# Patient Record
Sex: MALE | Race: WHITE | NOT HISPANIC OR LATINO | Employment: OTHER | URBAN - METROPOLITAN AREA
[De-identification: names, ages, dates, MRNs, and addresses within clinical notes are randomized per-mention and may not be internally consistent; named-entity substitution may affect disease eponyms.]

---

## 2017-08-09 ENCOUNTER — ANESTHESIA EVENT (EMERGENCY)
Dept: GASTROENTEROLOGY | Facility: HOSPITAL | Age: 67
DRG: 375 | End: 2017-08-09
Payer: COMMERCIAL

## 2017-08-09 ENCOUNTER — HOSPITAL ENCOUNTER (INPATIENT)
Facility: HOSPITAL | Age: 67
LOS: 3 days | Discharge: HOME | DRG: 375 | End: 2017-08-12
Attending: EMERGENCY MEDICINE | Admitting: FAMILY MEDICINE
Payer: COMMERCIAL

## 2017-08-09 ENCOUNTER — ANESTHESIA (EMERGENCY)
Dept: GASTROENTEROLOGY | Facility: HOSPITAL | Age: 67
DRG: 375 | End: 2017-08-09
Payer: COMMERCIAL

## 2017-08-09 ENCOUNTER — APPOINTMENT (INPATIENT)
Dept: RADIOLOGY | Facility: HOSPITAL | Age: 67
DRG: 375 | End: 2017-08-09
Payer: COMMERCIAL

## 2017-08-09 DIAGNOSIS — R13.10 DYSPHAGIA: Chronic | ICD-10-CM

## 2017-08-09 DIAGNOSIS — K22.9 ESOPHAGEAL ABNORMALITY: ICD-10-CM

## 2017-08-09 DIAGNOSIS — R13.10 ODYNOPHAGIA: Primary | ICD-10-CM

## 2017-08-09 PROBLEM — K21.9 GERD (GASTROESOPHAGEAL REFLUX DISEASE): Chronic | Status: ACTIVE | Noted: 2017-08-09

## 2017-08-09 PROBLEM — E78.5 HYPERLIPIDEMIA: Chronic | Status: ACTIVE | Noted: 2017-08-09

## 2017-08-09 PROBLEM — I10 ESSENTIAL HYPERTENSION: Chronic | Status: ACTIVE | Noted: 2017-08-09

## 2017-08-09 LAB
ANION GAP BLD CALC-SCNC: 17 MMOL/L (ref 4–13)
ANION GAP SERPL CALCULATED.3IONS-SCNC: 8 MMOL/L (ref 4–13)
BASOPHILS # BLD AUTO: 0.03 THOUSANDS/ΜL (ref 0–0.1)
BASOPHILS NFR BLD AUTO: 0 % (ref 0–1)
BUN BLD-MCNC: 12 MG/DL (ref 5–25)
BUN SERPL-MCNC: 11 MG/DL (ref 5–25)
CA-I BLD-SCNC: 1.11 MMOL/L (ref 1.12–1.32)
CALCIUM SERPL-MCNC: 8.6 MG/DL (ref 8.3–10.1)
CHLORIDE BLD-SCNC: 104 MMOL/L (ref 100–108)
CHLORIDE SERPL-SCNC: 107 MMOL/L (ref 100–108)
CO2 SERPL-SCNC: 24 MMOL/L (ref 21–32)
CREAT BLD-MCNC: 1.1 MG/DL (ref 0.6–1.3)
CREAT SERPL-MCNC: 1.08 MG/DL (ref 0.6–1.3)
EOSINOPHIL # BLD AUTO: 0.1 THOUSAND/ΜL (ref 0–0.61)
EOSINOPHIL NFR BLD AUTO: 1 % (ref 0–6)
ERYTHROCYTE [DISTWIDTH] IN BLOOD BY AUTOMATED COUNT: 13.2 % (ref 11.6–15.1)
GFR SERPL CREATININE-BSD FRML MDRD: 69 ML/MIN/1.73SQ M
GFR SERPL CREATININE-BSD FRML MDRD: 70 ML/MIN/1.73SQ M
GLUCOSE SERPL-MCNC: 111 MG/DL (ref 65–140)
GLUCOSE SERPL-MCNC: 117 MG/DL (ref 65–140)
HCT VFR BLD AUTO: 43.3 % (ref 36.5–49.3)
HCT VFR BLD CALC: 42 % (ref 36.5–49.3)
HGB BLD-MCNC: 15.1 G/DL (ref 12–17)
HGB BLDA-MCNC: 14.3 G/DL (ref 12–17)
LYMPHOCYTES # BLD AUTO: 1.45 THOUSANDS/ΜL (ref 0.6–4.47)
LYMPHOCYTES NFR BLD AUTO: 20 % (ref 14–44)
MCH RBC QN AUTO: 31.9 PG (ref 26.8–34.3)
MCHC RBC AUTO-ENTMCNC: 34.9 G/DL (ref 31.4–37.4)
MCV RBC AUTO: 92 FL (ref 82–98)
MONOCYTES # BLD AUTO: 0.6 THOUSAND/ΜL (ref 0.17–1.22)
MONOCYTES NFR BLD AUTO: 8 % (ref 4–12)
NEUTROPHILS # BLD AUTO: 5.22 THOUSANDS/ΜL (ref 1.85–7.62)
NEUTS SEG NFR BLD AUTO: 71 % (ref 43–75)
NRBC BLD AUTO-RTO: 0 /100 WBCS
PCO2 BLD: 25 MMOL/L (ref 21–32)
PLATELET # BLD AUTO: 211 THOUSANDS/UL (ref 149–390)
PMV BLD AUTO: 9 FL (ref 8.9–12.7)
POTASSIUM BLD-SCNC: 3.9 MMOL/L (ref 3.5–5.3)
POTASSIUM SERPL-SCNC: 3.8 MMOL/L (ref 3.5–5.3)
RBC # BLD AUTO: 4.73 MILLION/UL (ref 3.88–5.62)
SODIUM BLD-SCNC: 142 MMOL/L (ref 136–145)
SODIUM SERPL-SCNC: 139 MMOL/L (ref 136–145)
SPECIMEN SOURCE: ABNORMAL
WBC # BLD AUTO: 7.42 THOUSAND/UL (ref 4.31–10.16)

## 2017-08-09 PROCEDURE — 71260 CT THORAX DX C+: CPT

## 2017-08-09 PROCEDURE — 0DB38ZX EXCISION OF LOWER ESOPHAGUS, VIA NATURAL OR ARTIFICIAL OPENING ENDOSCOPIC, DIAGNOSTIC: ICD-10-PCS | Performed by: INTERNAL MEDICINE

## 2017-08-09 PROCEDURE — 80048 BASIC METABOLIC PNL TOTAL CA: CPT | Performed by: EMERGENCY MEDICINE

## 2017-08-09 PROCEDURE — 36415 COLL VENOUS BLD VENIPUNCTURE: CPT | Performed by: EMERGENCY MEDICINE

## 2017-08-09 PROCEDURE — 88305 TISSUE EXAM BY PATHOLOGIST: CPT | Performed by: INTERNAL MEDICINE

## 2017-08-09 PROCEDURE — 99285 EMERGENCY DEPT VISIT HI MDM: CPT

## 2017-08-09 PROCEDURE — 85025 COMPLETE CBC W/AUTO DIFF WBC: CPT | Performed by: EMERGENCY MEDICINE

## 2017-08-09 PROCEDURE — 80047 BASIC METABLC PNL IONIZED CA: CPT

## 2017-08-09 PROCEDURE — 85014 HEMATOCRIT: CPT

## 2017-08-09 PROCEDURE — 74177 CT ABD & PELVIS W/CONTRAST: CPT

## 2017-08-09 RX ORDER — SODIUM CHLORIDE 9 MG/ML
125 INJECTION, SOLUTION INTRAVENOUS CONTINUOUS
Status: DISCONTINUED | OUTPATIENT
Start: 2017-08-09 | End: 2017-08-12 | Stop reason: HOSPADM

## 2017-08-09 RX ORDER — PRAVASTATIN SODIUM 80 MG/1
80 TABLET ORAL
Status: DISCONTINUED | OUTPATIENT
Start: 2017-08-09 | End: 2017-08-12 | Stop reason: HOSPADM

## 2017-08-09 RX ORDER — ONDANSETRON 2 MG/ML
4 INJECTION INTRAMUSCULAR; INTRAVENOUS EVERY 6 HOURS PRN
Status: DISCONTINUED | OUTPATIENT
Start: 2017-08-09 | End: 2017-08-12 | Stop reason: HOSPADM

## 2017-08-09 RX ORDER — AMLODIPINE BESYLATE 5 MG/1
5 TABLET ORAL DAILY
Status: DISCONTINUED | OUTPATIENT
Start: 2017-08-10 | End: 2017-08-12 | Stop reason: HOSPADM

## 2017-08-09 RX ORDER — PROPOFOL 10 MG/ML
INJECTION, EMULSION INTRAVENOUS AS NEEDED
Status: DISCONTINUED | OUTPATIENT
Start: 2017-08-09 | End: 2017-08-09 | Stop reason: SURG

## 2017-08-09 RX ORDER — LIDOCAINE HYDROCHLORIDE 10 MG/ML
INJECTION, SOLUTION EPIDURAL; INFILTRATION; INTRACAUDAL; PERINEURAL AS NEEDED
Status: DISCONTINUED | OUTPATIENT
Start: 2017-08-09 | End: 2017-08-09 | Stop reason: SURG

## 2017-08-09 RX ORDER — SODIUM CHLORIDE 9 MG/ML
INJECTION, SOLUTION INTRAVENOUS CONTINUOUS PRN
Status: DISCONTINUED | OUTPATIENT
Start: 2017-08-09 | End: 2017-08-09 | Stop reason: SURG

## 2017-08-09 RX ORDER — METOPROLOL TARTRATE 50 MG/1
50 TABLET, FILM COATED ORAL EVERY 12 HOURS SCHEDULED
Status: DISCONTINUED | OUTPATIENT
Start: 2017-08-09 | End: 2017-08-12 | Stop reason: HOSPADM

## 2017-08-09 RX ORDER — ACETAMINOPHEN 325 MG/1
650 TABLET ORAL EVERY 6 HOURS PRN
Status: DISCONTINUED | OUTPATIENT
Start: 2017-08-09 | End: 2017-08-12 | Stop reason: HOSPADM

## 2017-08-09 RX ORDER — ASPIRIN 81 MG/1
81 TABLET, CHEWABLE ORAL DAILY
COMMUNITY

## 2017-08-09 RX ORDER — PANTOPRAZOLE SODIUM 40 MG/1
40 TABLET, DELAYED RELEASE ORAL
Status: DISCONTINUED | OUTPATIENT
Start: 2017-08-10 | End: 2017-08-09 | Stop reason: SDUPTHER

## 2017-08-09 RX ORDER — ASPIRIN 81 MG/1
81 TABLET, CHEWABLE ORAL DAILY
Status: DISCONTINUED | OUTPATIENT
Start: 2017-08-10 | End: 2017-08-12 | Stop reason: HOSPADM

## 2017-08-09 RX ORDER — PROPOFOL 10 MG/ML
INJECTION, EMULSION INTRAVENOUS CONTINUOUS PRN
Status: DISCONTINUED | OUTPATIENT
Start: 2017-08-09 | End: 2017-08-09 | Stop reason: SURG

## 2017-08-09 RX ORDER — METOPROLOL TARTRATE 50 MG/1
50 TABLET, FILM COATED ORAL 2 TIMES DAILY
COMMUNITY
End: 2021-01-01 | Stop reason: HOSPADM

## 2017-08-09 RX ORDER — AMLODIPINE BESYLATE 5 MG/1
5 TABLET ORAL DAILY
COMMUNITY
End: 2021-01-01 | Stop reason: HOSPADM

## 2017-08-09 RX ORDER — PANTOPRAZOLE SODIUM 40 MG/1
40 TABLET, DELAYED RELEASE ORAL
Status: DISCONTINUED | OUTPATIENT
Start: 2017-08-10 | End: 2017-08-12 | Stop reason: HOSPADM

## 2017-08-09 RX ORDER — SIMVASTATIN 40 MG
40 TABLET ORAL
COMMUNITY

## 2017-08-09 RX ADMIN — PROPOFOL 120 MCG/KG/MIN: 10 INJECTION, EMULSION INTRAVENOUS at 12:29

## 2017-08-09 RX ADMIN — METOPROLOL TARTRATE 50 MG: 50 TABLET ORAL at 22:03

## 2017-08-09 RX ADMIN — IOHEXOL 100 ML: 350 INJECTION, SOLUTION INTRAVENOUS at 16:23

## 2017-08-09 RX ADMIN — PRAVASTATIN SODIUM 80 MG: 80 TABLET ORAL at 17:51

## 2017-08-09 RX ADMIN — PROPOFOL 120 MG: 10 INJECTION, EMULSION INTRAVENOUS at 12:29

## 2017-08-09 RX ADMIN — SODIUM CHLORIDE 125 ML/HR: 0.9 INJECTION, SOLUTION INTRAVENOUS at 19:20

## 2017-08-09 RX ADMIN — LIDOCAINE HYDROCHLORIDE 100 MG: 10 INJECTION, SOLUTION EPIDURAL; INFILTRATION; INTRACAUDAL; PERINEURAL at 12:29

## 2017-08-09 RX ADMIN — SODIUM CHLORIDE: 0.9 INJECTION, SOLUTION INTRAVENOUS at 12:21

## 2017-08-10 LAB
ALBUMIN SERPL BCP-MCNC: 3.5 G/DL (ref 3.5–5)
ALP SERPL-CCNC: 70 U/L (ref 46–116)
ALT SERPL W P-5'-P-CCNC: 19 U/L (ref 12–78)
ANION GAP SERPL CALCULATED.3IONS-SCNC: 10 MMOL/L (ref 4–13)
AST SERPL W P-5'-P-CCNC: 21 U/L (ref 5–45)
BASOPHILS # BLD AUTO: 0.02 THOUSANDS/ΜL (ref 0–0.1)
BASOPHILS NFR BLD AUTO: 0 % (ref 0–1)
BILIRUB SERPL-MCNC: 0.63 MG/DL (ref 0.2–1)
BUN SERPL-MCNC: 10 MG/DL (ref 5–25)
CALCIUM SERPL-MCNC: 8.3 MG/DL (ref 8.3–10.1)
CHLORIDE SERPL-SCNC: 108 MMOL/L (ref 100–108)
CO2 SERPL-SCNC: 23 MMOL/L (ref 21–32)
CREAT SERPL-MCNC: 1.09 MG/DL (ref 0.6–1.3)
EOSINOPHIL # BLD AUTO: 0.19 THOUSAND/ΜL (ref 0–0.61)
EOSINOPHIL NFR BLD AUTO: 3 % (ref 0–6)
ERYTHROCYTE [DISTWIDTH] IN BLOOD BY AUTOMATED COUNT: 13.2 % (ref 11.6–15.1)
GFR SERPL CREATININE-BSD FRML MDRD: 69 ML/MIN/1.73SQ M
GLUCOSE SERPL-MCNC: 102 MG/DL (ref 65–140)
HCT VFR BLD AUTO: 42.4 % (ref 36.5–49.3)
HGB BLD-MCNC: 14.6 G/DL (ref 12–17)
INR PPP: 1.03 (ref 0.86–1.16)
LYMPHOCYTES # BLD AUTO: 1.64 THOUSANDS/ΜL (ref 0.6–4.47)
LYMPHOCYTES NFR BLD AUTO: 23 % (ref 14–44)
MCH RBC QN AUTO: 31.7 PG (ref 26.8–34.3)
MCHC RBC AUTO-ENTMCNC: 34.4 G/DL (ref 31.4–37.4)
MCV RBC AUTO: 92 FL (ref 82–98)
MONOCYTES # BLD AUTO: 0.63 THOUSAND/ΜL (ref 0.17–1.22)
MONOCYTES NFR BLD AUTO: 9 % (ref 4–12)
NEUTROPHILS # BLD AUTO: 4.68 THOUSANDS/ΜL (ref 1.85–7.62)
NEUTS SEG NFR BLD AUTO: 65 % (ref 43–75)
NRBC BLD AUTO-RTO: 0 /100 WBCS
PLATELET # BLD AUTO: 213 THOUSANDS/UL (ref 149–390)
PLATELET # BLD AUTO: 224 THOUSANDS/UL (ref 149–390)
PMV BLD AUTO: 9 FL (ref 8.9–12.7)
PMV BLD AUTO: 9.1 FL (ref 8.9–12.7)
POTASSIUM SERPL-SCNC: 3.8 MMOL/L (ref 3.5–5.3)
PROT SERPL-MCNC: 6.8 G/DL (ref 6.4–8.2)
PROTHROMBIN TIME: 13.5 SECONDS (ref 12.1–14.4)
RBC # BLD AUTO: 4.6 MILLION/UL (ref 3.88–5.62)
SODIUM SERPL-SCNC: 141 MMOL/L (ref 136–145)
WBC # BLD AUTO: 7.18 THOUSAND/UL (ref 4.31–10.16)

## 2017-08-10 PROCEDURE — 80053 COMPREHEN METABOLIC PANEL: CPT | Performed by: INTERNAL MEDICINE

## 2017-08-10 PROCEDURE — 85610 PROTHROMBIN TIME: CPT | Performed by: PHYSICIAN ASSISTANT

## 2017-08-10 PROCEDURE — 85025 COMPLETE CBC W/AUTO DIFF WBC: CPT | Performed by: INTERNAL MEDICINE

## 2017-08-10 PROCEDURE — 85049 AUTOMATED PLATELET COUNT: CPT | Performed by: INTERNAL MEDICINE

## 2017-08-10 RX ORDER — HEPARIN SODIUM 5000 [USP'U]/ML
5000 INJECTION, SOLUTION INTRAVENOUS; SUBCUTANEOUS EVERY 8 HOURS SCHEDULED
Status: DISCONTINUED | OUTPATIENT
Start: 2017-08-10 | End: 2017-08-12 | Stop reason: HOSPADM

## 2017-08-10 RX ADMIN — SODIUM CHLORIDE 125 ML/HR: 0.9 INJECTION, SOLUTION INTRAVENOUS at 19:01

## 2017-08-10 RX ADMIN — HEPARIN SODIUM 5000 UNITS: 5000 INJECTION, SOLUTION INTRAVENOUS; SUBCUTANEOUS at 14:01

## 2017-08-10 RX ADMIN — METOPROLOL TARTRATE 50 MG: 50 TABLET ORAL at 20:01

## 2017-08-10 RX ADMIN — PRAVASTATIN SODIUM 80 MG: 80 TABLET ORAL at 20:01

## 2017-08-10 RX ADMIN — SODIUM CHLORIDE 125 ML/HR: 0.9 INJECTION, SOLUTION INTRAVENOUS at 03:26

## 2017-08-10 RX ADMIN — PANTOPRAZOLE SODIUM 40 MG: 40 TABLET, DELAYED RELEASE ORAL at 05:57

## 2017-08-10 RX ADMIN — AMLODIPINE BESYLATE 5 MG: 5 TABLET ORAL at 10:25

## 2017-08-10 RX ADMIN — METOPROLOL TARTRATE 50 MG: 50 TABLET ORAL at 10:25

## 2017-08-10 RX ADMIN — SODIUM CHLORIDE 125 ML/HR: 0.9 INJECTION, SOLUTION INTRAVENOUS at 10:31

## 2017-08-10 RX ADMIN — ASPIRIN 81 MG 81 MG: 81 TABLET ORAL at 10:25

## 2017-08-11 ENCOUNTER — APPOINTMENT (INPATIENT)
Dept: RADIOLOGY | Facility: HOSPITAL | Age: 67
DRG: 375 | End: 2017-08-11
Payer: COMMERCIAL

## 2017-08-11 ENCOUNTER — ANESTHESIA (INPATIENT)
Dept: GASTROENTEROLOGY | Facility: HOSPITAL | Age: 67
DRG: 375 | End: 2017-08-11
Payer: COMMERCIAL

## 2017-08-11 ENCOUNTER — APPOINTMENT (INPATIENT)
Dept: RADIOLOGY | Facility: HOSPITAL | Age: 67
DRG: 375 | End: 2017-08-11
Attending: INTERNAL MEDICINE
Payer: COMMERCIAL

## 2017-08-11 ENCOUNTER — ANESTHESIA EVENT (INPATIENT)
Dept: GASTROENTEROLOGY | Facility: HOSPITAL | Age: 67
DRG: 375 | End: 2017-08-11
Payer: COMMERCIAL

## 2017-08-11 PROCEDURE — 99152 MOD SED SAME PHYS/QHP 5/>YRS: CPT

## 2017-08-11 PROCEDURE — 88305 TISSUE EXAM BY PATHOLOGIST: CPT | Performed by: FAMILY MEDICINE

## 2017-08-11 PROCEDURE — 0BBD3ZX EXCISION OF RIGHT MIDDLE LUNG LOBE, PERCUTANEOUS APPROACH, DIAGNOSTIC: ICD-10-PCS | Performed by: RADIOLOGY

## 2017-08-11 PROCEDURE — 88333 PATH CONSLTJ SURG CYTO XM 1: CPT | Performed by: FAMILY MEDICINE

## 2017-08-11 PROCEDURE — 0D738DZ DILATION OF LOWER ESOPHAGUS WITH INTRALUMINAL DEVICE, VIA NATURAL OR ARTIFICIAL OPENING ENDOSCOPIC: ICD-10-PCS | Performed by: INTERNAL MEDICINE

## 2017-08-11 PROCEDURE — 76000 FLUOROSCOPY <1 HR PHYS/QHP: CPT

## 2017-08-11 PROCEDURE — C1874 STENT, COATED/COV W/DEL SYS: HCPCS | Performed by: INTERNAL MEDICINE

## 2017-08-11 PROCEDURE — 32405 HB PERCUT BX LUNG/MEDIASTINUM: CPT

## 2017-08-11 PROCEDURE — 99153 MOD SED SAME PHYS/QHP EA: CPT

## 2017-08-11 PROCEDURE — 88305 TISSUE EXAM BY PATHOLOGIST: CPT | Performed by: INTERNAL MEDICINE

## 2017-08-11 PROCEDURE — 88341 IMHCHEM/IMCYTCHM EA ADD ANTB: CPT | Performed by: FAMILY MEDICINE

## 2017-08-11 PROCEDURE — 77012 CT SCAN FOR NEEDLE BIOPSY: CPT

## 2017-08-11 PROCEDURE — 88342 IMHCHEM/IMCYTCHM 1ST ANTB: CPT | Performed by: FAMILY MEDICINE

## 2017-08-11 PROCEDURE — 88313 SPECIAL STAINS GROUP 2: CPT | Performed by: INTERNAL MEDICINE

## 2017-08-11 PROCEDURE — 0DB68ZX EXCISION OF STOMACH, VIA NATURAL OR ARTIFICIAL OPENING ENDOSCOPIC, DIAGNOSTIC: ICD-10-PCS | Performed by: INTERNAL MEDICINE

## 2017-08-11 PROCEDURE — C1769 GUIDE WIRE: HCPCS | Performed by: INTERNAL MEDICINE

## 2017-08-11 DEVICE — STENT SYSTEM
Type: IMPLANTABLE DEVICE | Site: ESOPHAGUS | Status: NON-FUNCTIONAL
Brand: WALLFLEX™ ESOPHAGEAL
Removed: 2020-02-11

## 2017-08-11 DEVICE — STENT SYSTEM
Type: IMPLANTABLE DEVICE | Site: ESOPHAGUS | Status: FUNCTIONAL
Brand: WALLFLEX™ ESOPHAGEAL

## 2017-08-11 RX ORDER — MIDAZOLAM HYDROCHLORIDE 1 MG/ML
INJECTION INTRAMUSCULAR; INTRAVENOUS CODE/TRAUMA/SEDATION MEDICATION
Status: COMPLETED | OUTPATIENT
Start: 2017-08-11 | End: 2017-08-11

## 2017-08-11 RX ORDER — PROPOFOL 10 MG/ML
INJECTION, EMULSION INTRAVENOUS AS NEEDED
Status: DISCONTINUED | OUTPATIENT
Start: 2017-08-11 | End: 2017-08-11 | Stop reason: SURG

## 2017-08-11 RX ORDER — FENTANYL CITRATE 50 UG/ML
INJECTION, SOLUTION INTRAMUSCULAR; INTRAVENOUS CODE/TRAUMA/SEDATION MEDICATION
Status: COMPLETED | OUTPATIENT
Start: 2017-08-11 | End: 2017-08-11

## 2017-08-11 RX ORDER — PROPOFOL 10 MG/ML
INJECTION, EMULSION INTRAVENOUS CONTINUOUS PRN
Status: DISCONTINUED | OUTPATIENT
Start: 2017-08-11 | End: 2017-08-11 | Stop reason: SURG

## 2017-08-11 RX ADMIN — PROPOFOL 50 MG: 10 INJECTION, EMULSION INTRAVENOUS at 12:18

## 2017-08-11 RX ADMIN — PRAVASTATIN SODIUM 80 MG: 80 TABLET ORAL at 17:57

## 2017-08-11 RX ADMIN — ASPIRIN 81 MG 81 MG: 81 TABLET ORAL at 08:03

## 2017-08-11 RX ADMIN — SODIUM CHLORIDE 125 ML/HR: 0.9 INJECTION, SOLUTION INTRAVENOUS at 16:32

## 2017-08-11 RX ADMIN — PROPOFOL 50 MG: 10 INJECTION, EMULSION INTRAVENOUS at 12:21

## 2017-08-11 RX ADMIN — HEPARIN SODIUM 5000 UNITS: 5000 INJECTION, SOLUTION INTRAVENOUS; SUBCUTANEOUS at 22:23

## 2017-08-11 RX ADMIN — MIDAZOLAM 1 MG: 1 INJECTION INTRAMUSCULAR; INTRAVENOUS at 09:30

## 2017-08-11 RX ADMIN — PANTOPRAZOLE SODIUM 40 MG: 40 TABLET, DELAYED RELEASE ORAL at 05:57

## 2017-08-11 RX ADMIN — HEPARIN SODIUM 5000 UNITS: 5000 INJECTION, SOLUTION INTRAVENOUS; SUBCUTANEOUS at 01:39

## 2017-08-11 RX ADMIN — METOPROLOL TARTRATE 50 MG: 50 TABLET ORAL at 22:23

## 2017-08-11 RX ADMIN — AMLODIPINE BESYLATE 5 MG: 5 TABLET ORAL at 08:03

## 2017-08-11 RX ADMIN — METOPROLOL TARTRATE 50 MG: 50 TABLET ORAL at 08:03

## 2017-08-11 RX ADMIN — SODIUM CHLORIDE: 0.9 INJECTION, SOLUTION INTRAVENOUS at 12:12

## 2017-08-11 RX ADMIN — FENTANYL CITRATE 50 MCG: 50 INJECTION INTRAMUSCULAR; INTRAVENOUS at 09:30

## 2017-08-11 RX ADMIN — PROPOFOL 150 MCG/KG/MIN: 10 INJECTION, EMULSION INTRAVENOUS at 12:20

## 2017-08-11 RX ADMIN — SODIUM CHLORIDE 125 ML/HR: 0.9 INJECTION, SOLUTION INTRAVENOUS at 01:42

## 2017-08-12 RX ORDER — PANTOPRAZOLE SODIUM 40 MG/1
40 TABLET, DELAYED RELEASE ORAL
Qty: 30 TABLET | Refills: 0 | Status: SHIPPED | OUTPATIENT
Start: 2017-08-12

## 2017-08-12 RX ADMIN — METOPROLOL TARTRATE 50 MG: 50 TABLET ORAL at 09:29

## 2017-08-12 RX ADMIN — AMLODIPINE BESYLATE 5 MG: 5 TABLET ORAL at 09:29

## 2017-08-12 RX ADMIN — HEPARIN SODIUM 5000 UNITS: 5000 INJECTION, SOLUTION INTRAVENOUS; SUBCUTANEOUS at 14:06

## 2017-08-12 RX ADMIN — ASPIRIN 81 MG 81 MG: 81 TABLET ORAL at 09:29

## 2017-08-12 RX ADMIN — PANTOPRAZOLE SODIUM 40 MG: 40 TABLET, DELAYED RELEASE ORAL at 06:54

## 2017-08-12 RX ADMIN — SODIUM CHLORIDE 125 ML/HR: 0.9 INJECTION, SOLUTION INTRAVENOUS at 00:49

## 2017-08-12 RX ADMIN — HEPARIN SODIUM 5000 UNITS: 5000 INJECTION, SOLUTION INTRAVENOUS; SUBCUTANEOUS at 06:54

## 2017-08-12 RX ADMIN — SODIUM CHLORIDE 125 ML/HR: 0.9 INJECTION, SOLUTION INTRAVENOUS at 09:26

## 2017-08-16 ENCOUNTER — GENERIC CONVERSION - ENCOUNTER (OUTPATIENT)
Dept: OTHER | Facility: OTHER | Age: 67
End: 2017-08-16

## 2017-08-16 VITALS
SYSTOLIC BLOOD PRESSURE: 135 MMHG | HEART RATE: 70 BPM | RESPIRATION RATE: 18 BRPM | WEIGHT: 200 LBS | DIASTOLIC BLOOD PRESSURE: 70 MMHG | BODY MASS INDEX: 31.39 KG/M2 | HEIGHT: 67 IN | TEMPERATURE: 98.1 F | OXYGEN SATURATION: 97 %

## 2017-08-22 ENCOUNTER — ALLSCRIPTS OFFICE VISIT (OUTPATIENT)
Dept: OTHER | Facility: OTHER | Age: 67
End: 2017-08-22

## 2017-08-24 ENCOUNTER — TELEPHONE (OUTPATIENT)
Dept: RADIOLOGY | Facility: HOSPITAL | Age: 67
End: 2017-08-24

## 2017-08-24 DIAGNOSIS — C15.9 MALIGNANT NEOPLASM OF ESOPHAGUS (HCC): ICD-10-CM

## 2017-08-24 RX ORDER — SODIUM CHLORIDE 9 MG/ML
75 INJECTION, SOLUTION INTRAVENOUS CONTINUOUS
Status: CANCELLED | OUTPATIENT
Start: 2017-08-24

## 2017-08-28 ENCOUNTER — TRANSCRIBE ORDERS (OUTPATIENT)
Dept: ADMINISTRATIVE | Facility: HOSPITAL | Age: 67
End: 2017-08-28

## 2017-08-28 ENCOUNTER — TELEPHONE (OUTPATIENT)
Dept: SURGERY | Facility: HOSPITAL | Age: 67
End: 2017-08-28

## 2017-08-28 ENCOUNTER — HOSPITAL ENCOUNTER (OUTPATIENT)
Dept: RADIOLOGY | Facility: HOSPITAL | Age: 67
Discharge: HOME | End: 2017-08-28
Attending: INTERNAL MEDICINE | Admitting: RADIOLOGY
Payer: COMMERCIAL

## 2017-08-28 VITALS
RESPIRATION RATE: 20 BRPM | HEART RATE: 68 BPM | SYSTOLIC BLOOD PRESSURE: 104 MMHG | TEMPERATURE: 97.5 F | DIASTOLIC BLOOD PRESSURE: 59 MMHG | BODY MASS INDEX: 31.86 KG/M2 | WEIGHT: 203 LBS | OXYGEN SATURATION: 95 % | HEIGHT: 67 IN

## 2017-08-28 DIAGNOSIS — C15.9 ESOPHAGEAL CANCER (HCC): ICD-10-CM

## 2017-08-28 DIAGNOSIS — C15.9 MALIGNANT NEOPLASM OF ESOPHAGUS, UNSPECIFIED LOCATION (HCC): Primary | ICD-10-CM

## 2017-08-28 PROCEDURE — C1788 PORT, INDWELLING, IMP: HCPCS

## 2017-08-28 PROCEDURE — 76937 US GUIDE VASCULAR ACCESS: CPT

## 2017-08-28 PROCEDURE — 36561 INSERT TUNNELED CV CATH: CPT

## 2017-08-28 PROCEDURE — 77001 FLUOROGUIDE FOR VEIN DEVICE: CPT

## 2017-08-28 PROCEDURE — 99153 MOD SED SAME PHYS/QHP EA: CPT

## 2017-08-28 PROCEDURE — 99152 MOD SED SAME PHYS/QHP 5/>YRS: CPT

## 2017-08-28 RX ORDER — MIDAZOLAM HYDROCHLORIDE 1 MG/ML
INJECTION INTRAMUSCULAR; INTRAVENOUS CODE/TRAUMA/SEDATION MEDICATION
Status: COMPLETED | OUTPATIENT
Start: 2017-08-28 | End: 2017-08-28

## 2017-08-28 RX ORDER — FENTANYL CITRATE 50 UG/ML
INJECTION, SOLUTION INTRAMUSCULAR; INTRAVENOUS CODE/TRAUMA/SEDATION MEDICATION
Status: COMPLETED | OUTPATIENT
Start: 2017-08-28 | End: 2017-08-28

## 2017-08-28 RX ORDER — HYDROCODONE BITARTRATE AND ACETAMINOPHEN 5; 325 MG/1; MG/1
1 TABLET ORAL EVERY 6 HOURS PRN
Status: DISCONTINUED | OUTPATIENT
Start: 2017-08-28 | End: 2017-08-29 | Stop reason: HOSPADM

## 2017-08-28 RX ORDER — SODIUM CHLORIDE 9 MG/ML
75 INJECTION, SOLUTION INTRAVENOUS CONTINUOUS
Status: DISCONTINUED | OUTPATIENT
Start: 2017-08-28 | End: 2017-08-29 | Stop reason: HOSPADM

## 2017-08-28 RX ADMIN — MIDAZOLAM HYDROCHLORIDE 1 MG: 1 INJECTION, SOLUTION INTRAMUSCULAR; INTRAVENOUS at 11:16

## 2017-08-28 RX ADMIN — FENTANYL CITRATE 50 MCG: 50 INJECTION INTRAMUSCULAR; INTRAVENOUS at 11:10

## 2017-08-28 RX ADMIN — MIDAZOLAM HYDROCHLORIDE 1 MG: 1 INJECTION, SOLUTION INTRAMUSCULAR; INTRAVENOUS at 11:10

## 2017-08-28 RX ADMIN — FENTANYL CITRATE 50 MCG: 50 INJECTION INTRAMUSCULAR; INTRAVENOUS at 11:21

## 2017-08-28 RX ADMIN — MIDAZOLAM HYDROCHLORIDE 1 MG: 1 INJECTION, SOLUTION INTRAMUSCULAR; INTRAVENOUS at 11:21

## 2017-08-28 RX ADMIN — MIDAZOLAM HYDROCHLORIDE 1 MG: 1 INJECTION, SOLUTION INTRAMUSCULAR; INTRAVENOUS at 11:27

## 2017-08-28 RX ADMIN — FENTANYL CITRATE 50 MCG: 50 INJECTION INTRAMUSCULAR; INTRAVENOUS at 11:27

## 2017-09-01 LAB
SCAN RESULT: NORMAL
SCAN RESULT: NORMAL

## 2017-09-07 ENCOUNTER — HOSPITAL ENCOUNTER (OUTPATIENT)
Dept: NON INVASIVE DIAGNOSTICS | Facility: CLINIC | Age: 67
Discharge: HOME | End: 2017-09-07
Payer: COMMERCIAL

## 2017-09-07 DIAGNOSIS — C15.9 MALIGNANT NEOPLASM OF ESOPHAGUS, UNSPECIFIED LOCATION (HCC): ICD-10-CM

## 2017-09-07 PROCEDURE — 93306 TTE W/DOPPLER COMPLETE: CPT

## 2017-09-11 ENCOUNTER — APPOINTMENT (OUTPATIENT)
Dept: LAB | Facility: CLINIC | Age: 67
End: 2017-09-11
Payer: COMMERCIAL

## 2017-09-11 DIAGNOSIS — C15.9 MALIGNANT NEOPLASM OF ESOPHAGUS (HCC): ICD-10-CM

## 2017-09-11 LAB
ALBUMIN SERPL BCP-MCNC: 3.9 G/DL (ref 3.5–5)
ALP SERPL-CCNC: 65 U/L (ref 46–116)
ALT SERPL W P-5'-P-CCNC: 31 U/L (ref 12–78)
ANION GAP SERPL CALCULATED.3IONS-SCNC: 10 MMOL/L (ref 4–13)
AST SERPL W P-5'-P-CCNC: 24 U/L (ref 5–45)
BASOPHILS # BLD AUTO: 0.06 THOUSANDS/ΜL (ref 0–0.1)
BASOPHILS NFR BLD AUTO: 1 % (ref 0–1)
BILIRUB SERPL-MCNC: 0.3 MG/DL (ref 0.2–1)
BUN SERPL-MCNC: 12 MG/DL (ref 5–25)
CALCIUM SERPL-MCNC: 8.8 MG/DL (ref 8.3–10.1)
CHLORIDE SERPL-SCNC: 105 MMOL/L (ref 100–108)
CO2 SERPL-SCNC: 27 MMOL/L (ref 21–32)
CREAT SERPL-MCNC: 1.44 MG/DL (ref 0.6–1.3)
EOSINOPHIL # BLD AUTO: 0.22 THOUSAND/ΜL (ref 0–0.61)
EOSINOPHIL NFR BLD AUTO: 3 % (ref 0–6)
ERYTHROCYTE [DISTWIDTH] IN BLOOD BY AUTOMATED COUNT: 13.2 % (ref 11.6–15.1)
GFR SERPL CREATININE-BSD FRML MDRD: 50 ML/MIN/1.73SQ M
GLUCOSE SERPL-MCNC: 92 MG/DL (ref 65–140)
HCT VFR BLD AUTO: 44.4 % (ref 36.5–49.3)
HGB BLD-MCNC: 15 G/DL (ref 12–17)
LYMPHOCYTES # BLD AUTO: 1.76 THOUSANDS/ΜL (ref 0.6–4.47)
LYMPHOCYTES NFR BLD AUTO: 26 % (ref 14–44)
MCH RBC QN AUTO: 31 PG (ref 26.8–34.3)
MCHC RBC AUTO-ENTMCNC: 33.8 G/DL (ref 31.4–37.4)
MCV RBC AUTO: 92 FL (ref 82–98)
MONOCYTES # BLD AUTO: 0.5 THOUSAND/ΜL (ref 0.17–1.22)
MONOCYTES NFR BLD AUTO: 7 % (ref 4–12)
NEUTROPHILS # BLD AUTO: 4.29 THOUSANDS/ΜL (ref 1.85–7.62)
NEUTS SEG NFR BLD AUTO: 63 % (ref 43–75)
PLATELET # BLD AUTO: 235 THOUSANDS/UL (ref 149–390)
PMV BLD AUTO: 8.9 FL (ref 8.9–12.7)
POTASSIUM SERPL-SCNC: 3.7 MMOL/L (ref 3.5–5.3)
PROT SERPL-MCNC: 7.5 G/DL (ref 6.4–8.2)
RBC # BLD AUTO: 4.84 MILLION/UL (ref 3.88–5.62)
SODIUM SERPL-SCNC: 142 MMOL/L (ref 136–145)
WBC # BLD AUTO: 6.83 THOUSAND/UL (ref 4.31–10.16)

## 2017-09-11 PROCEDURE — 80053 COMPREHEN METABOLIC PANEL: CPT

## 2017-09-11 PROCEDURE — 85025 COMPLETE CBC W/AUTO DIFF WBC: CPT

## 2017-09-11 PROCEDURE — 36415 COLL VENOUS BLD VENIPUNCTURE: CPT

## 2017-09-12 RX ORDER — DEXTROSE MONOHYDRATE 50 MG/ML
20 INJECTION, SOLUTION INTRAVENOUS CONTINUOUS
Status: DISCONTINUED | OUTPATIENT
Start: 2017-09-13 | End: 2017-09-16 | Stop reason: HOSPADM

## 2017-09-12 RX ORDER — SODIUM CHLORIDE 9 MG/ML
20 INJECTION, SOLUTION INTRAVENOUS CONTINUOUS
Status: DISCONTINUED | OUTPATIENT
Start: 2017-09-13 | End: 2017-09-16 | Stop reason: HOSPADM

## 2017-09-12 RX ORDER — FLUOROURACIL 50 MG/ML
800 INJECTION, SOLUTION INTRAVENOUS ONCE
Status: COMPLETED | OUTPATIENT
Start: 2017-09-13 | End: 2017-09-13

## 2017-09-13 ENCOUNTER — GENERIC CONVERSION - ENCOUNTER (OUTPATIENT)
Dept: OTHER | Facility: OTHER | Age: 67
End: 2017-09-13

## 2017-09-13 ENCOUNTER — HOSPITAL ENCOUNTER (OUTPATIENT)
Dept: INFUSION CENTER | Facility: CLINIC | Age: 67
Discharge: HOME | End: 2017-09-13
Payer: COMMERCIAL

## 2017-09-13 VITALS
DIASTOLIC BLOOD PRESSURE: 68 MMHG | RESPIRATION RATE: 18 BRPM | HEIGHT: 67 IN | TEMPERATURE: 98.7 F | WEIGHT: 201.28 LBS | BODY MASS INDEX: 31.59 KG/M2 | SYSTOLIC BLOOD PRESSURE: 119 MMHG | HEART RATE: 68 BPM

## 2017-09-13 PROCEDURE — 96417 CHEMO IV INFUS EACH ADDL SEQ: CPT

## 2017-09-13 PROCEDURE — 96415 CHEMO IV INFUSION ADDL HR: CPT

## 2017-09-13 PROCEDURE — 96368 THER/DIAG CONCURRENT INF: CPT

## 2017-09-13 PROCEDURE — 96413 CHEMO IV INFUSION 1 HR: CPT

## 2017-09-13 PROCEDURE — 96411 CHEMO IV PUSH ADDL DRUG: CPT

## 2017-09-13 PROCEDURE — 96367 TX/PROPH/DG ADDL SEQ IV INF: CPT

## 2017-09-13 RX ADMIN — DEXAMETHASONE SODIUM PHOSPHATE: 10 INJECTION, SOLUTION INTRAMUSCULAR; INTRAVENOUS at 09:59

## 2017-09-13 RX ADMIN — SODIUM CHLORIDE 20 ML/HR: 0.9 INJECTION, SOLUTION INTRAVENOUS at 10:03

## 2017-09-13 RX ADMIN — Medication 300 UNITS: at 14:40

## 2017-09-13 RX ADMIN — OXALIPLATIN 173 MG: 5 INJECTION, SOLUTION, CONCENTRATE INTRAVENOUS at 12:27

## 2017-09-13 RX ADMIN — LEUCOVORIN CALCIUM 800 MG: 200 INJECTION, POWDER, LYOPHILIZED, FOR SOLUTION INTRAMUSCULAR; INTRAVENOUS at 12:17

## 2017-09-13 RX ADMIN — TRASTUZUMAB 552 MG: 150 INJECTION, POWDER, LYOPHILIZED, FOR SOLUTION INTRAVENOUS at 10:20

## 2017-09-13 RX ADMIN — FLUOROURACIL 800 MG: 50 INJECTION, SOLUTION INTRAVENOUS at 14:26

## 2017-09-13 RX ADMIN — DEXTROSE 20 ML/HR: 5 SOLUTION INTRAVENOUS at 12:00

## 2017-09-13 NOTE — PLAN OF CARE

## 2017-09-20 ENCOUNTER — GENERIC CONVERSION - ENCOUNTER (OUTPATIENT)
Dept: OTHER | Facility: OTHER | Age: 67
End: 2017-09-20

## 2017-09-26 ENCOUNTER — APPOINTMENT (OUTPATIENT)
Dept: LAB | Facility: CLINIC | Age: 67
End: 2017-09-26
Payer: COMMERCIAL

## 2017-09-26 DIAGNOSIS — C15.9 MALIGNANT NEOPLASM OF ESOPHAGUS, UNSPECIFIED LOCATION (HCC): ICD-10-CM

## 2017-09-26 LAB
ALBUMIN SERPL BCP-MCNC: 3.6 G/DL (ref 3.5–5)
ALP SERPL-CCNC: 67 U/L (ref 46–116)
ALT SERPL W P-5'-P-CCNC: 41 U/L (ref 12–78)
ANION GAP SERPL CALCULATED.3IONS-SCNC: 8 MMOL/L (ref 4–13)
AST SERPL W P-5'-P-CCNC: 21 U/L (ref 5–45)
BASOPHILS # BLD AUTO: 0.06 THOUSANDS/ΜL (ref 0–0.1)
BASOPHILS NFR BLD AUTO: 1 % (ref 0–1)
BILIRUB SERPL-MCNC: 0.3 MG/DL (ref 0.2–1)
BUN SERPL-MCNC: 12 MG/DL (ref 5–25)
CALCIUM SERPL-MCNC: 8.8 MG/DL (ref 8.3–10.1)
CHLORIDE SERPL-SCNC: 104 MMOL/L (ref 100–108)
CO2 SERPL-SCNC: 28 MMOL/L (ref 21–32)
CREAT SERPL-MCNC: 1.28 MG/DL (ref 0.6–1.3)
EOSINOPHIL # BLD AUTO: 0.29 THOUSAND/ΜL (ref 0–0.61)
EOSINOPHIL NFR BLD AUTO: 6 % (ref 0–6)
ERYTHROCYTE [DISTWIDTH] IN BLOOD BY AUTOMATED COUNT: 13.3 % (ref 11.6–15.1)
GFR SERPL CREATININE-BSD FRML MDRD: 57 ML/MIN/1.73SQ M
GLUCOSE SERPL-MCNC: 155 MG/DL (ref 65–140)
HCT VFR BLD AUTO: 41.6 % (ref 36.5–49.3)
HGB BLD-MCNC: 14.3 G/DL (ref 12–17)
LYMPHOCYTES # BLD AUTO: 1.47 THOUSANDS/ΜL (ref 0.6–4.47)
LYMPHOCYTES NFR BLD AUTO: 30 % (ref 14–44)
MCH RBC QN AUTO: 30.8 PG (ref 26.8–34.3)
MCHC RBC AUTO-ENTMCNC: 34.4 G/DL (ref 31.4–37.4)
MCV RBC AUTO: 90 FL (ref 82–98)
MONOCYTES # BLD AUTO: 0.55 THOUSAND/ΜL (ref 0.17–1.22)
MONOCYTES NFR BLD AUTO: 11 % (ref 4–12)
NEUTROPHILS # BLD AUTO: 2.59 THOUSANDS/ΜL (ref 1.85–7.62)
NEUTS SEG NFR BLD AUTO: 52 % (ref 43–75)
PLATELET # BLD AUTO: 213 THOUSANDS/UL (ref 149–390)
PMV BLD AUTO: 8.5 FL (ref 8.9–12.7)
POTASSIUM SERPL-SCNC: 4 MMOL/L (ref 3.5–5.3)
PROT SERPL-MCNC: 6.7 G/DL (ref 6.4–8.2)
RBC # BLD AUTO: 4.64 MILLION/UL (ref 3.88–5.62)
SODIUM SERPL-SCNC: 140 MMOL/L (ref 136–145)
WBC # BLD AUTO: 4.96 THOUSAND/UL (ref 4.31–10.16)

## 2017-09-26 PROCEDURE — 80053 COMPREHEN METABOLIC PANEL: CPT

## 2017-09-26 PROCEDURE — 85025 COMPLETE CBC W/AUTO DIFF WBC: CPT

## 2017-09-26 PROCEDURE — 36415 COLL VENOUS BLD VENIPUNCTURE: CPT

## 2017-09-26 RX ORDER — SODIUM CHLORIDE 9 MG/ML
20 INJECTION, SOLUTION INTRAVENOUS CONTINUOUS
Status: DISCONTINUED | OUTPATIENT
Start: 2017-09-27 | End: 2017-09-30 | Stop reason: HOSPADM

## 2017-09-26 RX ORDER — FLUOROURACIL 50 MG/ML
800 INJECTION, SOLUTION INTRAVENOUS ONCE
Status: COMPLETED | OUTPATIENT
Start: 2017-09-27 | End: 2017-09-27

## 2017-09-26 RX ORDER — DEXTROSE MONOHYDRATE 50 MG/ML
20 INJECTION, SOLUTION INTRAVENOUS CONTINUOUS
Status: DISCONTINUED | OUTPATIENT
Start: 2017-09-27 | End: 2017-09-30 | Stop reason: HOSPADM

## 2017-09-27 ENCOUNTER — HOSPITAL ENCOUNTER (OUTPATIENT)
Dept: INFUSION CENTER | Facility: CLINIC | Age: 67
Discharge: HOME | End: 2017-09-27
Payer: COMMERCIAL

## 2017-09-27 VITALS
HEART RATE: 70 BPM | SYSTOLIC BLOOD PRESSURE: 145 MMHG | WEIGHT: 203.71 LBS | TEMPERATURE: 97.3 F | RESPIRATION RATE: 16 BRPM | HEIGHT: 67 IN | DIASTOLIC BLOOD PRESSURE: 75 MMHG | BODY MASS INDEX: 31.97 KG/M2

## 2017-09-27 PROCEDURE — 96368 THER/DIAG CONCURRENT INF: CPT

## 2017-09-27 PROCEDURE — 96415 CHEMO IV INFUSION ADDL HR: CPT

## 2017-09-27 PROCEDURE — 96367 TX/PROPH/DG ADDL SEQ IV INF: CPT

## 2017-09-27 PROCEDURE — 96411 CHEMO IV PUSH ADDL DRUG: CPT

## 2017-09-27 PROCEDURE — 96413 CHEMO IV INFUSION 1 HR: CPT

## 2017-09-27 PROCEDURE — 96417 CHEMO IV INFUS EACH ADDL SEQ: CPT

## 2017-09-27 RX ORDER — LORAZEPAM 0.5 MG/1
0.5 TABLET ORAL EVERY 6 HOURS PRN
COMMUNITY
End: 2018-06-06 | Stop reason: SDUPTHER

## 2017-09-27 RX ADMIN — DEXAMETHASONE SODIUM PHOSPHATE: 10 INJECTION, SOLUTION INTRAMUSCULAR; INTRAVENOUS at 10:07

## 2017-09-27 RX ADMIN — FLUOROURACIL 800 MG: 50 INJECTION, SOLUTION INTRAVENOUS at 14:09

## 2017-09-27 RX ADMIN — OXALIPLATIN 173 MG: 5 INJECTION, SOLUTION, CONCENTRATE INTRAVENOUS at 12:12

## 2017-09-27 RX ADMIN — TRASTUZUMAB 368 MG: 150 INJECTION, POWDER, LYOPHILIZED, FOR SOLUTION INTRAVENOUS at 10:55

## 2017-09-27 RX ADMIN — Medication 300 UNITS: at 14:26

## 2017-09-27 RX ADMIN — SODIUM CHLORIDE 20 ML/HR: 0.9 INJECTION, SOLUTION INTRAVENOUS at 10:07

## 2017-09-27 RX ADMIN — DEXTROSE 20 ML/HR: 5 SOLUTION INTRAVENOUS at 12:07

## 2017-09-27 RX ADMIN — LEUCOVORIN CALCIUM 800 MG: 200 INJECTION, POWDER, LYOPHILIZED, FOR SOLUTION INTRAMUSCULAR; INTRAVENOUS at 12:09

## 2017-09-27 NOTE — PLAN OF CARE
Problem: Potential for Falls  Goal: Patient will remain free of falls  INTERVENTIONS:  - Assess patient frequently for physical needs  -  Identify cognitive and physical deficits and behaviors that affect risk of falls    -  [place] fall precautions as indicated by assessment   - Educate patient/family on patient safety including physical limitations  - Instruct patient to call for assistance with activity based on assessment  - Modify environment to reduce risk of injury  - Consider OT/PT consult to assist with strengthening/mobility   Outcome: Progressing

## 2017-09-27 NOTE — PROGRESS NOTES
Patient tolerated chemotherapy  Denies any discomforts  Reminded patient to refrain from eating or drinking cold foods due to oxaliplatin  Patient verbalized understanding  Patient remained accessed  Giana to go patient's  to home at 5:00pm today to connect CADd pump  Wife, Aura Wright, spoke with Giana while at infusion center  AVS given to patient   Aware of next appointment at infusion center

## 2017-10-10 ENCOUNTER — APPOINTMENT (OUTPATIENT)
Dept: LAB | Facility: CLINIC | Age: 67
End: 2017-10-10
Payer: COMMERCIAL

## 2017-10-10 DIAGNOSIS — C15.9 MALIGNANT NEOPLASM OF ESOPHAGUS, UNSPECIFIED LOCATION (HCC): ICD-10-CM

## 2017-10-10 LAB
ALBUMIN SERPL BCP-MCNC: 3.8 G/DL (ref 3.5–5)
ALP SERPL-CCNC: 70 U/L (ref 46–116)
ALT SERPL W P-5'-P-CCNC: 50 U/L (ref 12–78)
ANION GAP SERPL CALCULATED.3IONS-SCNC: 7 MMOL/L (ref 4–13)
AST SERPL W P-5'-P-CCNC: 26 U/L (ref 5–45)
BASOPHILS # BLD AUTO: 0.05 THOUSANDS/ΜL (ref 0–0.1)
BASOPHILS NFR BLD AUTO: 1 % (ref 0–1)
BILIRUB SERPL-MCNC: 0.6 MG/DL (ref 0.2–1)
BUN SERPL-MCNC: 12 MG/DL (ref 5–25)
CALCIUM SERPL-MCNC: 9 MG/DL (ref 8.3–10.1)
CHLORIDE SERPL-SCNC: 103 MMOL/L (ref 100–108)
CO2 SERPL-SCNC: 28 MMOL/L (ref 21–32)
CREAT SERPL-MCNC: 1.2 MG/DL (ref 0.6–1.3)
EOSINOPHIL # BLD AUTO: 0.23 THOUSAND/ΜL (ref 0–0.61)
EOSINOPHIL NFR BLD AUTO: 4 % (ref 0–6)
ERYTHROCYTE [DISTWIDTH] IN BLOOD BY AUTOMATED COUNT: 14.3 % (ref 11.6–15.1)
GFR SERPL CREATININE-BSD FRML MDRD: 62 ML/MIN/1.73SQ M
GLUCOSE SERPL-MCNC: 107 MG/DL (ref 65–140)
HCT VFR BLD AUTO: 43.2 % (ref 36.5–49.3)
HGB BLD-MCNC: 14.8 G/DL (ref 12–17)
LYMPHOCYTES # BLD AUTO: 1.65 THOUSANDS/ΜL (ref 0.6–4.47)
LYMPHOCYTES NFR BLD AUTO: 29 % (ref 14–44)
MCH RBC QN AUTO: 31.2 PG (ref 26.8–34.3)
MCHC RBC AUTO-ENTMCNC: 34.3 G/DL (ref 31.4–37.4)
MCV RBC AUTO: 91 FL (ref 82–98)
MONOCYTES # BLD AUTO: 0.79 THOUSAND/ΜL (ref 0.17–1.22)
MONOCYTES NFR BLD AUTO: 14 % (ref 4–12)
NEUTROPHILS # BLD AUTO: 2.89 THOUSANDS/ΜL (ref 1.85–7.62)
NEUTS SEG NFR BLD AUTO: 52 % (ref 43–75)
PLATELET # BLD AUTO: 190 THOUSANDS/UL (ref 149–390)
PMV BLD AUTO: 8.5 FL (ref 8.9–12.7)
POTASSIUM SERPL-SCNC: 4.5 MMOL/L (ref 3.5–5.3)
PROT SERPL-MCNC: 6.8 G/DL (ref 6.4–8.2)
RBC # BLD AUTO: 4.75 MILLION/UL (ref 3.88–5.62)
SODIUM SERPL-SCNC: 138 MMOL/L (ref 136–145)
WBC # BLD AUTO: 5.61 THOUSAND/UL (ref 4.31–10.16)

## 2017-10-10 PROCEDURE — 85025 COMPLETE CBC W/AUTO DIFF WBC: CPT

## 2017-10-10 PROCEDURE — 80053 COMPREHEN METABOLIC PANEL: CPT

## 2017-10-10 PROCEDURE — 36415 COLL VENOUS BLD VENIPUNCTURE: CPT

## 2017-10-10 RX ORDER — DEXTROSE MONOHYDRATE 50 MG/ML
20 INJECTION, SOLUTION INTRAVENOUS CONTINUOUS
Status: DISCONTINUED | OUTPATIENT
Start: 2017-10-11 | End: 2017-10-14 | Stop reason: HOSPADM

## 2017-10-10 RX ORDER — SODIUM CHLORIDE 9 MG/ML
20 INJECTION, SOLUTION INTRAVENOUS CONTINUOUS
Status: DISCONTINUED | OUTPATIENT
Start: 2017-10-11 | End: 2017-10-14 | Stop reason: HOSPADM

## 2017-10-10 RX ORDER — FLUOROURACIL 50 MG/ML
800 INJECTION, SOLUTION INTRAVENOUS ONCE
Status: DISCONTINUED | OUTPATIENT
Start: 2017-10-11 | End: 2017-10-11

## 2017-10-11 ENCOUNTER — HOSPITAL ENCOUNTER (OUTPATIENT)
Dept: INFUSION CENTER | Facility: CLINIC | Age: 67
Discharge: HOME | End: 2017-10-11
Payer: COMMERCIAL

## 2017-10-11 VITALS
RESPIRATION RATE: 16 BRPM | BODY MASS INDEX: 31.97 KG/M2 | DIASTOLIC BLOOD PRESSURE: 69 MMHG | SYSTOLIC BLOOD PRESSURE: 118 MMHG | WEIGHT: 203.71 LBS | TEMPERATURE: 97.3 F | HEIGHT: 67 IN | HEART RATE: 71 BPM

## 2017-10-11 PROCEDURE — 96367 TX/PROPH/DG ADDL SEQ IV INF: CPT

## 2017-10-11 PROCEDURE — 96415 CHEMO IV INFUSION ADDL HR: CPT

## 2017-10-11 PROCEDURE — 96417 CHEMO IV INFUS EACH ADDL SEQ: CPT

## 2017-10-11 PROCEDURE — 96413 CHEMO IV INFUSION 1 HR: CPT

## 2017-10-11 PROCEDURE — 96411 CHEMO IV PUSH ADDL DRUG: CPT

## 2017-10-11 PROCEDURE — 96368 THER/DIAG CONCURRENT INF: CPT

## 2017-10-11 RX ORDER — FLUOROURACIL 50 MG/ML
800 INJECTION, SOLUTION INTRAVENOUS ONCE
Status: COMPLETED | OUTPATIENT
Start: 2017-10-11 | End: 2017-10-11

## 2017-10-11 RX ADMIN — LEUCOVORIN CALCIUM 800 MG: 200 INJECTION, POWDER, LYOPHILIZED, FOR SOLUTION INTRAMUSCULAR; INTRAVENOUS at 12:28

## 2017-10-11 RX ADMIN — DEXAMETHASONE SODIUM PHOSPHATE: 10 INJECTION, SOLUTION INTRAMUSCULAR; INTRAVENOUS at 10:28

## 2017-10-11 RX ADMIN — FLUOROURACIL 800 MG: 50 INJECTION, SOLUTION INTRAVENOUS at 14:41

## 2017-10-11 RX ADMIN — OXALIPLATIN 172 MG: 5 INJECTION, SOLUTION, CONCENTRATE INTRAVENOUS at 12:28

## 2017-10-11 RX ADMIN — Medication 300 UNITS: at 14:57

## 2017-10-11 RX ADMIN — DEXTROSE 20 ML/HR: 5 SOLUTION INTRAVENOUS at 12:28

## 2017-10-11 RX ADMIN — TRASTUZUMAB 361 MG: 150 INJECTION, POWDER, LYOPHILIZED, FOR SOLUTION INTRAVENOUS at 11:09

## 2017-10-11 RX ADMIN — SODIUM CHLORIDE 20 ML/HR: 0.9 INJECTION, SOLUTION INTRAVENOUS at 10:16

## 2017-10-24 ENCOUNTER — APPOINTMENT (OUTPATIENT)
Dept: LAB | Facility: CLINIC | Age: 67
End: 2017-10-24
Payer: COMMERCIAL

## 2017-10-24 ENCOUNTER — ALLSCRIPTS OFFICE VISIT (OUTPATIENT)
Dept: OTHER | Facility: OTHER | Age: 67
End: 2017-10-24

## 2017-10-24 ENCOUNTER — TRANSCRIBE ORDERS (OUTPATIENT)
Dept: ADMINISTRATIVE | Facility: HOSPITAL | Age: 67
End: 2017-10-24

## 2017-10-24 DIAGNOSIS — C15.9 MALIGNANT NEOPLASM OF ESOPHAGUS, UNSPECIFIED LOCATION (HCC): Primary | ICD-10-CM

## 2017-10-24 DIAGNOSIS — C15.9 MALIGNANT NEOPLASM OF ESOPHAGUS, UNSPECIFIED LOCATION (HCC): ICD-10-CM

## 2017-10-24 LAB
ALBUMIN SERPL BCP-MCNC: 3.6 G/DL (ref 3.5–5)
ALP SERPL-CCNC: 84 U/L (ref 46–116)
ALT SERPL W P-5'-P-CCNC: 39 U/L (ref 12–78)
ANION GAP SERPL CALCULATED.3IONS-SCNC: 10 MMOL/L (ref 4–13)
AST SERPL W P-5'-P-CCNC: 23 U/L (ref 5–45)
BASOPHILS # BLD AUTO: 0.03 THOUSANDS/ΜL (ref 0–0.1)
BASOPHILS NFR BLD AUTO: 1 % (ref 0–1)
BILIRUB SERPL-MCNC: 0.4 MG/DL (ref 0.2–1)
BUN SERPL-MCNC: 14 MG/DL (ref 5–25)
CALCIUM SERPL-MCNC: 9 MG/DL (ref 8.3–10.1)
CHLORIDE SERPL-SCNC: 103 MMOL/L (ref 100–108)
CO2 SERPL-SCNC: 26 MMOL/L (ref 21–32)
CREAT SERPL-MCNC: 1.37 MG/DL (ref 0.6–1.3)
EOSINOPHIL # BLD AUTO: 0.34 THOUSAND/ΜL (ref 0–0.61)
EOSINOPHIL NFR BLD AUTO: 5 % (ref 0–6)
ERYTHROCYTE [DISTWIDTH] IN BLOOD BY AUTOMATED COUNT: 15 % (ref 11.6–15.1)
GFR SERPL CREATININE-BSD FRML MDRD: 53 ML/MIN/1.73SQ M
GLUCOSE SERPL-MCNC: 148 MG/DL (ref 65–140)
HCT VFR BLD AUTO: 40.1 % (ref 36.5–49.3)
HGB BLD-MCNC: 13.9 G/DL (ref 12–17)
LYMPHOCYTES # BLD AUTO: 1.66 THOUSANDS/ΜL (ref 0.6–4.47)
LYMPHOCYTES NFR BLD AUTO: 26 % (ref 14–44)
MCH RBC QN AUTO: 31.5 PG (ref 26.8–34.3)
MCHC RBC AUTO-ENTMCNC: 34.7 G/DL (ref 31.4–37.4)
MCV RBC AUTO: 91 FL (ref 82–98)
MONOCYTES # BLD AUTO: 0.91 THOUSAND/ΜL (ref 0.17–1.22)
MONOCYTES NFR BLD AUTO: 14 % (ref 4–12)
NEUTROPHILS # BLD AUTO: 3.52 THOUSANDS/ΜL (ref 1.85–7.62)
NEUTS SEG NFR BLD AUTO: 54 % (ref 43–75)
PLATELET # BLD AUTO: 157 THOUSANDS/UL (ref 149–390)
PMV BLD AUTO: 8.2 FL (ref 8.9–12.7)
POTASSIUM SERPL-SCNC: 4.4 MMOL/L (ref 3.5–5.3)
PROT SERPL-MCNC: 6.9 G/DL (ref 6.4–8.2)
RBC # BLD AUTO: 4.41 MILLION/UL (ref 3.88–5.62)
SODIUM SERPL-SCNC: 139 MMOL/L (ref 136–145)
WBC # BLD AUTO: 6.46 THOUSAND/UL (ref 4.31–10.16)

## 2017-10-24 PROCEDURE — 36415 COLL VENOUS BLD VENIPUNCTURE: CPT

## 2017-10-24 PROCEDURE — 80053 COMPREHEN METABOLIC PANEL: CPT

## 2017-10-24 PROCEDURE — 85025 COMPLETE CBC W/AUTO DIFF WBC: CPT

## 2017-10-24 RX ORDER — DEXTROSE MONOHYDRATE 50 MG/ML
20 INJECTION, SOLUTION INTRAVENOUS CONTINUOUS
Status: DISCONTINUED | OUTPATIENT
Start: 2017-10-25 | End: 2017-10-28 | Stop reason: HOSPADM

## 2017-10-24 RX ORDER — FLUOROURACIL 50 MG/ML
800 INJECTION, SOLUTION INTRAVENOUS ONCE
Status: COMPLETED | OUTPATIENT
Start: 2017-10-25 | End: 2017-10-25

## 2017-10-24 RX ORDER — SODIUM CHLORIDE 9 MG/ML
20 INJECTION, SOLUTION INTRAVENOUS CONTINUOUS
Status: DISCONTINUED | OUTPATIENT
Start: 2017-10-25 | End: 2017-10-28 | Stop reason: HOSPADM

## 2017-10-25 ENCOUNTER — HOSPITAL ENCOUNTER (OUTPATIENT)
Dept: INFUSION CENTER | Facility: CLINIC | Age: 67
Discharge: HOME | End: 2017-10-25
Payer: COMMERCIAL

## 2017-10-25 PROCEDURE — 96368 THER/DIAG CONCURRENT INF: CPT

## 2017-10-25 PROCEDURE — 96367 TX/PROPH/DG ADDL SEQ IV INF: CPT

## 2017-10-25 PROCEDURE — 96413 CHEMO IV INFUSION 1 HR: CPT

## 2017-10-25 PROCEDURE — 96411 CHEMO IV PUSH ADDL DRUG: CPT

## 2017-10-25 PROCEDURE — 96417 CHEMO IV INFUS EACH ADDL SEQ: CPT

## 2017-10-25 PROCEDURE — 96415 CHEMO IV INFUSION ADDL HR: CPT

## 2017-10-25 RX ORDER — DIPHENOXYLATE HYDROCHLORIDE AND ATROPINE SULFATE 2.5; .025 MG/1; MG/1
1 TABLET ORAL 4 TIMES DAILY PRN
COMMUNITY
End: 2020-01-01 | Stop reason: ALTCHOICE

## 2017-10-25 RX ADMIN — OXALIPLATIN 172 MG: 5 INJECTION, SOLUTION, CONCENTRATE INTRAVENOUS at 12:19

## 2017-10-25 RX ADMIN — DEXTROSE 20 ML/HR: 5 SOLUTION INTRAVENOUS at 12:15

## 2017-10-25 RX ADMIN — LEUCOVORIN CALCIUM 800 MG: 200 INJECTION, POWDER, LYOPHILIZED, FOR SOLUTION INTRAMUSCULAR; INTRAVENOUS at 12:16

## 2017-10-25 RX ADMIN — DEXAMETHASONE SODIUM PHOSPHATE: 10 INJECTION, SOLUTION INTRAMUSCULAR; INTRAVENOUS at 10:16

## 2017-10-25 RX ADMIN — SODIUM CHLORIDE 20 ML/HR: 0.9 INJECTION, SOLUTION INTRAVENOUS at 10:16

## 2017-10-25 RX ADMIN — TRASTUZUMAB 361 MG: 150 INJECTION, POWDER, LYOPHILIZED, FOR SOLUTION INTRAVENOUS at 11:06

## 2017-10-25 RX ADMIN — FLUOROURACIL 800 MG: 50 INJECTION, SOLUTION INTRAVENOUS at 14:21

## 2017-10-25 NOTE — PROGRESS NOTES
Assessment  1  Esophageal cancer (150 9) (C15 9)    Plan  Esophageal cancer    · Diphenoxylate-Atropine 2 5-0 025 MG Oral Tablet; 1-2 tablets by mouth every 6  hours as needed for diarrhea   Rx By: Jenny Saucedo; Dispense: 30 Days ; #:60 Tablet; Refill: 2;For: Esophageal cancer; NGA = N; Print Rx; Last Updated By: Manju Buck; 10/24/2017 12:27:37 PM   · (1) CBC/PLT/DIFF; Status:Active; Requested for:Recurring Schedule: 11/7/2017;  11/21/2017 ;    Perform:[facility]; EYU:[id number]; Ordered;For:Esophageal cancer; Ordered By:Damaso Senior;   · (1) COMPREHENSIVE METABOLIC PANEL; Status:Active; Requested for:Recurring  Schedule: 11/7/2017; 11/21/2017 ;    Perform:[facility]; GYI:[id number]; Ordered;For:Esophageal cancer; Ordered By:Damaso Senior;   · * CT CHEST ABDOMEN PELVIS W CONTRAST; Status:Need Information - Financial  Authorization; Requested for:17Nov2017;    Perform:[facility]; GHD:[id number]; Ordered;For:Esophageal cancer; Ordered By:Damaso Senior;    Discussion/Summary  Discussion Summary:   A 74year old gentleman with no significant past medical history who has normal performance status  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  His pulmonary metastasis was confirmed by biopsy  He has HER-2 positive disease  He is currently on systemic chemotherapy with trastuzumab with FOLFOX-6 with minimal toxicity except diarrhea  I recommended him to take Lomotil as needed  He is going to continue with same regimen every 2 weeks  I'm going to schedule CT scan of chest, abdomen pelvis in mid November 2017 for disease evaluation  I will see him again in November 21, 2017 to discuss those results and make further recommendation  He is in agreement with my recommendations        Chief Complaint  Chief Complaint: Chief Complaint:   The patient presents to the office today with Metastatic adenocarcinoma of distal esophagus with multiple lung metastasis as well as retroperitoneal adenopathy, HER-2 positive disease  diagnosed in July 2017  History of Present Illness  Current Therapy: FOLFOX-6 with trastuzumab  Fourth cycle to be given tomorrow  Disease Status: Not evaluated at this time  Interval History: A 74year old gentleman with no significant past medical history  He was recently hospitalized in early August 2017 with progressive dysphagia  However, he had minimal weight loss  He was found to have mass in distal esophagus, based on the EGD  Biopsy was positive for adenocarcinoma with mucinous features  CT scan of chest, abdomen pelvis showed not only mass in the distal esophagus, but also multiple pulmonary masses, consistent with metastatic disease  He also had retroperitoneal adenopathy measuring 2 1 cm  lung biopsy showed adenocarcinoma, consistent with esophageal primary  His tumor was subsequently tested for HER-2 by immunohistochemistry as well as fish  Tumor from esophagus was HER-2 3+ and Fish positive with ratio of 6 5  Tumor in the lung was HER-2 negative  HER-2 Fish was also negative  This was considered to be HER-2 positive disease  Therefore, she started systemic chemotherapy with trastuzumab and FOLFOX-6  So far, he had 3 cycle of systemic chemotherapy with excellent tolerance  However, he has 3-4 days of severe diarrhea  He has no complaint of nausea or vomiting  He has no history of neutropenic fever  He is maintaining his weight  He has no respiratory symptom  He also has complained of typical course sensitivity  His performance status is normal       Review of Systems  Complete-Male:   Constitutional: No fever or chills, feels well, no tiredness, no recent weight gain or weight loss  Eyes: No complaints of eye pain, no red eyes, no discharge from eyes, no itchy eyes  ENT: no complaints of earache, no hearing loss, no nosebleeds, no nasal discharge, no sore throat, no hoarseness  Cardiovascular: No complaints of slow heart rate, no fast heart rate, no chest pain, no palpitations, no leg claudication, no lower extremity  Respiratory: No complaints of shortness of breath, no wheezing, no cough, no SOB on exertion, no orthopnea or PND  Gastrointestinal: as noted in HPI  Genitourinary: No complaints of dysuria, no incontinence, no hesitancy, no nocturia, no genital lesion, no testicular pain  Musculoskeletal: No complaints of arthralgia, no myalgias, no joint swelling or stiffness, no limb pain or swelling  Integumentary: No complaints of skin rash or skin lesions, no itching, no skin wound, no dry skin  Neurological: No compliants of headache, no confusion, no convulsions, no numbness or tingling, no dizziness or fainting, no limb weakness, no difficulty walking  Psychiatric: Is not suicidal, no sleep disturbances, no anxiety or depression, no change in personality, no emotional problems  Endocrine: No complaints of proptosis, no hot flashes, no muscle weakness, no erectile dysfunction, no deepening of the voice, no feelings of weakness  Hematologic/Lymphatic: No complaints of swollen glands, no swollen glands in the neck, does not bleed easily, no easy bruising  ROS Reviewed:   ROS reviewed  Active Problems  1  Esophageal cancer (150 9) (C15 9)    Past Medical History  1  History of arterial occlusion (V12 51) (O33 913)    Surgical History  Surgical History Reviewed: The surgical history was reviewed and updated today  Family History  Father    1  Family history of essential hypertension (V17 49) (Z82 49)  Uncle    2  Family history of liver cancer (V16 0) (Z80 0)  Family History Reviewed: The family history was reviewed and updated today  Social History   · Drinks beer (V49 89) (Z78 9)   · Former smoker (G20 90) (K96 504)  Social History Reviewed: The social history was reviewed and updated today  The social history was reviewed and is unchanged  Current Meds   1  Adult Low Dose Aspirin 81 MG TABS; TAKE 1 TABLET DAILY; Therapy: (Tova Goels) to Recorded   2  AmLODIPine Besylate 5 MG Oral Tablet; TAKE 1 TABLET DAILY FOR BLOOD   PRESSURE; Therapy: (Tova Goels) to Recorded   3  LORazepam 0 5 MG Oral Tablet; TAKE 1 TABLET Every 6 hours PRN; Therapy: [id number] to (Evaluate:20Oct2017); Last Rx:20Sep2017 Ordered   4  Metoprolol Tartrate 50 MG Oral Tablet; TAKE 1 TABLET TWICE DAILY; Therapy: (Tova Goels) to Recorded   5  Pantoprazole Sodium 40 MG Oral Tablet Delayed Release; Therapy: [id number] to Recorded   6  Simvastatin 40 MG Oral Tablet; TAKE 1 TABLET DAILY; Therapy: (Tova Peeks) to Recorded  Medication List Reviewed: The medication list was reviewed and updated today  Allergies  1  No Known Drug Allergies  2  Animal dander - Cats    Vitals  Vital Signs    Recorded: [id number] 12:05PM   Temperature 97 F   Heart Rate 79   Respiration 18   Systolic 742   Diastolic 80   Height 5 ft 6 5 in   Weight 200 lb    BMI Calculated 31 8   BSA Calculated 2 01   O2 Saturation 96     Physical Exam    Constitutional   General appearance: No acute distress, well appearing and well nourished  Eyes   Conjunctiva and lids: No swelling, erythema, or discharge  Pupils and irises: Equal, round and reactive to light  Ears, Nose, Mouth, and Throat   External inspection of ears and nose: Normal     Otoscopic examination: Tympanic membrance translucent with normal light reflex  Canals patent without erythema  Nasal mucosa, septum, and turbinates: Normal without edema or erythema  Oropharynx: Normal with no erythema, edema, exudate or lesions  Pulmonary   Respiratory effort: No increased work of breathing or signs of respiratory distress  Auscultation of lungs: Clear to auscultation, equal breath sounds bilaterally, no wheezes, no rales, no rhonci  Cardiovascular   Palpation of heart: Normal PMI, no thrills

## 2017-10-25 NOTE — PROGRESS NOTES
Port was very positional causing difficulty getting blood return  Port did not infiltrate  Port was re-accessed with good blood return

## 2017-10-25 NOTE — PLAN OF CARE
Problem: Potential for Falls  Goal: Patient will remain free of falls  INTERVENTIONS:  - Assess patient frequently for physical needs  -  Identify cognitive and physical deficits and behaviors that affect risk of falls    -  [place] fall precautions as indicated by assessment   - Educate patient/family on patient safety including physical limitations  - Instruct patient to call for assistance with activity based on assessment  - Modify environment to reduce risk of injury  - Consider OT/PT consult to assist with strengthening/mobility   Outcome: Progressing

## 2017-10-26 VITALS — BODY MASS INDEX: 31.45 KG/M2 | HEIGHT: 67 IN | WEIGHT: 200.4 LBS

## 2017-11-07 ENCOUNTER — APPOINTMENT (OUTPATIENT)
Dept: LAB | Facility: CLINIC | Age: 67
End: 2017-11-07
Payer: COMMERCIAL

## 2017-11-07 DIAGNOSIS — C15.9 MALIGNANT NEOPLASM OF ESOPHAGUS (HCC): ICD-10-CM

## 2017-11-07 LAB
ALBUMIN SERPL BCP-MCNC: 3.6 G/DL (ref 3.5–5)
ALP SERPL-CCNC: 80 U/L (ref 46–116)
ALT SERPL W P-5'-P-CCNC: 47 U/L (ref 12–78)
ANION GAP SERPL CALCULATED.3IONS-SCNC: 10 MMOL/L (ref 4–13)
AST SERPL W P-5'-P-CCNC: 31 U/L (ref 5–45)
BASOPHILS # BLD AUTO: 0.07 THOUSANDS/ΜL (ref 0–0.1)
BASOPHILS NFR BLD AUTO: 1 % (ref 0–1)
BILIRUB SERPL-MCNC: 0.4 MG/DL (ref 0.2–1)
BUN SERPL-MCNC: 15 MG/DL (ref 5–25)
CALCIUM SERPL-MCNC: 9.1 MG/DL (ref 8.3–10.1)
CHLORIDE SERPL-SCNC: 103 MMOL/L (ref 100–108)
CO2 SERPL-SCNC: 28 MMOL/L (ref 21–32)
CREAT SERPL-MCNC: 1.25 MG/DL (ref 0.6–1.3)
EOSINOPHIL # BLD AUTO: 0.48 THOUSAND/ΜL (ref 0–0.61)
EOSINOPHIL NFR BLD AUTO: 10 % (ref 0–6)
ERYTHROCYTE [DISTWIDTH] IN BLOOD BY AUTOMATED COUNT: 16.5 % (ref 11.6–15.1)
GFR SERPL CREATININE-BSD FRML MDRD: 59 ML/MIN/1.73SQ M
GLUCOSE SERPL-MCNC: 129 MG/DL (ref 65–140)
HCT VFR BLD AUTO: 39.5 % (ref 36.5–49.3)
HGB BLD-MCNC: 13.8 G/DL (ref 12–17)
LYMPHOCYTES # BLD AUTO: 1.55 THOUSANDS/ΜL (ref 0.6–4.47)
LYMPHOCYTES NFR BLD AUTO: 31 % (ref 14–44)
MCH RBC QN AUTO: 32.3 PG (ref 26.8–34.3)
MCHC RBC AUTO-ENTMCNC: 34.9 G/DL (ref 31.4–37.4)
MCV RBC AUTO: 93 FL (ref 82–98)
MONOCYTES # BLD AUTO: 0.6 THOUSAND/ΜL (ref 0.17–1.22)
MONOCYTES NFR BLD AUTO: 12 % (ref 4–12)
NEUTROPHILS # BLD AUTO: 2.23 THOUSANDS/ΜL (ref 1.85–7.62)
NEUTS SEG NFR BLD AUTO: 46 % (ref 43–75)
PLATELET # BLD AUTO: 157 THOUSANDS/UL (ref 149–390)
PMV BLD AUTO: 8.4 FL (ref 8.9–12.7)
POTASSIUM SERPL-SCNC: 4.1 MMOL/L (ref 3.5–5.3)
PROT SERPL-MCNC: 6.9 G/DL (ref 6.4–8.2)
RBC # BLD AUTO: 4.27 MILLION/UL (ref 3.88–5.62)
SODIUM SERPL-SCNC: 141 MMOL/L (ref 136–145)
WBC # BLD AUTO: 4.93 THOUSAND/UL (ref 4.31–10.16)

## 2017-11-07 PROCEDURE — 80053 COMPREHEN METABOLIC PANEL: CPT

## 2017-11-07 PROCEDURE — 36415 COLL VENOUS BLD VENIPUNCTURE: CPT

## 2017-11-07 PROCEDURE — 85025 COMPLETE CBC W/AUTO DIFF WBC: CPT

## 2017-11-07 RX ORDER — FLUOROURACIL 50 MG/ML
800 INJECTION, SOLUTION INTRAVENOUS ONCE
Status: COMPLETED | OUTPATIENT
Start: 2017-11-08 | End: 2017-11-08

## 2017-11-07 RX ORDER — DEXTROSE MONOHYDRATE 50 MG/ML
20 INJECTION, SOLUTION INTRAVENOUS CONTINUOUS
Status: DISCONTINUED | OUTPATIENT
Start: 2017-11-08 | End: 2017-11-11 | Stop reason: HOSPADM

## 2017-11-07 RX ORDER — SODIUM CHLORIDE 9 MG/ML
20 INJECTION, SOLUTION INTRAVENOUS CONTINUOUS
Status: DISCONTINUED | OUTPATIENT
Start: 2017-11-08 | End: 2017-11-11 | Stop reason: HOSPADM

## 2017-11-08 ENCOUNTER — HOSPITAL ENCOUNTER (OUTPATIENT)
Dept: INFUSION CENTER | Facility: CLINIC | Age: 67
Discharge: HOME | End: 2017-11-08
Payer: COMMERCIAL

## 2017-11-08 VITALS
BODY MASS INDEX: 32.08 KG/M2 | TEMPERATURE: 96 F | SYSTOLIC BLOOD PRESSURE: 122 MMHG | WEIGHT: 204.37 LBS | HEIGHT: 67 IN | HEART RATE: 72 BPM | DIASTOLIC BLOOD PRESSURE: 78 MMHG | RESPIRATION RATE: 16 BRPM

## 2017-11-08 PROCEDURE — 96415 CHEMO IV INFUSION ADDL HR: CPT

## 2017-11-08 PROCEDURE — 96417 CHEMO IV INFUS EACH ADDL SEQ: CPT

## 2017-11-08 PROCEDURE — 96368 THER/DIAG CONCURRENT INF: CPT

## 2017-11-08 PROCEDURE — 96375 TX/PRO/DX INJ NEW DRUG ADDON: CPT

## 2017-11-08 PROCEDURE — 96411 CHEMO IV PUSH ADDL DRUG: CPT

## 2017-11-08 PROCEDURE — 96413 CHEMO IV INFUSION 1 HR: CPT

## 2017-11-08 RX ADMIN — DEXTROSE 20 ML/HR: 5 SOLUTION INTRAVENOUS at 09:46

## 2017-11-08 RX ADMIN — LEUCOVORIN CALCIUM 800 MG: 200 INJECTION, POWDER, LYOPHILIZED, FOR SOLUTION INTRAMUSCULAR; INTRAVENOUS at 11:29

## 2017-11-08 RX ADMIN — DEXAMETHASONE SODIUM PHOSPHATE: 10 INJECTION, SOLUTION INTRAMUSCULAR; INTRAVENOUS at 09:46

## 2017-11-08 RX ADMIN — SODIUM CHLORIDE 20 ML/HR: 0.9 INJECTION, SOLUTION INTRAVENOUS at 09:46

## 2017-11-08 RX ADMIN — FLUOROURACIL 800 MG: 50 INJECTION, SOLUTION INTRAVENOUS at 13:41

## 2017-11-08 RX ADMIN — Medication 300 UNITS: at 13:53

## 2017-11-08 RX ADMIN — TRASTUZUMAB 363 MG: 150 INJECTION, POWDER, LYOPHILIZED, FOR SOLUTION INTRAVENOUS at 10:17

## 2017-11-08 RX ADMIN — OXALIPLATIN 171 MG: 5 INJECTION, SOLUTION, CONCENTRATE INTRAVENOUS at 11:29

## 2017-11-08 NOTE — PLAN OF CARE
Problem: Potential for Falls  Goal: Patient will remain free of falls  INTERVENTIONS:  - Assess patient frequently for physical needs  -  Identify cognitive and physical deficits and behaviors that affect risk of falls    -  [place] fall precautions as indicated by assessment   - Educate patient/family on patient safety including physical limitations  - Instruct patient to call for assistance with activity based on assessment  - Modify environment to reduce risk of injury  - Consider OT/PT consult to assist with strengthening/mobility   Outcome: Progressing

## 2017-11-08 NOTE — PROGRESS NOTES
Pt received treatment without complications  Fluorouracil CADD pump to be connected at home  AVS provided  Aware of future appointments

## 2017-11-17 ENCOUNTER — HOSPITAL ENCOUNTER (OUTPATIENT)
Dept: RADIOLOGY | Facility: MEDICAL CENTER | Age: 67
Discharge: HOME | End: 2017-11-17
Payer: COMMERCIAL

## 2017-11-17 DIAGNOSIS — C15.9 MALIGNANT NEOPLASM OF ESOPHAGUS (HCC): ICD-10-CM

## 2017-11-17 PROCEDURE — 71260 CT THORAX DX C+: CPT

## 2017-11-17 PROCEDURE — 74177 CT ABD & PELVIS W/CONTRAST: CPT

## 2017-11-17 RX ADMIN — IOHEXOL 100 ML: 350 INJECTION, SOLUTION INTRAVENOUS at 09:12

## 2017-11-21 ENCOUNTER — APPOINTMENT (OUTPATIENT)
Dept: LAB | Facility: CLINIC | Age: 67
End: 2017-11-21
Payer: COMMERCIAL

## 2017-11-21 ENCOUNTER — GENERIC CONVERSION - ENCOUNTER (OUTPATIENT)
Dept: OTHER | Facility: OTHER | Age: 67
End: 2017-11-21

## 2017-11-21 DIAGNOSIS — C15.9 MALIGNANT NEOPLASM OF ESOPHAGUS (HCC): ICD-10-CM

## 2017-11-21 LAB
ALBUMIN SERPL BCP-MCNC: 3.7 G/DL (ref 3.5–5)
ALP SERPL-CCNC: 83 U/L (ref 46–116)
ALT SERPL W P-5'-P-CCNC: 48 U/L (ref 12–78)
ANION GAP SERPL CALCULATED.3IONS-SCNC: 6 MMOL/L (ref 4–13)
AST SERPL W P-5'-P-CCNC: 30 U/L (ref 5–45)
BASOPHILS # BLD AUTO: 0.11 THOUSANDS/ΜL (ref 0–0.1)
BASOPHILS NFR BLD AUTO: 2 % (ref 0–1)
BILIRUB SERPL-MCNC: 0.3 MG/DL (ref 0.2–1)
BUN SERPL-MCNC: 12 MG/DL (ref 5–25)
CALCIUM SERPL-MCNC: 9 MG/DL (ref 8.3–10.1)
CHLORIDE SERPL-SCNC: 105 MMOL/L (ref 100–108)
CO2 SERPL-SCNC: 28 MMOL/L (ref 21–32)
CREAT SERPL-MCNC: 1.24 MG/DL (ref 0.6–1.3)
EOSINOPHIL # BLD AUTO: 0.33 THOUSAND/ΜL (ref 0–0.61)
EOSINOPHIL NFR BLD AUTO: 7 % (ref 0–6)
ERYTHROCYTE [DISTWIDTH] IN BLOOD BY AUTOMATED COUNT: 17.7 % (ref 11.6–15.1)
GFR SERPL CREATININE-BSD FRML MDRD: 59 ML/MIN/1.73SQ M
GLUCOSE SERPL-MCNC: 111 MG/DL (ref 65–140)
HCT VFR BLD AUTO: 40.4 % (ref 36.5–49.3)
HGB BLD-MCNC: 13.6 G/DL (ref 12–17)
LYMPHOCYTES # BLD AUTO: 1.7 THOUSANDS/ΜL (ref 0.6–4.47)
LYMPHOCYTES NFR BLD AUTO: 36 % (ref 14–44)
MCH RBC QN AUTO: 32.2 PG (ref 26.8–34.3)
MCHC RBC AUTO-ENTMCNC: 33.7 G/DL (ref 31.4–37.4)
MCV RBC AUTO: 96 FL (ref 82–98)
MONOCYTES # BLD AUTO: 0.7 THOUSAND/ΜL (ref 0.17–1.22)
MONOCYTES NFR BLD AUTO: 15 % (ref 4–12)
NEUTROPHILS # BLD AUTO: 1.9 THOUSANDS/ΜL (ref 1.85–7.62)
NEUTS SEG NFR BLD AUTO: 40 % (ref 43–75)
PLATELET # BLD AUTO: 180 THOUSANDS/UL (ref 149–390)
PMV BLD AUTO: 8.6 FL (ref 8.9–12.7)
POTASSIUM SERPL-SCNC: 4 MMOL/L (ref 3.5–5.3)
PROT SERPL-MCNC: 6.9 G/DL (ref 6.4–8.2)
RBC # BLD AUTO: 4.23 MILLION/UL (ref 3.88–5.62)
SODIUM SERPL-SCNC: 139 MMOL/L (ref 136–145)
WBC # BLD AUTO: 4.74 THOUSAND/UL (ref 4.31–10.16)

## 2017-11-21 PROCEDURE — 36415 COLL VENOUS BLD VENIPUNCTURE: CPT

## 2017-11-21 PROCEDURE — 85025 COMPLETE CBC W/AUTO DIFF WBC: CPT

## 2017-11-21 PROCEDURE — 80053 COMPREHEN METABOLIC PANEL: CPT

## 2017-11-21 RX ORDER — SODIUM CHLORIDE 9 MG/ML
20 INJECTION, SOLUTION INTRAVENOUS CONTINUOUS
Status: DISCONTINUED | OUTPATIENT
Start: 2017-11-22 | End: 2017-11-25 | Stop reason: HOSPADM

## 2017-11-21 RX ORDER — DEXTROSE MONOHYDRATE 50 MG/ML
20 INJECTION, SOLUTION INTRAVENOUS CONTINUOUS
Status: DISCONTINUED | OUTPATIENT
Start: 2017-11-22 | End: 2017-11-25 | Stop reason: HOSPADM

## 2017-11-21 RX ORDER — FLUOROURACIL 50 MG/ML
800 INJECTION, SOLUTION INTRAVENOUS ONCE
Status: COMPLETED | OUTPATIENT
Start: 2017-11-22 | End: 2017-11-22

## 2017-11-22 ENCOUNTER — HOSPITAL ENCOUNTER (OUTPATIENT)
Dept: INFUSION CENTER | Facility: CLINIC | Age: 67
Discharge: HOME | End: 2017-11-22
Payer: COMMERCIAL

## 2017-11-22 VITALS
SYSTOLIC BLOOD PRESSURE: 127 MMHG | DIASTOLIC BLOOD PRESSURE: 89 MMHG | RESPIRATION RATE: 16 BRPM | BODY MASS INDEX: 31.81 KG/M2 | WEIGHT: 202.7 LBS | HEART RATE: 75 BPM | TEMPERATURE: 97.5 F | HEIGHT: 67 IN

## 2017-11-22 PROCEDURE — 96415 CHEMO IV INFUSION ADDL HR: CPT

## 2017-11-22 PROCEDURE — 96413 CHEMO IV INFUSION 1 HR: CPT

## 2017-11-22 PROCEDURE — 96411 CHEMO IV PUSH ADDL DRUG: CPT

## 2017-11-22 PROCEDURE — 96368 THER/DIAG CONCURRENT INF: CPT

## 2017-11-22 PROCEDURE — 96417 CHEMO IV INFUS EACH ADDL SEQ: CPT

## 2017-11-22 PROCEDURE — 96367 TX/PROPH/DG ADDL SEQ IV INF: CPT

## 2017-11-22 RX ADMIN — DEXTROSE 20 ML/HR: 5 SOLUTION INTRAVENOUS at 11:39

## 2017-11-22 RX ADMIN — LEUCOVORIN CALCIUM 800 MG: 200 INJECTION, POWDER, LYOPHILIZED, FOR SOLUTION INTRAMUSCULAR; INTRAVENOUS at 11:41

## 2017-11-22 RX ADMIN — OXALIPLATIN 171 MG: 5 INJECTION, SOLUTION, CONCENTRATE INTRAVENOUS at 11:41

## 2017-11-22 RX ADMIN — Medication 300 UNITS: at 13:50

## 2017-11-22 RX ADMIN — TRASTUZUMAB 363 MG: 150 INJECTION, POWDER, LYOPHILIZED, FOR SOLUTION INTRAVENOUS at 10:31

## 2017-11-22 RX ADMIN — DEXAMETHASONE SODIUM PHOSPHATE: 10 INJECTION, SOLUTION INTRAMUSCULAR; INTRAVENOUS at 09:48

## 2017-11-22 RX ADMIN — FLUOROURACIL 800 MG: 50 INJECTION, SOLUTION INTRAVENOUS at 13:45

## 2017-11-22 RX ADMIN — SODIUM CHLORIDE 20 ML/HR: 0.9 INJECTION, SOLUTION INTRAVENOUS at 09:40

## 2017-11-22 NOTE — PROGRESS NOTES
Pt  Denies new symptoms or concerns at this time  Labs reviewed and within normal parameters for chemotherapy ordered for infusion today

## 2017-11-22 NOTE — PROGRESS NOTES
Pt  Tolerated Herceptin, Leucovorin, Oxaliplatin and 5 Fu bolus without adverse event  5FU Cadd pump to be connected at home  Future appointments reviewed  AVS provided

## 2017-12-05 ENCOUNTER — TRANSCRIBE ORDERS (OUTPATIENT)
Dept: LAB | Facility: CLINIC | Age: 67
End: 2017-12-05

## 2017-12-05 ENCOUNTER — APPOINTMENT (OUTPATIENT)
Dept: LAB | Facility: CLINIC | Age: 67
End: 2017-12-05
Payer: COMMERCIAL

## 2017-12-05 DIAGNOSIS — C15.9 MALIGNANT NEOPLASM OF ESOPHAGUS, UNSPECIFIED LOCATION (HCC): ICD-10-CM

## 2017-12-05 LAB
ALBUMIN SERPL BCP-MCNC: 3.3 G/DL (ref 3.5–5)
ALP SERPL-CCNC: 77 U/L (ref 46–116)
ALT SERPL W P-5'-P-CCNC: 40 U/L (ref 12–78)
ANION GAP SERPL CALCULATED.3IONS-SCNC: 8 MMOL/L (ref 4–13)
AST SERPL W P-5'-P-CCNC: 29 U/L (ref 5–45)
BASOPHILS # BLD AUTO: 0.04 THOUSANDS/ΜL (ref 0–0.1)
BASOPHILS NFR BLD AUTO: 1 % (ref 0–1)
BILIRUB SERPL-MCNC: 0.4 MG/DL (ref 0.2–1)
BUN SERPL-MCNC: 14 MG/DL (ref 5–25)
CALCIUM SERPL-MCNC: 8.8 MG/DL (ref 8.3–10.1)
CHLORIDE SERPL-SCNC: 106 MMOL/L (ref 100–108)
CO2 SERPL-SCNC: 27 MMOL/L (ref 21–32)
CREAT SERPL-MCNC: 1.22 MG/DL (ref 0.6–1.3)
EOSINOPHIL # BLD AUTO: 0.19 THOUSAND/ΜL (ref 0–0.61)
EOSINOPHIL NFR BLD AUTO: 5 % (ref 0–6)
ERYTHROCYTE [DISTWIDTH] IN BLOOD BY AUTOMATED COUNT: 17.2 % (ref 11.6–15.1)
GFR SERPL CREATININE-BSD FRML MDRD: 61 ML/MIN/1.73SQ M
GLUCOSE SERPL-MCNC: 136 MG/DL (ref 65–140)
HCT VFR BLD AUTO: 37.8 % (ref 36.5–49.3)
HGB BLD-MCNC: 12.8 G/DL (ref 12–17)
LYMPHOCYTES # BLD AUTO: 1.41 THOUSANDS/ΜL (ref 0.6–4.47)
LYMPHOCYTES NFR BLD AUTO: 33 % (ref 14–44)
MCH RBC QN AUTO: 32.4 PG (ref 26.8–34.3)
MCHC RBC AUTO-ENTMCNC: 33.9 G/DL (ref 31.4–37.4)
MCV RBC AUTO: 96 FL (ref 82–98)
MONOCYTES # BLD AUTO: 0.69 THOUSAND/ΜL (ref 0.17–1.22)
MONOCYTES NFR BLD AUTO: 16 % (ref 4–12)
NEUTROPHILS # BLD AUTO: 1.92 THOUSANDS/ΜL (ref 1.85–7.62)
NEUTS SEG NFR BLD AUTO: 45 % (ref 43–75)
PLATELET # BLD AUTO: 164 THOUSANDS/UL (ref 149–390)
PMV BLD AUTO: 8.7 FL (ref 8.9–12.7)
POTASSIUM SERPL-SCNC: 4.2 MMOL/L (ref 3.5–5.3)
PROT SERPL-MCNC: 6.7 G/DL (ref 6.4–8.2)
RBC # BLD AUTO: 3.95 MILLION/UL (ref 3.88–5.62)
SODIUM SERPL-SCNC: 141 MMOL/L (ref 136–145)
WBC # BLD AUTO: 4.25 THOUSAND/UL (ref 4.31–10.16)

## 2017-12-05 PROCEDURE — 36415 COLL VENOUS BLD VENIPUNCTURE: CPT

## 2017-12-05 PROCEDURE — 85025 COMPLETE CBC W/AUTO DIFF WBC: CPT

## 2017-12-05 PROCEDURE — 80053 COMPREHEN METABOLIC PANEL: CPT

## 2017-12-05 RX ORDER — SODIUM CHLORIDE 9 MG/ML
20 INJECTION, SOLUTION INTRAVENOUS CONTINUOUS
Status: DISCONTINUED | OUTPATIENT
Start: 2017-12-06 | End: 2017-12-09 | Stop reason: HOSPADM

## 2017-12-05 RX ORDER — FLUOROURACIL 50 MG/ML
800 INJECTION, SOLUTION INTRAVENOUS ONCE
Status: COMPLETED | OUTPATIENT
Start: 2017-12-06 | End: 2017-12-06

## 2017-12-05 RX ORDER — DEXTROSE MONOHYDRATE 50 MG/ML
20 INJECTION, SOLUTION INTRAVENOUS CONTINUOUS
Status: DISCONTINUED | OUTPATIENT
Start: 2017-12-06 | End: 2017-12-09 | Stop reason: HOSPADM

## 2017-12-06 ENCOUNTER — HOSPITAL ENCOUNTER (OUTPATIENT)
Dept: INFUSION CENTER | Facility: CLINIC | Age: 67
Discharge: HOME | End: 2017-12-06
Payer: COMMERCIAL

## 2017-12-06 VITALS
HEIGHT: 67 IN | WEIGHT: 202.82 LBS | SYSTOLIC BLOOD PRESSURE: 128 MMHG | BODY MASS INDEX: 31.83 KG/M2 | RESPIRATION RATE: 16 BRPM | HEART RATE: 68 BPM | TEMPERATURE: 97 F | DIASTOLIC BLOOD PRESSURE: 70 MMHG

## 2017-12-06 PROCEDURE — 96411 CHEMO IV PUSH ADDL DRUG: CPT

## 2017-12-06 PROCEDURE — 96415 CHEMO IV INFUSION ADDL HR: CPT

## 2017-12-06 PROCEDURE — 96368 THER/DIAG CONCURRENT INF: CPT

## 2017-12-06 PROCEDURE — 96417 CHEMO IV INFUS EACH ADDL SEQ: CPT

## 2017-12-06 PROCEDURE — 96367 TX/PROPH/DG ADDL SEQ IV INF: CPT

## 2017-12-06 PROCEDURE — 96413 CHEMO IV INFUSION 1 HR: CPT

## 2017-12-06 RX ADMIN — DEXTROSE 20 ML/HR: 5 SOLUTION INTRAVENOUS at 11:01

## 2017-12-06 RX ADMIN — OXALIPLATIN 171 MG: 5 INJECTION, SOLUTION, CONCENTRATE INTRAVENOUS at 11:01

## 2017-12-06 RX ADMIN — TRASTUZUMAB 364 MG: 150 INJECTION, POWDER, LYOPHILIZED, FOR SOLUTION INTRAVENOUS at 09:58

## 2017-12-06 RX ADMIN — DEXAMETHASONE SODIUM PHOSPHATE: 10 INJECTION, SOLUTION INTRAMUSCULAR; INTRAVENOUS at 09:30

## 2017-12-06 RX ADMIN — Medication 300 UNITS: at 13:30

## 2017-12-06 RX ADMIN — SODIUM CHLORIDE 20 ML/HR: 0.9 INJECTION, SOLUTION INTRAVENOUS at 09:31

## 2017-12-06 RX ADMIN — FLUOROURACIL 800 MG: 50 INJECTION, SOLUTION INTRAVENOUS at 13:21

## 2017-12-06 RX ADMIN — LEUCOVORIN CALCIUM 800 MG: 200 INJECTION, POWDER, LYOPHILIZED, FOR SOLUTION INTRAMUSCULAR; INTRAVENOUS at 11:01

## 2017-12-06 NOTE — PLAN OF CARE
Problem: Potential for Falls  Goal: Patient will remain free of falls  INTERVENTIONS:  - Assess patient frequently for physical needs  -  Identify cognitive and physical deficits and behaviors that affect risk of falls    -  [place] fall precautions as indicated by assessment   - Educate patient/family on patient safety including physical limitations  - Instruct patient to call for assistance with activity based on assessment  - Modify environment to reduce risk of injury  - Consider OT/PT consult to assist with strengthening/mobility   Outcome: Progressing

## 2017-12-06 NOTE — PROGRESS NOTES
Pt arrived to unit without complaint other than cold sensitivity in hands/feet/airway  Pt states it did not subside this time between oxaliplatin treatments  Patient tolerated chemotherapy today  Denies any discomforts  Reminded patient to refrain from eating or drinking cold foods, to wear mittens, scarves, due to oxaliplatin  Patient verbalized understanding  Patient remained accessed  Giana to go patient's  to home around 4:00pm today to connect CADD pump  AVS given to patient   Aware of all future appts at infusion center

## 2017-12-19 ENCOUNTER — APPOINTMENT (OUTPATIENT)
Dept: LAB | Facility: CLINIC | Age: 67
End: 2017-12-19
Payer: COMMERCIAL

## 2017-12-19 ENCOUNTER — GENERIC CONVERSION - ENCOUNTER (OUTPATIENT)
Dept: OTHER | Facility: OTHER | Age: 67
End: 2017-12-19

## 2017-12-19 DIAGNOSIS — C15.9 MALIGNANT NEOPLASM OF ESOPHAGUS, UNSPECIFIED LOCATION (HCC): ICD-10-CM

## 2017-12-19 LAB
ALBUMIN SERPL BCP-MCNC: 3.6 G/DL (ref 3.5–5)
ALP SERPL-CCNC: 76 U/L (ref 46–116)
ALT SERPL W P-5'-P-CCNC: 53 U/L (ref 12–78)
ANION GAP SERPL CALCULATED.3IONS-SCNC: 7 MMOL/L (ref 4–13)
AST SERPL W P-5'-P-CCNC: 33 U/L (ref 5–45)
BASOPHILS # BLD AUTO: 0.03 THOUSANDS/ΜL (ref 0–0.1)
BASOPHILS NFR BLD AUTO: 1 % (ref 0–1)
BILIRUB SERPL-MCNC: 0.5 MG/DL (ref 0.2–1)
BUN SERPL-MCNC: 13 MG/DL (ref 5–25)
CALCIUM SERPL-MCNC: 9.1 MG/DL (ref 8.3–10.1)
CHLORIDE SERPL-SCNC: 105 MMOL/L (ref 100–108)
CO2 SERPL-SCNC: 26 MMOL/L (ref 21–32)
CREAT SERPL-MCNC: 1.15 MG/DL (ref 0.6–1.3)
EOSINOPHIL # BLD AUTO: 0.13 THOUSAND/ΜL (ref 0–0.61)
EOSINOPHIL NFR BLD AUTO: 3 % (ref 0–6)
ERYTHROCYTE [DISTWIDTH] IN BLOOD BY AUTOMATED COUNT: 17.4 % (ref 11.6–15.1)
GFR SERPL CREATININE-BSD FRML MDRD: 65 ML/MIN/1.73SQ M
GLUCOSE SERPL-MCNC: 115 MG/DL (ref 65–140)
HCT VFR BLD AUTO: 37.3 % (ref 36.5–49.3)
HGB BLD-MCNC: 12.6 G/DL (ref 12–17)
LYMPHOCYTES # BLD AUTO: 1.4 THOUSANDS/ΜL (ref 0.6–4.47)
LYMPHOCYTES NFR BLD AUTO: 35 % (ref 14–44)
MCH RBC QN AUTO: 33.1 PG (ref 26.8–34.3)
MCHC RBC AUTO-ENTMCNC: 33.8 G/DL (ref 31.4–37.4)
MCV RBC AUTO: 98 FL (ref 82–98)
MONOCYTES # BLD AUTO: 0.59 THOUSAND/ΜL (ref 0.17–1.22)
MONOCYTES NFR BLD AUTO: 15 % (ref 4–12)
NEUTROPHILS # BLD AUTO: 1.91 THOUSANDS/ΜL (ref 1.85–7.62)
NEUTS SEG NFR BLD AUTO: 46 % (ref 43–75)
PLATELET # BLD AUTO: 137 THOUSANDS/UL (ref 149–390)
PMV BLD AUTO: 8.8 FL (ref 8.9–12.7)
POTASSIUM SERPL-SCNC: 4.2 MMOL/L (ref 3.5–5.3)
PROT SERPL-MCNC: 6.7 G/DL (ref 6.4–8.2)
RBC # BLD AUTO: 3.81 MILLION/UL (ref 3.88–5.62)
SODIUM SERPL-SCNC: 138 MMOL/L (ref 136–145)
WBC # BLD AUTO: 4.06 THOUSAND/UL (ref 4.31–10.16)

## 2017-12-19 PROCEDURE — 85025 COMPLETE CBC W/AUTO DIFF WBC: CPT

## 2017-12-19 PROCEDURE — 36415 COLL VENOUS BLD VENIPUNCTURE: CPT

## 2017-12-19 PROCEDURE — 80053 COMPREHEN METABOLIC PANEL: CPT

## 2017-12-19 RX ORDER — FLUOROURACIL 50 MG/ML
800 INJECTION, SOLUTION INTRAVENOUS ONCE
Status: COMPLETED | OUTPATIENT
Start: 2017-12-20 | End: 2017-12-20

## 2017-12-19 RX ORDER — DEXTROSE MONOHYDRATE 50 MG/ML
20 INJECTION, SOLUTION INTRAVENOUS CONTINUOUS
Status: DISCONTINUED | OUTPATIENT
Start: 2017-12-20 | End: 2017-12-23 | Stop reason: HOSPADM

## 2017-12-19 RX ORDER — SODIUM CHLORIDE 9 MG/ML
20 INJECTION, SOLUTION INTRAVENOUS CONTINUOUS
Status: DISCONTINUED | OUTPATIENT
Start: 2017-12-20 | End: 2017-12-23 | Stop reason: HOSPADM

## 2017-12-20 ENCOUNTER — HOSPITAL ENCOUNTER (OUTPATIENT)
Dept: INFUSION CENTER | Facility: CLINIC | Age: 67
Discharge: HOME | End: 2017-12-22
Payer: COMMERCIAL

## 2017-12-20 VITALS — BODY MASS INDEX: 31.87 KG/M2 | WEIGHT: 203.04 LBS | HEIGHT: 67 IN

## 2017-12-20 PROCEDURE — 96417 CHEMO IV INFUS EACH ADDL SEQ: CPT

## 2017-12-20 PROCEDURE — 96549 UNLISTED CHEMOTHERAPY PX: CPT

## 2017-12-20 PROCEDURE — 96413 CHEMO IV INFUSION 1 HR: CPT

## 2017-12-20 PROCEDURE — 96415 CHEMO IV INFUSION ADDL HR: CPT

## 2017-12-20 PROCEDURE — 96367 TX/PROPH/DG ADDL SEQ IV INF: CPT

## 2017-12-20 RX ADMIN — OXALIPLATIN 171 MG: 5 INJECTION, SOLUTION, CONCENTRATE INTRAVENOUS at 11:35

## 2017-12-20 RX ADMIN — FLUOROURACIL 800 MG: 50 INJECTION, SOLUTION INTRAVENOUS at 13:37

## 2017-12-20 RX ADMIN — TRASTUZUMAB 364 MG: 150 INJECTION, POWDER, LYOPHILIZED, FOR SOLUTION INTRAVENOUS at 10:16

## 2017-12-20 RX ADMIN — SODIUM CHLORIDE 20 ML/HR: 0.9 INJECTION, SOLUTION INTRAVENOUS at 09:21

## 2017-12-20 RX ADMIN — LEUCOVORIN CALCIUM 800 MG: 200 INJECTION, POWDER, LYOPHILIZED, FOR SOLUTION INTRAMUSCULAR; INTRAVENOUS at 11:32

## 2017-12-20 RX ADMIN — DEXTROSE 20 ML/HR: 5 SOLUTION INTRAVENOUS at 11:30

## 2017-12-20 RX ADMIN — Medication 300 UNITS: at 13:47

## 2017-12-20 RX ADMIN — DEXAMETHASONE SODIUM PHOSPHATE: 10 INJECTION, SOLUTION INTRAMUSCULAR; INTRAVENOUS at 09:21

## 2017-12-20 NOTE — PLAN OF CARE

## 2018-01-02 ENCOUNTER — APPOINTMENT (OUTPATIENT)
Dept: LAB | Facility: CLINIC | Age: 68
End: 2018-01-02
Payer: COMMERCIAL

## 2018-01-02 DIAGNOSIS — C15.9 MALIGNANT NEOPLASM OF ESOPHAGUS, UNSPECIFIED LOCATION (HCC): ICD-10-CM

## 2018-01-02 LAB
ALBUMIN SERPL BCP-MCNC: 3.5 G/DL (ref 3.5–5)
ALP SERPL-CCNC: 86 U/L (ref 46–116)
ALT SERPL W P-5'-P-CCNC: 47 U/L (ref 12–78)
ANION GAP SERPL CALCULATED.3IONS-SCNC: 8 MMOL/L (ref 4–13)
AST SERPL W P-5'-P-CCNC: 29 U/L (ref 5–45)
BASOPHILS # BLD AUTO: 0.06 THOUSANDS/ΜL (ref 0–0.1)
BASOPHILS NFR BLD AUTO: 1 % (ref 0–1)
BILIRUB SERPL-MCNC: 0.4 MG/DL (ref 0.2–1)
BUN SERPL-MCNC: 16 MG/DL (ref 5–25)
CALCIUM SERPL-MCNC: 9.3 MG/DL (ref 8.3–10.1)
CHLORIDE SERPL-SCNC: 107 MMOL/L (ref 100–108)
CO2 SERPL-SCNC: 26 MMOL/L (ref 21–32)
CREAT SERPL-MCNC: 1.2 MG/DL (ref 0.6–1.3)
EOSINOPHIL # BLD AUTO: 0.16 THOUSAND/ΜL (ref 0–0.61)
EOSINOPHIL NFR BLD AUTO: 3 % (ref 0–6)
ERYTHROCYTE [DISTWIDTH] IN BLOOD BY AUTOMATED COUNT: 17 % (ref 11.6–15.1)
GFR SERPL CREATININE-BSD FRML MDRD: 61 ML/MIN/1.73SQ M
GLUCOSE SERPL-MCNC: 159 MG/DL (ref 65–140)
HCT VFR BLD AUTO: 38.1 % (ref 36.5–49.3)
HGB BLD-MCNC: 12.9 G/DL (ref 12–17)
LYMPHOCYTES # BLD AUTO: 1.64 THOUSANDS/ΜL (ref 0.6–4.47)
LYMPHOCYTES NFR BLD AUTO: 28 % (ref 14–44)
MCH RBC QN AUTO: 34.1 PG (ref 26.8–34.3)
MCHC RBC AUTO-ENTMCNC: 33.9 G/DL (ref 31.4–37.4)
MCV RBC AUTO: 101 FL (ref 82–98)
MONOCYTES # BLD AUTO: 0.74 THOUSAND/ΜL (ref 0.17–1.22)
MONOCYTES NFR BLD AUTO: 13 % (ref 4–12)
NEUTROPHILS # BLD AUTO: 3.21 THOUSANDS/ΜL (ref 1.85–7.62)
NEUTS SEG NFR BLD AUTO: 55 % (ref 43–75)
PLATELET # BLD AUTO: 162 THOUSANDS/UL (ref 149–390)
PMV BLD AUTO: 9.3 FL (ref 8.9–12.7)
POTASSIUM SERPL-SCNC: 4.6 MMOL/L (ref 3.5–5.3)
PROT SERPL-MCNC: 6.8 G/DL (ref 6.4–8.2)
RBC # BLD AUTO: 3.78 MILLION/UL (ref 3.88–5.62)
SODIUM SERPL-SCNC: 141 MMOL/L (ref 136–145)
WBC # BLD AUTO: 5.81 THOUSAND/UL (ref 4.31–10.16)

## 2018-01-02 PROCEDURE — 85025 COMPLETE CBC W/AUTO DIFF WBC: CPT

## 2018-01-02 PROCEDURE — 36415 COLL VENOUS BLD VENIPUNCTURE: CPT

## 2018-01-02 PROCEDURE — 80053 COMPREHEN METABOLIC PANEL: CPT

## 2018-01-02 RX ORDER — FLUOROURACIL 50 MG/ML
800 INJECTION, SOLUTION INTRAVENOUS ONCE
Status: COMPLETED | OUTPATIENT
Start: 2018-01-03 | End: 2018-01-03

## 2018-01-02 RX ORDER — SODIUM CHLORIDE 9 MG/ML
20 INJECTION, SOLUTION INTRAVENOUS CONTINUOUS
Status: DISCONTINUED | OUTPATIENT
Start: 2018-01-03 | End: 2018-01-06 | Stop reason: HOSPADM

## 2018-01-02 RX ORDER — DEXTROSE MONOHYDRATE 50 MG/ML
20 INJECTION, SOLUTION INTRAVENOUS CONTINUOUS
Status: DISCONTINUED | OUTPATIENT
Start: 2018-01-03 | End: 2018-01-06 | Stop reason: HOSPADM

## 2018-01-03 ENCOUNTER — HOSPITAL ENCOUNTER (OUTPATIENT)
Dept: INFUSION CENTER | Facility: CLINIC | Age: 68
Discharge: HOME | End: 2018-01-05
Payer: COMMERCIAL

## 2018-01-03 VITALS
DIASTOLIC BLOOD PRESSURE: 67 MMHG | TEMPERATURE: 96.8 F | SYSTOLIC BLOOD PRESSURE: 151 MMHG | BODY MASS INDEX: 31.51 KG/M2 | RESPIRATION RATE: 18 BRPM | WEIGHT: 200.73 LBS | HEART RATE: 75 BPM | HEIGHT: 67 IN

## 2018-01-03 PROCEDURE — 96417 CHEMO IV INFUS EACH ADDL SEQ: CPT

## 2018-01-03 PROCEDURE — 96411 CHEMO IV PUSH ADDL DRUG: CPT

## 2018-01-03 PROCEDURE — 96415 CHEMO IV INFUSION ADDL HR: CPT

## 2018-01-03 PROCEDURE — 96367 TX/PROPH/DG ADDL SEQ IV INF: CPT

## 2018-01-03 PROCEDURE — 96413 CHEMO IV INFUSION 1 HR: CPT

## 2018-01-03 PROCEDURE — 96368 THER/DIAG CONCURRENT INF: CPT

## 2018-01-03 RX ADMIN — FLUOROURACIL 800 MG: 50 INJECTION, SOLUTION INTRAVENOUS at 13:18

## 2018-01-03 RX ADMIN — SODIUM CHLORIDE 20 ML/HR: 0.9 INJECTION, SOLUTION INTRAVENOUS at 09:24

## 2018-01-03 RX ADMIN — DEXAMETHASONE SODIUM PHOSPHATE: 10 INJECTION, SOLUTION INTRAMUSCULAR; INTRAVENOUS at 09:26

## 2018-01-03 RX ADMIN — DEXTROSE 20 ML/HR: 5 SOLUTION INTRAVENOUS at 11:16

## 2018-01-03 RX ADMIN — LEUCOVORIN CALCIUM 800 MG: 200 INJECTION, POWDER, LYOPHILIZED, FOR SOLUTION INTRAMUSCULAR; INTRAVENOUS at 11:17

## 2018-01-03 RX ADMIN — TRASTUZUMAB 365 MG: 150 INJECTION, POWDER, LYOPHILIZED, FOR SOLUTION INTRAVENOUS at 10:10

## 2018-01-03 RX ADMIN — Medication 300 UNITS: at 13:37

## 2018-01-03 RX ADMIN — OXALIPLATIN 172 MG: 5 INJECTION, SOLUTION, CONCENTRATE INTRAVENOUS at 11:23

## 2018-01-10 NOTE — RESULT NOTES
Verified Results  (1) TISSUE EXAM 11Aug2017 12:31PM Prakash Loyola     Test Name Result Flag Reference   LAB AP CASE REPORT (Report)     Surgical Pathology Report             Case: [id number]                   Authorizing Provider: Hardy Mcallister MD      Collected:      08/11/2017 1231        Ordering Location:   [address] [place] [address]   Received:      08/11/2017 [facility]                               Pathologist:      Chandrakant Sr MD                                 Specimen:  Stomach, Gastric body mass   LAB AP FINAL DIAGNOSIS (Report)     A  Stomach mass (biopsy):  - Adenocarcinoma    Comment:   - Intradepartmental consultation concurs with the diagnosis of   adenocarcinoma    - Dr Nano Clancy was notified of the diagnosis on 8/15/17 at 3:25 pm     Interpretation performed at [facility], [address] [place] [address]    Electronically signed by Chandrakant Sr MD on 8/15/2017 at 3:26 PM   LAB AP SURGICAL ADDITIONAL INFORMATION (Report)     All controls performed with the immunohistochemical stains reported above   reacted appropriately  These tests were developed and their performance   characteristics determined by [facility] or   [employer]  They may not be cleared or approved by the [place]  Food and Drug Administration  The FDA has determined that such clearance   or approval is not necessary  These tests are used for clinical purposes  They should not be regarded as investigational or for research  This   laboratory has been approved by CLIA 88, designated as a high-complexity   laboratory and is qualified to perform these tests  LAB AP GROSS DESCRIPTION (Report)     A  The specimen is received in formalin, labeled with the patient's name   and medical record number, and is designated Gastric body mass  The   specimen consists of 3 tan to pink soft tissue fragments ranging in   greatest dimension from 0 2 to 0 6 centimeters   Due to the size of the   smallest fragment, it is questionable whether tissue will survive   histologic processing  Entirely submitted  One cassette  Note: The estimated total formalin fixation time based upon information   provided by the submitting clinician and the standard processing schedule   is 14 0 hours  LMS   LAB AP CLINICAL INFORMATION (Report)     EGD  FINDINGS:  Esophagus: A known malignant esophageal stricture was found to   be causing moderate obstruction  The obstruction was traversed  A   [employer] 23 mm diameter, 12 cm long, covered metal stent was   placed in the distal third of the esophagus, with success  Stomach: There   was a possible gastric body mass  Multiple biopsies was taken     Duodenum: The duodenum appeared to be normal

## 2018-01-11 NOTE — MISCELLANEOUS
Message  Call from Jody Hickman at [facility], they were scheduled to come out to the pt's home at 2:00 pm today at 9/13 to connect his 5FU pump (provided by Sancho,) but the pt is still at the infusion center, [facility]  They will not be able to come back to the house today to connect his pump, due to limited staffing, per Jody Hickman  Discussed with Dr Liz Romero and he is ok with the pump being connected tomorrow morning  A nurse is scheduled to come out to the house thurs 9/14  Active Problems    1  Esophageal cancer (150 9) (C15 9)    Current Meds   1  Adult Low Dose Aspirin 81 MG TABS; TAKE 1 TABLET DAILY; Therapy: (Christine Curling) to Recorded   2  AmLODIPine Besylate 5 MG Oral Tablet; TAKE 1 TABLET DAILY FOR BLOOD   PRESSURE; Therapy: (Christine Curling) to Recorded   3  Metoprolol Tartrate 50 MG Oral Tablet; TAKE 1 TABLET TWICE DAILY; Therapy: (Christine Curling) to Recorded   4  Pantoprazole Sodium 40 MG Oral Tablet Delayed Release; Therapy: [id number] to Recorded   5  Simvastatin 40 MG Oral Tablet; TAKE 1 TABLET DAILY; Therapy: (Recorded:[id number]) to Recorded    Allergies    1  No Known Drug Allergies    2   Animal dander - Cats    Signatures   Electronically signed by : Saul Green, ; Sep 13 2017  3:04PM EST                       (Author)

## 2018-01-13 VITALS
TEMPERATURE: 97.5 F | HEIGHT: 67 IN | RESPIRATION RATE: 18 BRPM | WEIGHT: 203 LBS | SYSTOLIC BLOOD PRESSURE: 110 MMHG | OXYGEN SATURATION: 96 % | DIASTOLIC BLOOD PRESSURE: 70 MMHG | BODY MASS INDEX: 31.86 KG/M2 | HEART RATE: 44 BPM

## 2018-01-14 VITALS
OXYGEN SATURATION: 96 % | DIASTOLIC BLOOD PRESSURE: 80 MMHG | RESPIRATION RATE: 18 BRPM | SYSTOLIC BLOOD PRESSURE: 130 MMHG | HEART RATE: 79 BPM | TEMPERATURE: 97 F | WEIGHT: 200 LBS | HEIGHT: 67 IN | BODY MASS INDEX: 31.39 KG/M2

## 2018-01-16 ENCOUNTER — TRANSCRIBE ORDERS (OUTPATIENT)
Dept: ADMINISTRATIVE | Facility: HOSPITAL | Age: 68
End: 2018-01-16

## 2018-01-16 ENCOUNTER — APPOINTMENT (OUTPATIENT)
Dept: LAB | Facility: CLINIC | Age: 68
End: 2018-01-16
Payer: COMMERCIAL

## 2018-01-16 ENCOUNTER — GENERIC CONVERSION - ENCOUNTER (OUTPATIENT)
Dept: OTHER | Facility: OTHER | Age: 68
End: 2018-01-16

## 2018-01-16 DIAGNOSIS — C15.9 MALIGNANT NEOPLASM OF ESOPHAGUS, UNSPECIFIED LOCATION (HCC): Primary | ICD-10-CM

## 2018-01-16 PROCEDURE — 80053 COMPREHEN METABOLIC PANEL: CPT | Performed by: INTERNAL MEDICINE

## 2018-01-16 PROCEDURE — 85025 COMPLETE CBC W/AUTO DIFF WBC: CPT | Performed by: INTERNAL MEDICINE

## 2018-01-16 PROCEDURE — 36415 COLL VENOUS BLD VENIPUNCTURE: CPT | Performed by: INTERNAL MEDICINE

## 2018-01-16 RX ORDER — DEXTROSE MONOHYDRATE 50 MG/ML
20 INJECTION, SOLUTION INTRAVENOUS CONTINUOUS
Status: DISCONTINUED | OUTPATIENT
Start: 2018-01-17 | End: 2018-01-20 | Stop reason: HOSPADM

## 2018-01-16 RX ORDER — FLUOROURACIL 50 MG/ML
800 INJECTION, SOLUTION INTRAVENOUS ONCE
Status: COMPLETED | OUTPATIENT
Start: 2018-01-17 | End: 2018-01-17

## 2018-01-16 RX ORDER — SODIUM CHLORIDE 9 MG/ML
20 INJECTION, SOLUTION INTRAVENOUS CONTINUOUS
Status: DISCONTINUED | OUTPATIENT
Start: 2018-01-17 | End: 2018-01-20 | Stop reason: HOSPADM

## 2018-01-17 ENCOUNTER — HOSPITAL ENCOUNTER (OUTPATIENT)
Dept: INFUSION CENTER | Facility: CLINIC | Age: 68
Discharge: HOME | End: 2018-01-17
Payer: COMMERCIAL

## 2018-01-17 VITALS
HEART RATE: 67 BPM | DIASTOLIC BLOOD PRESSURE: 76 MMHG | TEMPERATURE: 97.9 F | WEIGHT: 202.6 LBS | SYSTOLIC BLOOD PRESSURE: 131 MMHG | BODY MASS INDEX: 31.8 KG/M2 | HEIGHT: 67 IN | RESPIRATION RATE: 18 BRPM

## 2018-01-17 PROCEDURE — 96417 CHEMO IV INFUS EACH ADDL SEQ: CPT

## 2018-01-17 PROCEDURE — 96413 CHEMO IV INFUSION 1 HR: CPT

## 2018-01-17 PROCEDURE — 96415 CHEMO IV INFUSION ADDL HR: CPT

## 2018-01-17 PROCEDURE — 96411 CHEMO IV PUSH ADDL DRUG: CPT

## 2018-01-17 PROCEDURE — 96368 THER/DIAG CONCURRENT INF: CPT

## 2018-01-17 PROCEDURE — 96367 TX/PROPH/DG ADDL SEQ IV INF: CPT

## 2018-01-17 RX ADMIN — Medication 300 UNITS: at 13:20

## 2018-01-17 RX ADMIN — DEXAMETHASONE SODIUM PHOSPHATE: 10 INJECTION, SOLUTION INTRAMUSCULAR; INTRAVENOUS at 09:19

## 2018-01-17 RX ADMIN — TRASTUZUMAB 365 MG: 150 INJECTION, POWDER, LYOPHILIZED, FOR SOLUTION INTRAVENOUS at 09:51

## 2018-01-17 RX ADMIN — OXALIPLATIN 172 MG: 5 INJECTION, SOLUTION, CONCENTRATE INTRAVENOUS at 11:00

## 2018-01-17 RX ADMIN — DEXTROSE 20 ML/HR: 5 SOLUTION INTRAVENOUS at 10:57

## 2018-01-17 RX ADMIN — LEUCOVORIN CALCIUM 800 MG: 200 INJECTION, POWDER, LYOPHILIZED, FOR SOLUTION INTRAMUSCULAR; INTRAVENOUS at 11:00

## 2018-01-17 RX ADMIN — SODIUM CHLORIDE 20 ML/HR: 0.9 INJECTION, SOLUTION INTRAVENOUS at 09:19

## 2018-01-17 RX ADMIN — FLUOROURACIL 800 MG: 50 INJECTION, SOLUTION INTRAVENOUS at 13:14

## 2018-01-17 NOTE — PROGRESS NOTES
Loc Saul, Pharmacist spoke to Naomy Perez RN from office of Dr Mirza Cardoza who states he would be treating Pt  Regardless of EF  No new order for Echo at this time  Future orders will reflect this per Naomy Perez RN

## 2018-01-17 NOTE — PLAN OF CARE
Problem: Potential for Falls  Goal: Patient will remain free of falls  INTERVENTIONS:  - Assess patient frequently for physical needs  -  Identify cognitive and physical deficits and behaviors that affect risk of falls    -  [place] fall precautions as indicated by assessment   - Educate patient/family on patient safety including physical limitations  - Instruct patient to call for assistance with activity based on assessment  - Modify environment to reduce risk of injury  - Consider OT/PT consult to assist with strengthening/mobility   Outcome: Progressing

## 2018-01-17 NOTE — PROGRESS NOTES
Pt  Tolerated Herceptin, Leucovorin, Oxaliplatin and 5FU bolus without adverse event  Port a cath flushed without problem noting excellent blood return  Port a cath remain intact for 5FU Cadd pump that will be placed by Home care and discontinued by Home care  Future appointments reviewed  AVS provided

## 2018-01-22 VITALS
DIASTOLIC BLOOD PRESSURE: 80 MMHG | HEIGHT: 67 IN | TEMPERATURE: 96.2 F | SYSTOLIC BLOOD PRESSURE: 128 MMHG | OXYGEN SATURATION: 97 % | RESPIRATION RATE: 18 BRPM | BODY MASS INDEX: 31.39 KG/M2 | WEIGHT: 200 LBS | HEART RATE: 76 BPM

## 2018-01-22 VITALS
WEIGHT: 199 LBS | OXYGEN SATURATION: 98 % | HEART RATE: 68 BPM | BODY MASS INDEX: 31.23 KG/M2 | RESPIRATION RATE: 18 BRPM | DIASTOLIC BLOOD PRESSURE: 80 MMHG | TEMPERATURE: 97.3 F | HEIGHT: 67 IN | SYSTOLIC BLOOD PRESSURE: 124 MMHG

## 2018-01-24 VITALS
OXYGEN SATURATION: 96 % | SYSTOLIC BLOOD PRESSURE: 140 MMHG | DIASTOLIC BLOOD PRESSURE: 80 MMHG | RESPIRATION RATE: 18 BRPM | WEIGHT: 200 LBS | HEIGHT: 67 IN | BODY MASS INDEX: 31.39 KG/M2 | HEART RATE: 78 BPM | TEMPERATURE: 97.7 F

## 2018-01-24 VITALS
SYSTOLIC BLOOD PRESSURE: 128 MMHG | DIASTOLIC BLOOD PRESSURE: 80 MMHG | HEIGHT: 67 IN | BODY MASS INDEX: 31.55 KG/M2 | TEMPERATURE: 97.6 F | OXYGEN SATURATION: 98 % | RESPIRATION RATE: 18 BRPM | HEART RATE: 76 BPM | WEIGHT: 201 LBS

## 2018-01-30 ENCOUNTER — APPOINTMENT (OUTPATIENT)
Dept: LAB | Facility: CLINIC | Age: 68
End: 2018-01-30
Payer: COMMERCIAL

## 2018-01-30 DIAGNOSIS — Z00.00 ENCOUNTER FOR GENERAL ADULT MEDICAL EXAMINATION WITHOUT ABNORMAL FINDINGS: ICD-10-CM

## 2018-01-30 DIAGNOSIS — F41.9 ANXIETY DISORDER: ICD-10-CM

## 2018-01-30 DIAGNOSIS — C15.9 MALIGNANT NEOPLASM OF ESOPHAGUS (HCC): ICD-10-CM

## 2018-01-30 PROCEDURE — 85025 COMPLETE CBC W/AUTO DIFF WBC: CPT | Performed by: INTERNAL MEDICINE

## 2018-01-30 PROCEDURE — 80053 COMPREHEN METABOLIC PANEL: CPT | Performed by: INTERNAL MEDICINE

## 2018-01-30 PROCEDURE — 36415 COLL VENOUS BLD VENIPUNCTURE: CPT | Performed by: INTERNAL MEDICINE

## 2018-01-30 RX ORDER — SODIUM CHLORIDE 9 MG/ML
20 INJECTION, SOLUTION INTRAVENOUS CONTINUOUS
Status: DISCONTINUED | OUTPATIENT
Start: 2018-01-31 | End: 2018-02-03 | Stop reason: HOSPADM

## 2018-01-30 RX ORDER — FLUOROURACIL 50 MG/ML
800 INJECTION, SOLUTION INTRAVENOUS ONCE
Status: COMPLETED | OUTPATIENT
Start: 2018-01-31 | End: 2018-01-31

## 2018-01-30 RX ORDER — DEXTROSE MONOHYDRATE 50 MG/ML
20 INJECTION, SOLUTION INTRAVENOUS CONTINUOUS
Status: DISCONTINUED | OUTPATIENT
Start: 2018-01-31 | End: 2018-02-03 | Stop reason: HOSPADM

## 2018-01-31 ENCOUNTER — HOSPITAL ENCOUNTER (OUTPATIENT)
Dept: INFUSION CENTER | Facility: CLINIC | Age: 68
Discharge: HOME | End: 2018-01-31
Payer: COMMERCIAL

## 2018-01-31 VITALS
SYSTOLIC BLOOD PRESSURE: 142 MMHG | WEIGHT: 202.82 LBS | DIASTOLIC BLOOD PRESSURE: 87 MMHG | RESPIRATION RATE: 18 BRPM | TEMPERATURE: 97.5 F | BODY MASS INDEX: 31.83 KG/M2 | HEART RATE: 67 BPM | HEIGHT: 67 IN

## 2018-01-31 PROCEDURE — 96413 CHEMO IV INFUSION 1 HR: CPT

## 2018-01-31 PROCEDURE — 96368 THER/DIAG CONCURRENT INF: CPT

## 2018-01-31 PROCEDURE — 96375 TX/PRO/DX INJ NEW DRUG ADDON: CPT

## 2018-01-31 PROCEDURE — 96417 CHEMO IV INFUS EACH ADDL SEQ: CPT

## 2018-01-31 PROCEDURE — 96411 CHEMO IV PUSH ADDL DRUG: CPT

## 2018-01-31 PROCEDURE — 96415 CHEMO IV INFUSION ADDL HR: CPT

## 2018-01-31 RX ADMIN — SODIUM CHLORIDE 20 ML/HR: 0.9 INJECTION, SOLUTION INTRAVENOUS at 09:15

## 2018-01-31 RX ADMIN — OXALIPLATIN 172 MG: 5 INJECTION, SOLUTION, CONCENTRATE INTRAVENOUS at 11:02

## 2018-01-31 RX ADMIN — TRASTUZUMAB 365 MG: 150 INJECTION, POWDER, LYOPHILIZED, FOR SOLUTION INTRAVENOUS at 09:45

## 2018-01-31 RX ADMIN — FLUOROURACIL 800 MG: 50 INJECTION, SOLUTION INTRAVENOUS at 12:58

## 2018-01-31 RX ADMIN — Medication 300 UNITS: at 13:10

## 2018-01-31 RX ADMIN — DEXAMETHASONE SODIUM PHOSPHATE: 10 INJECTION, SOLUTION INTRAMUSCULAR; INTRAVENOUS at 09:15

## 2018-01-31 RX ADMIN — DEXTROSE 20 ML/HR: 5 SOLUTION INTRAVENOUS at 10:59

## 2018-01-31 RX ADMIN — LEUCOVORIN CALCIUM 800 MG: 200 INJECTION, POWDER, LYOPHILIZED, FOR SOLUTION INTRAMUSCULAR; INTRAVENOUS at 10:59

## 2018-01-31 NOTE — PROGRESS NOTES
Patient tolerated chemotherapy infusion well  Offers no complaints   Pt's port a cath left accessed for cadd pump connection at home by Sancho

## 2018-01-31 NOTE — PLAN OF CARE
Problem: Potential for Falls  Goal: Patient will remain free of falls  INTERVENTIONS:  - Assess patient frequently for physical needs  -  Identify cognitive and physical deficits and behaviors that affect risk of falls    -  [place] fall precautions as indicated by assessment   - Educate patient/family on patient safety including physical limitations  - Instruct patient to call for assistance with activity based on assessment  - Modify environment to reduce risk of injury  - Consider OT/PT consult to assist with strengthening/mobility   Outcome: Progressing

## 2018-02-09 ENCOUNTER — HOSPITAL ENCOUNTER (OUTPATIENT)
Dept: RADIOLOGY | Facility: MEDICAL CENTER | Age: 68
Discharge: HOME | End: 2018-02-09
Payer: COMMERCIAL

## 2018-02-09 DIAGNOSIS — C15.9 MALIGNANT NEOPLASM OF ESOPHAGUS, UNSPECIFIED LOCATION (HCC): ICD-10-CM

## 2018-02-09 PROCEDURE — 74177 CT ABD & PELVIS W/CONTRAST: CPT

## 2018-02-09 PROCEDURE — 71260 CT THORAX DX C+: CPT

## 2018-02-09 RX ADMIN — IOHEXOL 100 ML: 350 INJECTION, SOLUTION INTRAVENOUS at 10:52

## 2018-02-13 ENCOUNTER — APPOINTMENT (OUTPATIENT)
Dept: LAB | Facility: CLINIC | Age: 68
End: 2018-02-13
Payer: COMMERCIAL

## 2018-02-13 ENCOUNTER — OFFICE VISIT (OUTPATIENT)
Dept: HEMATOLOGY ONCOLOGY | Facility: CLINIC | Age: 68
End: 2018-02-13
Payer: COMMERCIAL

## 2018-02-13 VITALS
TEMPERATURE: 96.7 F | OXYGEN SATURATION: 96 % | RESPIRATION RATE: 16 BRPM | HEART RATE: 74 BPM | HEIGHT: 67 IN | DIASTOLIC BLOOD PRESSURE: 62 MMHG | SYSTOLIC BLOOD PRESSURE: 122 MMHG | BODY MASS INDEX: 31.71 KG/M2 | WEIGHT: 202 LBS

## 2018-02-13 DIAGNOSIS — C15.5 MALIGNANT NEOPLASM OF LOWER THIRD OF ESOPHAGUS (HCC): Primary | ICD-10-CM

## 2018-02-13 DIAGNOSIS — F41.9 ANXIETY DISORDER: ICD-10-CM

## 2018-02-13 DIAGNOSIS — C15.9 MALIGNANT NEOPLASM OF ESOPHAGUS (HCC): ICD-10-CM

## 2018-02-13 DIAGNOSIS — Z00.00 ENCOUNTER FOR GENERAL ADULT MEDICAL EXAMINATION WITHOUT ABNORMAL FINDINGS: ICD-10-CM

## 2018-02-13 LAB
ALBUMIN SERPL BCP-MCNC: 3.5 G/DL (ref 3.5–5)
ALP SERPL-CCNC: 85 U/L (ref 46–116)
ALT SERPL W P-5'-P-CCNC: 48 U/L (ref 12–78)
ANION GAP SERPL CALCULATED.3IONS-SCNC: 8 MMOL/L (ref 4–13)
AST SERPL W P-5'-P-CCNC: 36 U/L (ref 5–45)
BASOPHILS # BLD AUTO: 0.03 THOUSANDS/ΜL (ref 0–0.1)
BASOPHILS NFR BLD AUTO: 1 % (ref 0–1)
BILIRUB SERPL-MCNC: 0.5 MG/DL (ref 0.2–1)
BUN SERPL-MCNC: 10 MG/DL (ref 5–25)
CALCIUM SERPL-MCNC: 8.9 MG/DL (ref 8.3–10.1)
CHLORIDE SERPL-SCNC: 106 MMOL/L (ref 100–108)
CO2 SERPL-SCNC: 27 MMOL/L (ref 21–32)
CREAT SERPL-MCNC: 1.21 MG/DL (ref 0.6–1.3)
EOSINOPHIL # BLD AUTO: 0.13 THOUSAND/ΜL (ref 0–0.61)
EOSINOPHIL NFR BLD AUTO: 4 % (ref 0–6)
ERYTHROCYTE [DISTWIDTH] IN BLOOD BY AUTOMATED COUNT: 15.2 % (ref 11.6–15.1)
GFR SERPL CREATININE-BSD FRML MDRD: 61 ML/MIN/1.73SQ M
GLUCOSE SERPL-MCNC: 147 MG/DL (ref 65–140)
HCT VFR BLD AUTO: 36.9 % (ref 36.5–49.3)
HGB BLD-MCNC: 12.6 G/DL (ref 12–17)
LYMPHOCYTES # BLD AUTO: 1.15 THOUSANDS/ΜL (ref 0.6–4.47)
LYMPHOCYTES NFR BLD AUTO: 33 % (ref 14–44)
MCH RBC QN AUTO: 34.8 PG (ref 26.8–34.3)
MCHC RBC AUTO-ENTMCNC: 34.1 G/DL (ref 31.4–37.4)
MCV RBC AUTO: 102 FL (ref 82–98)
MONOCYTES # BLD AUTO: 0.6 THOUSAND/ΜL (ref 0.17–1.22)
MONOCYTES NFR BLD AUTO: 17 % (ref 4–12)
NEUTROPHILS # BLD AUTO: 1.56 THOUSANDS/ΜL (ref 1.85–7.62)
NEUTS SEG NFR BLD AUTO: 45 % (ref 43–75)
PLATELET # BLD AUTO: 121 THOUSANDS/UL (ref 149–390)
PMV BLD AUTO: 8.5 FL (ref 8.9–12.7)
POTASSIUM SERPL-SCNC: 4 MMOL/L (ref 3.5–5.3)
PROT SERPL-MCNC: 6.8 G/DL (ref 6.4–8.2)
RBC # BLD AUTO: 3.62 MILLION/UL (ref 3.88–5.62)
SODIUM SERPL-SCNC: 141 MMOL/L (ref 136–145)
WBC # BLD AUTO: 3.47 THOUSAND/UL (ref 4.31–10.16)

## 2018-02-13 PROCEDURE — 80053 COMPREHEN METABOLIC PANEL: CPT

## 2018-02-13 PROCEDURE — 99214 OFFICE O/P EST MOD 30 MIN: CPT | Performed by: INTERNAL MEDICINE

## 2018-02-13 PROCEDURE — 85025 COMPLETE CBC W/AUTO DIFF WBC: CPT

## 2018-02-13 PROCEDURE — 36415 COLL VENOUS BLD VENIPUNCTURE: CPT

## 2018-02-13 RX ORDER — SODIUM CHLORIDE 9 MG/ML
20 INJECTION, SOLUTION INTRAVENOUS CONTINUOUS
Status: DISCONTINUED | OUTPATIENT
Start: 2018-02-14 | End: 2018-02-17 | Stop reason: HOSPADM

## 2018-02-13 RX ORDER — DEXTROSE MONOHYDRATE 50 MG/ML
20 INJECTION, SOLUTION INTRAVENOUS CONTINUOUS
Status: DISCONTINUED | OUTPATIENT
Start: 2018-02-14 | End: 2018-02-17 | Stop reason: HOSPADM

## 2018-02-13 RX ORDER — FLUOROURACIL 50 MG/ML
800 INJECTION, SOLUTION INTRAVENOUS ONCE
Status: COMPLETED | OUTPATIENT
Start: 2018-02-14 | End: 2018-02-14

## 2018-02-13 NOTE — PROGRESS NOTES
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  His pulmonary metastasis was confirmed by biopsy  He has HER-2 positive disease  He is currently on systemic chemotherapy with trastuzumab with FOLFOX-6 with minimal toxicity  Based on a recent CT scan, he has good partial response  He is going to have 12 cycle of FOLFOX-6, starting tomorrow  Thereafter, he is continue with trastuzumab monotherapy every 3 weeks starting on 2018  I will see him again in 2 months with CBC and CMP  He is in agreement with my recommendations  Subjective:     HPI:          Interval History:  A 69year old gentleman with no significant past medical history  He was hospitalized in early 2017 with progressive dysphagia  However, he had minimal weight loss  He was found to have mass in distal esophagus, based on the EGD  Biopsy was positive for adenocarcinoma with mucinous features  CT scan of chest, abdomen pelvis showed not only mass in the distal esophagus, but also multiple pulmonary masses, consistent with metastatic disease  He also had retroperitoneal adenopathy measuring 2 1 cm  Lung biopsy showed adenocarcinoma, consistent with esophageal primary  His tumor was subsequently tested for HER-2 by immunohistochemistry as well as fish  Tumor from esophagus was HER-2 3+ and Fish positive with ratio of 6 5  Tumor in the lung was HER-2 negative  HER-2 Fish was also negative  This was considered to be HER-2 positive disease  Therefore, she started systemic chemotherapy with trastuzumab and FOLFOX-6  After 3 months of treatment, CT scan showed partial response   He has been tolerating systemic chemotherapy very well  He presents today prior to the last cycle of chemotherapy  He has mild fatigue  He also had mild neuropathy  He denied any mouth sore  He has intermittent loose stool  He has no complaint of pain  His weight is stable  He has no respiratory symptoms  His performance status is normal       Objective:     Primary Diagnosis:    Metastatic adenocarcinoma of distal esophagus with multiple lung metastasis as well as retroperitoneal adenopathy, HER-2 positive disease  diagnosed in July 2017  Cancer Staging:  No matching staging information was found for the patient  Previous Hematologic/ Oncologic Treatment:         Current Hematologic/ Oncologic Treatment:      FOLFOX-6 with trastuzumab  12th cycle to be given tomorrow  Disease Status:     Good partial response  Test Results:    Pathology:    Biopsy from December esophagus showed adenocarcinoma  biopsy showed adenocarcinoma with mucinous features  TTF-1 negative  from esophagus was HER-2 3+ and Fish positive with ratio of 6 5  from long was HER-2 negative and Fish negative  Radiology:    CT scan of chest abdomen pelvis in February 2018 showed further reduction of pulmonary metastasis, currently measures 4 mm  Less prominent thickening of distal esophagus  Gastrohepatic lymph not measures 7 x 12 mm which also has been decreased  I personally reviewed this films  Laboratory:        Physical Exam:      General Appearance:    Alert, oriented        Eyes:    PERRL   Ears:    Normal external ear canals, both ears   Nose:   Nares normal, septum midline   Throat:   Mucosa moist  Pharynx without injection  Neck:   Supple       Lungs:     Clear to auscultation bilaterally   Chest Wall:    No tenderness or deformity    Heart:    Regular rate and rhythm       Abdomen:     Soft, non-tender, bowel sounds +, no organomegaly           Extremities:   Extremities no cyanosis or edema       Skin:   no rash or icterus      Lymph nodes:   Cervical, supraclavicular, and axillary nodes normal   Neurologic:   CNII-XII intact, normal strength, sensation and reflexes     Throughout          Breast exam: Not performed  ROS: Review of Systems        Imaging: Ct Chest Abdomen Pelvis W Contrast    Result Date: 2/12/2018  Narrative: CT CHEST, ABDOMEN AND PELVIS WITH IV CONTRAST INDICATION:  "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  His pulmonary metastasis was confirmed by biopsy  He has HER-2 positive disease  He is currently on systemic chemotherapy with trastuzumab with FOLFOX-6 with minimal toxicity  He has partial response, based on the CT scan in November 2017   " COMPARISON: CT chest on pelvis 11/17/2017  TECHNIQUE: CT examination of the chest, abdomen and pelvis was performed  Reformatted images were created in axial, sagittal, and coronal planes  Radiation dose length product (DLP) for this visit:  738 mGy-cm   This examination, like all CT scans performed in the [facility], was performed utilizing techniques to minimize radiation dose exposure, including the use of iterative reconstruction and automated exposure control  IV Contrast:  100 mL of iohexol (OMNIPAQUE)   350 Multi-dose Enteric Contrast: Enteric contrast was administered  FINDINGS: CHEST LUNGS:  The lateral segment peripheral right middle lobe nodule which previously measured 7 mm x 4 mm as decreased in size now measuring 4 mm x 3 mm, #3/41  3 mm lingular nodule #3/43 is stable  Previously seen 3 mm left lower lobe nodule is no longer apparent  Areas of discoid scarring along the left fissure are stable  Mild peripheral pulmonary scarring throughout both lungs  No endotracheal or endobronchial lesion  PLEURA:  Unremarkable  HEART/GREAT VESSELS:  Unremarkable for patient's age   MEDIASTINUM AND ZAYNAB:  Mild diffuse circumferential thickening of the esophagus most apparent distally is less prominent  CHEST WALL AND LOWER NECK: Right-sided chest port remains in place  ABDOMEN LIVER/BILIARY TREE:  Unremarkable  GALLBLADDER:  No calcified gallstones  No pericholecystic inflammatory change  SPLEEN:  Unremarkable  PANCREAS:  Unremarkable  ADRENAL GLANDS: Unremarkable  KIDNEYS/URETERS:  Unremarkable  No hydronephrosis  STOMACH AND BOWEL:  Gastric stent remains in place  Distal colonic diverticulosis without acute diverticulitis  APPENDIX:  Noninflamed  ABDOMINOPELVIC CAVITY:  No ascites or free intraperitoneal air  Previously seen gastrohepatic/celiac axis lymph node which measured 14 mm x 8 mm now measures 12 mm x 7 mm  No new adenopathy  VESSELS:  Atherosclerotic changes are present  No evidence of aneurysm  PELVIS REPRODUCTIVE ORGANS:  Unremarkable for patient's age  URINARY BLADDER:  Unremarkable  ABDOMINAL WALL/INGUINAL REGIONS:  Unremarkable  OSSEOUS STRUCTURES:  No acute fracture or destructive osseous lesion  Impression: Less prominent circumferential thickening of the distal esophagus  Gastric stent remains in place  Decreased size of known pulmonary metastases  The dominant right middle lobe nodule which previously measured 7 mm x 4 mm now measures 4 mm x 3 mm  Other smaller nodules are also less prominent, no longer apparent or stable in size  Previously seen gastrohepatic/celiac axis lymph node which measured 8 mm x 14 mm now measures 7 mm x 12 mm  New or increased areas of metastatic disease are not apparent   Workstation performed: [id number]         Labs:   Lab Results   Component Value Date    WBC 3 47 (L) 02/13/2018    HGB 12 6 02/13/2018    HCT 36 9 02/13/2018     (H) 02/13/2018     (L) 02/13/2018     Lab Results   Component Value Date     01/30/2018    K 4 0 01/30/2018     01/30/2018    CO2 26 01/30/2018    ANIONGAP 7 01/30/2018    BUN 13 01/30/2018    CREATININE 1 25 01/30/2018    GLUCOSE 176 (H) 01/30/2018    CALCIUM 9 0 01/30/2018    AST 33 01/30/2018    ALT 53 01/30/2018    ALKPHOS 91 01/30/2018    PROT 6 9 01/30/2018    BILITOT 0 40 01/30/2018    EGFR 58 01/30/2018         Current Medications: Reviewed  Allergies: Reviewed  PMH/FH/SH:  Reviewed      Vital Sign:    Body surface area is 2 02 meters squared      Wt Readings from Last 3 Encounters:   02/13/18 91 6 kg (202 lb)   01/31/18 92 kg (202 lb 13 2 oz)   01/17/18 91 9 kg (202 lb 9 6 oz)        Temp Readings from Last 3 Encounters:   02/13/18 (!) 96 7 °F (35 9 °C) (Tympanic)   01/31/18 97 5 °F (36 4 °C) (Tympanic)   01/17/18 97 9 °F (36 6 °C) (Tympanic)        BP Readings from Last 3 Encounters:   02/13/18 122/62   01/31/18 142/87   01/17/18 131/76         Pulse Readings from Last 3 Encounters:   02/13/18 74   01/31/18 67   01/17/18 67     @LASTSAO2(3)@

## 2018-02-14 ENCOUNTER — HOSPITAL ENCOUNTER (OUTPATIENT)
Dept: INFUSION CENTER | Facility: CLINIC | Age: 68
Discharge: HOME | End: 2018-02-14
Payer: COMMERCIAL

## 2018-02-14 VITALS
BODY MASS INDEX: 32.42 KG/M2 | HEIGHT: 67 IN | DIASTOLIC BLOOD PRESSURE: 70 MMHG | HEART RATE: 70 BPM | RESPIRATION RATE: 18 BRPM | SYSTOLIC BLOOD PRESSURE: 117 MMHG | WEIGHT: 206.57 LBS | TEMPERATURE: 96.2 F

## 2018-02-14 PROCEDURE — 96413 CHEMO IV INFUSION 1 HR: CPT

## 2018-02-14 PROCEDURE — 96417 CHEMO IV INFUS EACH ADDL SEQ: CPT

## 2018-02-14 PROCEDURE — 96367 TX/PROPH/DG ADDL SEQ IV INF: CPT

## 2018-02-14 PROCEDURE — 96415 CHEMO IV INFUSION ADDL HR: CPT

## 2018-02-14 PROCEDURE — 96368 THER/DIAG CONCURRENT INF: CPT

## 2018-02-14 PROCEDURE — 96411 CHEMO IV PUSH ADDL DRUG: CPT

## 2018-02-14 RX ADMIN — TRASTUZUMAB 368 MG: 150 INJECTION, POWDER, LYOPHILIZED, FOR SOLUTION INTRAVENOUS at 09:56

## 2018-02-14 RX ADMIN — LEUCOVORIN CALCIUM 800 MG: 200 INJECTION, POWDER, LYOPHILIZED, FOR SOLUTION INTRAMUSCULAR; INTRAVENOUS at 11:14

## 2018-02-14 RX ADMIN — SODIUM CHLORIDE 20 ML/HR: 0.9 INJECTION, SOLUTION INTRAVENOUS at 08:51

## 2018-02-14 RX ADMIN — DEXAMETHASONE SODIUM PHOSPHATE: 10 INJECTION, SOLUTION INTRAMUSCULAR; INTRAVENOUS at 08:51

## 2018-02-14 RX ADMIN — FLUOROURACIL 800 MG: 50 INJECTION, SOLUTION INTRAVENOUS at 13:18

## 2018-02-14 RX ADMIN — OXALIPLATIN 172 MG: 5 INJECTION, SOLUTION, CONCENTRATE INTRAVENOUS at 11:17

## 2018-02-14 RX ADMIN — DEXTROSE 20 ML/HR: 5 SOLUTION INTRAVENOUS at 11:14

## 2018-02-14 NOTE — PLAN OF CARE
Problem: Potential for Falls  Goal: Patient will remain free of falls  INTERVENTIONS:  - Assess patient frequently for physical needs  -  Identify cognitive and physical deficits and behaviors that affect risk of falls    -  [place] fall precautions as indicated by assessment   - Educate patient/family on patient safety including physical limitations  - Instruct patient to call for assistance with activity based on assessment  - Modify environment to reduce risk of injury  - Consider OT/PT consult to assist with strengthening/mobility   Outcome: Progressing

## 2018-02-14 NOTE — PROGRESS NOTES
Pt tolerated chemotherapy today without incident  5FU CADD pump to be connected by [place] at pt's home  Pump will also be disconnected at home by [place]   Pt to have remaining chemo treatments at [facility]

## 2018-02-27 RX ORDER — SODIUM CHLORIDE 9 MG/ML
20 INJECTION, SOLUTION INTRAVENOUS CONTINUOUS
Status: DISCONTINUED | OUTPATIENT
Start: 2018-02-28 | End: 2018-03-03 | Stop reason: HOSPADM

## 2018-02-28 ENCOUNTER — HOSPITAL ENCOUNTER (OUTPATIENT)
Dept: INFUSION CENTER | Facility: CLINIC | Age: 68
Discharge: HOME | End: 2018-02-28
Payer: COMMERCIAL

## 2018-02-28 VITALS
BODY MASS INDEX: 31.7 KG/M2 | WEIGHT: 201.94 LBS | TEMPERATURE: 97.3 F | SYSTOLIC BLOOD PRESSURE: 128 MMHG | DIASTOLIC BLOOD PRESSURE: 57 MMHG | HEIGHT: 67 IN | HEART RATE: 51 BPM | RESPIRATION RATE: 19 BRPM | OXYGEN SATURATION: 96 %

## 2018-02-28 PROCEDURE — 96413 CHEMO IV INFUSION 1 HR: CPT

## 2018-02-28 RX ADMIN — HEPARIN 300 UNITS: 100 SYRINGE at 11:35

## 2018-02-28 RX ADMIN — TRASTUZUMAB 552 MG: 150 INJECTION, POWDER, LYOPHILIZED, FOR SOLUTION INTRAVENOUS at 10:28

## 2018-02-28 RX ADMIN — SODIUM CHLORIDE 20 ML/HR: 0.9 INJECTION, SOLUTION INTRAVENOUS at 09:20

## 2018-03-05 ENCOUNTER — TELEPHONE (OUTPATIENT)
Dept: HEMATOLOGY ONCOLOGY | Facility: CLINIC | Age: 68
End: 2018-03-05

## 2018-03-05 NOTE — TELEPHONE ENCOUNTER
req a refill of the Lorazepam sent to the [address] [place] [address] on file  Req that we call her back once it is sent

## 2018-03-20 RX ORDER — SODIUM CHLORIDE 9 MG/ML
20 INJECTION, SOLUTION INTRAVENOUS CONTINUOUS
Status: DISCONTINUED | OUTPATIENT
Start: 2018-03-21 | End: 2018-03-24 | Stop reason: HOSPADM

## 2018-03-21 ENCOUNTER — HOSPITAL ENCOUNTER (OUTPATIENT)
Dept: INFUSION CENTER | Facility: CLINIC | Age: 68
Discharge: HOME | End: 2018-03-21
Payer: COMMERCIAL

## 2018-03-21 VITALS
SYSTOLIC BLOOD PRESSURE: 134 MMHG | RESPIRATION RATE: 18 BRPM | TEMPERATURE: 97.6 F | OXYGEN SATURATION: 96 % | WEIGHT: 200 LBS | BODY MASS INDEX: 32.14 KG/M2 | HEART RATE: 73 BPM | HEIGHT: 66 IN | DIASTOLIC BLOOD PRESSURE: 68 MMHG

## 2018-03-21 PROCEDURE — 96413 CHEMO IV INFUSION 1 HR: CPT

## 2018-03-21 RX ADMIN — Medication 300 UNITS: at 11:25

## 2018-03-21 RX ADMIN — TRASTUZUMAB 552 MG: 150 INJECTION, POWDER, LYOPHILIZED, FOR SOLUTION INTRAVENOUS at 10:20

## 2018-03-21 RX ADMIN — SODIUM CHLORIDE 20 ML/HR: 0.9 INJECTION, SOLUTION INTRAVENOUS at 09:10

## 2018-03-21 NOTE — PROGRESS NOTES
Patient to [facility] for Herceptin: Offers no complaints at present time: Lab work ( 02/13/18 ) reviewed:  Within parameters to treat: Right PAC accessed without difficulty: Good blood return noted

## 2018-04-05 ENCOUNTER — TELEPHONE (OUTPATIENT)
Dept: HEMATOLOGY ONCOLOGY | Facility: CLINIC | Age: 68
End: 2018-04-05

## 2018-04-05 NOTE — TELEPHONE ENCOUNTER
CALLING FOR REFILL OF LORAZEPAM 0 5MG, 1X DAY - USES [facility] [phone]    TXS

## 2018-04-10 RX ORDER — SODIUM CHLORIDE 9 MG/ML
20 INJECTION, SOLUTION INTRAVENOUS CONTINUOUS
Status: DISCONTINUED | OUTPATIENT
Start: 2018-04-11 | End: 2018-04-14 | Stop reason: HOSPADM

## 2018-04-11 ENCOUNTER — HOSPITAL ENCOUNTER (OUTPATIENT)
Dept: INFUSION CENTER | Facility: CLINIC | Age: 68
Discharge: HOME | End: 2018-04-11
Payer: COMMERCIAL

## 2018-04-11 ENCOUNTER — OFFICE VISIT (OUTPATIENT)
Dept: HEMATOLOGY ONCOLOGY | Facility: CLINIC | Age: 68
End: 2018-04-11
Payer: COMMERCIAL

## 2018-04-11 VITALS
TEMPERATURE: 97.8 F | WEIGHT: 200 LBS | DIASTOLIC BLOOD PRESSURE: 68 MMHG | OXYGEN SATURATION: 96 % | RESPIRATION RATE: 18 BRPM | HEART RATE: 68 BPM | SYSTOLIC BLOOD PRESSURE: 112 MMHG | BODY MASS INDEX: 31.39 KG/M2 | HEIGHT: 67 IN

## 2018-04-11 VITALS
DIASTOLIC BLOOD PRESSURE: 70 MMHG | OXYGEN SATURATION: 98 % | WEIGHT: 200 LBS | BODY MASS INDEX: 31.39 KG/M2 | RESPIRATION RATE: 18 BRPM | SYSTOLIC BLOOD PRESSURE: 134 MMHG | HEART RATE: 72 BPM | HEIGHT: 67 IN | TEMPERATURE: 96.3 F

## 2018-04-11 DIAGNOSIS — C15.5 MALIGNANT NEOPLASM OF LOWER THIRD OF ESOPHAGUS (HCC): ICD-10-CM

## 2018-04-11 DIAGNOSIS — C15.5 MALIGNANT NEOPLASM OF LOWER THIRD OF ESOPHAGUS (HCC): Primary | ICD-10-CM

## 2018-04-11 LAB
ALBUMIN SERPL BCP-MCNC: 3.7 G/DL (ref 3.5–5)
ALP SERPL-CCNC: 75 U/L (ref 46–116)
ALT SERPL W P-5'-P-CCNC: 32 U/L (ref 12–78)
ANION GAP SERPL CALCULATED.3IONS-SCNC: 10 MMOL/L (ref 4–13)
AST SERPL W P-5'-P-CCNC: 23 U/L (ref 5–45)
BASOPHILS # BLD AUTO: 0.03 THOUSANDS/ΜL (ref 0–0.1)
BASOPHILS NFR BLD AUTO: 1 % (ref 0–1)
BILIRUB SERPL-MCNC: 0.3 MG/DL (ref 0.2–1)
BUN SERPL-MCNC: 14 MG/DL (ref 5–25)
CALCIUM SERPL-MCNC: 9.1 MG/DL
CHLORIDE SERPL-SCNC: 108 MMOL/L (ref 100–108)
CO2 SERPL-SCNC: 25 MMOL/L (ref 21–32)
CREAT SERPL-MCNC: 1.14 MG/DL (ref 0.6–1.3)
EOSINOPHIL # BLD AUTO: 0.3 THOUSAND/ΜL (ref 0–0.61)
EOSINOPHIL NFR BLD AUTO: 6 % (ref 0–6)
ERYTHROCYTE [DISTWIDTH] IN BLOOD BY AUTOMATED COUNT: 11.9 % (ref 11.6–15.1)
GFR SERPL CREATININE-BSD FRML MDRD: 65 ML/MIN/1.73SQ M
GLUCOSE SERPL-MCNC: 94 MG/DL (ref 65–140)
HCT VFR BLD AUTO: 38.9 % (ref 36.5–49.3)
HGB BLD-MCNC: 13.2 G/DL (ref 12–17)
LYMPHOCYTES # BLD AUTO: 1.78 THOUSANDS/ΜL (ref 0.6–4.47)
LYMPHOCYTES NFR BLD AUTO: 33 % (ref 14–44)
MCH RBC QN AUTO: 33.5 PG (ref 26.8–34.3)
MCHC RBC AUTO-ENTMCNC: 33.9 G/DL (ref 31.4–37.4)
MCV RBC AUTO: 99 FL (ref 82–98)
MONOCYTES # BLD AUTO: 0.58 THOUSAND/ΜL (ref 0.17–1.22)
MONOCYTES NFR BLD AUTO: 11 % (ref 4–12)
NEUTROPHILS # BLD AUTO: 2.76 THOUSANDS/ΜL (ref 1.85–7.62)
NEUTS SEG NFR BLD AUTO: 49 % (ref 43–75)
PLATELET # BLD AUTO: 171 THOUSANDS/UL (ref 149–390)
PMV BLD AUTO: 8.7 FL (ref 8.9–12.7)
POTASSIUM SERPL-SCNC: 4.2 MMOL/L (ref 3.5–5.3)
PROT SERPL-MCNC: 6.8 G/DL (ref 6.4–8.2)
RBC # BLD AUTO: 3.94 MILLION/UL (ref 3.88–5.62)
SODIUM SERPL-SCNC: 143 MMOL/L (ref 136–145)
WBC # BLD AUTO: 5.45 THOUSAND/UL (ref 4.31–10.16)

## 2018-04-11 PROCEDURE — 80053 COMPREHEN METABOLIC PANEL: CPT

## 2018-04-11 PROCEDURE — 99214 OFFICE O/P EST MOD 30 MIN: CPT | Performed by: INTERNAL MEDICINE

## 2018-04-11 PROCEDURE — 85025 COMPLETE CBC W/AUTO DIFF WBC: CPT

## 2018-04-11 PROCEDURE — 96413 CHEMO IV INFUSION 1 HR: CPT

## 2018-04-11 RX ADMIN — Medication 300 UNITS: at 11:14

## 2018-04-11 RX ADMIN — TRASTUZUMAB 544 MG: 150 INJECTION, POWDER, LYOPHILIZED, FOR SOLUTION INTRAVENOUS at 10:00

## 2018-04-11 RX ADMIN — SODIUM CHLORIDE 20 ML/HR: 0.9 INJECTION, SOLUTION INTRAVENOUS at 09:39

## 2018-04-11 NOTE — PROGRESS NOTES
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  His pulmonary metastasis was confirmed by biopsy  He has HER-2 positive disease  He completed 12 cycle of FOLFOX -6 with trastuzumab with good partial response  He is currently on trastuzumab monotherapy as maintenance therapy  Clinically, he has no evidence of progressive disease  I recommended him to continue with trastuzumab every 3 weeks  I am going to schedule CT scan of chest abdomen pelvis in late 2018 followed by office visit  He is in agreement with my recommendations            Subjective:      HPI:             Interval History:  A 69year old gentleman with no significant past medical history  He was hospitalized in early 2017 with progressive dysphagia  However, he had minimal weight loss  He was found to have mass in distal esophagus, based on the EGD  Biopsy was positive for adenocarcinoma with mucinous features  CT scan of chest, abdomen pelvis showed not only mass in the distal esophagus, but also multiple pulmonary masses, consistent with metastatic disease  He also had retroperitoneal adenopathy measuring 2 1 cm  Lung biopsy showed adenocarcinoma, consistent with esophageal primary  His tumor was subsequently tested for HER-2 by immunohistochemistry as well as fish  Tumor from esophagus was HER-2 3+ and Fish positive with ratio of 6 5  Tumor in the lung was HER-2 negative  HER-2 Fish was also negative  This was considered to be HER-2 positive disease  Therefore, she started systemic chemotherapy with trastuzumab and FOLFOX-6  After 3 months of treatment, CT scan showed partial response    He received 12 cycle of FOLFOX which was completed in February 2018 with slowly progressive neuropathy  He is currently on trastuzumab monotherapy every 3 weeks  He feels well  He has no dysphagia or odynophagia  He is maintaining his weight  He has no nausea, vomiting or diarrhea  His weight is absolutely stable  He has no complaint of pain  His performance status is normal     Objective:      Primary Diagnosis:     Metastatic adenocarcinoma of distal esophagus with multiple lung metastasis as well as retroperitoneal adenopathy, HER-2 positive disease  diagnosed in July 2017       Cancer Staging:  No matching staging information was found for the patient         Previous Hematologic/ Oncologic Treatment:       FOLFOX-6 with trastuzumab times 12 cycles  Completed in February 2018      Current Hematologic/ Oncologic Treatment:       Trastuzumab monotherapy every 3 weeks since February 2018      Disease Status:      Good partial response      Test Results:     Pathology:     Biopsy from December esophagus showed adenocarcinoma  biopsy showed adenocarcinoma with mucinous features  TTF-1 negative  from esophagus was HER-2 3+ and Fish positive with ratio of 6 5  from long was HER-2 negative and Fish negative        Radiology:     CT scan of chest abdomen pelvis in February 2018 showed further reduction of pulmonary metastasis, currently measures 4 mm  Less prominent thickening of distal esophagus  Gastrohepatic lymph not measures 7 x 12 mm which also has been decreased  I personally reviewed this films      Laboratory:           Physical Exam:        General Appearance:    Alert, oriented          Eyes:    PERRL   Ears:    Normal external ear canals, both ears   Nose:   Nares normal, septum midline   Throat:   Mucosa moist  Pharynx without injection      Neck:   Supple         Lungs:     Clear to auscultation bilaterally   Chest Wall:    No tenderness or deformity    Heart:    Regular rate and rhythm         Abdomen:     Soft, non-tender, bowel sounds +, no organomegaly               Extremities:   Extremities no cyanosis or edema         Skin:   no rash or icterus  Lymph nodes:   Cervical, supraclavicular, and axillary nodes normal   Neurologic:   CNII-XII intact, normal strength, sensation and reflexes     Throughout             Breast exam: Not performed  ROS: Review of Systems   All other systems reviewed and are negative  Imaging: No results found  Labs:   Lab Results   Component Value Date    WBC 3 47 (L) 02/13/2018    HGB 12 6 02/13/2018    HCT 36 9 02/13/2018     (H) 02/13/2018     (L) 02/13/2018     Lab Results   Component Value Date     02/13/2018    K 4 0 02/13/2018     02/13/2018    CO2 27 02/13/2018    ANIONGAP 8 02/13/2018    BUN 10 02/13/2018    CREATININE 1 21 02/13/2018    GLUCOSE 147 (H) 02/13/2018    CALCIUM 8 9 02/13/2018    AST 36 02/13/2018    ALT 48 02/13/2018    ALKPHOS 85 02/13/2018    PROT 6 8 02/13/2018    BILITOT 0 50 02/13/2018    EGFR 61 02/13/2018         Current Medications: Reviewed  Allergies: Reviewed  PMH/FH/SH:  Reviewed      Vital Sign:    Body surface area is 2 01 meters squared      Wt Readings from Last 3 Encounters:   04/11/18 90 7 kg (200 lb)   03/21/18 90 7 kg (200 lb)   02/28/18 91 6 kg (201 lb 15 1 oz)        Temp Readings from Last 3 Encounters:   04/11/18 (!) 96 3 °F (35 7 °C) (Tympanic)   03/21/18 97 6 °F (36 4 °C) (Oral)   02/28/18 (!) 97 3 °F (36 3 °C) (Temporal)        BP Readings from Last 3 Encounters:   04/11/18 134/70   03/21/18 134/68   02/28/18 128/57         Pulse Readings from Last 3 Encounters:   04/11/18 72   03/21/18 73   02/28/18 (!) 51     @LASTSAO2(3)@

## 2018-04-11 NOTE — PROGRESS NOTES
Pt to clinic for lab draw and herceptin infusion, pt offers no complaints at this time, aware of next appointment, declines avs

## 2018-05-01 RX ORDER — SODIUM CHLORIDE 9 MG/ML
20 INJECTION, SOLUTION INTRAVENOUS CONTINUOUS
Status: DISCONTINUED | OUTPATIENT
Start: 2018-05-02 | End: 2018-05-05 | Stop reason: HOSPADM

## 2018-05-02 ENCOUNTER — HOSPITAL ENCOUNTER (OUTPATIENT)
Dept: INFUSION CENTER | Facility: CLINIC | Age: 68
Discharge: HOME | End: 2018-05-02
Payer: COMMERCIAL

## 2018-05-02 VITALS
RESPIRATION RATE: 18 BRPM | BODY MASS INDEX: 30.54 KG/M2 | DIASTOLIC BLOOD PRESSURE: 61 MMHG | WEIGHT: 195 LBS | HEART RATE: 60 BPM | SYSTOLIC BLOOD PRESSURE: 120 MMHG | TEMPERATURE: 98.2 F

## 2018-05-02 PROCEDURE — 96413 CHEMO IV INFUSION 1 HR: CPT

## 2018-05-02 RX ADMIN — Medication 300 UNITS: at 11:33

## 2018-05-02 RX ADMIN — SODIUM CHLORIDE 20 ML/HR: 0.9 INJECTION, SOLUTION INTRAVENOUS at 09:30

## 2018-05-02 RX ADMIN — TRASTUZUMAB 544 MG: 150 INJECTION, POWDER, LYOPHILIZED, FOR SOLUTION INTRAVENOUS at 10:27

## 2018-05-22 RX ORDER — SODIUM CHLORIDE 9 MG/ML
20 INJECTION, SOLUTION INTRAVENOUS CONTINUOUS
Status: DISCONTINUED | OUTPATIENT
Start: 2018-05-23 | End: 2018-05-26 | Stop reason: HOSPADM

## 2018-05-23 ENCOUNTER — HOSPITAL ENCOUNTER (OUTPATIENT)
Dept: INFUSION CENTER | Facility: CLINIC | Age: 68
Discharge: HOME | End: 2018-05-23
Payer: COMMERCIAL

## 2018-05-23 VITALS
HEART RATE: 65 BPM | RESPIRATION RATE: 18 BRPM | HEIGHT: 67 IN | BODY MASS INDEX: 30.66 KG/M2 | TEMPERATURE: 98.6 F | SYSTOLIC BLOOD PRESSURE: 102 MMHG | OXYGEN SATURATION: 97 % | WEIGHT: 195.33 LBS | DIASTOLIC BLOOD PRESSURE: 64 MMHG

## 2018-05-23 PROCEDURE — 96413 CHEMO IV INFUSION 1 HR: CPT

## 2018-05-23 RX ADMIN — SODIUM CHLORIDE 20 ML/HR: 0.9 INJECTION, SOLUTION INTRAVENOUS at 09:20

## 2018-05-23 RX ADMIN — TRASTUZUMAB 531 MG: 150 INJECTION, POWDER, LYOPHILIZED, FOR SOLUTION INTRAVENOUS at 10:03

## 2018-05-23 RX ADMIN — Medication 300 UNITS: at 11:20

## 2018-05-23 NOTE — PLAN OF CARE
Knowledge Deficit     Patient/family/caregiver demonstrates understanding of disease process, treatment plan, medications, and discharge instructions Progressing        PAIN - ADULT     Verbalizes/displays adequate comfort level or baseline comfort level Progressing        SAFETY ADULT     Patient will remain free of falls Progressing

## 2018-05-23 NOTE — PROGRESS NOTES
PATIENT'S EJECTION FRACTION WAS 60% ON 9/7/17  PATIENT TOLERATED HERCEPTIN INFUSION WITH NO ISSUES  NEXT APPOINTMENT IS IN 3 WEEKS

## 2018-06-06 ENCOUNTER — TELEPHONE (OUTPATIENT)
Dept: HEMATOLOGY ONCOLOGY | Facility: CLINIC | Age: 68
End: 2018-06-06

## 2018-06-06 DIAGNOSIS — F41.9 ANXIETY: Primary | ICD-10-CM

## 2018-06-06 RX ORDER — LORAZEPAM 0.5 MG/1
0.5 TABLET ORAL DAILY
Qty: 30 TABLET | Refills: 2 | Status: SHIPPED | OUTPATIENT
Start: 2018-06-06 | End: 2019-06-26 | Stop reason: SDUPTHER

## 2018-06-06 NOTE — TELEPHONE ENCOUNTER
PT NEEDS REFILL OF LORAZEPAM  5MG; 1X DAY; #30   USES [facility] [phone]    TXS

## 2018-06-12 RX ORDER — SODIUM CHLORIDE 9 MG/ML
20 INJECTION, SOLUTION INTRAVENOUS CONTINUOUS
Status: DISCONTINUED | OUTPATIENT
Start: 2018-06-13 | End: 2018-06-16 | Stop reason: HOSPADM

## 2018-06-13 ENCOUNTER — HOSPITAL ENCOUNTER (OUTPATIENT)
Dept: INFUSION CENTER | Facility: CLINIC | Age: 68
Discharge: HOME | End: 2018-06-13
Payer: COMMERCIAL

## 2018-06-13 VITALS
HEART RATE: 70 BPM | SYSTOLIC BLOOD PRESSURE: 118 MMHG | DIASTOLIC BLOOD PRESSURE: 72 MMHG | WEIGHT: 193.34 LBS | TEMPERATURE: 97.8 F | OXYGEN SATURATION: 97 % | RESPIRATION RATE: 18 BRPM | BODY MASS INDEX: 30.28 KG/M2

## 2018-06-13 PROCEDURE — 96413 CHEMO IV INFUSION 1 HR: CPT

## 2018-06-13 RX ADMIN — Medication 300 UNITS: at 13:01

## 2018-06-13 RX ADMIN — TRASTUZUMAB 531 MG: 150 INJECTION, POWDER, LYOPHILIZED, FOR SOLUTION INTRAVENOUS at 12:00

## 2018-06-13 RX ADMIN — SODIUM CHLORIDE 20 ML/HR: 0.9 INJECTION, SOLUTION INTRAVENOUS at 11:20

## 2018-06-29 ENCOUNTER — HOSPITAL ENCOUNTER (OUTPATIENT)
Dept: RADIOLOGY | Facility: MEDICAL CENTER | Age: 68
Discharge: HOME | End: 2018-06-29
Payer: COMMERCIAL

## 2018-06-29 DIAGNOSIS — C15.5 MALIGNANT NEOPLASM OF LOWER THIRD OF ESOPHAGUS (HCC): ICD-10-CM

## 2018-06-29 PROCEDURE — 71260 CT THORAX DX C+: CPT

## 2018-06-29 PROCEDURE — 74177 CT ABD & PELVIS W/CONTRAST: CPT

## 2018-06-29 RX ADMIN — IOHEXOL 100 ML: 350 INJECTION, SOLUTION INTRAVENOUS at 09:59

## 2018-07-02 RX ORDER — SODIUM CHLORIDE 9 MG/ML
20 INJECTION, SOLUTION INTRAVENOUS CONTINUOUS
Status: DISCONTINUED | OUTPATIENT
Start: 2018-07-03 | End: 2018-07-06 | Stop reason: HOSPADM

## 2018-07-03 ENCOUNTER — HOSPITAL ENCOUNTER (OUTPATIENT)
Dept: INFUSION CENTER | Facility: CLINIC | Age: 68
Discharge: HOME | End: 2018-07-03
Payer: COMMERCIAL

## 2018-07-03 ENCOUNTER — OFFICE VISIT (OUTPATIENT)
Dept: HEMATOLOGY ONCOLOGY | Facility: CLINIC | Age: 68
End: 2018-07-03
Payer: COMMERCIAL

## 2018-07-03 VITALS
HEIGHT: 67 IN | TEMPERATURE: 96.8 F | HEART RATE: 72 BPM | OXYGEN SATURATION: 97 % | WEIGHT: 197 LBS | DIASTOLIC BLOOD PRESSURE: 70 MMHG | BODY MASS INDEX: 30.92 KG/M2 | RESPIRATION RATE: 18 BRPM | SYSTOLIC BLOOD PRESSURE: 118 MMHG

## 2018-07-03 VITALS
SYSTOLIC BLOOD PRESSURE: 122 MMHG | WEIGHT: 195 LBS | BODY MASS INDEX: 30.61 KG/M2 | HEIGHT: 67 IN | OXYGEN SATURATION: 96 % | DIASTOLIC BLOOD PRESSURE: 74 MMHG | RESPIRATION RATE: 18 BRPM | TEMPERATURE: 97.8 F | HEART RATE: 62 BPM

## 2018-07-03 DIAGNOSIS — C15.5 MALIGNANT NEOPLASM OF LOWER THIRD OF ESOPHAGUS (HCC): Primary | ICD-10-CM

## 2018-07-03 PROCEDURE — 96413 CHEMO IV INFUSION 1 HR: CPT

## 2018-07-03 PROCEDURE — 99214 OFFICE O/P EST MOD 30 MIN: CPT | Performed by: INTERNAL MEDICINE

## 2018-07-03 RX ADMIN — HEPARIN 300 UNITS: 100 SYRINGE at 12:36

## 2018-07-03 RX ADMIN — SODIUM CHLORIDE 20 ML/HR: 0.9 INJECTION, SOLUTION INTRAVENOUS at 10:10

## 2018-07-03 RX ADMIN — TRASTUZUMAB 526 MG: 150 INJECTION, POWDER, LYOPHILIZED, FOR SOLUTION INTRAVENOUS at 11:32

## 2018-07-03 NOTE — PROGRESS NOTES
Patient tolerated his chemo without any adverse reactions   Next appointment confirmed and avs given

## 2018-07-03 NOTE — PROGRESS NOTES
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  His pulmonary metastasis was confirmed by biopsy  He has HER-2 positive disease  He completed 12 cycle of FOLFOX -6 with trastuzumab with good partial response  He is currently on trastuzumab monotherapy as maintenance therapy  clinically as well as radiographically, he has no evidence progressive disease  He is newly asymptomatic from oncology standpoint  He has perfect performance status  I recommended him to continue trastuzumab monotherapy every 3 weeks  I will see him again in 2 months with echocardiogram for cardiac monitoring  He is in agreement with my recommendations         Subjective:      HPI:             Interval History:  A 69year old gentleman with no significant past medical history  He was hospitalized in early 2017 with progressive dysphagia  However, he had minimal weight loss  He was found to have mass in distal esophagus, based on the EGD  Biopsy was positive for adenocarcinoma with mucinous features  CT scan of chest, abdomen pelvis showed not only mass in the distal esophagus, but also multiple pulmonary masses, consistent with metastatic disease  He also had retroperitoneal adenopathy measuring 2 1 cm  Lung biopsy showed adenocarcinoma, consistent with esophageal primary  His tumor was subsequently tested for HER-2 by immunohistochemistry as well as fish  Tumor from esophagus was HER-2 3+ and Fish positive with ratio of 6 5  Tumor in the lung was HER-2 negative  HER-2 Fish was also negative  This was considered to be HER-2 positive disease   Therefore, she started systemic chemotherapy with trastuzumab and FOLFOX-6  After 3 months of treatment, CT scan showed partial response  He received 12 cycle of FOLFOX which was completed in February 2018 with slowly progressive neuropathy  He is currently on trastuzumab monotherapy every 3 weeks  his recent CT scan showed no evidence of progressive disease  His previous lung metastatic lesion is no longer detectable  He feels very well  He has no difficulty in swallowing  He is maintaining his weight  He has no nausea, vomiting or diarrhea  He has no respiratory symptoms  He denied any pain  His performance status is normal     Objective:      Primary Diagnosis:     Metastatic adenocarcinoma of distal esophagus with multiple lung metastasis as well as retroperitoneal adenopathy, HER-2 positive disease  diagnosed in July 2017       Cancer Staging:  No matching staging information was found for the patient         Previous Hematologic/ Oncologic Treatment:       FOLFOX-6 with trastuzumab times 12 cycles  Completed in February 2018      Current Hematologic/ Oncologic Treatment:       Trastuzumab monotherapy every 3 weeks since February 2018      Disease Status:      Good partial response      Test Results:     Pathology:     Biopsy from December esophagus showed adenocarcinoma  biopsy showed adenocarcinoma with mucinous features  TTF-1 negative  from esophagus was HER-2 3+ and Fish positive with ratio of 6 5  from long was HER-2 negative and Fish negative        Radiology:     CT scan of chest abdomen pelvis in June 2018 showed no longer visible lung metastasis  Stable mild thickening of esophageal wall  Diminishing abdominal lymphadenopathy  No evidence of progression      Laboratory:           Physical Exam:        General Appearance:    Alert, oriented          Eyes:    PERRL   Ears:    Normal external ear canals, both ears   Nose:   Nares normal, septum midline   Throat:   Mucosa moist  Pharynx without injection      Neck:   Supple         Lungs:     Clear to auscultation bilaterally   Chest Wall:    No tenderness or deformity    Heart:    Regular rate and rhythm         Abdomen:     Soft, non-tender, bowel sounds +, no organomegaly               Extremities:   Extremities no cyanosis or edema         Skin:   no rash or icterus  Lymph nodes:   Cervical, supraclavicular, and axillary nodes normal   Neurologic:   CNII-XII intact, normal strength, sensation and reflexes     Throughout             Breast exam: Not performed  ROS: Review of Systems   Neurological:        Neuropathy  All other systems reviewed and are negative  Imaging: Ct Chest Abdomen Pelvis W Contrast    Result Date: 7/2/2018  Narrative: CT CHEST, ABDOMEN AND PELVIS WITH IV CONTRAST INDICATION:   C15 5: Malignant neoplasm of lower third of esophagus  Esophageal cancer  COMPARISON: Multiple prior examinations, most recently February 9, 2018 TECHNIQUE: CT examination of the chest, abdomen and pelvis was performed  Axial, sagittal, and coronal 2D reformatted images were created from the source data and submitted for interpretation  Radiation dose length product (DLP) for this visit:  739 mGy-cm   This examination, like all CT scans performed in the [facility], was performed utilizing techniques to minimize radiation dose exposure, including the use of iterative reconstruction and automated exposure control  IV Contrast:  100 mL of iohexol (OMNIPAQUE) Enteric Contrast: Enteric contrast was administered  FINDINGS: CHEST LUNGS:  A nodule previously identified in the lateral aspect of the right middle lobe is no longer identifiable  A subpleural 2 mm nodular density in the lateral aspect of the right lower lobe along the lower aspect of the major fissure on image 39 of series 3 and image 33 of series 602 slightly is not significantly changed from previous examination, probably representing a tiny focus of pleural thickening    A 3 mm lingular nodule on image 38 of series 3 is unchanged from most recent prior examination  No new pulmonary nodule or mass     Emphysematous changes are again noted  There is is progressive interval increase in subpleural reticular markings, most notable in the mid chest bilaterally  No endotracheal or endobronchial lesion  PLEURA:  Unremarkable  HEART/GREAT VESSELS:  Trace pericardial effusion  Coronary artery and aortic valvular calcifications are noted  No thoracic aortic aneurysm  MEDIASTINUM AND ZAYNAB:  Mild diffuse circumferential thickening of the esophagus most apparent distally is unchanged  No mediastinal or hilar lymphadenopathy  CHEST WALL AND LOWER NECK: Right-sided chest port remains in place  ABDOMEN LIVER/BILIARY TREE:  Unremarkable  GALLBLADDER:  No calcified gallstones  No pericholecystic inflammatory change  SPLEEN:  No discrete splenic lesion  Spleen is mildly enlarged measuring up to 13 6 cm, similar from prior exam  PANCREAS:  Unremarkable  ADRENAL GLANDS: Unremarkable  KIDNEYS/URETERS:  Unremarkable  No hydronephrosis  STOMACH AND BOWEL:  An esophageal stent which has migrated distally into the gastric body is unchanged in appearance when compared to February 9, 2018  Distal colonic diverticulosis without acute diverticulitis is again noted  APPENDIX:  Noninflamed  ABDOMINOPELVIC CAVITY:  No ascites or free intraperitoneal air  Previously seen gastrohepatic/celiac axis lymph node which measured 14 mm x 8 mm in November 2017 and 12 mm x 7 mm on February 9, 2018 has decreased in size now measuring only 9 x 5 mm on image 55 of series 2  No new adenopathy  VESSELS:  Atherosclerotic changes are again present  No evidence of aneurysm  PELVIS REPRODUCTIVE ORGANS:  Unremarkable for patient's age  URINARY BLADDER:  Unremarkable  ABDOMINAL WALL/INGUINAL REGIONS:  Fat containing right inguinal hernia again noted  Small fat-containing umbilical hernia again seen  OSSEOUS STRUCTURES:  No acute fracture or destructive osseous lesion  Impression: Previously noted largest right middle lobe pulmonary metastasis has decreased in size and is no longer detectable by CT  Other tiny pulmonary nodules are not significantly changed  Unchanged mild circumferential thickening of the distal esophagus  Esophageal stent which had been placed on August 11, 2017 and subsequently migrated into the stomach is unchanged in position from previous examination  Continued interval decrease in the size of the previously noted gastrohepatic/celiac axis lymph node which had measured 8 mm x 14 and November 2017 and now measures 9 x 5 mm  No new metastatic lesions identified in the chest, abdomen or pelvis  Increasing reticular markings in the periphery of the lungs most severe in the mid lung zones most suspicious for developing interstitial lung disease superimposed upon emphysema  Workstation performed: [id number]         Labs:   Lab Results   Component Value Date    WBC 5 45 04/11/2018    HGB 13 2 04/11/2018    HCT 38 9 04/11/2018    MCV 99 (H) 04/11/2018     04/11/2018     Lab Results   Component Value Date     04/11/2018    K 4 2 04/11/2018     04/11/2018    CO2 25 04/11/2018    ANIONGAP 10 04/11/2018    BUN 14 04/11/2018    CREATININE 1 14 04/11/2018    GLUCOSE 94 04/11/2018    CALCIUM 9 1 04/11/2018    AST 23 04/11/2018    ALT 32 04/11/2018    ALKPHOS 75 04/11/2018    PROT 6 8 04/11/2018    BILITOT 0 30 04/11/2018    EGFR 65 04/11/2018         Current Medications: Reviewed  Allergies: Reviewed  PMH/FH/SH:  Reviewed      Vital Sign:    Body surface area is 2 01 meters squared      Wt Readings from Last 3 Encounters:   07/03/18 89 4 kg (197 lb)   06/13/18 87 7 kg (193 lb 5 5 oz)   05/23/18 88 6 kg (195 lb 5 2 oz)        Temp Readings from Last 3 Encounters:   07/03/18 (!) 96 8 °F (36 °C) (Tympanic)   06/13/18 97 8 °F (36 6 °C) (Oral)   05/23/18 98 6 °F (37 °C) (Oral)        BP Readings from Last 3 Encounters:   07/03/18 118/70   06/13/18 118/72   05/23/18 102/64         Pulse Readings from Last 3 Encounters:   07/03/18 72   06/13/18 70   05/23/18 65     @LASTSAO2(3)@

## 2018-07-03 NOTE — PROGRESS NOTES
Patient is here for chemo  He offers no complaints at this time  Last Echo from 9/8/17 has EF=60%  Patient saw Dr Deborah Kennedy prior to infusion appointment

## 2018-07-24 RX ORDER — SODIUM CHLORIDE 9 MG/ML
20 INJECTION, SOLUTION INTRAVENOUS CONTINUOUS
Status: DISCONTINUED | OUTPATIENT
Start: 2018-07-25 | End: 2018-07-28 | Stop reason: HOSPADM

## 2018-07-25 ENCOUNTER — HOSPITAL ENCOUNTER (OUTPATIENT)
Dept: INFUSION CENTER | Facility: CLINIC | Age: 68
Discharge: HOME | End: 2018-07-25
Payer: COMMERCIAL

## 2018-07-25 VITALS
WEIGHT: 201 LBS | SYSTOLIC BLOOD PRESSURE: 131 MMHG | RESPIRATION RATE: 18 BRPM | TEMPERATURE: 97.7 F | DIASTOLIC BLOOD PRESSURE: 64 MMHG | BODY MASS INDEX: 31.48 KG/M2 | HEART RATE: 66 BPM

## 2018-07-25 PROCEDURE — 96413 CHEMO IV INFUSION 1 HR: CPT

## 2018-07-25 RX ADMIN — HEPARIN 300 UNITS: 100 SYRINGE at 10:48

## 2018-07-25 RX ADMIN — TRASTUZUMAB 526 MG: 150 INJECTION, POWDER, LYOPHILIZED, FOR SOLUTION INTRAVENOUS at 09:46

## 2018-07-25 RX ADMIN — SODIUM CHLORIDE 20 ML/HR: 0.9 INJECTION, SOLUTION INTRAVENOUS at 09:20

## 2018-07-25 NOTE — PROGRESS NOTES
Patient tolerated treatment today without complications  Verified patients next appointment  AVS provided

## 2018-08-14 RX ORDER — SODIUM CHLORIDE 9 MG/ML
20 INJECTION, SOLUTION INTRAVENOUS CONTINUOUS
Status: DISCONTINUED | OUTPATIENT
Start: 2018-08-15 | End: 2018-08-18 | Stop reason: HOSPADM

## 2018-08-15 ENCOUNTER — HOSPITAL ENCOUNTER (OUTPATIENT)
Dept: INFUSION CENTER | Facility: CLINIC | Age: 68
Discharge: HOME | End: 2018-08-15
Payer: COMMERCIAL

## 2018-08-15 VITALS
SYSTOLIC BLOOD PRESSURE: 122 MMHG | BODY MASS INDEX: 31.97 KG/M2 | DIASTOLIC BLOOD PRESSURE: 62 MMHG | TEMPERATURE: 97.8 F | OXYGEN SATURATION: 97 % | HEART RATE: 74 BPM | RESPIRATION RATE: 18 BRPM | WEIGHT: 203.71 LBS | HEIGHT: 67 IN

## 2018-08-15 PROCEDURE — 96413 CHEMO IV INFUSION 1 HR: CPT

## 2018-08-15 RX ADMIN — TRASTUZUMAB 547 MG: 150 INJECTION, POWDER, LYOPHILIZED, FOR SOLUTION INTRAVENOUS at 10:23

## 2018-08-15 RX ADMIN — SODIUM CHLORIDE 20 ML/HR: 0.9 INJECTION, SOLUTION INTRAVENOUS at 09:35

## 2018-08-15 RX ADMIN — HEPARIN 300 UNITS: 100 SYRINGE at 11:33

## 2018-08-30 ENCOUNTER — HOSPITAL ENCOUNTER (OUTPATIENT)
Dept: NON INVASIVE DIAGNOSTICS | Facility: MEDICAL CENTER | Age: 68
Discharge: HOME | End: 2018-08-30
Payer: COMMERCIAL

## 2018-08-30 ENCOUNTER — APPOINTMENT (OUTPATIENT)
Dept: LAB | Facility: CLINIC | Age: 68
End: 2018-08-30
Payer: COMMERCIAL

## 2018-08-30 DIAGNOSIS — C15.5 MALIGNANT NEOPLASM OF LOWER THIRD OF ESOPHAGUS (HCC): ICD-10-CM

## 2018-08-30 PROCEDURE — 93306 TTE W/DOPPLER COMPLETE: CPT

## 2018-08-30 PROCEDURE — 93306 TTE W/DOPPLER COMPLETE: CPT | Performed by: INTERNAL MEDICINE

## 2018-09-04 RX ORDER — SODIUM CHLORIDE 9 MG/ML
20 INJECTION, SOLUTION INTRAVENOUS CONTINUOUS
Status: DISCONTINUED | OUTPATIENT
Start: 2018-09-05 | End: 2018-09-08 | Stop reason: HOSPADM

## 2018-09-05 ENCOUNTER — HOSPITAL ENCOUNTER (OUTPATIENT)
Dept: INFUSION CENTER | Facility: CLINIC | Age: 68
Discharge: HOME | End: 2018-09-05
Payer: COMMERCIAL

## 2018-09-05 ENCOUNTER — OFFICE VISIT (OUTPATIENT)
Dept: HEMATOLOGY ONCOLOGY | Facility: CLINIC | Age: 68
End: 2018-09-05
Payer: COMMERCIAL

## 2018-09-05 VITALS
HEIGHT: 66 IN | SYSTOLIC BLOOD PRESSURE: 116 MMHG | WEIGHT: 208.5 LBS | TEMPERATURE: 98.2 F | DIASTOLIC BLOOD PRESSURE: 68 MMHG | OXYGEN SATURATION: 98 % | BODY MASS INDEX: 33.51 KG/M2 | RESPIRATION RATE: 18 BRPM | HEART RATE: 64 BPM

## 2018-09-05 VITALS
HEIGHT: 67 IN | OXYGEN SATURATION: 97 % | TEMPERATURE: 97.2 F | RESPIRATION RATE: 18 BRPM | HEART RATE: 64 BPM | WEIGHT: 208 LBS | BODY MASS INDEX: 32.65 KG/M2 | SYSTOLIC BLOOD PRESSURE: 136 MMHG | DIASTOLIC BLOOD PRESSURE: 69 MMHG

## 2018-09-05 DIAGNOSIS — C15.5 MALIGNANT NEOPLASM OF LOWER THIRD OF ESOPHAGUS (HCC): Primary | ICD-10-CM

## 2018-09-05 PROCEDURE — 99214 OFFICE O/P EST MOD 30 MIN: CPT | Performed by: INTERNAL MEDICINE

## 2018-09-05 PROCEDURE — 96413 CHEMO IV INFUSION 1 HR: CPT

## 2018-09-05 RX ADMIN — TRASTUZUMAB 547 MG: 150 INJECTION, POWDER, LYOPHILIZED, FOR SOLUTION INTRAVENOUS at 10:39

## 2018-09-05 RX ADMIN — SODIUM CHLORIDE 20 ML/HR: 0.9 INJECTION, SOLUTION INTRAVENOUS at 10:05

## 2018-09-05 NOTE — PROGRESS NOTES
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  His pulmonary metastasis was confirmed by biopsy  He has HER-2 positive disease   He completed 12 cycle of FOLFOX -6 with trastuzumab with good partial response  Jhoan Leger is currently on trastuzumab monotherapy as maintenance therapy  Jhoan Leger has no cardiac toxicity, based on recent echocardiogram   Clinically, she has no evidence of progression  Therefore, I recommended him to continue with trastuzumab every 3 weeks  I am going to set up CT scan of chest abdomen pelvis in late 2018 for disease monitoring  I will see him again in late 2018 to discuss those results  He is in agreement with my recommendation                                    Subjective:      HPI:             Interval History:  A 69year old gentleman with no significant past medical history  He was hospitalized in early 2017 with progressive dysphagia  However, he had minimal weight loss  He was found to have mass in distal esophagus, based on the EGD  Biopsy was positive for adenocarcinoma with mucinous features  CT scan of chest, abdomen pelvis showed not only mass in the distal esophagus, but also multiple pulmonary masses, consistent with metastatic disease  He also had retroperitoneal adenopathy measuring 2 1 cm  Lung biopsy showed adenocarcinoma, consistent with esophageal primary  His tumor was subsequently tested for HER-2 by immunohistochemistry as well as fish  Tumor from esophagus was HER-2 3+ and Fish positive with ratio of 6 5  Tumor in the lung was HER-2 negative  HER-2 Fish was also negative  This was considered to be HER-2 positive disease   Therefore, she started systemic chemotherapy with trastuzumab and FOLFOX-6  After 3 months of treatment, CT scan showed partial response   He received 12 cycle of FOLFOX which was completed in February 2018 with slowly progressive neuropathy  Leanne Desouza is currently on trastuzumab monotherapy every 3 weeks  He presents today for routine follow-up  He continued to do well  He has no dysphagia or odynophagia  He gained 7 lb since 2 months ago  He has no respiratory symptoms  His previous neuropathy has been improving  His performance status is normal                             Objective:      Primary Diagnosis:     Metastatic adenocarcinoma of distal esophagus with multiple lung metastasis as well as retroperitoneal adenopathy, HER-2 positive disease  diagnosed in July 2017       Cancer Staging:  No matching staging information was found for the patient         Previous Hematologic/ Oncologic Treatment:       FOLFOX-6 with trastuzumab times 12 cycles   Completed in February 2018      Current Hematologic/ Oncologic Treatment:       Trastuzumab monotherapy every 3 weeks since February 2018      Disease Status:      Good partial response      Test Results:     Pathology:     Biopsy from December esophagus showed adenocarcinoma  biopsy showed adenocarcinoma with mucinous features  TTF-1 negative  from esophagus was HER-2 3+ and Fish positive with ratio of 6 5  from long was HER-2 negative and Fish negative        Radiology:     CT scan of chest abdomen pelvis in June 2018 showed no longer visible lung metastasis  Stable mild thickening of esophageal wall  Diminishing abdominal lymphadenopathy  No evidence of progression      Echocardiogram in August 2018 showed ejection fraction 55%      Laboratory:           Physical Exam:        General Appearance:    Alert, oriented          Eyes:    PERRL   Ears:    Normal external ear canals, both ears   Nose:   Nares normal, septum midline   Throat:   Mucosa moist  Pharynx without injection  Neck:   Supple         Lungs:     Clear to auscultation bilaterally   Chest Wall:    No tenderness or deformity    Heart:    Regular rate and rhythm         Abdomen:     Soft, non-tender, bowel sounds +, no organomegaly               Extremities:   Extremities no cyanosis or edema         Skin:   no rash or icterus  Lymph nodes:   Cervical, supraclavicular, and axillary nodes normal   Neurologic:   CNII-XII intact, normal strength, sensation and reflexes     Throughout             Breast exam: Not performed  ROS: Review of Systems   Neurological:        Mild neuropathy   All other systems reviewed and are negative  Imaging: No results found  Labs:   Lab Results   Component Value Date    WBC 7 40 08/30/2018    HGB 14 6 08/30/2018    HCT 43 4 08/30/2018    MCV 94 08/30/2018     08/30/2018     Lab Results   Component Value Date     08/30/2018    K 4 4 08/30/2018     08/30/2018    CO2 29 08/30/2018    BUN 15 08/30/2018    CREATININE 1 44 (H) 08/30/2018    GLUCOSE 117 08/09/2017    CALCIUM 8 5 08/30/2018    AST 19 08/30/2018    ALT 38 08/30/2018    ALKPHOS 70 08/30/2018    EGFR 49 08/30/2018         Current Medications: Reviewed  Allergies: Reviewed  PMH/FH/SH:  Reviewed      Vital Sign:    Body surface area is 2 04 meters squared      Wt Readings from Last 3 Encounters:   09/05/18 94 6 kg (208 lb 8 oz)   08/15/18 92 4 kg (203 lb 11 3 oz)   07/25/18 91 2 kg (201 lb)        Temp Readings from Last 3 Encounters:   09/05/18 98 2 °F (36 8 °C) (Tympanic)   08/15/18 97 8 °F (36 6 °C) (Oral)   07/25/18 97 7 °F (36 5 °C) (Oral)        BP Readings from Last 3 Encounters:   09/05/18 116/68   08/15/18 122/62   07/25/18 131/64         Pulse Readings from Last 3 Encounters:   09/05/18 64   08/15/18 74   07/25/18 66     @LASTSAO2(3)@

## 2018-09-25 RX ORDER — SODIUM CHLORIDE 9 MG/ML
20 INJECTION, SOLUTION INTRAVENOUS CONTINUOUS
Status: DISCONTINUED | OUTPATIENT
Start: 2018-09-26 | End: 2018-09-29 | Stop reason: HOSPADM

## 2018-09-26 ENCOUNTER — HOSPITAL ENCOUNTER (OUTPATIENT)
Dept: INFUSION CENTER | Facility: CLINIC | Age: 68
Discharge: HOME | End: 2018-09-26
Payer: COMMERCIAL

## 2018-09-26 VITALS
HEIGHT: 67 IN | BODY MASS INDEX: 32.8 KG/M2 | HEART RATE: 72 BPM | TEMPERATURE: 98.1 F | WEIGHT: 209 LBS | OXYGEN SATURATION: 96 % | SYSTOLIC BLOOD PRESSURE: 110 MMHG | DIASTOLIC BLOOD PRESSURE: 68 MMHG | RESPIRATION RATE: 18 BRPM

## 2018-09-26 PROCEDURE — 96413 CHEMO IV INFUSION 1 HR: CPT

## 2018-09-26 RX ORDER — ZOLPIDEM TARTRATE 5 MG/1
5 TABLET ORAL
COMMUNITY
End: 2019-03-13

## 2018-09-26 RX ADMIN — SODIUM CHLORIDE 20 ML/HR: 0.9 INJECTION, SOLUTION INTRAVENOUS at 09:10

## 2018-09-26 RX ADMIN — HEPARIN 300 UNITS: 100 SYRINGE at 11:11

## 2018-09-26 RX ADMIN — TRASTUZUMAB 566 MG: 150 INJECTION, POWDER, LYOPHILIZED, FOR SOLUTION INTRAVENOUS at 10:05

## 2018-09-26 NOTE — PROGRESS NOTES
Patient is here for chemo  He offers no complaints at this time   Last echo was on 8/30/18 with a EF of 55% No labs needed for chemo today

## 2018-10-16 RX ORDER — SODIUM CHLORIDE 9 MG/ML
20 INJECTION, SOLUTION INTRAVENOUS CONTINUOUS
Status: DISCONTINUED | OUTPATIENT
Start: 2018-10-17 | End: 2018-10-20 | Stop reason: HOSPADM

## 2018-10-17 ENCOUNTER — HOSPITAL ENCOUNTER (OUTPATIENT)
Dept: INFUSION CENTER | Facility: CLINIC | Age: 68
Discharge: HOME | End: 2018-10-17
Payer: COMMERCIAL

## 2018-10-17 VITALS
WEIGHT: 213 LBS | OXYGEN SATURATION: 96 % | DIASTOLIC BLOOD PRESSURE: 70 MMHG | HEART RATE: 84 BPM | TEMPERATURE: 98.1 F | RESPIRATION RATE: 18 BRPM | BODY MASS INDEX: 33.36 KG/M2 | SYSTOLIC BLOOD PRESSURE: 118 MMHG

## 2018-10-17 PROCEDURE — 96413 CHEMO IV INFUSION 1 HR: CPT

## 2018-10-17 RX ADMIN — Medication 300 UNITS: at 11:00

## 2018-10-17 RX ADMIN — TRASTUZUMAB 566 MG: 150 INJECTION, POWDER, LYOPHILIZED, FOR SOLUTION INTRAVENOUS at 09:55

## 2018-10-17 RX ADMIN — SODIUM CHLORIDE 20 ML/HR: 0.9 INJECTION, SOLUTION INTRAVENOUS at 08:58

## 2018-10-17 NOTE — PLAN OF CARE
Problem: Potential for Falls  Goal: Patient will remain free of falls  INTERVENTIONS:  - Assess patient frequently for physical needs  -  Identify cognitive and physical deficits and behaviors that affect risk of falls    -  [place] fall precautions as indicated by assessment   - Educate patient/family on patient safety including physical limitations  - Instruct patient to call for assistance with activity based on assessment  - Modify environment to reduce risk of injury  - Consider OT/PT consult to assist with strengthening/mobility   Outcome: Progressing

## 2018-10-17 NOTE — PROGRESS NOTES
To clinic for maintenance herceptin  He offers no specific complaints  He states "I am getting used to my new normal"

## 2018-11-06 RX ORDER — SODIUM CHLORIDE 9 MG/ML
20 INJECTION, SOLUTION INTRAVENOUS CONTINUOUS
Status: DISCONTINUED | OUTPATIENT
Start: 2018-11-07 | End: 2018-11-10 | Stop reason: HOSPADM

## 2018-11-07 ENCOUNTER — HOSPITAL ENCOUNTER (OUTPATIENT)
Dept: INFUSION CENTER | Facility: CLINIC | Age: 68
Discharge: HOME | End: 2018-11-07
Payer: COMMERCIAL

## 2018-11-07 VITALS
OXYGEN SATURATION: 98 % | RESPIRATION RATE: 18 BRPM | SYSTOLIC BLOOD PRESSURE: 148 MMHG | HEIGHT: 67 IN | TEMPERATURE: 97.4 F | HEART RATE: 77 BPM | DIASTOLIC BLOOD PRESSURE: 80 MMHG | WEIGHT: 210 LBS | BODY MASS INDEX: 32.96 KG/M2

## 2018-11-07 PROCEDURE — 96413 CHEMO IV INFUSION 1 HR: CPT

## 2018-11-07 RX ADMIN — SODIUM CHLORIDE 20 ML/HR: 0.9 INJECTION, SOLUTION INTRAVENOUS at 09:37

## 2018-11-07 RX ADMIN — HEPARIN 300 UNITS: 100 SYRINGE at 11:36

## 2018-11-07 RX ADMIN — TRASTUZUMAB 600 MG: 150 INJECTION, POWDER, LYOPHILIZED, FOR SOLUTION INTRAVENOUS at 10:30

## 2018-11-07 NOTE — PROGRESS NOTES
LATE NOTE:  Pt to clinic for single agent herceptin for her 2 positive esophageal cancer  He offers no complaints  He presented with hypertension and stated that "I am always high when I am not at home"  He confirms that he took his antihypertensives this morning  Blood pressure rechecked and improving at the completion of his therapy  AVS provided

## 2018-11-14 ENCOUNTER — TELEPHONE (OUTPATIENT)
Dept: HEMATOLOGY ONCOLOGY | Facility: CLINIC | Age: 68
End: 2018-11-14

## 2018-11-14 NOTE — TELEPHONE ENCOUNTER
Medicare called stating that pt's CT scans need a facility selection in order to authorize study  Please address/ advise  Thanks - Will also forward information to financial oncology group via email

## 2018-11-27 RX ORDER — SODIUM CHLORIDE 9 MG/ML
20 INJECTION, SOLUTION INTRAVENOUS CONTINUOUS
Status: DISCONTINUED | OUTPATIENT
Start: 2018-11-28 | End: 2018-12-01 | Stop reason: HOSPADM

## 2018-11-28 ENCOUNTER — HOSPITAL ENCOUNTER (OUTPATIENT)
Dept: INFUSION CENTER | Facility: CLINIC | Age: 68
Discharge: HOME | End: 2018-11-28
Payer: COMMERCIAL

## 2018-11-28 ENCOUNTER — APPOINTMENT (OUTPATIENT)
Dept: LAB | Facility: CLINIC | Age: 68
End: 2018-11-28
Payer: COMMERCIAL

## 2018-11-28 VITALS
RESPIRATION RATE: 18 BRPM | SYSTOLIC BLOOD PRESSURE: 130 MMHG | WEIGHT: 207 LBS | HEIGHT: 66 IN | HEART RATE: 70 BPM | BODY MASS INDEX: 33.27 KG/M2 | TEMPERATURE: 98 F | DIASTOLIC BLOOD PRESSURE: 74 MMHG

## 2018-11-28 DIAGNOSIS — C15.5 MALIGNANT NEOPLASM OF LOWER THIRD OF ESOPHAGUS (HCC): ICD-10-CM

## 2018-11-28 LAB
ALBUMIN SERPL BCP-MCNC: 4.1 G/DL (ref 3.5–5)
ALP SERPL-CCNC: 76 U/L (ref 46–116)
ALT SERPL W P-5'-P-CCNC: 40 U/L (ref 12–78)
ANION GAP SERPL CALCULATED.3IONS-SCNC: 8 MMOL/L (ref 4–13)
AST SERPL W P-5'-P-CCNC: 22 U/L (ref 5–45)
BASOPHILS # BLD AUTO: 0.07 THOUSANDS/ΜL (ref 0–0.1)
BASOPHILS NFR BLD AUTO: 1 % (ref 0–1)
BILIRUB SERPL-MCNC: 0.4 MG/DL (ref 0.2–1)
BUN SERPL-MCNC: 12 MG/DL (ref 5–25)
CALCIUM SERPL-MCNC: 9.1 MG/DL (ref 8.3–10.1)
CHLORIDE SERPL-SCNC: 106 MMOL/L (ref 100–108)
CO2 SERPL-SCNC: 29 MMOL/L (ref 21–32)
CREAT SERPL-MCNC: 1.34 MG/DL (ref 0.6–1.3)
EOSINOPHIL # BLD AUTO: 0.23 THOUSAND/ΜL (ref 0–0.61)
EOSINOPHIL NFR BLD AUTO: 3 % (ref 0–6)
ERYTHROCYTE [DISTWIDTH] IN BLOOD BY AUTOMATED COUNT: 12.6 % (ref 11.6–15.1)
GFR SERPL CREATININE-BSD FRML MDRD: 54 ML/MIN/1.73SQ M
GLUCOSE P FAST SERPL-MCNC: 144 MG/DL (ref 65–99)
HCT VFR BLD AUTO: 46.2 % (ref 36.5–49.3)
HGB BLD-MCNC: 15.8 G/DL (ref 12–17)
IMM GRANULOCYTES # BLD AUTO: 0.03 THOUSAND/UL (ref 0–0.2)
IMM GRANULOCYTES NFR BLD AUTO: 0 % (ref 0–2)
LYMPHOCYTES # BLD AUTO: 1.85 THOUSANDS/ΜL (ref 0.6–4.47)
LYMPHOCYTES NFR BLD AUTO: 28 % (ref 14–44)
MCH RBC QN AUTO: 31.5 PG (ref 26.8–34.3)
MCHC RBC AUTO-ENTMCNC: 34.2 G/DL (ref 31.4–37.4)
MCV RBC AUTO: 92 FL (ref 82–98)
MONOCYTES # BLD AUTO: 0.56 THOUSAND/ΜL (ref 0.17–1.22)
MONOCYTES NFR BLD AUTO: 8 % (ref 4–12)
NEUTROPHILS # BLD AUTO: 3.96 THOUSANDS/ΜL (ref 1.85–7.62)
NEUTS SEG NFR BLD AUTO: 60 % (ref 43–75)
NRBC BLD AUTO-RTO: 0 /100 WBCS
PLATELET # BLD AUTO: 207 THOUSANDS/UL (ref 149–390)
PMV BLD AUTO: 8.4 FL (ref 8.9–12.7)
POTASSIUM SERPL-SCNC: 4.1 MMOL/L (ref 3.5–5.3)
PROT SERPL-MCNC: 7.4 G/DL (ref 6.4–8.2)
RBC # BLD AUTO: 5.01 MILLION/UL (ref 3.88–5.62)
SODIUM SERPL-SCNC: 143 MMOL/L (ref 136–145)
WBC # BLD AUTO: 6.7 THOUSAND/UL (ref 4.31–10.16)

## 2018-11-28 PROCEDURE — 85025 COMPLETE CBC W/AUTO DIFF WBC: CPT

## 2018-11-28 PROCEDURE — 80053 COMPREHEN METABOLIC PANEL: CPT

## 2018-11-28 PROCEDURE — 36415 COLL VENOUS BLD VENIPUNCTURE: CPT

## 2018-11-28 PROCEDURE — 96413 CHEMO IV INFUSION 1 HR: CPT

## 2018-11-28 RX ADMIN — HEPARIN 300 UNITS: 100 SYRINGE at 10:30

## 2018-11-28 RX ADMIN — SODIUM CHLORIDE 20 ML/HR: 0.9 INJECTION, SOLUTION INTRAVENOUS at 08:45

## 2018-11-28 RX ADMIN — TRASTUZUMAB 600 MG: 150 INJECTION, POWDER, LYOPHILIZED, FOR SOLUTION INTRAVENOUS at 09:16

## 2018-11-28 NOTE — PROGRESS NOTES
Patient tolerated Herceptin infusion without incident  Port de-accessed and flushed with heparin as per protocol  Patient aware of next appointment  AVS printed and given to patient

## 2018-11-30 ENCOUNTER — HOSPITAL ENCOUNTER (OUTPATIENT)
Dept: RADIOLOGY | Facility: MEDICAL CENTER | Age: 68
Discharge: HOME | End: 2018-11-30
Payer: COMMERCIAL

## 2018-11-30 DIAGNOSIS — C15.5 MALIGNANT NEOPLASM OF LOWER THIRD OF ESOPHAGUS (HCC): ICD-10-CM

## 2018-11-30 PROCEDURE — 71260 CT THORAX DX C+: CPT

## 2018-11-30 PROCEDURE — 74177 CT ABD & PELVIS W/CONTRAST: CPT

## 2018-11-30 RX ADMIN — IOHEXOL 100 ML: 350 INJECTION, SOLUTION INTRAVENOUS at 09:35

## 2018-12-05 ENCOUNTER — OFFICE VISIT (OUTPATIENT)
Dept: HEMATOLOGY ONCOLOGY | Facility: CLINIC | Age: 68
End: 2018-12-05
Payer: COMMERCIAL

## 2018-12-05 VITALS
HEIGHT: 67 IN | TEMPERATURE: 97.3 F | WEIGHT: 209 LBS | OXYGEN SATURATION: 76 % | BODY MASS INDEX: 32.8 KG/M2 | SYSTOLIC BLOOD PRESSURE: 122 MMHG | RESPIRATION RATE: 18 BRPM | DIASTOLIC BLOOD PRESSURE: 82 MMHG | HEART RATE: 70 BPM

## 2018-12-05 DIAGNOSIS — C15.5 MALIGNANT NEOPLASM OF LOWER THIRD OF ESOPHAGUS (HCC): Primary | ICD-10-CM

## 2018-12-05 PROCEDURE — 99214 OFFICE O/P EST MOD 30 MIN: CPT | Performed by: INTERNAL MEDICINE

## 2018-12-05 NOTE — PROGRESS NOTES
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  His pulmonary metastasis was confirmed by biopsy  He has HER-2 positive disease   He completed 12 cycle of FOLFOX -6 with trastuzumab with good partial response   He is currently on trastuzumab monotherapy as maintenance therapy  Clinically as well as radiographically, he has no evidence of progressive disease  I recommended him to continue with trastuzumab every 3 weeks as a maintenance therapy  He is in agreement with my recommendation  I will see him again in 3 months with echocardiogram for cardiac monitoring                                                      Subjective:      HPI:             Interval History:  A 69year old gentleman with no significant past medical history  He was hospitalized in early 2017 with progressive dysphagia  However, he had minimal weight loss  He was found to have mass in distal esophagus, based on the EGD  Biopsy was positive for adenocarcinoma with mucinous features  CT scan of chest, abdomen pelvis showed not only mass in the distal esophagus, but also multiple pulmonary masses, consistent with metastatic disease  He also had retroperitoneal adenopathy measuring 2 1 cm  Lung biopsy showed adenocarcinoma, consistent with esophageal primary  His tumor was subsequently tested for HER-2 by immunohistochemistry as well as fish  Tumor from esophagus was HER-2 3+ and Fish positive with ratio of 6 5  Tumor in the lung was HER-2 negative  HER-2 Fish was also negative  This was considered to be HER-2 positive disease  Therefore, she started systemic chemotherapy with trastuzumab and FOLFOX-6   After 3 months of treatment, CT scan showed partial response   He received 12 cycle of FOLFOX which was completed in February 2018 with slowly progressive neuropathy  Teresa Otto is currently on trastuzumab monotherapy every 3 weeks as maintenance therapy  He recently underwent CT scan of chest abdomen pelvis which only showed a few mm of pulmonary nodules  He feels well  His weight is stable  He has no complaint of pain  He has no respiratory symptoms  He has no difficulty of swallowing  His performance status is normal                                              Objective:      Primary Diagnosis:     Metastatic adenocarcinoma of distal esophagus with multiple lung metastasis as well as retroperitoneal adenopathy, HER-2 positive disease  diagnosed in July 2017       Cancer Staging:  No matching staging information was found for the patient         Previous Hematologic/ Oncologic Treatment:       FOLFOX-6 with trastuzumab times 12 cycles   Completed in February 2018      Current Hematologic/ Oncologic Treatment:       Trastuzumab monotherapy every 3 weeks since February 2018      Disease Status:      Good partial response      Test Results:     Pathology:     Biopsy from December esophagus showed adenocarcinoma  biopsy showed adenocarcinoma with mucinous features  TTF-1 negative  from esophagus was HER-2 3+ and Fish positive with ratio of 6 5  from long was HER-2 negative and Fish negative        Radiology:     CT scan of chest abdomen pelvis in November 2018 showed no new findings  A few mm of pulmonary nodules      Echocardiogram in August 2018 showed ejection fraction 55%      Laboratory:           Physical Exam:        General Appearance:    Alert, oriented          Eyes:    PERRL   Ears:    Normal external ear canals, both ears   Nose:   Nares normal, septum midline   Throat:   Mucosa moist  Pharynx without injection      Neck:   Supple         Lungs:     Clear to auscultation bilaterally   Chest Wall:    No tenderness or deformity    Heart:    Regular rate and rhythm         Abdomen:     Soft, non-tender, bowel sounds +, no organomegaly               Extremities:   Extremities no cyanosis or edema         Skin:   no rash or icterus  Lymph nodes:   Cervical, supraclavicular, and axillary nodes normal   Neurologic:   CNII-XII intact, normal strength, sensation and reflexes     Throughout             Breast exam: Not performed  ROS: Review of Systems   All other systems reviewed and are negative  Imaging: Ct Chest Abdomen Pelvis W Contrast    Result Date: 12/3/2018  Narrative: CT CHEST, ABDOMEN AND PELVIS WITH IV CONTRAST INDICATION:   C15 5: Malignant neoplasm of lower third of esophagus  Excerpt from Hematology/Oncology progress note dated 9/5/2018: " 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  His pulmonary metastasis was confirmed by biopsy  "I am going to set up CT scan of chest abdomen pelvis in late November 2018 for disease monitoring " COMPARISON:  CT chest on pelvis 6/29/2018  TECHNIQUE: CT examination of the chest, abdomen and pelvis was performed  Axial, sagittal, and coronal 2D reformatted images were created from the source data and submitted for interpretation  Radiation dose length product (DLP) for this visit:  930 mGy-cm   This examination, like all CT scans performed in the [facility], was performed utilizing techniques to minimize radiation dose exposure, including the use of iterative reconstruction and automated exposure control  IV Contrast:  100 mL of iohexol (OMNIPAQUE) Enteric Contrast: Enteric contrast was administered  FINDINGS: CHEST LUNGS:  No new or large pulmonary nodules  Previously described 2 mm right lower lobe subpleural nodule remains stable, #4/73   3 mm subpleural lingular nodule #4/74 also remains stable  No endotracheal or endobronchial lesion   Stable mild peripheral septal thickening throughout both lungs  PLEURA:  Unremarkable  HEART/GREAT VESSELS:  Stable trace pericardial effusion  Atherosclerotic calcifications of the aorta and coronary arteries  MEDIASTINUM AND ZAYNAB:  Mild, stable, circumferential thickening of the distal esophagus  CHEST WALL AND LOWER NECK:   Right anterior chest wall vascular port  ABDOMEN LIVER/BILIARY TREE:  Unremarkable  GALLBLADDER:  No calcified gallstones  No pericholecystic inflammatory change  SPLEEN:  Unremarkable  PANCREAS:  Unremarkable  ADRENAL GLANDS:  Unremarkable  KIDNEYS/URETERS:  Unremarkable  No hydronephrosis  STOMACH AND BOWEL:  Migrated esophageal stent within the esophagus is unchanged in appearance  Colonic diverticulosis without acute diverticulitis  APPENDIX:  Noninflamed  ABDOMINOPELVIC CAVITY:  No ascites or free intraperitoneal air  No residual enlarged lymph nodes  VESSELS:  Unremarkable for patient's age  PELVIS REPRODUCTIVE ORGANS:  Unremarkable for patient's age  URINARY BLADDER:  Unremarkable  ABDOMINAL WALL/INGUINAL REGIONS:  Stable fat-containing right inguinal hernia  Stable small fat-containing paraumbilical hernia  OSSEOUS STRUCTURES:  No acute fracture or destructive osseous lesion  Impression: No new findings  Stable 2 mm subpleural right lower lobe and 3 mm subpleural lingular pulmonary nodules  No recurrent adenopathy within the abdomen or pelvis  No residual enlarged lymph nodes  Known migrated esophageal stent located within the gastric lumen is unchanged in appearance  Stable peripheral interstitial changes in both lungs   Workstation performed: [id number]         Labs:   Lab Results   Component Value Date    WBC 6 70 11/28/2018    HGB 15 8 11/28/2018    HCT 46 2 11/28/2018    MCV 92 11/28/2018     11/28/2018     Lab Results   Component Value Date    K 4 1 11/28/2018     11/28/2018    CO2 29 11/28/2018    BUN 12 11/28/2018    CREATININE 1 34 (H) 11/28/2018    GLUCOSE 117 08/09/2017    GLUF 144 (H) 11/28/2018    CALCIUM 9 1 11/28/2018    AST 22 11/28/2018    ALT 40 11/28/2018    ALKPHOS 76 11/28/2018    EGFR 54 11/28/2018       Current Medications: Reviewed  Allergies: Reviewed  PMH/FH/SH:  Reviewed      Vital Sign:    Body surface area is 2 05 meters squared      Wt Readings from Last 3 Encounters:   12/05/18 94 8 kg (209 lb)   11/28/18 93 9 kg (207 lb)   11/07/18 95 3 kg (210 lb)        Temp Readings from Last 3 Encounters:   12/05/18 (!) 97 3 °F (36 3 °C) (Tympanic)   11/28/18 98 °F (36 7 °C) (Temporal)   11/07/18 (!) 97 4 °F (36 3 °C) (Oral)        BP Readings from Last 3 Encounters:   12/05/18 122/82   11/28/18 130/74   11/07/18 148/80         Pulse Readings from Last 3 Encounters:   12/05/18 70   11/28/18 70   11/07/18 77     @LASTSAO2(3)@

## 2018-12-18 RX ORDER — SODIUM CHLORIDE 9 MG/ML
20 INJECTION, SOLUTION INTRAVENOUS CONTINUOUS
Status: DISCONTINUED | OUTPATIENT
Start: 2018-12-19 | End: 2018-12-22 | Stop reason: HOSPADM

## 2018-12-19 ENCOUNTER — HOSPITAL ENCOUNTER (OUTPATIENT)
Dept: INFUSION CENTER | Facility: CLINIC | Age: 68
Discharge: HOME | End: 2018-12-19
Payer: COMMERCIAL

## 2018-12-19 VITALS
HEART RATE: 65 BPM | HEIGHT: 67 IN | OXYGEN SATURATION: 96 % | TEMPERATURE: 98 F | RESPIRATION RATE: 18 BRPM | SYSTOLIC BLOOD PRESSURE: 136 MMHG | BODY MASS INDEX: 32.18 KG/M2 | WEIGHT: 205 LBS | DIASTOLIC BLOOD PRESSURE: 70 MMHG

## 2018-12-19 PROCEDURE — 96413 CHEMO IV INFUSION 1 HR: CPT

## 2018-12-19 RX ADMIN — SODIUM CHLORIDE 20 ML/HR: 0.9 INJECTION, SOLUTION INTRAVENOUS at 09:10

## 2018-12-19 RX ADMIN — TRASTUZUMAB 569 MG: 150 INJECTION, POWDER, LYOPHILIZED, FOR SOLUTION INTRAVENOUS at 10:01

## 2018-12-19 RX ADMIN — Medication 300 UNITS: at 11:13

## 2019-01-08 RX ORDER — SODIUM CHLORIDE 9 MG/ML
20 INJECTION, SOLUTION INTRAVENOUS CONTINUOUS
Status: DISCONTINUED | OUTPATIENT
Start: 2019-01-09 | End: 2019-01-12 | Stop reason: HOSPADM

## 2019-01-09 ENCOUNTER — HOSPITAL ENCOUNTER (OUTPATIENT)
Dept: INFUSION CENTER | Facility: CLINIC | Age: 69
Discharge: HOME | End: 2019-01-09
Payer: COMMERCIAL

## 2019-01-09 VITALS
BODY MASS INDEX: 32.11 KG/M2 | RESPIRATION RATE: 18 BRPM | HEART RATE: 66 BPM | OXYGEN SATURATION: 97 % | TEMPERATURE: 97.7 F | WEIGHT: 207 LBS | DIASTOLIC BLOOD PRESSURE: 66 MMHG | SYSTOLIC BLOOD PRESSURE: 100 MMHG

## 2019-01-09 PROCEDURE — 96413 CHEMO IV INFUSION 1 HR: CPT

## 2019-01-09 RX ADMIN — TRASTUZUMAB 558 MG: 150 INJECTION, POWDER, LYOPHILIZED, FOR SOLUTION INTRAVENOUS at 09:45

## 2019-01-09 RX ADMIN — SODIUM CHLORIDE 20 ML/HR: 0.9 INJECTION, SOLUTION INTRAVENOUS at 09:25

## 2019-01-09 NOTE — PROGRESS NOTES
Patient tolerated treatment today without complications  Verified patients upcoming appointment  AVS provided

## 2019-01-29 RX ORDER — SODIUM CHLORIDE 9 MG/ML
20 INJECTION, SOLUTION INTRAVENOUS CONTINUOUS
Status: DISCONTINUED | OUTPATIENT
Start: 2019-01-30 | End: 2019-02-02 | Stop reason: HOSPADM

## 2019-01-30 ENCOUNTER — HOSPITAL ENCOUNTER (OUTPATIENT)
Dept: INFUSION CENTER | Facility: CLINIC | Age: 69
Discharge: HOME | End: 2019-01-30
Payer: COMMERCIAL

## 2019-01-30 VITALS
HEART RATE: 52 BPM | RESPIRATION RATE: 19 BRPM | BODY MASS INDEX: 32.66 KG/M2 | SYSTOLIC BLOOD PRESSURE: 138 MMHG | TEMPERATURE: 97.1 F | DIASTOLIC BLOOD PRESSURE: 62 MMHG | HEIGHT: 67 IN | WEIGHT: 208.11 LBS

## 2019-01-30 PROCEDURE — 96413 CHEMO IV INFUSION 1 HR: CPT

## 2019-01-30 RX ADMIN — SODIUM CHLORIDE 20 ML/HR: 0.9 INJECTION, SOLUTION INTRAVENOUS at 09:04

## 2019-01-30 RX ADMIN — TRASTUZUMAB 558 MG: 150 INJECTION, POWDER, LYOPHILIZED, FOR SOLUTION INTRAVENOUS at 09:57

## 2019-02-19 RX ORDER — SODIUM CHLORIDE 9 MG/ML
20 INJECTION, SOLUTION INTRAVENOUS CONTINUOUS
Status: DISCONTINUED | OUTPATIENT
Start: 2019-02-20 | End: 2019-02-23 | Stop reason: HOSPADM

## 2019-02-20 ENCOUNTER — HOSPITAL ENCOUNTER (OUTPATIENT)
Dept: INFUSION CENTER | Facility: CLINIC | Age: 69
Discharge: HOME | End: 2019-02-20
Payer: COMMERCIAL

## 2019-02-20 VITALS
HEART RATE: 69 BPM | OXYGEN SATURATION: 95 % | SYSTOLIC BLOOD PRESSURE: 117 MMHG | TEMPERATURE: 97.6 F | DIASTOLIC BLOOD PRESSURE: 57 MMHG | BODY MASS INDEX: 32.96 KG/M2 | HEIGHT: 67 IN | WEIGHT: 210 LBS | RESPIRATION RATE: 18 BRPM

## 2019-02-20 PROCEDURE — 96413 CHEMO IV INFUSION 1 HR: CPT

## 2019-02-20 RX ADMIN — TRASTUZUMAB 566 MG: 150 INJECTION, POWDER, LYOPHILIZED, FOR SOLUTION INTRAVENOUS at 10:10

## 2019-02-20 RX ADMIN — SODIUM CHLORIDE 20 ML/HR: 0.9 INJECTION, SOLUTION INTRAVENOUS at 09:02

## 2019-02-20 NOTE — PLAN OF CARE
Problem: Potential for Falls  Goal: Patient will remain free of falls  Description  INTERVENTIONS:  - Assess patient frequently for physical needs  -  Identify cognitive and physical deficits and behaviors that affect risk of falls  -  [place] fall precautions as indicated by assessment   - Educate patient/family on patient safety including physical limitations  - Instruct patient to call for assistance with activity based on assessment  - Modify environment to reduce risk of injury  - Consider OT/PT consult to assist with strengthening/mobility  Outcome: Progressing     Problem: Knowledge Deficit  Goal: Patient/family/caregiver demonstrates understanding of disease process, treatment plan, medications, and discharge instructions  Description  Complete learning assessment and assess knowledge base    Interventions:  - Provide teaching at level of understanding  - Provide teaching via preferred learning methods  Outcome: Progressing

## 2019-02-20 NOTE — PROGRESS NOTES
Pt to clinic for herceptin infusion,l pt offers no complaints at this time, will continue to monitor, aware of next appointment, declines avs

## 2019-03-05 ENCOUNTER — HOSPITAL ENCOUNTER (OUTPATIENT)
Dept: NON INVASIVE DIAGNOSTICS | Facility: CLINIC | Age: 69
Discharge: HOME | End: 2019-03-05
Payer: COMMERCIAL

## 2019-03-05 DIAGNOSIS — C15.5 MALIGNANT NEOPLASM OF LOWER THIRD OF ESOPHAGUS (HCC): ICD-10-CM

## 2019-03-05 PROCEDURE — 93306 TTE W/DOPPLER COMPLETE: CPT | Performed by: INTERNAL MEDICINE

## 2019-03-05 PROCEDURE — 93306 TTE W/DOPPLER COMPLETE: CPT

## 2019-03-07 ENCOUNTER — APPOINTMENT (OUTPATIENT)
Dept: LAB | Facility: CLINIC | Age: 69
End: 2019-03-07
Payer: COMMERCIAL

## 2019-03-07 DIAGNOSIS — C15.5 MALIGNANT NEOPLASM OF LOWER THIRD OF ESOPHAGUS (HCC): ICD-10-CM

## 2019-03-07 LAB
ALBUMIN SERPL BCP-MCNC: 4.1 G/DL (ref 3.5–5)
ALP SERPL-CCNC: 68 U/L (ref 46–116)
ALT SERPL W P-5'-P-CCNC: 36 U/L (ref 12–78)
ANION GAP SERPL CALCULATED.3IONS-SCNC: 10 MMOL/L (ref 4–13)
AST SERPL W P-5'-P-CCNC: 18 U/L (ref 5–45)
BASOPHILS # BLD AUTO: 0.06 THOUSANDS/ΜL (ref 0–0.1)
BASOPHILS NFR BLD AUTO: 1 % (ref 0–1)
BILIRUB SERPL-MCNC: 0.5 MG/DL (ref 0.2–1)
BUN SERPL-MCNC: 16 MG/DL (ref 5–25)
CALCIUM SERPL-MCNC: 9.1 MG/DL (ref 8.3–10.1)
CHLORIDE SERPL-SCNC: 104 MMOL/L (ref 100–108)
CO2 SERPL-SCNC: 27 MMOL/L (ref 21–32)
CREAT SERPL-MCNC: 1.44 MG/DL (ref 0.6–1.3)
EOSINOPHIL # BLD AUTO: 0.34 THOUSAND/ΜL (ref 0–0.61)
EOSINOPHIL NFR BLD AUTO: 5 % (ref 0–6)
ERYTHROCYTE [DISTWIDTH] IN BLOOD BY AUTOMATED COUNT: 12.5 % (ref 11.6–15.1)
GFR SERPL CREATININE-BSD FRML MDRD: 49 ML/MIN/1.73SQ M
GLUCOSE SERPL-MCNC: 122 MG/DL (ref 65–140)
HCT VFR BLD AUTO: 44.3 % (ref 36.5–49.3)
HGB BLD-MCNC: 15.1 G/DL (ref 12–17)
IMM GRANULOCYTES # BLD AUTO: 0.03 THOUSAND/UL (ref 0–0.2)
IMM GRANULOCYTES NFR BLD AUTO: 0 % (ref 0–2)
LYMPHOCYTES # BLD AUTO: 1.98 THOUSANDS/ΜL (ref 0.6–4.47)
LYMPHOCYTES NFR BLD AUTO: 29 % (ref 14–44)
MCH RBC QN AUTO: 31.1 PG (ref 26.8–34.3)
MCHC RBC AUTO-ENTMCNC: 34.1 G/DL (ref 31.4–37.4)
MCV RBC AUTO: 91 FL (ref 82–98)
MONOCYTES # BLD AUTO: 0.63 THOUSAND/ΜL (ref 0.17–1.22)
MONOCYTES NFR BLD AUTO: 9 % (ref 4–12)
NEUTROPHILS # BLD AUTO: 3.77 THOUSANDS/ΜL (ref 1.85–7.62)
NEUTS SEG NFR BLD AUTO: 56 % (ref 43–75)
NRBC BLD AUTO-RTO: 0 /100 WBCS
PLATELET # BLD AUTO: 199 THOUSANDS/UL (ref 149–390)
PMV BLD AUTO: 9.2 FL (ref 8.9–12.7)
POTASSIUM SERPL-SCNC: 4.3 MMOL/L (ref 3.5–5.3)
PROT SERPL-MCNC: 7.2 G/DL (ref 6.4–8.2)
RBC # BLD AUTO: 4.86 MILLION/UL (ref 3.88–5.62)
SODIUM SERPL-SCNC: 141 MMOL/L (ref 136–145)
WBC # BLD AUTO: 6.81 THOUSAND/UL (ref 4.31–10.16)

## 2019-03-07 PROCEDURE — 80053 COMPREHEN METABOLIC PANEL: CPT

## 2019-03-07 PROCEDURE — 36415 COLL VENOUS BLD VENIPUNCTURE: CPT

## 2019-03-07 PROCEDURE — 85025 COMPLETE CBC W/AUTO DIFF WBC: CPT

## 2019-03-12 RX ORDER — SODIUM CHLORIDE 9 MG/ML
20 INJECTION, SOLUTION INTRAVENOUS CONTINUOUS
Status: DISCONTINUED | OUTPATIENT
Start: 2019-03-13 | End: 2019-03-16 | Stop reason: HOSPADM

## 2019-03-13 ENCOUNTER — OFFICE VISIT (OUTPATIENT)
Dept: HEMATOLOGY ONCOLOGY | Facility: CLINIC | Age: 69
End: 2019-03-13
Payer: COMMERCIAL

## 2019-03-13 ENCOUNTER — HOSPITAL ENCOUNTER (OUTPATIENT)
Dept: INFUSION CENTER | Facility: CLINIC | Age: 69
Discharge: HOME | End: 2019-03-13
Payer: COMMERCIAL

## 2019-03-13 VITALS
BODY MASS INDEX: 29.56 KG/M2 | WEIGHT: 206.5 LBS | SYSTOLIC BLOOD PRESSURE: 135 MMHG | DIASTOLIC BLOOD PRESSURE: 70 MMHG | OXYGEN SATURATION: 97 % | HEART RATE: 65 BPM | HEIGHT: 70 IN | RESPIRATION RATE: 18 BRPM | TEMPERATURE: 97.7 F

## 2019-03-13 VITALS
HEART RATE: 74 BPM | BODY MASS INDEX: 32.49 KG/M2 | RESPIRATION RATE: 18 BRPM | DIASTOLIC BLOOD PRESSURE: 70 MMHG | WEIGHT: 207 LBS | TEMPERATURE: 98 F | OXYGEN SATURATION: 96 % | SYSTOLIC BLOOD PRESSURE: 120 MMHG | HEIGHT: 67 IN

## 2019-03-13 DIAGNOSIS — C15.5 MALIGNANT NEOPLASM OF LOWER THIRD OF ESOPHAGUS (HCC): Primary | ICD-10-CM

## 2019-03-13 PROCEDURE — 96413 CHEMO IV INFUSION 1 HR: CPT

## 2019-03-13 PROCEDURE — 99214 OFFICE O/P EST MOD 30 MIN: CPT | Performed by: INTERNAL MEDICINE

## 2019-03-13 RX ORDER — SUVOREXANT 10 MG/1
10 TABLET, FILM COATED ORAL
COMMUNITY
Start: 2019-03-06 | End: 2020-01-01 | Stop reason: ALTCHOICE

## 2019-03-13 RX ADMIN — TRASTUZUMAB 566 MG: 150 INJECTION, POWDER, LYOPHILIZED, FOR SOLUTION INTRAVENOUS at 10:48

## 2019-03-13 RX ADMIN — SODIUM CHLORIDE 20 ML/HR: 0.9 INJECTION, SOLUTION INTRAVENOUS at 10:04

## 2019-03-13 NOTE — PROGRESS NOTES
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  His pulmonary metastasis was confirmed by biopsy  He has HER-2 positive disease   He completed 12 cycle of FOLFOX -6 with trastuzumab with good partial response   He is currently on trastuzumab monotherapy as maintenance therapy  He has no cardiac toxicity from trastuzumab  Clinically, he has no evidence of progressive disease  I recommended her him to continue with trastuzumab every 3 weeks  I will see him again in 3 months with CBC, CMP and CT scan of chest abdomen pelvis for disease monitoring  He is in agreement with my recommendations                                                                           Subjective:      HPI:             Interval History:  A 77year old gentleman with no significant past medical history  He was hospitalized in early 2017 with progressive dysphagia  However, he had minimal weight loss  He was found to have mass in distal esophagus, based on the EGD  Biopsy was positive for adenocarcinoma with mucinous features  CT scan of chest, abdomen pelvis showed not only mass in the distal esophagus, but also multiple pulmonary masses, consistent with metastatic disease  He also had retroperitoneal adenopathy measuring 2 1 cm  Lung biopsy showed adenocarcinoma, consistent with esophageal primary  His tumor was subsequently tested for HER-2 by immunohistochemistry as well as fish  Tumor from esophagus was HER-2 3+ and Fish positive with ratio of 6 5  Tumor in the lung was HER-2 negative  HER-2 Fish was also negative  This was considered to be HER-2 positive disease   Therefore, she started systemic chemotherapy with trastuzumab and FOLFOX-6  After 3 months of treatment, CT scan showed partial response   He received 12 cycle of FOLFOX which was completed in February 2018 with slowly progressive neuropathy  [facility] is currently on trastuzumab monotherapy every 3 weeks as maintenance therapy  He presents today for routine follow-up  He recently underwent echocardiogram which showed ejection fraction 60%  He has no respiratory symptoms  He is eating well  His weight is stable  He has very minimal dysphagia with 1st swallow  He denied any pain  His performance status is normal                                                  Objective:      Primary Diagnosis:     Metastatic adenocarcinoma of distal esophagus with multiple lung metastasis as well as retroperitoneal adenopathy, HER-2 positive disease  diagnosed in July 2017       Cancer Staging:  No matching staging information was found for the patient         Previous Hematologic/ Oncologic Treatment:       FOLFOX-6 with trastuzumab times 12 cycles   Completed in February 2018      Current Hematologic/ Oncologic Treatment:       Trastuzumab monotherapy every 3 weeks since February 2018      Disease Status:      Good partial response      Test Results:     Pathology:     Biopsy from December esophagus showed adenocarcinoma  biopsy showed adenocarcinoma with mucinous features  TTF-1 negative  from esophagus was HER-2 3+ and Fish positive with ratio of 6 5  from long was HER-2 negative and Fish negative        Radiology:     CT scan of chest abdomen pelvis in November 2018 showed no new findings  A few mm of pulmonary nodules      Echocardiogram in March 2019 showed ejection fraction 60%     Laboratory:           Physical Exam:        General Appearance:    Alert, oriented          Eyes:    PERRL   Ears:    Normal external ear canals, both ears   Nose:   Nares normal, septum midline   Throat:   Mucosa moist  Pharynx without injection      Neck:   Supple         Lungs:     Clear to auscultation bilaterally   Chest Wall:    No tenderness or deformity    Heart:    Regular rate and rhythm         Abdomen:     Soft, non-tender, bowel sounds +, no organomegaly               Extremities:   Extremities no cyanosis or edema         Skin:   no rash or icterus  Lymph nodes:   Cervical, supraclavicular, and axillary nodes normal   Neurologic:   CNII-XII intact, normal strength, sensation and reflexes     Throughout             Breast exam: Not performed  ROS: Review of Systems   All other systems reviewed and are negative  Imaging: No results found  Labs:   Lab Results   Component Value Date    WBC 6 81 03/07/2019    HGB 15 1 03/07/2019    HCT 44 3 03/07/2019    MCV 91 03/07/2019     03/07/2019     Lab Results   Component Value Date    K 4 3 03/07/2019     03/07/2019    CO2 27 03/07/2019    BUN 16 03/07/2019    CREATININE 1 44 (H) 03/07/2019    GLUCOSE 117 08/09/2017    GLUF 144 (H) 11/28/2018    CALCIUM 9 1 03/07/2019    AST 18 03/07/2019    ALT 36 03/07/2019    ALKPHOS 68 03/07/2019    EGFR 49 03/07/2019       Current Medications: Reviewed  Allergies: Reviewed  PMH/FH/SH:  Reviewed      Vital Sign:    Body surface area is 2 05 meters squared      Wt Readings from Last 3 Encounters:   03/13/19 93 9 kg (207 lb)   02/20/19 95 3 kg (210 lb)   01/30/19 94 4 kg (208 lb 1 8 oz)        Temp Readings from Last 3 Encounters:   03/13/19 98 °F (36 7 °C) (Tympanic)   02/20/19 97 6 °F (36 4 °C) (Temporal)   01/30/19 (!) 97 1 °F (36 2 °C) (Temporal)        BP Readings from Last 3 Encounters:   03/13/19 120/70   02/20/19 117/57   01/30/19 138/62         Pulse Readings from Last 3 Encounters:   03/13/19 74   02/20/19 69   01/30/19 (!) 52     @LASTSAO2(3)@

## 2019-04-03 ENCOUNTER — HOSPITAL ENCOUNTER (OUTPATIENT)
Dept: INFUSION CENTER | Facility: CLINIC | Age: 69
Discharge: HOME | End: 2019-04-03
Payer: COMMERCIAL

## 2019-04-03 VITALS
HEIGHT: 67 IN | DIASTOLIC BLOOD PRESSURE: 68 MMHG | WEIGHT: 206.5 LBS | TEMPERATURE: 98 F | BODY MASS INDEX: 32.41 KG/M2 | SYSTOLIC BLOOD PRESSURE: 120 MMHG | RESPIRATION RATE: 16 BRPM | HEART RATE: 62 BPM | OXYGEN SATURATION: 96 %

## 2019-04-03 PROCEDURE — 96413 CHEMO IV INFUSION 1 HR: CPT

## 2019-04-03 RX ADMIN — TRASTUZUMAB 566 MG: 150 INJECTION, POWDER, LYOPHILIZED, FOR SOLUTION INTRAVENOUS at 10:09

## 2019-04-03 NOTE — PROGRESS NOTES
Nurse spoke with Sarah Pizarro RN & confirmed with her per Dr Deborah Kennedy pt  Does NOT need labs done today for Herceptin treatment & pt  Should have labs done prior to ofc visit with Dr Sim Koyanagi  Nurse informed the pt  Of the same, pt  Is aware & verbalized understanding

## 2019-04-17 DIAGNOSIS — R13.10 ODYNOPHAGIA: ICD-10-CM

## 2019-04-17 RX ORDER — SODIUM CHLORIDE 9 MG/ML
20 INJECTION, SOLUTION INTRAVENOUS ONCE
Status: CANCELLED | OUTPATIENT
Start: 2019-05-15

## 2019-04-17 RX ORDER — SODIUM CHLORIDE 9 MG/ML
20 INJECTION, SOLUTION INTRAVENOUS ONCE
Status: CANCELLED | OUTPATIENT
Start: 2019-06-05

## 2019-04-23 RX ORDER — SODIUM CHLORIDE 9 MG/ML
20 INJECTION, SOLUTION INTRAVENOUS CONTINUOUS
Status: DISCONTINUED | OUTPATIENT
Start: 2019-04-24 | End: 2019-04-27 | Stop reason: HOSPADM

## 2019-04-24 ENCOUNTER — HOSPITAL ENCOUNTER (OUTPATIENT)
Dept: INFUSION CENTER | Facility: CLINIC | Age: 69
Discharge: HOME | End: 2019-04-24
Payer: COMMERCIAL

## 2019-04-24 VITALS
DIASTOLIC BLOOD PRESSURE: 78 MMHG | TEMPERATURE: 98 F | HEART RATE: 62 BPM | OXYGEN SATURATION: 95 % | SYSTOLIC BLOOD PRESSURE: 120 MMHG | WEIGHT: 206.5 LBS | RESPIRATION RATE: 14 BRPM | BODY MASS INDEX: 32.34 KG/M2

## 2019-04-24 PROCEDURE — 96413 CHEMO IV INFUSION 1 HR: CPT

## 2019-04-24 RX ADMIN — SODIUM CHLORIDE 20 ML/HR: 0.9 INJECTION, SOLUTION INTRAVENOUS at 08:41

## 2019-04-24 RX ADMIN — TRASTUZUMAB 562 MG: 150 INJECTION, POWDER, LYOPHILIZED, FOR SOLUTION INTRAVENOUS at 09:34

## 2019-05-15 ENCOUNTER — HOSPITAL ENCOUNTER (OUTPATIENT)
Dept: INFUSION CENTER | Facility: CLINIC | Age: 69
Discharge: HOME | End: 2019-05-15
Payer: COMMERCIAL

## 2019-05-15 VITALS
BODY MASS INDEX: 32.25 KG/M2 | OXYGEN SATURATION: 96 % | TEMPERATURE: 97.5 F | RESPIRATION RATE: 18 BRPM | WEIGHT: 205.91 LBS | HEART RATE: 67 BPM | SYSTOLIC BLOOD PRESSURE: 140 MMHG | DIASTOLIC BLOOD PRESSURE: 55 MMHG

## 2019-05-15 DIAGNOSIS — R13.10 ODYNOPHAGIA: Primary | ICD-10-CM

## 2019-05-15 PROCEDURE — 96413 CHEMO IV INFUSION 1 HR: CPT

## 2019-05-15 RX ORDER — SODIUM CHLORIDE 9 MG/ML
20 INJECTION, SOLUTION INTRAVENOUS ONCE
Status: COMPLETED | OUTPATIENT
Start: 2019-05-15 | End: 2019-05-15

## 2019-05-15 RX ADMIN — TRASTUZUMAB 562 MG: 150 INJECTION, POWDER, LYOPHILIZED, FOR SOLUTION INTRAVENOUS at 09:34

## 2019-05-15 RX ADMIN — SODIUM CHLORIDE 20 ML/HR: 0.9 INJECTION, SOLUTION INTRAVENOUS at 09:05

## 2019-06-03 ENCOUNTER — DOCUMENTATION (OUTPATIENT)
Dept: HEMATOLOGY ONCOLOGY | Facility: CLINIC | Age: 69
End: 2019-06-03

## 2019-06-05 ENCOUNTER — HOSPITAL ENCOUNTER (OUTPATIENT)
Dept: INFUSION CENTER | Facility: CLINIC | Age: 69
Discharge: HOME | End: 2019-06-05
Payer: COMMERCIAL

## 2019-06-05 VITALS
BODY MASS INDEX: 31.78 KG/M2 | HEART RATE: 90 BPM | SYSTOLIC BLOOD PRESSURE: 138 MMHG | RESPIRATION RATE: 18 BRPM | TEMPERATURE: 97.6 F | WEIGHT: 202.93 LBS | DIASTOLIC BLOOD PRESSURE: 65 MMHG

## 2019-06-05 DIAGNOSIS — R13.10 ODYNOPHAGIA: Primary | ICD-10-CM

## 2019-06-05 PROCEDURE — 96413 CHEMO IV INFUSION 1 HR: CPT

## 2019-06-05 RX ORDER — SODIUM CHLORIDE 9 MG/ML
20 INJECTION, SOLUTION INTRAVENOUS ONCE
Status: COMPLETED | OUTPATIENT
Start: 2019-06-05 | End: 2019-06-05

## 2019-06-05 RX ADMIN — SODIUM CHLORIDE 20 ML/HR: 0.9 INJECTION, SOLUTION INTRAVENOUS at 08:30

## 2019-06-05 RX ADMIN — TRASTUZUMAB 562 MG: 150 INJECTION, POWDER, LYOPHILIZED, FOR SOLUTION INTRAVENOUS at 09:30

## 2019-06-17 ENCOUNTER — TRANSCRIBE ORDERS (OUTPATIENT)
Dept: LAB | Facility: CLINIC | Age: 69
End: 2019-06-17

## 2019-06-17 ENCOUNTER — APPOINTMENT (OUTPATIENT)
Dept: LAB | Facility: CLINIC | Age: 69
End: 2019-06-17
Payer: COMMERCIAL

## 2019-06-17 DIAGNOSIS — C15.5 MALIGNANT NEOPLASM OF LOWER THIRD OF ESOPHAGUS (HCC): ICD-10-CM

## 2019-06-17 LAB
ALBUMIN SERPL BCP-MCNC: 4.1 G/DL (ref 3.5–5)
ALP SERPL-CCNC: 71 U/L (ref 46–116)
ALT SERPL W P-5'-P-CCNC: 34 U/L (ref 12–78)
ANION GAP SERPL CALCULATED.3IONS-SCNC: 8 MMOL/L (ref 4–13)
AST SERPL W P-5'-P-CCNC: 20 U/L (ref 5–45)
BASOPHILS # BLD AUTO: 0.07 THOUSANDS/ΜL (ref 0–0.1)
BASOPHILS NFR BLD AUTO: 1 % (ref 0–1)
BILIRUB SERPL-MCNC: 0.6 MG/DL (ref 0.2–1)
BUN SERPL-MCNC: 16 MG/DL (ref 5–25)
CALCIUM SERPL-MCNC: 9.1 MG/DL (ref 8.3–10.1)
CHLORIDE SERPL-SCNC: 105 MMOL/L (ref 100–108)
CO2 SERPL-SCNC: 27 MMOL/L (ref 21–32)
CREAT SERPL-MCNC: 1.26 MG/DL (ref 0.6–1.3)
EOSINOPHIL # BLD AUTO: 0.31 THOUSAND/ΜL (ref 0–0.61)
EOSINOPHIL NFR BLD AUTO: 5 % (ref 0–6)
ERYTHROCYTE [DISTWIDTH] IN BLOOD BY AUTOMATED COUNT: 12.9 % (ref 11.6–15.1)
GFR SERPL CREATININE-BSD FRML MDRD: 57 ML/MIN/1.73SQ M
GLUCOSE P FAST SERPL-MCNC: 107 MG/DL (ref 65–99)
HCT VFR BLD AUTO: 45.5 % (ref 36.5–49.3)
HGB BLD-MCNC: 15 G/DL (ref 12–17)
IMM GRANULOCYTES # BLD AUTO: 0.02 THOUSAND/UL (ref 0–0.2)
IMM GRANULOCYTES NFR BLD AUTO: 0 % (ref 0–2)
LYMPHOCYTES # BLD AUTO: 1.63 THOUSANDS/ΜL (ref 0.6–4.47)
LYMPHOCYTES NFR BLD AUTO: 26 % (ref 14–44)
MCH RBC QN AUTO: 31.3 PG (ref 26.8–34.3)
MCHC RBC AUTO-ENTMCNC: 33 G/DL (ref 31.4–37.4)
MCV RBC AUTO: 95 FL (ref 82–98)
MONOCYTES # BLD AUTO: 0.51 THOUSAND/ΜL (ref 0.17–1.22)
MONOCYTES NFR BLD AUTO: 8 % (ref 4–12)
NEUTROPHILS # BLD AUTO: 3.66 THOUSANDS/ΜL (ref 1.85–7.62)
NEUTS SEG NFR BLD AUTO: 60 % (ref 43–75)
NRBC BLD AUTO-RTO: 0 /100 WBCS
PLATELET # BLD AUTO: 191 THOUSANDS/UL (ref 149–390)
PMV BLD AUTO: 9 FL (ref 8.9–12.7)
POTASSIUM SERPL-SCNC: 4.4 MMOL/L (ref 3.5–5.3)
PROT SERPL-MCNC: 7.5 G/DL (ref 6.4–8.2)
RBC # BLD AUTO: 4.79 MILLION/UL (ref 3.88–5.62)
SODIUM SERPL-SCNC: 140 MMOL/L (ref 136–145)
WBC # BLD AUTO: 6.2 THOUSAND/UL (ref 4.31–10.16)

## 2019-06-17 PROCEDURE — 80053 COMPREHEN METABOLIC PANEL: CPT

## 2019-06-17 PROCEDURE — 85025 COMPLETE CBC W/AUTO DIFF WBC: CPT

## 2019-06-17 PROCEDURE — 36415 COLL VENOUS BLD VENIPUNCTURE: CPT

## 2019-06-19 DIAGNOSIS — R13.10 ODYNOPHAGIA: ICD-10-CM

## 2019-06-19 DIAGNOSIS — C15.5 MALIGNANT NEOPLASM OF LOWER THIRD OF ESOPHAGUS (HCC): ICD-10-CM

## 2019-06-19 RX ORDER — SODIUM CHLORIDE 9 MG/ML
20 INJECTION, SOLUTION INTRAVENOUS ONCE
Status: CANCELLED | OUTPATIENT
Start: 2019-07-24

## 2019-06-19 RX ORDER — SODIUM CHLORIDE 9 MG/ML
20 INJECTION, SOLUTION INTRAVENOUS ONCE
Status: CANCELLED | OUTPATIENT
Start: 2019-06-26

## 2019-06-25 ENCOUNTER — HOSPITAL ENCOUNTER (OUTPATIENT)
Dept: RADIOLOGY | Facility: MEDICAL CENTER | Age: 69
Discharge: HOME | End: 2019-06-25
Payer: COMMERCIAL

## 2019-06-25 DIAGNOSIS — C15.5 MALIGNANT NEOPLASM OF LOWER THIRD OF ESOPHAGUS (HCC): ICD-10-CM

## 2019-06-25 PROCEDURE — 74177 CT ABD & PELVIS W/CONTRAST: CPT

## 2019-06-25 PROCEDURE — 71260 CT THORAX DX C+: CPT

## 2019-06-25 RX ADMIN — IOHEXOL 100 ML: 350 INJECTION, SOLUTION INTRAVENOUS at 09:38

## 2019-06-26 ENCOUNTER — HOSPITAL ENCOUNTER (OUTPATIENT)
Dept: INFUSION CENTER | Facility: CLINIC | Age: 69
Discharge: HOME | End: 2019-06-26
Payer: COMMERCIAL

## 2019-06-26 ENCOUNTER — TELEPHONE (OUTPATIENT)
Dept: HEMATOLOGY ONCOLOGY | Facility: CLINIC | Age: 69
End: 2019-06-26

## 2019-06-26 ENCOUNTER — OFFICE VISIT (OUTPATIENT)
Dept: HEMATOLOGY ONCOLOGY | Facility: CLINIC | Age: 69
End: 2019-06-26
Payer: COMMERCIAL

## 2019-06-26 VITALS
DIASTOLIC BLOOD PRESSURE: 70 MMHG | OXYGEN SATURATION: 97 % | HEART RATE: 68 BPM | HEIGHT: 67 IN | RESPIRATION RATE: 18 BRPM | SYSTOLIC BLOOD PRESSURE: 138 MMHG | BODY MASS INDEX: 31.71 KG/M2 | TEMPERATURE: 96.4 F | WEIGHT: 202 LBS

## 2019-06-26 VITALS
TEMPERATURE: 97.3 F | DIASTOLIC BLOOD PRESSURE: 66 MMHG | BODY MASS INDEX: 31.39 KG/M2 | WEIGHT: 200 LBS | HEIGHT: 67 IN | RESPIRATION RATE: 16 BRPM | SYSTOLIC BLOOD PRESSURE: 141 MMHG | OXYGEN SATURATION: 97 % | HEART RATE: 64 BPM

## 2019-06-26 DIAGNOSIS — C15.5 MALIGNANT NEOPLASM OF LOWER THIRD OF ESOPHAGUS (HCC): Primary | ICD-10-CM

## 2019-06-26 DIAGNOSIS — R13.10 ODYNOPHAGIA: ICD-10-CM

## 2019-06-26 DIAGNOSIS — F41.9 ANXIETY: ICD-10-CM

## 2019-06-26 PROCEDURE — 96413 CHEMO IV INFUSION 1 HR: CPT

## 2019-06-26 PROCEDURE — 99214 OFFICE O/P EST MOD 30 MIN: CPT | Performed by: INTERNAL MEDICINE

## 2019-06-26 RX ORDER — LORAZEPAM 0.5 MG/1
0.5 TABLET ORAL 2 TIMES DAILY PRN
Qty: 60 TABLET | Refills: 2 | Status: SHIPPED | OUTPATIENT
Start: 2019-06-26 | End: 2020-01-06

## 2019-06-26 RX ORDER — SODIUM CHLORIDE 9 MG/ML
20 INJECTION, SOLUTION INTRAVENOUS ONCE
Status: COMPLETED | OUTPATIENT
Start: 2019-06-26 | End: 2019-06-26

## 2019-06-26 RX ORDER — SODIUM CHLORIDE 9 MG/ML
20 INJECTION, SOLUTION INTRAVENOUS ONCE
Status: CANCELLED | OUTPATIENT
Start: 2019-09-04

## 2019-06-26 RX ORDER — SODIUM CHLORIDE 9 MG/ML
20 INJECTION, SOLUTION INTRAVENOUS ONCE
Status: CANCELLED | OUTPATIENT
Start: 2019-09-25

## 2019-06-26 RX ORDER — SODIUM CHLORIDE 9 MG/ML
20 INJECTION, SOLUTION INTRAVENOUS ONCE
Status: CANCELLED | OUTPATIENT
Start: 2019-08-14

## 2019-06-26 RX ORDER — SODIUM CHLORIDE 9 MG/ML
20 INJECTION, SOLUTION INTRAVENOUS ONCE
Status: CANCELLED | OUTPATIENT
Start: 2019-10-16

## 2019-06-26 RX ADMIN — TRASTUZUMAB 562 MG: 150 INJECTION, POWDER, LYOPHILIZED, FOR SOLUTION INTRAVENOUS at 10:22

## 2019-06-26 RX ADMIN — SODIUM CHLORIDE 20 ML/HR: 0.9 INJECTION, SOLUTION INTRAVENOUS at 09:18

## 2019-06-27 ENCOUNTER — OFFICE VISIT (OUTPATIENT)
Dept: GASTROENTEROLOGY | Facility: CLINIC | Age: 69
End: 2019-06-27
Payer: COMMERCIAL

## 2019-06-27 VITALS
WEIGHT: 201 LBS | RESPIRATION RATE: 16 BRPM | HEART RATE: 66 BPM | BODY MASS INDEX: 31.55 KG/M2 | HEIGHT: 67 IN | SYSTOLIC BLOOD PRESSURE: 130 MMHG | DIASTOLIC BLOOD PRESSURE: 70 MMHG

## 2019-06-27 DIAGNOSIS — R13.10 DYSPHAGIA, UNSPECIFIED TYPE: Primary | ICD-10-CM

## 2019-06-27 DIAGNOSIS — C15.5 MALIGNANT NEOPLASM OF LOWER THIRD OF ESOPHAGUS (HCC): ICD-10-CM

## 2019-06-27 DIAGNOSIS — R13.10 ODYNOPHAGIA: ICD-10-CM

## 2019-06-27 PROCEDURE — 99203 OFFICE O/P NEW LOW 30 MIN: CPT | Performed by: PHYSICIAN ASSISTANT

## 2019-06-27 NOTE — H&P (VIEW-ONLY)
[facility] - Outpatient Consultation  Angela Garcia 77 y o  male MRN: [id number]  Encounter: [id number]          ASSESSMENT AND PLAN:      1  Malignant neoplasm of lower third of esophagus (HCC)  2  Odynophagia  3  Dysphagia, unspecified type  Patient's recent CT scan as well as hematology oncology note were reviewed  Will plan EGD with or without stent removal and replacement of metal stent within the next week  Will discuss this case with Dr Addison Hernandez  Until patient's EGD is performed patient will stick to liquids and soft foods  Further recommendations will be made after patient's endoscopic evaluation  ______________________________________________________________________    HPI:    77-year-old male who is here with dysphagia  Patient has a history of a 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 diagnosed 2 years ago  Patient completed 12 cycles of chemotherapy with good response  Patient is now maintained on Herceptin every 3 weeks  Patient reports that over the last several months he has had progressive dysphagia which is new for him  Patient did undergo an endoscopy with metal stent placement in August of 2017 with Dr Rhoda Ledezma  Patient's CT scan from June 25, 2019 was reviewed  It appears the patient's esophageal stent migrated within the stomach  This appears to be a stable finding compared to scans from 2018  Patient reports right now that he is having issues tolerating solid food especially broccoli and breads  He reports that food is sticking in the upper part of his esophagus and he does actually have to regurgitate his food from time to time  He reports that he is having no problems with liquids at this point in time  He reports that his weight has been stable  He reports that his energy has been good with no significant fatigue  He denies any issues with his stools    He denies any melena or rectal bleeding  And he denies any significant abdominal or chest pains  REVIEW OF SYSTEMS:    CONSTITUTIONAL: Denies any fever, chills, rigors, and weight loss  HEENT: No earache or tinnitus  Denies hearing loss or visual disturbances  CARDIOVASCULAR: No chest pain or palpitations  RESPIRATORY: Denies any cough, hemoptysis, shortness of breath or dyspnea on exertion  GASTROINTESTINAL: As noted in the History of Present Illness  GENITOURINARY: No problems with urination  Denies any hematuria or dysuria  NEUROLOGIC: No dizziness or vertigo, denies headaches  MUSCULOSKELETAL: Denies any muscle or joint pain  SKIN: Denies skin rashes or itching  ENDOCRINE: Denies excessive thirst  Denies intolerance to heat or cold  PSYCHOSOCIAL: Denies depression or anxiety  Denies any recent memory loss  Historical Information   Past Medical History:   Diagnosis Date    Anxiety     Dysphagia     Esophageal abnormality     Esophageal cancer (HCC)     GERD (gastroesophageal reflux disease)     Hyperlipidemia     Hypertension     Occasional tremors      Past Surgical History:   Procedure Laterality Date    ESOPHAGOGASTRODUODENOSCOPY N/A 8/9/2017    Procedure: ESOPHAGOGASTRODUODENOSCOPY (EGD); Surgeon: Kameron Limon MD;  Location: [facility]; Service: Gastroenterology    ESOPHAGOGASTRODUODENOSCOPY N/A 8/11/2017    Procedure: ESOPHAGOGASTRODUODENOSCOPY (EGD) w/ stent;  Surgeon: Kameron Limon MD;  Location: [facility];   Service: Gastroenterology    VASCULAR SURGERY  2008    blockage in neck      Social History   Social History     Substance and Sexual Activity   Alcohol Use No    Comment: social     Social History     Substance and Sexual Activity   Drug Use Yes    Types: Marijuana     Social History     Tobacco Use   Smoking Status Former Smoker    Packs/day: 1 00    Years: 30 00    Pack years: 30 00    Last attempt to quit: 9/13/2008    Years since quitting: 10 7   Smokeless Tobacco Never Used     History reviewed  No pertinent family history  Meds/Allergies       Current Outpatient Medications:     amLODIPine (NORVASC) 5 mg tablet    aspirin 81 mg chewable tablet    BELSOMRA 10 MG TABS    diphenoxylate-atropine (LOMOTIL) 2 5-0 025 mg per tablet    LORazepam (ATIVAN) 0 5 mg tablet    metoprolol tartrate (LOPRESSOR) 50 mg tablet    pantoprazole (PROTONIX) 40 mg tablet    simvastatin (ZOCOR) 40 mg tablet  No current facility-administered medications for this visit  Allergies   Allergen Reactions    Other      Cat Dander           Objective     Blood pressure 130/70, pulse 66, resp  rate 16, height 5' 7" (1 702 m), weight 91 2 kg (201 lb)  Body mass index is 31 48 kg/m²  PHYSICAL EXAM:      General Appearance:   Alert, cooperative, no distress   HEENT:   Normocephalic, atraumatic, anicteric      Neck:  Supple, symmetrical, trachea midline   Lungs:   Clear to auscultation bilaterally; no rales, rhonchi or wheezing; respirations unlabored    Heart[de-identified]   Regular rate and rhythm; no murmur, rub, or gallop  Abdomen:   Soft, non-tender, non-distended; normal bowel sounds; no masses, no organomegaly    Genitalia:   Deferred    Rectal:   Deferred    Extremities:  No cyanosis, clubbing or edema    Pulses:  2+ and symmetric    Skin:  No jaundice, rashes, or lesions    Lymph nodes:  No palpable cervical lymphadenopathy        Lab Results:   No visits with results within 1 Day(s) from this visit     Latest known visit with results is:   Appointment on 06/17/2019   Component Date Value    WBC 06/17/2019 6 20     RBC 06/17/2019 4 79     Hemoglobin 06/17/2019 15 0     Hematocrit 06/17/2019 45 5     MCV 06/17/2019 95     MCH 06/17/2019 31 3     MCHC 06/17/2019 33 0     RDW 06/17/2019 12 9     MPV 06/17/2019 9 0     Platelets 54/47/7264 191     nRBC 06/17/2019 0     Neutrophils Relative 06/17/2019 60     Immat GRANS % 06/17/2019 0     Lymphocytes Relative 06/17/2019 26     Monocytes Relative 06/17/2019 8     Eosinophils Relative 06/17/2019 5     Basophils Relative 06/17/2019 1     Neutrophils Absolute 06/17/2019 3 66     Immature Grans Absolute 06/17/2019 0 02     Lymphocytes Absolute 06/17/2019 1 63     Monocytes Absolute 06/17/2019 0 51     Eosinophils Absolute 06/17/2019 0 31     Basophils Absolute 06/17/2019 0 07     Sodium 06/17/2019 140     Potassium 06/17/2019 4 4     Chloride 06/17/2019 105     CO2 06/17/2019 27     ANION GAP 06/17/2019 8     BUN 06/17/2019 16     Creatinine 06/17/2019 1 26     Glucose, Fasting 06/17/2019 107*    Calcium 06/17/2019 9 1     AST 06/17/2019 20     ALT 06/17/2019 34     Alkaline Phosphatase 06/17/2019 71     Total Protein 06/17/2019 7 5     Albumin 06/17/2019 4 1     Total Bilirubin 06/17/2019 0 60     eGFR 06/17/2019 57          Radiology Results:   Ct Chest Abdomen Pelvis W Contrast    Result Date: 6/26/2019  Narrative: CT CHEST, ABDOMEN AND PELVIS WITH IV CONTRAST INDICATION:   C15 5: Malignant neoplasm of lower third of esophagus  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, completed 12 cycles of initial chemotherapy, currently on maintenance therapy  COMPARISON:  CT chest abdomen pelvis 11/30/2018 TECHNIQUE: CT examination of the chest, abdomen and pelvis was performed  Axial, sagittal, and coronal 2D reformatted images were created from the source data and submitted for interpretation  Radiation dose length product (DLP) for this visit:  648 mGy-cm   This examination, like all CT scans performed in the [facility], was performed utilizing techniques to minimize radiation dose exposure, including the use of iterative reconstruction and automated exposure control  IV Contrast:  100 mL of iohexol (OMNIPAQUE) Enteric Contrast: Enteric contrast was administered   FINDINGS: CHEST LUNGS:  Peripheral reticulation of groundglass is unchanged in appearance and consistent with pulmonary fibrosis (nonspecific interstitial pneumonitis pattern)  Right lower lobe subpleural nodule measures 3 mm while previously measuring 2 mm when remeasured in similar fashion  6 mm nodule within the right lower lobe is also stable (series 3 image 50)  4 mm subpleural nodule within the left upper lobe is stable (series 3 image 63)  Cluster of subcentimeter nodules within the lingula also stable (series 3 image 71)  Subcentimeter nodules along the major fissures bilaterally are also stable likely represent fissural lymph nodes  No focal consolidations or pneumothorax  There are no tracheal endobronchial lesions  PLEURA:  Unremarkable  HEART/GREAT VESSELS:  Unremarkable for patient's age  MEDIASTINUM AND ZAYNAB:  Unremarkable  CHEST WALL AND LOWER NECK:   Right port catheter tip terminates in the cavoatrial junction  ABDOMEN LIVER/BILIARY TREE:  Unremarkable  GALLBLADDER:  No calcified gallstones  No pericholecystic inflammatory change  SPLEEN:  Unremarkable  PANCREAS:  Unremarkable  ADRENAL GLANDS:  Unremarkable  KIDNEYS/URETERS:  Unremarkable  No hydronephrosis  STOMACH AND BOWEL:  Migrated esophageal stent into the stomach is unchanged in appearance  Diverticulosis without active diverticulitis is present  APPENDIX:  A normal appendix was visualized  ABDOMINOPELVIC CAVITY:  No ascites or free intraperitoneal air  No lymphadenopathy  VESSELS:  Unremarkable for patient's age  PELVIS REPRODUCTIVE ORGANS:  Unremarkable for patient's age  URINARY BLADDER:  Unremarkable  ABDOMINAL WALL/INGUINAL REGIONS:  Unremarkable  OSSEOUS STRUCTURES:  No acute fracture or destructive osseous lesion  Impression: 1  Subpleural right lower lobe nodule that measures 3 mm while previously measuring 2 mm  Unclear if this represents an actual true increase in size or just related to differences in technique or new adjacent atelectasis  Remaining nodules are stable     Attention on subsequent follow-up imaging recommended  It would also be reasonable to consider a short-term follow-up CT of the chest in 3 months to ensure stability  2   No evidence of recurrent or metastatic disease within the abdomen or pelvis  3   Stable migrated esophageal stent within the stomach  4   Stable findings of interstitial lung disease/pulmonary fibrosis   Workstation performed: [id number]

## 2019-07-02 ENCOUNTER — ANESTHESIA EVENT (OUTPATIENT)
Dept: PERIOP | Facility: HOSPITAL | Age: 69
End: 2019-07-02

## 2019-07-02 NOTE — ANESTHESIA PREPROCEDURE EVALUATION
Review of Systems/Medical History  Patient summary reviewed  Chart reviewed      Cardiovascular  Hyperlipidemia, Hypertension , No valve replacement ,    Pulmonary       GI/Hepatic  Dysphagia,   GERD , Esophageal disease esophageal cancer,             Endo/Other     GYN       Hematology   Musculoskeletal       Neurology   Psychology   Anxiety,              Physical Exam    Airway      TM Distance: <3 FB  Neck ROM: limited     Dental   No notable dental hx     Cardiovascular  Rhythm: regular, Rate: normal, Cardiovascular exam normal    Pulmonary  Pulmonary exam normal Breath sounds clear to auscultation,     Other Findings        Anesthesia Plan  ASA Score- 3     Anesthesia Type-     Plan Factors-    Induction-     Postoperative Plan-     Informed Consent- Anesthetic plan and risks discussed with patient and spouse  I personally reviewed this patient with the CRNA  Discussed and agreed on the Anesthesia Plan with the CRNA  Patricia Eastman

## 2019-07-03 ENCOUNTER — ANESTHESIA (OUTPATIENT)
Dept: PERIOP | Facility: HOSPITAL | Age: 69
End: 2019-07-03

## 2019-07-03 ENCOUNTER — TELEPHONE (OUTPATIENT)
Dept: GASTROENTEROLOGY | Facility: CLINIC | Age: 69
End: 2019-07-03

## 2019-07-03 ENCOUNTER — HOSPITAL ENCOUNTER (OUTPATIENT)
Dept: PERIOP | Facility: HOSPITAL | Age: 69
Setting detail: OUTPATIENT SURGERY
Discharge: HOME | End: 2019-07-03
Attending: INTERNAL MEDICINE | Admitting: INTERNAL MEDICINE
Payer: COMMERCIAL

## 2019-07-03 ENCOUNTER — HOSPITAL ENCOUNTER (OUTPATIENT)
Dept: RADIOLOGY | Facility: HOSPITAL | Age: 69
Discharge: HOME | End: 2019-07-03
Payer: COMMERCIAL

## 2019-07-03 VITALS
OXYGEN SATURATION: 98 % | WEIGHT: 197.75 LBS | HEIGHT: 67 IN | HEART RATE: 64 BPM | BODY MASS INDEX: 31.04 KG/M2 | RESPIRATION RATE: 22 BRPM | TEMPERATURE: 97.5 F | DIASTOLIC BLOOD PRESSURE: 81 MMHG | SYSTOLIC BLOOD PRESSURE: 171 MMHG

## 2019-07-03 DIAGNOSIS — C15.5 MALIGNANT NEOPLASM OF LOWER THIRD OF ESOPHAGUS (HCC): ICD-10-CM

## 2019-07-03 DIAGNOSIS — R13.10 ODYNOPHAGIA: ICD-10-CM

## 2019-07-03 DIAGNOSIS — R13.10 DYSPHAGIA, UNSPECIFIED TYPE: ICD-10-CM

## 2019-07-03 PROCEDURE — C1874 STENT, COATED/COV W/DEL SYS: HCPCS

## 2019-07-03 PROCEDURE — 76000 FLUOROSCOPY <1 HR PHYS/QHP: CPT

## 2019-07-03 PROCEDURE — NC001 PR NO CHARGE

## 2019-07-03 PROCEDURE — 43266 EGD ENDOSCOPIC STENT PLACE: CPT

## 2019-07-03 PROCEDURE — C1769 GUIDE WIRE: HCPCS

## 2019-07-03 RX ORDER — PROPOFOL 10 MG/ML
INJECTION, EMULSION INTRAVENOUS AS NEEDED
Status: DISCONTINUED | OUTPATIENT
Start: 2019-07-03 | End: 2019-07-03 | Stop reason: SURG

## 2019-07-03 RX ORDER — PROPOFOL 10 MG/ML
INJECTION, EMULSION INTRAVENOUS CONTINUOUS PRN
Status: DISCONTINUED | OUTPATIENT
Start: 2019-07-03 | End: 2019-07-03 | Stop reason: SURG

## 2019-07-03 RX ORDER — LIDOCAINE HYDROCHLORIDE 10 MG/ML
INJECTION, SOLUTION EPIDURAL; INFILTRATION; INTRACAUDAL; PERINEURAL AS NEEDED
Status: DISCONTINUED | OUTPATIENT
Start: 2019-07-03 | End: 2019-07-03 | Stop reason: SURG

## 2019-07-03 RX ORDER — SODIUM CHLORIDE, SODIUM LACTATE, POTASSIUM CHLORIDE, CALCIUM CHLORIDE 600; 310; 30; 20 MG/100ML; MG/100ML; MG/100ML; MG/100ML
125 INJECTION, SOLUTION INTRAVENOUS CONTINUOUS
Status: DISCONTINUED | OUTPATIENT
Start: 2019-07-03 | End: 2019-07-07 | Stop reason: HOSPADM

## 2019-07-03 RX ORDER — HYDRALAZINE HYDROCHLORIDE 20 MG/ML
10 INJECTION INTRAMUSCULAR; INTRAVENOUS ONCE
Status: COMPLETED | OUTPATIENT
Start: 2019-07-03 | End: 2019-07-03

## 2019-07-03 RX ADMIN — SODIUM CHLORIDE, SODIUM LACTATE, POTASSIUM CHLORIDE, AND CALCIUM CHLORIDE 125 ML/HR: .6; .31; .03; .02 INJECTION, SOLUTION INTRAVENOUS at 07:13

## 2019-07-03 RX ADMIN — HYDRALAZINE HYDROCHLORIDE 10 MG: 20 INJECTION INTRAMUSCULAR; INTRAVENOUS at 08:34

## 2019-07-03 RX ADMIN — PROPOFOL 30 MG: 10 INJECTION, EMULSION INTRAVENOUS at 07:45

## 2019-07-03 RX ADMIN — PROPOFOL 20 MCG/KG/MIN: 10 INJECTION, EMULSION INTRAVENOUS at 07:27

## 2019-07-03 RX ADMIN — PROPOFOL 150 MG: 10 INJECTION, EMULSION INTRAVENOUS at 07:27

## 2019-07-03 RX ADMIN — LIDOCAINE HYDROCHLORIDE 50 MG: 10 INJECTION, SOLUTION EPIDURAL; INFILTRATION; INTRACAUDAL; PERINEURAL at 07:27

## 2019-07-03 RX ADMIN — HYDRALAZINE HYDROCHLORIDE 10 MG: 20 INJECTION INTRAMUSCULAR; INTRAVENOUS at 08:55

## 2019-07-03 NOTE — DISCHARGE INSTRUCTIONS
Upper Endoscopy   WHAT YOU NEED TO KNOW:   An upper endoscopy is also called an upper gastrointestinal (GI) endoscopy, or an esophagogastroduodenoscopy (EGD)  You may feel bloated, gassy, or have some abdominal discomfort after your procedure  Your throat may be sore for 24 to 36 hours  You may burp or pass gas from air that is still inside your body  DISCHARGE INSTRUCTIONS:   Call 911 for any of the following:   · You have sudden chest pain or trouble breathing  Seek care immediately if:   · You feel dizzy or faint  · You have trouble swallowing  · Your bowel movements are very dark or black  · Your abdomen is hard and firm and you have severe pain  · You vomit blood  Contact your healthcare provider if:   · You feel full or bloated and cannot burp or pass gas  · You have not had a bowel movement for 3 days after your procedure  · You have neck pain  · You have a fever or chills  · You have nausea or are vomiting  · You have a rash or hives  · You have questions or concerns about your endoscopy  Relieve a sore throat:  Suck on throat lozenges or crushed ice  Gargle with a small amount of warm salt water  Mix 1 teaspoon of salt and 1 cup of warm water to make salt water  Relieve gas and discomfort from bloating:  Lie on your right side with a heating pad on your abdomen  Take short walks to help pass gas  Eat small meals until bloating is relieved  Rest after your procedure: You have been given medicine to relax you  Do not  drive or make important decisions until the day after your procedure  Return to your normal activity as directed  You can usually return to work the day after your procedure  Follow up with your healthcare provider as directed:  Write down your questions so you remember to ask them during your visits     © 2017 [address] is for End User's use only and may not be sold, redistributed or otherwise used for commercial purposes  All illustrations and images included in CareNotes® are the copyrighted property of [employer]  or Soham Barlow  The above information is an  only  It is not intended as medical advice for individual conditions or treatments  Talk to your doctor, nurse or pharmacist before following any medical regimen to see if it is safe and effective for you  Esophageal Dilation   WHAT YOU NEED TO KNOW:   Esophageal dilation is a procedure to widen a narrow part of your esophagus  Your healthcare provider will use a dilator (inflatable balloon or another tool that expands) to make the area wider  He may also do an endoscopy before or during your esophageal dilation  During an endoscopy, your healthcare provider will use a scope to see inside your esophagus  DISCHARGE INSTRUCTIONS:   Medicines:   · Medicines  may be given to decrease stomach acid that can irritate your esophagus  · Take your medicine as directed  Contact your healthcare provider if you think your medicine is not helping or if you have side effects  Tell him if you are allergic to any medicine  Keep a list of the medicines, vitamins, and herbs you take  Include the amounts, and when and why you take them  Bring the list or the pill bottles to follow-up visits  Carry your medicine list with you in case of an emergency  Follow up with your healthcare provider as directed:  Write down your questions so you remember to ask them during your visits  Nutrition:  You may eat foods you normally eat  Chew your food well  Eat soft foods if you still have problems swallowing  Soft foods include applesauce, bananas, cooked cereal, cottage cheese, eggs, pudding, and yogurt  Ask for more information on what types of food to eat  Contact your healthcare provider if:   · You have a fever  · You feel very full or bloated  · You have more problems swallowing food  · You have nausea or are vomiting      · You have questions or concerns about your condition or care  Seek care immediately or call 911 if:   · You vomit blood  · You are not able to swallow any food  · You have a fast heartbeat, chest pain, or sudden trouble breathing  · Your abdomen suddenly becomes tender and hard  © 2017 [facility] Information is for End User's use only and may not be sold, redistributed or otherwise used for commercial purposes  All illustrations and images included in CareNotes® are the copyrighted property of [employer]  or Soham Barlow  The above information is an  only  It is not intended as medical advice for individual conditions or treatments  Talk to your doctor, nurse or pharmacist before following any medical regimen to see if it is safe and effective for you  Esophageal Stricture   WHAT YOU NEED TO KNOW:   Esophageal stricture is a narrowing of your esophagus  Inflammation or damage to your esophagus may cause scar tissue that leads to narrowing  DISCHARGE INSTRUCTIONS:   Medicines:   · Medicines  may be given to decrease stomach acid  · Take your medicine as directed  Contact your healthcare provider if you think your medicine is not helping or if you have side effects  Tell him if you are allergic to any medicine  Keep a list of the medicines, vitamins, and herbs you take  Include the amounts, and when and why you take them  Bring the list or the pill bottles to follow-up visits  Carry your medicine list with you in case of an emergency  Follow up with your healthcare provider as directed: You may need to return for more tests  Write down your questions so you remember to ask them during your visits  Nutrition:  You may not be able to eat solid foods for a period of time  You may be allowed to drink water, broth, apple juice, or lemon-lime soft drinks  You may also suck on ice chips or eat gelatin   As you improve, you may be given soft foods to eat or thickened liquids to drink  You may return to eating normal foods as your swallowing gets better  Ask for more information about the type of foods you should eat  Rest as needed:  Slowly start to do more each day  Return to your daily activities as directed  Contact your healthcare provider if:   · You have a fever  · You are vomiting and cannot keep any food or liquids down  · You feel very full and cannot burp or vomit  · You have pain that does not decrease even after you take medicine  · Your symptoms get worse  · You have questions or concerns about your condition or care  Seek care immediately or call 911 if:   · You have severe chest pain and sudden trouble breathing  · Your bowel movements are black, bloody, or tarry-looking  · Your vomit looks like coffee grounds or has blood in it  © 2017 [address] Information is for End User's use only and may not be sold, redistributed or otherwise used for commercial purposes  All illustrations and images included in CareNotes® are the copyrighted property of [employer] Inc  or Soham Barlow  The above information is an  only  It is not intended as medical advice for individual conditions or treatments  Talk to your doctor, nurse or pharmacist before following any medical regimen to see if it is safe and effective for you

## 2019-07-03 NOTE — ANESTHESIA POSTPROCEDURE EVALUATION
Post-Op Assessment Note    CV Status:  Stable  Pain Score: 0    Pain management: adequate     Mental Status:  Awake   Hydration Status:  Stable   PONV Controlled:  None   Airway Patency:  Patent and adequate   Post Op Vitals Reviewed: Yes      Staff: CRNA           BP   169/75   Temp      Pulse  67   Resp   16   SpO2   97

## 2019-07-03 NOTE — PROGRESS NOTES
Pt BP 21/107   HR 64  SpO2 98 Res 13  Dr Anthony Celis  Pt given 10mg hydralazine  Will continue to monitor

## 2019-07-05 DIAGNOSIS — R11.0 NAUSEA: Primary | ICD-10-CM

## 2019-07-05 RX ORDER — ONDANSETRON 4 MG/1
4 TABLET, ORALLY DISINTEGRATING ORAL EVERY 6 HOURS PRN
Qty: 20 TABLET | Refills: 0 | Status: SHIPPED | OUTPATIENT
Start: 2019-07-05 | End: 2019-12-09 | Stop reason: SDUPTHER

## 2019-07-05 NOTE — TELEPHONE ENCOUNTER
PT CALLED STATING THAT SINCE HE HAD HIS PROCEDURE ON Wednesday HE HAS BEEN EXPERIENCING NAUSEA, DRY HEAVING AND NO APPETITE   BEST CALL BACK NUMBER [phone]

## 2019-07-05 NOTE — TELEPHONE ENCOUNTER
Spoke with patients wife and patient  Pt h/o malignant neoplasm of lower 3rd esophagus, dysphagia    Patient c/o nausea yesterday and dry heaving yesterday  He is feeling a little better today but not much  EGD with stent was done two days ago  Patient is keeping down liquid and soft foods  Advised patient this is okay to continue having  Can we send him zofran?

## 2019-07-24 ENCOUNTER — TELEPHONE (OUTPATIENT)
Dept: GASTROENTEROLOGY | Facility: CLINIC | Age: 69
End: 2019-07-24

## 2019-07-24 ENCOUNTER — HOSPITAL ENCOUNTER (OUTPATIENT)
Dept: INFUSION CENTER | Facility: CLINIC | Age: 69
Discharge: HOME | End: 2019-07-24
Payer: COMMERCIAL

## 2019-07-24 VITALS
RESPIRATION RATE: 18 BRPM | DIASTOLIC BLOOD PRESSURE: 78 MMHG | HEIGHT: 67 IN | WEIGHT: 191 LBS | BODY MASS INDEX: 29.98 KG/M2 | SYSTOLIC BLOOD PRESSURE: 138 MMHG | TEMPERATURE: 97.7 F | OXYGEN SATURATION: 96 % | HEART RATE: 71 BPM

## 2019-07-24 DIAGNOSIS — C15.5 MALIGNANT NEOPLASM OF LOWER THIRD OF ESOPHAGUS (HCC): Primary | ICD-10-CM

## 2019-07-24 DIAGNOSIS — R13.10 ODYNOPHAGIA: ICD-10-CM

## 2019-07-24 PROCEDURE — 96413 CHEMO IV INFUSION 1 HR: CPT

## 2019-07-24 RX ORDER — SODIUM CHLORIDE 9 MG/ML
20 INJECTION, SOLUTION INTRAVENOUS ONCE
Status: COMPLETED | OUTPATIENT
Start: 2019-07-24 | End: 2019-07-24

## 2019-07-24 RX ADMIN — TRASTUZUMAB 562 MG: 150 INJECTION, POWDER, LYOPHILIZED, FOR SOLUTION INTRAVENOUS at 10:00

## 2019-07-24 RX ADMIN — SODIUM CHLORIDE 20 ML/HR: 0.9 INJECTION, SOLUTION INTRAVENOUS at 09:25

## 2019-07-24 NOTE — PROGRESS NOTES
Patient arrived for Herceptin treatment  Offers no complaints  Patient recently had a stent replaced in his esophagus  His original stent migrated and he was unable to eat anything other than liquids resulting in significant weight loss  Since replacement of stent, patient feels better and is able to eat soft foods  Patient concerned about new stent migrating and is afraid os eating certain foods  Patient has an appointment with dietician in Aug for recommendations

## 2019-07-26 ENCOUNTER — TELEPHONE (OUTPATIENT)
Dept: GASTROENTEROLOGY | Facility: CLINIC | Age: 69
End: 2019-07-26

## 2019-07-26 NOTE — TELEPHONE ENCOUNTER
CYRUS: Spoke with patient  h/o malignant neoplasm of lower 3rd esophagus, dysphagia    Patient c/o "stomach ache" for a few weeks  He is eating and drinking well in small amounts  Following a soft foods diet  He is having a BM every 1-2 days  He is coming in next week for a repeat EGD  Advised patient okay to take tylenol, and start mylanta PRN at meals time for relief  He will follow-up next week

## 2019-07-26 NOTE — TELEPHONE ENCOUNTER
ptn had a procedure with Dr Imani Galo on 7/3/19  Since then he has been experiencing ABD pain  Ptn is scheduled for an EGD with Dr Ananya Mar on 8/1/19   Please advise

## 2019-07-31 ENCOUNTER — ANESTHESIA EVENT (OUTPATIENT)
Dept: GASTROENTEROLOGY | Facility: HOSPITAL | Age: 69
End: 2019-07-31

## 2019-07-31 ENCOUNTER — TELEPHONE (OUTPATIENT)
Dept: HEMATOLOGY ONCOLOGY | Facility: CLINIC | Age: 69
End: 2019-07-31

## 2019-07-31 NOTE — ANESTHESIA PREPROCEDURE EVALUATION
Review of Systems/Medical History  Patient summary reviewed  Chart reviewed  No history of anesthetic complications     Cardiovascular  Hyperlipidemia, Hypertension ,    Pulmonary  Negative pulmonary ROS        GI/Hepatic  Dysphagia,   GERD , Esophageal disease esophageal cancer,        Negative  ROS        Endo/Other  Negative endo/other ROS      GYN  Negative gynecology ROS          Hematology  Negative hematology ROS      Musculoskeletal  Negative musculoskeletal ROS        Neurology  Negative neurology ROS      Psychology   Anxiety,     Comment: + marijuana         Physical Exam    Airway    Mallampati score: I  TM Distance: >3 FB  Neck ROM: full     Dental       Cardiovascular      Pulmonary      Other Findings        Anesthesia Plan  ASA Score- 2     Anesthesia Type- IV sedation with anesthesia with ASA Monitors  Additional Monitors:   Airway Plan:         Plan Factors-    Induction- intravenous  Postoperative Plan-     Informed Consent- Anesthetic plan and risks discussed with patient  I personally reviewed this patient with the CRNA  Discussed and agreed on the Anesthesia Plan with the CRNA  Tati Gonzalez

## 2019-07-31 NOTE — TELEPHONE ENCOUNTER
Called patient on 7/31 left a message stating that his 10/2 appointment with Rosetta Donohue needed to be rescheduled due to him not being in the office that day  Stated that if this date and or time does not work for patient to please give our office a call back

## 2019-08-01 ENCOUNTER — ANESTHESIA (OUTPATIENT)
Dept: GASTROENTEROLOGY | Facility: HOSPITAL | Age: 69
End: 2019-08-01

## 2019-08-01 ENCOUNTER — TELEPHONE (OUTPATIENT)
Dept: GASTROENTEROLOGY | Facility: CLINIC | Age: 69
End: 2019-08-01

## 2019-08-01 ENCOUNTER — HOSPITAL ENCOUNTER (OUTPATIENT)
Dept: GASTROENTEROLOGY | Facility: HOSPITAL | Age: 69
Setting detail: OUTPATIENT SURGERY
Discharge: HOME | End: 2019-08-01
Attending: INTERNAL MEDICINE | Admitting: INTERNAL MEDICINE
Payer: COMMERCIAL

## 2019-08-01 VITALS
WEIGHT: 191.8 LBS | TEMPERATURE: 97.9 F | RESPIRATION RATE: 18 BRPM | BODY MASS INDEX: 30.1 KG/M2 | HEART RATE: 60 BPM | DIASTOLIC BLOOD PRESSURE: 69 MMHG | OXYGEN SATURATION: 98 % | HEIGHT: 67 IN | SYSTOLIC BLOOD PRESSURE: 143 MMHG

## 2019-08-01 DIAGNOSIS — R13.10 DYSPHAGIA, UNSPECIFIED TYPE: ICD-10-CM

## 2019-08-01 DIAGNOSIS — T18.2XXA FOREIGN BODY IN STOMACH, INITIAL ENCOUNTER: Primary | ICD-10-CM

## 2019-08-01 PROCEDURE — 43235 EGD DIAGNOSTIC BRUSH WASH: CPT | Performed by: INTERNAL MEDICINE

## 2019-08-01 RX ORDER — LIDOCAINE HYDROCHLORIDE 10 MG/ML
INJECTION, SOLUTION EPIDURAL; INFILTRATION; INTRACAUDAL; PERINEURAL AS NEEDED
Status: DISCONTINUED | OUTPATIENT
Start: 2019-08-01 | End: 2019-08-01 | Stop reason: SURG

## 2019-08-01 RX ORDER — SODIUM CHLORIDE, SODIUM LACTATE, POTASSIUM CHLORIDE, CALCIUM CHLORIDE 600; 310; 30; 20 MG/100ML; MG/100ML; MG/100ML; MG/100ML
125 INJECTION, SOLUTION INTRAVENOUS CONTINUOUS
Status: DISCONTINUED | OUTPATIENT
Start: 2019-08-01 | End: 2019-08-05 | Stop reason: HOSPADM

## 2019-08-01 RX ORDER — PROPOFOL 10 MG/ML
INJECTION, EMULSION INTRAVENOUS AS NEEDED
Status: DISCONTINUED | OUTPATIENT
Start: 2019-08-01 | End: 2019-08-01 | Stop reason: SURG

## 2019-08-01 RX ADMIN — SODIUM CHLORIDE, SODIUM LACTATE, POTASSIUM CHLORIDE, AND CALCIUM CHLORIDE: .6; .31; .03; .02 INJECTION, SOLUTION INTRAVENOUS at 08:18

## 2019-08-01 RX ADMIN — LIDOCAINE HYDROCHLORIDE 50 MG: 10 INJECTION, SOLUTION EPIDURAL; INFILTRATION; INTRACAUDAL; PERINEURAL at 08:21

## 2019-08-01 RX ADMIN — PROPOFOL 120 MG: 10 INJECTION, EMULSION INTRAVENOUS at 08:21

## 2019-08-01 NOTE — ANESTHESIA POSTPROCEDURE EVALUATION
Post-Op Assessment Note    CV Status:  Stable  Pain Score: 0    Pain management: adequate     Mental Status:  Sleepy   Hydration Status:  Stable   PONV Controlled:  None   Airway Patency:  Patent and adequate   Post Op Vitals Reviewed: Yes      Staff: CRNA           BP 96/53 (08/01/19 0830)    Temp      Pulse 58 (08/01/19 0830)   Resp 18 (08/01/19 0830)    SpO2 94 % (08/01/19 0830)

## 2019-08-01 NOTE — H&P
History and Physical - [facility]  Darby Sultana 77 y o  male MRN: [id number]                  HPI: Darby Sultana is a 77y o  year old male who presents for re-evaluation of esophageal stent  History of esophageal cancer  Two migrated stents  Midepigastric pain  REVIEW OF SYSTEMS: Per the HPI, and otherwise unremarkable  Historical Information   Past Medical History:   Diagnosis Date    Anxiety     Dysphagia     Esophageal abnormality     Esophageal cancer (HCC)     GERD (gastroesophageal reflux disease)     Hyperlipidemia     Hypertension     Occasional tremors      Past Surgical History:   Procedure Laterality Date    ESOPHAGOGASTRODUODENOSCOPY N/A 8/9/2017    Procedure: ESOPHAGOGASTRODUODENOSCOPY (EGD); Surgeon: Annalisa De Jesus MD;  Location: [facility]; Service: Gastroenterology    ESOPHAGOGASTRODUODENOSCOPY N/A 8/11/2017    Procedure: ESOPHAGOGASTRODUODENOSCOPY (EGD) w/ stent;  Surgeon: Annalisa De Jesus MD;  Location: [facility]; Service: Gastroenterology    VASCULAR SURGERY  2008    blockage in neck      Social History   Social History     Substance and Sexual Activity   Alcohol Use Yes    Frequency: Monthly or less    Comment: social     Social History     Substance and Sexual Activity   Drug Use Yes    Types: Marijuana    Comment: daily last 7/1/19     Social History     Tobacco Use   Smoking Status Former Smoker    Packs/day: 1 00    Years: 30 00    Pack years: 30 00    Last attempt to quit: 9/13/2008    Years since quitting: 10 8   Smokeless Tobacco Never Used     No family history on file  Meds/Allergies       (Not in a hospital admission)    Allergies   Allergen Reactions    Other      Cat Dander       Objective     Blood pressure 121/64, pulse 64, temperature 97 9 °F (36 6 °C), temperature source Oral, resp  rate 18, height 5' 7" (1 702 m), weight 87 kg (191 lb 12 8 oz), SpO2 96 %        PHYSICAL EXAM    Gen: NAD  CV: RRR  CHEST: Clear  ABD: soft, NT/ND  EXT: no edema  Neuro: AAO      ASSESSMENT/PLAN:  This is a 77y o  year old male here for esophageal carcinoma, midepigastric pain, migrated stents    PLAN:   Procedure:  EGD

## 2019-08-01 NOTE — DISCHARGE INSTRUCTIONS
Upper Endoscopy   WHAT YOU NEED TO KNOW:   An upper endoscopy is also called an upper gastrointestinal (GI) endoscopy, or an esophagogastroduodenoscopy (EGD)  You may feel bloated, gassy, or have some abdominal discomfort after your procedure  Your throat may be sore for 24 to 36 hours  You may burp or pass gas from air that is still inside your body  DISCHARGE INSTRUCTIONS:   Call 911 for any of the following:   · You have sudden chest pain or trouble breathing  Seek care immediately if:   · You feel dizzy or faint  · You have trouble swallowing  · Your bowel movements are very dark or black  · Your abdomen is hard and firm and you have severe pain  · You vomit blood  Contact your healthcare provider if:   · You feel full or bloated and cannot burp or pass gas  · You have not had a bowel movement for 3 days after your procedure  · You have neck pain  · You have a fever or chills  · You have nausea or are vomiting  · You have a rash or hives  · You have questions or concerns about your endoscopy  Relieve a sore throat:  Suck on throat lozenges or crushed ice  Gargle with a small amount of warm salt water  Mix 1 teaspoon of salt and 1 cup of warm water to make salt water  Relieve gas and discomfort from bloating:  Lie on your right side with a heating pad on your abdomen  Take short walks to help pass gas  Eat small meals until bloating is relieved  Rest after your procedure: You have been given medicine to relax you  Do not  drive or make important decisions until the day after your procedure  Return to your normal activity as directed  You can usually return to work the day after your procedure  Follow up with your healthcare provider as directed:  Write down your questions so you remember to ask them during your visits     © 2017 [address] is for End User's use only and may not be sold, redistributed or otherwise used for commercial purposes  All illustrations and images included in CareNotes® are the copyrighted property of A D A M [employer] Inc  or Soham Barlow  The above information is an  only  It is not intended as medical advice for individual conditions or treatments  Talk to your doctor, nurse or pharmacist before following any medical regimen to see if it is safe and effective for you

## 2019-08-02 ENCOUNTER — TELEPHONE (OUTPATIENT)
Dept: GASTROENTEROLOGY | Facility: CLINIC | Age: 69
End: 2019-08-02

## 2019-08-02 NOTE — TELEPHONE ENCOUNTER
Pt has been notified via message on his phone number listed on file  I called 2x both times I got VM  I left a detailed message informing him of his appointment for Tuesday august 6

## 2019-08-06 ENCOUNTER — CONSULT (OUTPATIENT)
Dept: SURGERY | Facility: CLINIC | Age: 69
End: 2019-08-06
Payer: COMMERCIAL

## 2019-08-06 VITALS
HEART RATE: 71 BPM | SYSTOLIC BLOOD PRESSURE: 130 MMHG | BODY MASS INDEX: 29.98 KG/M2 | HEIGHT: 67 IN | DIASTOLIC BLOOD PRESSURE: 70 MMHG | RESPIRATION RATE: 14 BRPM | WEIGHT: 191 LBS | TEMPERATURE: 97.4 F

## 2019-08-06 DIAGNOSIS — T85.528A MIGRATION OF ESOPHAGEAL STENT, INITIAL ENCOUNTER: ICD-10-CM

## 2019-08-06 DIAGNOSIS — A09 DIARRHEA OF INFECTIOUS ORIGIN: Primary | ICD-10-CM

## 2019-08-06 PROCEDURE — 99214 OFFICE O/P EST MOD 30 MIN: CPT | Performed by: SURGERY

## 2019-08-06 NOTE — PROGRESS NOTES
Follow Up - General Surgery   Marybeth Sexton 77 y o  male MRN: [id number]  Unit/Bed#:  Encounter: [id number]    Assessment/Plan     Assessment:  Migrated esophageal stent x3  Stage IV esophageal CA  Explosive diarrhea  Hypertension  Hyperlipidemia    Plan:  In light of the migrated esophageal stents in symptomatology, the patient was advised for laparoscopic extraction of the esophageal stents migrated into the stomach  Unfortunately the patient is on chemotherapy, he will require to be off chemotherapy at least 6 weeks  The patient has a tough decision to make, he will contact our office at that time  History of Present Illness     HPI:  Marybeth Sexton is a 77 y o  male who presents to my office accompanied by his wife for evaluation of migrated esophageal stents x3  The patient has been complaining of aperture abdominal pain without nausea vomiting, he stated that the pain can go up to 7/10, last for a couple hours and is 2 to 3 times a day,  he does have some symptoms of dysphagia for which she is on soft pureed diet  Denies having any chills, fever  He does complain of explosive diarrhea since the last stent in July, he denied having any mucus or blood in the stool  He never had colonoscopy in the past     Review of Systems   Constitutional: Negative for chills and fever  HENT: Negative for nosebleeds and sore throat  Eyes: Negative for pain and discharge  Respiratory: Negative for cough and shortness of breath  Cardiovascular: Negative for chest pain and palpitations  Gastrointestinal:        As per history of present illness  Endocrine: Negative for cold intolerance and heat intolerance  Genitourinary: Negative for dysuria and hematuria  Neurological: Negative for seizures and headaches  Hematological: Negative for adenopathy  Does not bruise/bleed easily  Psychiatric/Behavioral: Negative for confusion  The patient is not nervous/anxious          Historical Information   Past Medical History:   Diagnosis Date    Anxiety     Dysphagia     Esophageal abnormality     Esophageal cancer (HCC)     GERD (gastroesophageal reflux disease)     Hyperlipidemia     Hypertension     Occasional tremors      Past Surgical History:   Procedure Laterality Date    ESOPHAGOGASTRODUODENOSCOPY N/A 8/9/2017    Procedure: ESOPHAGOGASTRODUODENOSCOPY (EGD); Surgeon: Carmen Jones MD;  [facility]; Service: Gastroenterology    ESOPHAGOGASTRODUODENOSCOPY N/A 8/11/2017    Procedure: ESOPHAGOGASTRODUODENOSCOPY (EGD) w/ stent;  Surgeon: Carmen Jones MD;  Location: [facility];   Service: Gastroenterology    PORTACATH PLACEMENT      VASCULAR SURGERY  2008    blockage in neck      Social History   Social History     Substance and Sexual Activity   Alcohol Use Yes    Frequency: Monthly or less     Social History     Substance and Sexual Activity   Drug Use Yes    Types: Marijuana    Comment: daily last 7/31/19     Social History     Tobacco Use   Smoking Status Former Smoker    Packs/day: 1 00    Years: 30 00    Pack years: 30 00    Last attempt to quit: 9/13/2008    Years since quitting: 10 9   Smokeless Tobacco Never Used     Family History: non-contributory    Meds/Allergies   all medications and allergies reviewed     Current Outpatient Medications:     amLODIPine (NORVASC) 5 mg tablet, Take 5 mg by mouth daily, Disp: , Rfl:     aspirin 81 mg chewable tablet, Chew 81 mg daily, Disp: , Rfl:     BELSOMRA 10 MG TABS, , Disp: , Rfl:     diphenoxylate-atropine (LOMOTIL) 2 5-0 025 mg per tablet, Take 1 tablet by mouth 4 (four) times a day as needed for diarrhea, Disp: , Rfl:     LORazepam (ATIVAN) 0 5 mg tablet, Take 1 tablet (0 5 mg total) by mouth 2 (two) times a day as needed for anxiety, Disp: 60 tablet, Rfl: 2    metoprolol tartrate (LOPRESSOR) 50 mg tablet, Take 50 mg by mouth 2 (two) times a day, Disp: , Rfl:     ondansetron (ZOFRAN-ODT) 4 mg disintegrating tablet, Take 1 tablet (4 mg total) by mouth every 6 (six) hours as needed for nausea or vomiting, Disp: 20 tablet, Rfl: 0    pantoprazole (PROTONIX) 40 mg tablet, Take 1 tablet by mouth daily in the early morning, Disp: 30 tablet, Rfl: 0    simvastatin (ZOCOR) 40 mg tablet, Take 40 mg by mouth daily at bedtime, Disp: , Rfl:   Allergies   Allergen Reactions    Other      Cat Dander       Objective     Current Vitals:   Blood Pressure: 130/70 (08/06/19 1104)  Pulse: 71 (08/06/19 1104)  Temperature: (!) 97 4 °F (36 3 °C) (08/06/19 1104)  Temp Source: Tympanic (08/06/19 1104)  Respirations: 14 (08/06/19 1104)  Height: 5' 7" (170 2 cm) (08/06/19 1104)  Weight - Scale: 86 6 kg (191 lb) (08/06/19 1104)      Invasive Devices     Central Venous Catheter Line            Port A Cath 08/28/17 Right Chest 708 days                Physical Exam   Constitutional: He is oriented to person, place, and time  He appears well-developed and well-nourished  No distress  HENT:   Head: Normocephalic  Mouth/Throat: No oropharyngeal exudate  Eyes: Pupils are equal, round, and reactive to light  No scleral icterus  Neck: Normal range of motion  Cardiovascular: Normal rate and regular rhythm  No murmur heard  Pulmonary/Chest: Effort normal and breath sounds normal  No respiratory distress  Abdominal: He exhibits no mass  There is tenderness  Musculoskeletal: He exhibits no edema or deformity  Lymphadenopathy:     He has no cervical adenopathy  Neurological: He is alert and oriented to person, place, and time  No cranial nerve deficit  Skin: No erythema  No pallor  Psychiatric: He has a normal mood and affect  His behavior is normal    Nursing note and vitals reviewed  Imaging: I have personally reviewed pertinent reports  Procedure: Fl < 1 Hour    Result Date: 7/9/2019  Narrative: C-ARM - chest/abdomen INDICATION: malignant neoplasm of esophagus  Procedure guidance   COMPARISON:  None TECHNIQUE: FLUOROSCOPY TIME:   2 minutes 13 seconds 4 FLUOROSCOPIC IMAGES FINDINGS: Fluoroscopic guidance provided for surgical procedure  Esophageal stent is present  Osseous and soft tissue detail limited by technique  Impression: Fluoroscopic guidance provided for surgical procedure  Please refer to the separate procedure notes for additional details  Workstation performed: [id number]     Procedure: Ct Chest Abdomen Pelvis W Contrast    Result Date: 6/26/2019  Narrative: CT CHEST, ABDOMEN AND PELVIS WITH IV CONTRAST INDICATION:   C15 5: Malignant neoplasm of lower third of esophagus  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, completed 12 cycles of initial chemotherapy, currently on maintenance therapy  COMPARISON:  CT chest abdomen pelvis 11/30/2018 TECHNIQUE: CT examination of the chest, abdomen and pelvis was performed  Axial, sagittal, and coronal 2D reformatted images were created from the source data and submitted for interpretation  Radiation dose length product (DLP) for this visit:  648 mGy-cm   This examination, like all CT scans performed in the [facility], was performed utilizing techniques to minimize radiation dose exposure, including the use of iterative reconstruction and automated exposure control  IV Contrast:  100 mL of iohexol (OMNIPAQUE) Enteric Contrast: Enteric contrast was administered  FINDINGS: CHEST LUNGS:  Peripheral reticulation of groundglass is unchanged in appearance and consistent with pulmonary fibrosis (nonspecific interstitial pneumonitis pattern)  Right lower lobe subpleural nodule measures 3 mm while previously measuring 2 mm when remeasured in similar fashion  6 mm nodule within the right lower lobe is also stable (series 3 image 50)  4 mm subpleural nodule within the left upper lobe is stable (series 3 image 63)  Cluster of subcentimeter nodules within the lingula also stable (series 3 image 71)     Subcentimeter nodules along the major fissures bilaterally are also stable likely represent fissural lymph nodes  No focal consolidations or pneumothorax  There are no tracheal endobronchial lesions  PLEURA:  Unremarkable  HEART/GREAT VESSELS:  Unremarkable for patient's age  MEDIASTINUM AND ZAYNAB:  Unremarkable  CHEST WALL AND LOWER NECK:   Right port catheter tip terminates in the cavoatrial junction  ABDOMEN LIVER/BILIARY TREE:  Unremarkable  GALLBLADDER:  No calcified gallstones  No pericholecystic inflammatory change  SPLEEN:  Unremarkable  PANCREAS:  Unremarkable  ADRENAL GLANDS:  Unremarkable  KIDNEYS/URETERS:  Unremarkable  No hydronephrosis  STOMACH AND BOWEL:  Migrated esophageal stent into the stomach is unchanged in appearance  Diverticulosis without active diverticulitis is present  APPENDIX:  A normal appendix was visualized  ABDOMINOPELVIC CAVITY:  No ascites or free intraperitoneal air  No lymphadenopathy  VESSELS:  Unremarkable for patient's age  PELVIS REPRODUCTIVE ORGANS:  Unremarkable for patient's age  URINARY BLADDER:  Unremarkable  ABDOMINAL WALL/INGUINAL REGIONS:  Unremarkable  OSSEOUS STRUCTURES:  No acute fracture or destructive osseous lesion  Impression: 1  Subpleural right lower lobe nodule that measures 3 mm while previously measuring 2 mm  Unclear if this represents an actual true increase in size or just related to differences in technique or new adjacent atelectasis  Remaining nodules are stable  Attention on subsequent follow-up imaging recommended  It would also be reasonable to consider a short-term follow-up CT of the chest in 3 months to ensure stability  2   No evidence of recurrent or metastatic disease within the abdomen or pelvis  3   Stable migrated esophageal stent within the stomach  4   Stable findings of interstitial lung disease/pulmonary fibrosis   Workstation performed: [id number]     EKG, Pathology, and Other Studies: I have personally reviewed pertinent films in PACS

## 2019-08-06 NOTE — PROGRESS NOTES
Pt has  3 esophageal stents migrate into his stomach  C/O some pain and discomfort  No nausea no vomiting  He is able to eat and hold down foods with no issues  Bowels have been very loose and constant but are not painful  No fevers

## 2019-08-07 ENCOUNTER — APPOINTMENT (OUTPATIENT)
Dept: LAB | Facility: HOSPITAL | Age: 69
End: 2019-08-07
Attending: SURGERY
Payer: COMMERCIAL

## 2019-08-07 DIAGNOSIS — A09 DIARRHEA OF INFECTIOUS ORIGIN: ICD-10-CM

## 2019-08-07 PROCEDURE — 87493 C DIFF AMPLIFIED PROBE: CPT

## 2019-08-08 LAB — C DIFF TOX GENS STL QL NAA+PROBE: NORMAL

## 2019-08-14 ENCOUNTER — HOSPITAL ENCOUNTER (OUTPATIENT)
Dept: INFUSION CENTER | Facility: CLINIC | Age: 69
Discharge: HOME | End: 2019-08-14
Payer: COMMERCIAL

## 2019-08-14 VITALS
RESPIRATION RATE: 18 BRPM | SYSTOLIC BLOOD PRESSURE: 118 MMHG | TEMPERATURE: 97.5 F | WEIGHT: 189.5 LBS | DIASTOLIC BLOOD PRESSURE: 70 MMHG | BODY MASS INDEX: 29.74 KG/M2 | HEART RATE: 76 BPM | HEIGHT: 67 IN

## 2019-08-14 DIAGNOSIS — R13.10 ODYNOPHAGIA: ICD-10-CM

## 2019-08-14 DIAGNOSIS — C15.5 MALIGNANT NEOPLASM OF LOWER THIRD OF ESOPHAGUS (HCC): Primary | ICD-10-CM

## 2019-08-14 PROCEDURE — 96413 CHEMO IV INFUSION 1 HR: CPT

## 2019-08-14 RX ORDER — SODIUM CHLORIDE 9 MG/ML
20 INJECTION, SOLUTION INTRAVENOUS ONCE
Status: COMPLETED | OUTPATIENT
Start: 2019-08-14 | End: 2019-08-14

## 2019-08-14 RX ADMIN — SODIUM CHLORIDE 20 ML/HR: 0.9 INJECTION, SOLUTION INTRAVENOUS at 09:38

## 2019-08-14 RX ADMIN — TRASTUZUMAB 562 MG: 150 INJECTION, POWDER, LYOPHILIZED, FOR SOLUTION INTRAVENOUS at 10:20

## 2019-08-14 NOTE — PLAN OF CARE
Problem: Potential for Falls  Goal: Patient will remain free of falls  Description  INTERVENTIONS:  - Assess patient frequently for physical needs  -  Identify cognitive and physical deficits and behaviors that affect risk of falls  -  [place] fall precautions as indicated by assessment   - Educate patient/family on patient safety including physical limitations  - Instruct patient to call for assistance with activity based on assessment  - Modify environment to reduce risk of injury  - Consider OT/PT consult to assist with strengthening/mobility  Outcome: Progressing     Problem: Knowledge Deficit  Goal: Patient/family/caregiver demonstrates understanding of disease process, treatment plan, medications, and discharge instructions  Description  Complete learning assessment and assess knowledge base    Interventions:  - Provide teaching at level of understanding  - Provide teaching via preferred learning methods  Outcome: Progressing

## 2019-08-14 NOTE — PROGRESS NOTES
Patient tolerated infusion well and without incident, patient offers no complaints, patient aware of future apt, pt D/C in stable condition

## 2019-09-04 ENCOUNTER — HOSPITAL ENCOUNTER (OUTPATIENT)
Dept: INFUSION CENTER | Facility: CLINIC | Age: 69
Discharge: HOME | End: 2019-09-04
Payer: COMMERCIAL

## 2019-09-04 VITALS
HEIGHT: 67 IN | RESPIRATION RATE: 18 BRPM | OXYGEN SATURATION: 97 % | WEIGHT: 192.5 LBS | HEART RATE: 66 BPM | BODY MASS INDEX: 30.21 KG/M2 | TEMPERATURE: 96.9 F | SYSTOLIC BLOOD PRESSURE: 130 MMHG | DIASTOLIC BLOOD PRESSURE: 66 MMHG

## 2019-09-04 DIAGNOSIS — C15.5 MALIGNANT NEOPLASM OF LOWER THIRD OF ESOPHAGUS (HCC): Primary | ICD-10-CM

## 2019-09-04 DIAGNOSIS — R13.10 ODYNOPHAGIA: ICD-10-CM

## 2019-09-04 PROCEDURE — 96413 CHEMO IV INFUSION 1 HR: CPT

## 2019-09-04 RX ORDER — SODIUM CHLORIDE 9 MG/ML
20 INJECTION, SOLUTION INTRAVENOUS ONCE
Status: COMPLETED | OUTPATIENT
Start: 2019-09-04 | End: 2019-09-04

## 2019-09-04 RX ADMIN — TRASTUZUMAB 562 MG: 150 INJECTION, POWDER, LYOPHILIZED, FOR SOLUTION INTRAVENOUS at 10:47

## 2019-09-04 RX ADMIN — SODIUM CHLORIDE 20 ML/HR: 0.9 INJECTION, SOLUTION INTRAVENOUS at 10:10

## 2019-09-16 ENCOUNTER — DOCUMENTATION (OUTPATIENT)
Dept: HEMATOLOGY ONCOLOGY | Facility: CLINIC | Age: 69
End: 2019-09-16

## 2019-09-16 NOTE — PROGRESS NOTES
pts wife called & left me a message  I called her back & got her voicemail  left her a message to call me back

## 2019-09-25 ENCOUNTER — HOSPITAL ENCOUNTER (OUTPATIENT)
Dept: INFUSION CENTER | Facility: CLINIC | Age: 69
Discharge: HOME | End: 2019-09-25
Payer: COMMERCIAL

## 2019-09-25 VITALS
HEART RATE: 77 BPM | DIASTOLIC BLOOD PRESSURE: 66 MMHG | SYSTOLIC BLOOD PRESSURE: 132 MMHG | BODY MASS INDEX: 31.08 KG/M2 | OXYGEN SATURATION: 99 % | RESPIRATION RATE: 18 BRPM | WEIGHT: 198 LBS | TEMPERATURE: 98.1 F | HEIGHT: 67 IN

## 2019-09-25 DIAGNOSIS — C15.5 MALIGNANT NEOPLASM OF LOWER THIRD OF ESOPHAGUS (HCC): Primary | ICD-10-CM

## 2019-09-25 DIAGNOSIS — R13.10 ODYNOPHAGIA: ICD-10-CM

## 2019-09-25 LAB
ALBUMIN SERPL BCP-MCNC: 3.6 G/DL (ref 3.5–5)
ALP SERPL-CCNC: 61 U/L (ref 46–116)
ALT SERPL W P-5'-P-CCNC: 21 U/L (ref 12–78)
ANION GAP SERPL CALCULATED.3IONS-SCNC: 9 MMOL/L (ref 4–13)
AST SERPL W P-5'-P-CCNC: 18 U/L (ref 5–45)
BASOPHILS # BLD AUTO: 0.06 THOUSANDS/ΜL (ref 0–0.1)
BASOPHILS NFR BLD AUTO: 1 % (ref 0–1)
BILIRUB SERPL-MCNC: 0.4 MG/DL (ref 0.2–1)
BUN SERPL-MCNC: 12 MG/DL (ref 5–25)
CALCIUM SERPL-MCNC: 8.4 MG/DL (ref 8.3–10.1)
CHLORIDE SERPL-SCNC: 105 MMOL/L (ref 100–108)
CO2 SERPL-SCNC: 26 MMOL/L (ref 21–32)
CREAT SERPL-MCNC: 1.2 MG/DL (ref 0.6–1.3)
EOSINOPHIL # BLD AUTO: 0.54 THOUSAND/ΜL (ref 0–0.61)
EOSINOPHIL NFR BLD AUTO: 9 % (ref 0–6)
ERYTHROCYTE [DISTWIDTH] IN BLOOD BY AUTOMATED COUNT: 13.2 % (ref 11.6–15.1)
GFR SERPL CREATININE-BSD FRML MDRD: 61 ML/MIN/1.73SQ M
GLUCOSE SERPL-MCNC: 148 MG/DL (ref 65–140)
HCT VFR BLD AUTO: 40.8 % (ref 36.5–49.3)
HGB BLD-MCNC: 13.5 G/DL (ref 12–17)
IMM GRANULOCYTES # BLD AUTO: 0.04 THOUSAND/UL (ref 0–0.2)
IMM GRANULOCYTES NFR BLD AUTO: 1 % (ref 0–2)
LYMPHOCYTES # BLD AUTO: 1.52 THOUSANDS/ΜL (ref 0.6–4.47)
LYMPHOCYTES NFR BLD AUTO: 25 % (ref 14–44)
MCH RBC QN AUTO: 31.4 PG (ref 26.8–34.3)
MCHC RBC AUTO-ENTMCNC: 33.1 G/DL (ref 31.4–37.4)
MCV RBC AUTO: 95 FL (ref 82–98)
MONOCYTES # BLD AUTO: 0.55 THOUSAND/ΜL (ref 0.17–1.22)
MONOCYTES NFR BLD AUTO: 9 % (ref 4–12)
NEUTROPHILS # BLD AUTO: 3.32 THOUSANDS/ΜL (ref 1.85–7.62)
NEUTS SEG NFR BLD AUTO: 55 % (ref 43–75)
NRBC BLD AUTO-RTO: 0 /100 WBCS
PLATELET # BLD AUTO: 180 THOUSANDS/UL (ref 149–390)
PMV BLD AUTO: 8.9 FL (ref 8.9–12.7)
POTASSIUM SERPL-SCNC: 4.1 MMOL/L (ref 3.5–5.3)
PROT SERPL-MCNC: 6.4 G/DL (ref 6.4–8.2)
RBC # BLD AUTO: 4.3 MILLION/UL (ref 3.88–5.62)
SODIUM SERPL-SCNC: 140 MMOL/L (ref 136–145)
WBC # BLD AUTO: 6.03 THOUSAND/UL (ref 4.31–10.16)

## 2019-09-25 PROCEDURE — 85025 COMPLETE CBC W/AUTO DIFF WBC: CPT

## 2019-09-25 PROCEDURE — 80053 COMPREHEN METABOLIC PANEL: CPT

## 2019-09-25 PROCEDURE — 96413 CHEMO IV INFUSION 1 HR: CPT

## 2019-09-25 RX ORDER — SODIUM CHLORIDE 9 MG/ML
20 INJECTION, SOLUTION INTRAVENOUS ONCE
Status: COMPLETED | OUTPATIENT
Start: 2019-09-25 | End: 2019-09-25

## 2019-09-25 RX ADMIN — TRASTUZUMAB 562 MG: 150 INJECTION, POWDER, LYOPHILIZED, FOR SOLUTION INTRAVENOUS at 10:23

## 2019-09-25 RX ADMIN — SODIUM CHLORIDE 20 ML/HR: 0.9 INJECTION, SOLUTION INTRAVENOUS at 09:46

## 2019-09-25 NOTE — PLAN OF CARE
Problem: Potential for Falls  Goal: Patient will remain free of falls  Description  INTERVENTIONS:  - Assess patient frequently for physical needs  -  Identify cognitive and physical deficits and behaviors that affect risk of falls  -  [place] fall precautions as indicated by assessment   - Educate patient/family on patient safety including physical limitations  - Instruct patient to call for assistance with activity based on assessment  - Modify environment to reduce risk of injury  - Consider OT/PT consult to assist with strengthening/mobility  Outcome: Progressing     Problem: Knowledge Deficit  Goal: Patient/family/caregiver demonstrates understanding of disease process, treatment plan, medications, and discharge instructions  Description  Complete learning assessment and assess knowledge base    Interventions:  - Provide teaching at level of understanding  - Provide teaching via preferred learning methods  Outcome: Progressing

## 2019-09-27 ENCOUNTER — TELEPHONE (OUTPATIENT)
Dept: HEMATOLOGY ONCOLOGY | Facility: CLINIC | Age: 69
End: 2019-09-27

## 2019-10-16 ENCOUNTER — HOSPITAL ENCOUNTER (OUTPATIENT)
Dept: INFUSION CENTER | Facility: CLINIC | Age: 69
Discharge: HOME | End: 2019-10-16
Payer: COMMERCIAL

## 2019-10-16 ENCOUNTER — OFFICE VISIT (OUTPATIENT)
Dept: HEMATOLOGY ONCOLOGY | Facility: CLINIC | Age: 69
End: 2019-10-16
Payer: COMMERCIAL

## 2019-10-16 VITALS
SYSTOLIC BLOOD PRESSURE: 138 MMHG | DIASTOLIC BLOOD PRESSURE: 80 MMHG | WEIGHT: 198 LBS | OXYGEN SATURATION: 98 % | HEART RATE: 97 BPM | RESPIRATION RATE: 20 BRPM | BODY MASS INDEX: 31.01 KG/M2 | TEMPERATURE: 98.1 F

## 2019-10-16 VITALS
WEIGHT: 198 LBS | RESPIRATION RATE: 18 BRPM | HEIGHT: 67 IN | DIASTOLIC BLOOD PRESSURE: 64 MMHG | BODY MASS INDEX: 31.08 KG/M2 | HEART RATE: 72 BPM | SYSTOLIC BLOOD PRESSURE: 116 MMHG | OXYGEN SATURATION: 96 % | TEMPERATURE: 97.8 F

## 2019-10-16 DIAGNOSIS — C15.5 MALIGNANT NEOPLASM OF LOWER THIRD OF ESOPHAGUS (HCC): Primary | ICD-10-CM

## 2019-10-16 DIAGNOSIS — R13.10 ODYNOPHAGIA: ICD-10-CM

## 2019-10-16 PROCEDURE — 99214 OFFICE O/P EST MOD 30 MIN: CPT | Performed by: INTERNAL MEDICINE

## 2019-10-16 PROCEDURE — 96413 CHEMO IV INFUSION 1 HR: CPT

## 2019-10-16 RX ORDER — SODIUM CHLORIDE 9 MG/ML
20 INJECTION, SOLUTION INTRAVENOUS ONCE
Status: CANCELLED | OUTPATIENT
Start: 2019-12-18

## 2019-10-16 RX ORDER — SODIUM CHLORIDE 9 MG/ML
20 INJECTION, SOLUTION INTRAVENOUS ONCE
Status: CANCELLED | OUTPATIENT
Start: 2020-02-19

## 2019-10-16 RX ORDER — SODIUM CHLORIDE 9 MG/ML
20 INJECTION, SOLUTION INTRAVENOUS ONCE
Status: CANCELLED | OUTPATIENT
Start: 2020-01-08

## 2019-10-16 RX ORDER — SODIUM CHLORIDE 9 MG/ML
20 INJECTION, SOLUTION INTRAVENOUS ONCE
Status: CANCELLED | OUTPATIENT
Start: 2019-11-27

## 2019-10-16 RX ORDER — SODIUM CHLORIDE 9 MG/ML
20 INJECTION, SOLUTION INTRAVENOUS ONCE
Status: CANCELLED | OUTPATIENT
Start: 2019-11-06

## 2019-10-16 RX ORDER — SODIUM CHLORIDE 9 MG/ML
20 INJECTION, SOLUTION INTRAVENOUS ONCE
Status: COMPLETED | OUTPATIENT
Start: 2019-10-16 | End: 2019-10-16

## 2019-10-16 RX ORDER — SODIUM CHLORIDE 9 MG/ML
20 INJECTION, SOLUTION INTRAVENOUS ONCE
Status: CANCELLED | OUTPATIENT
Start: 2020-01-29

## 2019-10-16 RX ADMIN — TRASTUZUMAB 562 MG: 150 INJECTION, POWDER, LYOPHILIZED, FOR SOLUTION INTRAVENOUS at 10:20

## 2019-10-16 RX ADMIN — SODIUM CHLORIDE 20 ML/HR: 0.9 INJECTION, SOLUTION INTRAVENOUS at 09:24

## 2019-10-16 NOTE — PATIENT INSTRUCTIONS
October 2019 Sunday Monday Tuesday Wednesday Thursday Friday Saturday             1     2     3     4     5                6     7     8     9     10     11     12                13     14     15     16    FOLLOW UP PG   8:05 AM   (20 min )   Enoc Jose MD   [facility] [place]    INF ONCOLOGY TX-TREATMENT PLAN   9:00 AM   (90 min )   AN INF BED 1   [address] 17     18     19          [address], Day 1      20     21     22     23     24     25     26                27     28     29     30     31                               Treatment Details       10/16/2019 - Cycle 29, Day 1      Chemotherapy: ONCBCN PROVIDER COMMUNICATION, TRASTUZUMAB INFUSION        November 2019 Sunday Monday Tuesday Wednesday Thursday Friday Saturday                            1     2                3     4     5     6    INF ONCOLOGY TX-TREATMENT PLAN   9:00 AM   (90 min )   AN INF CHAIR [address] 7     8     9          [address] 30, Day 1      10     11     12     13     14     15     16                17     18     19     20     21     22     23                24     25     26     27    INF ONCOLOGY TX-TREATMENT PLAN   9:00 AM   (90 min )   AN INF CHAIR 5   [address] 28     29     30          [address], Day 1           Treatment Details       11/6/2019 - Cycle 30, Day 1      Chemotherapy: ONCBCN PROVIDER COMMUNICATION, TRASTUZUMAB INFUSION    11/27/2019 - Cycle 31, Day 1      Chemotherapy: ONCBCN PROVIDER COMMUNICATION, TRASTUZUMAB INFUSION

## 2019-10-16 NOTE — PROGRESS NOTES
Seen and assessed in Med Onc per Dr Asa Bravo  To [facility] for single agent herceptin maintenance for stage IV esophageal cancer  Pt offers no complaints  Will continue to monitor

## 2019-10-16 NOTE — PROGRESS NOTES
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  His pulmonary metastasis was confirmed by biopsy  He has HER-2 positive disease   He completed 12 cycle of FOLFOX -6 with trastuzumab with good partial response   He is currently on trastuzumab monotherapy as maintenance therapy  Clinically, he has no evidence of progressive disease  I recommended him to continue with trastuzumab maintenance therapy every 3 weeks  I am going to set up CT scan of chest abdomen pelvis in 3 months as well as CBC and CMP  I will see him after the next CT scan for disease monitoring  He is in agreement with my recommendations                                   Subjective:      HPI:             Interval History:  A 77year old gentleman with no significant past medical history  He was hospitalized in early 2017 with progressive dysphagia  However, he had minimal weight loss  He was found to have mass in distal esophagus, based on the EGD  Biopsy was positive for adenocarcinoma with mucinous features  CT scan of chest, abdomen pelvis showed not only mass in the distal esophagus, but also multiple pulmonary masses, consistent with metastatic disease  He also had retroperitoneal adenopathy measuring 2 1 cm  Lung biopsy showed adenocarcinoma, consistent with esophageal primary  His tumor was subsequently tested for HER-2 by immunohistochemistry as well as fish  Tumor from esophagus was HER-2 3+ and Fish positive with ratio of 6 5  Tumor in the lung was HER-2 negative  HER-2 Fish was also negative  This was considered to be HER-2 positive disease   Therefore, she started systemic chemotherapy with trastuzumab and FOLFOX-6  After 3 months of treatment, CT scan showed partial response   He received 12 cycle of FOLFOX which was completed in February 2018 with slowly progressive neuropathy  Janessa Webber is currently on trastuzumab monotherapy every 3 weeks as maintenance therapy   He presents today for routine follow-up  In June 2019, he had complaint of dysphagia  I referred him to gastroenterologist who did endoscopy  Endoscopy showed mal-functioning stent which partially felt to the stomach   He had another stent placed which again fell down to the stomach  In order to recover the stent in the stomach, he was referred to general surgery who recommended not to have surgery  His symptom of dysphagia has improved  He is maintaining his weight  He has occasional lower abdominal pain  He has no diarrhea  His performance status is normal                                                Objective:      Primary Diagnosis:     Metastatic adenocarcinoma of distal esophagus with multiple lung metastasis as well as retroperitoneal adenopathy, HER-2 positive disease  diagnosed in July 2017       Cancer Staging:  No matching staging information was found for the patient         Previous Hematologic/ Oncologic Treatment:       FOLFOX-6 with trastuzumab times 12 cycles   Completed in February 2018      Current Hematologic/ Oncologic Treatment:       Trastuzumab monotherapy every 3 weeks since February 2018      Disease Status:      Good partial response      Test Results:     Pathology:     Biopsy from December esophagus showed adenocarcinoma  biopsy showed adenocarcinoma with mucinous features  TTF-1 negative  from esophagus was HER-2 3+ and Fish positive with ratio of 6 5  from long was HER-2 negative and Fish negative        Radiology:     CT scan of chest abdomen pelvis in June 2019 has not been officially read  Personal review of film by me showed no apparent lung metastasis    Esophageal wall appeared to be normal      Echocardiogram in March 2019 showed ejection fraction 60%     Laboratory:           Physical Exam:        General Appearance:    Alert, oriented          Eyes:    PERRL   Ears:    Normal external ear canals, both ears   Nose:   Nares normal, septum midline   Throat:   Mucosa moist  Pharynx without injection  Neck:   Supple         Lungs:     Clear to auscultation bilaterally   Chest Wall:    No tenderness or deformity    Heart:    Regular rate and rhythm         Abdomen:     Soft, non-tender, bowel sounds +, no organomegaly               Extremities:   Extremities no cyanosis or edema         Skin:   no rash or icterus  Lymph nodes:   Cervical, supraclavicular, and axillary nodes normal   Neurologic:   CNII-XII intact, normal strength, sensation and reflexes     Throughout             Breast exam: Not performed  ROS: Review of Systems   Gastrointestinal:        Occasional abdominal pain   All other systems reviewed and are negative  Imaging: No results found  Labs:   Lab Results   Component Value Date    WBC 6 03 09/25/2019    HGB 13 5 09/25/2019    HCT 40 8 09/25/2019    MCV 95 09/25/2019     09/25/2019     Lab Results   Component Value Date    K 4 1 09/25/2019     09/25/2019    CO2 26 09/25/2019    BUN 12 09/25/2019    CREATININE 1 20 09/25/2019    GLUCOSE 117 08/09/2017    GLUF 107 (H) 06/17/2019    CALCIUM 8 4 09/25/2019    AST 18 09/25/2019    ALT 21 09/25/2019    ALKPHOS 61 09/25/2019    EGFR 61 09/25/2019         Current Medications: Reviewed  Allergies: Reviewed  PMH/FH/SH:  Reviewed      Vital Sign:    Body surface area is 2 01 meters squared      Wt Readings from Last 3 Encounters:   10/16/19 89 8 kg (198 lb)   09/25/19 89 8 kg (198 lb)   09/04/19 87 3 kg (192 lb 8 oz)        Temp Readings from Last 3 Encounters:   10/16/19 97 8 °F (36 6 °C) (Tympanic Core)   09/25/19 98 1 °F (36 7 °C) (Temporal)   09/04/19 (!) 96 9 °F (36 1 °C)        BP Readings from Last 3 Encounters: 10/16/19 116/64   09/25/19 132/66   09/04/19 130/66         Pulse Readings from Last 3 Encounters:   10/16/19 72   09/25/19 77   09/04/19 66     @LASTSAO2(3)@

## 2019-11-06 ENCOUNTER — HOSPITAL ENCOUNTER (OUTPATIENT)
Dept: INFUSION CENTER | Facility: CLINIC | Age: 69
Discharge: HOME | End: 2019-11-06
Payer: COMMERCIAL

## 2019-11-06 VITALS
TEMPERATURE: 97.6 F | RESPIRATION RATE: 18 BRPM | BODY MASS INDEX: 31.95 KG/M2 | DIASTOLIC BLOOD PRESSURE: 76 MMHG | HEART RATE: 72 BPM | SYSTOLIC BLOOD PRESSURE: 132 MMHG | WEIGHT: 204 LBS

## 2019-11-06 DIAGNOSIS — C15.5 MALIGNANT NEOPLASM OF LOWER THIRD OF ESOPHAGUS (HCC): Primary | ICD-10-CM

## 2019-11-06 DIAGNOSIS — R13.10 ODYNOPHAGIA: ICD-10-CM

## 2019-11-06 PROCEDURE — 96413 CHEMO IV INFUSION 1 HR: CPT

## 2019-11-06 RX ORDER — SODIUM CHLORIDE 9 MG/ML
20 INJECTION, SOLUTION INTRAVENOUS ONCE
Status: COMPLETED | OUTPATIENT
Start: 2019-11-06 | End: 2019-11-06

## 2019-11-06 RX ADMIN — TRASTUZUMAB 562 MG: 150 INJECTION, POWDER, LYOPHILIZED, FOR SOLUTION INTRAVENOUS at 10:07

## 2019-11-06 RX ADMIN — SODIUM CHLORIDE 20 ML/HR: 0.9 INJECTION, SOLUTION INTRAVENOUS at 09:21

## 2019-11-06 NOTE — PATIENT INSTRUCTIONS
November 2019 Sunday Monday Tuesday Wednesday Thursday Friday Saturday                            1     2                3     4     5     6    INF ONCOLOGY TX-TREATMENT PLAN   9:00 AM   (90 min )   [facility] [address] [place] [address] 7     8     9          Cycle 30, Day 1      10     11     12     13     14     15     16                17     18     19     20     21     22     23                24     25     26     27    INF ONCOLOGY TX-TREATMENT PLAN   8:30 AM   (90 min )   AN INF CHAIR [address] [place] [address] 28     29     30          Cycle 31, Day 1           Treatment Details       11/6/2019 - Cycle 30, Day 1      Chemotherapy: ONCBCN PROVIDER COMMUNICATION, TRASTUZUMAB INFUSION    11/27/2019 - Cycle 31, Day 1      Chemotherapy: ONCBCN PROVIDER COMMUNICATION, TRASTUZUMAB INFUSION

## 2019-11-06 NOTE — PROGRESS NOTES
To [facility] for maintenance herceptin for her 2 positive esophageal cancer  He is tolerating treatment without ill effect  He specifically denies any peripheral edema, lung/chest congestion or "wet" cough  He denies dyspnea  Will treat today as per orders

## 2019-11-27 ENCOUNTER — HOSPITAL ENCOUNTER (OUTPATIENT)
Dept: INFUSION CENTER | Facility: CLINIC | Age: 69
Discharge: HOME | End: 2019-11-27
Payer: COMMERCIAL

## 2019-11-27 VITALS
HEART RATE: 76 BPM | BODY MASS INDEX: 31.78 KG/M2 | DIASTOLIC BLOOD PRESSURE: 78 MMHG | RESPIRATION RATE: 18 BRPM | HEIGHT: 67 IN | WEIGHT: 202.5 LBS | SYSTOLIC BLOOD PRESSURE: 138 MMHG | TEMPERATURE: 97.6 F

## 2019-11-27 DIAGNOSIS — C15.5 MALIGNANT NEOPLASM OF LOWER THIRD OF ESOPHAGUS (HCC): Primary | ICD-10-CM

## 2019-11-27 DIAGNOSIS — R13.10 ODYNOPHAGIA: ICD-10-CM

## 2019-11-27 PROCEDURE — 96413 CHEMO IV INFUSION 1 HR: CPT

## 2019-11-27 RX ORDER — SODIUM CHLORIDE 9 MG/ML
20 INJECTION, SOLUTION INTRAVENOUS ONCE
Status: COMPLETED | OUTPATIENT
Start: 2019-11-27 | End: 2019-11-27

## 2019-11-27 RX ADMIN — SODIUM CHLORIDE 20 ML/HR: 0.9 INJECTION, SOLUTION INTRAVENOUS at 08:25

## 2019-11-27 RX ADMIN — TRASTUZUMAB 562 MG: 150 INJECTION, POWDER, LYOPHILIZED, FOR SOLUTION INTRAVENOUS at 09:20

## 2019-11-27 NOTE — PROGRESS NOTES
Patient to [facility] for Herceptin: Offers no complaints at present time: Right PAC accessed without difficulty: Good blood return noted

## 2019-12-09 DIAGNOSIS — R11.0 NAUSEA: ICD-10-CM

## 2019-12-09 RX ORDER — ONDANSETRON 4 MG/1
4 TABLET, ORALLY DISINTEGRATING ORAL EVERY 6 HOURS PRN
Qty: 20 TABLET | Refills: 0 | Status: SHIPPED | OUTPATIENT
Start: 2019-12-09 | End: 2020-01-01 | Stop reason: ALTCHOICE

## 2019-12-18 ENCOUNTER — HOSPITAL ENCOUNTER (OUTPATIENT)
Dept: INFUSION CENTER | Facility: CLINIC | Age: 69
Discharge: HOME | End: 2019-12-18
Payer: COMMERCIAL

## 2019-12-18 VITALS
HEIGHT: 67 IN | BODY MASS INDEX: 31.39 KG/M2 | WEIGHT: 200 LBS | RESPIRATION RATE: 16 BRPM | TEMPERATURE: 98 F | SYSTOLIC BLOOD PRESSURE: 118 MMHG | OXYGEN SATURATION: 97 % | DIASTOLIC BLOOD PRESSURE: 70 MMHG | HEART RATE: 78 BPM

## 2019-12-18 DIAGNOSIS — R13.10 ODYNOPHAGIA: ICD-10-CM

## 2019-12-18 DIAGNOSIS — C15.5 MALIGNANT NEOPLASM OF LOWER THIRD OF ESOPHAGUS (HCC): Primary | ICD-10-CM

## 2019-12-18 PROCEDURE — 96413 CHEMO IV INFUSION 1 HR: CPT

## 2019-12-18 RX ORDER — SODIUM CHLORIDE 9 MG/ML
20 INJECTION, SOLUTION INTRAVENOUS ONCE
Status: COMPLETED | OUTPATIENT
Start: 2019-12-18 | End: 2019-12-18

## 2019-12-18 RX ADMIN — SODIUM CHLORIDE 20 ML/HR: 0.9 INJECTION, SOLUTION INTRAVENOUS at 10:52

## 2019-12-18 RX ADMIN — TRASTUZUMAB 562 MG: 150 INJECTION, POWDER, LYOPHILIZED, FOR SOLUTION INTRAVENOUS at 12:07

## 2019-12-27 ENCOUNTER — APPOINTMENT (OUTPATIENT)
Dept: LAB | Facility: CLINIC | Age: 69
End: 2019-12-27
Payer: COMMERCIAL

## 2019-12-27 ENCOUNTER — TRANSCRIBE ORDERS (OUTPATIENT)
Dept: RADIOLOGY | Facility: HOSPITAL | Age: 69
End: 2019-12-27

## 2019-12-27 ENCOUNTER — TELEPHONE (OUTPATIENT)
Dept: HEMATOLOGY ONCOLOGY | Facility: CLINIC | Age: 69
End: 2019-12-27

## 2019-12-27 DIAGNOSIS — C15.5 MALIGNANT NEOPLASM OF LOWER THIRD OF ESOPHAGUS (HCC): Primary | ICD-10-CM

## 2019-12-27 DIAGNOSIS — C15.5 MALIGNANT NEOPLASM OF LOWER THIRD OF ESOPHAGUS (HCC): ICD-10-CM

## 2019-12-27 LAB
ALBUMIN SERPL BCP-MCNC: 3.5 G/DL (ref 3.5–5)
ALP SERPL-CCNC: 82 U/L (ref 46–116)
ALT SERPL W P-5'-P-CCNC: 24 U/L (ref 12–78)
ANION GAP SERPL CALCULATED.3IONS-SCNC: 9 MMOL/L (ref 4–13)
AST SERPL W P-5'-P-CCNC: 14 U/L (ref 5–45)
BASOPHILS # BLD AUTO: 0.09 THOUSANDS/ΜL (ref 0–0.1)
BASOPHILS NFR BLD AUTO: 1 % (ref 0–1)
BILIRUB SERPL-MCNC: 0.43 MG/DL (ref 0.2–1)
BUN SERPL-MCNC: 14 MG/DL (ref 5–25)
CALCIUM SERPL-MCNC: 8.5 MG/DL (ref 8.3–10.1)
CHLORIDE SERPL-SCNC: 104 MMOL/L (ref 100–108)
CO2 SERPL-SCNC: 27 MMOL/L (ref 21–32)
CREAT SERPL-MCNC: 1.33 MG/DL (ref 0.6–1.3)
EOSINOPHIL # BLD AUTO: 0.55 THOUSAND/ΜL (ref 0–0.61)
EOSINOPHIL NFR BLD AUTO: 6 % (ref 0–6)
ERYTHROCYTE [DISTWIDTH] IN BLOOD BY AUTOMATED COUNT: 12.6 % (ref 11.6–15.1)
GFR SERPL CREATININE-BSD FRML MDRD: 53 ML/MIN/1.73SQ M
GLUCOSE P FAST SERPL-MCNC: 133 MG/DL (ref 65–99)
HCT VFR BLD AUTO: 44 % (ref 36.5–49.3)
HGB BLD-MCNC: 14.6 G/DL (ref 12–17)
IMM GRANULOCYTES # BLD AUTO: 0.07 THOUSAND/UL (ref 0–0.2)
IMM GRANULOCYTES NFR BLD AUTO: 1 % (ref 0–2)
LYMPHOCYTES # BLD AUTO: 1.9 THOUSANDS/ΜL (ref 0.6–4.47)
LYMPHOCYTES NFR BLD AUTO: 20 % (ref 14–44)
MCH RBC QN AUTO: 30.9 PG (ref 26.8–34.3)
MCHC RBC AUTO-ENTMCNC: 33.2 G/DL (ref 31.4–37.4)
MCV RBC AUTO: 93 FL (ref 82–98)
MONOCYTES # BLD AUTO: 0.83 THOUSAND/ΜL (ref 0.17–1.22)
MONOCYTES NFR BLD AUTO: 9 % (ref 4–12)
NEUTROPHILS # BLD AUTO: 6.24 THOUSANDS/ΜL (ref 1.85–7.62)
NEUTS SEG NFR BLD AUTO: 63 % (ref 43–75)
NRBC BLD AUTO-RTO: 0 /100 WBCS
PLATELET # BLD AUTO: 258 THOUSANDS/UL (ref 149–390)
PMV BLD AUTO: 8.3 FL (ref 8.9–12.7)
POTASSIUM SERPL-SCNC: 4.4 MMOL/L (ref 3.5–5.3)
PROT SERPL-MCNC: 6.8 G/DL (ref 6.4–8.2)
RBC # BLD AUTO: 4.73 MILLION/UL (ref 3.88–5.62)
SODIUM SERPL-SCNC: 140 MMOL/L (ref 136–145)
WBC # BLD AUTO: 9.68 THOUSAND/UL (ref 4.31–10.16)

## 2019-12-27 PROCEDURE — 36415 COLL VENOUS BLD VENIPUNCTURE: CPT

## 2019-12-27 PROCEDURE — 85025 COMPLETE CBC W/AUTO DIFF WBC: CPT

## 2019-12-27 PROCEDURE — 80053 COMPREHEN METABOLIC PANEL: CPT

## 2019-12-27 NOTE — TELEPHONE ENCOUNTER
Pinky called because patient is scheduled for a cat scan and needs bloodwork before he can have it done because he is over 68 and has high blood pressure   Any questions please call him at [phone]

## 2019-12-27 NOTE — TELEPHONE ENCOUNTER
Left a VM for patient to let him know that he needs lab work done before his CT scan on Monday 12/30  He should get these labs today or tomorrow  Placed an order for CBC, CMP, and BMP to check his kidney function/liver function/etc  If he goes to a [address] [place] [address] lab, these orders are already in the system  If he needs lab scripts, I can fax them to wherever he goes  Left call back number if he has questions/concerns

## 2019-12-30 ENCOUNTER — HOSPITAL ENCOUNTER (OUTPATIENT)
Dept: RADIOLOGY | Facility: MEDICAL CENTER | Age: 69
Discharge: HOME | End: 2019-12-30
Payer: COMMERCIAL

## 2019-12-30 DIAGNOSIS — C15.5 MALIGNANT NEOPLASM OF LOWER THIRD OF ESOPHAGUS (HCC): ICD-10-CM

## 2019-12-30 PROCEDURE — 71260 CT THORAX DX C+: CPT

## 2019-12-30 PROCEDURE — 74177 CT ABD & PELVIS W/CONTRAST: CPT

## 2019-12-30 RX ADMIN — IOHEXOL 100 ML: 350 INJECTION, SOLUTION INTRAVENOUS at 09:08

## 2020-01-01 ENCOUNTER — OFFICE VISIT (OUTPATIENT)
Dept: GASTROENTEROLOGY | Facility: AMBULARY SURGERY CENTER | Age: 70
End: 2020-01-01
Payer: COMMERCIAL

## 2020-01-01 ENCOUNTER — TELEPHONE (OUTPATIENT)
Dept: HEMATOLOGY ONCOLOGY | Facility: CLINIC | Age: 70
End: 2020-01-01

## 2020-01-01 ENCOUNTER — HOSPITAL ENCOUNTER (OUTPATIENT)
Dept: INFUSION CENTER | Facility: CLINIC | Age: 70
Discharge: HOME | End: 2020-12-10
Payer: COMMERCIAL

## 2020-01-01 ENCOUNTER — TRANSCRIBE ORDERS (OUTPATIENT)
Dept: ADMINISTRATIVE | Facility: HOSPITAL | Age: 70
End: 2020-01-01

## 2020-01-01 ENCOUNTER — HOSPITAL ENCOUNTER (OUTPATIENT)
Dept: NON INVASIVE DIAGNOSTICS | Facility: CLINIC | Age: 70
Discharge: HOME | End: 2020-11-13
Payer: COMMERCIAL

## 2020-01-01 ENCOUNTER — HOSPITAL ENCOUNTER (OUTPATIENT)
Dept: INFUSION CENTER | Facility: CLINIC | Age: 70
Discharge: HOME | End: 2020-11-11
Payer: COMMERCIAL

## 2020-01-01 ENCOUNTER — TELEPHONE (OUTPATIENT)
Dept: VASCULAR SURGERY | Facility: CLINIC | Age: 70
End: 2020-01-01

## 2020-01-01 ENCOUNTER — TRANSCRIBE ORDERS (OUTPATIENT)
Dept: VASCULAR SURGERY | Facility: CLINIC | Age: 70
End: 2020-01-01

## 2020-01-01 ENCOUNTER — HOSPITAL ENCOUNTER (OUTPATIENT)
Dept: INFUSION CENTER | Facility: CLINIC | Age: 70
Discharge: HOME | End: 2020-09-09
Payer: COMMERCIAL

## 2020-01-01 ENCOUNTER — CONSULT (OUTPATIENT)
Dept: VASCULAR SURGERY | Facility: CLINIC | Age: 70
End: 2020-01-01
Payer: COMMERCIAL

## 2020-01-01 ENCOUNTER — PATIENT OUTREACH (OUTPATIENT)
Dept: CASE MANAGEMENT | Facility: HOSPITAL | Age: 70
End: 2020-01-01

## 2020-01-01 ENCOUNTER — OFFICE VISIT (OUTPATIENT)
Dept: HEMATOLOGY ONCOLOGY | Facility: CLINIC | Age: 70
End: 2020-01-01
Payer: COMMERCIAL

## 2020-01-01 ENCOUNTER — HOSPITAL ENCOUNTER (OUTPATIENT)
Dept: INFUSION CENTER | Facility: CLINIC | Age: 70
Discharge: HOME | End: 2020-10-07
Payer: COMMERCIAL

## 2020-01-01 VITALS
DIASTOLIC BLOOD PRESSURE: 84 MMHG | SYSTOLIC BLOOD PRESSURE: 132 MMHG | BODY MASS INDEX: 31.86 KG/M2 | TEMPERATURE: 96.8 F | HEIGHT: 67 IN | WEIGHT: 203 LBS | HEART RATE: 68 BPM

## 2020-01-01 VITALS
RESPIRATION RATE: 18 BRPM | HEIGHT: 67 IN | TEMPERATURE: 97.7 F | DIASTOLIC BLOOD PRESSURE: 76 MMHG | SYSTOLIC BLOOD PRESSURE: 128 MMHG | WEIGHT: 204 LBS | HEART RATE: 71 BPM | OXYGEN SATURATION: 97 % | BODY MASS INDEX: 32.02 KG/M2

## 2020-01-01 VITALS
HEART RATE: 64 BPM | WEIGHT: 204 LBS | SYSTOLIC BLOOD PRESSURE: 130 MMHG | OXYGEN SATURATION: 98 % | DIASTOLIC BLOOD PRESSURE: 78 MMHG | TEMPERATURE: 97 F | BODY MASS INDEX: 31.95 KG/M2 | RESPIRATION RATE: 16 BRPM

## 2020-01-01 VITALS
BODY MASS INDEX: 31.94 KG/M2 | WEIGHT: 203.5 LBS | SYSTOLIC BLOOD PRESSURE: 127 MMHG | HEIGHT: 67 IN | RESPIRATION RATE: 18 BRPM | HEART RATE: 66 BPM | DIASTOLIC BLOOD PRESSURE: 64 MMHG | OXYGEN SATURATION: 94 % | TEMPERATURE: 97.2 F

## 2020-01-01 VITALS
RESPIRATION RATE: 16 BRPM | WEIGHT: 203.71 LBS | DIASTOLIC BLOOD PRESSURE: 78 MMHG | BODY MASS INDEX: 31.9 KG/M2 | SYSTOLIC BLOOD PRESSURE: 140 MMHG | HEART RATE: 68 BPM | TEMPERATURE: 97.1 F

## 2020-01-01 VITALS — HEIGHT: 67 IN | WEIGHT: 203.4 LBS | BODY MASS INDEX: 31.92 KG/M2

## 2020-01-01 VITALS — BODY MASS INDEX: 31.64 KG/M2 | TEMPERATURE: 97.8 F | WEIGHT: 202 LBS

## 2020-01-01 DIAGNOSIS — I65.23 OCCLUSION AND STENOSIS OF BILATERAL CAROTID ARTERIES: Primary | ICD-10-CM

## 2020-01-01 DIAGNOSIS — I65.23 OCCLUSION AND STENOSIS OF BILATERAL CAROTID ARTERIES: ICD-10-CM

## 2020-01-01 DIAGNOSIS — I73.9 PERIPHERAL VASCULAR DISEASE, UNSPECIFIED (HCC): ICD-10-CM

## 2020-01-01 DIAGNOSIS — R13.10 ODYNOPHAGIA: ICD-10-CM

## 2020-01-01 DIAGNOSIS — C15.5 MALIGNANT NEOPLASM OF LOWER THIRD OF ESOPHAGUS (HCC): Primary | ICD-10-CM

## 2020-01-01 DIAGNOSIS — F41.9 ANXIETY: ICD-10-CM

## 2020-01-01 DIAGNOSIS — I65.23 BILATERAL CAROTID ARTERY STENOSIS: ICD-10-CM

## 2020-01-01 DIAGNOSIS — I65.22 STENOSIS OF LEFT CAROTID ARTERY: ICD-10-CM

## 2020-01-01 DIAGNOSIS — I73.9 PERIPHERAL ARTERIAL DISEASE (HCC): Primary | ICD-10-CM

## 2020-01-01 DIAGNOSIS — R19.5 POSITIVE COLORECTAL CANCER SCREENING USING COLOGUARD TEST: Primary | ICD-10-CM

## 2020-01-01 DIAGNOSIS — I10 ESSENTIAL HYPERTENSION: Chronic | ICD-10-CM

## 2020-01-01 DIAGNOSIS — I73.9 PVD (PERIPHERAL VASCULAR DISEASE) (HCC): Primary | ICD-10-CM

## 2020-01-01 LAB
ALBUMIN SERPL BCP-MCNC: 3.7 G/DL (ref 3.5–5)
ALBUMIN SERPL BCP-MCNC: 3.8 G/DL (ref 3.5–5)
ALP SERPL-CCNC: 64 U/L (ref 46–116)
ALP SERPL-CCNC: 65 U/L (ref 46–116)
ALT SERPL W P-5'-P-CCNC: 29 U/L (ref 12–78)
ALT SERPL W P-5'-P-CCNC: 39 U/L (ref 12–78)
ANION GAP SERPL CALCULATED.3IONS-SCNC: 8 MMOL/L (ref 4–13)
ANION GAP SERPL CALCULATED.3IONS-SCNC: 9 MMOL/L (ref 4–13)
AST SERPL W P-5'-P-CCNC: 15 U/L (ref 5–45)
AST SERPL W P-5'-P-CCNC: 18 U/L (ref 5–45)
BASOPHILS # BLD AUTO: 0.06 THOUSANDS/ΜL (ref 0–0.1)
BASOPHILS NFR BLD AUTO: 1 % (ref 0–1)
BILIRUB SERPL-MCNC: 0.4 MG/DL (ref 0.2–1)
BILIRUB SERPL-MCNC: 0.5 MG/DL (ref 0.2–1)
BUN SERPL-MCNC: 12 MG/DL (ref 5–25)
BUN SERPL-MCNC: 18 MG/DL (ref 5–25)
CALCIUM SERPL-MCNC: 8.2 MG/DL (ref 8.3–10.1)
CALCIUM SERPL-MCNC: 8.5 MG/DL (ref 8.3–10.1)
CHLORIDE SERPL-SCNC: 105 MMOL/L (ref 100–108)
CHLORIDE SERPL-SCNC: 106 MMOL/L (ref 100–108)
CHOLEST SERPL-MCNC: 129 MG/DL (ref 50–200)
CO2 SERPL-SCNC: 25 MMOL/L (ref 21–32)
CO2 SERPL-SCNC: 26 MMOL/L (ref 21–32)
CREAT SERPL-MCNC: 1.37 MG/DL (ref 0.6–1.3)
CREAT SERPL-MCNC: 1.63 MG/DL (ref 0.6–1.3)
EOSINOPHIL # BLD AUTO: 0.24 THOUSAND/ΜL (ref 0–0.61)
EOSINOPHIL NFR BLD AUTO: 3 % (ref 0–6)
ERYTHROCYTE [DISTWIDTH] IN BLOOD BY AUTOMATED COUNT: 12.5 % (ref 11.6–15.1)
EST. AVERAGE GLUCOSE BLD GHB EST-MCNC: 140 MG/DL
GFR SERPL CREATININE-BSD FRML MDRD: 42 ML/MIN/1.73SQ M
GFR SERPL CREATININE-BSD FRML MDRD: 52 ML/MIN/1.73SQ M
GLUCOSE P FAST SERPL-MCNC: 127 MG/DL (ref 65–99)
GLUCOSE SERPL-MCNC: 127 MG/DL (ref 65–140)
GLUCOSE SERPL-MCNC: 145 MG/DL (ref 65–140)
HBA1C MFR BLD: 6.5 %
HCT VFR BLD AUTO: 42.7 % (ref 36.5–49.3)
HDLC SERPL-MCNC: 28 MG/DL
HGB BLD-MCNC: 14.4 G/DL (ref 12–17)
IMM GRANULOCYTES # BLD AUTO: 0.04 THOUSAND/UL (ref 0–0.2)
IMM GRANULOCYTES NFR BLD AUTO: 1 % (ref 0–2)
LDLC SERPL CALC-MCNC: 77 MG/DL (ref 0–100)
LYMPHOCYTES # BLD AUTO: 1.99 THOUSANDS/ΜL (ref 0.6–4.47)
LYMPHOCYTES NFR BLD AUTO: 27 % (ref 14–44)
MCH RBC QN AUTO: 31 PG (ref 26.8–34.3)
MCHC RBC AUTO-ENTMCNC: 33.7 G/DL (ref 31.4–37.4)
MCV RBC AUTO: 92 FL (ref 82–98)
MONOCYTES # BLD AUTO: 0.62 THOUSAND/ΜL (ref 0.17–1.22)
MONOCYTES NFR BLD AUTO: 8 % (ref 4–12)
NEUTROPHILS # BLD AUTO: 4.5 THOUSANDS/ΜL (ref 1.85–7.62)
NEUTS SEG NFR BLD AUTO: 60 % (ref 43–75)
NRBC BLD AUTO-RTO: 0 /100 WBCS
PLATELET # BLD AUTO: 209 THOUSANDS/UL (ref 149–390)
PMV BLD AUTO: 9 FL (ref 8.9–12.7)
POTASSIUM SERPL-SCNC: 4 MMOL/L (ref 3.5–5.3)
POTASSIUM SERPL-SCNC: 4.1 MMOL/L (ref 3.5–5.3)
PROT SERPL-MCNC: 6.6 G/DL (ref 6.4–8.2)
PROT SERPL-MCNC: 7.1 G/DL (ref 6.4–8.2)
RBC # BLD AUTO: 4.65 MILLION/UL (ref 3.88–5.62)
SODIUM SERPL-SCNC: 138 MMOL/L (ref 136–145)
SODIUM SERPL-SCNC: 141 MMOL/L (ref 136–145)
TRIGL SERPL-MCNC: 122 MG/DL
WBC # BLD AUTO: 7.45 THOUSAND/UL (ref 4.31–10.16)

## 2020-01-01 PROCEDURE — 96413 CHEMO IV INFUSION 1 HR: CPT

## 2020-01-01 PROCEDURE — 93923 UPR/LXTR ART STDY 3+ LVLS: CPT

## 2020-01-01 PROCEDURE — 85025 COMPLETE CBC W/AUTO DIFF WBC: CPT

## 2020-01-01 PROCEDURE — 80053 COMPREHEN METABOLIC PANEL: CPT

## 2020-01-01 PROCEDURE — 93880 EXTRACRANIAL BILAT STUDY: CPT | Performed by: SURGERY

## 2020-01-01 PROCEDURE — 93925 LOWER EXTREMITY STUDY: CPT | Performed by: SURGERY

## 2020-01-01 PROCEDURE — 80061 LIPID PANEL: CPT | Performed by: FAMILY MEDICINE

## 2020-01-01 PROCEDURE — 83036 HEMOGLOBIN GLYCOSYLATED A1C: CPT | Performed by: FAMILY MEDICINE

## 2020-01-01 PROCEDURE — 93925 LOWER EXTREMITY STUDY: CPT

## 2020-01-01 PROCEDURE — 93880 EXTRACRANIAL BILAT STUDY: CPT

## 2020-01-01 PROCEDURE — 80053 COMPREHEN METABOLIC PANEL: CPT | Performed by: FAMILY MEDICINE

## 2020-01-01 PROCEDURE — 99204 OFFICE O/P NEW MOD 45 MIN: CPT | Performed by: PHYSICIAN ASSISTANT

## 2020-01-01 PROCEDURE — 99214 OFFICE O/P EST MOD 30 MIN: CPT | Performed by: INTERNAL MEDICINE

## 2020-01-01 PROCEDURE — 99214 OFFICE O/P EST MOD 30 MIN: CPT | Performed by: PHYSICIAN ASSISTANT

## 2020-01-01 PROCEDURE — 93922 UPR/L XTREMITY ART 2 LEVELS: CPT | Performed by: SURGERY

## 2020-01-01 RX ORDER — SODIUM CHLORIDE 9 MG/ML
20 INJECTION, SOLUTION INTRAVENOUS ONCE
Status: COMPLETED | OUTPATIENT
Start: 2020-01-01 | End: 2020-01-01

## 2020-01-01 RX ORDER — SODIUM CHLORIDE 9 MG/ML
20 INJECTION, SOLUTION INTRAVENOUS ONCE
Status: CANCELLED | OUTPATIENT
Start: 2021-01-01

## 2020-01-01 RX ORDER — LORAZEPAM 0.5 MG/1
0.5 TABLET ORAL 2 TIMES DAILY PRN
Qty: 60 TABLET | Refills: 0 | Status: SHIPPED | OUTPATIENT
Start: 2020-01-01 | End: 2021-01-01 | Stop reason: SDUPTHER

## 2020-01-01 RX ADMIN — SODIUM CHLORIDE 20 ML/HR: 0.9 INJECTION, SOLUTION INTRAVENOUS at 09:15

## 2020-01-01 RX ADMIN — TRASTUZUMAB 562 MG: 150 INJECTION, POWDER, LYOPHILIZED, FOR SOLUTION INTRAVENOUS at 09:10

## 2020-01-01 RX ADMIN — TRASTUZUMAB 562 MG: 150 INJECTION, POWDER, LYOPHILIZED, FOR SOLUTION INTRAVENOUS at 08:53

## 2020-01-01 RX ADMIN — SODIUM CHLORIDE 20 ML/HR: 0.9 INJECTION, SOLUTION INTRAVENOUS at 10:36

## 2020-01-01 RX ADMIN — SODIUM CHLORIDE 20 ML/HR: 0.9 INJECTION, SOLUTION INTRAVENOUS at 08:31

## 2020-01-01 RX ADMIN — TRASTUZUMAB 562 MG: 150 INJECTION, POWDER, LYOPHILIZED, FOR SOLUTION INTRAVENOUS at 09:59

## 2020-01-01 RX ADMIN — SODIUM CHLORIDE 20 ML/HR: 0.9 INJECTION, SOLUTION INTRAVENOUS at 08:25

## 2020-01-01 RX ADMIN — TRASTUZUMAB 562 MG: 150 INJECTION, POWDER, LYOPHILIZED, FOR SOLUTION INTRAVENOUS at 10:42

## 2020-01-06 DIAGNOSIS — F41.9 ANXIETY: ICD-10-CM

## 2020-01-06 RX ORDER — LORAZEPAM 0.5 MG/1
TABLET ORAL
Qty: 60 TABLET | Refills: 0 | Status: SHIPPED | OUTPATIENT
Start: 2020-01-06 | End: 2020-04-06 | Stop reason: SDUPTHER

## 2020-01-08 ENCOUNTER — HOSPITAL ENCOUNTER (OUTPATIENT)
Dept: INFUSION CENTER | Facility: CLINIC | Age: 70
Discharge: HOME | End: 2020-01-08
Payer: COMMERCIAL

## 2020-01-08 ENCOUNTER — OFFICE VISIT (OUTPATIENT)
Dept: HEMATOLOGY ONCOLOGY | Facility: CLINIC | Age: 70
End: 2020-01-08
Payer: COMMERCIAL

## 2020-01-08 ENCOUNTER — TELEPHONE (OUTPATIENT)
Dept: HEMATOLOGY ONCOLOGY | Facility: CLINIC | Age: 70
End: 2020-01-08

## 2020-01-08 VITALS
WEIGHT: 196 LBS | SYSTOLIC BLOOD PRESSURE: 152 MMHG | OXYGEN SATURATION: 98 % | TEMPERATURE: 97.6 F | BODY MASS INDEX: 30.76 KG/M2 | RESPIRATION RATE: 16 BRPM | HEIGHT: 67 IN | DIASTOLIC BLOOD PRESSURE: 76 MMHG | HEART RATE: 68 BPM

## 2020-01-08 VITALS
HEART RATE: 67 BPM | SYSTOLIC BLOOD PRESSURE: 142 MMHG | BODY MASS INDEX: 30.76 KG/M2 | OXYGEN SATURATION: 96 % | RESPIRATION RATE: 18 BRPM | HEIGHT: 67 IN | WEIGHT: 196 LBS | TEMPERATURE: 98.1 F | DIASTOLIC BLOOD PRESSURE: 82 MMHG

## 2020-01-08 DIAGNOSIS — C15.5 MALIGNANT NEOPLASM OF LOWER THIRD OF ESOPHAGUS (HCC): Primary | ICD-10-CM

## 2020-01-08 DIAGNOSIS — R13.10 ODYNOPHAGIA: ICD-10-CM

## 2020-01-08 PROCEDURE — 96413 CHEMO IV INFUSION 1 HR: CPT

## 2020-01-08 PROCEDURE — 99215 OFFICE O/P EST HI 40 MIN: CPT | Performed by: INTERNAL MEDICINE

## 2020-01-08 RX ORDER — SODIUM CHLORIDE 9 MG/ML
20 INJECTION, SOLUTION INTRAVENOUS ONCE
Status: CANCELLED | OUTPATIENT
Start: 2020-04-01

## 2020-01-08 RX ORDER — SODIUM CHLORIDE 9 MG/ML
20 INJECTION, SOLUTION INTRAVENOUS ONCE
Status: COMPLETED | OUTPATIENT
Start: 2020-01-08 | End: 2020-01-08

## 2020-01-08 RX ORDER — SODIUM CHLORIDE 9 MG/ML
20 INJECTION, SOLUTION INTRAVENOUS ONCE
Status: CANCELLED | OUTPATIENT
Start: 2020-03-11

## 2020-01-08 RX ORDER — SODIUM CHLORIDE 9 MG/ML
20 INJECTION, SOLUTION INTRAVENOUS ONCE
Status: CANCELLED | OUTPATIENT
Start: 2020-04-22

## 2020-01-08 RX ADMIN — SODIUM CHLORIDE 20 ML/HR: 0.9 INJECTION, SOLUTION INTRAVENOUS at 09:40

## 2020-01-08 RX ADMIN — TRASTUZUMAB 562 MG: 150 INJECTION, POWDER, LYOPHILIZED, FOR SOLUTION INTRAVENOUS at 10:13

## 2020-01-08 NOTE — PROGRESS NOTES
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  His pulmonary metastasis was confirmed by biopsy  He has HER-2 positive disease   He completed 12 cycle of FOLFOX -6 with trastuzumab with good partial response   He is currently on trastuzumab monotherapy as maintenance therapy  Clinically, as well as radiographically, he has no evidence of progressive disease  I recommended him to continue with trastuzumab every 3 weeks  Regarding his migrated stent, I will refer him to Dr Marcella Burk for the laparoscopic retrieval of migrated stent  I personally discussed the case with Dr Marcella Burk  Holding trastuzumab of for this procedure is not indicated  He is in agreement with my recommendations  I will see him again in 3 months with CBC and CMP                                   Subjective:      HPI:             Interval History:  A 70 year old gentleman with no significant past medical history  He was hospitalized in early 2017 with progressive dysphagia  However, he had minimal weight loss  He was found to have mass in distal esophagus, based on the EGD  Biopsy was positive for adenocarcinoma with mucinous features  CT scan of chest, abdomen pelvis showed not only mass in the distal esophagus, but also multiple pulmonary masses, consistent with metastatic disease  He also had retroperitoneal adenopathy measuring 2 1 cm  Lung biopsy showed adenocarcinoma, consistent with esophageal primary  His tumor was subsequently tested for HER-2 by immunohistochemistry as well as fish  Tumor from esophagus was HER-2 3+ and Fish positive with ratio of 6 5  Tumor in the lung was HER-2 negative   HER-2 Fish was also negative  This was considered to be HER-2 positive disease  Therefore, she started systemic chemotherapy with trastuzumab and FOLFOX-6  After 3 months of treatment, CT scan showed partial response   He received 12 cycle of FOLFOX which was completed in February 2018 with slowly progressive neuropathy  Jag Forbes is currently on trastuzumab monotherapy every 3 weeks as maintenance therapy   He presents today for routine follow-up  In June 2019, he had complaint of dysphagia  I referred him to gastroenterologist who did endoscopy  Endoscopy showed migrating esophageal stent  It has not recovered by endoscopy  He was seen by surgeon who told him that he has still hold off the Herceptin for 6 weeks prior to the surgery which he did not recommend  From cancer standpoint, he is doing well  He always has some epigastric discomfort  However, he has minimal weight loss  He denied any respiratory symptoms  He has no complaint of pain   His performance status is normal   Recent CT scan showed tiny pulmonary nodules with no evidence of progression                                                 Objective:      Primary Diagnosis:     Metastatic adenocarcinoma of distal esophagus with multiple lung metastasis as well as retroperitoneal adenopathy, HER-2 positive disease  diagnosed in July 2017       Cancer Staging:  No matching staging information was found for the patient         Previous Hematologic/ Oncologic Treatment:       FOLFOX-6 with trastuzumab times 12 cycles   Completed in February 2018      Current Hematologic/ Oncologic Treatment:       Trastuzumab monotherapy every 3 weeks since February 2018      Disease Status:      Good partial response      Test Results:     Pathology:     Biopsy from December esophagus showed adenocarcinoma  biopsy showed adenocarcinoma with mucinous features  TTF-1 negative  from esophagus was HER-2 3+ and Fish positive with ratio of 6 5  from long was HER-2 negative and Fish negative        Radiology:     CT scan of chest abdomen pelvis in December 2019 showed stable pulmonary nodules  No new metastatic disease      Echocardiogram in March 2019 showed ejection fraction 60%     Laboratory:           Physical Exam:        General Appearance:    Alert, oriented          Eyes:    PERRL   Ears:    Normal external ear canals, both ears   Nose:   Nares normal, septum midline   Throat:   Mucosa moist  Pharynx without injection  Neck:   Supple         Lungs:     Clear to auscultation bilaterally   Chest Wall:    No tenderness or deformity    Heart:    Regular rate and rhythm         Abdomen:     Soft, non-tender, bowel sounds +, no organomegaly               Extremities:   Extremities no cyanosis or edema         Skin:   no rash or icterus  Lymph nodes:   Cervical, supraclavicular, and axillary nodes normal   Neurologic:   CNII-XII intact, normal strength, sensation and reflexes     Throughout             Breast exam: Not performed  ROS: Review of Systems   Gastrointestinal:        Epigastric discomfort   All other systems reviewed and are negative  Imaging: Ct Chest Abdomen Pelvis W Contrast    Result Date: 1/3/2020  Narrative: CT CHEST, ABDOMEN AND PELVIS WITH IV CONTRAST INDICATION:   C15 5: Malignant neoplasm of lower third of esophagus  COMPARISON:  June 25, 2019 TECHNIQUE: CT examination of the chest, abdomen and pelvis was performed  Axial, sagittal, and coronal 2D reformatted images were created from the source data and submitted for interpretation  Radiation dose length product (DLP) for this visit:  719 mGy-cm   This examination, like all CT scans performed in the [facility], was performed utilizing techniques to minimize radiation dose exposure, including the use of iterative reconstruction and automated exposure control  IV Contrast:  100 mL of iohexol (OMNIPAQUE) Enteric Contrast: Enteric contrast was administered   FINDINGS: CHEST LUNGS:  Stable peripheral pulmonary fibrosis both lungs  Stable small pulmonary nodules  Subcentimeter nodules along the major fissures bilaterally are also stable likely represent fissural lymph nodes  No focal consolidations or pneumothorax  There are no tracheal endobronchial lesions  PLEURA:  Unremarkable  HEART/GREAT VESSELS:  Unremarkable for patient's age  Coronary and aortic atherosclerosis  MEDIASTINUM AND ZAYNAB:  Unremarkable  CHEST WALL AND LOWER NECK:   Right port catheter tip terminates in the cavoatrial junction  ABDOMEN LIVER/BILIARY TREE:  Unremarkable  GALLBLADDER:  No calcified gallstones  No pericholecystic inflammatory change  SPLEEN:  Mildly enlarged, similar prior exam  PANCREAS:  Unremarkable  ADRENAL GLANDS:  Unremarkable  KIDNEYS/URETERS:  Unremarkable  No hydronephrosis  STOMACH AND BOWEL: Esophagus is collapsed with small amount of GE reflux noted  Mild wall thickening of the esophagus distally  There are 3 large esophageal stents in the stomach  No obstruction  Mild inflammation region of the pylorus and 1st portion of duodenum  Diverticulosis without active diverticulitis is present  APPENDIX:  A normal appendix was visualized  ABDOMINOPELVIC CAVITY:  No ascites or free intraperitoneal air  No lymphadenopathy  VESSELS:  Unremarkable for patient's age  PELVIS REPRODUCTIVE ORGANS:  Unremarkable for patient's age  URINARY BLADDER:  Unremarkable  ABDOMINAL WALL/INGUINAL REGIONS:  Small fat-containing inguinal hernias  Small fat-containing umbilical hernia  OSSEOUS STRUCTURES:  No acute fracture or destructive osseous lesion  Impression: Stable exam   Numerous stable pulmonary nodules  No evidence of recurrent or new metastatic disease within the abdomen or pelvis  There are now three migrated esophageal stents visible in the stomach  Mild inflammation the region of the pylorus and 1st portion of duodenum  Stable findings of interstitial lung disease/pulmonary fibrosis   Workstation performed: [id number]         Labs:   Lab Results   Component Value Date    WBC 9 68 12/27/2019    HGB 14 6 12/27/2019    HCT 44 0 12/27/2019    MCV 93 12/27/2019     12/27/2019     Lab Results   Component Value Date    K 4 4 12/27/2019     12/27/2019    CO2 27 12/27/2019    BUN 14 12/27/2019    CREATININE 1 33 (H) 12/27/2019    GLUCOSE 117 08/09/2017    GLUF 133 (H) 12/27/2019    CALCIUM 8 5 12/27/2019    AST 14 12/27/2019    ALT 24 12/27/2019    ALKPHOS 82 12/27/2019    EGFR 53 12/27/2019         Current Medications: Reviewed  Allergies: Reviewed  PMH/FH/SH:  Reviewed      Vital Sign:    Body surface area is 2 meters squared      Wt Readings from Last 3 Encounters:   01/08/20 88 9 kg (196 lb)   12/18/19 90 7 kg (200 lb)   11/27/19 91 9 kg (202 lb 8 oz)        Temp Readings from Last 3 Encounters:   01/08/20 98 1 °F (36 7 °C) (Oral)   12/18/19 98 °F (36 7 °C) (Temporal)   11/27/19 97 6 °F (36 4 °C) (Temporal)        BP Readings from Last 3 Encounters:   01/08/20 142/82   12/18/19 118/70   11/27/19 138/78         Pulse Readings from Last 3 Encounters:   01/08/20 67   12/18/19 78   11/27/19 76     @LASTSAO2(3)@

## 2020-01-08 NOTE — TELEPHONE ENCOUNTER
New Patient Encounter    New Patient Intake Form   Patient Details:  Rafael Dodd  1950  [id number]    Background Information:  [address] starts by opening a telephone encounter and gathering the following information   Who is calling to schedule? If not self, relationship to patient? Taniya Corado   Referring Provider Dr Herman Singh   What is the diagnosis? Esophageal Cancer   Is this diagnosis confirmed Yes   Is there a confirmed diagnosis from a biopsy/tissue reviewed by pathology? Yes   Is there any prior history of Cancer? No   If yes, please explain N/A   When was the diagnosis? Unknown   Is patient aware of diagnosis? Yes   Reason for visit? NP DX   Have you had any testing done? If so: when, where? Yes   Are records in [facility]? yes   Was the patient told to bring a disk? no   Scheduling Information:   Preferred [place]: [facility]     Requesting Specific Provider? Dr Rey Solo   Are there any dates/time the patient cannot be seen? Any      Miscellaneous: Any   After completing the above information, please route to Financial Counselor and the appropriate Nurse Navigator for review

## 2020-01-10 ENCOUNTER — TELEPHONE (OUTPATIENT)
Dept: HEMATOLOGY ONCOLOGY | Facility: CLINIC | Age: 70
End: 2020-01-10

## 2020-01-10 NOTE — TELEPHONE ENCOUNTER
Patient is having [facility] billing issues and they would like to speak with the financial couselors to get some help    Please call her at [phone]

## 2020-01-21 PROBLEM — T85.528A: Status: ACTIVE | Noted: 2020-01-21

## 2020-01-22 ENCOUNTER — CONSULT (OUTPATIENT)
Dept: SURGICAL ONCOLOGY | Facility: CLINIC | Age: 70
End: 2020-01-22
Payer: COMMERCIAL

## 2020-01-22 VITALS
HEART RATE: 68 BPM | DIASTOLIC BLOOD PRESSURE: 70 MMHG | RESPIRATION RATE: 16 BRPM | SYSTOLIC BLOOD PRESSURE: 122 MMHG | BODY MASS INDEX: 31.08 KG/M2 | TEMPERATURE: 98.3 F | HEIGHT: 67 IN | WEIGHT: 198 LBS

## 2020-01-22 DIAGNOSIS — C15.5 MALIGNANT NEOPLASM OF LOWER THIRD OF ESOPHAGUS (HCC): Primary | ICD-10-CM

## 2020-01-22 DIAGNOSIS — T85.528A MIGRATION OF ESOPHAGEAL STENT, INITIAL ENCOUNTER: ICD-10-CM

## 2020-01-22 DIAGNOSIS — R13.10 ODYNOPHAGIA: ICD-10-CM

## 2020-01-22 PROCEDURE — 99215 OFFICE O/P EST HI 40 MIN: CPT | Performed by: SURGERY

## 2020-01-22 NOTE — LETTER
January 22, 2020     Floridalma Gomez MD  [facility] [facility]  [address] [place] [address] [address] [place] [address]    Patient: Marisela Barahona   YOB: 1950   Date of Visit: 1/22/2020       Dear Dr Will Lopez:    Thank you for referring Martínez Quintanilla to me for evaluation  Below are my notes for this consultation  If you have questions, please do not hesitate to call me  I look forward to following your patient along with you  Sincerely,        Zaynba Mcmahon MD        CC: DO Zaynab Paulson MD  1/22/2020  2:39 PM  Sign at close encounter               Surgical Oncology Consult       [address] [facility]  [address] [place] [address] [facility] [address]  1950  [id number]  [address] [facility],[address]  [facility] [place]  [facility] [place] [address]    Chief Complaint   Patient presents with    Consult    Esophageal Cancer       Assessment/Plan:    No problem-specific Assessment & Plan notes found for this encounter  Diagnoses and all orders for this visit:    Malignant neoplasm of lower third of esophagus [facility])  -     Ambulatory referral to Surgical Oncology    Migration of esophageal stent, initial encounter    Odynophagia  -     Ambulatory referral to Surgical Oncology        Advance Care Planning/Advance Directives:  Discussed disease status, cancer treatment plans and/or cancer treatment goals with the patient  No history exists  History of Present Illness:  Patient is a 68-year-old man with metastatic esophageal cancer  He has 3 esophageal stents which had been placed over last several months  These of since migrated as he has had a good response to therapy  As far back as of August of last year, stents were in his stomach    They are bothersome to him and he is aware of them being there especially when he eats meal   Symptoms of discomfort or alleviated by Mylanta, though this is clearly temporary  He has been referred to discuss surgical extraction of the stents  He is presently on Herceptin  Review of Systems   Constitutional: Negative  Negative for unexpected weight change  HENT: Negative  Eyes: Negative  Respiratory: Negative  Cardiovascular: Negative  Gastrointestinal: Negative  Endocrine: Negative  Genitourinary: Negative  Musculoskeletal: Negative  Skin: Negative  Allergic/Immunologic: Negative  Neurological: Negative  Hematological: Negative  Psychiatric/Behavioral: Negative  Patient Active Problem List   Diagnosis    Odynophagia    Dysphagia    GERD (gastroesophageal reflux disease)    Essential hypertension    Hyperlipidemia    Esophageal abnormality    Malignant neoplasm of lower third of esophagus ([address] )    Migrated esophageal stent     Past Medical History:   Diagnosis Date    Anxiety     Dysphagia     Esophageal abnormality     Esophageal cancer (HCC)     GERD (gastroesophageal reflux disease)     Hyperlipidemia     Hypertension     Occasional tremors      Past Surgical History:   Procedure Laterality Date    ESOPHAGOGASTRODUODENOSCOPY N/A 8/9/2017    Procedure: ESOPHAGOGASTRODUODENOSCOPY (EGD); Surgeon: Daniel Meraz MD;  [facility]; Service: Gastroenterology    ESOPHAGOGASTRODUODENOSCOPY N/A 8/11/2017    Procedure: ESOPHAGOGASTRODUODENOSCOPY (EGD) w/ stent;  Surgeon: Daniel Meraz MD;  [facility]; Service: Gastroenterology    PORTACATH PLACEMENT      VASCULAR SURGERY  2008    blockage in neck      No family history on file    Social History     Socioeconomic History    Marital status: Single     Spouse name: Not on file    Number of children: Not on file    Years of education: Not on file    Highest education level: Not on file   Occupational History    Not on file   Social Needs    Financial resource strain: Not on file    Food insecurity:     Worry: Not on file Inability: Not on file    Transportation needs:     Medical: Not on file     Non-medical: Not on file   Tobacco Use    Smoking status: Former Smoker     Packs/day: 1 00     Years: 30 00     Pack years: 30 00     Last attempt to quit: 2008     Years since quittin 3    Smokeless tobacco: Never Used   Substance and Sexual Activity    Alcohol use: Yes     Frequency: Monthly or less    Drug use: Yes     Types: Marijuana     Comment: daily last 19    Sexual activity: Not Currently   Lifestyle    Physical activity:     Days per week: Not on file     Minutes per session: Not on file    Stress: Not on file   Relationships    Social connections:     Talks on phone: Not on file     Gets together: Not on file     Attends [employer] service: Not on file     Active member of club or organization: Not on file     Attends meetings of clubs or organizations: Not on file     Relationship status: Not on file    Intimate partner violence:     Fear of current or ex partner: Not on file     Emotionally abused: Not on file     Physically abused: Not on file     Forced sexual activity: Not on file   Other Topics Concern    Not on file   Social History Narrative    Not on file       Current Outpatient Medications:     amLODIPine (NORVASC) 5 mg tablet, Take 5 mg by mouth daily, Disp: , Rfl:     aspirin 81 mg chewable tablet, Chew 81 mg daily, Disp: , Rfl:     BELSOMRA 10 MG TABS, , Disp: , Rfl:     diphenoxylate-atropine (LOMOTIL) 2 5-0 025 mg per tablet, Take 1 tablet by mouth 4 (four) times a day as needed for diarrhea, Disp: , Rfl:     LORazepam (ATIVAN) 0 5 mg tablet, TAKE 1 TABLET BY MOUTH TWICE DAILY AS NEEDED FOR ANXIETY, Disp: 60 tablet, Rfl: 0    metoprolol tartrate (LOPRESSOR) 50 mg tablet, Take 50 mg by mouth 2 (two) times a day, Disp: , Rfl:     pantoprazole (PROTONIX) 40 mg tablet, Take 1 tablet by mouth daily in the early morning, Disp: 30 tablet, Rfl: 0    simvastatin (ZOCOR) 40 mg tablet, Take 40 mg by mouth daily at bedtime, Disp: , Rfl:     ondansetron (ZOFRAN-ODT) 4 mg disintegrating tablet, Take 1 tablet (4 mg total) by mouth every 6 (six) hours as needed for nausea or vomiting, Disp: 20 tablet, Rfl: 0  Allergies   Allergen Reactions    Other      Cat Dander     Vitals:    01/22/20 1414   BP: 122/70   Pulse: 68   Resp: 16   Temp: 98 3 °F (36 8 °C)       Physical Exam   Constitutional: He is oriented to person, place, and time  He appears well-developed and well-nourished  HENT:   Head: Normocephalic and atraumatic  Right Ear: External ear normal    Left Ear: External ear normal    Eyes: Pupils are equal, round, and reactive to light  EOM are normal    Neck: Normal range of motion  Neck supple  Cardiovascular: Normal rate and normal heart sounds  Pulmonary/Chest: Effort normal and breath sounds normal    Abdominal: Soft  Bowel sounds are normal  He exhibits no distension and no mass  There is no tenderness  There is no rebound and no guarding  Umbilical hernia, reducible   Musculoskeletal: Normal range of motion  Neurological: He is alert and oriented to person, place, and time  Skin: Skin is warm and dry  Pathology:  none    Labs:  none    Imaging  Ct Chest Abdomen Pelvis W Contrast    Result Date: 1/3/2020  Narrative: CT CHEST, ABDOMEN AND PELVIS WITH IV CONTRAST INDICATION:   C15 5: Malignant neoplasm of lower third of esophagus  COMPARISON:  June 25, 2019 TECHNIQUE: CT examination of the chest, abdomen and pelvis was performed  Axial, sagittal, and coronal 2D reformatted images were created from the source data and submitted for interpretation  Radiation dose length product (DLP) for this visit:  719 mGy-cm   This examination, like all CT scans performed in the [facility], was performed utilizing techniques to minimize radiation dose exposure, including the use of iterative reconstruction and automated exposure control   IV Contrast:  100 mL of iohexol (OMNIPAQUE) Enteric Contrast: Enteric contrast was administered  FINDINGS: CHEST LUNGS:  Stable peripheral pulmonary fibrosis both lungs  Stable small pulmonary nodules  Subcentimeter nodules along the major fissures bilaterally are also stable likely represent fissural lymph nodes  No focal consolidations or pneumothorax  There are no tracheal endobronchial lesions  PLEURA:  Unremarkable  HEART/GREAT VESSELS:  Unremarkable for patient's age  Coronary and aortic atherosclerosis  MEDIASTINUM AND ZAYNAB:  Unremarkable  CHEST WALL AND LOWER NECK:   Right port catheter tip terminates in the cavoatrial junction  ABDOMEN LIVER/BILIARY TREE:  Unremarkable  GALLBLADDER:  No calcified gallstones  No pericholecystic inflammatory change  SPLEEN:  Mildly enlarged, similar prior exam  PANCREAS:  Unremarkable  ADRENAL GLANDS:  Unremarkable  KIDNEYS/URETERS:  Unremarkable  No hydronephrosis  STOMACH AND BOWEL: Esophagus is collapsed with small amount of GE reflux noted  Mild wall thickening of the esophagus distally  There are 3 large esophageal stents in the stomach  No obstruction  Mild inflammation region of the pylorus and 1st portion of duodenum  Diverticulosis without active diverticulitis is present  APPENDIX:  A normal appendix was visualized  ABDOMINOPELVIC CAVITY:  No ascites or free intraperitoneal air  No lymphadenopathy  VESSELS:  Unremarkable for patient's age  PELVIS REPRODUCTIVE ORGANS:  Unremarkable for patient's age  URINARY BLADDER:  Unremarkable  ABDOMINAL WALL/INGUINAL REGIONS:  Small fat-containing inguinal hernias  Small fat-containing umbilical hernia  OSSEOUS STRUCTURES:  No acute fracture or destructive osseous lesion  Impression: Stable exam   Numerous stable pulmonary nodules  No evidence of recurrent or new metastatic disease within the abdomen or pelvis  There are now three migrated esophageal stents visible in the stomach    Mild inflammation the region of the pylorus and 1st portion of duodenum  Stable findings of interstitial lung disease/pulmonary fibrosis  Workstation performed: [id number]     I reviewed the above laboratory and imaging data  Discussion/Summary:  Metastatic esophageal cancer, stents which have migrated  These mandate resection/removal   Will schedule for exploratory laparotomy/removal of retained esophageal stents  Rationale for this along with risks and benefits of surgery including infection, bleeding, need for possible additional surgery, discussed with patient and his wife  They understand the plan wish to proceed as outlined  All questions answered and consents signed at this visit

## 2020-01-22 NOTE — H&P (VIEW-ONLY)
Surgical Oncology Consult       [address]  CANCER [facility] [place]  [address] \[facility]"" [place] [address]    Deja Daily  1950  [id number]  [address]  CANCER [facility] [place]  [address] [facility] [place] [address]    Chief Complaint   Patient presents with    Consult    Esophageal Cancer       Assessment/Plan:    No problem-specific Assessment & Plan notes found for this encounter  Diagnoses and all orders for this visit:    Malignant neoplasm of lower third of esophagus [facility])  -     Ambulatory referral to Surgical Oncology    Migration of esophageal stent, initial encounter    Odynophagia  -     Ambulatory referral to Surgical Oncology        Advance Care Planning/Advance Directives:  Discussed disease status, cancer treatment plans and/or cancer treatment goals with the patient  No history exists  History of Present Illness:  Patient is a 43-year-old man with metastatic esophageal cancer  He has 3 esophageal stents which had been placed over last several months  These of since migrated as he has had a good response to therapy  As far back as of August of last year, stents were in his stomach  They are bothersome to him and he is aware of them being there especially when he eats meal   Symptoms of discomfort or alleviated by Mylanta, though this is clearly temporary  He has been referred to discuss surgical extraction of the stents  He is presently on Herceptin  Review of Systems   Constitutional: Negative  Negative for unexpected weight change  HENT: Negative  Eyes: Negative  Respiratory: Negative  Cardiovascular: Negative  Gastrointestinal: Negative  Endocrine: Negative  Genitourinary: Negative  Musculoskeletal: Negative  Skin: Negative  Allergic/Immunologic: Negative  Neurological: Negative  Hematological: Negative      Psychiatric/Behavioral: Negative  Patient Active Problem List   Diagnosis    Odynophagia    Dysphagia    GERD (gastroesophageal reflux disease)    Essential hypertension    Hyperlipidemia    Esophageal abnormality    Malignant neoplasm of lower third of esophagus ([address] )    Migrated esophageal stent     Past Medical History:   Diagnosis Date    Anxiety     Dysphagia     Esophageal abnormality     Esophageal cancer (HCC)     GERD (gastroesophageal reflux disease)     Hyperlipidemia     Hypertension     Occasional tremors      Past Surgical History:   Procedure Laterality Date    ESOPHAGOGASTRODUODENOSCOPY N/A 2017    Procedure: ESOPHAGOGASTRODUODENOSCOPY (EGD); Surgeon: Yamilka Cotto MD;  [facility]; Service: Gastroenterology    ESOPHAGOGASTRODUODENOSCOPY N/A 2017    Procedure: ESOPHAGOGASTRODUODENOSCOPY (EGD) w/ stent;  Surgeon: Yamilka Cotto MD;  [facility]; Service: Gastroenterology    PORTACATH PLACEMENT      VASCULAR SURGERY      blockage in neck      No family history on file    Social History     Socioeconomic History    Marital status: Single     Spouse name: Not on file    Number of children: Not on file    Years of education: Not on file    Highest education level: Not on file   Occupational History    Not on file   Social Needs    Financial resource strain: Not on file    Food insecurity:     Worry: Not on file     Inability: Not on file    Transportation needs:     Medical: Not on file     Non-medical: Not on file   Tobacco Use    Smoking status: Former Smoker     Packs/day: 1 00     Years: 30 00     Pack years: 30 00     Last attempt to quit: 2008     Years since quittin 3    Smokeless tobacco: Never Used   Substance and Sexual Activity    Alcohol use: Yes     Frequency: Monthly or less    Drug use: Yes     Types: Marijuana     Comment: daily last 19    Sexual activity: Not Currently   Lifestyle    Physical activity:     Days per week: Not on file     Minutes per session: Not on file    Stress: Not on file   Relationships    Social connections:     Talks on phone: Not on file     Gets together: Not on file     Attends [employer] service: Not on file     Active member of club or organization: Not on file     Attends meetings of clubs or organizations: Not on file     Relationship status: Not on file    Intimate partner violence:     Fear of current or ex partner: Not on file     Emotionally abused: Not on file     Physically abused: Not on file     Forced sexual activity: Not on file   Other Topics Concern    Not on file   Social History Narrative    Not on file       Current Outpatient Medications:     amLODIPine (NORVASC) 5 mg tablet, Take 5 mg by mouth daily, Disp: , Rfl:     aspirin 81 mg chewable tablet, Chew 81 mg daily, Disp: , Rfl:     BELSOMRA 10 MG TABS, , Disp: , Rfl:     diphenoxylate-atropine (LOMOTIL) 2 5-0 025 mg per tablet, Take 1 tablet by mouth 4 (four) times a day as needed for diarrhea, Disp: , Rfl:     LORazepam (ATIVAN) 0 5 mg tablet, TAKE 1 TABLET BY MOUTH TWICE DAILY AS NEEDED FOR ANXIETY, Disp: 60 tablet, Rfl: 0    metoprolol tartrate (LOPRESSOR) 50 mg tablet, Take 50 mg by mouth 2 (two) times a day, Disp: , Rfl:     pantoprazole (PROTONIX) 40 mg tablet, Take 1 tablet by mouth daily in the early morning, Disp: 30 tablet, Rfl: 0    simvastatin (ZOCOR) 40 mg tablet, Take 40 mg by mouth daily at bedtime, Disp: , Rfl:     ondansetron (ZOFRAN-ODT) 4 mg disintegrating tablet, Take 1 tablet (4 mg total) by mouth every 6 (six) hours as needed for nausea or vomiting, Disp: 20 tablet, Rfl: 0  Allergies   Allergen Reactions    Other      Cat Dander     Vitals:    01/22/20 1414   BP: 122/70   Pulse: 68   Resp: 16   Temp: 98 3 °F (36 8 °C)       Physical Exam   Constitutional: He is oriented to person, place, and time  He appears well-developed and well-nourished  HENT:   Head: Normocephalic and atraumatic     Right Ear: External ear normal    Left Ear: External ear normal    Eyes: Pupils are equal, round, and reactive to light  EOM are normal    Neck: Normal range of motion  Neck supple  Cardiovascular: Normal rate and normal heart sounds  Pulmonary/Chest: Effort normal and breath sounds normal    Abdominal: Soft  Bowel sounds are normal  He exhibits no distension and no mass  There is no tenderness  There is no rebound and no guarding  Umbilical hernia, reducible   Musculoskeletal: Normal range of motion  Neurological: He is alert and oriented to person, place, and time  Skin: Skin is warm and dry  Pathology:  none    Labs:  none    Imaging  Ct Chest Abdomen Pelvis W Contrast    Result Date: 1/3/2020  Narrative: CT CHEST, ABDOMEN AND PELVIS WITH IV CONTRAST INDICATION:   C15 5: Malignant neoplasm of lower third of esophagus  COMPARISON:  June 25, 2019 TECHNIQUE: CT examination of the chest, abdomen and pelvis was performed  Axial, sagittal, and coronal 2D reformatted images were created from the source data and submitted for interpretation  Radiation dose length product (DLP) for this visit:  719 mGy-cm   This examination, like all CT scans performed in the [facility], was performed utilizing techniques to minimize radiation dose exposure, including the use of iterative reconstruction and automated exposure control  IV Contrast:  100 mL of iohexol (OMNIPAQUE) Enteric Contrast: Enteric contrast was administered  FINDINGS: CHEST LUNGS:  Stable peripheral pulmonary fibrosis both lungs  Stable small pulmonary nodules  Subcentimeter nodules along the major fissures bilaterally are also stable likely represent fissural lymph nodes  No focal consolidations or pneumothorax  There are no tracheal endobronchial lesions  PLEURA:  Unremarkable  HEART/GREAT VESSELS:  Unremarkable for patient's age  Coronary and aortic atherosclerosis  MEDIASTINUM AND ZAYNAB:  Unremarkable   CHEST WALL AND LOWER NECK:   Right port catheter tip terminates in the cavoatrial junction  ABDOMEN LIVER/BILIARY TREE:  Unremarkable  GALLBLADDER:  No calcified gallstones  No pericholecystic inflammatory change  SPLEEN:  Mildly enlarged, similar prior exam  PANCREAS:  Unremarkable  ADRENAL GLANDS:  Unremarkable  KIDNEYS/URETERS:  Unremarkable  No hydronephrosis  STOMACH AND BOWEL: Esophagus is collapsed with small amount of GE reflux noted  Mild wall thickening of the esophagus distally  There are 3 large esophageal stents in the stomach  No obstruction  Mild inflammation region of the pylorus and 1st portion of duodenum  Diverticulosis without active diverticulitis is present  APPENDIX:  A normal appendix was visualized  ABDOMINOPELVIC CAVITY:  No ascites or free intraperitoneal air  No lymphadenopathy  VESSELS:  Unremarkable for patient's age  PELVIS REPRODUCTIVE ORGANS:  Unremarkable for patient's age  URINARY BLADDER:  Unremarkable  ABDOMINAL WALL/INGUINAL REGIONS:  Small fat-containing inguinal hernias  Small fat-containing umbilical hernia  OSSEOUS STRUCTURES:  No acute fracture or destructive osseous lesion  Impression: Stable exam   Numerous stable pulmonary nodules  No evidence of recurrent or new metastatic disease within the abdomen or pelvis  There are now three migrated esophageal stents visible in the stomach  Mild inflammation the region of the pylorus and 1st portion of duodenum  Stable findings of interstitial lung disease/pulmonary fibrosis  Workstation performed: [id number]     I reviewed the above laboratory and imaging data  Discussion/Summary:  Metastatic esophageal cancer, stents which have migrated  These mandate resection/removal   Will schedule for exploratory laparotomy/removal of retained esophageal stents  Rationale for this along with risks and benefits of surgery including infection, bleeding, need for possible additional surgery, discussed with patient and his wife    They understand the plan wish to proceed as outlined  All questions answered and consents signed at this visit

## 2020-01-22 NOTE — PATIENT INSTRUCTIONS
Pre-Surgery Instructions:   Medication Instructions    amLODIPine (NORVASC) 5 mg tablet Take morning of surgery    aspirin 81 mg chewable tablet Stop taking 1 week prior to surgery    BELSOMRA 10 MG TABS Stop taking 1 days prior to surgery    diphenoxylate-atropine (LOMOTIL) 2 5-0 025 mg per tablet Stop taking 1 days prior to surgery    LORazepam (ATIVAN) 0 5 mg tablet Stop taking 1 days prior to surgery    metoprolol tartrate (LOPRESSOR) 50 mg tablet Take morning of surgery    pantoprazole (PROTONIX) 40 mg tablet Stop taking 1 days prior to surgery    simvastatin (ZOCOR) 40 mg tablet Stop taking 1 days prior to surgery

## 2020-01-22 NOTE — PROGRESS NOTES
Surgical Oncology Consult       [address] [facility]  [facility] [place]  [address] [facility]  [place] [address]    Johnny Mtz  1950  [id number]  [address] [facility] [facility]  [facility] [place]  [place] [address] [place] [address]    Chief Complaint   Patient presents with    Consult    Esophageal Cancer       Assessment/Plan:    No problem-specific Assessment & Plan notes found for this encounter  Diagnoses and all orders for this visit:    Malignant neoplasm of lower third of esophagus [facility])  -     Ambulatory referral to Surgical Oncology    Migration of esophageal stent, initial encounter    Odynophagia  -     Ambulatory referral to Surgical Oncology        Advance Care Planning/Advance Directives:  Discussed disease status, cancer treatment plans and/or cancer treatment goals with the patient  No history exists  History of Present Illness:  Patient is a 68-year-old man with metastatic esophageal cancer  He has 3 esophageal stents which had been placed over last several months  These of since migrated as he has had a good response to therapy  As far back as of August of last year, stents were in his stomach  They are bothersome to him and he is aware of them being there especially when he eats meal   Symptoms of discomfort or alleviated by Mylanta, though this is clearly temporary  He has been referred to discuss surgical extraction of the stents  He is presently on Herceptin  Review of Systems   Constitutional: Negative  Negative for unexpected weight change  HENT: Negative  Eyes: Negative  Respiratory: Negative  Cardiovascular: Negative  Gastrointestinal: Negative  Endocrine: Negative  Genitourinary: Negative  Musculoskeletal: Negative  Skin: Negative  Allergic/Immunologic: Negative  Neurological: Negative  Hematological: Negative      Psychiatric/Behavioral: Negative  Patient Active Problem List   Diagnosis    Odynophagia    Dysphagia    GERD (gastroesophageal reflux disease)    Essential hypertension    Hyperlipidemia    Esophageal abnormality    Malignant neoplasm of lower third of esophagus ([address] )    Migrated esophageal stent     Past Medical History:   Diagnosis Date    Anxiety     Dysphagia     Esophageal abnormality     Esophageal cancer (HCC)     GERD (gastroesophageal reflux disease)     Hyperlipidemia     Hypertension     Occasional tremors      Past Surgical History:   Procedure Laterality Date    ESOPHAGOGASTRODUODENOSCOPY N/A 2017    Procedure: ESOPHAGOGASTRODUODENOSCOPY (EGD); Surgeon: Meyer Peabody, MD;  [facility]; Service: Gastroenterology    ESOPHAGOGASTRODUODENOSCOPY N/A 2017    Procedure: ESOPHAGOGASTRODUODENOSCOPY (EGD) w/ stent;  Surgeon: Meyer Peabody, MD;  [facility]; Service: Gastroenterology    PORTACATH PLACEMENT      VASCULAR SURGERY      blockage in neck      No family history on file    Social History     Socioeconomic History    Marital status: Single     Spouse name: Not on file    Number of children: Not on file    Years of education: Not on file    Highest education level: Not on file   Occupational History    Not on file   Social Needs    Financial resource strain: Not on file    Food insecurity:     Worry: Not on file     Inability: Not on file    Transportation needs:     Medical: Not on file     Non-medical: Not on file   Tobacco Use    Smoking status: Former Smoker     Packs/day: 1 00     Years: 30 00     Pack years: 30 00     Last attempt to quit: 2008     Years since quittin 3    Smokeless tobacco: Never Used   Substance and Sexual Activity    Alcohol use: Yes     Frequency: Monthly or less    Drug use: Yes     Types: Marijuana     Comment: daily last 19    Sexual activity: Not Currently   Lifestyle    Physical activity:     Days per week: Not on file     Minutes per session: Not on file    Stress: Not on file   Relationships    Social connections:     Talks on phone: Not on file     Gets together: Not on file     Attends Druze service: Not on file     Active member of club or organization: Not on file     Attends meetings of clubs or organizations: Not on file     Relationship status: Not on file    Intimate partner violence:     Fear of current or ex partner: Not on file     Emotionally abused: Not on file     Physically abused: Not on file     Forced sexual activity: Not on file   Other Topics Concern    Not on file   Social History Narrative    Not on file       Current Outpatient Medications:     amLODIPine (NORVASC) 5 mg tablet, Take 5 mg by mouth daily, Disp: , Rfl:     aspirin 81 mg chewable tablet, Chew 81 mg daily, Disp: , Rfl:     BELSOMRA 10 MG TABS, , Disp: , Rfl:     diphenoxylate-atropine (LOMOTIL) 2 5-0 025 mg per tablet, Take 1 tablet by mouth 4 (four) times a day as needed for diarrhea, Disp: , Rfl:     LORazepam (ATIVAN) 0 5 mg tablet, TAKE 1 TABLET BY MOUTH TWICE DAILY AS NEEDED FOR ANXIETY, Disp: 60 tablet, Rfl: 0    metoprolol tartrate (LOPRESSOR) 50 mg tablet, Take 50 mg by mouth 2 (two) times a day, Disp: , Rfl:     pantoprazole (PROTONIX) 40 mg tablet, Take 1 tablet by mouth daily in the early morning, Disp: 30 tablet, Rfl: 0    simvastatin (ZOCOR) 40 mg tablet, Take 40 mg by mouth daily at bedtime, Disp: , Rfl:     ondansetron (ZOFRAN-ODT) 4 mg disintegrating tablet, Take 1 tablet (4 mg total) by mouth every 6 (six) hours as needed for nausea or vomiting, Disp: 20 tablet, Rfl: 0  Allergies   Allergen Reactions    Other      Cat Dander     Vitals:    01/22/20 1414   BP: 122/70   Pulse: 68   Resp: 16   Temp: 98 3 °F (36 8 °C)       Physical Exam   Constitutional: He is oriented to person, place, and time  He appears well-developed and well-nourished  HENT:   Head: Normocephalic and atraumatic     Right Ear: External ear normal    Left Ear: External ear normal    Eyes: Pupils are equal, round, and reactive to light  EOM are normal    Neck: Normal range of motion  Neck supple  Cardiovascular: Normal rate and normal heart sounds  Pulmonary/Chest: Effort normal and breath sounds normal    Abdominal: Soft  Bowel sounds are normal  He exhibits no distension and no mass  There is no tenderness  There is no rebound and no guarding  Umbilical hernia, reducible   Musculoskeletal: Normal range of motion  Neurological: He is alert and oriented to person, place, and time  Skin: Skin is warm and dry  Pathology:  none    Labs:  none    Imaging  Ct Chest Abdomen Pelvis W Contrast    Result Date: 1/3/2020  Narrative: CT CHEST, ABDOMEN AND PELVIS WITH IV CONTRAST INDICATION:   C15 5: Malignant neoplasm of lower third of esophagus  COMPARISON:  June 25, 2019 TECHNIQUE: CT examination of the chest, abdomen and pelvis was performed  Axial, sagittal, and coronal 2D reformatted images were created from the source data and submitted for interpretation  Radiation dose length product (DLP) for this visit:  719 mGy-cm   This examination, like all CT scans performed in the [facility], was performed utilizing techniques to minimize radiation dose exposure, including the use of iterative reconstruction and automated exposure control  IV Contrast:  100 mL of iohexol (OMNIPAQUE) Enteric Contrast: Enteric contrast was administered  FINDINGS: CHEST LUNGS:  Stable peripheral pulmonary fibrosis both lungs  Stable small pulmonary nodules  Subcentimeter nodules along the major fissures bilaterally are also stable likely represent fissural lymph nodes  No focal consolidations or pneumothorax  There are no tracheal endobronchial lesions  PLEURA:  Unremarkable  HEART/GREAT VESSELS:  Unremarkable for patient's age  Coronary and aortic atherosclerosis  MEDIASTINUM AND ZAYNAB:  Unremarkable   CHEST WALL AND LOWER NECK:   Right port catheter tip terminates in the cavoatrial junction  ABDOMEN LIVER/BILIARY TREE:  Unremarkable  GALLBLADDER:  No calcified gallstones  No pericholecystic inflammatory change  SPLEEN:  Mildly enlarged, similar prior exam  PANCREAS:  Unremarkable  ADRENAL GLANDS:  Unremarkable  KIDNEYS/URETERS:  Unremarkable  No hydronephrosis  STOMACH AND BOWEL: Esophagus is collapsed with small amount of GE reflux noted  Mild wall thickening of the esophagus distally  There are 3 large esophageal stents in the stomach  No obstruction  Mild inflammation region of the pylorus and 1st portion of duodenum  Diverticulosis without active diverticulitis is present  APPENDIX:  A normal appendix was visualized  ABDOMINOPELVIC CAVITY:  No ascites or free intraperitoneal air  No lymphadenopathy  VESSELS:  Unremarkable for patient's age  PELVIS REPRODUCTIVE ORGANS:  Unremarkable for patient's age  URINARY BLADDER:  Unremarkable  ABDOMINAL WALL/INGUINAL REGIONS:  Small fat-containing inguinal hernias  Small fat-containing umbilical hernia  OSSEOUS STRUCTURES:  No acute fracture or destructive osseous lesion  Impression: Stable exam   Numerous stable pulmonary nodules  No evidence of recurrent or new metastatic disease within the abdomen or pelvis  There are now three migrated esophageal stents visible in the stomach  Mild inflammation the region of the pylorus and 1st portion of duodenum  Stable findings of interstitial lung disease/pulmonary fibrosis  Workstation performed: [id number]     I reviewed the above laboratory and imaging data  Discussion/Summary:  Metastatic esophageal cancer, stents which have migrated  These mandate resection/removal   Will schedule for exploratory laparotomy/removal of retained esophageal stents  Rationale for this along with risks and benefits of surgery including infection, bleeding, need for possible additional surgery, discussed with patient and his wife    They understand the plan wish to proceed as outlined  All questions answered and consents signed at this visit

## 2020-01-27 ENCOUNTER — ANESTHESIA EVENT (OUTPATIENT)
Dept: PERIOP | Facility: HOSPITAL | Age: 70
End: 2020-01-27
Payer: COMMERCIAL

## 2020-01-27 PROBLEM — T85.528A MIGRATION OF ESOPHAGEAL STENT: Status: ACTIVE | Noted: 2020-01-27

## 2020-01-29 ENCOUNTER — HOSPITAL ENCOUNTER (OUTPATIENT)
Dept: INFUSION CENTER | Facility: CLINIC | Age: 70
Discharge: HOME | End: 2020-01-29
Payer: COMMERCIAL

## 2020-01-29 VITALS
WEIGHT: 197.5 LBS | DIASTOLIC BLOOD PRESSURE: 68 MMHG | TEMPERATURE: 97.6 F | HEART RATE: 64 BPM | SYSTOLIC BLOOD PRESSURE: 130 MMHG | BODY MASS INDEX: 31 KG/M2 | RESPIRATION RATE: 16 BRPM | OXYGEN SATURATION: 96 % | HEIGHT: 67 IN

## 2020-01-29 DIAGNOSIS — R13.10 ODYNOPHAGIA: ICD-10-CM

## 2020-01-29 DIAGNOSIS — C15.5 MALIGNANT NEOPLASM OF LOWER THIRD OF ESOPHAGUS (HCC): Primary | ICD-10-CM

## 2020-01-29 PROCEDURE — 96413 CHEMO IV INFUSION 1 HR: CPT

## 2020-01-29 RX ORDER — SODIUM CHLORIDE 9 MG/ML
20 INJECTION, SOLUTION INTRAVENOUS ONCE
Status: COMPLETED | OUTPATIENT
Start: 2020-01-29 | End: 2020-01-29

## 2020-01-29 RX ADMIN — TRASTUZUMAB 562 MG: 150 INJECTION, POWDER, LYOPHILIZED, FOR SOLUTION INTRAVENOUS at 10:51

## 2020-01-29 RX ADMIN — SODIUM CHLORIDE 20 ML/HR: 0.9 INJECTION, SOLUTION INTRAVENOUS at 09:55

## 2020-01-29 NOTE — PLAN OF CARE
Problem: Knowledge Deficit  Goal: Patient/family/caregiver demonstrates understanding of disease process, treatment plan, medications, and discharge instructions  Description  Complete learning assessment and assess knowledge base    Interventions:  - Provide teaching at level of understanding  - Provide teaching via preferred learning methods  1/29/2020 0951 by Meghana Benoit RN  Outcome: Progressing  1/29/2020 0950 by Meghana Benoit, RN  Outcome: Progressing

## 2020-01-29 NOTE — PROGRESS NOTES
Patient tolerated treatment without complications  Patient declined AVS  Patient aware of upcoming appointments

## 2020-01-29 NOTE — PROGRESS NOTES
Spoke with Griffin Smoker in regards to Chemotherapy Provider Communication stating to monitor ejection fraction and pulmonary function  Pt has a MUGA scan result, put no ordered or resulted PFTs  OK to proceed with therapy as ordered without pulmonary function monitoring per Dr Carmen Patino

## 2020-01-29 NOTE — PROGRESS NOTES
Patient here for herceptin for treatment of esophageal cancer  Patient resting with no complaints  Vitals stable  Labs reviewed  Ef 60%  Callbell within reach  Will continue to monitor

## 2020-02-05 ENCOUNTER — APPOINTMENT (OUTPATIENT)
Dept: LAB | Facility: HOSPITAL | Age: 70
End: 2020-02-05
Attending: SURGERY
Payer: COMMERCIAL

## 2020-02-05 ENCOUNTER — OFFICE VISIT (OUTPATIENT)
Dept: LAB | Facility: HOSPITAL | Age: 70
End: 2020-02-05
Attending: SURGERY
Payer: COMMERCIAL

## 2020-02-05 DIAGNOSIS — C15.5 MALIGNANT NEOPLASM OF LOWER THIRD OF ESOPHAGUS (HCC): ICD-10-CM

## 2020-02-05 LAB
ABO GROUP BLD: NORMAL
ALBUMIN SERPL BCP-MCNC: 3.3 G/DL (ref 3.5–5)
ALP SERPL-CCNC: 81 U/L (ref 46–116)
ALT SERPL W P-5'-P-CCNC: 22 U/L (ref 12–78)
ANION GAP SERPL CALCULATED.3IONS-SCNC: 5 MMOL/L (ref 4–13)
APTT PPP: 26 SECONDS (ref 23–37)
AST SERPL W P-5'-P-CCNC: 13 U/L (ref 5–45)
BASOPHILS # BLD AUTO: 0.08 THOUSANDS/ΜL (ref 0–0.1)
BASOPHILS NFR BLD AUTO: 1 % (ref 0–1)
BILIRUB SERPL-MCNC: 0.32 MG/DL (ref 0.2–1)
BLD GP AB SCN SERPL QL: NEGATIVE
BUN SERPL-MCNC: 13 MG/DL (ref 5–25)
CALCIUM SERPL-MCNC: 8.6 MG/DL (ref 8.3–10.1)
CHLORIDE SERPL-SCNC: 109 MMOL/L (ref 100–108)
CO2 SERPL-SCNC: 26 MMOL/L (ref 21–32)
CREAT SERPL-MCNC: 1.18 MG/DL (ref 0.6–1.3)
EOSINOPHIL # BLD AUTO: 0.72 THOUSAND/ΜL (ref 0–0.61)
EOSINOPHIL NFR BLD AUTO: 10 % (ref 0–6)
ERYTHROCYTE [DISTWIDTH] IN BLOOD BY AUTOMATED COUNT: 12.8 % (ref 11.6–15.1)
GFR SERPL CREATININE-BSD FRML MDRD: 62 ML/MIN/1.73SQ M
GLUCOSE P FAST SERPL-MCNC: 149 MG/DL (ref 65–99)
HCT VFR BLD AUTO: 42.3 % (ref 36.5–49.3)
HGB BLD-MCNC: 13.9 G/DL (ref 12–17)
IMM GRANULOCYTES # BLD AUTO: 0.05 THOUSAND/UL (ref 0–0.2)
IMM GRANULOCYTES NFR BLD AUTO: 1 % (ref 0–2)
INR PPP: 0.98 (ref 0.84–1.19)
LYMPHOCYTES # BLD AUTO: 1.91 THOUSANDS/ΜL (ref 0.6–4.47)
LYMPHOCYTES NFR BLD AUTO: 27 % (ref 14–44)
MCH RBC QN AUTO: 30.2 PG (ref 26.8–34.3)
MCHC RBC AUTO-ENTMCNC: 32.9 G/DL (ref 31.4–37.4)
MCV RBC AUTO: 92 FL (ref 82–98)
MONOCYTES # BLD AUTO: 0.67 THOUSAND/ΜL (ref 0.17–1.22)
MONOCYTES NFR BLD AUTO: 10 % (ref 4–12)
NEUTROPHILS # BLD AUTO: 3.56 THOUSANDS/ΜL (ref 1.85–7.62)
NEUTS SEG NFR BLD AUTO: 51 % (ref 43–75)
NRBC BLD AUTO-RTO: 0 /100 WBCS
PLATELET # BLD AUTO: 223 THOUSANDS/UL (ref 149–390)
PMV BLD AUTO: 8.8 FL (ref 8.9–12.7)
POTASSIUM SERPL-SCNC: 3.7 MMOL/L (ref 3.5–5.3)
PROT SERPL-MCNC: 6.7 G/DL (ref 6.4–8.2)
PROTHROMBIN TIME: 12.6 SECONDS (ref 11.6–14.5)
RBC # BLD AUTO: 4.6 MILLION/UL (ref 3.88–5.62)
RH BLD: POSITIVE
SODIUM SERPL-SCNC: 140 MMOL/L (ref 136–145)
SPECIMEN EXPIRATION DATE: NORMAL
WBC # BLD AUTO: 6.99 THOUSAND/UL (ref 4.31–10.16)

## 2020-02-05 PROCEDURE — 80053 COMPREHEN METABOLIC PANEL: CPT

## 2020-02-05 PROCEDURE — 86850 RBC ANTIBODY SCREEN: CPT

## 2020-02-05 PROCEDURE — 86900 BLOOD TYPING SEROLOGIC ABO: CPT

## 2020-02-05 PROCEDURE — 85730 THROMBOPLASTIN TIME PARTIAL: CPT

## 2020-02-05 PROCEDURE — 36415 COLL VENOUS BLD VENIPUNCTURE: CPT

## 2020-02-05 PROCEDURE — 85610 PROTHROMBIN TIME: CPT

## 2020-02-05 PROCEDURE — 85025 COMPLETE CBC W/AUTO DIFF WBC: CPT

## 2020-02-05 PROCEDURE — 86901 BLOOD TYPING SEROLOGIC RH(D): CPT

## 2020-02-05 PROCEDURE — 93005 ELECTROCARDIOGRAM TRACING: CPT

## 2020-02-05 RX ORDER — MULTIVITAMIN
1 TABLET ORAL DAILY
COMMUNITY

## 2020-02-05 NOTE — PRE-PROCEDURE INSTRUCTIONS
Pre-Surgery Instructions:   Medication Instructions    amLODIPine (NORVASC) 5 mg tablet Instructed patient per Anesthesia Guidelines   aspirin 81 mg chewable tablet Instructed patient per Anesthesia Guidelines   BELSOMRA 10 MG TABS Instructed patient per Anesthesia Guidelines   diphenoxylate-atropine (LOMOTIL) 2 5-0 025 mg per tablet Instructed patient per Anesthesia Guidelines   LORazepam (ATIVAN) 0 5 mg tablet Instructed patient per Anesthesia Guidelines   metoprolol tartrate (LOPRESSOR) 50 mg tablet Instructed patient per Anesthesia Guidelines   Multiple Vitamin (MULTIVITAMIN) tablet Instructed patient per Anesthesia Guidelines   ondansetron (ZOFRAN-ODT) 4 mg disintegrating tablet Instructed patient per Anesthesia Guidelines   pantoprazole (PROTONIX) 40 mg tablet Instructed patient per Anesthesia Guidelines   simvastatin (ZOCOR) 40 mg tablet Instructed patient per Anesthesia Guidelines  Will take amlodipine, loraz, metopr, pantopr am surg sm sip h2o  INSTR ON DENA CALL,  REPORT LOC , BRING PHOTO ID/MED LIST/INS  INFO ,SHOWER REV , STOP ASA/NSAID/VIT 7 DAY PREOP, PT VERBALIZES UNDERSTANDING W/ NO FURTHER QUESTIONS  Med Instructions Troubleshoot   5HT3 Antagonists Med Class     Continue to take this medication on your normal schedule  If this is an oral medication and you take it in the morning, then you may take this medicine with a sip of water  Benzodiazepine antagonist Med Class     If this medication is needed please continue to take on your normal schedule  If you take it in the morning, then you may take this medicine with a sip of water  ASA Med Class: Aspirin     Should be discontinued at least one week prior to planned operation, unless specifically stated otherwise by surgical service  Your Surgeon may have patient stop taking aspirin up to a week before surgery if having intracranial, middle ear, posterior eye, spine surgery or prostate surgery    [Patients taking aspirin for coronary stents should be reviewed by an anesthesiologist in the optimization clinic  Please do not discontinue aspirin in patients with coronary stents unless given specific permission to do so by the cardiologist who prescribed medication ]   If your surgeon approves please continue to take this medication on your normal schedule  You may take this medication on the morning of your surgery with a sip of water  Beta blocker Med Class     Continue to take this heart medication on your normal schedule  If this is an oral medication and you take it in the morning, then you may take this medicine with a sip of water  Calcium Channel Blocker Med Class     Continue to take this heart medication on your normal schedule  If this is an oral medication and you take it in the morning, then you may take this medicine with a sip of water  Statin Med Class     Continue to take this medication on your normal schedule  If this is an oral medication and you take it in the morning, then you may take this medicine with a sip of water  Vitamin Med Class     You may continue to take any vitamin that your surgeon has prescribed to you up to the day before surgery  If your surgeon has not specifically prescribed this vitamin or instructed you to continue then stop taking 7 days prior to surgery

## 2020-02-06 DIAGNOSIS — Z91.89 RISK FACTORS FOR OBSTRUCTIVE SLEEP APNEA: Primary | ICD-10-CM

## 2020-02-08 LAB
ATRIAL RATE: 69 BPM
P AXIS: 38 DEGREES
PR INTERVAL: 210 MS
QRS AXIS: -16 DEGREES
QRSD INTERVAL: 92 MS
QT INTERVAL: 386 MS
QTC INTERVAL: 413 MS
T WAVE AXIS: 31 DEGREES
VENTRICULAR RATE: 69 BPM

## 2020-02-08 PROCEDURE — 93010 ELECTROCARDIOGRAM REPORT: CPT | Performed by: INTERNAL MEDICINE

## 2020-02-11 ENCOUNTER — APPOINTMENT (OUTPATIENT)
Dept: RADIOLOGY | Facility: HOSPITAL | Age: 70
End: 2020-02-11
Payer: COMMERCIAL

## 2020-02-11 ENCOUNTER — HOSPITAL ENCOUNTER (OUTPATIENT)
Facility: HOSPITAL | Age: 70
Setting detail: OUTPATIENT SURGERY
Discharge: HOME | End: 2020-02-11
Attending: SURGERY | Admitting: SURGERY
Payer: COMMERCIAL

## 2020-02-11 ENCOUNTER — ANESTHESIA (OUTPATIENT)
Dept: PERIOP | Facility: HOSPITAL | Age: 70
End: 2020-02-11
Payer: COMMERCIAL

## 2020-02-11 VITALS
SYSTOLIC BLOOD PRESSURE: 125 MMHG | OXYGEN SATURATION: 95 % | HEART RATE: 77 BPM | DIASTOLIC BLOOD PRESSURE: 64 MMHG | WEIGHT: 197 LBS | BODY MASS INDEX: 30.92 KG/M2 | RESPIRATION RATE: 20 BRPM | HEIGHT: 67 IN | TEMPERATURE: 96.6 F

## 2020-02-11 DIAGNOSIS — T85.528A MIGRATION OF ESOPHAGEAL STENT, INITIAL ENCOUNTER: ICD-10-CM

## 2020-02-11 PROCEDURE — 88300 SURGICAL PATH GROSS: CPT | Performed by: PATHOLOGY

## 2020-02-11 PROCEDURE — 43247 EGD REMOVE FOREIGN BODY: CPT | Performed by: THORACIC SURGERY (CARDIOTHORACIC VASCULAR SURGERY)

## 2020-02-11 PROCEDURE — 74240 X-RAY XM UPR GI TRC 1CNTRST: CPT

## 2020-02-11 RX ORDER — LIDOCAINE HYDROCHLORIDE 10 MG/ML
0.5 INJECTION, SOLUTION EPIDURAL; INFILTRATION; INTRACAUDAL; PERINEURAL ONCE AS NEEDED
Status: COMPLETED | OUTPATIENT
Start: 2020-02-11 | End: 2020-02-11

## 2020-02-11 RX ORDER — LIDOCAINE HYDROCHLORIDE 10 MG/ML
INJECTION, SOLUTION EPIDURAL; INFILTRATION; INTRACAUDAL; PERINEURAL AS NEEDED
Status: DISCONTINUED | OUTPATIENT
Start: 2020-02-11 | End: 2020-02-11 | Stop reason: SURG

## 2020-02-11 RX ORDER — SODIUM CHLORIDE, SODIUM LACTATE, POTASSIUM CHLORIDE, CALCIUM CHLORIDE 600; 310; 30; 20 MG/100ML; MG/100ML; MG/100ML; MG/100ML
20 INJECTION, SOLUTION INTRAVENOUS CONTINUOUS
Status: DISCONTINUED | OUTPATIENT
Start: 2020-02-11 | End: 2020-02-11 | Stop reason: HOSPADM

## 2020-02-11 RX ORDER — ROCURONIUM BROMIDE 10 MG/ML
INJECTION, SOLUTION INTRAVENOUS AS NEEDED
Status: DISCONTINUED | OUTPATIENT
Start: 2020-02-11 | End: 2020-02-11 | Stop reason: SURG

## 2020-02-11 RX ORDER — SODIUM CHLORIDE, SODIUM LACTATE, POTASSIUM CHLORIDE, CALCIUM CHLORIDE 600; 310; 30; 20 MG/100ML; MG/100ML; MG/100ML; MG/100ML
125 INJECTION, SOLUTION INTRAVENOUS CONTINUOUS
Status: DISCONTINUED | OUTPATIENT
Start: 2020-02-11 | End: 2020-02-11 | Stop reason: HOSPADM

## 2020-02-11 RX ORDER — FENTANYL CITRATE/PF 50 MCG/ML
25 SYRINGE (ML) INJECTION
Status: DISCONTINUED | OUTPATIENT
Start: 2020-02-11 | End: 2020-02-11 | Stop reason: HOSPADM

## 2020-02-11 RX ORDER — MIDAZOLAM HYDROCHLORIDE 2 MG/2ML
INJECTION, SOLUTION INTRAMUSCULAR; INTRAVENOUS AS NEEDED
Status: DISCONTINUED | OUTPATIENT
Start: 2020-02-11 | End: 2020-02-11 | Stop reason: SURG

## 2020-02-11 RX ORDER — PROPOFOL 10 MG/ML
INJECTION, EMULSION INTRAVENOUS AS NEEDED
Status: DISCONTINUED | OUTPATIENT
Start: 2020-02-11 | End: 2020-02-11 | Stop reason: SURG

## 2020-02-11 RX ORDER — FENTANYL CITRATE 50 UG/ML
INJECTION, SOLUTION INTRAMUSCULAR; INTRAVENOUS AS NEEDED
Status: DISCONTINUED | OUTPATIENT
Start: 2020-02-11 | End: 2020-02-11 | Stop reason: SURG

## 2020-02-11 RX ORDER — ONDANSETRON 2 MG/ML
4 INJECTION INTRAMUSCULAR; INTRAVENOUS ONCE AS NEEDED
Status: DISCONTINUED | OUTPATIENT
Start: 2020-02-11 | End: 2020-02-11 | Stop reason: HOSPADM

## 2020-02-11 RX ADMIN — SODIUM CHLORIDE, SODIUM LACTATE, POTASSIUM CHLORIDE, AND CALCIUM CHLORIDE: .6; .31; .03; .02 INJECTION, SOLUTION INTRAVENOUS at 13:59

## 2020-02-11 RX ADMIN — PROPOFOL 50 MG: 10 INJECTION, EMULSION INTRAVENOUS at 12:59

## 2020-02-11 RX ADMIN — SODIUM CHLORIDE, SODIUM LACTATE, POTASSIUM CHLORIDE, AND CALCIUM CHLORIDE: .6; .31; .03; .02 INJECTION, SOLUTION INTRAVENOUS at 12:16

## 2020-02-11 RX ADMIN — MIDAZOLAM 2 MG: 1 INJECTION INTRAMUSCULAR; INTRAVENOUS at 12:49

## 2020-02-11 RX ADMIN — IOHEXOL 300 ML: 350 INJECTION, SOLUTION INTRAVENOUS at 16:43

## 2020-02-11 RX ADMIN — LIDOCAINE HYDROCHLORIDE 50 MG: 10 INJECTION, SOLUTION EPIDURAL; INFILTRATION; INTRACAUDAL; PERINEURAL at 12:58

## 2020-02-11 RX ADMIN — FENTANYL CITRATE 50 MCG: 50 INJECTION, SOLUTION INTRAMUSCULAR; INTRAVENOUS at 12:58

## 2020-02-11 RX ADMIN — PROPOFOL 150 MG: 10 INJECTION, EMULSION INTRAVENOUS at 12:58

## 2020-02-11 RX ADMIN — LIDOCAINE HYDROCHLORIDE 0.5 ML: 10 INJECTION, SOLUTION EPIDURAL; INFILTRATION; INTRACAUDAL; PERINEURAL at 12:18

## 2020-02-11 RX ADMIN — FENTANYL CITRATE 50 MCG: 50 INJECTION, SOLUTION INTRAMUSCULAR; INTRAVENOUS at 13:17

## 2020-02-11 RX ADMIN — ROCURONIUM BROMIDE 30 MG: 50 INJECTION, SOLUTION INTRAVENOUS at 12:59

## 2020-02-11 RX ADMIN — SODIUM CHLORIDE, SODIUM LACTATE, POTASSIUM CHLORIDE, AND CALCIUM CHLORIDE 125 ML/HR: .6; .31; .03; .02 INJECTION, SOLUTION INTRAVENOUS at 12:17

## 2020-02-11 NOTE — PERIOPERATIVE NURSING NOTE
As per dr Arin Baugh md, ok to go to asc at this time to wait for swallow study at 1600  Will continue to monitor

## 2020-02-11 NOTE — DISCHARGE INSTRUCTIONS
Upper Endoscopy   WHAT YOU NEED TO KNOW:   An upper endoscopy is also called an upper gastrointestinal (GI) endoscopy, or an esophagogastroduodenoscopy (EGD)  You may feel bloated, gassy, or have some abdominal discomfort after your procedure  Your throat may be sore for 24 to 36 hours  You may burp or pass gas from air that is still inside your body  DISCHARGE INSTRUCTIONS:   Call 911 if:   · You have sudden chest pain or trouble breathing  Seek care immediately if:   · You feel dizzy or faint  · You have trouble swallowing  · You have severe throat pain  · Your bowel movements are very dark or black  · Your abdomen is hard and firm and you have severe pain  · You vomit blood  Contact your healthcare provider if:   · You feel full or bloated and cannot burp or pass gas  · You have not had a bowel movement for 3 days after your procedure  · You have neck pain  · You have a fever or chills  · You have nausea or are vomiting  · You have a rash or hives  · You have questions or concerns about your endoscopy  Relieve a sore throat:  Suck on throat lozenges or crushed ice  Gargle with a small amount of warm salt water  Mix 1 teaspoon of salt and 1 cup of warm water to make salt water  Relieve gas and discomfort from bloating:  Lie on your right side with a heating pad on your abdomen  Take short walks to help pass gas  Eat small meals until bloating is relieved  Rest after your procedure:  Do not drive or make important decisions until the day after your procedure  Return to your normal activity as directed  You can usually return to work the day after your procedure  Follow up with your healthcare provider as directed:  Write down your questions so you remember to ask them during your visits  © 2017 [employer]  Information is for End User's use only and may not be sold, redistributed or otherwise used for commercial purposes   All illustrations and images included in [employer] are the copyrighted property of [employer] , [employer]  or Soham Barlow  The above information is an  only  It is not intended as medical advice for individual conditions or treatments  Talk to your doctor, nurse or pharmacist before following any medical regimen to see if it is safe and effective for you

## 2020-02-11 NOTE — PERIOPERATIVE NURSING NOTE
Dr Jacky Vides md, to assess patient pacu bedside due to patient desaturating  No shortness of breath and patient remains asymptomatic at this time  Dr Jacky Vides md, ok with patient going to asc at this time  Will continue to monitor

## 2020-02-11 NOTE — ANESTHESIA POSTPROCEDURE EVALUATION
Post-Op Assessment Note    CV Status:  Stable  Pain Score: 0    Pain management: adequate     Mental Status:  Alert and awake   Hydration Status:  Euvolemic   PONV Controlled:  Controlled   Airway Patency:  Patent   Post Op Vitals Reviewed: Yes      Staff: CRNA           /67 (02/11/20 1358)    Temp 97 6 °F (36 4 °C) (02/11/20 1358)    Pulse 84 (02/11/20 1358)   Resp 18 (02/11/20 1358)    SpO2 100 % (02/11/20 1358)

## 2020-02-11 NOTE — INTERVAL H&P NOTE
H&P reviewed  After examining the patient I find no changes in the patients condition since the H&P had been written      Vitals:    02/11/20 1108   BP: 130/72   Pulse: 68   Resp: 16   Temp: 98 9 °F (37 2 °C)   SpO2: 95%

## 2020-02-11 NOTE — OP NOTE
OPERATIVE REPORT  PATIENT NAME: Kim Zuniga    :  1950  MRN: [id number]  Pt Location: [facility] OR ROOM 07    SURGERY DATE: 2020    Surgeon(s) and Role:     * Andrey Cifuentes MD - Primary     * Gianni Adams MD - Assisting     * Kelly Barillas - Observing    Preop Diagnosis:  Migration of esophageal stent, initial encounter [T85 528A]    Post-Op Diagnosis Codes:     * Migration of esophageal stent, initial encounter [T85 528A]    Procedure(s) (LRB):  EGD; REMOVAL OF ESOPHAGEAL STENTS X 3 (N/A)    Specimen(s):  ID Type Source Tests Collected by Time Destination   1 : ESOPHAGEAL STENTS X 3 Tissue Esophagus TISSUE EXAM Andrey Cifuentes MD 2020 1334        Estimated Blood Loss:   Minimal    Drains:  * No LDAs found *    Anesthesia Type:   General    Operative Indications:  Migration of esophageal stent, initial encounter [[id number]]  68-year-old male with history of stage IV esophageal cancer on lifelong chemotherapy who has had 3 esophageal stents placed for dysphagia  He has had good response to chemotherapy and these have all migrated into his stomach  He gets daily symptoms of pain and difficulty eating with these  Tolerating soft diet for now  Operative Findings:  Two stents free floating in the body of the stomach  Able to remove these individually  Once stent lodged into the gastric antrum and proximal duodenum  Reactive granulation tissue around this  Able to free this stent from the gastric wall and removed this intact  Repeat EGD showed now evidence of esophageal defect  Mild mucosal irritation  Complications:   None    Procedure and Technique:  After obtaining informed consent the patient was brought back to the operating room placed supine on the OR table  General anesthesia was induced without incident    A formal time-out was performed at this time verifying patient, date of birth, procedure, fire risk score, ASA score, antibiotic usage, need for blood products, anticipate specimens, beta-blocker usage, imaging and any other questions or concerns  At this point in adult endoscope was inserted into the patient's oropharynx and directed down the esophagus  Here we encountered evidence of his esophageal cancer at approximately 34 cm from the gum line  This was only obstructing approximately 20-30% of the esophageal lumen  We could easily passes with the adult endoscope  We then went into the stomach and could immediately visualize 2 free-floating stents in the body of the stomach  The green pull string on each of the stents was grasped individually with alligator forceps and the stents were removed through the esophagus and oropharynx without issue  A 3rd stent was visualized in the gastric antrum and proximal duodenum  The stent was flipped with the string distally and the original distal portion was imbedded in to the pylorus  There is moderate inflammatory tissue around this  Ultimately we are able to get fully around the stent and with manipulation with the forcep was able to free this from the gastric wall  This was flipped and the green string was grasped and the stent was removed again through the esophagus and oropharynx  Repeat endoscopy showed no evidence gastric antral/duodenal perforation  There is mild inflammatory tissue here  There was some minimal mucosal bleeding at the GE junction  The esophagus showed no major defect  There is mild mucosal irritation and bleeding but otherwise no abnormality  The stomach was fully suctioned out at this time and the patient was awoke without issue    We will get a swallow study once the patient as awake, prior to discharge      Patient was transferred to the PACU in stable condition       I was present for the entire procedure    Patient Disposition:  PACU     SIGNATURE: Flora Calvillo MD  DATE: February 11, 2020  TIME: 2:35 PM

## 2020-02-11 NOTE — ANESTHESIA PREPROCEDURE EVALUATION
Review of Systems/Medical History  Patient summary reviewed  Chart reviewed  No history of anesthetic complications     Cardiovascular  Hyperlipidemia, Hypertension ,    Pulmonary  Smoker ex-smoker  ,        GI/Hepatic  Dysphagia,   GERD , Esophageal disease (s/p esophageal stent with current migration of stent) esophageal cancer,        Negative  ROS        Endo/Other  Negative endo/other ROS      GYN  Negative gynecology ROS          Hematology  Negative hematology ROS      Musculoskeletal  Negative musculoskeletal ROS        Neurology  Negative neurology ROS      Psychology   Anxiety,     Comment: + marijuana         Physical Exam    Airway    Mallampati score: I  TM Distance: >3 FB  Neck ROM: full     Dental   Comment: edentulous,     Cardiovascular      Pulmonary      Other Findings        Anesthesia Plan  ASA Score- 2     Anesthesia Type- general with ASA Monitors  Additional Monitors:   Airway Plan: ETT  Plan Factors-    Induction- intravenous  Postoperative Plan- Plan for postoperative opioid use  Informed Consent- Anesthetic plan and risks discussed with patient  I personally reviewed this patient with the CRNA  Discussed and agreed on the Anesthesia Plan with the CRNA  Kayla Tejeda

## 2020-02-19 ENCOUNTER — HOSPITAL ENCOUNTER (OUTPATIENT)
Dept: INFUSION CENTER | Facility: CLINIC | Age: 70
Discharge: HOME | End: 2020-02-19
Payer: COMMERCIAL

## 2020-02-19 VITALS
WEIGHT: 201 LBS | OXYGEN SATURATION: 91 % | HEART RATE: 70 BPM | BODY MASS INDEX: 31.48 KG/M2 | TEMPERATURE: 98.3 F | SYSTOLIC BLOOD PRESSURE: 132 MMHG | RESPIRATION RATE: 18 BRPM | DIASTOLIC BLOOD PRESSURE: 72 MMHG

## 2020-02-19 DIAGNOSIS — C15.5 MALIGNANT NEOPLASM OF LOWER THIRD OF ESOPHAGUS (HCC): Primary | ICD-10-CM

## 2020-02-19 DIAGNOSIS — R13.10 ODYNOPHAGIA: ICD-10-CM

## 2020-02-19 PROCEDURE — 96413 CHEMO IV INFUSION 1 HR: CPT

## 2020-02-19 RX ORDER — SODIUM CHLORIDE 9 MG/ML
20 INJECTION, SOLUTION INTRAVENOUS ONCE
Status: COMPLETED | OUTPATIENT
Start: 2020-02-19 | End: 2020-02-19

## 2020-02-19 RX ADMIN — TRASTUZUMAB 562 MG: 150 INJECTION, POWDER, LYOPHILIZED, FOR SOLUTION INTRAVENOUS at 10:34

## 2020-02-19 RX ADMIN — SODIUM CHLORIDE 20 ML/HR: 0.9 INJECTION, SOLUTION INTRAVENOUS at 10:05

## 2020-02-19 NOTE — PROGRESS NOTES
Pt here for Herceptin for treatment of esophageal ca  EF from 3/5/19 is 60%  E-mail sent to the office to inquire about repeat Echo  Vitals stable  Callbell within reach; will continue to monitor

## 2020-02-19 NOTE — PROGRESS NOTES
Pt tolerated treatment well  Per e-mail from [facility] [place], repeat echo will be set up with pt's next Zoey appt in April  Pt declined AVS  Next appt reviewed c/ pt

## 2020-02-25 ENCOUNTER — TELEPHONE (OUTPATIENT)
Dept: SURGICAL ONCOLOGY | Facility: CLINIC | Age: 70
End: 2020-02-25

## 2020-02-25 NOTE — TELEPHONE ENCOUNTER
Spoke to Sandra Martínez to notify her that Savilla Pea does not need to f/u with Dr Vasyl Lockwood since the stents were removed laparoscopically but Savilla Pea still needs to f/u with Dr Lisa Martínez verbally understands

## 2020-03-11 ENCOUNTER — HOSPITAL ENCOUNTER (OUTPATIENT)
Dept: INFUSION CENTER | Facility: CLINIC | Age: 70
Discharge: HOME | End: 2020-03-11
Payer: COMMERCIAL

## 2020-03-11 VITALS
WEIGHT: 205.5 LBS | SYSTOLIC BLOOD PRESSURE: 155 MMHG | TEMPERATURE: 97.9 F | BODY MASS INDEX: 32.19 KG/M2 | DIASTOLIC BLOOD PRESSURE: 78 MMHG | HEART RATE: 68 BPM | OXYGEN SATURATION: 94 % | RESPIRATION RATE: 18 BRPM

## 2020-03-11 DIAGNOSIS — R13.10 ODYNOPHAGIA: ICD-10-CM

## 2020-03-11 DIAGNOSIS — C15.5 MALIGNANT NEOPLASM OF LOWER THIRD OF ESOPHAGUS (HCC): Primary | ICD-10-CM

## 2020-03-11 PROCEDURE — 96413 CHEMO IV INFUSION 1 HR: CPT

## 2020-03-11 RX ORDER — SODIUM CHLORIDE 9 MG/ML
20 INJECTION, SOLUTION INTRAVENOUS ONCE
Status: COMPLETED | OUTPATIENT
Start: 2020-03-11 | End: 2020-03-11

## 2020-03-11 RX ADMIN — TRASTUZUMAB 562 MG: 150 INJECTION, POWDER, LYOPHILIZED, FOR SOLUTION INTRAVENOUS at 09:42

## 2020-03-11 RX ADMIN — SODIUM CHLORIDE 20 ML/HR: 0.9 INJECTION, SOLUTION INTRAVENOUS at 08:55

## 2020-03-11 NOTE — PROGRESS NOTES
Pt here for Herceptin infusion  Port accessed, blood return noted and is infusing with YAMINI @ [facility]  Pt has no complaints at this time and is resting comfortably 
Pt tolerated treatment well with no complications  Pt aware of future appts  Pt made aware to call Dr Inga Wood and see if he is to have another ECHO before his next appt with him in April    Declined AVS 
Ambulatory

## 2020-04-01 ENCOUNTER — HOSPITAL ENCOUNTER (OUTPATIENT)
Dept: INFUSION CENTER | Facility: CLINIC | Age: 70
Discharge: HOME | End: 2020-04-01
Payer: COMMERCIAL

## 2020-04-01 VITALS
OXYGEN SATURATION: 95 % | HEART RATE: 78 BPM | WEIGHT: 207 LBS | RESPIRATION RATE: 18 BRPM | HEIGHT: 67 IN | BODY MASS INDEX: 32.49 KG/M2 | TEMPERATURE: 97.8 F | DIASTOLIC BLOOD PRESSURE: 75 MMHG | SYSTOLIC BLOOD PRESSURE: 132 MMHG

## 2020-04-01 DIAGNOSIS — C15.5 MALIGNANT NEOPLASM OF LOWER THIRD OF ESOPHAGUS (HCC): Primary | ICD-10-CM

## 2020-04-01 DIAGNOSIS — R13.10 ODYNOPHAGIA: ICD-10-CM

## 2020-04-01 PROCEDURE — 96413 CHEMO IV INFUSION 1 HR: CPT

## 2020-04-01 RX ORDER — SODIUM CHLORIDE 9 MG/ML
20 INJECTION, SOLUTION INTRAVENOUS ONCE
Status: COMPLETED | OUTPATIENT
Start: 2020-04-01 | End: 2020-04-01

## 2020-04-01 RX ADMIN — SODIUM CHLORIDE 20 ML/HR: 0.9 INJECTION, SOLUTION INTRAVENOUS at 09:30

## 2020-04-01 RX ADMIN — TRASTUZUMAB 562 MG: 150 INJECTION, POWDER, LYOPHILIZED, FOR SOLUTION INTRAVENOUS at 10:09

## 2020-04-06 ENCOUNTER — TELEPHONE (OUTPATIENT)
Dept: HEMATOLOGY ONCOLOGY | Facility: CLINIC | Age: 70
End: 2020-04-06

## 2020-04-06 DIAGNOSIS — F41.9 ANXIETY: ICD-10-CM

## 2020-04-07 RX ORDER — LORAZEPAM 0.5 MG/1
0.5 TABLET ORAL 2 TIMES DAILY PRN
Qty: 60 TABLET | Refills: 0 | Status: SHIPPED | OUTPATIENT
Start: 2020-04-07 | End: 2020-01-01 | Stop reason: SDUPTHER

## 2020-04-21 ENCOUNTER — TELEPHONE (OUTPATIENT)
Dept: HEMATOLOGY ONCOLOGY | Facility: CLINIC | Age: 70
End: 2020-04-21

## 2020-04-22 ENCOUNTER — OFFICE VISIT (OUTPATIENT)
Dept: HEMATOLOGY ONCOLOGY | Facility: CLINIC | Age: 70
End: 2020-04-22
Payer: COMMERCIAL

## 2020-04-22 ENCOUNTER — HOSPITAL ENCOUNTER (OUTPATIENT)
Dept: INFUSION CENTER | Facility: CLINIC | Age: 70
Discharge: HOME | End: 2020-04-22
Payer: COMMERCIAL

## 2020-04-22 VITALS
DIASTOLIC BLOOD PRESSURE: 60 MMHG | HEART RATE: 70 BPM | RESPIRATION RATE: 18 BRPM | TEMPERATURE: 98.2 F | SYSTOLIC BLOOD PRESSURE: 122 MMHG | WEIGHT: 208.5 LBS | BODY MASS INDEX: 32.72 KG/M2 | OXYGEN SATURATION: 91 % | HEIGHT: 67 IN

## 2020-04-22 VITALS
HEIGHT: 67 IN | SYSTOLIC BLOOD PRESSURE: 156 MMHG | HEART RATE: 76 BPM | BODY MASS INDEX: 32.44 KG/M2 | TEMPERATURE: 98.1 F | OXYGEN SATURATION: 96 % | WEIGHT: 206.7 LBS | RESPIRATION RATE: 18 BRPM | DIASTOLIC BLOOD PRESSURE: 76 MMHG

## 2020-04-22 DIAGNOSIS — C15.5 MALIGNANT NEOPLASM OF LOWER THIRD OF ESOPHAGUS (HCC): Primary | ICD-10-CM

## 2020-04-22 DIAGNOSIS — R13.10 ODYNOPHAGIA: ICD-10-CM

## 2020-04-22 DIAGNOSIS — B02.9 HERPES ZOSTER WITHOUT COMPLICATION: ICD-10-CM

## 2020-04-22 PROCEDURE — 99215 OFFICE O/P EST HI 40 MIN: CPT | Performed by: INTERNAL MEDICINE

## 2020-04-22 PROCEDURE — 96413 CHEMO IV INFUSION 1 HR: CPT

## 2020-04-22 RX ORDER — SODIUM CHLORIDE 9 MG/ML
20 INJECTION, SOLUTION INTRAVENOUS ONCE
Status: COMPLETED | OUTPATIENT
Start: 2020-04-22 | End: 2020-04-22

## 2020-04-22 RX ORDER — SODIUM CHLORIDE 9 MG/ML
20 INJECTION, SOLUTION INTRAVENOUS ONCE
Status: CANCELLED | OUTPATIENT
Start: 2020-05-20

## 2020-04-22 RX ORDER — SODIUM CHLORIDE 9 MG/ML
20 INJECTION, SOLUTION INTRAVENOUS ONCE
Status: CANCELLED | OUTPATIENT
Start: 2020-07-15

## 2020-04-22 RX ORDER — SODIUM CHLORIDE 9 MG/ML
20 INJECTION, SOLUTION INTRAVENOUS ONCE
Status: CANCELLED | OUTPATIENT
Start: 2020-06-17

## 2020-04-22 RX ORDER — VALACYCLOVIR HYDROCHLORIDE 1 G/1
1000 TABLET, FILM COATED ORAL 3 TIMES DAILY
Qty: 21 TABLET | Refills: 0 | Status: SHIPPED | OUTPATIENT
Start: 2020-04-22 | End: 2020-01-01 | Stop reason: ALTCHOICE

## 2020-04-22 RX ADMIN — SODIUM CHLORIDE 20 ML/HR: 0.9 INJECTION, SOLUTION INTRAVENOUS at 09:05

## 2020-04-22 RX ADMIN — TRASTUZUMAB 562 MG: 150 INJECTION, POWDER, LYOPHILIZED, FOR SOLUTION INTRAVENOUS at 10:00

## 2020-05-20 ENCOUNTER — HOSPITAL ENCOUNTER (OUTPATIENT)
Dept: INFUSION CENTER | Facility: CLINIC | Age: 70
Discharge: HOME | End: 2020-05-20
Payer: COMMERCIAL

## 2020-05-20 VITALS
WEIGHT: 203.5 LBS | HEART RATE: 64 BPM | DIASTOLIC BLOOD PRESSURE: 74 MMHG | TEMPERATURE: 97.6 F | RESPIRATION RATE: 18 BRPM | SYSTOLIC BLOOD PRESSURE: 132 MMHG | OXYGEN SATURATION: 95 % | BODY MASS INDEX: 31.87 KG/M2

## 2020-05-20 DIAGNOSIS — R13.10 ODYNOPHAGIA: ICD-10-CM

## 2020-05-20 DIAGNOSIS — C15.5 MALIGNANT NEOPLASM OF LOWER THIRD OF ESOPHAGUS (HCC): Primary | ICD-10-CM

## 2020-05-20 PROCEDURE — 96413 CHEMO IV INFUSION 1 HR: CPT

## 2020-05-20 RX ORDER — SODIUM CHLORIDE 9 MG/ML
20 INJECTION, SOLUTION INTRAVENOUS ONCE
Status: COMPLETED | OUTPATIENT
Start: 2020-05-20 | End: 2020-05-20

## 2020-05-20 RX ADMIN — SODIUM CHLORIDE 20 ML/HR: 0.9 INJECTION, SOLUTION INTRAVENOUS at 09:27

## 2020-05-20 RX ADMIN — TRASTUZUMAB 562 MG: 150 INJECTION, POWDER, LYOPHILIZED, FOR SOLUTION INTRAVENOUS at 09:51

## 2020-06-17 ENCOUNTER — HOSPITAL ENCOUNTER (OUTPATIENT)
Dept: INFUSION CENTER | Facility: CLINIC | Age: 70
Discharge: HOME | End: 2020-06-17
Payer: COMMERCIAL

## 2020-06-17 VITALS
DIASTOLIC BLOOD PRESSURE: 78 MMHG | RESPIRATION RATE: 18 BRPM | BODY MASS INDEX: 31.86 KG/M2 | SYSTOLIC BLOOD PRESSURE: 146 MMHG | OXYGEN SATURATION: 99 % | HEART RATE: 64 BPM | WEIGHT: 203 LBS | TEMPERATURE: 97.5 F | HEIGHT: 67 IN

## 2020-06-17 DIAGNOSIS — R13.10 ODYNOPHAGIA: ICD-10-CM

## 2020-06-17 DIAGNOSIS — C15.5 MALIGNANT NEOPLASM OF LOWER THIRD OF ESOPHAGUS (HCC): Primary | ICD-10-CM

## 2020-06-17 PROCEDURE — 96413 CHEMO IV INFUSION 1 HR: CPT

## 2020-06-17 RX ORDER — SODIUM CHLORIDE 9 MG/ML
20 INJECTION, SOLUTION INTRAVENOUS ONCE
Status: COMPLETED | OUTPATIENT
Start: 2020-06-17 | End: 2020-06-17

## 2020-06-17 RX ADMIN — TRASTUZUMAB 562 MG: 150 INJECTION, POWDER, LYOPHILIZED, FOR SOLUTION INTRAVENOUS at 10:15

## 2020-06-17 RX ADMIN — SODIUM CHLORIDE 20 ML/HR: 0.9 INJECTION, SOLUTION INTRAVENOUS at 09:05

## 2020-07-02 ENCOUNTER — TELEPHONE (OUTPATIENT)
Dept: HEMATOLOGY ONCOLOGY | Facility: CLINIC | Age: 70
End: 2020-07-02

## 2020-07-02 ENCOUNTER — APPOINTMENT (OUTPATIENT)
Dept: LAB | Facility: CLINIC | Age: 70
End: 2020-07-02
Payer: COMMERCIAL

## 2020-07-02 DIAGNOSIS — C15.5 MALIGNANT NEOPLASM OF LOWER THIRD OF ESOPHAGUS (HCC): ICD-10-CM

## 2020-07-02 DIAGNOSIS — C15.5 MALIGNANT NEOPLASM OF LOWER THIRD OF ESOPHAGUS (HCC): Primary | ICD-10-CM

## 2020-07-02 LAB
ALBUMIN SERPL BCP-MCNC: 3.9 G/DL (ref 3.5–5)
ALP SERPL-CCNC: 61 U/L (ref 46–116)
ALT SERPL W P-5'-P-CCNC: 38 U/L (ref 12–78)
ANION GAP SERPL CALCULATED.3IONS-SCNC: 10 MMOL/L (ref 4–13)
AST SERPL W P-5'-P-CCNC: 20 U/L (ref 5–45)
BILIRUB SERPL-MCNC: 0.47 MG/DL (ref 0.2–1)
BUN SERPL-MCNC: 13 MG/DL (ref 5–25)
CALCIUM SERPL-MCNC: 8.7 MG/DL (ref 8.3–10.1)
CHLORIDE SERPL-SCNC: 102 MMOL/L (ref 100–108)
CO2 SERPL-SCNC: 25 MMOL/L (ref 21–32)
CREAT SERPL-MCNC: 1.23 MG/DL (ref 0.6–1.3)
GFR SERPL CREATININE-BSD FRML MDRD: 59 ML/MIN/1.73SQ M
GLUCOSE P FAST SERPL-MCNC: 147 MG/DL (ref 65–99)
POTASSIUM SERPL-SCNC: 4.4 MMOL/L (ref 3.5–5.3)
PROT SERPL-MCNC: 7.1 G/DL (ref 6.4–8.2)
SODIUM SERPL-SCNC: 137 MMOL/L (ref 136–145)

## 2020-07-02 PROCEDURE — 80053 COMPREHEN METABOLIC PANEL: CPT

## 2020-07-02 PROCEDURE — 36415 COLL VENOUS BLD VENIPUNCTURE: CPT

## 2020-07-02 NOTE — TELEPHONE ENCOUNTER
Called patient left a message stating that before he has his catscan done on 7/6 that there are some labs that need to be drawn before he has that done  Orders are already in the system as long as patient goes to a [facility] lab  Stated that if he has any questions to please give the office a call back

## 2020-07-02 NOTE — TELEPHONE ENCOUNTER
Pinky from radiology called to advise the patient will need a BMP completed prior top his 7/6 CT chest abdomen pelvis w contrast ordered by Dr Obdulia Guajardo

## 2020-07-06 ENCOUNTER — HOSPITAL ENCOUNTER (OUTPATIENT)
Dept: RADIOLOGY | Facility: MEDICAL CENTER | Age: 70
Discharge: HOME | End: 2020-07-06
Payer: COMMERCIAL

## 2020-07-06 DIAGNOSIS — C15.5 MALIGNANT NEOPLASM OF LOWER THIRD OF ESOPHAGUS (HCC): ICD-10-CM

## 2020-07-06 DIAGNOSIS — R13.10 ODYNOPHAGIA: ICD-10-CM

## 2020-07-06 PROCEDURE — 71260 CT THORAX DX C+: CPT

## 2020-07-06 PROCEDURE — 74177 CT ABD & PELVIS W/CONTRAST: CPT

## 2020-07-06 RX ADMIN — IOHEXOL 100 ML: 350 INJECTION, SOLUTION INTRAVENOUS at 11:52

## 2020-07-08 ENCOUNTER — HOSPITAL ENCOUNTER (OUTPATIENT)
Dept: NON INVASIVE DIAGNOSTICS | Facility: CLINIC | Age: 70
Discharge: HOME | End: 2020-07-08
Payer: COMMERCIAL

## 2020-07-08 DIAGNOSIS — R13.10 ODYNOPHAGIA: ICD-10-CM

## 2020-07-08 DIAGNOSIS — C15.5 MALIGNANT NEOPLASM OF LOWER THIRD OF ESOPHAGUS (HCC): ICD-10-CM

## 2020-07-08 PROCEDURE — 93306 TTE W/DOPPLER COMPLETE: CPT | Performed by: INTERNAL MEDICINE

## 2020-07-08 PROCEDURE — 93306 TTE W/DOPPLER COMPLETE: CPT

## 2020-07-13 ENCOUNTER — TELEPHONE (OUTPATIENT)
Dept: HEMATOLOGY ONCOLOGY | Facility: CLINIC | Age: 70
End: 2020-07-13

## 2020-07-15 ENCOUNTER — OFFICE VISIT (OUTPATIENT)
Dept: HEMATOLOGY ONCOLOGY | Facility: CLINIC | Age: 70
End: 2020-07-15
Payer: COMMERCIAL

## 2020-07-15 ENCOUNTER — TELEPHONE (OUTPATIENT)
Dept: HEMATOLOGY ONCOLOGY | Facility: CLINIC | Age: 70
End: 2020-07-15

## 2020-07-15 ENCOUNTER — HOSPITAL ENCOUNTER (OUTPATIENT)
Dept: INFUSION CENTER | Facility: CLINIC | Age: 70
Discharge: HOME | End: 2020-07-15
Payer: COMMERCIAL

## 2020-07-15 VITALS
HEART RATE: 66 BPM | WEIGHT: 205 LBS | HEIGHT: 67 IN | BODY MASS INDEX: 32.18 KG/M2 | RESPIRATION RATE: 18 BRPM | DIASTOLIC BLOOD PRESSURE: 72 MMHG | TEMPERATURE: 97.7 F | SYSTOLIC BLOOD PRESSURE: 124 MMHG | OXYGEN SATURATION: 97 %

## 2020-07-15 VITALS
WEIGHT: 204.5 LBS | BODY MASS INDEX: 32.03 KG/M2 | HEART RATE: 67 BPM | OXYGEN SATURATION: 96 % | DIASTOLIC BLOOD PRESSURE: 80 MMHG | SYSTOLIC BLOOD PRESSURE: 167 MMHG | RESPIRATION RATE: 18 BRPM

## 2020-07-15 DIAGNOSIS — R13.10 ODYNOPHAGIA: ICD-10-CM

## 2020-07-15 DIAGNOSIS — C15.5 MALIGNANT NEOPLASM OF LOWER THIRD OF ESOPHAGUS (HCC): Primary | ICD-10-CM

## 2020-07-15 DIAGNOSIS — B02.29 POSTHERPETIC NEURALGIA: ICD-10-CM

## 2020-07-15 PROCEDURE — 99214 OFFICE O/P EST MOD 30 MIN: CPT | Performed by: INTERNAL MEDICINE

## 2020-07-15 PROCEDURE — 96413 CHEMO IV INFUSION 1 HR: CPT

## 2020-07-15 RX ORDER — SODIUM CHLORIDE 9 MG/ML
20 INJECTION, SOLUTION INTRAVENOUS ONCE
Status: CANCELLED | OUTPATIENT
Start: 2020-01-01

## 2020-07-15 RX ORDER — SODIUM CHLORIDE 9 MG/ML
20 INJECTION, SOLUTION INTRAVENOUS ONCE
Status: COMPLETED | OUTPATIENT
Start: 2020-07-15 | End: 2020-07-15

## 2020-07-15 RX ORDER — SODIUM CHLORIDE 9 MG/ML
20 INJECTION, SOLUTION INTRAVENOUS ONCE
Status: CANCELLED | OUTPATIENT
Start: 2020-08-12

## 2020-07-15 RX ORDER — LIDOCAINE 50 MG/G
OINTMENT TOPICAL AS NEEDED
Qty: 35.44 G | Refills: 1 | Status: SHIPPED | OUTPATIENT
Start: 2020-07-15 | End: 2020-07-16

## 2020-07-15 RX ADMIN — SODIUM CHLORIDE 20 ML/HR: 0.9 INJECTION, SOLUTION INTRAVENOUS at 10:16

## 2020-07-15 RX ADMIN — TRASTUZUMAB 562 MG: 150 INJECTION, POWDER, LYOPHILIZED, FOR SOLUTION INTRAVENOUS at 10:37

## 2020-07-15 NOTE — TELEPHONE ENCOUNTER
Received prior auth request from [facility] for patient's Lidocaine     Email has been sent to the Oncology Finance Team

## 2020-07-15 NOTE — PROGRESS NOTES
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  His pulmonary metastasis was confirmed by biopsy  He has HER-2 positive disease   He completed 12 cycle of FOLFOX -6 with trastuzumab with good partial response   He is currently on trastuzumab monotherapy as maintenance therapy with no cardiac toxicity  Based on recent CT scan, he appeared to be in near complete response  I recommended him to continue with trastuzumab every 4 weeks  He seems to have post herpetic neuralgia  I prescribed topical lidocaine cream   He is in agreement with my recommendation  I will see him again in 4 months with CBC and CMP                                   Subjective:      HPI:             Interval History:  A 70 year old gentleman with no significant past medical history  He was hospitalized in early 2017 with progressive dysphagia  However, he had minimal weight loss  He was found to have mass in distal esophagus, based on the EGD  Biopsy was positive for adenocarcinoma with mucinous features  CT scan of chest, abdomen pelvis showed not only mass in the distal esophagus, but also multiple pulmonary masses, consistent with metastatic disease  He also had retroperitoneal adenopathy measuring 2 1 cm  Lung biopsy showed adenocarcinoma, consistent with esophageal primary  His tumor was subsequently tested for HER-2 by immunohistochemistry as well as fish  Tumor from esophagus was HER-2 3+ and Fish positive with ratio of 6 5  Tumor in the lung was HER-2 negative  HER-2 Fish was also negative  This was considered to be HER-2 positive disease   Therefore, she started systemic chemotherapy with trastuzumab and FOLFOX-6  After 3 months of treatment, CT scan showed partial response   He received 12 cycle of FOLFOX which was completed in February 2018 with slowly progressive neuropathy  Tiffani Garcia is currently on trastuzumab monotherapy every 4 weeks as maintenance therapy  In April 2020, he developed shingles  He was treated with antiviral treatment  He is to have some sensitivity at his right chest cage  He has no complaint of pain  His weight is stable  He has mild dysphagia  He has no respiratory symptoms  Recent CT scan showed no evidence of metastatic disease  He has normal ejection fraction based on the recent echocardiogram   His performance status is normal                                                 Objective:      Primary Diagnosis:     Metastatic adenocarcinoma of distal esophagus with multiple lung metastasis as well as retroperitoneal adenopathy, HER-2 positive disease  diagnosed in July 2017       Cancer Staging:  No matching staging information was found for the patient         Previous Hematologic/ Oncologic Treatment:       FOLFOX-6 with trastuzumab times 12 cycles   Completed in February 2018      Current Hematologic/ Oncologic Treatment:       Trastuzumab monotherapy since February 2018      Disease Status:      Near complete response      Test Results:     Pathology:     Biopsy from December esophagus showed adenocarcinoma  biopsy showed adenocarcinoma with mucinous features  TTF-1 negative  from esophagus was HER-2 3+ and Fish positive with ratio of 6 5  from long was HER-2 negative and Fish negative        Radiology:     CT scan of chest abdomen pelvis in July 2020 showed no evidence of metastatic disease      Echocardiogram in July 2020 showed ejection fraction 60%       Laboratory:           Physical Exam:        General Appearance:    Alert, oriented          Eyes:    PERRL   Ears:    Normal external ear canals, both ears   Nose:   Nares normal, septum midline   Throat:   Mucosa moist  Pharynx without injection  Neck:   Supple         Lungs:     Clear to auscultation bilaterally   Chest Wall:    No tenderness or deformity    Heart:    Regular rate and rhythm         Abdomen:     Soft, non-tender, bowel sounds +, no organomegaly               Extremities:   Extremities no cyanosis or edema         Skin:   Band like erythematous rash around right ptosis or consistent with shingle  Lymph nodes:   Cervical, supraclavicular, and axillary nodes normal   Neurologic:   CNII-XII intact, normal strength, sensation and reflexes     Throughout             Breast exam: Not performed  ROS: Review of Systems   All other systems reviewed and are negative  Imaging: Ct Chest Abdomen Pelvis W Contrast    Result Date: 7/9/2020  Narrative: CT CHEST, ABDOMEN AND PELVIS WITH IV CONTRAST INDICATION:   C15 5: Malignant neoplasm of lower third of esophagus R13 10: Dysphagia, unspecified  COMPARISON:  CT chest abdomen and pelvis 12/30/2019 and CT chest 6/29/2018 TECHNIQUE: CT examination of the chest, abdomen and pelvis was performed  Axial, sagittal, and coronal 2D reformatted images were created from the source data and submitted for interpretation  Radiation dose length product (DLP) for this visit:  832 mGy-cm   This examination, like all CT scans performed in the [facility], was performed utilizing techniques to minimize radiation dose exposure, including the use of iterative reconstruction and automated exposure control  IV Contrast:  100 mL of iohexol (OMNIPAQUE)   350 Enteric Contrast: Enteric contrast was administered  FINDINGS: CHEST LUNGS:  3 mm subpleural nodule in the lingula image 76 series 3 unchanged as far back as June 2018  Stable bilateral fissural intrapulmonary lymph nodes  No new suspicious lung nodule  Stable bilateral multi lobar subpleural reticular scarring  No infiltrate  Central airways are clear  PLEURA:  Unremarkable   HEART/GREAT VESSELS:  Heart is not enlarged  Stable trace pericardial effusion  Aortic and coronary artery calcification  Tip of portacatheter in the superior cavoatrial junction  MEDIASTINUM AND ZAYNAB:  Stable minimal thickening of the distal esophagus  No enlarged lymph nodes  CHEST WALL AND LOWER NECK:   Right anterior chest wall maya catheter  Stable tiny clips or punctate calcifications adjacent to right lobe of thyroid gland and right superior mediastinum  ABDOMEN LIVER/BILIARY TREE:  Unremarkable  GALLBLADDER:  No calcified gallstones  No pericholecystic inflammatory change  SPLEEN:  Unremarkable  PANCREAS:  Unremarkable  ADRENAL GLANDS:  Unremarkable  KIDNEYS/URETERS:  Unremarkable  No hydronephrosis  STOMACH AND BOWEL:  Ascending through sigmoid colonic diverticulosis without immediate adjacent stranding  Interval removal of esophageal stents from the stomach  No bowel obstruction  Minimal smooth thickening at the gastroduodenal junction and second duodenal segment  APPENDIX:  A normal appendix was visualized  ABDOMINOPELVIC CAVITY:  No ascites  No pneumoperitoneum  No lymphadenopathy  VESSELS:  Aortoiliac calcification  Stable minimal distal infrarenal aortic ectasia maximum diameter 2 4 cm  Stable focal moderate proximal right common iliac artery stenosis  PELVIS REPRODUCTIVE ORGANS:  Unremarkable for patient's age  URINARY BLADDER:  Unremarkable  ABDOMINAL WALL/INGUINAL REGIONS:  Stable small fat-containing left periumbilical hernia  OSSEOUS STRUCTURES:  No acute fracture or osseous destructive lesion identified  Degenerative changes of the spine, pubic symphysis, and multiple joints  Impression: CT chest: Stable minimal thickening of the distal esophagus  No evidence of metastatic disease  Stable lingular nodule and bilateral fissural intrapulmonary lymph nodes unchanged as far back as June 2018  CT abdomen and pelvis: No evidence of abdominopelvic metastatic disease   Interval removal of migrated esophageal stents from the stomach and proximal duodenum  Minimal nonspecific submucosal thickening gastroduodenal junction and proximal second duodenal segment may be sequela of chronic peptic ulcer disease  Otherwise, no significant interval change  Workstation performed: [id number]         Labs:   Lab Results   Component Value Date    WBC 6 99 02/05/2020    HGB 13 9 02/05/2020    HCT 42 3 02/05/2020    MCV 92 02/05/2020     02/05/2020     Lab Results   Component Value Date    K 4 4 07/02/2020     07/02/2020    CO2 25 07/02/2020    BUN 13 07/02/2020    CREATININE 1 23 07/02/2020    GLUCOSE 117 08/09/2017    GLUF 147 (H) 07/02/2020    CALCIUM 8 7 07/02/2020    AST 20 07/02/2020    ALT 38 07/02/2020    ALKPHOS 61 07/02/2020    EGFR 59 07/02/2020           Current Medications: Reviewed  Allergies: Reviewed  PMH/FH/SH:  Reviewed      Vital Sign:    Body surface area is 2 04 meters squared      Wt Readings from Last 3 Encounters:   07/15/20 93 kg (205 lb)   06/17/20 92 1 kg (203 lb)   05/20/20 92 3 kg (203 lb 8 oz)        Temp Readings from Last 3 Encounters:   07/15/20 97 7 °F (36 5 °C) (Tympanic Core)   06/17/20 97 5 °F (36 4 °C) (Temporal)   05/20/20 97 6 °F (36 4 °C) (Temporal)        BP Readings from Last 3 Encounters:   07/15/20 124/72   06/17/20 146/78   05/20/20 132/74         Pulse Readings from Last 3 Encounters:   07/15/20 66   06/17/20 64   05/20/20 64     @LASTSAO2(3)@

## 2020-07-15 NOTE — PROGRESS NOTES
Pt here for Herceptin infusion  EF is 60% from ECHO completed 7/8/2020  Port accessed and blood return noted with NSS infusing @ [facility]  Pt resting comfortably and has no further questions or concerns at this time  Call bell with in reach

## 2020-07-15 NOTE — PROGRESS NOTES
Pt tolerated treatment well with no complications  Port flushed and blood return noted before de accessing  Pt aware of future appts   Declined AVS

## 2020-07-16 DIAGNOSIS — Z95.828 PORT-A-CATH IN PLACE: Primary | ICD-10-CM

## 2020-07-16 RX ORDER — LIDOCAINE AND PRILOCAINE 25; 25 MG/G; MG/G
CREAM TOPICAL AS NEEDED
Qty: 30 G | Refills: 0 | Status: SHIPPED | OUTPATIENT
Start: 2020-07-16 | End: 2020-01-01 | Stop reason: ALTCHOICE

## 2020-07-17 ENCOUNTER — DOCUMENTATION (OUTPATIENT)
Dept: HEMATOLOGY ONCOLOGY | Facility: CLINIC | Age: 70
End: 2020-07-17

## 2020-07-17 NOTE — PROGRESS NOTES
7/16/2020 received notification from [employer] [facility] that the lidocaine 5% cream needs authorization  Pt has [address]  ID # [id number]  BIN # [id number]  N # [id number]  [facility] # [id number]    Submitted for auth through cover my meds  The notification I received was that this is non -formulary  Lidocaine-Prilocaine cream is the formulary  Reached out to clinical to see if Dr Kenny Hubbard would change the order  Per clinical, they will change to the Lidocaine-Prilocaine cream       7/17/2020 submitted for auth through cover my meds    Per the response, no Abbey Meneses is needed

## 2020-08-12 ENCOUNTER — HOSPITAL ENCOUNTER (OUTPATIENT)
Dept: INFUSION CENTER | Facility: CLINIC | Age: 70
Discharge: HOME | End: 2020-08-12
Payer: COMMERCIAL

## 2020-08-12 VITALS
TEMPERATURE: 98.3 F | BODY MASS INDEX: 31.64 KG/M2 | SYSTOLIC BLOOD PRESSURE: 132 MMHG | DIASTOLIC BLOOD PRESSURE: 72 MMHG | HEART RATE: 62 BPM | WEIGHT: 202 LBS | RESPIRATION RATE: 16 BRPM | OXYGEN SATURATION: 97 %

## 2020-08-12 DIAGNOSIS — R13.10 ODYNOPHAGIA: ICD-10-CM

## 2020-08-12 DIAGNOSIS — C15.5 MALIGNANT NEOPLASM OF LOWER THIRD OF ESOPHAGUS (HCC): Primary | ICD-10-CM

## 2020-08-12 PROCEDURE — 96413 CHEMO IV INFUSION 1 HR: CPT

## 2020-08-12 RX ORDER — SODIUM CHLORIDE 9 MG/ML
20 INJECTION, SOLUTION INTRAVENOUS ONCE
Status: COMPLETED | OUTPATIENT
Start: 2020-08-12 | End: 2020-08-12

## 2020-08-12 RX ADMIN — SODIUM CHLORIDE 20 ML/HR: 0.9 INJECTION, SOLUTION INTRAVENOUS at 14:15

## 2020-08-12 RX ADMIN — TRASTUZUMAB 562 MG: 150 INJECTION, POWDER, LYOPHILIZED, FOR SOLUTION INTRAVENOUS at 14:43

## 2020-08-12 NOTE — PROGRESS NOTES
Pt here for Herceptin infusion  EF is 60% from ECHO completed on 7/8/2020  Port accessed and blood return noted with NSS infusing @ [facility]  Pt resting comfortably and has no further questions or concerns at this time  Call bell with in reach

## 2020-08-19 NOTE — TELEPHONE ENCOUNTER
Patient's caregiver Janett Hager called to ask if patient should be seen for Infusion in November   Please give Janett Hager a call back at [phone]

## 2020-08-19 NOTE — TELEPHONE ENCOUNTER
Patient's wife called, she would like November herceptin appt scheduled directly after f/u with Dr Brigido Velasco  Will send request to RN to have appt arranged

## 2020-08-19 NOTE — TELEPHONE ENCOUNTER
Left VM for patient to let him know that he was scheduled for infusion on 11/11 at 9am  His appointment with Dr Cassandria Lombard is on 11/11 at 820am  Left call back number if needed

## 2020-09-09 NOTE — PROGRESS NOTES
Pt resting with no complaints, vitals stable, labs drawn for other provider, not required for this treatment, call bell within reach  Will continue to monitor

## 2020-11-30 PROBLEM — I73.9 PERIPHERAL ARTERIAL DISEASE (HCC): Status: ACTIVE | Noted: 2020-01-01

## 2020-11-30 PROBLEM — I65.23 BILATERAL CAROTID ARTERY STENOSIS: Status: ACTIVE | Noted: 2020-01-01

## 2020-11-30 PROBLEM — E78.5 HYPERLIPIDEMIA: Chronic | Status: RESOLVED | Noted: 2017-08-09 | Resolved: 2020-01-01

## 2020-11-30 PROBLEM — I65.22 STENOSIS OF LEFT CAROTID ARTERY: Status: ACTIVE | Noted: 2020-01-01

## 2020-12-09 PROBLEM — R19.5 POSITIVE COLORECTAL CANCER SCREENING USING COLOGUARD TEST: Status: ACTIVE | Noted: 2020-01-01

## 2021-01-01 ENCOUNTER — APPOINTMENT (OUTPATIENT)
Dept: RADIATION ONCOLOGY | Facility: CLINIC | Age: 71
End: 2021-01-01
Attending: RADIOLOGY
Payer: COMMERCIAL

## 2021-01-01 ENCOUNTER — APPOINTMENT (INPATIENT)
Dept: RADIOLOGY | Facility: HOSPITAL | Age: 71
DRG: 871 | End: 2021-01-01
Payer: COMMERCIAL

## 2021-01-01 ENCOUNTER — APPOINTMENT (OUTPATIENT)
Dept: RADIATION ONCOLOGY | Facility: HOSPITAL | Age: 71
End: 2021-01-01
Attending: RADIOLOGY
Payer: COMMERCIAL

## 2021-01-01 ENCOUNTER — APPOINTMENT (OUTPATIENT)
Dept: RADIOLOGY | Facility: HOSPITAL | Age: 71
End: 2021-01-01
Payer: COMMERCIAL

## 2021-01-01 ENCOUNTER — APPOINTMENT (INPATIENT)
Dept: CT IMAGING | Facility: HOSPITAL | Age: 71
DRG: 871 | End: 2021-01-01
Payer: COMMERCIAL

## 2021-01-01 ENCOUNTER — OFFICE VISIT (OUTPATIENT)
Dept: HEMATOLOGY ONCOLOGY | Facility: CLINIC | Age: 71
End: 2021-01-01
Payer: COMMERCIAL

## 2021-01-01 ENCOUNTER — APPOINTMENT (INPATIENT)
Dept: VASCULAR ULTRASOUND | Facility: HOSPITAL | Age: 71
DRG: 871 | End: 2021-01-01
Payer: COMMERCIAL

## 2021-01-01 ENCOUNTER — NURSING HOME VISIT (OUTPATIENT)
Dept: GERIATRICS | Facility: OTHER | Age: 71
End: 2021-01-01
Payer: COMMERCIAL

## 2021-01-01 ENCOUNTER — HOSPITAL ENCOUNTER (OUTPATIENT)
Dept: INFUSION CENTER | Facility: CLINIC | Age: 71
Discharge: HOME | End: 2021-05-04
Payer: COMMERCIAL

## 2021-01-01 ENCOUNTER — NURSE TRIAGE (OUTPATIENT)
Dept: OTHER | Facility: OTHER | Age: 71
End: 2021-01-01

## 2021-01-01 ENCOUNTER — APPOINTMENT (OUTPATIENT)
Dept: RADIATION ONCOLOGY | Facility: CLINIC | Age: 71
End: 2021-01-01
Payer: COMMERCIAL

## 2021-01-01 ENCOUNTER — APPOINTMENT (EMERGENCY)
Dept: CT IMAGING | Facility: HOSPITAL | Age: 71
DRG: 871 | End: 2021-01-01
Payer: COMMERCIAL

## 2021-01-01 ENCOUNTER — TELEPHONE (OUTPATIENT)
Dept: OTHER | Facility: OTHER | Age: 71
End: 2021-01-01

## 2021-01-01 ENCOUNTER — HOSPITAL ENCOUNTER (OUTPATIENT)
Dept: RADIOLOGY | Facility: MEDICAL CENTER | Age: 71
Discharge: HOME | End: 2021-02-24
Payer: COMMERCIAL

## 2021-01-01 ENCOUNTER — HOSPITAL ENCOUNTER (OUTPATIENT)
Dept: INFUSION CENTER | Facility: CLINIC | Age: 71
Discharge: HOME | End: 2021-03-09
Payer: COMMERCIAL

## 2021-01-01 ENCOUNTER — LAB (OUTPATIENT)
Dept: LAB | Facility: CLINIC | Age: 71
End: 2021-01-01
Payer: COMMERCIAL

## 2021-01-01 ENCOUNTER — PATIENT OUTREACH (OUTPATIENT)
Dept: CASE MANAGEMENT | Facility: HOSPITAL | Age: 71
End: 2021-01-01

## 2021-01-01 ENCOUNTER — OFFICE VISIT (OUTPATIENT)
Dept: CARDIAC SURGERY | Facility: CLINIC | Age: 71
End: 2021-01-01
Payer: COMMERCIAL

## 2021-01-01 ENCOUNTER — ANESTHESIA (OUTPATIENT)
Dept: PERIOP | Facility: HOSPITAL | Age: 71
End: 2021-01-01
Payer: COMMERCIAL

## 2021-01-01 ENCOUNTER — TELEMEDICINE (OUTPATIENT)
Dept: RADIATION ONCOLOGY | Facility: CLINIC | Age: 71
End: 2021-01-01
Attending: RADIOLOGY

## 2021-01-01 ENCOUNTER — HOSPITAL ENCOUNTER (OUTPATIENT)
Dept: RADIOLOGY | Facility: HOSPITAL | Age: 71
Discharge: HOME | End: 2021-02-12
Attending: THORACIC SURGERY (CARDIOTHORACIC VASCULAR SURGERY)
Payer: COMMERCIAL

## 2021-01-01 ENCOUNTER — TELEPHONE (OUTPATIENT)
Dept: HEMATOLOGY ONCOLOGY | Facility: CLINIC | Age: 71
End: 2021-01-01

## 2021-01-01 ENCOUNTER — HOSPITAL ENCOUNTER (OUTPATIENT)
Dept: RADIOLOGY | Facility: HOSPITAL | Age: 71
Discharge: HOME | End: 2021-03-30
Payer: COMMERCIAL

## 2021-01-01 ENCOUNTER — HOSPITAL ENCOUNTER (INPATIENT)
Facility: HOSPITAL | Age: 71
LOS: 26 days | Discharge: NURSING, REHAB OR LONG-TERM CARE | DRG: 871 | End: 2021-07-11
Attending: EMERGENCY MEDICINE | Admitting: HOSPITALIST
Payer: COMMERCIAL

## 2021-01-01 ENCOUNTER — APPOINTMENT (INPATIENT)
Dept: RADIOLOGY | Facility: HOSPITAL | Age: 71
DRG: 327 | End: 2021-01-01
Attending: INTERNAL MEDICINE
Payer: COMMERCIAL

## 2021-01-01 ENCOUNTER — CLINICAL SUPPORT (OUTPATIENT)
Dept: URGENT CARE | Facility: MEDICAL CENTER | Age: 71
End: 2021-01-01
Payer: COMMERCIAL

## 2021-01-01 ENCOUNTER — TELEPHONE (OUTPATIENT)
Dept: GASTROENTEROLOGY | Facility: AMBULARY SURGERY CENTER | Age: 71
End: 2021-01-01

## 2021-01-01 ENCOUNTER — APPOINTMENT (EMERGENCY)
Dept: RADIOLOGY | Facility: HOSPITAL | Age: 71
DRG: 871 | End: 2021-01-01
Payer: COMMERCIAL

## 2021-01-01 ENCOUNTER — APPOINTMENT (INPATIENT)
Dept: RADIOLOGY | Facility: HOSPITAL | Age: 71
DRG: 327 | End: 2021-01-01
Payer: COMMERCIAL

## 2021-01-01 ENCOUNTER — HOSPITAL ENCOUNTER (INPATIENT)
Facility: HOSPITAL | Age: 71
LOS: 2 days | Discharge: HOME | DRG: 327 | End: 2021-01-27
Attending: EMERGENCY MEDICINE | Admitting: INTERNAL MEDICINE
Payer: COMMERCIAL

## 2021-01-01 ENCOUNTER — HOSPITAL ENCOUNTER (OUTPATIENT)
Dept: INFUSION CENTER | Facility: CLINIC | Age: 71
Discharge: HOME | End: 2021-02-09
Payer: COMMERCIAL

## 2021-01-01 ENCOUNTER — DOCUMENTATION (OUTPATIENT)
Dept: NUTRITION | Facility: CLINIC | Age: 71
End: 2021-01-01

## 2021-01-01 ENCOUNTER — TELEPHONE (OUTPATIENT)
Dept: NUTRITION | Facility: CLINIC | Age: 71
End: 2021-01-01

## 2021-01-01 ENCOUNTER — HOSPITAL ENCOUNTER (OUTPATIENT)
Facility: HOSPITAL | Age: 71
Setting detail: OUTPATIENT SURGERY
Discharge: HOME | End: 2021-04-26
Attending: THORACIC SURGERY (CARDIOTHORACIC VASCULAR SURGERY) | Admitting: THORACIC SURGERY (CARDIOTHORACIC VASCULAR SURGERY)
Payer: COMMERCIAL

## 2021-01-01 ENCOUNTER — APPOINTMENT (EMERGENCY)
Dept: RADIOLOGY | Facility: HOSPITAL | Age: 71
End: 2021-01-01
Payer: COMMERCIAL

## 2021-01-01 ENCOUNTER — APPOINTMENT (OUTPATIENT)
Dept: LAB | Facility: HOSPITAL | Age: 71
End: 2021-01-01
Attending: RADIOLOGY
Payer: COMMERCIAL

## 2021-01-01 ENCOUNTER — ANESTHESIA EVENT (OUTPATIENT)
Dept: PERIOP | Facility: HOSPITAL | Age: 71
End: 2021-01-01
Payer: COMMERCIAL

## 2021-01-01 ENCOUNTER — TELEPHONE (OUTPATIENT)
Dept: CARDIAC SURGERY | Facility: CLINIC | Age: 71
End: 2021-01-01

## 2021-01-01 ENCOUNTER — APPOINTMENT (OUTPATIENT)
Dept: LAB | Facility: HOSPITAL | Age: 71
End: 2021-01-01
Payer: COMMERCIAL

## 2021-01-01 ENCOUNTER — HOSPITAL ENCOUNTER (OUTPATIENT)
Dept: RADIOLOGY | Facility: HOSPITAL | Age: 71
Discharge: HOME | DRG: 327 | End: 2021-01-25
Payer: COMMERCIAL

## 2021-01-01 ENCOUNTER — HOSPITAL ENCOUNTER (OUTPATIENT)
Dept: INFUSION CENTER | Facility: CLINIC | Age: 71
End: 2021-01-01

## 2021-01-01 ENCOUNTER — HOSPITAL ENCOUNTER (OUTPATIENT)
Dept: INFUSION CENTER | Facility: CLINIC | Age: 71
Discharge: HOME | End: 2021-04-06
Payer: COMMERCIAL

## 2021-01-01 ENCOUNTER — HOSPITAL ENCOUNTER (OUTPATIENT)
Dept: INFUSION CENTER | Facility: CLINIC | Age: 71
Discharge: HOME | End: 2021-06-01
Payer: COMMERCIAL

## 2021-01-01 ENCOUNTER — HOSPITAL ENCOUNTER (OUTPATIENT)
Dept: INFUSION CENTER | Facility: CLINIC | Age: 71
Discharge: HOME | End: 2021-06-29

## 2021-01-01 ENCOUNTER — HOSPITAL ENCOUNTER (EMERGENCY)
Facility: HOSPITAL | Age: 71
End: 2021-07-20
Attending: EMERGENCY MEDICINE | Admitting: EMERGENCY MEDICINE
Payer: COMMERCIAL

## 2021-01-01 ENCOUNTER — TRANSCRIBE ORDERS (OUTPATIENT)
Dept: ADMINISTRATIVE | Facility: HOSPITAL | Age: 71
End: 2021-01-01

## 2021-01-01 ENCOUNTER — APPOINTMENT (INPATIENT)
Dept: NON INVASIVE DIAGNOSTICS | Facility: HOSPITAL | Age: 71
DRG: 871 | End: 2021-01-01
Payer: COMMERCIAL

## 2021-01-01 ENCOUNTER — TELEPHONE (OUTPATIENT)
Dept: SURGICAL ONCOLOGY | Facility: CLINIC | Age: 71
End: 2021-01-01

## 2021-01-01 ENCOUNTER — ANESTHESIA (INPATIENT)
Dept: PERIOP | Facility: HOSPITAL | Age: 71
DRG: 327 | End: 2021-01-01
Payer: COMMERCIAL

## 2021-01-01 ENCOUNTER — HOSPITAL ENCOUNTER (INPATIENT)
Facility: HOSPITAL | Age: 71
LOS: 8 days | Discharge: HOSPICE | DRG: 871 | End: 2021-07-28
Attending: ANESTHESIOLOGY | Admitting: ANESTHESIOLOGY
Payer: COMMERCIAL

## 2021-01-01 ENCOUNTER — HOSPITAL ENCOUNTER (OUTPATIENT)
Dept: INFUSION CENTER | Facility: CLINIC | Age: 71
Discharge: HOME | End: 2021-01-06
Payer: COMMERCIAL

## 2021-01-01 ENCOUNTER — ANESTHESIA EVENT (INPATIENT)
Dept: PERIOP | Facility: HOSPITAL | Age: 71
DRG: 327 | End: 2021-01-01
Payer: COMMERCIAL

## 2021-01-01 VITALS
RESPIRATION RATE: 16 BRPM | BODY MASS INDEX: 30.23 KG/M2 | DIASTOLIC BLOOD PRESSURE: 64 MMHG | OXYGEN SATURATION: 98 % | HEART RATE: 76 BPM | TEMPERATURE: 97.3 F | WEIGHT: 193 LBS | SYSTOLIC BLOOD PRESSURE: 124 MMHG

## 2021-01-01 VITALS
DIASTOLIC BLOOD PRESSURE: 66 MMHG | RESPIRATION RATE: 18 BRPM | TEMPERATURE: 98.4 F | HEART RATE: 50 BPM | OXYGEN SATURATION: 97 % | HEART RATE: 100 BPM | RESPIRATION RATE: 18 BRPM | OXYGEN SATURATION: 95 % | TEMPERATURE: 96.9 F | WEIGHT: 199 LBS | DIASTOLIC BLOOD PRESSURE: 60 MMHG | SYSTOLIC BLOOD PRESSURE: 96 MMHG | WEIGHT: 167.99 LBS | HEIGHT: 67 IN | BODY MASS INDEX: 26.37 KG/M2 | SYSTOLIC BLOOD PRESSURE: 131 MMHG | BODY MASS INDEX: 31.17 KG/M2

## 2021-01-01 VITALS
DIASTOLIC BLOOD PRESSURE: 66 MMHG | BODY MASS INDEX: 29.44 KG/M2 | SYSTOLIC BLOOD PRESSURE: 138 MMHG | RESPIRATION RATE: 18 BRPM | OXYGEN SATURATION: 96 % | TEMPERATURE: 97.5 F | HEART RATE: 76 BPM | WEIGHT: 188 LBS

## 2021-01-01 VITALS
HEART RATE: 77 BPM | TEMPERATURE: 97.1 F | WEIGHT: 183 LBS | SYSTOLIC BLOOD PRESSURE: 108 MMHG | DIASTOLIC BLOOD PRESSURE: 66 MMHG | OXYGEN SATURATION: 97 % | RESPIRATION RATE: 18 BRPM | BODY MASS INDEX: 28.66 KG/M2

## 2021-01-01 VITALS
BODY MASS INDEX: 27.57 KG/M2 | WEIGHT: 176 LBS | HEART RATE: 82 BPM | RESPIRATION RATE: 18 BRPM | SYSTOLIC BLOOD PRESSURE: 130 MMHG | TEMPERATURE: 98.6 F | DIASTOLIC BLOOD PRESSURE: 62 MMHG

## 2021-01-01 VITALS
TEMPERATURE: 98.2 F | OXYGEN SATURATION: 95 % | WEIGHT: 167 LBS | RESPIRATION RATE: 29 BRPM | DIASTOLIC BLOOD PRESSURE: 79 MMHG | SYSTOLIC BLOOD PRESSURE: 103 MMHG | HEIGHT: 67 IN | BODY MASS INDEX: 26.21 KG/M2 | HEART RATE: 117 BPM

## 2021-01-01 VITALS
WEIGHT: 192.9 LBS | DIASTOLIC BLOOD PRESSURE: 80 MMHG | HEIGHT: 67 IN | HEART RATE: 84 BPM | SYSTOLIC BLOOD PRESSURE: 120 MMHG | BODY MASS INDEX: 30.28 KG/M2 | TEMPERATURE: 97.8 F | RESPIRATION RATE: 16 BRPM | OXYGEN SATURATION: 97 %

## 2021-01-01 VITALS
TEMPERATURE: 97.7 F | SYSTOLIC BLOOD PRESSURE: 105 MMHG | HEIGHT: 67 IN | RESPIRATION RATE: 22 BRPM | WEIGHT: 139.11 LBS | DIASTOLIC BLOOD PRESSURE: 55 MMHG | HEART RATE: 84 BPM | OXYGEN SATURATION: 92 % | BODY MASS INDEX: 21.83 KG/M2

## 2021-01-01 VITALS
SYSTOLIC BLOOD PRESSURE: 157 MMHG | WEIGHT: 201.94 LBS | OXYGEN SATURATION: 97 % | HEART RATE: 81 BPM | TEMPERATURE: 98.8 F | BODY MASS INDEX: 31.7 KG/M2 | RESPIRATION RATE: 18 BRPM | HEIGHT: 67 IN | DIASTOLIC BLOOD PRESSURE: 83 MMHG

## 2021-01-01 VITALS
SYSTOLIC BLOOD PRESSURE: 131 MMHG | BODY MASS INDEX: 27.23 KG/M2 | TEMPERATURE: 98.2 F | RESPIRATION RATE: 16 BRPM | WEIGHT: 173.5 LBS | OXYGEN SATURATION: 91 % | HEIGHT: 67 IN | DIASTOLIC BLOOD PRESSURE: 70 MMHG | HEART RATE: 85 BPM

## 2021-01-01 VITALS
TEMPERATURE: 97.8 F | SYSTOLIC BLOOD PRESSURE: 114 MMHG | HEART RATE: 77 BPM | HEART RATE: 88 BPM | OXYGEN SATURATION: 97 % | RESPIRATION RATE: 16 BRPM | DIASTOLIC BLOOD PRESSURE: 64 MMHG

## 2021-01-01 VITALS
HEIGHT: 67 IN | SYSTOLIC BLOOD PRESSURE: 120 MMHG | TEMPERATURE: 97 F | BODY MASS INDEX: 29.03 KG/M2 | RESPIRATION RATE: 22 BRPM | HEART RATE: 78 BPM | DIASTOLIC BLOOD PRESSURE: 66 MMHG | OXYGEN SATURATION: 98 % | WEIGHT: 185 LBS

## 2021-01-01 VITALS
DIASTOLIC BLOOD PRESSURE: 64 MMHG | OXYGEN SATURATION: 98 % | RESPIRATION RATE: 18 BRPM | TEMPERATURE: 97.9 F | WEIGHT: 196 LBS | SYSTOLIC BLOOD PRESSURE: 108 MMHG | BODY MASS INDEX: 30.7 KG/M2 | HEART RATE: 74 BPM

## 2021-01-01 VITALS
BODY MASS INDEX: 30.97 KG/M2 | SYSTOLIC BLOOD PRESSURE: 137 MMHG | TEMPERATURE: 98.8 F | RESPIRATION RATE: 14 BRPM | WEIGHT: 197.31 LBS | DIASTOLIC BLOOD PRESSURE: 69 MMHG | HEART RATE: 71 BPM | OXYGEN SATURATION: 98 % | HEIGHT: 67 IN

## 2021-01-01 VITALS
HEART RATE: 65 BPM | WEIGHT: 202 LBS | BODY MASS INDEX: 31.64 KG/M2 | OXYGEN SATURATION: 91 % | SYSTOLIC BLOOD PRESSURE: 120 MMHG | TEMPERATURE: 96.4 F | RESPIRATION RATE: 18 BRPM | DIASTOLIC BLOOD PRESSURE: 74 MMHG

## 2021-01-01 VITALS
WEIGHT: 198 LBS | HEART RATE: 72 BPM | SYSTOLIC BLOOD PRESSURE: 136 MMHG | RESPIRATION RATE: 18 BRPM | DIASTOLIC BLOOD PRESSURE: 78 MMHG | TEMPERATURE: 97.5 F | OXYGEN SATURATION: 97 % | HEIGHT: 67 IN | BODY MASS INDEX: 31.08 KG/M2

## 2021-01-01 DIAGNOSIS — I65.29 STENOSIS OF CAROTID ARTERY, UNSPECIFIED LATERALITY: ICD-10-CM

## 2021-01-01 DIAGNOSIS — E11.9 TYPE 2 DIABETES MELLITUS WITHOUT COMPLICATION, WITHOUT LONG-TERM CURRENT USE OF INSULIN (HCC): ICD-10-CM

## 2021-01-01 DIAGNOSIS — R25.1 TREMORS OF NERVOUS SYSTEM: Primary | ICD-10-CM

## 2021-01-01 DIAGNOSIS — R06.02 SOB (SHORTNESS OF BREATH): ICD-10-CM

## 2021-01-01 DIAGNOSIS — C15.5 MALIGNANT NEOPLASM OF LOWER THIRD OF ESOPHAGUS (HCC): Primary | ICD-10-CM

## 2021-01-01 DIAGNOSIS — F41.9 ANXIETY: ICD-10-CM

## 2021-01-01 DIAGNOSIS — C15.5 MALIGNANT NEOPLASM OF LOWER THIRD OF ESOPHAGUS (HCC): ICD-10-CM

## 2021-01-01 DIAGNOSIS — R25.1 OCCASIONAL TREMORS: Primary | ICD-10-CM

## 2021-01-01 DIAGNOSIS — K59.00 CONSTIPATION, UNSPECIFIED CONSTIPATION TYPE: ICD-10-CM

## 2021-01-01 DIAGNOSIS — C15.9 MALIGNANT NEOPLASM OF ESOPHAGUS, UNSPECIFIED LOCATION (HCC): ICD-10-CM

## 2021-01-01 DIAGNOSIS — R13.10 ODYNOPHAGIA: ICD-10-CM

## 2021-01-01 DIAGNOSIS — I10 ESSENTIAL HYPERTENSION: ICD-10-CM

## 2021-01-01 DIAGNOSIS — R13.19 ESOPHAGEAL DYSPHAGIA: Primary | ICD-10-CM

## 2021-01-01 DIAGNOSIS — J18.9 PNEUMONIA: ICD-10-CM

## 2021-01-01 DIAGNOSIS — J96.01 ACUTE RESPIRATORY FAILURE WITH HYPOXIA (HCC): Primary | ICD-10-CM

## 2021-01-01 DIAGNOSIS — Z01.818 PRE-OP TESTING: Primary | ICD-10-CM

## 2021-01-01 DIAGNOSIS — C15.9 ESOPHAGEAL CANCER (HCC): ICD-10-CM

## 2021-01-01 DIAGNOSIS — C15.9 MALIGNANT NEOPLASM OF ESOPHAGUS, UNSPECIFIED LOCATION (HCC): Primary | ICD-10-CM

## 2021-01-01 DIAGNOSIS — R13.10 ODYNOPHAGIA: Primary | ICD-10-CM

## 2021-01-01 DIAGNOSIS — K21.9 GASTROESOPHAGEAL REFLUX DISEASE, UNSPECIFIED WHETHER ESOPHAGITIS PRESENT: ICD-10-CM

## 2021-01-01 DIAGNOSIS — Z95.828 PORT-A-CATH IN PLACE: Primary | ICD-10-CM

## 2021-01-01 DIAGNOSIS — R13.19 ESOPHAGEAL DYSPHAGIA: ICD-10-CM

## 2021-01-01 DIAGNOSIS — K22.2 ESOPHAGEAL STRICTURE: ICD-10-CM

## 2021-01-01 DIAGNOSIS — J18.9 BILATERAL PNEUMONIA: Primary | ICD-10-CM

## 2021-01-01 DIAGNOSIS — T85.528A MIGRATION OF ESOPHAGEAL STENT, INITIAL ENCOUNTER: ICD-10-CM

## 2021-01-01 DIAGNOSIS — I48.0 PAROXYSMAL A-FIB (HCC): ICD-10-CM

## 2021-01-01 DIAGNOSIS — Z20.828 SARS-ASSOCIATED CORONAVIRUS EXPOSURE: Primary | ICD-10-CM

## 2021-01-01 DIAGNOSIS — R09.02 HYPOXIA: Primary | ICD-10-CM

## 2021-01-01 DIAGNOSIS — R53.81 DEBILITY: ICD-10-CM

## 2021-01-01 DIAGNOSIS — E44.0 MODERATE PROTEIN-CALORIE MALNUTRITION (HCC): ICD-10-CM

## 2021-01-01 DIAGNOSIS — Z01.818 PRE-OP TESTING: ICD-10-CM

## 2021-01-01 DIAGNOSIS — I73.9 PAD (PERIPHERAL ARTERY DISEASE) (HCC): ICD-10-CM

## 2021-01-01 DIAGNOSIS — Z78.9 NEED FOR FOLLOW-UP BY SOCIAL WORKER: Primary | ICD-10-CM

## 2021-01-01 DIAGNOSIS — R13.10 DYSPHAGIA, UNSPECIFIED TYPE: Primary | Chronic | ICD-10-CM

## 2021-01-01 DIAGNOSIS — J96.01 ACUTE RESPIRATORY FAILURE WITH HYPOXIA (HCC): ICD-10-CM

## 2021-01-01 DIAGNOSIS — J96.21 ACUTE ON CHRONIC RESPIRATORY FAILURE WITH HYPOXIA (HCC): Primary | ICD-10-CM

## 2021-01-01 DIAGNOSIS — T85.528D MIGRATION OF ESOPHAGEAL STENT, SUBSEQUENT ENCOUNTER: Primary | ICD-10-CM

## 2021-01-01 DIAGNOSIS — R13.10 DYSPHAGIA, UNSPECIFIED TYPE: Chronic | ICD-10-CM

## 2021-01-01 DIAGNOSIS — F41.9 ANXIETY: Primary | ICD-10-CM

## 2021-01-01 DIAGNOSIS — R09.02 HYPOXIA: ICD-10-CM

## 2021-01-01 DIAGNOSIS — Z86.16 HISTORY OF COVID-19: ICD-10-CM

## 2021-01-01 DIAGNOSIS — T85.528D MIGRATION OF ESOPHAGEAL STENT, SUBSEQUENT ENCOUNTER: ICD-10-CM

## 2021-01-01 DIAGNOSIS — J18.9 PNEUMONIA OF BOTH LUNGS DUE TO INFECTIOUS ORGANISM, UNSPECIFIED PART OF LUNG: ICD-10-CM

## 2021-01-01 LAB
ABO GROUP BLD: NORMAL
ALBUMIN SERPL BCP-MCNC: 1.9 G/DL (ref 3.5–5)
ALBUMIN SERPL BCP-MCNC: 2.2 G/DL (ref 3.5–5)
ALBUMIN SERPL BCP-MCNC: 2.4 G/DL (ref 3.5–5)
ALBUMIN SERPL BCP-MCNC: 2.8 G/DL (ref 3.5–5)
ALBUMIN SERPL BCP-MCNC: 3.2 G/DL (ref 3.4–4.8)
ALBUMIN SERPL BCP-MCNC: 3.4 G/DL (ref 3.5–5)
ALP SERPL-CCNC: 135 U/L (ref 46–116)
ALP SERPL-CCNC: 165.5 U/L (ref 10–129)
ALP SERPL-CCNC: 205 U/L (ref 46–116)
ALP SERPL-CCNC: 210 U/L (ref 46–116)
ALP SERPL-CCNC: 283 U/L (ref 46–116)
ALP SERPL-CCNC: 86 U/L (ref 46–116)
ALT SERPL W P-5'-P-CCNC: 22 U/L (ref 12–78)
ALT SERPL W P-5'-P-CCNC: 63 U/L (ref 12–78)
ALT SERPL W P-5'-P-CCNC: 64 U/L (ref 12–78)
ALT SERPL W P-5'-P-CCNC: 65 U/L (ref 12–78)
ALT SERPL W P-5'-P-CCNC: 79 U/L (ref 5–63)
ALT SERPL W P-5'-P-CCNC: 85 U/L (ref 12–78)
ANION GAP SERPL CALCULATED.3IONS-SCNC: 10 MMOL/L (ref 4–13)
ANION GAP SERPL CALCULATED.3IONS-SCNC: 10 MMOL/L (ref 4–13)
ANION GAP SERPL CALCULATED.3IONS-SCNC: 12 MMOL/L (ref 4–13)
ANION GAP SERPL CALCULATED.3IONS-SCNC: 12 MMOL/L (ref 4–13)
ANION GAP SERPL CALCULATED.3IONS-SCNC: 13 MMOL/L (ref 4–13)
ANION GAP SERPL CALCULATED.3IONS-SCNC: 14 MMOL/L (ref 4–13)
ANION GAP SERPL CALCULATED.3IONS-SCNC: 14 MMOL/L (ref 4–13)
ANION GAP SERPL CALCULATED.3IONS-SCNC: 4 MMOL/L (ref 4–13)
ANION GAP SERPL CALCULATED.3IONS-SCNC: 4 MMOL/L (ref 4–13)
ANION GAP SERPL CALCULATED.3IONS-SCNC: 5 MMOL/L (ref 4–13)
ANION GAP SERPL CALCULATED.3IONS-SCNC: 5 MMOL/L (ref 4–13)
ANION GAP SERPL CALCULATED.3IONS-SCNC: 6 MMOL/L (ref 4–13)
ANION GAP SERPL CALCULATED.3IONS-SCNC: 7 MMOL/L (ref 4–13)
ANION GAP SERPL CALCULATED.3IONS-SCNC: 8 MMOL/L (ref 4–13)
ANION GAP SERPL CALCULATED.3IONS-SCNC: 9 MMOL/L (ref 4–13)
APTT PPP: 110 SECONDS (ref 23–37)
APTT PPP: 120 SECONDS (ref 23–37)
APTT PPP: 23 SECONDS (ref 23–37)
APTT PPP: 28 SECONDS (ref 23–37)
APTT PPP: 28 SECONDS (ref 23–37)
APTT PPP: 33 SECONDS (ref 23–31)
APTT PPP: 38 SECONDS (ref 23–37)
APTT PPP: 47 SECONDS (ref 23–37)
APTT PPP: 53 SECONDS (ref 23–37)
APTT PPP: 55 SECONDS (ref 23–37)
APTT PPP: 59 SECONDS (ref 23–37)
APTT PPP: 60 SECONDS (ref 23–37)
APTT PPP: 68 SECONDS (ref 23–37)
APTT PPP: 71 SECONDS (ref 23–37)
APTT PPP: 75 SECONDS (ref 23–37)
APTT PPP: 75 SECONDS (ref 23–37)
APTT PPP: 76 SECONDS (ref 23–37)
APTT PPP: 83 SECONDS (ref 23–37)
APTT PPP: 87 SECONDS (ref 23–37)
APTT PPP: 90 SECONDS (ref 23–37)
APTT PPP: 93 SECONDS (ref 23–37)
APTT PPP: 96 SECONDS (ref 23–37)
APTT PPP: 99 SECONDS (ref 23–37)
ARTERIAL PATENCY WRIST A: YES
AST SERPL W P-5'-P-CCNC: 14 U/L (ref 5–45)
AST SERPL W P-5'-P-CCNC: 21 U/L (ref 5–45)
AST SERPL W P-5'-P-CCNC: 30 U/L (ref 5–45)
AST SERPL W P-5'-P-CCNC: 31 U/L (ref 15–41)
AST SERPL W P-5'-P-CCNC: 31 U/L (ref 5–45)
AST SERPL W P-5'-P-CCNC: 69 U/L (ref 5–45)
ATRIAL RATE: 103 BPM
ATRIAL RATE: 109 BPM
ATRIAL RATE: 117 BPM
ATRIAL RATE: 136 BPM
ATRIAL RATE: 250 BPM
ATRIAL RATE: 74 BPM
BACTERIA BLD CULT: NORMAL
BASE EXCESS BLDA CALC-SCNC: 0 MMOL/L
BASOPHILS # BLD AUTO: 0.01 THOUSANDS/ΜL (ref 0–0.1)
BASOPHILS # BLD AUTO: 0.02 THOUSANDS/ΜL (ref 0–0.1)
BASOPHILS # BLD AUTO: 0.02 THOUSANDS/ΜL (ref 0–0.1)
BASOPHILS # BLD AUTO: 0.03 THOUSANDS/ΜL (ref 0–0.1)
BASOPHILS # BLD AUTO: 0.04 THOUSANDS/ΜL (ref 0–0.1)
BASOPHILS # BLD AUTO: 0.05 THOUSANDS/ΜL (ref 0–0.1)
BASOPHILS # BLD AUTO: 0.07 THOUSANDS/ΜL (ref 0–0.1)
BASOPHILS # BLD AUTO: 0.08 THOUSANDS/ΜL (ref 0–0.1)
BASOPHILS # BLD MANUAL: 0 THOUSAND/UL (ref 0–0.1)
BASOPHILS # BLD MANUAL: 0.13 THOUSAND/UL (ref 0–0.1)
BASOPHILS # BLD MANUAL: 0.15 THOUSAND/UL (ref 0–0.1)
BASOPHILS NFR BLD AUTO: 0 % (ref 0–1)
BASOPHILS NFR BLD AUTO: 1 % (ref 0–1)
BASOPHILS NFR MAR MANUAL: 0 % (ref 0–1)
BASOPHILS NFR MAR MANUAL: 1 % (ref 0–1)
BASOPHILS NFR MAR MANUAL: 1 % (ref 0–1)
BILIRUB SERPL-MCNC: 0.33 MG/DL (ref 0.2–1)
BILIRUB SERPL-MCNC: 0.38 MG/DL (ref 0.2–1)
BILIRUB SERPL-MCNC: 0.39 MG/DL (ref 0.2–1)
BILIRUB SERPL-MCNC: 0.45 MG/DL (ref 0.2–1)
BILIRUB SERPL-MCNC: 0.58 MG/DL (ref 0.3–1.2)
BILIRUB SERPL-MCNC: 0.66 MG/DL (ref 0.2–1)
BILIRUB UR QL STRIP: NEGATIVE
BLD GP AB SCN SERPL QL: NEGATIVE
BNP SERPL-MCNC: 240 PG/ML (ref 1–100)
BUN SERPL-MCNC: 10 MG/DL (ref 5–25)
BUN SERPL-MCNC: 11 MG/DL (ref 5–25)
BUN SERPL-MCNC: 11 MG/DL (ref 5–25)
BUN SERPL-MCNC: 12 MG/DL (ref 5–25)
BUN SERPL-MCNC: 14 MG/DL (ref 6–20)
BUN SERPL-MCNC: 15 MG/DL (ref 5–25)
BUN SERPL-MCNC: 15 MG/DL (ref 5–25)
BUN SERPL-MCNC: 16 MG/DL (ref 5–25)
BUN SERPL-MCNC: 19 MG/DL (ref 5–25)
BUN SERPL-MCNC: 20 MG/DL (ref 5–25)
BUN SERPL-MCNC: 20 MG/DL (ref 5–25)
BUN SERPL-MCNC: 21 MG/DL (ref 5–25)
BUN SERPL-MCNC: 21 MG/DL (ref 5–25)
BUN SERPL-MCNC: 22 MG/DL (ref 5–25)
BUN SERPL-MCNC: 22 MG/DL (ref 5–25)
BUN SERPL-MCNC: 23 MG/DL (ref 5–25)
BUN SERPL-MCNC: 23 MG/DL (ref 5–25)
BUN SERPL-MCNC: 24 MG/DL (ref 5–25)
BUN SERPL-MCNC: 25 MG/DL (ref 5–25)
BUN SERPL-MCNC: 25 MG/DL (ref 5–25)
BUN SERPL-MCNC: 27 MG/DL (ref 5–25)
BUN SERPL-MCNC: 28 MG/DL (ref 5–25)
BUN SERPL-MCNC: 29 MG/DL (ref 5–25)
BUN SERPL-MCNC: 30 MG/DL (ref 5–25)
BUN SERPL-MCNC: 31 MG/DL (ref 5–25)
BUN SERPL-MCNC: 32 MG/DL (ref 5–25)
BUN SERPL-MCNC: 33 MG/DL (ref 5–25)
BUN SERPL-MCNC: 40 MG/DL (ref 5–25)
BUN SERPL-MCNC: 41 MG/DL (ref 5–25)
BUN SERPL-MCNC: 42 MG/DL (ref 5–25)
BUN SERPL-MCNC: 46 MG/DL (ref 5–25)
CALCIUM ALBUM COR SERPL-MCNC: 10 MG/DL (ref 8.3–10.1)
CALCIUM ALBUM COR SERPL-MCNC: 10.2 MG/DL (ref 8.3–10.1)
CALCIUM ALBUM COR SERPL-MCNC: 9.2 MG/DL (ref 8.3–10.1)
CALCIUM ALBUM COR SERPL-MCNC: 9.5 MG/DL (ref 8.3–10.1)
CALCIUM ALBUM COR SERPL-MCNC: 9.8 MG/DL (ref 8.3–10.1)
CALCIUM ALBUM COR SERPL-MCNC: 9.9 MG/DL (ref 8.3–10.1)
CALCIUM SERPL-MCNC: 7.2 MG/DL (ref 8.3–10.1)
CALCIUM SERPL-MCNC: 7.7 MG/DL (ref 8.3–10.1)
CALCIUM SERPL-MCNC: 8 MG/DL (ref 8.3–10.1)
CALCIUM SERPL-MCNC: 8 MG/DL (ref 8.3–10.1)
CALCIUM SERPL-MCNC: 8.1 MG/DL (ref 8.3–10.1)
CALCIUM SERPL-MCNC: 8.2 MG/DL (ref 8.3–10.1)
CALCIUM SERPL-MCNC: 8.2 MG/DL (ref 8.3–10.1)
CALCIUM SERPL-MCNC: 8.3 MG/DL (ref 8.3–10.1)
CALCIUM SERPL-MCNC: 8.4 MG/DL (ref 8.3–10.1)
CALCIUM SERPL-MCNC: 8.5 MG/DL (ref 8.3–10.1)
CALCIUM SERPL-MCNC: 8.6 MG/DL (ref 8.3–10.1)
CALCIUM SERPL-MCNC: 8.7 MG/DL (ref 8.3–10.1)
CALCIUM SERPL-MCNC: 8.9 MG/DL (ref 8.4–10.2)
CALCIUM SERPL-MCNC: 9.1 MG/DL (ref 8.3–10.1)
CALCIUM SERPL-MCNC: 9.2 MG/DL (ref 8.3–10.1)
CALCIUM SERPL-MCNC: 9.4 MG/DL (ref 8.3–10.1)
CALCIUM SERPL-MCNC: 9.7 MG/DL (ref 8.3–10.1)
CHLORIDE SERPL-SCNC: 100 MMOL/L (ref 100–108)
CHLORIDE SERPL-SCNC: 101 MMOL/L (ref 100–108)
CHLORIDE SERPL-SCNC: 102 MMOL/L (ref 100–108)
CHLORIDE SERPL-SCNC: 102 MMOL/L (ref 96–108)
CHLORIDE SERPL-SCNC: 103 MMOL/L (ref 100–108)
CHLORIDE SERPL-SCNC: 104 MMOL/L (ref 100–108)
CHLORIDE SERPL-SCNC: 105 MMOL/L (ref 100–108)
CHLORIDE SERPL-SCNC: 105 MMOL/L (ref 100–108)
CHLORIDE SERPL-SCNC: 108 MMOL/L (ref 100–108)
CHLORIDE SERPL-SCNC: 108 MMOL/L (ref 100–108)
CHLORIDE SERPL-SCNC: 95 MMOL/L (ref 100–108)
CHLORIDE SERPL-SCNC: 97 MMOL/L (ref 100–108)
CHLORIDE SERPL-SCNC: 98 MMOL/L (ref 100–108)
CHLORIDE SERPL-SCNC: 98 MMOL/L (ref 100–108)
CHLORIDE SERPL-SCNC: 99 MMOL/L (ref 100–108)
CLARITY UR: CLEAR
CO2 SERPL-SCNC: 21 MMOL/L (ref 21–32)
CO2 SERPL-SCNC: 22 MMOL/L (ref 21–32)
CO2 SERPL-SCNC: 23 MMOL/L (ref 21–32)
CO2 SERPL-SCNC: 24 MMOL/L (ref 21–32)
CO2 SERPL-SCNC: 24 MMOL/L (ref 21–32)
CO2 SERPL-SCNC: 25 MMOL/L (ref 21–32)
CO2 SERPL-SCNC: 25 MMOL/L (ref 22–33)
CO2 SERPL-SCNC: 26 MMOL/L (ref 21–32)
CO2 SERPL-SCNC: 27 MMOL/L (ref 21–32)
CO2 SERPL-SCNC: 28 MMOL/L (ref 21–32)
CO2 SERPL-SCNC: 29 MMOL/L (ref 21–32)
CO2 SERPL-SCNC: 30 MMOL/L (ref 21–32)
COLOR UR: YELLOW
CREAT SERPL-MCNC: 0.69 MG/DL (ref 0.6–1.3)
CREAT SERPL-MCNC: 0.72 MG/DL (ref 0.5–1.2)
CREAT SERPL-MCNC: 0.78 MG/DL (ref 0.6–1.3)
CREAT SERPL-MCNC: 0.84 MG/DL (ref 0.6–1.3)
CREAT SERPL-MCNC: 0.84 MG/DL (ref 0.6–1.3)
CREAT SERPL-MCNC: 0.89 MG/DL (ref 0.6–1.3)
CREAT SERPL-MCNC: 0.9 MG/DL (ref 0.6–1.3)
CREAT SERPL-MCNC: 0.93 MG/DL (ref 0.6–1.3)
CREAT SERPL-MCNC: 0.96 MG/DL (ref 0.6–1.3)
CREAT SERPL-MCNC: 0.97 MG/DL (ref 0.6–1.3)
CREAT SERPL-MCNC: 1.04 MG/DL (ref 0.6–1.3)
CREAT SERPL-MCNC: 1.06 MG/DL (ref 0.6–1.3)
CREAT SERPL-MCNC: 1.07 MG/DL (ref 0.6–1.3)
CREAT SERPL-MCNC: 1.08 MG/DL (ref 0.6–1.3)
CREAT SERPL-MCNC: 1.14 MG/DL (ref 0.6–1.3)
CREAT SERPL-MCNC: 1.18 MG/DL (ref 0.6–1.3)
CREAT SERPL-MCNC: 1.18 MG/DL (ref 0.6–1.3)
CREAT SERPL-MCNC: 1.19 MG/DL (ref 0.6–1.3)
CREAT SERPL-MCNC: 1.24 MG/DL (ref 0.6–1.3)
CREAT SERPL-MCNC: 1.26 MG/DL (ref 0.6–1.3)
CREAT SERPL-MCNC: 1.28 MG/DL (ref 0.6–1.3)
CREAT SERPL-MCNC: 1.35 MG/DL (ref 0.6–1.3)
CREAT SERPL-MCNC: 1.44 MG/DL (ref 0.6–1.3)
CRP SERPL QL: 18.5 MG/L
CRP SERPL QL: 242 MG/L
CRP SERPL QL: 29.7 MG/L
CRP SERPL QL: 52.3 MG/L
D DIMER PPP FEU-MCNC: 1.56 UG/ML FEU
D DIMER PPP FEU-MCNC: 1.56 UG/ML FEU
D DIMER PPP FEU-MCNC: 1.79 UG/ML FEU
D DIMER PPP FEU-MCNC: 2.41 UG/ML FEU
D DIMER PPP FEU-MCNC: 5.87 UG/ML FEU
EOSINOPHIL # BLD AUTO: 0 THOUSAND/ΜL (ref 0–0.61)
EOSINOPHIL # BLD AUTO: 0 THOUSAND/ΜL (ref 0–0.61)
EOSINOPHIL # BLD AUTO: 0.01 THOUSAND/ΜL (ref 0–0.61)
EOSINOPHIL # BLD AUTO: 0.01 THOUSAND/ΜL (ref 0–0.61)
EOSINOPHIL # BLD AUTO: 0.22 THOUSAND/ΜL (ref 0–0.61)
EOSINOPHIL # BLD AUTO: 0.25 THOUSAND/ΜL (ref 0–0.61)
EOSINOPHIL # BLD AUTO: 0.25 THOUSAND/ΜL (ref 0–0.61)
EOSINOPHIL # BLD AUTO: 0.45 THOUSAND/ΜL (ref 0–0.61)
EOSINOPHIL # BLD MANUAL: 0 THOUSAND/UL (ref 0–0.4)
EOSINOPHIL # BLD MANUAL: 0.13 THOUSAND/UL (ref 0–0.4)
EOSINOPHIL # BLD MANUAL: 0.15 THOUSAND/UL (ref 0–0.4)
EOSINOPHIL NFR BLD AUTO: 0 % (ref 0–6)
EOSINOPHIL NFR BLD AUTO: 1 % (ref 0–6)
EOSINOPHIL NFR BLD AUTO: 4 % (ref 0–6)
EOSINOPHIL NFR BLD AUTO: 4 % (ref 0–6)
EOSINOPHIL NFR BLD AUTO: 6 % (ref 0–6)
EOSINOPHIL NFR BLD MANUAL: 0 % (ref 0–6)
EOSINOPHIL NFR BLD MANUAL: 1 % (ref 0–6)
EOSINOPHIL NFR BLD MANUAL: 1 % (ref 0–6)
ERYTHROCYTE [DISTWIDTH] IN BLOOD BY AUTOMATED COUNT: 12.4 % (ref 11.6–15.1)
ERYTHROCYTE [DISTWIDTH] IN BLOOD BY AUTOMATED COUNT: 12.5 % (ref 11.6–15.1)
ERYTHROCYTE [DISTWIDTH] IN BLOOD BY AUTOMATED COUNT: 12.6 % (ref 11.6–15.1)
ERYTHROCYTE [DISTWIDTH] IN BLOOD BY AUTOMATED COUNT: 12.7 % (ref 11.6–15.1)
ERYTHROCYTE [DISTWIDTH] IN BLOOD BY AUTOMATED COUNT: 12.8 % (ref 11.6–15.1)
ERYTHROCYTE [DISTWIDTH] IN BLOOD BY AUTOMATED COUNT: 12.8 % (ref 11.6–15.1)
ERYTHROCYTE [DISTWIDTH] IN BLOOD BY AUTOMATED COUNT: 12.9 % (ref 11.6–15.1)
ERYTHROCYTE [DISTWIDTH] IN BLOOD BY AUTOMATED COUNT: 13 % (ref 11.6–15.1)
ERYTHROCYTE [DISTWIDTH] IN BLOOD BY AUTOMATED COUNT: 13.1 % (ref 11.6–15.1)
ERYTHROCYTE [DISTWIDTH] IN BLOOD BY AUTOMATED COUNT: 13.2 % (ref 11.6–15.1)
ERYTHROCYTE [DISTWIDTH] IN BLOOD BY AUTOMATED COUNT: 13.3 % (ref 11.6–15.1)
ERYTHROCYTE [DISTWIDTH] IN BLOOD BY AUTOMATED COUNT: 13.3 % (ref 11.6–15.1)
ERYTHROCYTE [DISTWIDTH] IN BLOOD BY AUTOMATED COUNT: 13.4 % (ref 11.6–15.1)
ERYTHROCYTE [DISTWIDTH] IN BLOOD BY AUTOMATED COUNT: 14 % (ref 11.6–15.1)
ERYTHROCYTE [DISTWIDTH] IN BLOOD BY AUTOMATED COUNT: 14 % (ref 11.6–15.1)
ERYTHROCYTE [DISTWIDTH] IN BLOOD BY AUTOMATED COUNT: 14.1 % (ref 11.6–15.1)
ERYTHROCYTE [DISTWIDTH] IN BLOOD BY AUTOMATED COUNT: 14.2 % (ref 11.6–15.1)
ERYTHROCYTE [DISTWIDTH] IN BLOOD BY AUTOMATED COUNT: 14.4 % (ref 11.6–15.1)
ERYTHROCYTE [DISTWIDTH] IN BLOOD BY AUTOMATED COUNT: 14.7 % (ref 11.6–15.1)
ERYTHROCYTE [DISTWIDTH] IN BLOOD BY AUTOMATED COUNT: 15 % (ref 11.6–15.1)
EST. AVERAGE GLUCOSE BLD GHB EST-MCNC: 140 MG/DL
FERRITIN SERPL-MCNC: 468 NG/ML (ref 8–388)
FERRITIN SERPL-MCNC: 558 NG/ML (ref 8–388)
FERRITIN SERPL-MCNC: 579 NG/ML (ref 8–388)
FERRITIN SERPL-MCNC: 596 NG/ML (ref 8–388)
GFR SERPL CREATININE-BSD FRML MDRD: 48 ML/MIN/1.73SQ M
GFR SERPL CREATININE-BSD FRML MDRD: 52 ML/MIN/1.73SQ M
GFR SERPL CREATININE-BSD FRML MDRD: 56 ML/MIN/1.73SQ M
GFR SERPL CREATININE-BSD FRML MDRD: 57 ML/MIN/1.73SQ M
GFR SERPL CREATININE-BSD FRML MDRD: 58 ML/MIN/1.73SQ M
GFR SERPL CREATININE-BSD FRML MDRD: 61 ML/MIN/1.73SQ M
GFR SERPL CREATININE-BSD FRML MDRD: 64 ML/MIN/1.73SQ M
GFR SERPL CREATININE-BSD FRML MDRD: 68 ML/MIN/1.73SQ M
GFR SERPL CREATININE-BSD FRML MDRD: 69 ML/MIN/1.73SQ M
GFR SERPL CREATININE-BSD FRML MDRD: 70 ML/MIN/1.73SQ M
GFR SERPL CREATININE-BSD FRML MDRD: 71 ML/MIN/1.73SQ M
GFR SERPL CREATININE-BSD FRML MDRD: 78 ML/MIN/1.73SQ M
GFR SERPL CREATININE-BSD FRML MDRD: 79 ML/MIN/1.73SQ M
GFR SERPL CREATININE-BSD FRML MDRD: 82 ML/MIN/1.73SQ M
GFR SERPL CREATININE-BSD FRML MDRD: 85 ML/MIN/1.73SQ M
GFR SERPL CREATININE-BSD FRML MDRD: 87 ML/MIN/1.73SQ M
GFR SERPL CREATININE-BSD FRML MDRD: 87 ML/MIN/1.73SQ M
GFR SERPL CREATININE-BSD FRML MDRD: 90 ML/MIN/1.73SQ M
GFR SERPL CREATININE-BSD FRML MDRD: 93 ML/MIN/1.73SQ M
GFR SERPL CREATININE-BSD FRML MDRD: 95 ML/MIN/1.73SQ M
GLUCOSE P FAST SERPL-MCNC: 155 MG/DL (ref 65–99)
GLUCOSE SERPL-MCNC: 101 MG/DL (ref 65–140)
GLUCOSE SERPL-MCNC: 104 MG/DL (ref 65–140)
GLUCOSE SERPL-MCNC: 106 MG/DL (ref 65–140)
GLUCOSE SERPL-MCNC: 107 MG/DL (ref 65–140)
GLUCOSE SERPL-MCNC: 110 MG/DL (ref 65–140)
GLUCOSE SERPL-MCNC: 116 MG/DL (ref 65–140)
GLUCOSE SERPL-MCNC: 119 MG/DL (ref 65–140)
GLUCOSE SERPL-MCNC: 122 MG/DL (ref 65–140)
GLUCOSE SERPL-MCNC: 123 MG/DL (ref 65–140)
GLUCOSE SERPL-MCNC: 123 MG/DL (ref 65–140)
GLUCOSE SERPL-MCNC: 124 MG/DL (ref 65–140)
GLUCOSE SERPL-MCNC: 124 MG/DL (ref 65–140)
GLUCOSE SERPL-MCNC: 125 MG/DL (ref 65–140)
GLUCOSE SERPL-MCNC: 126 MG/DL (ref 65–140)
GLUCOSE SERPL-MCNC: 128 MG/DL (ref 65–140)
GLUCOSE SERPL-MCNC: 129 MG/DL (ref 65–140)
GLUCOSE SERPL-MCNC: 129 MG/DL (ref 65–140)
GLUCOSE SERPL-MCNC: 130 MG/DL (ref 65–140)
GLUCOSE SERPL-MCNC: 133 MG/DL (ref 65–140)
GLUCOSE SERPL-MCNC: 135 MG/DL (ref 65–140)
GLUCOSE SERPL-MCNC: 136 MG/DL (ref 65–140)
GLUCOSE SERPL-MCNC: 139 MG/DL (ref 65–140)
GLUCOSE SERPL-MCNC: 141 MG/DL (ref 65–140)
GLUCOSE SERPL-MCNC: 143 MG/DL (ref 65–140)
GLUCOSE SERPL-MCNC: 148 MG/DL (ref 65–140)
GLUCOSE SERPL-MCNC: 152 MG/DL (ref 65–140)
GLUCOSE SERPL-MCNC: 153 MG/DL (ref 65–140)
GLUCOSE SERPL-MCNC: 154 MG/DL (ref 65–140)
GLUCOSE SERPL-MCNC: 155 MG/DL (ref 65–140)
GLUCOSE SERPL-MCNC: 158 MG/DL (ref 65–140)
GLUCOSE SERPL-MCNC: 158 MG/DL (ref 65–140)
GLUCOSE SERPL-MCNC: 159 MG/DL (ref 65–140)
GLUCOSE SERPL-MCNC: 159 MG/DL (ref 65–140)
GLUCOSE SERPL-MCNC: 164 MG/DL (ref 65–140)
GLUCOSE SERPL-MCNC: 165 MG/DL (ref 65–140)
GLUCOSE SERPL-MCNC: 168 MG/DL (ref 65–140)
GLUCOSE SERPL-MCNC: 170 MG/DL (ref 65–140)
GLUCOSE SERPL-MCNC: 171 MG/DL (ref 65–140)
GLUCOSE SERPL-MCNC: 171 MG/DL (ref 65–140)
GLUCOSE SERPL-MCNC: 174 MG/DL (ref 65–140)
GLUCOSE SERPL-MCNC: 179 MG/DL (ref 65–140)
GLUCOSE SERPL-MCNC: 179 MG/DL (ref 65–140)
GLUCOSE SERPL-MCNC: 180 MG/DL (ref 65–140)
GLUCOSE SERPL-MCNC: 181 MG/DL (ref 65–140)
GLUCOSE SERPL-MCNC: 181 MG/DL (ref 65–140)
GLUCOSE SERPL-MCNC: 183 MG/DL (ref 65–140)
GLUCOSE SERPL-MCNC: 184 MG/DL (ref 65–140)
GLUCOSE SERPL-MCNC: 186 MG/DL (ref 65–140)
GLUCOSE SERPL-MCNC: 188 MG/DL (ref 65–140)
GLUCOSE SERPL-MCNC: 189 MG/DL (ref 65–140)
GLUCOSE SERPL-MCNC: 190 MG/DL (ref 65–140)
GLUCOSE SERPL-MCNC: 194 MG/DL (ref 65–140)
GLUCOSE SERPL-MCNC: 195 MG/DL (ref 65–140)
GLUCOSE SERPL-MCNC: 196 MG/DL (ref 65–140)
GLUCOSE SERPL-MCNC: 198 MG/DL (ref 65–140)
GLUCOSE SERPL-MCNC: 200 MG/DL (ref 65–140)
GLUCOSE SERPL-MCNC: 201 MG/DL (ref 65–140)
GLUCOSE SERPL-MCNC: 203 MG/DL (ref 65–140)
GLUCOSE SERPL-MCNC: 204 MG/DL (ref 65–140)
GLUCOSE SERPL-MCNC: 208 MG/DL (ref 65–140)
GLUCOSE SERPL-MCNC: 209 MG/DL (ref 65–140)
GLUCOSE SERPL-MCNC: 215 MG/DL (ref 65–140)
GLUCOSE SERPL-MCNC: 217 MG/DL (ref 65–140)
GLUCOSE SERPL-MCNC: 218 MG/DL (ref 65–140)
GLUCOSE SERPL-MCNC: 218 MG/DL (ref 65–140)
GLUCOSE SERPL-MCNC: 221 MG/DL (ref 65–140)
GLUCOSE SERPL-MCNC: 225 MG/DL (ref 65–140)
GLUCOSE SERPL-MCNC: 227 MG/DL (ref 65–140)
GLUCOSE SERPL-MCNC: 227 MG/DL (ref 65–140)
GLUCOSE SERPL-MCNC: 229 MG/DL (ref 65–140)
GLUCOSE SERPL-MCNC: 232 MG/DL (ref 65–140)
GLUCOSE SERPL-MCNC: 234 MG/DL (ref 65–140)
GLUCOSE SERPL-MCNC: 235 MG/DL (ref 65–140)
GLUCOSE SERPL-MCNC: 238 MG/DL (ref 65–140)
GLUCOSE SERPL-MCNC: 239 MG/DL (ref 65–140)
GLUCOSE SERPL-MCNC: 243 MG/DL (ref 65–140)
GLUCOSE SERPL-MCNC: 246 MG/DL (ref 65–140)
GLUCOSE SERPL-MCNC: 246 MG/DL (ref 65–140)
GLUCOSE SERPL-MCNC: 248 MG/DL (ref 65–140)
GLUCOSE SERPL-MCNC: 252 MG/DL (ref 65–140)
GLUCOSE SERPL-MCNC: 255 MG/DL (ref 65–140)
GLUCOSE SERPL-MCNC: 259 MG/DL (ref 65–140)
GLUCOSE SERPL-MCNC: 262 MG/DL (ref 65–140)
GLUCOSE SERPL-MCNC: 263 MG/DL (ref 65–140)
GLUCOSE SERPL-MCNC: 264 MG/DL (ref 65–140)
GLUCOSE SERPL-MCNC: 265 MG/DL (ref 65–140)
GLUCOSE SERPL-MCNC: 265 MG/DL (ref 65–140)
GLUCOSE SERPL-MCNC: 267 MG/DL (ref 65–140)
GLUCOSE SERPL-MCNC: 269 MG/DL (ref 65–140)
GLUCOSE SERPL-MCNC: 275 MG/DL (ref 65–140)
GLUCOSE SERPL-MCNC: 277 MG/DL (ref 65–140)
GLUCOSE SERPL-MCNC: 278 MG/DL (ref 65–140)
GLUCOSE SERPL-MCNC: 278 MG/DL (ref 65–140)
GLUCOSE SERPL-MCNC: 280 MG/DL (ref 65–140)
GLUCOSE SERPL-MCNC: 281 MG/DL (ref 65–140)
GLUCOSE SERPL-MCNC: 285 MG/DL (ref 65–140)
GLUCOSE SERPL-MCNC: 295 MG/DL (ref 65–140)
GLUCOSE SERPL-MCNC: 299 MG/DL (ref 65–140)
GLUCOSE SERPL-MCNC: 299 MG/DL (ref 65–140)
GLUCOSE SERPL-MCNC: 300 MG/DL (ref 65–140)
GLUCOSE SERPL-MCNC: 304 MG/DL (ref 65–140)
GLUCOSE SERPL-MCNC: 308 MG/DL (ref 65–140)
GLUCOSE SERPL-MCNC: 311 MG/DL (ref 65–140)
GLUCOSE SERPL-MCNC: 312 MG/DL (ref 65–140)
GLUCOSE SERPL-MCNC: 313 MG/DL (ref 65–140)
GLUCOSE SERPL-MCNC: 320 MG/DL (ref 65–140)
GLUCOSE SERPL-MCNC: 325 MG/DL (ref 65–140)
GLUCOSE SERPL-MCNC: 326 MG/DL (ref 65–140)
GLUCOSE SERPL-MCNC: 336 MG/DL (ref 65–140)
GLUCOSE SERPL-MCNC: 336 MG/DL (ref 65–140)
GLUCOSE SERPL-MCNC: 360 MG/DL (ref 65–140)
GLUCOSE SERPL-MCNC: 380 MG/DL (ref 65–140)
GLUCOSE SERPL-MCNC: 432 MG/DL (ref 65–140)
GLUCOSE SERPL-MCNC: 80 MG/DL (ref 65–140)
GLUCOSE SERPL-MCNC: 80 MG/DL (ref 65–140)
GLUCOSE SERPL-MCNC: 83 MG/DL (ref 65–140)
GLUCOSE SERPL-MCNC: 84 MG/DL (ref 65–140)
GLUCOSE SERPL-MCNC: 84 MG/DL (ref 65–140)
GLUCOSE SERPL-MCNC: 93 MG/DL (ref 65–140)
GLUCOSE SERPL-MCNC: 95 MG/DL (ref 65–140)
GLUCOSE SERPL-MCNC: 99 MG/DL (ref 65–140)
GLUCOSE UR STRIP-MCNC: NEGATIVE MG/DL
HBA1C MFR BLD: 6.5 %
HCO3 BLDA-SCNC: 23.2 MMOL/L (ref 22–28)
HCT VFR BLD AUTO: 33.9 % (ref 36.5–49.3)
HCT VFR BLD AUTO: 34.7 % (ref 36.5–49.3)
HCT VFR BLD AUTO: 35.8 % (ref 36.5–49.3)
HCT VFR BLD AUTO: 36.8 % (ref 36.5–49.3)
HCT VFR BLD AUTO: 37.3 % (ref 36.5–49.3)
HCT VFR BLD AUTO: 37.3 % (ref 36.5–49.3)
HCT VFR BLD AUTO: 37.7 % (ref 36.5–49.3)
HCT VFR BLD AUTO: 38.4 % (ref 36.5–49.3)
HCT VFR BLD AUTO: 38.9 % (ref 36.5–49.3)
HCT VFR BLD AUTO: 39.2 % (ref 36.5–49.3)
HCT VFR BLD AUTO: 39.8 % (ref 36.5–49.3)
HCT VFR BLD AUTO: 40.1 % (ref 36.5–49.3)
HCT VFR BLD AUTO: 41.1 % (ref 36.5–49.3)
HCT VFR BLD AUTO: 41.7 % (ref 36.5–49.3)
HCT VFR BLD AUTO: 42.1 % (ref 36.5–49.3)
HCT VFR BLD AUTO: 43.2 % (ref 36.5–49.3)
HCT VFR BLD AUTO: 43.3 % (ref 36.5–49.3)
HCT VFR BLD AUTO: 43.3 % (ref 36.5–49.3)
HCT VFR BLD AUTO: 43.5 % (ref 36.5–49.3)
HCT VFR BLD AUTO: 44 % (ref 36.5–49.3)
HCT VFR BLD AUTO: 44.3 % (ref 36.5–49.3)
HCT VFR BLD AUTO: 45.1 % (ref 36.5–49.3)
HCT VFR BLD AUTO: 45.5 % (ref 36.5–49.3)
HCT VFR BLD AUTO: 46 % (ref 36.5–49.3)
HGB BLD-MCNC: 11.3 G/DL (ref 12–17)
HGB BLD-MCNC: 11.5 G/DL (ref 12–17)
HGB BLD-MCNC: 11.9 G/DL (ref 12–17)
HGB BLD-MCNC: 12.1 G/DL (ref 12–17)
HGB BLD-MCNC: 12.2 G/DL (ref 12–17)
HGB BLD-MCNC: 12.3 G/DL (ref 12–17)
HGB BLD-MCNC: 12.3 G/DL (ref 12–17)
HGB BLD-MCNC: 12.6 G/DL (ref 12–17)
HGB BLD-MCNC: 12.8 G/DL (ref 12–17)
HGB BLD-MCNC: 13 G/DL (ref 12–17)
HGB BLD-MCNC: 13.3 G/DL (ref 12–17)
HGB BLD-MCNC: 13.5 G/DL (ref 12–17)
HGB BLD-MCNC: 13.7 G/DL (ref 12–17)
HGB BLD-MCNC: 13.8 G/DL (ref 12–17)
HGB BLD-MCNC: 14.2 G/DL (ref 12–17)
HGB BLD-MCNC: 14.2 G/DL (ref 12–17)
HGB BLD-MCNC: 14.3 G/DL (ref 12–17)
HGB BLD-MCNC: 14.3 G/DL (ref 12–17)
HGB BLD-MCNC: 14.6 G/DL (ref 12–17)
HGB BLD-MCNC: 14.7 G/DL (ref 12–17)
HGB BLD-MCNC: 14.7 G/DL (ref 12–17)
HGB BLD-MCNC: 14.8 G/DL (ref 12–17)
HGB BLD-MCNC: 15 G/DL (ref 12–17)
HGB BLD-MCNC: 15.6 G/DL (ref 12–17)
HGB UR QL STRIP.AUTO: NEGATIVE
HYPERCHROMIA BLD QL SMEAR: PRESENT
IMM GRANULOCYTES # BLD AUTO: 0.03 THOUSAND/UL (ref 0–0.2)
IMM GRANULOCYTES # BLD AUTO: 0.04 THOUSAND/UL (ref 0–0.2)
IMM GRANULOCYTES # BLD AUTO: 0.04 THOUSAND/UL (ref 0–0.2)
IMM GRANULOCYTES # BLD AUTO: 0.07 THOUSAND/UL (ref 0–0.2)
IMM GRANULOCYTES # BLD AUTO: 0.11 THOUSAND/UL (ref 0–0.2)
IMM GRANULOCYTES # BLD AUTO: 0.15 THOUSAND/UL (ref 0–0.2)
IMM GRANULOCYTES # BLD AUTO: 0.18 THOUSAND/UL (ref 0–0.2)
IMM GRANULOCYTES # BLD AUTO: 0.21 THOUSAND/UL (ref 0–0.2)
IMM GRANULOCYTES NFR BLD AUTO: 0 % (ref 0–2)
IMM GRANULOCYTES NFR BLD AUTO: 0 % (ref 0–2)
IMM GRANULOCYTES NFR BLD AUTO: 1 % (ref 0–2)
IMM GRANULOCYTES NFR BLD AUTO: 2 % (ref 0–2)
IMM GRANULOCYTES NFR BLD AUTO: 2 % (ref 0–2)
INR PPP: 0.93 (ref 0.84–1.19)
INR PPP: 1.01 (ref 0.84–1.19)
INR PPP: 1.03 (ref 0.84–1.19)
INR PPP: 1.06 (ref 0.84–1.19)
INR PPP: 1.3 (ref 0.9–1.1)
INR PPP: 1.52 (ref 0.84–1.19)
INR PPP: 1.59 (ref 0.84–1.19)
INR PPP: 1.89 (ref 0.84–1.19)
INR PPP: 1.9 (ref 0.84–1.19)
INR PPP: 1.95 (ref 0.84–1.19)
INR PPP: 2.14 (ref 0.84–1.19)
INR PPP: 2.19 (ref 0.84–1.19)
INR PPP: 2.34 (ref 0.84–1.19)
INR PPP: 2.35 (ref 0.84–1.19)
INR PPP: 2.45 (ref 0.84–1.19)
INR PPP: 2.46 (ref 0.84–1.19)
INR PPP: 2.47 (ref 0.84–1.19)
INR PPP: 2.47 (ref 0.84–1.19)
INR PPP: 2.53 (ref 0.84–1.19)
INR PPP: 2.67 (ref 0.84–1.19)
INR PPP: 3.1 (ref 0.84–1.19)
INR PPP: 3.17 (ref 0.84–1.19)
IPAP: 14
KETONES UR STRIP-MCNC: NEGATIVE MG/DL
L PNEUMO1 AG UR QL IA.RAPID: NEGATIVE
LACTATE SERPL-SCNC: 1.8 MMOL/L (ref 0–2)
LACTATE SERPL-SCNC: 2 MMOL/L (ref 0.5–2)
LEUKOCYTE ESTERASE UR QL STRIP: NEGATIVE
LYMPHOCYTES # BLD AUTO: 0.25 THOUSAND/UL (ref 0.6–4.47)
LYMPHOCYTES # BLD AUTO: 0.38 THOUSANDS/ΜL (ref 0.6–4.47)
LYMPHOCYTES # BLD AUTO: 0.43 THOUSANDS/ΜL (ref 0.6–4.47)
LYMPHOCYTES # BLD AUTO: 0.44 THOUSAND/UL (ref 0.6–4.47)
LYMPHOCYTES # BLD AUTO: 0.45 THOUSANDS/ΜL (ref 0.6–4.47)
LYMPHOCYTES # BLD AUTO: 0.63 THOUSANDS/ΜL (ref 0.6–4.47)
LYMPHOCYTES # BLD AUTO: 0.67 THOUSAND/UL (ref 0.6–4.47)
LYMPHOCYTES # BLD AUTO: 0.72 THOUSANDS/ΜL (ref 0.6–4.47)
LYMPHOCYTES # BLD AUTO: 1.14 THOUSANDS/ΜL (ref 0.6–4.47)
LYMPHOCYTES # BLD AUTO: 2.03 THOUSANDS/ΜL (ref 0.6–4.47)
LYMPHOCYTES # BLD AUTO: 2.22 THOUSANDS/ΜL (ref 0.6–4.47)
LYMPHOCYTES # BLD AUTO: 3 % (ref 14–44)
LYMPHOCYTES # BLD AUTO: 3 % (ref 14–44)
LYMPHOCYTES # BLD AUTO: 5 % (ref 14–44)
LYMPHOCYTES NFR BLD AUTO: 10 % (ref 14–44)
LYMPHOCYTES NFR BLD AUTO: 11 % (ref 14–44)
LYMPHOCYTES NFR BLD AUTO: 25 % (ref 14–44)
LYMPHOCYTES NFR BLD AUTO: 3 % (ref 14–44)
LYMPHOCYTES NFR BLD AUTO: 32 % (ref 14–44)
LYMPHOCYTES NFR BLD AUTO: 4 % (ref 14–44)
LYMPHOCYTES NFR BLD AUTO: 5 % (ref 14–44)
LYMPHOCYTES NFR BLD AUTO: 5 % (ref 14–44)
MAGNESIUM SERPL-MCNC: 1.9 MG/DL (ref 1.6–2.6)
MAGNESIUM SERPL-MCNC: 1.9 MG/DL (ref 1.6–2.6)
MAGNESIUM SERPL-MCNC: 2 MG/DL (ref 1.6–2.6)
MAGNESIUM SERPL-MCNC: 2.2 MG/DL (ref 1.6–2.6)
MCH RBC QN AUTO: 29.3 PG (ref 26.8–34.3)
MCH RBC QN AUTO: 29.6 PG (ref 26.8–34.3)
MCH RBC QN AUTO: 29.7 PG (ref 26.8–34.3)
MCH RBC QN AUTO: 29.9 PG (ref 26.8–34.3)
MCH RBC QN AUTO: 30 PG (ref 26.8–34.3)
MCH RBC QN AUTO: 30.1 PG (ref 26.8–34.3)
MCH RBC QN AUTO: 30.2 PG (ref 26.8–34.3)
MCH RBC QN AUTO: 30.3 PG (ref 26.8–34.3)
MCH RBC QN AUTO: 30.3 PG (ref 26.8–34.3)
MCH RBC QN AUTO: 30.6 PG (ref 26.8–34.3)
MCH RBC QN AUTO: 30.7 PG (ref 26.8–34.3)
MCH RBC QN AUTO: 30.7 PG (ref 26.8–34.3)
MCH RBC QN AUTO: 30.8 PG (ref 26.8–34.3)
MCH RBC QN AUTO: 30.8 PG (ref 26.8–34.3)
MCH RBC QN AUTO: 30.9 PG (ref 26.8–34.3)
MCH RBC QN AUTO: 31 PG (ref 26.8–34.3)
MCHC RBC AUTO-ENTMCNC: 31.8 G/DL (ref 31.4–37.4)
MCHC RBC AUTO-ENTMCNC: 32.7 G/DL (ref 31.4–37.4)
MCHC RBC AUTO-ENTMCNC: 32.8 G/DL (ref 31.4–37.4)
MCHC RBC AUTO-ENTMCNC: 32.9 G/DL (ref 31.4–37.4)
MCHC RBC AUTO-ENTMCNC: 32.9 G/DL (ref 31.4–37.4)
MCHC RBC AUTO-ENTMCNC: 33 G/DL (ref 31.4–37.4)
MCHC RBC AUTO-ENTMCNC: 33.1 G/DL (ref 31.4–37.4)
MCHC RBC AUTO-ENTMCNC: 33.2 G/DL (ref 31.4–37.4)
MCHC RBC AUTO-ENTMCNC: 33.3 G/DL (ref 31.4–37.4)
MCHC RBC AUTO-ENTMCNC: 33.3 G/DL (ref 31.4–37.4)
MCHC RBC AUTO-ENTMCNC: 33.4 G/DL (ref 31.4–37.4)
MCHC RBC AUTO-ENTMCNC: 33.7 G/DL (ref 31.4–37.4)
MCHC RBC AUTO-ENTMCNC: 33.8 G/DL (ref 31.4–37.4)
MCHC RBC AUTO-ENTMCNC: 33.9 G/DL (ref 31.4–37.4)
MCHC RBC AUTO-ENTMCNC: 34.3 G/DL (ref 31.4–37.4)
MCV RBC AUTO: 89 FL (ref 82–98)
MCV RBC AUTO: 90 FL (ref 82–98)
MCV RBC AUTO: 91 FL (ref 82–98)
MCV RBC AUTO: 92 FL (ref 82–98)
MCV RBC AUTO: 93 FL (ref 82–98)
MCV RBC AUTO: 94 FL (ref 82–98)
METAMYELOCYTES NFR BLD MANUAL: 2 % (ref 0–1)
METAMYELOCYTES NFR BLD MANUAL: 3 % (ref 0–1)
METAMYELOCYTES NFR BLD MANUAL: 3 % (ref 0–1)
MONOCYTES # BLD AUTO: 0.3 THOUSAND/UL (ref 0–1.22)
MONOCYTES # BLD AUTO: 0.33 THOUSAND/UL (ref 0–1.22)
MONOCYTES # BLD AUTO: 0.4 THOUSAND/ΜL (ref 0.17–1.22)
MONOCYTES # BLD AUTO: 0.45 THOUSAND/ΜL (ref 0.17–1.22)
MONOCYTES # BLD AUTO: 0.49 THOUSAND/ΜL (ref 0.17–1.22)
MONOCYTES # BLD AUTO: 0.57 THOUSAND/ΜL (ref 0.17–1.22)
MONOCYTES # BLD AUTO: 0.58 THOUSAND/ΜL (ref 0.17–1.22)
MONOCYTES # BLD AUTO: 0.61 THOUSAND/ΜL (ref 0.17–1.22)
MONOCYTES # BLD AUTO: 0.7 THOUSAND/ΜL (ref 0.17–1.22)
MONOCYTES # BLD AUTO: 0.94 THOUSAND/UL (ref 0–1.22)
MONOCYTES # BLD AUTO: 1.15 THOUSAND/ΜL (ref 0.17–1.22)
MONOCYTES NFR BLD AUTO: 3 % (ref 4–12)
MONOCYTES NFR BLD AUTO: 4 % (ref 4–12)
MONOCYTES NFR BLD AUTO: 4 % (ref 4–12)
MONOCYTES NFR BLD AUTO: 6 % (ref 4–12)
MONOCYTES NFR BLD AUTO: 7 % (ref 4–12)
MONOCYTES NFR BLD AUTO: 9 % (ref 4–12)
MONOCYTES NFR BLD: 2 % (ref 4–12)
MONOCYTES NFR BLD: 4 % (ref 4–12)
MONOCYTES NFR BLD: 7 % (ref 4–12)
MRSA NOSE QL CULT: NORMAL
MYELOCYTES NFR BLD MANUAL: 1 % (ref 0–1)
NEUTROPHILS # BLD AUTO: 10.21 THOUSANDS/ΜL (ref 1.85–7.62)
NEUTROPHILS # BLD AUTO: 13.99 THOUSANDS/ΜL (ref 1.85–7.62)
NEUTROPHILS # BLD AUTO: 16.33 THOUSANDS/ΜL (ref 1.85–7.62)
NEUTROPHILS # BLD AUTO: 3.8 THOUSANDS/ΜL (ref 1.85–7.62)
NEUTROPHILS # BLD AUTO: 4.72 THOUSANDS/ΜL (ref 1.85–7.62)
NEUTROPHILS # BLD AUTO: 4.83 THOUSANDS/ΜL (ref 1.85–7.62)
NEUTROPHILS # BLD AUTO: 7.1 THOUSANDS/ΜL (ref 1.85–7.62)
NEUTROPHILS # BLD AUTO: 8.77 THOUSANDS/ΜL (ref 1.85–7.62)
NEUTROPHILS # BLD MANUAL: 11.19 THOUSAND/UL (ref 1.85–7.62)
NEUTROPHILS # BLD MANUAL: 13.42 THOUSAND/UL (ref 1.85–7.62)
NEUTROPHILS # BLD MANUAL: 7.4 THOUSAND/UL (ref 1.85–7.62)
NEUTS BAND NFR BLD MANUAL: 1 % (ref 0–8)
NEUTS BAND NFR BLD MANUAL: 2 % (ref 0–8)
NEUTS BAND NFR BLD MANUAL: 3 % (ref 0–8)
NEUTS SEG NFR BLD AUTO: 54 % (ref 43–75)
NEUTS SEG NFR BLD AUTO: 58 % (ref 43–75)
NEUTS SEG NFR BLD AUTO: 74 % (ref 43–75)
NEUTS SEG NFR BLD AUTO: 80 % (ref 43–75)
NEUTS SEG NFR BLD AUTO: 86 % (ref 43–75)
NEUTS SEG NFR BLD AUTO: 87 % (ref 43–75)
NEUTS SEG NFR BLD AUTO: 89 % (ref 43–75)
NEUTS SEG NFR BLD AUTO: 90 % (ref 43–75)
NEUTS SEG NFR BLD AUTO: 93 % (ref 43–75)
NITRITE UR QL STRIP: NEGATIVE
NON VENT- BIPAP: ABNORMAL
NRBC BLD AUTO-RTO: 0 /100 WBCS
NT-PROBNP SERPL-MCNC: 1153 PG/ML
O2 CT BLDA-SCNC: 19.4 ML/DL (ref 16–23)
OXYHGB MFR BLDA: 89.9 % (ref 94–97)
P AXIS: 32 DEGREES
P AXIS: 35 DEGREES
P AXIS: 42 DEGREES
P AXIS: 51 DEGREES
PCO2 BLDA: 33.9 MM HG (ref 36–44)
PEEP MAX SETTING VENT: 8 CM[H2O]
PH BLDA: 7.45 [PH] (ref 7.35–7.45)
PH UR STRIP.AUTO: 5.5 [PH]
PHOSPHATE SERPL-MCNC: 3.5 MG/DL (ref 2.3–4.1)
PHOSPHATE SERPL-MCNC: 3.8 MG/DL (ref 2.3–4.1)
PLATELET # BLD AUTO: 181 THOUSANDS/UL (ref 149–390)
PLATELET # BLD AUTO: 223 THOUSANDS/UL (ref 149–390)
PLATELET # BLD AUTO: 227 THOUSANDS/UL (ref 149–390)
PLATELET # BLD AUTO: 237 THOUSANDS/UL (ref 149–390)
PLATELET # BLD AUTO: 237 THOUSANDS/UL (ref 149–390)
PLATELET # BLD AUTO: 247 THOUSANDS/UL (ref 149–390)
PLATELET # BLD AUTO: 247 THOUSANDS/UL (ref 149–390)
PLATELET # BLD AUTO: 256 THOUSANDS/UL (ref 149–390)
PLATELET # BLD AUTO: 265 THOUSANDS/UL (ref 149–390)
PLATELET # BLD AUTO: 278 THOUSANDS/UL (ref 149–390)
PLATELET # BLD AUTO: 279 THOUSANDS/UL (ref 149–390)
PLATELET # BLD AUTO: 280 THOUSANDS/UL (ref 149–390)
PLATELET # BLD AUTO: 305 THOUSANDS/UL (ref 149–390)
PLATELET # BLD AUTO: 306 THOUSANDS/UL (ref 149–390)
PLATELET # BLD AUTO: 332 THOUSANDS/UL (ref 149–390)
PLATELET # BLD AUTO: 370 THOUSANDS/UL (ref 149–390)
PLATELET # BLD AUTO: 383 THOUSANDS/UL (ref 149–390)
PLATELET # BLD AUTO: 387 THOUSANDS/UL (ref 149–390)
PLATELET # BLD AUTO: 390 THOUSANDS/UL (ref 149–390)
PLATELET # BLD AUTO: 392 THOUSANDS/UL (ref 149–390)
PLATELET # BLD AUTO: 394 THOUSANDS/UL (ref 149–390)
PLATELET # BLD AUTO: 406 THOUSANDS/UL (ref 149–390)
PLATELET # BLD AUTO: 431 THOUSANDS/UL (ref 149–390)
PLATELET # BLD AUTO: 435 THOUSANDS/UL (ref 149–390)
PLATELET # BLD AUTO: 442 THOUSANDS/UL (ref 149–390)
PLATELET BLD QL SMEAR: ABNORMAL
PLATELET BLD QL SMEAR: ADEQUATE
PLATELET BLD QL SMEAR: ADEQUATE
PMV BLD AUTO: 8.3 FL (ref 8.9–12.7)
PMV BLD AUTO: 8.4 FL (ref 8.9–12.7)
PMV BLD AUTO: 8.5 FL (ref 8.9–12.7)
PMV BLD AUTO: 8.5 FL (ref 8.9–12.7)
PMV BLD AUTO: 8.6 FL (ref 8.9–12.7)
PMV BLD AUTO: 8.7 FL (ref 8.9–12.7)
PMV BLD AUTO: 8.8 FL (ref 8.9–12.7)
PMV BLD AUTO: 8.9 FL (ref 8.9–12.7)
PMV BLD AUTO: 8.9 FL (ref 8.9–12.7)
PMV BLD AUTO: 9 FL (ref 8.9–12.7)
PMV BLD AUTO: 9.3 FL (ref 8.9–12.7)
PMV BLD AUTO: 9.4 FL (ref 8.9–12.7)
PMV BLD AUTO: 9.5 FL (ref 8.9–12.7)
PMV BLD AUTO: 9.6 FL (ref 8.9–12.7)
PMV BLD AUTO: 9.6 FL (ref 8.9–12.7)
PMV BLD AUTO: 9.8 FL (ref 8.9–12.7)
PO2 BLDA: 54.9 MM HG (ref 75–129)
POLYCHROMASIA BLD QL SMEAR: PRESENT
POLYCHROMASIA BLD QL SMEAR: PRESENT
POTASSIUM SERPL-SCNC: 3.7 MMOL/L (ref 3.5–5.3)
POTASSIUM SERPL-SCNC: 3.9 MMOL/L (ref 3.5–5.3)
POTASSIUM SERPL-SCNC: 3.9 MMOL/L (ref 3.5–5.3)
POTASSIUM SERPL-SCNC: 4 MMOL/L (ref 3.5–5.3)
POTASSIUM SERPL-SCNC: 4.1 MMOL/L (ref 3.5–5)
POTASSIUM SERPL-SCNC: 4.1 MMOL/L (ref 3.5–5.3)
POTASSIUM SERPL-SCNC: 4.2 MMOL/L (ref 3.5–5.3)
POTASSIUM SERPL-SCNC: 4.3 MMOL/L (ref 3.5–5.3)
POTASSIUM SERPL-SCNC: 4.3 MMOL/L (ref 3.5–5.3)
POTASSIUM SERPL-SCNC: 4.4 MMOL/L (ref 3.5–5.3)
POTASSIUM SERPL-SCNC: 4.5 MMOL/L (ref 3.5–5.3)
POTASSIUM SERPL-SCNC: 4.6 MMOL/L (ref 3.5–5.3)
POTASSIUM SERPL-SCNC: 4.6 MMOL/L (ref 3.5–5.3)
POTASSIUM SERPL-SCNC: 4.7 MMOL/L (ref 3.5–5.3)
POTASSIUM SERPL-SCNC: 4.8 MMOL/L (ref 3.5–5.3)
POTASSIUM SERPL-SCNC: 4.9 MMOL/L (ref 3.5–5.3)
POTASSIUM SERPL-SCNC: 5 MMOL/L (ref 3.5–5.3)
POTASSIUM SERPL-SCNC: 5 MMOL/L (ref 3.5–5.3)
POTASSIUM SERPL-SCNC: 5.1 MMOL/L (ref 3.5–5.3)
POTASSIUM SERPL-SCNC: 5.2 MMOL/L (ref 3.5–5.3)
POTASSIUM SERPL-SCNC: 5.5 MMOL/L (ref 3.5–5.3)
POTASSIUM SERPL-SCNC: 5.6 MMOL/L (ref 3.5–5.3)
PR INTERVAL: 142 MS
PR INTERVAL: 176 MS
PR INTERVAL: 182 MS
PR INTERVAL: 184 MS
PROCALCITONIN SERPL-MCNC: 0.08 NG/ML
PROCALCITONIN SERPL-MCNC: 0.08 NG/ML
PROCALCITONIN SERPL-MCNC: 0.13 NG/ML
PROCALCITONIN SERPL-MCNC: 0.15 NG/ML
PROCALCITONIN SERPL-MCNC: 0.21 NG/ML
PROCALCITONIN SERPL-MCNC: 0.3 NG/ML
PROCALCITONIN SERPL-MCNC: 0.31 NG/ML
PROCALCITONIN SERPL-MCNC: 0.43 NG/ML
PROT SERPL-MCNC: 5.7 G/DL (ref 6.4–8.2)
PROT SERPL-MCNC: 6.4 G/DL (ref 6.4–8.3)
PROT SERPL-MCNC: 6.7 G/DL (ref 6.4–8.2)
PROT SERPL-MCNC: 7 G/DL (ref 6.4–8.2)
PROT SERPL-MCNC: 7 G/DL (ref 6.4–8.2)
PROT SERPL-MCNC: 8.3 G/DL (ref 6.4–8.2)
PROT UR STRIP-MCNC: NEGATIVE MG/DL
PROTHROMBIN TIME: 12.5 SECONDS (ref 11.6–14.5)
PROTHROMBIN TIME: 12.8 SECONDS (ref 11.6–14.5)
PROTHROMBIN TIME: 13.6 SECONDS (ref 11.6–14.5)
PROTHROMBIN TIME: 13.9 SECONDS (ref 11.6–14.5)
PROTHROMBIN TIME: 14.5 SECONDS (ref 9.5–12.1)
PROTHROMBIN TIME: 18.6 SECONDS (ref 11.6–14.5)
PROTHROMBIN TIME: 19 SECONDS (ref 11.6–14.5)
PROTHROMBIN TIME: 21.8 SECONDS (ref 11.6–14.5)
PROTHROMBIN TIME: 22.1 SECONDS (ref 11.6–14.5)
PROTHROMBIN TIME: 22.4 SECONDS (ref 11.6–14.5)
PROTHROMBIN TIME: 24 SECONDS (ref 11.6–14.5)
PROTHROMBIN TIME: 24.4 SECONDS (ref 11.6–14.5)
PROTHROMBIN TIME: 25.7 SECONDS (ref 11.6–14.5)
PROTHROMBIN TIME: 25.8 SECONDS (ref 11.6–14.5)
PROTHROMBIN TIME: 26.6 SECONDS (ref 11.6–14.5)
PROTHROMBIN TIME: 26.8 SECONDS (ref 11.6–14.5)
PROTHROMBIN TIME: 26.8 SECONDS (ref 11.6–14.5)
PROTHROMBIN TIME: 26.9 SECONDS (ref 11.6–14.5)
PROTHROMBIN TIME: 27.5 SECONDS (ref 11.6–14.5)
PROTHROMBIN TIME: 28.5 SECONDS (ref 11.6–14.5)
PROTHROMBIN TIME: 32 SECONDS (ref 11.6–14.5)
PROTHROMBIN TIME: 32.5 SECONDS (ref 11.6–14.5)
QRS AXIS: -13 DEGREES
QRS AXIS: -28 DEGREES
QRS AXIS: -37 DEGREES
QRS AXIS: 0 DEGREES
QRS AXIS: 1 DEGREES
QRS AXIS: 7 DEGREES
QRSD INTERVAL: 82 MS
QRSD INTERVAL: 84 MS
QRSD INTERVAL: 94 MS
QT INTERVAL: 284 MS
QT INTERVAL: 296 MS
QT INTERVAL: 314 MS
QT INTERVAL: 318 MS
QT INTERVAL: 330 MS
QT INTERVAL: 388 MS
QTC INTERVAL: 395 MS
QTC INTERVAL: 406 MS
QTC INTERVAL: 419 MS
QTC INTERVAL: 424 MS
QTC INTERVAL: 430 MS
QTC INTERVAL: 437 MS
RBC # BLD AUTO: 3.78 MILLION/UL (ref 3.88–5.62)
RBC # BLD AUTO: 3.84 MILLION/UL (ref 3.88–5.62)
RBC # BLD AUTO: 3.95 MILLION/UL (ref 3.88–5.62)
RBC # BLD AUTO: 4.02 MILLION/UL (ref 3.88–5.62)
RBC # BLD AUTO: 4.11 MILLION/UL (ref 3.88–5.62)
RBC # BLD AUTO: 4.12 MILLION/UL (ref 3.88–5.62)
RBC # BLD AUTO: 4.12 MILLION/UL (ref 3.88–5.62)
RBC # BLD AUTO: 4.16 MILLION/UL (ref 3.88–5.62)
RBC # BLD AUTO: 4.24 MILLION/UL (ref 3.88–5.62)
RBC # BLD AUTO: 4.3 MILLION/UL (ref 3.88–5.62)
RBC # BLD AUTO: 4.42 MILLION/UL (ref 3.88–5.62)
RBC # BLD AUTO: 4.45 MILLION/UL (ref 3.88–5.62)
RBC # BLD AUTO: 4.55 MILLION/UL (ref 3.88–5.62)
RBC # BLD AUTO: 4.59 MILLION/UL (ref 3.88–5.62)
RBC # BLD AUTO: 4.6 MILLION/UL (ref 3.88–5.62)
RBC # BLD AUTO: 4.63 MILLION/UL (ref 3.88–5.62)
RBC # BLD AUTO: 4.65 MILLION/UL (ref 3.88–5.62)
RBC # BLD AUTO: 4.74 MILLION/UL (ref 3.88–5.62)
RBC # BLD AUTO: 4.75 MILLION/UL (ref 3.88–5.62)
RBC # BLD AUTO: 4.81 MILLION/UL (ref 3.88–5.62)
RBC # BLD AUTO: 4.82 MILLION/UL (ref 3.88–5.62)
RBC # BLD AUTO: 4.96 MILLION/UL (ref 3.88–5.62)
RBC # BLD AUTO: 5.05 MILLION/UL (ref 3.88–5.62)
RBC # BLD AUTO: 5.06 MILLION/UL (ref 3.88–5.62)
RBC MORPH BLD: NORMAL
RBC MORPH BLD: PRESENT
RH BLD: POSITIVE
S PNEUM AG UR QL: NEGATIVE
SARS-COV-2 RNA RESP QL NAA+PROBE: NEGATIVE
SARS-COV-2 RNA RESP QL NAA+PROBE: POSITIVE
SARS-COV-2 RNA RESP QL NAA+PROBE: POSITIVE
SODIUM SERPL-SCNC: 130 MMOL/L (ref 136–145)
SODIUM SERPL-SCNC: 132 MMOL/L (ref 136–145)
SODIUM SERPL-SCNC: 132 MMOL/L (ref 136–145)
SODIUM SERPL-SCNC: 133 MMOL/L (ref 136–145)
SODIUM SERPL-SCNC: 134 MMOL/L (ref 136–145)
SODIUM SERPL-SCNC: 135 MMOL/L (ref 136–145)
SODIUM SERPL-SCNC: 136 MMOL/L (ref 133–145)
SODIUM SERPL-SCNC: 136 MMOL/L (ref 136–145)
SODIUM SERPL-SCNC: 137 MMOL/L (ref 136–145)
SODIUM SERPL-SCNC: 139 MMOL/L (ref 136–145)
SODIUM SERPL-SCNC: 141 MMOL/L (ref 136–145)
SODIUM SERPL-SCNC: 141 MMOL/L (ref 136–145)
SP GR UR STRIP.AUTO: 1.01 (ref 1–1.03)
SPECIMEN EXPIRATION DATE: NORMAL
SPECIMEN SOURCE: ABNORMAL
T WAVE AXIS: 13 DEGREES
T WAVE AXIS: 17 DEGREES
T WAVE AXIS: 3 DEGREES
T WAVE AXIS: 38 DEGREES
T WAVE AXIS: 45 DEGREES
T WAVE AXIS: 8 DEGREES
TOTAL CELLS COUNTED SPEC: 100
TROPONIN I SERPL-MCNC: <0.02 NG/ML
TROPONIN I SERPL-MCNC: <0.03 NG/ML (ref 0–0.07)
TSH SERPL DL<=0.05 MIU/L-ACNC: 1.35 UIU/ML (ref 0.36–3.74)
UROBILINOGEN UR QL STRIP.AUTO: 0.2 E.U./DL
VANCOMYCIN TROUGH SERPL-MCNC: 12.3 UG/ML (ref 10–20)
VENT BIPAP FIO2: 100 %
VENTRICULAR RATE: 107 BPM
VENTRICULAR RATE: 116 BPM
VENTRICULAR RATE: 131 BPM
VENTRICULAR RATE: 74 BPM
VENTRICULAR RATE: 98 BPM
VENTRICULAR RATE: 99 BPM
WBC # BLD AUTO: 10.44 THOUSAND/UL (ref 4.31–10.16)
WBC # BLD AUTO: 10.68 THOUSAND/UL (ref 4.31–10.16)
WBC # BLD AUTO: 11.86 THOUSAND/UL (ref 4.31–10.16)
WBC # BLD AUTO: 12.03 THOUSAND/UL (ref 4.31–10.16)
WBC # BLD AUTO: 12.28 THOUSAND/UL (ref 4.31–10.16)
WBC # BLD AUTO: 12.66 THOUSAND/UL (ref 4.31–10.16)
WBC # BLD AUTO: 12.68 THOUSAND/UL (ref 4.31–10.16)
WBC # BLD AUTO: 13.43 THOUSAND/UL (ref 4.31–10.16)
WBC # BLD AUTO: 13.48 THOUSAND/UL (ref 4.31–10.16)
WBC # BLD AUTO: 14.26 THOUSAND/UL (ref 4.31–10.16)
WBC # BLD AUTO: 14.75 THOUSAND/UL (ref 4.31–10.16)
WBC # BLD AUTO: 14.96 THOUSAND/UL (ref 4.31–10.16)
WBC # BLD AUTO: 15.31 THOUSAND/UL (ref 4.31–10.16)
WBC # BLD AUTO: 16.15 THOUSAND/UL (ref 4.31–10.16)
WBC # BLD AUTO: 17.53 THOUSAND/UL (ref 4.31–10.16)
WBC # BLD AUTO: 6.34 THOUSAND/UL (ref 4.31–10.16)
WBC # BLD AUTO: 6.98 THOUSAND/UL (ref 4.31–10.16)
WBC # BLD AUTO: 8.02 THOUSAND/UL (ref 4.31–10.16)
WBC # BLD AUTO: 8.16 THOUSAND/UL (ref 4.31–10.16)
WBC # BLD AUTO: 8.17 THOUSAND/UL (ref 4.31–10.16)
WBC # BLD AUTO: 8.32 THOUSAND/UL (ref 4.31–10.16)
WBC # BLD AUTO: 8.98 THOUSAND/UL (ref 4.31–10.16)
WBC # BLD AUTO: 9.36 THOUSAND/UL (ref 4.31–10.16)
WBC # BLD AUTO: 9.85 THOUSAND/UL (ref 4.31–10.16)

## 2021-01-01 PROCEDURE — 94760 N-INVAS EAR/PLS OXIMETRY 1: CPT

## 2021-01-01 PROCEDURE — C1769 GUIDE WIRE: HCPCS | Performed by: THORACIC SURGERY (CARDIOTHORACIC VASCULAR SURGERY)

## 2021-01-01 PROCEDURE — 97530 THERAPEUTIC ACTIVITIES: CPT

## 2021-01-01 PROCEDURE — 94640 AIRWAY INHALATION TREATMENT: CPT

## 2021-01-01 PROCEDURE — 96413 CHEMO IV INFUSION 1 HR: CPT

## 2021-01-01 PROCEDURE — 82728 ASSAY OF FERRITIN: CPT | Performed by: INTERNAL MEDICINE

## 2021-01-01 PROCEDURE — 71045 X-RAY EXAM CHEST 1 VIEW: CPT

## 2021-01-01 PROCEDURE — 85379 FIBRIN DEGRADATION QUANT: CPT | Performed by: INTERNAL MEDICINE

## 2021-01-01 PROCEDURE — 99232 SBSQ HOSP IP/OBS MODERATE 35: CPT | Performed by: INTERNAL MEDICINE

## 2021-01-01 PROCEDURE — 85730 THROMBOPLASTIN TIME PARTIAL: CPT | Performed by: PHYSICIAN ASSISTANT

## 2021-01-01 PROCEDURE — 93010 ELECTROCARDIOGRAM REPORT: CPT | Performed by: INTERNAL MEDICINE

## 2021-01-01 PROCEDURE — 94660 CPAP INITIATION&MGMT: CPT

## 2021-01-01 PROCEDURE — 94668 MNPJ CHEST WALL SBSQ: CPT

## 2021-01-01 PROCEDURE — U0003 INFECTIOUS AGENT DETECTION BY NUCLEIC ACID (DNA OR RNA); SEVERE ACUTE RESPIRATORY SYNDROME CORONAVIRUS 2 (SARS-COV-2) (CORONAVIRUS DISEASE [COVID-19]), AMPLIFIED PROBE TECHNIQUE, MAKING USE OF HIGH THROUGHPUT TECHNOLOGIES AS DESCRIBED BY CMS-2020-01-R: HCPCS

## 2021-01-01 PROCEDURE — 85379 FIBRIN DEGRADATION QUANT: CPT | Performed by: EMERGENCY MEDICINE

## 2021-01-01 PROCEDURE — 97110 THERAPEUTIC EXERCISES: CPT

## 2021-01-01 PROCEDURE — 77386 HB NTSTY MODUL RAD TX DLVR CPLX: CPT | Performed by: RADIOLOGY

## 2021-01-01 PROCEDURE — U0003 INFECTIOUS AGENT DETECTION BY NUCLEIC ACID (DNA OR RNA); SEVERE ACUTE RESPIRATORY SYNDROME CORONAVIRUS 2 (SARS-COV-2) (CORONAVIRUS DISEASE [COVID-19]), AMPLIFIED PROBE TECHNIQUE, MAKING USE OF HIGH THROUGHPUT TECHNOLOGIES AS DESCRIBED BY CMS-2020-01-R: HCPCS | Performed by: PHYSICIAN ASSISTANT

## 2021-01-01 PROCEDURE — 85027 COMPLETE CBC AUTOMATED: CPT | Performed by: INTERNAL MEDICINE

## 2021-01-01 PROCEDURE — 80048 BASIC METABOLIC PNL TOTAL CA: CPT | Performed by: INTERNAL MEDICINE

## 2021-01-01 PROCEDURE — 82948 REAGENT STRIP/BLOOD GLUCOSE: CPT

## 2021-01-01 PROCEDURE — 85610 PROTHROMBIN TIME: CPT | Performed by: EMERGENCY MEDICINE

## 2021-01-01 PROCEDURE — 85007 BL SMEAR W/DIFF WBC COUNT: CPT | Performed by: INTERNAL MEDICINE

## 2021-01-01 PROCEDURE — 80048 BASIC METABOLIC PNL TOTAL CA: CPT | Performed by: EMERGENCY MEDICINE

## 2021-01-01 PROCEDURE — 88342 IMHCHEM/IMCYTCHM 1ST ANTB: CPT | Performed by: PATHOLOGY

## 2021-01-01 PROCEDURE — 96375 TX/PRO/DX INJ NEW DRUG ADDON: CPT

## 2021-01-01 PROCEDURE — 88305 TISSUE EXAM BY PATHOLOGIST: CPT | Performed by: PATHOLOGY

## 2021-01-01 PROCEDURE — 80048 BASIC METABOLIC PNL TOTAL CA: CPT | Performed by: NURSE PRACTITIONER

## 2021-01-01 PROCEDURE — 84132 ASSAY OF SERUM POTASSIUM: CPT | Performed by: INTERNAL MEDICINE

## 2021-01-01 PROCEDURE — 85025 COMPLETE CBC W/AUTO DIFF WBC: CPT | Performed by: PHYSICIAN ASSISTANT

## 2021-01-01 PROCEDURE — C9113 INJ PANTOPRAZOLE SODIUM, VIA: HCPCS | Performed by: PHYSICIAN ASSISTANT

## 2021-01-01 PROCEDURE — 77301 RADIOTHERAPY DOSE PLAN IMRT: CPT | Performed by: RADIOLOGY

## 2021-01-01 PROCEDURE — 80053 COMPREHEN METABOLIC PANEL: CPT

## 2021-01-01 PROCEDURE — 99239 HOSP IP/OBS DSCHRG MGMT >30: CPT | Performed by: INTERNAL MEDICINE

## 2021-01-01 PROCEDURE — G1004 CDSM NDSC: HCPCS

## 2021-01-01 PROCEDURE — 93005 ELECTROCARDIOGRAM TRACING: CPT

## 2021-01-01 PROCEDURE — 74220 X-RAY XM ESOPHAGUS 1CNTRST: CPT

## 2021-01-01 PROCEDURE — 74018 RADEX ABDOMEN 1 VIEW: CPT

## 2021-01-01 PROCEDURE — 99238 HOSP IP/OBS DSCHRG MGMT 30/<: CPT | Performed by: PHYSICIAN ASSISTANT

## 2021-01-01 PROCEDURE — 85610 PROTHROMBIN TIME: CPT | Performed by: INTERNAL MEDICINE

## 2021-01-01 PROCEDURE — 99285 EMERGENCY DEPT VISIT HI MDM: CPT

## 2021-01-01 PROCEDURE — 80053 COMPREHEN METABOLIC PANEL: CPT | Performed by: HOSPITALIST

## 2021-01-01 PROCEDURE — U0005 INFEC AGEN DETEC AMPLI PROBE: HCPCS | Performed by: FAMILY MEDICINE

## 2021-01-01 PROCEDURE — 86900 BLOOD TYPING SEROLOGIC ABO: CPT

## 2021-01-01 PROCEDURE — 83735 ASSAY OF MAGNESIUM: CPT | Performed by: NURSE PRACTITIONER

## 2021-01-01 PROCEDURE — 81003 URINALYSIS AUTO W/O SCOPE: CPT | Performed by: EMERGENCY MEDICINE

## 2021-01-01 PROCEDURE — 84145 PROCALCITONIN (PCT): CPT | Performed by: PHYSICIAN ASSISTANT

## 2021-01-01 PROCEDURE — 99211 OFF/OP EST MAY X REQ PHY/QHP: CPT | Performed by: RADIOLOGY

## 2021-01-01 PROCEDURE — 80048 BASIC METABOLIC PNL TOTAL CA: CPT | Performed by: ANESTHESIOLOGY

## 2021-01-01 PROCEDURE — 77336 RADIATION PHYSICS CONSULT: CPT | Performed by: RADIOLOGY

## 2021-01-01 PROCEDURE — 94762 N-INVAS EAR/PLS OXIMTRY CONT: CPT

## 2021-01-01 PROCEDURE — 99291 CRITICAL CARE FIRST HOUR: CPT

## 2021-01-01 PROCEDURE — 74177 CT ABD & PELVIS W/CONTRAST: CPT

## 2021-01-01 PROCEDURE — 96361 HYDRATE IV INFUSION ADD-ON: CPT

## 2021-01-01 PROCEDURE — 96365 THER/PROPH/DIAG IV INF INIT: CPT

## 2021-01-01 PROCEDURE — 84443 ASSAY THYROID STIM HORMONE: CPT | Performed by: INTERNAL MEDICINE

## 2021-01-01 PROCEDURE — 85027 COMPLETE CBC AUTOMATED: CPT | Performed by: ANESTHESIOLOGY

## 2021-01-01 PROCEDURE — 85730 THROMBOPLASTIN TIME PARTIAL: CPT | Performed by: ANESTHESIOLOGY

## 2021-01-01 PROCEDURE — 97535 SELF CARE MNGMENT TRAINING: CPT

## 2021-01-01 PROCEDURE — 94761 N-INVAS EAR/PLS OXIMETRY MLT: CPT

## 2021-01-01 PROCEDURE — 92611 MOTION FLUOROSCOPY/SWALLOW: CPT

## 2021-01-01 PROCEDURE — 85025 COMPLETE CBC W/AUTO DIFF WBC: CPT | Performed by: INTERNAL MEDICINE

## 2021-01-01 PROCEDURE — 99254 IP/OBS CNSLTJ NEW/EST MOD 60: CPT | Performed by: INTERNAL MEDICINE

## 2021-01-01 PROCEDURE — 85730 THROMBOPLASTIN TIME PARTIAL: CPT | Performed by: INTERNAL MEDICINE

## 2021-01-01 PROCEDURE — 85027 COMPLETE CBC AUTOMATED: CPT | Performed by: PHYSICIAN ASSISTANT

## 2021-01-01 PROCEDURE — 99233 SBSQ HOSP IP/OBS HIGH 50: CPT | Performed by: ANESTHESIOLOGY

## 2021-01-01 PROCEDURE — 85610 PROTHROMBIN TIME: CPT | Performed by: PHYSICIAN ASSISTANT

## 2021-01-01 PROCEDURE — U0005 INFEC AGEN DETEC AMPLI PROBE: HCPCS

## 2021-01-01 PROCEDURE — 85027 COMPLETE CBC AUTOMATED: CPT | Performed by: NURSE PRACTITIONER

## 2021-01-01 PROCEDURE — 84484 ASSAY OF TROPONIN QUANT: CPT | Performed by: EMERGENCY MEDICINE

## 2021-01-01 PROCEDURE — 77338 DESIGN MLC DEVICE FOR IMRT: CPT | Performed by: RADIOLOGY

## 2021-01-01 PROCEDURE — 84145 PROCALCITONIN (PCT): CPT | Performed by: EMERGENCY MEDICINE

## 2021-01-01 PROCEDURE — 97163 PT EVAL HIGH COMPLEX 45 MIN: CPT

## 2021-01-01 PROCEDURE — 80048 BASIC METABOLIC PNL TOTAL CA: CPT

## 2021-01-01 PROCEDURE — 99232 SBSQ HOSP IP/OBS MODERATE 35: CPT | Performed by: ANESTHESIOLOGY

## 2021-01-01 PROCEDURE — 85025 COMPLETE CBC W/AUTO DIFF WBC: CPT

## 2021-01-01 PROCEDURE — 36415 COLL VENOUS BLD VENIPUNCTURE: CPT | Performed by: EMERGENCY MEDICINE

## 2021-01-01 PROCEDURE — 99223 1ST HOSP IP/OBS HIGH 75: CPT | Performed by: INTERNAL MEDICINE

## 2021-01-01 PROCEDURE — 85007 BL SMEAR W/DIFF WBC COUNT: CPT | Performed by: NURSE PRACTITIONER

## 2021-01-01 PROCEDURE — 99222 1ST HOSP IP/OBS MODERATE 55: CPT | Performed by: INTERNAL MEDICINE

## 2021-01-01 PROCEDURE — 92610 EVALUATE SWALLOWING FUNCTION: CPT

## 2021-01-01 PROCEDURE — 85025 COMPLETE CBC W/AUTO DIFF WBC: CPT | Performed by: EMERGENCY MEDICINE

## 2021-01-01 PROCEDURE — 85610 PROTHROMBIN TIME: CPT | Performed by: NURSE PRACTITIONER

## 2021-01-01 PROCEDURE — 84145 PROCALCITONIN (PCT): CPT | Performed by: INTERNAL MEDICINE

## 2021-01-01 PROCEDURE — 99291 CRITICAL CARE FIRST HOUR: CPT | Performed by: EMERGENCY MEDICINE

## 2021-01-01 PROCEDURE — 83735 ASSAY OF MAGNESIUM: CPT | Performed by: HOSPITALIST

## 2021-01-01 PROCEDURE — 94664 DEMO&/EVAL PT USE INHALER: CPT

## 2021-01-01 PROCEDURE — 83735 ASSAY OF MAGNESIUM: CPT | Performed by: PHYSICIAN ASSISTANT

## 2021-01-01 PROCEDURE — U0005 INFEC AGEN DETEC AMPLI PROBE: HCPCS | Performed by: PHYSICIAN ASSISTANT

## 2021-01-01 PROCEDURE — 99309 SBSQ NF CARE MODERATE MDM 30: CPT | Performed by: NURSE PRACTITIONER

## 2021-01-01 PROCEDURE — 94002 VENT MGMT INPAT INIT DAY: CPT

## 2021-01-01 PROCEDURE — 99232 SBSQ HOSP IP/OBS MODERATE 35: CPT | Performed by: THORACIC SURGERY (CARDIOTHORACIC VASCULAR SURGERY)

## 2021-01-01 PROCEDURE — U0003 INFECTIOUS AGENT DETECTION BY NUCLEIC ACID (DNA OR RNA); SEVERE ACUTE RESPIRATORY SYNDROME CORONAVIRUS 2 (SARS-COV-2) (CORONAVIRUS DISEASE [COVID-19]), AMPLIFIED PROBE TECHNIQUE, MAKING USE OF HIGH THROUGHPUT TECHNOLOGIES AS DESCRIBED BY CMS-2020-01-R: HCPCS | Performed by: FAMILY MEDICINE

## 2021-01-01 PROCEDURE — 74230 X-RAY XM SWLNG FUNCJ C+: CPT

## 2021-01-01 PROCEDURE — 83605 ASSAY OF LACTIC ACID: CPT | Performed by: EMERGENCY MEDICINE

## 2021-01-01 PROCEDURE — 99291 CRITICAL CARE FIRST HOUR: CPT | Performed by: ANESTHESIOLOGY

## 2021-01-01 PROCEDURE — 96366 THER/PROPH/DIAG IV INF ADDON: CPT

## 2021-01-01 PROCEDURE — 80053 COMPREHEN METABOLIC PANEL: CPT | Performed by: EMERGENCY MEDICINE

## 2021-01-01 PROCEDURE — 86140 C-REACTIVE PROTEIN: CPT | Performed by: PHYSICIAN ASSISTANT

## 2021-01-01 PROCEDURE — 36415 COLL VENOUS BLD VENIPUNCTURE: CPT

## 2021-01-01 PROCEDURE — 97167 OT EVAL HIGH COMPLEX 60 MIN: CPT

## 2021-01-01 PROCEDURE — 0DB68ZX EXCISION OF STOMACH, VIA NATURAL OR ARTIFICIAL OPENING ENDOSCOPIC, DIAGNOSTIC: ICD-10-PCS | Performed by: THORACIC SURGERY (CARDIOTHORACIC VASCULAR SURGERY)

## 2021-01-01 PROCEDURE — 77300 RADIATION THERAPY DOSE PLAN: CPT | Performed by: RADIOLOGY

## 2021-01-01 PROCEDURE — 99233 SBSQ HOSP IP/OBS HIGH 50: CPT | Performed by: INTERNAL MEDICINE

## 2021-01-01 PROCEDURE — 71046 X-RAY EXAM CHEST 2 VIEWS: CPT

## 2021-01-01 PROCEDURE — 43247 EGD REMOVE FOREIGN BODY: CPT | Performed by: THORACIC SURGERY (CARDIOTHORACIC VASCULAR SURGERY)

## 2021-01-01 PROCEDURE — NC001 PR NO CHARGE: Performed by: THORACIC SURGERY (CARDIOTHORACIC VASCULAR SURGERY)

## 2021-01-01 PROCEDURE — 83036 HEMOGLOBIN GLYCOSYLATED A1C: CPT | Performed by: INTERNAL MEDICINE

## 2021-01-01 PROCEDURE — 86850 RBC ANTIBODY SCREEN: CPT

## 2021-01-01 PROCEDURE — 82805 BLOOD GASES W/O2 SATURATION: CPT | Performed by: EMERGENCY MEDICINE

## 2021-01-01 PROCEDURE — 86140 C-REACTIVE PROTEIN: CPT | Performed by: INTERNAL MEDICINE

## 2021-01-01 PROCEDURE — 0DB38ZX EXCISION OF LOWER ESOPHAGUS, VIA NATURAL OR ARTIFICIAL OPENING ENDOSCOPIC, DIAGNOSTIC: ICD-10-PCS | Performed by: THORACIC SURGERY (CARDIOTHORACIC VASCULAR SURGERY)

## 2021-01-01 PROCEDURE — 88313 SPECIAL STAINS GROUP 2: CPT | Performed by: PATHOLOGY

## 2021-01-01 PROCEDURE — 93970 EXTREMITY STUDY: CPT | Performed by: SURGERY

## 2021-01-01 PROCEDURE — 0D738DZ DILATION OF LOWER ESOPHAGUS WITH INTRALUMINAL DEVICE, VIA NATURAL OR ARTIFICIAL OPENING ENDOSCOPIC: ICD-10-PCS | Performed by: THORACIC SURGERY (CARDIOTHORACIC VASCULAR SURGERY)

## 2021-01-01 PROCEDURE — 93306 TTE W/DOPPLER COMPLETE: CPT | Performed by: INTERNAL MEDICINE

## 2021-01-01 PROCEDURE — 92526 ORAL FUNCTION THERAPY: CPT

## 2021-01-01 PROCEDURE — 80053 COMPREHEN METABOLIC PANEL: CPT | Performed by: INTERNAL MEDICINE

## 2021-01-01 PROCEDURE — 99223 1ST HOSP IP/OBS HIGH 75: CPT | Performed by: THORACIC SURGERY (CARDIOTHORACIC VASCULAR SURGERY)

## 2021-01-01 PROCEDURE — 82728 ASSAY OF FERRITIN: CPT | Performed by: PHYSICIAN ASSISTANT

## 2021-01-01 PROCEDURE — 71275 CT ANGIOGRAPHY CHEST: CPT

## 2021-01-01 PROCEDURE — 87081 CULTURE SCREEN ONLY: CPT | Performed by: PHYSICIAN ASSISTANT

## 2021-01-01 PROCEDURE — 85049 AUTOMATED PLATELET COUNT: CPT | Performed by: HOSPITALIST

## 2021-01-01 PROCEDURE — 84100 ASSAY OF PHOSPHORUS: CPT | Performed by: PHYSICIAN ASSISTANT

## 2021-01-01 PROCEDURE — 83735 ASSAY OF MAGNESIUM: CPT | Performed by: EMERGENCY MEDICINE

## 2021-01-01 PROCEDURE — 99214 OFFICE O/P EST MOD 30 MIN: CPT | Performed by: PHYSICIAN ASSISTANT

## 2021-01-01 PROCEDURE — 93970 EXTREMITY STUDY: CPT

## 2021-01-01 PROCEDURE — 85610 PROTHROMBIN TIME: CPT

## 2021-01-01 PROCEDURE — 85027 COMPLETE CBC AUTOMATED: CPT | Performed by: EMERGENCY MEDICINE

## 2021-01-01 PROCEDURE — 71260 CT THORAX DX C+: CPT

## 2021-01-01 PROCEDURE — 77334 RADIATION TREATMENT AID(S): CPT | Performed by: RADIOLOGY

## 2021-01-01 PROCEDURE — 87449 NOS EACH ORGANISM AG IA: CPT | Performed by: PHYSICIAN ASSISTANT

## 2021-01-01 PROCEDURE — 80048 BASIC METABOLIC PNL TOTAL CA: CPT | Performed by: PHYSICIAN ASSISTANT

## 2021-01-01 PROCEDURE — 80202 ASSAY OF VANCOMYCIN: CPT | Performed by: HOSPITALIST

## 2021-01-01 PROCEDURE — 85027 COMPLETE CBC AUTOMATED: CPT | Performed by: HOSPITALIST

## 2021-01-01 PROCEDURE — 71250 CT THORAX DX C-: CPT

## 2021-01-01 PROCEDURE — 99223 1ST HOSP IP/OBS HIGH 75: CPT | Performed by: HOSPITALIST

## 2021-01-01 PROCEDURE — 99213 OFFICE O/P EST LOW 20 MIN: CPT | Performed by: PHYSICIAN ASSISTANT

## 2021-01-01 PROCEDURE — 85730 THROMBOPLASTIN TIME PARTIAL: CPT

## 2021-01-01 PROCEDURE — 74360 X-RAY GUIDE GI DILATION: CPT | Performed by: THORACIC SURGERY (CARDIOTHORACIC VASCULAR SURGERY)

## 2021-01-01 PROCEDURE — 80053 COMPREHEN METABOLIC PANEL: CPT | Performed by: PHYSICIAN ASSISTANT

## 2021-01-01 PROCEDURE — 99213 OFFICE O/P EST LOW 20 MIN: CPT | Performed by: THORACIC SURGERY (CARDIOTHORACIC VASCULAR SURGERY)

## 2021-01-01 PROCEDURE — 83880 ASSAY OF NATRIURETIC PEPTIDE: CPT | Performed by: EMERGENCY MEDICINE

## 2021-01-01 PROCEDURE — 99306 1ST NF CARE HIGH MDM 50: CPT | Performed by: INTERNAL MEDICINE

## 2021-01-01 PROCEDURE — 86901 BLOOD TYPING SEROLOGIC RH(D): CPT

## 2021-01-01 PROCEDURE — 87040 BLOOD CULTURE FOR BACTERIA: CPT | Performed by: EMERGENCY MEDICINE

## 2021-01-01 PROCEDURE — 99285 EMERGENCY DEPT VISIT HI MDM: CPT | Performed by: EMERGENCY MEDICINE

## 2021-01-01 PROCEDURE — 99356 PR PROLONGED SVC I/P OR OBS SETTING 1ST HOUR: CPT | Performed by: INTERNAL MEDICINE

## 2021-01-01 PROCEDURE — 99231 SBSQ HOSP IP/OBS SF/LOW 25: CPT | Performed by: INTERNAL MEDICINE

## 2021-01-01 PROCEDURE — 43266 EGD ENDOSCOPIC STENT PLACE: CPT | Performed by: THORACIC SURGERY (CARDIOTHORACIC VASCULAR SURGERY)

## 2021-01-01 PROCEDURE — 85007 BL SMEAR W/DIFF WBC COUNT: CPT | Performed by: EMERGENCY MEDICINE

## 2021-01-01 PROCEDURE — 0D968ZZ DRAINAGE OF STOMACH, VIA NATURAL OR ARTIFICIAL OPENING ENDOSCOPIC: ICD-10-PCS | Performed by: THORACIC SURGERY (CARDIOTHORACIC VASCULAR SURGERY)

## 2021-01-01 PROCEDURE — 85730 THROMBOPLASTIN TIME PARTIAL: CPT | Performed by: EMERGENCY MEDICINE

## 2021-01-01 PROCEDURE — 99232 SBSQ HOSP IP/OBS MODERATE 35: CPT | Performed by: PHYSICIAN ASSISTANT

## 2021-01-01 PROCEDURE — 99215 OFFICE O/P EST HI 40 MIN: CPT | Performed by: INTERNAL MEDICINE

## 2021-01-01 PROCEDURE — 84100 ASSAY OF PHOSPHORUS: CPT | Performed by: NURSE PRACTITIONER

## 2021-01-01 PROCEDURE — C1874 STENT, COATED/COV W/DEL SYS: HCPCS | Performed by: THORACIC SURGERY (CARDIOTHORACIC VASCULAR SURGERY)

## 2021-01-01 PROCEDURE — 36600 WITHDRAWAL OF ARTERIAL BLOOD: CPT

## 2021-01-01 PROCEDURE — 93306 TTE W/DOPPLER COMPLETE: CPT

## 2021-01-01 DEVICE — STENT SYSTEM
Type: IMPLANTABLE DEVICE | Site: ESOPHAGUS | Status: FUNCTIONAL
Brand: WALLFLEX™ ESOPHAGEAL

## 2021-01-01 RX ORDER — DEXAMETHASONE SODIUM PHOSPHATE 10 MG/ML
INJECTION, SOLUTION INTRAMUSCULAR; INTRAVENOUS AS NEEDED
Status: DISCONTINUED | OUTPATIENT
Start: 2021-01-01 | End: 2021-01-01

## 2021-01-01 RX ORDER — METHYLPREDNISOLONE SODIUM SUCCINATE 40 MG/ML
40 INJECTION, POWDER, LYOPHILIZED, FOR SOLUTION INTRAMUSCULAR; INTRAVENOUS EVERY 12 HOURS SCHEDULED
Status: DISCONTINUED | OUTPATIENT
Start: 2021-01-01 | End: 2021-01-01

## 2021-01-01 RX ORDER — LANOLIN ALCOHOL/MO/W.PET/CERES
3 CREAM (GRAM) TOPICAL
Status: DISCONTINUED | OUTPATIENT
Start: 2021-01-01 | End: 2021-01-01 | Stop reason: HOSPADM

## 2021-01-01 RX ORDER — POLYETHYLENE GLYCOL 3350 17 G/17G
17 POWDER, FOR SOLUTION ORAL DAILY
Status: DISCONTINUED | OUTPATIENT
Start: 2021-01-01 | End: 2021-01-01

## 2021-01-01 RX ORDER — AMOXICILLIN AND CLAVULANATE POTASSIUM 875; 125 MG/1; MG/1
1 TABLET, FILM COATED ORAL EVERY 12 HOURS SCHEDULED
COMMUNITY
End: 2021-01-01 | Stop reason: HOSPADM

## 2021-01-01 RX ORDER — INSULIN GLARGINE 100 [IU]/ML
15 INJECTION, SOLUTION SUBCUTANEOUS EVERY MORNING
Status: DISCONTINUED | OUTPATIENT
Start: 2021-01-01 | End: 2021-01-01

## 2021-01-01 RX ORDER — SODIUM CHLORIDE 9 MG/ML
20 INJECTION, SOLUTION INTRAVENOUS ONCE
Status: CANCELLED | OUTPATIENT
Start: 2021-01-01

## 2021-01-01 RX ORDER — AMLODIPINE BESYLATE 5 MG/1
5 TABLET ORAL DAILY
Status: DISCONTINUED | OUTPATIENT
Start: 2021-01-01 | End: 2021-01-01

## 2021-01-01 RX ORDER — POLYETHYLENE GLYCOL 3350 17 G/17G
17 POWDER, FOR SOLUTION ORAL DAILY PRN
Status: DISCONTINUED | OUTPATIENT
Start: 2021-01-01 | End: 2021-01-01

## 2021-01-01 RX ORDER — PREDNISONE 10 MG/1
10 TABLET ORAL DAILY
Status: DISCONTINUED | OUTPATIENT
Start: 2021-01-01 | End: 2021-01-01 | Stop reason: HOSPADM

## 2021-01-01 RX ORDER — DEXAMETHASONE SODIUM PHOSPHATE 4 MG/ML
6 INJECTION, SOLUTION INTRA-ARTICULAR; INTRALESIONAL; INTRAMUSCULAR; INTRAVENOUS; SOFT TISSUE ONCE
Status: COMPLETED | OUTPATIENT
Start: 2021-01-01 | End: 2021-01-01

## 2021-01-01 RX ORDER — HEPARIN SODIUM 1000 [USP'U]/ML
4000 INJECTION, SOLUTION INTRAVENOUS; SUBCUTANEOUS
Status: DISCONTINUED | OUTPATIENT
Start: 2021-01-01 | End: 2021-01-01

## 2021-01-01 RX ORDER — HEPARIN SODIUM 1000 [USP'U]/ML
4000 INJECTION, SOLUTION INTRAVENOUS; SUBCUTANEOUS ONCE
Status: DISCONTINUED | OUTPATIENT
Start: 2021-01-01 | End: 2021-01-01

## 2021-01-01 RX ORDER — PREDNISONE 10 MG/1
10 TABLET ORAL DAILY
Qty: 7 TABLET | Refills: 0 | Status: SHIPPED | OUTPATIENT
Start: 2021-01-01 | End: 2021-01-01

## 2021-01-01 RX ORDER — AMOXICILLIN 250 MG
2 CAPSULE ORAL 2 TIMES DAILY
Status: DISCONTINUED | OUTPATIENT
Start: 2021-01-01 | End: 2021-01-01

## 2021-01-01 RX ORDER — POLYETHYLENE GLYCOL 3350 17 G/17G
17 POWDER, FOR SOLUTION ORAL 3 TIMES DAILY
Status: DISCONTINUED | OUTPATIENT
Start: 2021-01-01 | End: 2021-01-01

## 2021-01-01 RX ORDER — HYDROMORPHONE HCL/PF 1 MG/ML
0.5 SYRINGE (ML) INJECTION ONCE
Status: COMPLETED | OUTPATIENT
Start: 2021-01-01 | End: 2021-01-01

## 2021-01-01 RX ORDER — ALBUTEROL SULFATE 2.5 MG/3ML
2.5 SOLUTION RESPIRATORY (INHALATION)
Status: DISCONTINUED | OUTPATIENT
Start: 2021-01-01 | End: 2021-01-01

## 2021-01-01 RX ORDER — LANOLIN ALCOHOL/MO/W.PET/CERES
3 CREAM (GRAM) TOPICAL ONCE
Status: COMPLETED | OUTPATIENT
Start: 2021-01-01 | End: 2021-01-01

## 2021-01-01 RX ORDER — HEPARIN SODIUM 1000 [USP'U]/ML
2000 INJECTION, SOLUTION INTRAVENOUS; SUBCUTANEOUS
Status: DISCONTINUED | OUTPATIENT
Start: 2021-01-01 | End: 2021-01-01

## 2021-01-01 RX ORDER — LORAZEPAM 0.5 MG/1
0.5 TABLET ORAL 2 TIMES DAILY PRN
Status: DISCONTINUED | OUTPATIENT
Start: 2021-01-01 | End: 2021-01-01 | Stop reason: HOSPADM

## 2021-01-01 RX ORDER — SUCCINYLCHOLINE/SOD CL,ISO/PF 100 MG/5ML
SYRINGE (ML) INTRAVENOUS AS NEEDED
Status: DISCONTINUED | OUTPATIENT
Start: 2021-01-01 | End: 2021-01-01

## 2021-01-01 RX ORDER — PREDNISONE 20 MG/1
20 TABLET ORAL DAILY
Status: DISCONTINUED | OUTPATIENT
Start: 2021-01-01 | End: 2021-01-01 | Stop reason: HOSPADM

## 2021-01-01 RX ORDER — CEFDINIR 250 MG/5ML
300 POWDER, FOR SUSPENSION ORAL EVERY 12 HOURS SCHEDULED
Status: COMPLETED | OUTPATIENT
Start: 2021-01-01 | End: 2021-01-01

## 2021-01-01 RX ORDER — METHYLPREDNISOLONE SODIUM SUCCINATE 40 MG/ML
20 INJECTION, POWDER, LYOPHILIZED, FOR SOLUTION INTRAMUSCULAR; INTRAVENOUS DAILY
Status: DISCONTINUED | OUTPATIENT
Start: 2021-01-01 | End: 2021-01-01

## 2021-01-01 RX ORDER — FUROSEMIDE 40 MG/1
40 TABLET ORAL ONCE
Status: COMPLETED | OUTPATIENT
Start: 2021-01-01 | End: 2021-01-01

## 2021-01-01 RX ORDER — HEPARIN SODIUM 10000 [USP'U]/100ML
3-20 INJECTION, SOLUTION INTRAVENOUS
Status: DISCONTINUED | OUTPATIENT
Start: 2021-01-01 | End: 2021-01-01

## 2021-01-01 RX ORDER — HEPARIN SODIUM 1000 [USP'U]/ML
4000 INJECTION, SOLUTION INTRAVENOUS; SUBCUTANEOUS ONCE
Status: COMPLETED | OUTPATIENT
Start: 2021-01-01 | End: 2021-01-01

## 2021-01-01 RX ORDER — POLYETHYLENE GLYCOL 3350 17 G/17G
17 POWDER, FOR SOLUTION ORAL 3 TIMES DAILY
Status: DISCONTINUED | OUTPATIENT
Start: 2021-01-01 | End: 2021-01-01 | Stop reason: HOSPADM

## 2021-01-01 RX ORDER — ONDANSETRON 4 MG/1
4 TABLET, ORALLY DISINTEGRATING ORAL EVERY 6 HOURS PRN
Qty: 20 TABLET | Refills: 0 | Status: SHIPPED | OUTPATIENT
Start: 2021-01-01 | End: 2021-01-01 | Stop reason: SDUPTHER

## 2021-01-01 RX ORDER — PANTOPRAZOLE SODIUM 40 MG/1
40 TABLET, DELAYED RELEASE ORAL
Status: DISCONTINUED | OUTPATIENT
Start: 2021-01-01 | End: 2021-01-01 | Stop reason: HOSPADM

## 2021-01-01 RX ORDER — METOPROLOL TARTRATE 5 MG/5ML
5 INJECTION INTRAVENOUS EVERY 6 HOURS PRN
Status: DISCONTINUED | OUTPATIENT
Start: 2021-01-01 | End: 2021-01-01 | Stop reason: HOSPADM

## 2021-01-01 RX ORDER — PREDNISONE 1 MG/1
5 TABLET ORAL DAILY
Qty: 7 TABLET | Refills: 0 | Status: SHIPPED | OUTPATIENT
Start: 2021-01-01 | End: 2021-08-19

## 2021-01-01 RX ORDER — BISACODYL 10 MG
10 SUPPOSITORY, RECTAL RECTAL DAILY PRN
Status: DISCONTINUED | OUTPATIENT
Start: 2021-01-01 | End: 2021-01-01

## 2021-01-01 RX ORDER — LORAZEPAM 0.5 MG/1
0.5 TABLET ORAL 2 TIMES DAILY PRN
Qty: 60 TABLET | Refills: 1 | Status: SHIPPED | OUTPATIENT
Start: 2021-01-01 | End: 2021-01-01 | Stop reason: HOSPADM

## 2021-01-01 RX ORDER — LEVALBUTEROL 1.25 MG/.5ML
1.25 SOLUTION, CONCENTRATE RESPIRATORY (INHALATION)
Status: DISCONTINUED | OUTPATIENT
Start: 2021-01-01 | End: 2021-01-01 | Stop reason: HOSPADM

## 2021-01-01 RX ORDER — LEVALBUTEROL 1.25 MG/.5ML
1.25 SOLUTION, CONCENTRATE RESPIRATORY (INHALATION)
Status: DISCONTINUED | OUTPATIENT
Start: 2021-01-01 | End: 2021-01-01

## 2021-01-01 RX ORDER — DICYCLOMINE HYDROCHLORIDE 10 MG/1
10 CAPSULE ORAL
Status: DISCONTINUED | OUTPATIENT
Start: 2021-01-01 | End: 2021-01-01

## 2021-01-01 RX ORDER — WARFARIN SODIUM 7.5 MG/1
7.5 TABLET ORAL
Status: COMPLETED | OUTPATIENT
Start: 2021-01-01 | End: 2021-01-01

## 2021-01-01 RX ORDER — LIDOCAINE AND PRILOCAINE 25; 25 MG/G; MG/G
CREAM TOPICAL AS NEEDED
Qty: 30 G | Refills: 1 | Status: SHIPPED | OUTPATIENT
Start: 2021-01-01

## 2021-01-01 RX ORDER — PRAVASTATIN SODIUM 80 MG/1
80 TABLET ORAL
Status: DISCONTINUED | OUTPATIENT
Start: 2021-01-01 | End: 2021-01-01 | Stop reason: HOSPADM

## 2021-01-01 RX ORDER — FUROSEMIDE 20 MG/1
20 TABLET ORAL DAILY
Qty: 30 TABLET | Refills: 0
Start: 2021-01-01 | End: 2021-01-01 | Stop reason: HOSPADM

## 2021-01-01 RX ORDER — LANOLIN ALCOHOL/MO/W.PET/CERES
3 CREAM (GRAM) TOPICAL
Qty: 30 EACH | Refills: 0 | Status: SHIPPED | OUTPATIENT
Start: 2021-01-01

## 2021-01-01 RX ORDER — SUCRALFATE ORAL 1 G/10ML
1 SUSPENSION ORAL 4 TIMES DAILY
Qty: 420 ML | Refills: 3 | Status: SHIPPED | OUTPATIENT
Start: 2021-01-01 | End: 2021-01-01 | Stop reason: HOSPADM

## 2021-01-01 RX ORDER — SODIUM CHLORIDE, SODIUM LACTATE, POTASSIUM CHLORIDE, CALCIUM CHLORIDE 600; 310; 30; 20 MG/100ML; MG/100ML; MG/100ML; MG/100ML
75 INJECTION, SOLUTION INTRAVENOUS CONTINUOUS
Status: DISCONTINUED | OUTPATIENT
Start: 2021-01-01 | End: 2021-01-01

## 2021-01-01 RX ORDER — SODIUM CHLORIDE 9 MG/ML
20 INJECTION, SOLUTION INTRAVENOUS ONCE
Status: COMPLETED | OUTPATIENT
Start: 2021-01-01 | End: 2021-01-01

## 2021-01-01 RX ORDER — METHYLPREDNISOLONE SODIUM SUCCINATE 40 MG/ML
40 INJECTION, POWDER, LYOPHILIZED, FOR SOLUTION INTRAMUSCULAR; INTRAVENOUS DAILY
Status: DISCONTINUED | OUTPATIENT
Start: 2021-01-01 | End: 2021-01-01

## 2021-01-01 RX ORDER — AZITHROMYCIN 250 MG/1
500 TABLET, FILM COATED ORAL EVERY 24 HOURS
COMMUNITY
End: 2021-01-01 | Stop reason: HOSPADM

## 2021-01-01 RX ORDER — DOCUSATE SODIUM 100 MG/1
100 CAPSULE, LIQUID FILLED ORAL 2 TIMES DAILY
Status: DISCONTINUED | OUTPATIENT
Start: 2021-01-01 | End: 2021-01-01 | Stop reason: HOSPADM

## 2021-01-01 RX ORDER — PREDNISONE 1 MG/1
5 TABLET ORAL DAILY
Status: DISCONTINUED | OUTPATIENT
Start: 2021-01-01 | End: 2021-01-01 | Stop reason: HOSPADM

## 2021-01-01 RX ORDER — KETOROLAC TROMETHAMINE 30 MG/ML
15 INJECTION, SOLUTION INTRAMUSCULAR; INTRAVENOUS ONCE
Status: COMPLETED | OUTPATIENT
Start: 2021-01-01 | End: 2021-01-01

## 2021-01-01 RX ORDER — PREDNISONE 20 MG/1
40 TABLET ORAL DAILY
Status: DISCONTINUED | OUTPATIENT
Start: 2021-01-01 | End: 2021-01-01

## 2021-01-01 RX ORDER — SODIUM CHLORIDE, SODIUM LACTATE, POTASSIUM CHLORIDE, CALCIUM CHLORIDE 600; 310; 30; 20 MG/100ML; MG/100ML; MG/100ML; MG/100ML
50 INJECTION, SOLUTION INTRAVENOUS CONTINUOUS
Status: DISCONTINUED | OUTPATIENT
Start: 2021-01-01 | End: 2021-01-01 | Stop reason: HOSPADM

## 2021-01-01 RX ORDER — DEXTROSE, SODIUM CHLORIDE, SODIUM LACTATE, POTASSIUM CHLORIDE, AND CALCIUM CHLORIDE 5; .6; .31; .03; .02 G/100ML; G/100ML; G/100ML; G/100ML; G/100ML
75 INJECTION, SOLUTION INTRAVENOUS CONTINUOUS
Status: DISCONTINUED | OUTPATIENT
Start: 2021-01-01 | End: 2021-01-01

## 2021-01-01 RX ORDER — TRAZODONE HYDROCHLORIDE 100 MG/1
100 TABLET ORAL
Qty: 30 TABLET | Refills: 0 | Status: SHIPPED | OUTPATIENT
Start: 2021-01-01

## 2021-01-01 RX ORDER — ECHINACEA PURPUREA EXTRACT 125 MG
1 TABLET ORAL
Status: DISCONTINUED | OUTPATIENT
Start: 2021-01-01 | End: 2021-01-01 | Stop reason: HOSPADM

## 2021-01-01 RX ORDER — TRAZODONE HYDROCHLORIDE 50 MG/1
100 TABLET ORAL
Status: DISCONTINUED | OUTPATIENT
Start: 2021-01-01 | End: 2021-01-01 | Stop reason: HOSPADM

## 2021-01-01 RX ORDER — SODIUM CHLORIDE, SODIUM LACTATE, POTASSIUM CHLORIDE, CALCIUM CHLORIDE 600; 310; 30; 20 MG/100ML; MG/100ML; MG/100ML; MG/100ML
INJECTION, SOLUTION INTRAVENOUS CONTINUOUS PRN
Status: DISCONTINUED | OUTPATIENT
Start: 2021-01-01 | End: 2021-01-01

## 2021-01-01 RX ORDER — WARFARIN SODIUM 3 MG/1
3 TABLET ORAL
Qty: 30 TABLET | Refills: 0
Start: 2021-01-01

## 2021-01-01 RX ORDER — SODIUM CHLORIDE FOR INHALATION 0.9 %
3 VIAL, NEBULIZER (ML) INHALATION
Status: DISCONTINUED | OUTPATIENT
Start: 2021-01-01 | End: 2021-01-01

## 2021-01-01 RX ORDER — FUROSEMIDE 40 MG/1
40 TABLET ORAL DAILY
Status: DISCONTINUED | OUTPATIENT
Start: 2021-01-01 | End: 2021-01-01

## 2021-01-01 RX ORDER — ASPIRIN 81 MG/1
324 TABLET, CHEWABLE ORAL ONCE
Status: COMPLETED | OUTPATIENT
Start: 2021-01-01 | End: 2021-01-01

## 2021-01-01 RX ORDER — LANOLIN ALCOHOL/MO/W.PET/CERES
3 CREAM (GRAM) TOPICAL
Status: DISCONTINUED | OUTPATIENT
Start: 2021-01-01 | End: 2021-01-01

## 2021-01-01 RX ORDER — DEXTROSE, SODIUM CHLORIDE, SODIUM LACTATE, POTASSIUM CHLORIDE, AND CALCIUM CHLORIDE 5; .6; .31; .03; .02 G/100ML; G/100ML; G/100ML; G/100ML; G/100ML
50 INJECTION, SOLUTION INTRAVENOUS CONTINUOUS
Status: DISCONTINUED | OUTPATIENT
Start: 2021-01-01 | End: 2021-01-01

## 2021-01-01 RX ORDER — NYSTATIN 100000 U/G
CREAM TOPICAL 2 TIMES DAILY
Status: DISCONTINUED | OUTPATIENT
Start: 2021-01-01 | End: 2021-01-01

## 2021-01-01 RX ORDER — WARFARIN SODIUM 5 MG/1
10 TABLET ORAL
Status: COMPLETED | OUTPATIENT
Start: 2021-01-01 | End: 2021-01-01

## 2021-01-01 RX ORDER — LIDOCAINE 40 MG/G
CREAM TOPICAL DAILY PRN
Status: DISCONTINUED | OUTPATIENT
Start: 2021-01-01 | End: 2021-01-01

## 2021-01-01 RX ORDER — INSULIN GLARGINE 100 [IU]/ML
12 INJECTION, SOLUTION SUBCUTANEOUS EVERY MORNING
Status: DISCONTINUED | OUTPATIENT
Start: 2021-01-01 | End: 2021-01-01 | Stop reason: HOSPADM

## 2021-01-01 RX ORDER — METHYLPREDNISOLONE SODIUM SUCCINATE 40 MG/ML
40 INJECTION, POWDER, LYOPHILIZED, FOR SOLUTION INTRAMUSCULAR; INTRAVENOUS ONCE
Status: COMPLETED | OUTPATIENT
Start: 2021-01-01 | End: 2021-01-01

## 2021-01-01 RX ORDER — PANTOPRAZOLE SODIUM 40 MG/1
40 INJECTION, POWDER, FOR SOLUTION INTRAVENOUS EVERY 12 HOURS SCHEDULED
Status: DISCONTINUED | OUTPATIENT
Start: 2021-01-01 | End: 2021-01-01 | Stop reason: HOSPADM

## 2021-01-01 RX ORDER — ALBUTEROL SULFATE 2.5 MG/3ML
2.5 SOLUTION RESPIRATORY (INHALATION) EVERY 4 HOURS PRN
COMMUNITY

## 2021-01-01 RX ORDER — PREDNISONE 10 MG/1
10 TABLET ORAL DAILY
Qty: 7 TABLET | Refills: 0 | Status: SHIPPED | OUTPATIENT
Start: 2021-01-01 | End: 2021-01-01 | Stop reason: HOSPADM

## 2021-01-01 RX ORDER — BENZONATATE 100 MG/1
100 CAPSULE ORAL 3 TIMES DAILY PRN
Status: DISCONTINUED | OUTPATIENT
Start: 2021-01-01 | End: 2021-01-01 | Stop reason: HOSPADM

## 2021-01-01 RX ORDER — PREDNISONE 10 MG/1
TABLET ORAL
Qty: 30 TABLET | Refills: 0
Start: 2021-01-01 | End: 2021-01-01 | Stop reason: HOSPADM

## 2021-01-01 RX ORDER — AMLODIPINE BESYLATE 10 MG/1
10 TABLET ORAL DAILY
Status: DISCONTINUED | OUTPATIENT
Start: 2021-01-01 | End: 2021-01-01 | Stop reason: HOSPADM

## 2021-01-01 RX ORDER — HYDROMORPHONE HCL/PF 1 MG/ML
0.4 SYRINGE (ML) INJECTION
Status: DISCONTINUED | OUTPATIENT
Start: 2021-01-01 | End: 2021-01-01 | Stop reason: HOSPADM

## 2021-01-01 RX ORDER — LABETALOL 20 MG/4 ML (5 MG/ML) INTRAVENOUS SYRINGE
10 ONCE
Status: COMPLETED | OUTPATIENT
Start: 2021-01-01 | End: 2021-01-01

## 2021-01-01 RX ORDER — FUROSEMIDE 20 MG/1
20 TABLET ORAL DAILY
Status: DISCONTINUED | OUTPATIENT
Start: 2021-01-01 | End: 2021-01-01

## 2021-01-01 RX ORDER — NYSTATIN 100000 [USP'U]/G
POWDER TOPICAL 4 TIMES DAILY
COMMUNITY

## 2021-01-01 RX ORDER — IPRATROPIUM BROMIDE AND ALBUTEROL SULFATE .5; 3 MG/3ML; MG/3ML
1 SOLUTION RESPIRATORY (INHALATION) ONCE
Status: COMPLETED | OUTPATIENT
Start: 2021-01-01 | End: 2021-01-01

## 2021-01-01 RX ORDER — PROPOFOL 10 MG/ML
INJECTION, EMULSION INTRAVENOUS AS NEEDED
Status: DISCONTINUED | OUTPATIENT
Start: 2021-01-01 | End: 2021-01-01

## 2021-01-01 RX ORDER — WARFARIN SODIUM 3 MG/1
3 TABLET ORAL
Status: DISCONTINUED | OUTPATIENT
Start: 2021-01-01 | End: 2021-01-01 | Stop reason: HOSPADM

## 2021-01-01 RX ORDER — VANCOMYCIN HYDROCHLORIDE 1 G/200ML
1000 INJECTION, SOLUTION INTRAVENOUS EVERY 12 HOURS
Status: DISCONTINUED | OUTPATIENT
Start: 2021-01-01 | End: 2021-01-01

## 2021-01-01 RX ORDER — MAGNESIUM CARB/ALUMINUM HYDROX 105-160MG
296 TABLET,CHEWABLE ORAL ONCE
Status: COMPLETED | OUTPATIENT
Start: 2021-01-01 | End: 2021-01-01

## 2021-01-01 RX ORDER — IPRATROPIUM BROMIDE AND ALBUTEROL SULFATE 2.5; .5 MG/3ML; MG/3ML
3 SOLUTION RESPIRATORY (INHALATION) EVERY 4 HOURS PRN
Status: DISCONTINUED | OUTPATIENT
Start: 2021-01-01 | End: 2021-01-01

## 2021-01-01 RX ORDER — HEPARIN SODIUM 5000 [USP'U]/ML
5000 INJECTION, SOLUTION INTRAVENOUS; SUBCUTANEOUS EVERY 8 HOURS SCHEDULED
Status: DISCONTINUED | OUTPATIENT
Start: 2021-01-01 | End: 2021-01-01 | Stop reason: HOSPADM

## 2021-01-01 RX ORDER — INSULIN GLARGINE 100 [IU]/ML
10 INJECTION, SOLUTION SUBCUTANEOUS EVERY MORNING
Status: DISCONTINUED | OUTPATIENT
Start: 2021-01-01 | End: 2021-01-01

## 2021-01-01 RX ORDER — HYDRALAZINE HYDROCHLORIDE 20 MG/ML
5 INJECTION INTRAMUSCULAR; INTRAVENOUS EVERY 6 HOURS PRN
Status: DISCONTINUED | OUTPATIENT
Start: 2021-01-01 | End: 2021-01-01 | Stop reason: HOSPADM

## 2021-01-01 RX ORDER — POLYETHYLENE GLYCOL 3350 17 G/17G
34 POWDER, FOR SOLUTION ORAL 3 TIMES DAILY
Status: DISCONTINUED | OUTPATIENT
Start: 2021-01-01 | End: 2021-01-01 | Stop reason: HOSPADM

## 2021-01-01 RX ORDER — FUROSEMIDE 10 MG/ML
40 INJECTION INTRAMUSCULAR; INTRAVENOUS ONCE
Status: COMPLETED | OUTPATIENT
Start: 2021-01-01 | End: 2021-01-01

## 2021-01-01 RX ORDER — FUROSEMIDE 20 MG/1
20 TABLET ORAL DAILY
Qty: 30 TABLET | Refills: 0 | Status: SHIPPED | OUTPATIENT
Start: 2021-01-01 | End: 2021-01-01

## 2021-01-01 RX ORDER — GUAIFENESIN 600 MG
1200 TABLET, EXTENDED RELEASE 12 HR ORAL EVERY 12 HOURS SCHEDULED
Status: DISCONTINUED | OUTPATIENT
Start: 2021-01-01 | End: 2021-01-01 | Stop reason: HOSPADM

## 2021-01-01 RX ORDER — LANOLIN ALCOHOL/MO/W.PET/CERES
3 CREAM (GRAM) TOPICAL
Qty: 30 EACH | Refills: 0 | Status: SHIPPED | OUTPATIENT
Start: 2021-01-01 | End: 2021-01-01

## 2021-01-01 RX ORDER — LIDOCAINE 40 MG/G
CREAM TOPICAL DAILY PRN
Status: DISCONTINUED | OUTPATIENT
Start: 2021-01-01 | End: 2021-01-01 | Stop reason: HOSPADM

## 2021-01-01 RX ORDER — LANOLIN ALCOHOL/MO/W.PET/CERES
6 CREAM (GRAM) TOPICAL
Status: DISCONTINUED | OUTPATIENT
Start: 2021-01-01 | End: 2021-01-01 | Stop reason: HOSPADM

## 2021-01-01 RX ORDER — CEFTRIAXONE 1 G/50ML
1000 INJECTION, SOLUTION INTRAVENOUS ONCE
Status: COMPLETED | OUTPATIENT
Start: 2021-01-01 | End: 2021-01-01

## 2021-01-01 RX ORDER — POLYETHYLENE GLYCOL 3350 17 G/17G
17 POWDER, FOR SOLUTION ORAL ONCE
Status: COMPLETED | OUTPATIENT
Start: 2021-01-01 | End: 2021-01-01

## 2021-01-01 RX ORDER — LIDOCAINE HYDROCHLORIDE 20 MG/ML
15 SOLUTION OROPHARYNGEAL 4 TIMES DAILY PRN
Qty: 100 ML | Refills: 0 | Status: SHIPPED | OUTPATIENT
Start: 2021-01-01

## 2021-01-01 RX ORDER — ONDANSETRON 2 MG/ML
INJECTION INTRAMUSCULAR; INTRAVENOUS AS NEEDED
Status: DISCONTINUED | OUTPATIENT
Start: 2021-01-01 | End: 2021-01-01

## 2021-01-01 RX ORDER — LORAZEPAM 0.5 MG/1
0.5 TABLET ORAL EVERY 8 HOURS PRN
Qty: 20 TABLET | Refills: 0 | Status: SHIPPED | OUTPATIENT
Start: 2021-01-01 | End: 2021-01-01

## 2021-01-01 RX ORDER — INSULIN GLARGINE 100 [IU]/ML
12 INJECTION, SOLUTION SUBCUTANEOUS EVERY MORNING
Qty: 10 ML | Refills: 0
Start: 2021-01-01

## 2021-01-01 RX ORDER — ACETAMINOPHEN 325 MG/1
650 TABLET ORAL EVERY 6 HOURS PRN
Status: DISCONTINUED | OUTPATIENT
Start: 2021-01-01 | End: 2021-01-01 | Stop reason: HOSPADM

## 2021-01-01 RX ORDER — AMLODIPINE BESYLATE 10 MG/1
10 TABLET ORAL DAILY
Status: DISCONTINUED | OUTPATIENT
Start: 2021-01-01 | End: 2021-01-01

## 2021-01-01 RX ORDER — FUROSEMIDE 10 MG/ML
20 INJECTION INTRAMUSCULAR; INTRAVENOUS ONCE
Status: COMPLETED | OUTPATIENT
Start: 2021-01-01 | End: 2021-01-01

## 2021-01-01 RX ORDER — SODIUM CHLORIDE 9 MG/ML
20 INJECTION, SOLUTION INTRAVENOUS ONCE
Status: CANCELLED | OUTPATIENT
Start: 2021-08-24

## 2021-01-01 RX ORDER — DOCUSATE SODIUM 100 MG/1
100 CAPSULE, LIQUID FILLED ORAL 2 TIMES DAILY
COMMUNITY

## 2021-01-01 RX ORDER — PREDNISONE 20 MG/1
20 TABLET ORAL DAILY
Status: DISCONTINUED | OUTPATIENT
Start: 2021-01-01 | End: 2021-01-01

## 2021-01-01 RX ORDER — FENTANYL CITRATE/PF 50 MCG/ML
25 SYRINGE (ML) INJECTION
Status: DISCONTINUED | OUTPATIENT
Start: 2021-01-01 | End: 2021-01-01 | Stop reason: HOSPADM

## 2021-01-01 RX ORDER — OXYCODONE HYDROCHLORIDE 10 MG/1
10 TABLET ORAL EVERY 4 HOURS PRN
Status: DISCONTINUED | OUTPATIENT
Start: 2021-01-01 | End: 2021-01-01 | Stop reason: HOSPADM

## 2021-01-01 RX ORDER — PREDNISONE 20 MG/1
20 TABLET ORAL DAILY
Qty: 7 TABLET | Refills: 0 | Status: SHIPPED | OUTPATIENT
Start: 2021-01-01 | End: 2021-01-01

## 2021-01-01 RX ORDER — ASPIRIN 81 MG/1
81 TABLET, CHEWABLE ORAL DAILY
Status: DISCONTINUED | OUTPATIENT
Start: 2021-01-01 | End: 2021-01-01 | Stop reason: HOSPADM

## 2021-01-01 RX ORDER — PREDNISONE 10 MG/1
30 TABLET ORAL DAILY
Qty: 12 TABLET | Refills: 0 | Status: SHIPPED | OUTPATIENT
Start: 2021-01-01 | End: 2021-01-01 | Stop reason: HOSPADM

## 2021-01-01 RX ORDER — AMLODIPINE BESYLATE 10 MG/1
10 TABLET ORAL DAILY
Qty: 30 TABLET | Refills: 0 | Status: SHIPPED | OUTPATIENT
Start: 2021-01-01 | End: 2021-01-01

## 2021-01-01 RX ORDER — AMOXICILLIN 250 MG
1 CAPSULE ORAL 2 TIMES DAILY
Status: DISCONTINUED | OUTPATIENT
Start: 2021-01-01 | End: 2021-01-01

## 2021-01-01 RX ORDER — ALBUMIN (HUMAN) 12.5 G/50ML
12.5 SOLUTION INTRAVENOUS ONCE
Status: COMPLETED | OUTPATIENT
Start: 2021-01-01 | End: 2021-01-01

## 2021-01-01 RX ORDER — POLYETHYLENE GLYCOL 3350 17 G/17G
17 POWDER, FOR SOLUTION ORAL 3 TIMES DAILY
Qty: 1530 G | Refills: 0 | Status: SHIPPED | OUTPATIENT
Start: 2021-01-01 | End: 2021-01-01

## 2021-01-01 RX ORDER — OXYCODONE HYDROCHLORIDE 5 MG/1
2.5 TABLET ORAL EVERY 4 HOURS PRN
Status: DISCONTINUED | OUTPATIENT
Start: 2021-01-01 | End: 2021-01-01

## 2021-01-01 RX ORDER — ONDANSETRON 2 MG/ML
4 INJECTION INTRAMUSCULAR; INTRAVENOUS EVERY 4 HOURS PRN
Status: DISCONTINUED | OUTPATIENT
Start: 2021-01-01 | End: 2021-01-01 | Stop reason: HOSPADM

## 2021-01-01 RX ORDER — POLYETHYLENE GLYCOL 3350, SODIUM CHLORIDE, SODIUM BICARBONATE, POTASSIUM CHLORIDE 420; 11.2; 5.72; 1.48 G/4L; G/4L; G/4L; G/4L
2000 POWDER, FOR SOLUTION ORAL ONCE
Status: COMPLETED | OUTPATIENT
Start: 2021-01-01 | End: 2021-01-01

## 2021-01-01 RX ORDER — LEVALBUTEROL 1.25 MG/.5ML
1.25 SOLUTION, CONCENTRATE RESPIRATORY (INHALATION)
Qty: 45 ML | Refills: 0 | Status: SHIPPED | OUTPATIENT
Start: 2021-01-01 | End: 2021-01-01 | Stop reason: HOSPADM

## 2021-01-01 RX ORDER — SODIUM CHLORIDE FOR INHALATION 0.9 %
3 VIAL, NEBULIZER (ML) INHALATION
Status: DISCONTINUED | OUTPATIENT
Start: 2021-01-01 | End: 2021-01-01 | Stop reason: HOSPADM

## 2021-01-01 RX ORDER — MICONAZOLE NITRATE 20 MG/G
CREAM TOPICAL 2 TIMES DAILY
Status: DISCONTINUED | OUTPATIENT
Start: 2021-01-01 | End: 2021-01-01 | Stop reason: HOSPADM

## 2021-01-01 RX ORDER — INSULIN GLARGINE 100 [IU]/ML
10 INJECTION, SOLUTION SUBCUTANEOUS
Status: DISCONTINUED | OUTPATIENT
Start: 2021-01-01 | End: 2021-01-01

## 2021-01-01 RX ORDER — FUROSEMIDE 10 MG/ML
20 INJECTION INTRAMUSCULAR; INTRAVENOUS ONCE
Status: DISCONTINUED | OUTPATIENT
Start: 2021-01-01 | End: 2021-01-01

## 2021-01-01 RX ORDER — METHYLPREDNISOLONE SODIUM SUCCINATE 125 MG/2ML
60 INJECTION, POWDER, LYOPHILIZED, FOR SOLUTION INTRAMUSCULAR; INTRAVENOUS EVERY 12 HOURS SCHEDULED
Status: DISCONTINUED | OUTPATIENT
Start: 2021-01-01 | End: 2021-01-01

## 2021-01-01 RX ORDER — MINERAL OIL 100 G/100G
1 OIL RECTAL ONCE
Status: COMPLETED | OUTPATIENT
Start: 2021-01-01 | End: 2021-01-01

## 2021-01-01 RX ORDER — PRAVASTATIN SODIUM 80 MG/1
80 TABLET ORAL
Status: DISCONTINUED | OUTPATIENT
Start: 2021-01-01 | End: 2021-01-01

## 2021-01-01 RX ORDER — FUROSEMIDE 10 MG/ML
40 INJECTION INTRAMUSCULAR; INTRAVENOUS DAILY
Status: DISCONTINUED | OUTPATIENT
Start: 2021-01-01 | End: 2021-01-01

## 2021-01-01 RX ORDER — BISACODYL 10 MG
10 SUPPOSITORY, RECTAL RECTAL ONCE
Status: COMPLETED | OUTPATIENT
Start: 2021-01-01 | End: 2021-01-01

## 2021-01-01 RX ORDER — DOCUSATE SODIUM 100 MG/1
100 CAPSULE, LIQUID FILLED ORAL DAILY PRN
Status: DISCONTINUED | OUTPATIENT
Start: 2021-01-01 | End: 2021-01-01

## 2021-01-01 RX ORDER — MORPHINE SULFATE 4 MG/ML
4 INJECTION, SOLUTION INTRAMUSCULAR; INTRAVENOUS EVERY 4 HOURS PRN
Status: DISCONTINUED | OUTPATIENT
Start: 2021-01-01 | End: 2021-01-01 | Stop reason: HOSPADM

## 2021-01-01 RX ORDER — HYDROMORPHONE HCL/PF 1 MG/ML
0.5 SYRINGE (ML) INJECTION
Status: DISCONTINUED | OUTPATIENT
Start: 2021-01-01 | End: 2021-01-01 | Stop reason: HOSPADM

## 2021-01-01 RX ORDER — PREDNISONE 20 MG/1
20 TABLET ORAL DAILY
Qty: 7 TABLET | Refills: 0 | Status: SHIPPED | OUTPATIENT
Start: 2021-01-01 | End: 2021-01-01 | Stop reason: HOSPADM

## 2021-01-01 RX ORDER — ONDANSETRON 4 MG/1
4 TABLET, ORALLY DISINTEGRATING ORAL EVERY 6 HOURS PRN
Qty: 20 TABLET | Refills: 0 | Status: SHIPPED | OUTPATIENT
Start: 2021-01-01

## 2021-01-01 RX ORDER — ALBUTEROL SULFATE 2.5 MG/3ML
2.5 SOLUTION RESPIRATORY (INHALATION) EVERY 6 HOURS PRN
Status: DISCONTINUED | OUTPATIENT
Start: 2021-01-01 | End: 2021-01-01

## 2021-01-01 RX ORDER — METHYLPREDNISOLONE SODIUM SUCCINATE 40 MG/ML
20 INJECTION, POWDER, LYOPHILIZED, FOR SOLUTION INTRAMUSCULAR; INTRAVENOUS EVERY 12 HOURS SCHEDULED
Status: DISCONTINUED | OUTPATIENT
Start: 2021-01-01 | End: 2021-01-01

## 2021-01-01 RX ORDER — LIDOCAINE HYDROCHLORIDE 20 MG/ML
15 SOLUTION OROPHARYNGEAL 4 TIMES DAILY PRN
Qty: 100 ML | Refills: 3 | Status: SHIPPED | OUTPATIENT
Start: 2021-01-01 | End: 2021-01-01 | Stop reason: HOSPADM

## 2021-01-01 RX ORDER — WARFARIN SODIUM 1 MG/1
1 TABLET ORAL
Status: COMPLETED | OUTPATIENT
Start: 2021-01-01 | End: 2021-01-01

## 2021-01-01 RX ORDER — TRAZODONE HYDROCHLORIDE 50 MG/1
50 TABLET ORAL
Status: DISCONTINUED | OUTPATIENT
Start: 2021-01-01 | End: 2021-01-01

## 2021-01-01 RX ORDER — WARFARIN SODIUM 5 MG/1
5 TABLET ORAL
Status: DISCONTINUED | OUTPATIENT
Start: 2021-01-01 | End: 2021-01-01

## 2021-01-01 RX ORDER — KETOROLAC TROMETHAMINE 30 MG/ML
15 INJECTION, SOLUTION INTRAMUSCULAR; INTRAVENOUS ONCE
Status: DISCONTINUED | OUTPATIENT
Start: 2021-01-01 | End: 2021-01-01

## 2021-01-01 RX ORDER — ROCURONIUM BROMIDE 10 MG/ML
INJECTION, SOLUTION INTRAVENOUS AS NEEDED
Status: DISCONTINUED | OUTPATIENT
Start: 2021-01-01 | End: 2021-01-01

## 2021-01-01 RX ORDER — NYSTATIN 100000 [USP'U]/G
POWDER TOPICAL 3 TIMES DAILY
Status: DISCONTINUED | OUTPATIENT
Start: 2021-01-01 | End: 2021-01-01 | Stop reason: HOSPADM

## 2021-01-01 RX ORDER — AMLODIPINE BESYLATE 10 MG/1
10 TABLET ORAL DAILY
Qty: 30 TABLET | Refills: 0 | Status: SHIPPED | OUTPATIENT
Start: 2021-01-01 | End: 2021-01-01 | Stop reason: HOSPADM

## 2021-01-01 RX ORDER — METHYLPREDNISOLONE SODIUM SUCCINATE 40 MG/ML
40 INJECTION, POWDER, LYOPHILIZED, FOR SOLUTION INTRAMUSCULAR; INTRAVENOUS EVERY 8 HOURS SCHEDULED
Status: DISCONTINUED | OUTPATIENT
Start: 2021-01-01 | End: 2021-01-01

## 2021-01-01 RX ORDER — METOPROLOL TARTRATE 50 MG/1
50 TABLET, FILM COATED ORAL 2 TIMES DAILY
Status: DISCONTINUED | OUTPATIENT
Start: 2021-01-01 | End: 2021-01-01 | Stop reason: HOSPADM

## 2021-01-01 RX ORDER — HYDROMORPHONE HCL/PF 1 MG/ML
0.5 SYRINGE (ML) INJECTION EVERY 6 HOURS PRN
Status: DISCONTINUED | OUTPATIENT
Start: 2021-01-01 | End: 2021-01-01 | Stop reason: HOSPADM

## 2021-01-01 RX ORDER — SENNOSIDES 8.6 MG
2 TABLET ORAL 2 TIMES DAILY
Status: DISCONTINUED | OUTPATIENT
Start: 2021-01-01 | End: 2021-01-01 | Stop reason: HOSPADM

## 2021-01-01 RX ORDER — PREDNISONE 20 MG/1
40 TABLET ORAL DAILY
Status: COMPLETED | OUTPATIENT
Start: 2021-01-01 | End: 2021-01-01

## 2021-01-01 RX ORDER — POLYETHYLENE GLYCOL 3350 17 G/17G
17 POWDER, FOR SOLUTION ORAL 2 TIMES DAILY
Status: DISCONTINUED | OUTPATIENT
Start: 2021-01-01 | End: 2021-01-01

## 2021-01-01 RX ORDER — FUROSEMIDE 20 MG/1
20 TABLET ORAL DAILY
Status: DISCONTINUED | OUTPATIENT
Start: 2021-01-01 | End: 2021-01-01 | Stop reason: HOSPADM

## 2021-01-01 RX ORDER — OXYCODONE HYDROCHLORIDE 5 MG/1
5 TABLET ORAL EVERY 4 HOURS PRN
Status: DISCONTINUED | OUTPATIENT
Start: 2021-01-01 | End: 2021-01-01 | Stop reason: HOSPADM

## 2021-01-01 RX ORDER — WARFARIN SODIUM 3 MG/1
3 TABLET ORAL
Qty: 30 TABLET | Refills: 0 | Status: SHIPPED | OUTPATIENT
Start: 2021-01-01 | End: 2021-01-01

## 2021-01-01 RX ORDER — METOPROLOL TARTRATE 50 MG/1
50 TABLET, FILM COATED ORAL 2 TIMES DAILY
Status: DISCONTINUED | OUTPATIENT
Start: 2021-01-01 | End: 2021-01-01

## 2021-01-01 RX ORDER — FENTANYL CITRATE 50 UG/ML
INJECTION, SOLUTION INTRAMUSCULAR; INTRAVENOUS AS NEEDED
Status: DISCONTINUED | OUTPATIENT
Start: 2021-01-01 | End: 2021-01-01

## 2021-01-01 RX ORDER — LANOLIN ALCOHOL/MO/W.PET/CERES
9 CREAM (GRAM) TOPICAL
Status: DISCONTINUED | OUTPATIENT
Start: 2021-01-01 | End: 2021-01-01 | Stop reason: HOSPADM

## 2021-01-01 RX ORDER — ONDANSETRON 2 MG/ML
4 INJECTION INTRAMUSCULAR; INTRAVENOUS ONCE AS NEEDED
Status: DISCONTINUED | OUTPATIENT
Start: 2021-01-01 | End: 2021-01-01 | Stop reason: HOSPADM

## 2021-01-01 RX ORDER — LIDOCAINE HYDROCHLORIDE 20 MG/ML
15 SOLUTION OROPHARYNGEAL 4 TIMES DAILY PRN
Status: DISCONTINUED | OUTPATIENT
Start: 2021-01-01 | End: 2021-01-01 | Stop reason: HOSPADM

## 2021-01-01 RX ORDER — DEXAMETHASONE SODIUM PHOSPHATE 4 MG/ML
6 INJECTION, SOLUTION INTRA-ARTICULAR; INTRALESIONAL; INTRAMUSCULAR; INTRAVENOUS; SOFT TISSUE DAILY
Status: DISCONTINUED | OUTPATIENT
Start: 2021-01-01 | End: 2021-01-01

## 2021-01-01 RX ORDER — SENNOSIDES 8.6 MG
1 TABLET ORAL
Status: DISCONTINUED | OUTPATIENT
Start: 2021-01-01 | End: 2021-01-01 | Stop reason: HOSPADM

## 2021-01-01 RX ORDER — OXYCODONE HYDROCHLORIDE 5 MG/1
TABLET ORAL
Qty: 30 TABLET | Refills: 0 | Status: SHIPPED | OUTPATIENT
Start: 2021-01-01 | End: 2021-01-01 | Stop reason: SDUPTHER

## 2021-01-01 RX ADMIN — MICONAZOLE NITRATE: 20 CREAM TOPICAL at 09:20

## 2021-01-01 RX ADMIN — GUAIFENESIN 1200 MG: 600 TABLET, EXTENDED RELEASE ORAL at 08:32

## 2021-01-01 RX ADMIN — Medication 9 MG: at 22:04

## 2021-01-01 RX ADMIN — GUAIFENESIN 1200 MG: 600 TABLET, EXTENDED RELEASE ORAL at 22:01

## 2021-01-01 RX ADMIN — METHYLPREDNISOLONE SODIUM SUCCINATE 40 MG: 40 INJECTION, POWDER, FOR SOLUTION INTRAMUSCULAR; INTRAVENOUS at 08:49

## 2021-01-01 RX ADMIN — NYSTATIN 500000 UNITS: 100000 SUSPENSION ORAL at 22:03

## 2021-01-01 RX ADMIN — LEVALBUTEROL HYDROCHLORIDE 1.25 MG: 1.25 SOLUTION, CONCENTRATE RESPIRATORY (INHALATION) at 19:51

## 2021-01-01 RX ADMIN — NYSTATIN: 100000 POWDER TOPICAL at 09:01

## 2021-01-01 RX ADMIN — LORAZEPAM 0.5 MG: 0.5 TABLET ORAL at 21:50

## 2021-01-01 RX ADMIN — INSULIN GLARGINE 12 UNITS: 100 INJECTION, SOLUTION SUBCUTANEOUS at 08:31

## 2021-01-01 RX ADMIN — MELATONIN 6 MG: 3 TAB ORAL at 21:35

## 2021-01-01 RX ADMIN — INSULIN GLARGINE 15 UNITS: 100 INJECTION, SOLUTION SUBCUTANEOUS at 09:04

## 2021-01-01 RX ADMIN — LEVALBUTEROL HYDROCHLORIDE 1.25 MG: 1.25 SOLUTION, CONCENTRATE RESPIRATORY (INHALATION) at 19:21

## 2021-01-01 RX ADMIN — METHYLPREDNISOLONE SODIUM SUCCINATE 40 MG: 40 INJECTION, POWDER, FOR SOLUTION INTRAMUSCULAR; INTRAVENOUS at 05:26

## 2021-01-01 RX ADMIN — AMLODIPINE BESYLATE 10 MG: 10 TABLET ORAL at 08:31

## 2021-01-01 RX ADMIN — METRONIDAZOLE 500 MG: 500 INJECTION, SOLUTION INTRAVENOUS at 18:54

## 2021-01-01 RX ADMIN — PREDNISONE 40 MG: 20 TABLET ORAL at 08:59

## 2021-01-01 RX ADMIN — NYSTATIN: 100000 POWDER TOPICAL at 18:07

## 2021-01-01 RX ADMIN — METOPROLOL TARTRATE 50 MG: 50 TABLET, FILM COATED ORAL at 20:43

## 2021-01-01 RX ADMIN — ISODIUM CHLORIDE 3 ML: 0.03 SOLUTION RESPIRATORY (INHALATION) at 07:06

## 2021-01-01 RX ADMIN — INSULIN LISPRO 3 UNITS: 100 INJECTION, SOLUTION INTRAVENOUS; SUBCUTANEOUS at 08:29

## 2021-01-01 RX ADMIN — NYSTATIN: 100000 POWDER TOPICAL at 17:56

## 2021-01-01 RX ADMIN — LEVALBUTEROL HYDROCHLORIDE 1.25 MG: 1.25 SOLUTION, CONCENTRATE RESPIRATORY (INHALATION) at 19:47

## 2021-01-01 RX ADMIN — ISODIUM CHLORIDE 3 ML: 0.03 SOLUTION RESPIRATORY (INHALATION) at 07:34

## 2021-01-01 RX ADMIN — Medication 1 SPRAY: at 22:05

## 2021-01-01 RX ADMIN — NYSTATIN: 100000 POWDER TOPICAL at 17:46

## 2021-01-01 RX ADMIN — PREDNISONE 30 MG: 20 TABLET ORAL at 09:20

## 2021-01-01 RX ADMIN — POLYETHYLENE GLYCOL 3350 17 G: 17 POWDER, FOR SOLUTION ORAL at 22:00

## 2021-01-01 RX ADMIN — ENOXAPARIN SODIUM 80 MG: 80 INJECTION SUBCUTANEOUS at 08:01

## 2021-01-01 RX ADMIN — DOCUSATE SODIUM AND SENNOSIDES 1 TABLET: 8.6; 5 TABLET, FILM COATED ORAL at 17:18

## 2021-01-01 RX ADMIN — ALBUMIN (HUMAN) 12.5 G: 0.25 INJECTION, SOLUTION INTRAVENOUS at 11:50

## 2021-01-01 RX ADMIN — TIOTROPIUM BROMIDE 18 MCG: 18 CAPSULE ORAL; RESPIRATORY (INHALATION) at 08:11

## 2021-01-01 RX ADMIN — HEPARIN SODIUM 14 UNITS/KG/HR: 10000 INJECTION, SOLUTION INTRAVENOUS at 12:35

## 2021-01-01 RX ADMIN — TIOTROPIUM BROMIDE 18 MCG: 18 CAPSULE ORAL; RESPIRATORY (INHALATION) at 09:20

## 2021-01-01 RX ADMIN — GUAIFENESIN 1200 MG: 600 TABLET, EXTENDED RELEASE ORAL at 09:04

## 2021-01-01 RX ADMIN — ACETAMINOPHEN 650 MG: 325 TABLET, FILM COATED ORAL at 05:22

## 2021-01-01 RX ADMIN — FUROSEMIDE 20 MG: 20 TABLET ORAL at 08:58

## 2021-01-01 RX ADMIN — LORAZEPAM 0.5 MG: 0.5 TABLET ORAL at 22:04

## 2021-01-01 RX ADMIN — NYSTATIN 1 APPLICATION: 100000 POWDER TOPICAL at 16:13

## 2021-01-01 RX ADMIN — PANTOPRAZOLE SODIUM 40 MG: 40 TABLET, DELAYED RELEASE ORAL at 05:40

## 2021-01-01 RX ADMIN — METHYLPREDNISOLONE SODIUM SUCCINATE 40 MG: 40 INJECTION, POWDER, FOR SOLUTION INTRAMUSCULAR; INTRAVENOUS at 21:00

## 2021-01-01 RX ADMIN — METRONIDAZOLE 500 MG: 500 INJECTION, SOLUTION INTRAVENOUS at 16:56

## 2021-01-01 RX ADMIN — WARFARIN SODIUM 3 MG: 3 TABLET ORAL at 17:52

## 2021-01-01 RX ADMIN — GUAIFENESIN 1200 MG: 600 TABLET, EXTENDED RELEASE ORAL at 08:11

## 2021-01-01 RX ADMIN — PANTOPRAZOLE SODIUM 40 MG: 40 INJECTION, POWDER, FOR SOLUTION INTRAVENOUS at 09:35

## 2021-01-01 RX ADMIN — DOCUSATE SODIUM AND SENNOSIDES 2 TABLET: 8.6; 5 TABLET, FILM COATED ORAL at 18:04

## 2021-01-01 RX ADMIN — LORAZEPAM 0.5 MG: 0.5 TABLET ORAL at 12:18

## 2021-01-01 RX ADMIN — GUAIFENESIN 1200 MG: 600 TABLET, EXTENDED RELEASE ORAL at 21:59

## 2021-01-01 RX ADMIN — INSULIN LISPRO 3 UNITS: 100 INJECTION, SOLUTION INTRAVENOUS; SUBCUTANEOUS at 14:36

## 2021-01-01 RX ADMIN — ASPIRIN 81 MG CHEWABLE TABLET 324 MG: 81 TABLET CHEWABLE at 06:02

## 2021-01-01 RX ADMIN — LEVALBUTEROL HYDROCHLORIDE 1.25 MG: 1.25 SOLUTION, CONCENTRATE RESPIRATORY (INHALATION) at 07:23

## 2021-01-01 RX ADMIN — TIOTROPIUM BROMIDE 18 MCG: 18 CAPSULE ORAL; RESPIRATORY (INHALATION) at 08:57

## 2021-01-01 RX ADMIN — METHYLPREDNISOLONE SODIUM SUCCINATE 20 MG: 40 INJECTION, POWDER, FOR SOLUTION INTRAMUSCULAR; INTRAVENOUS at 09:35

## 2021-01-01 RX ADMIN — GUAIFENESIN 1200 MG: 600 TABLET, EXTENDED RELEASE ORAL at 21:49

## 2021-01-01 RX ADMIN — DOCUSATE SODIUM 100 MG: 100 CAPSULE, LIQUID FILLED ORAL at 09:39

## 2021-01-01 RX ADMIN — ONDANSETRON 4 MG: 2 INJECTION INTRAMUSCULAR; INTRAVENOUS at 13:33

## 2021-01-01 RX ADMIN — HEPARIN SODIUM 2000 UNITS: 1000 INJECTION INTRAVENOUS; SUBCUTANEOUS at 06:27

## 2021-01-01 RX ADMIN — CEFTRIAXONE SODIUM 1000 MG: 10 INJECTION, POWDER, FOR SOLUTION INTRAVENOUS at 05:25

## 2021-01-01 RX ADMIN — POLYETHYLENE GLYCOL 3350 17 G: 17 POWDER, FOR SOLUTION ORAL at 09:00

## 2021-01-01 RX ADMIN — HYDROMORPHONE HYDROCHLORIDE 0.5 MG: 1 INJECTION, SOLUTION INTRAMUSCULAR; INTRAVENOUS; SUBCUTANEOUS at 09:41

## 2021-01-01 RX ADMIN — GUAIFENESIN 1200 MG: 600 TABLET, EXTENDED RELEASE ORAL at 08:28

## 2021-01-01 RX ADMIN — SENNOSIDES 17.2 MG: 8.6 TABLET ORAL at 18:40

## 2021-01-01 RX ADMIN — GUAIFENESIN 1200 MG: 600 TABLET, EXTENDED RELEASE ORAL at 08:49

## 2021-01-01 RX ADMIN — Medication 9 MG: at 22:01

## 2021-01-01 RX ADMIN — METHYLPREDNISOLONE SODIUM SUCCINATE 40 MG: 40 INJECTION, POWDER, FOR SOLUTION INTRAMUSCULAR; INTRAVENOUS at 13:12

## 2021-01-01 RX ADMIN — NYSTATIN 500000 UNITS: 100000 SUSPENSION ORAL at 08:23

## 2021-01-01 RX ADMIN — ISODIUM CHLORIDE 3 ML: 0.03 SOLUTION RESPIRATORY (INHALATION) at 19:26

## 2021-01-01 RX ADMIN — WARFARIN SODIUM 10 MG: 5 TABLET ORAL at 17:37

## 2021-01-01 RX ADMIN — Medication 9 MG: at 21:49

## 2021-01-01 RX ADMIN — MELATONIN 3 MG: 3 TAB ORAL at 21:18

## 2021-01-01 RX ADMIN — TIOTROPIUM BROMIDE 18 MCG: 18 CAPSULE ORAL; RESPIRATORY (INHALATION) at 08:56

## 2021-01-01 RX ADMIN — METHYLPREDNISOLONE SODIUM SUCCINATE 60 MG: 125 INJECTION, POWDER, FOR SOLUTION INTRAMUSCULAR; INTRAVENOUS at 08:37

## 2021-01-01 RX ADMIN — METHYLPREDNISOLONE SODIUM SUCCINATE 40 MG: 40 INJECTION, POWDER, FOR SOLUTION INTRAMUSCULAR; INTRAVENOUS at 21:37

## 2021-01-01 RX ADMIN — ENOXAPARIN SODIUM 80 MG: 80 INJECTION SUBCUTANEOUS at 20:42

## 2021-01-01 RX ADMIN — LEVALBUTEROL HYDROCHLORIDE 1.25 MG: 1.25 SOLUTION, CONCENTRATE RESPIRATORY (INHALATION) at 20:21

## 2021-01-01 RX ADMIN — INSULIN GLARGINE 12 UNITS: 100 INJECTION, SOLUTION SUBCUTANEOUS at 08:08

## 2021-01-01 RX ADMIN — ISODIUM CHLORIDE 3 ML: 0.03 SOLUTION RESPIRATORY (INHALATION) at 13:13

## 2021-01-01 RX ADMIN — MICONAZOLE NITRATE: 20 CREAM TOPICAL at 16:29

## 2021-01-01 RX ADMIN — SENNOSIDES 17.2 MG: 8.6 TABLET ORAL at 08:39

## 2021-01-01 RX ADMIN — PANTOPRAZOLE SODIUM 40 MG: 40 TABLET, DELAYED RELEASE ORAL at 06:25

## 2021-01-01 RX ADMIN — MELATONIN 6 MG: 3 TAB ORAL at 21:50

## 2021-01-01 RX ADMIN — AMLODIPINE BESYLATE 5 MG: 5 TABLET ORAL at 08:32

## 2021-01-01 RX ADMIN — INSULIN GLARGINE 15 UNITS: 100 INJECTION, SOLUTION SUBCUTANEOUS at 09:01

## 2021-01-01 RX ADMIN — LEVALBUTEROL HYDROCHLORIDE 1.25 MG: 1.25 SOLUTION, CONCENTRATE RESPIRATORY (INHALATION) at 13:21

## 2021-01-01 RX ADMIN — CEFEPIME HYDROCHLORIDE 2000 MG: 2 INJECTION, POWDER, FOR SOLUTION INTRAVENOUS at 05:58

## 2021-01-01 RX ADMIN — PANTOPRAZOLE SODIUM 40 MG: 40 TABLET, DELAYED RELEASE ORAL at 05:26

## 2021-01-01 RX ADMIN — ISODIUM CHLORIDE 3 ML: 0.03 SOLUTION RESPIRATORY (INHALATION) at 07:45

## 2021-01-01 RX ADMIN — METOPROLOL TARTRATE 25 MG: 25 TABLET, FILM COATED ORAL at 20:43

## 2021-01-01 RX ADMIN — MICONAZOLE NITRATE: 20 CREAM TOPICAL at 18:19

## 2021-01-01 RX ADMIN — DOCUSATE SODIUM 100 MG: 100 CAPSULE, LIQUID FILLED ORAL at 09:03

## 2021-01-01 RX ADMIN — FUROSEMIDE 20 MG: 20 TABLET ORAL at 08:08

## 2021-01-01 RX ADMIN — DEXAMETHASONE SODIUM PHOSPHATE 6 MG: 4 INJECTION, SOLUTION INTRAMUSCULAR; INTRAVENOUS at 07:15

## 2021-01-01 RX ADMIN — LEVALBUTEROL HYDROCHLORIDE 1.25 MG: 1.25 SOLUTION, CONCENTRATE RESPIRATORY (INHALATION) at 19:46

## 2021-01-01 RX ADMIN — WARFARIN SODIUM 3 MG: 3 TABLET ORAL at 17:53

## 2021-01-01 RX ADMIN — DICYCLOMINE HYDROCHLORIDE 10 MG: 10 CAPSULE ORAL at 12:00

## 2021-01-01 RX ADMIN — GUAIFENESIN 1200 MG: 600 TABLET, EXTENDED RELEASE ORAL at 09:22

## 2021-01-01 RX ADMIN — PRAVASTATIN SODIUM 80 MG: 80 TABLET ORAL at 17:29

## 2021-01-01 RX ADMIN — METOPROLOL TARTRATE 50 MG: 50 TABLET, FILM COATED ORAL at 18:12

## 2021-01-01 RX ADMIN — FENTANYL CITRATE 50 MCG: 50 INJECTION INTRAMUSCULAR; INTRAVENOUS at 12:40

## 2021-01-01 RX ADMIN — PRAVASTATIN SODIUM 80 MG: 80 TABLET ORAL at 17:23

## 2021-01-01 RX ADMIN — Medication 3 MG: at 21:03

## 2021-01-01 RX ADMIN — METHYLPREDNISOLONE SODIUM SUCCINATE 40 MG: 40 INJECTION, POWDER, FOR SOLUTION INTRAMUSCULAR; INTRAVENOUS at 14:14

## 2021-01-01 RX ADMIN — ASPIRIN 81 MG: 81 TABLET, CHEWABLE ORAL at 09:00

## 2021-01-01 RX ADMIN — IOHEXOL 85 ML: 350 INJECTION, SOLUTION INTRAVENOUS at 10:01

## 2021-01-01 RX ADMIN — METRONIDAZOLE 500 MG: 500 INJECTION, SOLUTION INTRAVENOUS at 00:02

## 2021-01-01 RX ADMIN — ISODIUM CHLORIDE 3 ML: 0.03 SOLUTION RESPIRATORY (INHALATION) at 13:33

## 2021-01-01 RX ADMIN — LEVALBUTEROL HYDROCHLORIDE 1.25 MG: 1.25 SOLUTION, CONCENTRATE RESPIRATORY (INHALATION) at 14:58

## 2021-01-01 RX ADMIN — FENTANYL CITRATE 50 MCG: 50 INJECTION INTRAMUSCULAR; INTRAVENOUS at 08:36

## 2021-01-01 RX ADMIN — PHENYLEPHRINE HYDROCHLORIDE 200 MCG: 10 INJECTION INTRAVENOUS at 08:11

## 2021-01-01 RX ADMIN — TIOTROPIUM BROMIDE 18 MCG: 18 CAPSULE ORAL; RESPIRATORY (INHALATION) at 08:51

## 2021-01-01 RX ADMIN — PANTOPRAZOLE SODIUM 40 MG: 40 INJECTION, POWDER, FOR SOLUTION INTRAVENOUS at 21:09

## 2021-01-01 RX ADMIN — AMLODIPINE BESYLATE 10 MG: 10 TABLET ORAL at 09:37

## 2021-01-01 RX ADMIN — INSULIN LISPRO 3 UNITS: 100 INJECTION, SOLUTION INTRAVENOUS; SUBCUTANEOUS at 13:12

## 2021-01-01 RX ADMIN — PROPOFOL 200 MG: 10 INJECTION, EMULSION INTRAVENOUS at 08:03

## 2021-01-01 RX ADMIN — ISODIUM CHLORIDE 3 ML: 0.03 SOLUTION RESPIRATORY (INHALATION) at 13:31

## 2021-01-01 RX ADMIN — ENOXAPARIN SODIUM 80 MG: 80 INJECTION SUBCUTANEOUS at 09:11

## 2021-01-01 RX ADMIN — DICYCLOMINE HYDROCHLORIDE 10 MG: 10 CAPSULE ORAL at 21:52

## 2021-01-01 RX ADMIN — METOPROLOL TARTRATE 25 MG: 25 TABLET, FILM COATED ORAL at 22:24

## 2021-01-01 RX ADMIN — NYSTATIN: 100000 POWDER TOPICAL at 07:56

## 2021-01-01 RX ADMIN — AMLODIPINE BESYLATE 10 MG: 10 TABLET ORAL at 08:11

## 2021-01-01 RX ADMIN — DOCUSATE SODIUM AND SENNOSIDES 2 TABLET: 8.6; 5 TABLET, FILM COATED ORAL at 08:58

## 2021-01-01 RX ADMIN — PANTOPRAZOLE SODIUM 40 MG: 40 TABLET, DELAYED RELEASE ORAL at 05:46

## 2021-01-01 RX ADMIN — SODIUM CHLORIDE 20 ML/HR: 0.9 INJECTION, SOLUTION INTRAVENOUS at 14:41

## 2021-01-01 RX ADMIN — TIOTROPIUM BROMIDE 18 MCG: 18 CAPSULE ORAL; RESPIRATORY (INHALATION) at 10:33

## 2021-01-01 RX ADMIN — PANTOPRAZOLE SODIUM 40 MG: 40 TABLET, DELAYED RELEASE ORAL at 06:13

## 2021-01-01 RX ADMIN — TIOTROPIUM BROMIDE 18 MCG: 18 CAPSULE ORAL; RESPIRATORY (INHALATION) at 14:38

## 2021-01-01 RX ADMIN — NYSTATIN: 100000 POWDER TOPICAL at 08:20

## 2021-01-01 RX ADMIN — MICONAZOLE NITRATE 1 APPLICATION: 20 CREAM TOPICAL at 08:59

## 2021-01-01 RX ADMIN — Medication 9 MG: at 21:52

## 2021-01-01 RX ADMIN — FUROSEMIDE 40 MG: 10 INJECTION, SOLUTION INTRAMUSCULAR; INTRAVENOUS at 13:11

## 2021-01-01 RX ADMIN — LEVALBUTEROL HYDROCHLORIDE 1.25 MG: 1.25 SOLUTION, CONCENTRATE RESPIRATORY (INHALATION) at 07:27

## 2021-01-01 RX ADMIN — NYSTATIN: 100000 POWDER TOPICAL at 15:15

## 2021-01-01 RX ADMIN — INSULIN LISPRO 2 UNITS: 100 INJECTION, SOLUTION INTRAVENOUS; SUBCUTANEOUS at 08:55

## 2021-01-01 RX ADMIN — ISODIUM CHLORIDE 3 ML: 0.03 SOLUTION RESPIRATORY (INHALATION) at 13:09

## 2021-01-01 RX ADMIN — PANTOPRAZOLE SODIUM 40 MG: 40 TABLET, DELAYED RELEASE ORAL at 05:33

## 2021-01-01 RX ADMIN — NYSTATIN: 100000 POWDER TOPICAL at 22:34

## 2021-01-01 RX ADMIN — CEFEPIME HYDROCHLORIDE 2000 MG: 2 INJECTION, POWDER, FOR SOLUTION INTRAVENOUS at 05:45

## 2021-01-01 RX ADMIN — NYSTATIN 500000 UNITS: 100000 SUSPENSION ORAL at 12:18

## 2021-01-01 RX ADMIN — PREDNISONE 30 MG: 20 TABLET ORAL at 09:03

## 2021-01-01 RX ADMIN — LEVALBUTEROL HYDROCHLORIDE 1.25 MG: 1.25 SOLUTION, CONCENTRATE RESPIRATORY (INHALATION) at 20:57

## 2021-01-01 RX ADMIN — INSULIN GLARGINE 12 UNITS: 100 INJECTION, SOLUTION SUBCUTANEOUS at 09:20

## 2021-01-01 RX ADMIN — ENOXAPARIN SODIUM 40 MG: 40 INJECTION SUBCUTANEOUS at 09:37

## 2021-01-01 RX ADMIN — DEXTROSE, SODIUM CHLORIDE, SODIUM LACTATE, POTASSIUM CHLORIDE, AND CALCIUM CHLORIDE 75 ML/HR: 5; .6; .31; .03; .02 INJECTION, SOLUTION INTRAVENOUS at 19:22

## 2021-01-01 RX ADMIN — CEFEPIME HYDROCHLORIDE 2000 MG: 2 INJECTION, POWDER, FOR SOLUTION INTRAVENOUS at 19:11

## 2021-01-01 RX ADMIN — LEVALBUTEROL HYDROCHLORIDE 1.25 MG: 1.25 SOLUTION, CONCENTRATE RESPIRATORY (INHALATION) at 13:39

## 2021-01-01 RX ADMIN — ISODIUM CHLORIDE 3 ML: 0.03 SOLUTION RESPIRATORY (INHALATION) at 13:21

## 2021-01-01 RX ADMIN — Medication 9 MG: at 21:57

## 2021-01-01 RX ADMIN — MICONAZOLE NITRATE 1 APPLICATION: 20 CREAM TOPICAL at 09:40

## 2021-01-01 RX ADMIN — INSULIN GLARGINE 12 UNITS: 100 INJECTION, SOLUTION SUBCUTANEOUS at 09:00

## 2021-01-01 RX ADMIN — NYSTATIN 500000 UNITS: 100000 SUSPENSION ORAL at 21:26

## 2021-01-01 RX ADMIN — METOPROLOL TARTRATE 25 MG: 25 TABLET, FILM COATED ORAL at 22:08

## 2021-01-01 RX ADMIN — OXYCODONE HYDROCHLORIDE 2.5 MG: 5 TABLET ORAL at 10:57

## 2021-01-01 RX ADMIN — WARFARIN SODIUM 7.5 MG: 7.5 TABLET ORAL at 17:08

## 2021-01-01 RX ADMIN — METOPROLOL TARTRATE 5 MG: 5 INJECTION, SOLUTION INTRAVENOUS at 17:06

## 2021-01-01 RX ADMIN — LEVALBUTEROL HYDROCHLORIDE 1.25 MG: 1.25 SOLUTION, CONCENTRATE RESPIRATORY (INHALATION) at 13:09

## 2021-01-01 RX ADMIN — TIOTROPIUM BROMIDE 18 MCG: 18 CAPSULE ORAL; RESPIRATORY (INHALATION) at 09:40

## 2021-01-01 RX ADMIN — METRONIDAZOLE 500 MG: 500 INJECTION, SOLUTION INTRAVENOUS at 01:00

## 2021-01-01 RX ADMIN — CEFEPIME HYDROCHLORIDE 2000 MG: 2 INJECTION, POWDER, FOR SOLUTION INTRAVENOUS at 18:00

## 2021-01-01 RX ADMIN — SENNOSIDES 8.6 MG: 8.6 TABLET, FILM COATED ORAL at 21:51

## 2021-01-01 RX ADMIN — ISODIUM CHLORIDE 3 ML: 0.03 SOLUTION RESPIRATORY (INHALATION) at 20:00

## 2021-01-01 RX ADMIN — ASPIRIN 81 MG: 81 TABLET, CHEWABLE ORAL at 09:23

## 2021-01-01 RX ADMIN — LEVALBUTEROL HYDROCHLORIDE 1.25 MG: 1.25 SOLUTION, CONCENTRATE RESPIRATORY (INHALATION) at 07:49

## 2021-01-01 RX ADMIN — LEVALBUTEROL HYDROCHLORIDE 1.25 MG: 1.25 SOLUTION, CONCENTRATE RESPIRATORY (INHALATION) at 13:31

## 2021-01-01 RX ADMIN — INSULIN GLARGINE 12 UNITS: 100 INJECTION, SOLUTION SUBCUTANEOUS at 09:10

## 2021-01-01 RX ADMIN — PANTOPRAZOLE SODIUM 40 MG: 40 TABLET, DELAYED RELEASE ORAL at 06:06

## 2021-01-01 RX ADMIN — TIOTROPIUM BROMIDE 18 MCG: 18 CAPSULE ORAL; RESPIRATORY (INHALATION) at 09:35

## 2021-01-01 RX ADMIN — NYSTATIN 500000 UNITS: 100000 SUSPENSION ORAL at 13:12

## 2021-01-01 RX ADMIN — DOCUSATE SODIUM 100 MG: 100 CAPSULE, LIQUID FILLED ORAL at 18:40

## 2021-01-01 RX ADMIN — Medication 12.5 MG: at 09:04

## 2021-01-01 RX ADMIN — Medication 9 MG: at 22:08

## 2021-01-01 RX ADMIN — LEVALBUTEROL HYDROCHLORIDE 1.25 MG: 1.25 SOLUTION, CONCENTRATE RESPIRATORY (INHALATION) at 19:39

## 2021-01-01 RX ADMIN — NYSTATIN: 100000 POWDER TOPICAL at 22:04

## 2021-01-01 RX ADMIN — NYSTATIN 500000 UNITS: 100000 SUSPENSION ORAL at 21:37

## 2021-01-01 RX ADMIN — ISODIUM CHLORIDE 3 ML: 0.03 SOLUTION RESPIRATORY (INHALATION) at 07:44

## 2021-01-01 RX ADMIN — ISODIUM CHLORIDE 3 ML: 0.03 SOLUTION RESPIRATORY (INHALATION) at 07:02

## 2021-01-01 RX ADMIN — CEFEPIME HYDROCHLORIDE 2000 MG: 2 INJECTION, POWDER, FOR SOLUTION INTRAVENOUS at 08:02

## 2021-01-01 RX ADMIN — METOPROLOL TARTRATE 25 MG: 25 TABLET, FILM COATED ORAL at 08:29

## 2021-01-01 RX ADMIN — METHYLPREDNISOLONE SODIUM SUCCINATE 40 MG: 40 INJECTION, POWDER, FOR SOLUTION INTRAMUSCULAR; INTRAVENOUS at 08:18

## 2021-01-01 RX ADMIN — ENOXAPARIN SODIUM 40 MG: 40 INJECTION SUBCUTANEOUS at 08:24

## 2021-01-01 RX ADMIN — ENOXAPARIN SODIUM 80 MG: 80 INJECTION SUBCUTANEOUS at 08:31

## 2021-01-01 RX ADMIN — LEVALBUTEROL HYDROCHLORIDE 1.25 MG: 1.25 SOLUTION, CONCENTRATE RESPIRATORY (INHALATION) at 07:21

## 2021-01-01 RX ADMIN — NYSTATIN: 100000 POWDER TOPICAL at 17:37

## 2021-01-01 RX ADMIN — METHYLPREDNISOLONE SODIUM SUCCINATE 40 MG: 40 INJECTION, POWDER, FOR SOLUTION INTRAMUSCULAR; INTRAVENOUS at 05:33

## 2021-01-01 RX ADMIN — NYSTATIN 1 APPLICATION: 100000 POWDER TOPICAL at 09:07

## 2021-01-01 RX ADMIN — ASPIRIN 81 MG: 81 TABLET, CHEWABLE ORAL at 08:33

## 2021-01-01 RX ADMIN — POLYETHYLENE GLYCOL 3350 34 G: 17 POWDER, FOR SOLUTION ORAL at 21:50

## 2021-01-01 RX ADMIN — INSULIN LISPRO 3 UNITS: 100 INJECTION, SOLUTION INTRAVENOUS; SUBCUTANEOUS at 11:32

## 2021-01-01 RX ADMIN — POLYETHYLENE GLYCOL 3350 17 G: 17 POWDER, FOR SOLUTION ORAL at 08:44

## 2021-01-01 RX ADMIN — Medication 12.5 MG: at 08:52

## 2021-01-01 RX ADMIN — POLYETHYLENE GLYCOL 3350 17 G: 17 POWDER, FOR SOLUTION ORAL at 08:17

## 2021-01-01 RX ADMIN — CEFDINIR 300 MG: 250 POWDER, FOR SUSPENSION ORAL at 09:39

## 2021-01-01 RX ADMIN — DOCUSATE SODIUM 100 MG: 100 CAPSULE, LIQUID FILLED ORAL at 17:52

## 2021-01-01 RX ADMIN — PANTOPRAZOLE SODIUM 40 MG: 40 TABLET, DELAYED RELEASE ORAL at 05:13

## 2021-01-01 RX ADMIN — WARFARIN SODIUM 3 MG: 3 TABLET ORAL at 17:28

## 2021-01-01 RX ADMIN — LEVALBUTEROL HYDROCHLORIDE 1.25 MG: 1.25 SOLUTION, CONCENTRATE RESPIRATORY (INHALATION) at 19:26

## 2021-01-01 RX ADMIN — MICONAZOLE NITRATE: 20 CREAM TOPICAL at 08:56

## 2021-01-01 RX ADMIN — OXYCODONE HYDROCHLORIDE 10 MG: 10 TABLET ORAL at 22:45

## 2021-01-01 RX ADMIN — LEVALBUTEROL HYDROCHLORIDE 1.25 MG: 1.25 SOLUTION, CONCENTRATE RESPIRATORY (INHALATION) at 19:14

## 2021-01-01 RX ADMIN — MELATONIN 3 MG: at 21:24

## 2021-01-01 RX ADMIN — HEPARIN SODIUM 11 UNITS/KG/HR: 10000 INJECTION, SOLUTION INTRAVENOUS at 18:44

## 2021-01-01 RX ADMIN — ISODIUM CHLORIDE 3 ML: 0.03 SOLUTION RESPIRATORY (INHALATION) at 20:21

## 2021-01-01 RX ADMIN — GUAIFENESIN 1200 MG: 600 TABLET, EXTENDED RELEASE ORAL at 22:04

## 2021-01-01 RX ADMIN — PANTOPRAZOLE SODIUM 40 MG: 40 INJECTION, POWDER, FOR SOLUTION INTRAVENOUS at 21:50

## 2021-01-01 RX ADMIN — PROPOFOL 200 MG: 10 INJECTION, EMULSION INTRAVENOUS at 12:53

## 2021-01-01 RX ADMIN — INSULIN LISPRO 8 UNITS: 100 INJECTION, SOLUTION INTRAVENOUS; SUBCUTANEOUS at 12:00

## 2021-01-01 RX ADMIN — ACETAMINOPHEN 650 MG: 325 TABLET, FILM COATED ORAL at 21:06

## 2021-01-01 RX ADMIN — NYSTATIN: 100000 POWDER TOPICAL at 21:50

## 2021-01-01 RX ADMIN — METHYLPREDNISOLONE SODIUM SUCCINATE 40 MG: 40 INJECTION, POWDER, FOR SOLUTION INTRAMUSCULAR; INTRAVENOUS at 06:19

## 2021-01-01 RX ADMIN — TRASTUZUMAB 562 MG: 150 INJECTION, POWDER, LYOPHILIZED, FOR SOLUTION INTRAVENOUS at 10:57

## 2021-01-01 RX ADMIN — HEPARIN SODIUM 12 UNITS/KG/HR: 10000 INJECTION, SOLUTION INTRAVENOUS at 07:53

## 2021-01-01 RX ADMIN — PRAVASTATIN SODIUM 80 MG: 80 TABLET ORAL at 18:12

## 2021-01-01 RX ADMIN — METHYLPREDNISOLONE SODIUM SUCCINATE 40 MG: 40 INJECTION, POWDER, FOR SOLUTION INTRAMUSCULAR; INTRAVENOUS at 09:03

## 2021-01-01 RX ADMIN — CEFEPIME HYDROCHLORIDE 2000 MG: 2 INJECTION, POWDER, FOR SOLUTION INTRAVENOUS at 20:42

## 2021-01-01 RX ADMIN — BISACODYL 10 MG: 10 SUPPOSITORY RECTAL at 12:10

## 2021-01-01 RX ADMIN — Medication 9 MG: at 21:40

## 2021-01-01 RX ADMIN — OXYCODONE HYDROCHLORIDE 10 MG: 10 TABLET ORAL at 09:12

## 2021-01-01 RX ADMIN — TIOTROPIUM BROMIDE 18 MCG: 18 CAPSULE ORAL; RESPIRATORY (INHALATION) at 09:05

## 2021-01-01 RX ADMIN — AZITHROMYCIN MONOHYDRATE 500 MG: 500 INJECTION, POWDER, LYOPHILIZED, FOR SOLUTION INTRAVENOUS at 07:34

## 2021-01-01 RX ADMIN — NYSTATIN 1 APPLICATION: 100000 POWDER TOPICAL at 09:24

## 2021-01-01 RX ADMIN — ISODIUM CHLORIDE 3 ML: 0.03 SOLUTION RESPIRATORY (INHALATION) at 20:13

## 2021-01-01 RX ADMIN — FUROSEMIDE 40 MG: 40 TABLET ORAL at 08:44

## 2021-01-01 RX ADMIN — ISODIUM CHLORIDE 3 ML: 0.03 SOLUTION RESPIRATORY (INHALATION) at 20:57

## 2021-01-01 RX ADMIN — ROCURONIUM BROMIDE 10 MG: 50 INJECTION, SOLUTION INTRAVENOUS at 12:53

## 2021-01-01 RX ADMIN — PREDNISONE 20 MG: 20 TABLET ORAL at 08:11

## 2021-01-01 RX ADMIN — ENOXAPARIN SODIUM 40 MG: 40 INJECTION SUBCUTANEOUS at 08:44

## 2021-01-01 RX ADMIN — LEVALBUTEROL HYDROCHLORIDE 1.25 MG: 1.25 SOLUTION, CONCENTRATE RESPIRATORY (INHALATION) at 14:12

## 2021-01-01 RX ADMIN — Medication 12.5 MG: at 09:39

## 2021-01-01 RX ADMIN — Medication 12.5 MG: at 21:50

## 2021-01-01 RX ADMIN — FUROSEMIDE 40 MG: 40 TABLET ORAL at 17:27

## 2021-01-01 RX ADMIN — METHYLPREDNISOLONE SODIUM SUCCINATE 60 MG: 125 INJECTION, POWDER, FOR SOLUTION INTRAMUSCULAR; INTRAVENOUS at 21:05

## 2021-01-01 RX ADMIN — METHYLPREDNISOLONE SODIUM SUCCINATE 40 MG: 40 INJECTION, POWDER, FOR SOLUTION INTRAMUSCULAR; INTRAVENOUS at 22:00

## 2021-01-01 RX ADMIN — HEPARIN SODIUM 5000 UNITS: 5000 INJECTION INTRAVENOUS; SUBCUTANEOUS at 21:27

## 2021-01-01 RX ADMIN — MICONAZOLE NITRATE: 20 CREAM TOPICAL at 08:13

## 2021-01-01 RX ADMIN — NYSTATIN 500000 UNITS: 100000 SUSPENSION ORAL at 17:28

## 2021-01-01 RX ADMIN — ISODIUM CHLORIDE 3 ML: 0.03 SOLUTION RESPIRATORY (INHALATION) at 13:36

## 2021-01-01 RX ADMIN — METHYLPREDNISOLONE SODIUM SUCCINATE 20 MG: 40 INJECTION, POWDER, FOR SOLUTION INTRAMUSCULAR; INTRAVENOUS at 09:39

## 2021-01-01 RX ADMIN — ASPIRIN 81 MG: 81 TABLET, CHEWABLE ORAL at 09:20

## 2021-01-01 RX ADMIN — INSULIN LISPRO 6 UNITS: 100 INJECTION, SOLUTION INTRAVENOUS; SUBCUTANEOUS at 20:29

## 2021-01-01 RX ADMIN — MELATONIN 3 MG: 3 TAB ORAL at 22:31

## 2021-01-01 RX ADMIN — LORAZEPAM 0.5 MG: 0.5 TABLET ORAL at 21:43

## 2021-01-01 RX ADMIN — SENNOSIDES 17.2 MG: 8.6 TABLET ORAL at 09:00

## 2021-01-01 RX ADMIN — LEVALBUTEROL HYDROCHLORIDE 1.25 MG: 1.25 SOLUTION, CONCENTRATE RESPIRATORY (INHALATION) at 07:16

## 2021-01-01 RX ADMIN — METHYLPREDNISOLONE SODIUM SUCCINATE 60 MG: 125 INJECTION, POWDER, FOR SOLUTION INTRAMUSCULAR; INTRAVENOUS at 21:19

## 2021-01-01 RX ADMIN — TIOTROPIUM BROMIDE 18 MCG: 18 CAPSULE ORAL; RESPIRATORY (INHALATION) at 09:27

## 2021-01-01 RX ADMIN — PANTOPRAZOLE SODIUM 40 MG: 40 TABLET, DELAYED RELEASE ORAL at 05:57

## 2021-01-01 RX ADMIN — INSULIN LISPRO 2 UNITS: 100 INJECTION, SOLUTION INTRAVENOUS; SUBCUTANEOUS at 08:45

## 2021-01-01 RX ADMIN — VANCOMYCIN HYDROCHLORIDE 1000 MG: 750 INJECTION, SOLUTION INTRAVENOUS at 08:02

## 2021-01-01 RX ADMIN — INSULIN LISPRO 3 UNITS: 100 INJECTION, SOLUTION INTRAVENOUS; SUBCUTANEOUS at 18:59

## 2021-01-01 RX ADMIN — GUAIFENESIN 1200 MG: 600 TABLET, EXTENDED RELEASE ORAL at 09:03

## 2021-01-01 RX ADMIN — METOPROLOL TARTRATE 25 MG: 25 TABLET, FILM COATED ORAL at 21:59

## 2021-01-01 RX ADMIN — FUROSEMIDE 40 MG: 40 TABLET ORAL at 08:24

## 2021-01-01 RX ADMIN — NYSTATIN: 100000 POWDER TOPICAL at 21:55

## 2021-01-01 RX ADMIN — INSULIN LISPRO 3 UNITS: 100 INJECTION, SOLUTION INTRAVENOUS; SUBCUTANEOUS at 17:35

## 2021-01-01 RX ADMIN — INSULIN LISPRO 3 UNITS: 100 INJECTION, SOLUTION INTRAVENOUS; SUBCUTANEOUS at 11:55

## 2021-01-01 RX ADMIN — Medication 12.5 MG: at 21:18

## 2021-01-01 RX ADMIN — HEPARIN SODIUM 5000 UNITS: 5000 INJECTION INTRAVENOUS; SUBCUTANEOUS at 18:12

## 2021-01-01 RX ADMIN — POLYETHYLENE GLYCOL 3350 17 G: 17 POWDER, FOR SOLUTION ORAL at 07:55

## 2021-01-01 RX ADMIN — SENNOSIDES 17.2 MG: 8.6 TABLET ORAL at 17:51

## 2021-01-01 RX ADMIN — NYSTATIN: 100000 POWDER TOPICAL at 22:23

## 2021-01-01 RX ADMIN — POLYETHYLENE GLYCOL 3350 17 G: 17 POWDER, FOR SOLUTION ORAL at 09:40

## 2021-01-01 RX ADMIN — POLYETHYLENE GLYCOL 3350 34 G: 17 POWDER, FOR SOLUTION ORAL at 18:42

## 2021-01-01 RX ADMIN — PRAVASTATIN SODIUM 80 MG: 80 TABLET ORAL at 15:38

## 2021-01-01 RX ADMIN — DICYCLOMINE HYDROCHLORIDE 10 MG: 10 CAPSULE ORAL at 17:25

## 2021-01-01 RX ADMIN — ASPIRIN 81 MG: 81 TABLET, CHEWABLE ORAL at 08:08

## 2021-01-01 RX ADMIN — DOCUSATE SODIUM AND SENNOSIDES 2 TABLET: 8.6; 5 TABLET, FILM COATED ORAL at 08:33

## 2021-01-01 RX ADMIN — HYDROMORPHONE HYDROCHLORIDE 0.5 MG: 1 INJECTION, SOLUTION INTRAMUSCULAR; INTRAVENOUS; SUBCUTANEOUS at 14:00

## 2021-01-01 RX ADMIN — ASPIRIN 81 MG: 81 TABLET, CHEWABLE ORAL at 08:58

## 2021-01-01 RX ADMIN — NYSTATIN: 100000 POWDER TOPICAL at 21:27

## 2021-01-01 RX ADMIN — GUAIFENESIN 1200 MG: 600 TABLET, EXTENDED RELEASE ORAL at 23:04

## 2021-01-01 RX ADMIN — TRASTUZUMAB 503 MG: 150 INJECTION, POWDER, LYOPHILIZED, FOR SOLUTION INTRAVENOUS at 09:15

## 2021-01-01 RX ADMIN — WARFARIN SODIUM 5 MG: 5 TABLET ORAL at 18:04

## 2021-01-01 RX ADMIN — PANTOPRAZOLE SODIUM 40 MG: 40 TABLET, DELAYED RELEASE ORAL at 05:09

## 2021-01-01 RX ADMIN — NYSTATIN: 100000 POWDER TOPICAL at 17:08

## 2021-01-01 RX ADMIN — DEXAMETHASONE SODIUM PHOSPHATE 10 MG: 10 INJECTION, SOLUTION INTRAMUSCULAR; INTRAVENOUS at 13:33

## 2021-01-01 RX ADMIN — NYSTATIN: 100000 POWDER TOPICAL at 15:49

## 2021-01-01 RX ADMIN — LEVALBUTEROL HYDROCHLORIDE 1.25 MG: 1.25 SOLUTION, CONCENTRATE RESPIRATORY (INHALATION) at 15:05

## 2021-01-01 RX ADMIN — NYSTATIN 1 APPLICATION: 100000 POWDER TOPICAL at 21:52

## 2021-01-01 RX ADMIN — ISODIUM CHLORIDE 3 ML: 0.03 SOLUTION RESPIRATORY (INHALATION) at 13:39

## 2021-01-01 RX ADMIN — ASPIRIN 81 MG: 81 TABLET, CHEWABLE ORAL at 09:10

## 2021-01-01 RX ADMIN — AMLODIPINE BESYLATE 10 MG: 10 TABLET ORAL at 08:49

## 2021-01-01 RX ADMIN — MELATONIN 6 MG: 3 TAB ORAL at 21:38

## 2021-01-01 RX ADMIN — AMLODIPINE BESYLATE 10 MG: 10 TABLET ORAL at 08:24

## 2021-01-01 RX ADMIN — TIOTROPIUM BROMIDE 18 MCG: 18 CAPSULE ORAL; RESPIRATORY (INHALATION) at 09:37

## 2021-01-01 RX ADMIN — HEPARIN SODIUM 2000 UNITS: 1000 INJECTION INTRAVENOUS; SUBCUTANEOUS at 05:34

## 2021-01-01 RX ADMIN — LORAZEPAM 0.5 MG: 0.5 TABLET ORAL at 05:22

## 2021-01-01 RX ADMIN — INSULIN LISPRO 4 UNITS: 100 INJECTION, SOLUTION INTRAVENOUS; SUBCUTANEOUS at 08:26

## 2021-01-01 RX ADMIN — NYSTATIN 1 APPLICATION: 100000 POWDER TOPICAL at 16:05

## 2021-01-01 RX ADMIN — DEXTROSE, SODIUM CHLORIDE, SODIUM LACTATE, POTASSIUM CHLORIDE, AND CALCIUM CHLORIDE 50 ML/HR: 5; .6; .31; .03; .02 INJECTION, SOLUTION INTRAVENOUS at 11:50

## 2021-01-01 RX ADMIN — INSULIN GLARGINE 12 UNITS: 100 INJECTION, SOLUTION SUBCUTANEOUS at 09:23

## 2021-01-01 RX ADMIN — NYSTATIN: 100000 POWDER TOPICAL at 08:45

## 2021-01-01 RX ADMIN — NYSTATIN: 100000 POWDER TOPICAL at 21:40

## 2021-01-01 RX ADMIN — WARFARIN SODIUM 3 MG: 3 TABLET ORAL at 18:19

## 2021-01-01 RX ADMIN — OXYCODONE HYDROCHLORIDE 10 MG: 10 TABLET ORAL at 21:55

## 2021-01-01 RX ADMIN — LORAZEPAM 0.5 MG: 0.5 TABLET ORAL at 12:44

## 2021-01-01 RX ADMIN — GUAIFENESIN 1200 MG: 600 TABLET, EXTENDED RELEASE ORAL at 08:24

## 2021-01-01 RX ADMIN — FUROSEMIDE 20 MG: 20 TABLET ORAL at 09:00

## 2021-01-01 RX ADMIN — LEVALBUTEROL HYDROCHLORIDE 1.25 MG: 1.25 SOLUTION, CONCENTRATE RESPIRATORY (INHALATION) at 13:54

## 2021-01-01 RX ADMIN — METOPROLOL TARTRATE 25 MG: 25 TABLET, FILM COATED ORAL at 08:23

## 2021-01-01 RX ADMIN — GUAIFENESIN 1200 MG: 600 TABLET, EXTENDED RELEASE ORAL at 22:08

## 2021-01-01 RX ADMIN — METHYLPREDNISOLONE SODIUM SUCCINATE 40 MG: 40 INJECTION, POWDER, FOR SOLUTION INTRAMUSCULAR; INTRAVENOUS at 08:35

## 2021-01-01 RX ADMIN — CEFEPIME HYDROCHLORIDE 2000 MG: 2 INJECTION, POWDER, FOR SOLUTION INTRAVENOUS at 08:11

## 2021-01-01 RX ADMIN — SENNOSIDES 8.6 MG: 8.6 TABLET, FILM COATED ORAL at 22:44

## 2021-01-01 RX ADMIN — ENOXAPARIN SODIUM 80 MG: 80 INJECTION SUBCUTANEOUS at 21:59

## 2021-01-01 RX ADMIN — FUROSEMIDE 20 MG: 20 TABLET ORAL at 09:03

## 2021-01-01 RX ADMIN — WARFARIN SODIUM 3 MG: 3 TABLET ORAL at 17:45

## 2021-01-01 RX ADMIN — MELATONIN 6 MG: 3 TAB ORAL at 22:45

## 2021-01-01 RX ADMIN — LEVALBUTEROL HYDROCHLORIDE 1.25 MG: 1.25 SOLUTION, CONCENTRATE RESPIRATORY (INHALATION) at 20:13

## 2021-01-01 RX ADMIN — DOCUSATE SODIUM 100 MG: 100 CAPSULE, LIQUID FILLED ORAL at 09:23

## 2021-01-01 RX ADMIN — GUAIFENESIN 1200 MG: 600 TABLET, EXTENDED RELEASE ORAL at 09:00

## 2021-01-01 RX ADMIN — Medication 9 MG: at 22:23

## 2021-01-01 RX ADMIN — LEVALBUTEROL HYDROCHLORIDE 1.25 MG: 1.25 SOLUTION, CONCENTRATE RESPIRATORY (INHALATION) at 19:42

## 2021-01-01 RX ADMIN — DEXTROSE, SODIUM CHLORIDE, SODIUM LACTATE, POTASSIUM CHLORIDE, AND CALCIUM CHLORIDE 75 ML/HR: 5; .6; .31; .03; .02 INJECTION, SOLUTION INTRAVENOUS at 18:49

## 2021-01-01 RX ADMIN — LIDOCAINE HYDROCHLORIDE 15 ML: 20 SOLUTION ORAL; TOPICAL at 08:16

## 2021-01-01 RX ADMIN — ISODIUM CHLORIDE 3 ML: 0.03 SOLUTION RESPIRATORY (INHALATION) at 13:16

## 2021-01-01 RX ADMIN — GUAIFENESIN 1200 MG: 600 TABLET, EXTENDED RELEASE ORAL at 21:52

## 2021-01-01 RX ADMIN — SODIUM CHLORIDE 20 ML/HR: 0.9 INJECTION, SOLUTION INTRAVENOUS at 10:57

## 2021-01-01 RX ADMIN — METRONIDAZOLE 500 MG: 500 INJECTION, SOLUTION INTRAVENOUS at 08:27

## 2021-01-01 RX ADMIN — ASPIRIN 81 MG: 81 TABLET, CHEWABLE ORAL at 09:11

## 2021-01-01 RX ADMIN — HEPARIN SODIUM 12 UNITS/KG/HR: 10000 INJECTION, SOLUTION INTRAVENOUS at 12:18

## 2021-01-01 RX ADMIN — NYSTATIN 500000 UNITS: 100000 SUSPENSION ORAL at 07:50

## 2021-01-01 RX ADMIN — POLYETHYLENE GLYCOL 3350 17 G: 17 POWDER, FOR SOLUTION ORAL at 09:02

## 2021-01-01 RX ADMIN — POLYETHYLENE GLYCOL 3350 17 G: 17 POWDER, FOR SOLUTION ORAL at 12:54

## 2021-01-01 RX ADMIN — ISODIUM CHLORIDE 3 ML: 0.03 SOLUTION RESPIRATORY (INHALATION) at 13:48

## 2021-01-01 RX ADMIN — NYSTATIN: 100000 CREAM TOPICAL at 12:57

## 2021-01-01 RX ADMIN — POLYETHYLENE GLYCOL 3350 17 G: 17 POWDER, FOR SOLUTION ORAL at 22:16

## 2021-01-01 RX ADMIN — TRAZODONE HYDROCHLORIDE 50 MG: 50 TABLET ORAL at 21:50

## 2021-01-01 RX ADMIN — NYSTATIN 1 APPLICATION: 100000 POWDER TOPICAL at 20:23

## 2021-01-01 RX ADMIN — DOCUSATE SODIUM 100 MG: 100 CAPSULE, LIQUID FILLED ORAL at 17:00

## 2021-01-01 RX ADMIN — LORAZEPAM 0.5 MG: 0.5 TABLET ORAL at 21:26

## 2021-01-01 RX ADMIN — TIOTROPIUM BROMIDE 18 MCG: 18 CAPSULE ORAL; RESPIRATORY (INHALATION) at 08:32

## 2021-01-01 RX ADMIN — GUAIFENESIN 1200 MG: 600 TABLET, EXTENDED RELEASE ORAL at 08:08

## 2021-01-01 RX ADMIN — LEVALBUTEROL HYDROCHLORIDE 1.25 MG: 1.25 SOLUTION, CONCENTRATE RESPIRATORY (INHALATION) at 07:12

## 2021-01-01 RX ADMIN — POLYETHYLENE GLYCOL 3350 17 G: 17 POWDER, FOR SOLUTION ORAL at 08:12

## 2021-01-01 RX ADMIN — ISODIUM CHLORIDE 3 ML: 0.03 SOLUTION RESPIRATORY (INHALATION) at 19:21

## 2021-01-01 RX ADMIN — LIDOCAINE HYDROCHLORIDE 15 ML: 20 SOLUTION ORAL; TOPICAL at 12:50

## 2021-01-01 RX ADMIN — NYSTATIN: 100000 POWDER TOPICAL at 21:03

## 2021-01-01 RX ADMIN — PRAVASTATIN SODIUM 80 MG: 80 TABLET ORAL at 17:58

## 2021-01-01 RX ADMIN — GUAIFENESIN 1200 MG: 600 TABLET, EXTENDED RELEASE ORAL at 21:37

## 2021-01-01 RX ADMIN — LEVALBUTEROL HYDROCHLORIDE 1.25 MG: 1.25 SOLUTION, CONCENTRATE RESPIRATORY (INHALATION) at 13:38

## 2021-01-01 RX ADMIN — ISODIUM CHLORIDE 3 ML: 0.03 SOLUTION RESPIRATORY (INHALATION) at 13:43

## 2021-01-01 RX ADMIN — TIOTROPIUM BROMIDE 18 MCG: 18 CAPSULE ORAL; RESPIRATORY (INHALATION) at 08:12

## 2021-01-01 RX ADMIN — AMLODIPINE BESYLATE 10 MG: 10 TABLET ORAL at 09:38

## 2021-01-01 RX ADMIN — WARFARIN SODIUM 3 MG: 3 TABLET ORAL at 18:35

## 2021-01-01 RX ADMIN — ISODIUM CHLORIDE 3 ML: 0.03 SOLUTION RESPIRATORY (INHALATION) at 20:12

## 2021-01-01 RX ADMIN — HEPARIN SODIUM 14 UNITS/KG/HR: 10000 INJECTION, SOLUTION INTRAVENOUS at 16:07

## 2021-01-01 RX ADMIN — POLYETHYLENE GLYCOL 3350 34 G: 17 POWDER, FOR SOLUTION ORAL at 09:23

## 2021-01-01 RX ADMIN — LEVALBUTEROL HYDROCHLORIDE 1.25 MG: 1.25 SOLUTION, CONCENTRATE RESPIRATORY (INHALATION) at 07:07

## 2021-01-01 RX ADMIN — LEVALBUTEROL HYDROCHLORIDE 1.25 MG: 1.25 SOLUTION, CONCENTRATE RESPIRATORY (INHALATION) at 07:51

## 2021-01-01 RX ADMIN — LEVALBUTEROL HYDROCHLORIDE 1.25 MG: 1.25 SOLUTION, CONCENTRATE RESPIRATORY (INHALATION) at 13:43

## 2021-01-01 RX ADMIN — ISODIUM CHLORIDE 3 ML: 0.03 SOLUTION RESPIRATORY (INHALATION) at 07:23

## 2021-01-01 RX ADMIN — ISODIUM CHLORIDE 3 ML: 0.03 SOLUTION RESPIRATORY (INHALATION) at 19:47

## 2021-01-01 RX ADMIN — GUAIFENESIN 1200 MG: 600 TABLET, EXTENDED RELEASE ORAL at 22:00

## 2021-01-01 RX ADMIN — ENOXAPARIN SODIUM 80 MG: 80 INJECTION SUBCUTANEOUS at 09:39

## 2021-01-01 RX ADMIN — HEPARIN SODIUM 5000 UNITS: 5000 INJECTION INTRAVENOUS; SUBCUTANEOUS at 21:00

## 2021-01-01 RX ADMIN — ISODIUM CHLORIDE 3 ML: 0.03 SOLUTION RESPIRATORY (INHALATION) at 19:25

## 2021-01-01 RX ADMIN — ISODIUM CHLORIDE 3 ML: 0.03 SOLUTION RESPIRATORY (INHALATION) at 07:36

## 2021-01-01 RX ADMIN — NYSTATIN 1 APPLICATION: 100000 POWDER TOPICAL at 16:34

## 2021-01-01 RX ADMIN — DOCUSATE SODIUM 100 MG: 100 CAPSULE, LIQUID FILLED ORAL at 08:34

## 2021-01-01 RX ADMIN — LORAZEPAM 0.5 MG: 0.5 TABLET ORAL at 22:44

## 2021-01-01 RX ADMIN — OXYCODONE HYDROCHLORIDE 10 MG: 10 TABLET ORAL at 11:51

## 2021-01-01 RX ADMIN — FUROSEMIDE 40 MG: 10 INJECTION, SOLUTION INTRAMUSCULAR; INTRAVENOUS at 12:05

## 2021-01-01 RX ADMIN — DOCUSATE SODIUM 100 MG: 100 CAPSULE, LIQUID FILLED ORAL at 18:02

## 2021-01-01 RX ADMIN — METHYLPREDNISOLONE SODIUM SUCCINATE 40 MG: 40 INJECTION, POWDER, FOR SOLUTION INTRAMUSCULAR; INTRAVENOUS at 08:51

## 2021-01-01 RX ADMIN — GUAIFENESIN 1200 MG: 600 TABLET, EXTENDED RELEASE ORAL at 08:31

## 2021-01-01 RX ADMIN — POLYETHYLENE GLYCOL 3350 17 G: 17 POWDER, FOR SOLUTION ORAL at 08:37

## 2021-01-01 RX ADMIN — PANTOPRAZOLE SODIUM 40 MG: 40 TABLET, DELAYED RELEASE ORAL at 05:27

## 2021-01-01 RX ADMIN — METHYLPREDNISOLONE SODIUM SUCCINATE 40 MG: 40 INJECTION, POWDER, FOR SOLUTION INTRAMUSCULAR; INTRAVENOUS at 15:17

## 2021-01-01 RX ADMIN — METHYLPREDNISOLONE SODIUM SUCCINATE 40 MG: 40 INJECTION, POWDER, FOR SOLUTION INTRAMUSCULAR; INTRAVENOUS at 21:57

## 2021-01-01 RX ADMIN — LEVALBUTEROL HYDROCHLORIDE 1.25 MG: 1.25 SOLUTION, CONCENTRATE RESPIRATORY (INHALATION) at 07:02

## 2021-01-01 RX ADMIN — METHYLPREDNISOLONE SODIUM SUCCINATE 40 MG: 40 INJECTION, POWDER, FOR SOLUTION INTRAMUSCULAR; INTRAVENOUS at 08:44

## 2021-01-01 RX ADMIN — NYSTATIN 500000 UNITS: 100000 SUSPENSION ORAL at 20:43

## 2021-01-01 RX ADMIN — GUAIFENESIN 1200 MG: 600 TABLET, EXTENDED RELEASE ORAL at 21:39

## 2021-01-01 RX ADMIN — LEVALBUTEROL HYDROCHLORIDE 1.25 MG: 1.25 SOLUTION, CONCENTRATE RESPIRATORY (INHALATION) at 07:40

## 2021-01-01 RX ADMIN — LEVALBUTEROL HYDROCHLORIDE 1.25 MG: 1.25 SOLUTION, CONCENTRATE RESPIRATORY (INHALATION) at 07:36

## 2021-01-01 RX ADMIN — METOPROLOL TARTRATE 25 MG: 25 TABLET, FILM COATED ORAL at 08:08

## 2021-01-01 RX ADMIN — SENNOSIDES 17.2 MG: 8.6 TABLET ORAL at 17:00

## 2021-01-01 RX ADMIN — GUAIFENESIN 1200 MG: 600 TABLET, EXTENDED RELEASE ORAL at 09:21

## 2021-01-01 RX ADMIN — DICYCLOMINE HYDROCHLORIDE 10 MG: 10 CAPSULE ORAL at 05:54

## 2021-01-01 RX ADMIN — FUROSEMIDE 20 MG: 20 TABLET ORAL at 08:28

## 2021-01-01 RX ADMIN — ISODIUM CHLORIDE 3 ML: 0.03 SOLUTION RESPIRATORY (INHALATION) at 13:02

## 2021-01-01 RX ADMIN — WARFARIN SODIUM 5 MG: 5 TABLET ORAL at 17:49

## 2021-01-01 RX ADMIN — Medication 12.5 MG: at 08:37

## 2021-01-01 RX ADMIN — TIOTROPIUM BROMIDE 18 MCG: 18 CAPSULE ORAL; RESPIRATORY (INHALATION) at 08:59

## 2021-01-01 RX ADMIN — ASPIRIN 81 MG: 81 TABLET, CHEWABLE ORAL at 08:44

## 2021-01-01 RX ADMIN — CEFEPIME HYDROCHLORIDE 2000 MG: 2 INJECTION, POWDER, FOR SOLUTION INTRAVENOUS at 08:18

## 2021-01-01 RX ADMIN — AMLODIPINE BESYLATE 10 MG: 10 TABLET ORAL at 09:11

## 2021-01-01 RX ADMIN — TIOTROPIUM BROMIDE 18 MCG: 18 CAPSULE ORAL; RESPIRATORY (INHALATION) at 09:06

## 2021-01-01 RX ADMIN — ISODIUM CHLORIDE 3 ML: 0.03 SOLUTION RESPIRATORY (INHALATION) at 20:24

## 2021-01-01 RX ADMIN — METOPROLOL TARTRATE 25 MG: 25 TABLET, FILM COATED ORAL at 08:49

## 2021-01-01 RX ADMIN — PRAVASTATIN SODIUM 80 MG: 80 TABLET ORAL at 16:14

## 2021-01-01 RX ADMIN — LEVALBUTEROL HYDROCHLORIDE 1.25 MG: 1.25 SOLUTION, CONCENTRATE RESPIRATORY (INHALATION) at 13:48

## 2021-01-01 RX ADMIN — NYSTATIN 500000 UNITS: 100000 SUSPENSION ORAL at 17:42

## 2021-01-01 RX ADMIN — NYSTATIN: 100000 POWDER TOPICAL at 22:18

## 2021-01-01 RX ADMIN — LEVALBUTEROL HYDROCHLORIDE 1.25 MG: 1.25 SOLUTION, CONCENTRATE RESPIRATORY (INHALATION) at 07:39

## 2021-01-01 RX ADMIN — ASPIRIN 81 MG: 81 TABLET, CHEWABLE ORAL at 08:24

## 2021-01-01 RX ADMIN — LORAZEPAM 0.5 MG: 0.5 TABLET ORAL at 00:06

## 2021-01-01 RX ADMIN — HEPARIN SODIUM 12 UNITS/KG/HR: 10000 INJECTION, SOLUTION INTRAVENOUS at 08:44

## 2021-01-01 RX ADMIN — PANTOPRAZOLE SODIUM 40 MG: 40 INJECTION, POWDER, FOR SOLUTION INTRAVENOUS at 21:35

## 2021-01-01 RX ADMIN — Medication 9 MG: at 21:26

## 2021-01-01 RX ADMIN — NYSTATIN 500000 UNITS: 100000 SUSPENSION ORAL at 08:49

## 2021-01-01 RX ADMIN — INSULIN GLARGINE 12 UNITS: 100 INJECTION, SOLUTION SUBCUTANEOUS at 08:39

## 2021-01-01 RX ADMIN — METOPROLOL TARTRATE 25 MG: 25 TABLET, FILM COATED ORAL at 21:26

## 2021-01-01 RX ADMIN — Medication 9 MG: at 22:05

## 2021-01-01 RX ADMIN — GUAIFENESIN 1200 MG: 600 TABLET, EXTENDED RELEASE ORAL at 20:43

## 2021-01-01 RX ADMIN — MELATONIN 3 MG: 3 TAB ORAL at 21:05

## 2021-01-01 RX ADMIN — DOCUSATE SODIUM 100 MG: 100 CAPSULE, LIQUID FILLED ORAL at 21:37

## 2021-01-01 RX ADMIN — NYSTATIN 500000 UNITS: 100000 SUSPENSION ORAL at 15:15

## 2021-01-01 RX ADMIN — NYSTATIN 1 APPLICATION: 100000 POWDER TOPICAL at 08:40

## 2021-01-01 RX ADMIN — HEPARIN SODIUM 2000 UNITS: 1000 INJECTION INTRAVENOUS; SUBCUTANEOUS at 12:46

## 2021-01-01 RX ADMIN — MINERAL OIL 1 ENEMA: 100 ENEMA RECTAL at 12:41

## 2021-01-01 RX ADMIN — GUAIFENESIN 1200 MG: 600 TABLET, EXTENDED RELEASE ORAL at 22:23

## 2021-01-01 RX ADMIN — HYDROMORPHONE HYDROCHLORIDE 0.5 MG: 1 INJECTION, SOLUTION INTRAMUSCULAR; INTRAVENOUS; SUBCUTANEOUS at 22:07

## 2021-01-01 RX ADMIN — POLYETHYLENE GLYCOL 3350 17 G: 17 POWDER, FOR SOLUTION ORAL at 22:10

## 2021-01-01 RX ADMIN — METHYLPREDNISOLONE SODIUM SUCCINATE 40 MG: 40 INJECTION, POWDER, FOR SOLUTION INTRAMUSCULAR; INTRAVENOUS at 14:43

## 2021-01-01 RX ADMIN — PREDNISONE 40 MG: 20 TABLET ORAL at 09:14

## 2021-01-01 RX ADMIN — CEFDINIR 300 MG: 250 POWDER, FOR SUSPENSION ORAL at 09:12

## 2021-01-01 RX ADMIN — VANCOMYCIN HYDROCHLORIDE 1500 MG: 1 INJECTION, POWDER, LYOPHILIZED, FOR SOLUTION INTRAVENOUS at 09:24

## 2021-01-01 RX ADMIN — NYSTATIN: 100000 POWDER TOPICAL at 17:29

## 2021-01-01 RX ADMIN — INSULIN GLARGINE 10 UNITS: 100 INJECTION, SOLUTION SUBCUTANEOUS at 13:06

## 2021-01-01 RX ADMIN — DOCUSATE SODIUM 100 MG: 100 CAPSULE, LIQUID FILLED ORAL at 08:37

## 2021-01-01 RX ADMIN — POLYETHYLENE GLYCOL 3350 17 G: 17 POWDER, FOR SOLUTION ORAL at 21:39

## 2021-01-01 RX ADMIN — GUAIFENESIN 1200 MG: 600 TABLET, EXTENDED RELEASE ORAL at 08:17

## 2021-01-01 RX ADMIN — METOPROLOL TARTRATE 50 MG: 50 TABLET, FILM COATED ORAL at 21:04

## 2021-01-01 RX ADMIN — ASPIRIN 81 MG CHEWABLE TABLET 81 MG: 81 TABLET CHEWABLE at 08:31

## 2021-01-01 RX ADMIN — FUROSEMIDE 40 MG: 40 TABLET ORAL at 15:15

## 2021-01-01 RX ADMIN — Medication 12.5 MG: at 21:38

## 2021-01-01 RX ADMIN — Medication 9 MG: at 22:03

## 2021-01-01 RX ADMIN — DOCUSATE SODIUM 100 MG: 100 CAPSULE, LIQUID FILLED ORAL at 17:58

## 2021-01-01 RX ADMIN — FUROSEMIDE 20 MG: 20 TABLET ORAL at 09:11

## 2021-01-01 RX ADMIN — POLYETHYLENE GLYCOL 3350 17 G: 17 POWDER, FOR SOLUTION ORAL at 21:41

## 2021-01-01 RX ADMIN — ISODIUM CHLORIDE 3 ML: 0.03 SOLUTION RESPIRATORY (INHALATION) at 19:53

## 2021-01-01 RX ADMIN — ISODIUM CHLORIDE 3 ML: 0.03 SOLUTION RESPIRATORY (INHALATION) at 07:26

## 2021-01-01 RX ADMIN — TRASTUZUMAB 562 MG: 150 INJECTION, POWDER, LYOPHILIZED, FOR SOLUTION INTRAVENOUS at 15:05

## 2021-01-01 RX ADMIN — ISODIUM CHLORIDE 3 ML: 0.03 SOLUTION RESPIRATORY (INHALATION) at 07:27

## 2021-01-01 RX ADMIN — AMLODIPINE BESYLATE 10 MG: 10 TABLET ORAL at 07:58

## 2021-01-01 RX ADMIN — ASPIRIN 81 MG: 81 TABLET, CHEWABLE ORAL at 09:02

## 2021-01-01 RX ADMIN — ASPIRIN 81 MG: 81 TABLET, CHEWABLE ORAL at 07:58

## 2021-01-01 RX ADMIN — CEFDINIR 300 MG: 250 POWDER, FOR SUSPENSION ORAL at 08:12

## 2021-01-01 RX ADMIN — INSULIN LISPRO 3 UNITS: 100 INJECTION, SOLUTION INTRAVENOUS; SUBCUTANEOUS at 11:31

## 2021-01-01 RX ADMIN — PREDNISONE 40 MG: 20 TABLET ORAL at 08:17

## 2021-01-01 RX ADMIN — NYSTATIN 1 APPLICATION: 100000 POWDER TOPICAL at 18:54

## 2021-01-01 RX ADMIN — LORAZEPAM 0.5 MG: 0.5 TABLET ORAL at 06:46

## 2021-01-01 RX ADMIN — NYSTATIN: 100000 POWDER TOPICAL at 22:16

## 2021-01-01 RX ADMIN — PANTOPRAZOLE SODIUM 40 MG: 40 TABLET, DELAYED RELEASE ORAL at 05:34

## 2021-01-01 RX ADMIN — ONDANSETRON 4 MG: 2 INJECTION INTRAMUSCULAR; INTRAVENOUS at 08:24

## 2021-01-01 RX ADMIN — ISODIUM CHLORIDE 3 ML: 0.03 SOLUTION RESPIRATORY (INHALATION) at 19:14

## 2021-01-01 RX ADMIN — NYSTATIN: 100000 POWDER TOPICAL at 08:24

## 2021-01-01 RX ADMIN — DOCUSATE SODIUM 100 MG: 100 CAPSULE, LIQUID FILLED ORAL at 09:00

## 2021-01-01 RX ADMIN — Medication 1 SPRAY: at 17:28

## 2021-01-01 RX ADMIN — METOPROLOL TARTRATE 50 MG: 50 TABLET, FILM COATED ORAL at 17:27

## 2021-01-01 RX ADMIN — PANTOPRAZOLE SODIUM 40 MG: 40 INJECTION, POWDER, FOR SOLUTION INTRAVENOUS at 21:19

## 2021-01-01 RX ADMIN — NYSTATIN: 100000 POWDER TOPICAL at 20:44

## 2021-01-01 RX ADMIN — GUAIFENESIN 1200 MG: 600 TABLET, EXTENDED RELEASE ORAL at 20:21

## 2021-01-01 RX ADMIN — INSULIN LISPRO 3 UNITS: 100 INJECTION, SOLUTION INTRAVENOUS; SUBCUTANEOUS at 21:58

## 2021-01-01 RX ADMIN — LORAZEPAM 0.5 MG: 0.5 TABLET ORAL at 21:53

## 2021-01-01 RX ADMIN — TIOTROPIUM BROMIDE 18 MCG: 18 CAPSULE ORAL; RESPIRATORY (INHALATION) at 09:08

## 2021-01-01 RX ADMIN — METHYLPREDNISOLONE SODIUM SUCCINATE 40 MG: 40 INJECTION, POWDER, FOR SOLUTION INTRAMUSCULAR; INTRAVENOUS at 20:20

## 2021-01-01 RX ADMIN — PANTOPRAZOLE SODIUM 40 MG: 40 INJECTION, POWDER, FOR SOLUTION INTRAVENOUS at 08:17

## 2021-01-01 RX ADMIN — AMLODIPINE BESYLATE 5 MG: 5 TABLET ORAL at 09:59

## 2021-01-01 RX ADMIN — NYSTATIN: 100000 POWDER TOPICAL at 08:59

## 2021-01-01 RX ADMIN — PANTOPRAZOLE SODIUM 40 MG: 40 INJECTION, POWDER, FOR SOLUTION INTRAVENOUS at 10:00

## 2021-01-01 RX ADMIN — SENNOSIDES 17.2 MG: 8.6 TABLET ORAL at 09:11

## 2021-01-01 RX ADMIN — FUROSEMIDE 40 MG: 10 INJECTION, SOLUTION INTRAMUSCULAR; INTRAVENOUS at 07:58

## 2021-01-01 RX ADMIN — ENOXAPARIN SODIUM 80 MG: 80 INJECTION SUBCUTANEOUS at 21:00

## 2021-01-01 RX ADMIN — Medication 100 MG: at 12:53

## 2021-01-01 RX ADMIN — ISODIUM CHLORIDE 3 ML: 0.03 SOLUTION RESPIRATORY (INHALATION) at 13:38

## 2021-01-01 RX ADMIN — DOCUSATE SODIUM 100 MG: 100 CAPSULE, LIQUID FILLED ORAL at 17:45

## 2021-01-01 RX ADMIN — METHYLPREDNISOLONE SODIUM SUCCINATE 40 MG: 40 INJECTION, POWDER, FOR SOLUTION INTRAMUSCULAR; INTRAVENOUS at 21:50

## 2021-01-01 RX ADMIN — GUAIFENESIN 1200 MG: 600 TABLET, EXTENDED RELEASE ORAL at 21:01

## 2021-01-01 RX ADMIN — LORAZEPAM 0.5 MG: 0.5 TABLET ORAL at 22:50

## 2021-01-01 RX ADMIN — ISODIUM CHLORIDE 3 ML: 0.03 SOLUTION RESPIRATORY (INHALATION) at 19:12

## 2021-01-01 RX ADMIN — FUROSEMIDE 40 MG: 40 TABLET ORAL at 09:37

## 2021-01-01 RX ADMIN — ISODIUM CHLORIDE 3 ML: 0.03 SOLUTION RESPIRATORY (INHALATION) at 19:05

## 2021-01-01 RX ADMIN — ISODIUM CHLORIDE 3 ML: 0.03 SOLUTION RESPIRATORY (INHALATION) at 13:10

## 2021-01-01 RX ADMIN — LEVALBUTEROL HYDROCHLORIDE 1.25 MG: 1.25 SOLUTION, CONCENTRATE RESPIRATORY (INHALATION) at 19:05

## 2021-01-01 RX ADMIN — GUAIFENESIN 1200 MG: 600 TABLET, EXTENDED RELEASE ORAL at 09:11

## 2021-01-01 RX ADMIN — SENNOSIDES 17.2 MG: 8.6 TABLET ORAL at 09:21

## 2021-01-01 RX ADMIN — LEVALBUTEROL HYDROCHLORIDE 1.25 MG: 1.25 SOLUTION, CONCENTRATE RESPIRATORY (INHALATION) at 13:53

## 2021-01-01 RX ADMIN — PREDNISONE 40 MG: 20 TABLET ORAL at 09:00

## 2021-01-01 RX ADMIN — Medication 9 MG: at 21:51

## 2021-01-01 RX ADMIN — DOCUSATE SODIUM AND SENNOSIDES 1 TABLET: 8.6; 5 TABLET, FILM COATED ORAL at 09:01

## 2021-01-01 RX ADMIN — TRAZODONE HYDROCHLORIDE 100 MG: 50 TABLET ORAL at 21:38

## 2021-01-01 RX ADMIN — METHYLPREDNISOLONE SODIUM SUCCINATE 40 MG: 40 INJECTION, POWDER, FOR SOLUTION INTRAMUSCULAR; INTRAVENOUS at 14:38

## 2021-01-01 RX ADMIN — DOCUSATE SODIUM AND SENNOSIDES 2 TABLET: 8.6; 5 TABLET, FILM COATED ORAL at 08:35

## 2021-01-01 RX ADMIN — VANCOMYCIN HYDROCHLORIDE 750 MG: 750 INJECTION, SOLUTION INTRAVENOUS at 09:01

## 2021-01-01 RX ADMIN — HEPARIN SODIUM 4000 UNITS: 1000 INJECTION INTRAVENOUS; SUBCUTANEOUS at 08:47

## 2021-01-01 RX ADMIN — DOCUSATE SODIUM 100 MG: 100 CAPSULE, LIQUID FILLED ORAL at 17:23

## 2021-01-01 RX ADMIN — Medication 3 MG: at 21:47

## 2021-01-01 RX ADMIN — LIDOCAINE HYDROCHLORIDE 15 ML: 20 SOLUTION ORAL; TOPICAL at 16:11

## 2021-01-01 RX ADMIN — SODIUM CHLORIDE 20 ML/HR: 0.9 INJECTION, SOLUTION INTRAVENOUS at 14:12

## 2021-01-01 RX ADMIN — LEVALBUTEROL HYDROCHLORIDE 1.25 MG: 1.25 SOLUTION, CONCENTRATE RESPIRATORY (INHALATION) at 07:24

## 2021-01-01 RX ADMIN — NYSTATIN: 100000 CREAM TOPICAL at 09:08

## 2021-01-01 RX ADMIN — NYSTATIN 500000 UNITS: 100000 SUSPENSION ORAL at 09:37

## 2021-01-01 RX ADMIN — NYSTATIN 1 APPLICATION: 100000 POWDER TOPICAL at 09:10

## 2021-01-01 RX ADMIN — METOPROLOL TARTRATE 50 MG: 50 TABLET, FILM COATED ORAL at 08:02

## 2021-01-01 RX ADMIN — INSULIN LISPRO 2 UNITS: 100 INJECTION, SOLUTION INTRAVENOUS; SUBCUTANEOUS at 23:46

## 2021-01-01 RX ADMIN — METHYLPREDNISOLONE SODIUM SUCCINATE 40 MG: 40 INJECTION, POWDER, FOR SOLUTION INTRAMUSCULAR; INTRAVENOUS at 06:07

## 2021-01-01 RX ADMIN — PANTOPRAZOLE SODIUM 40 MG: 40 INJECTION, POWDER, FOR SOLUTION INTRAVENOUS at 13:20

## 2021-01-01 RX ADMIN — NYSTATIN 500000 UNITS: 100000 SUSPENSION ORAL at 20:20

## 2021-01-01 RX ADMIN — ISODIUM CHLORIDE 3 ML: 0.03 SOLUTION RESPIRATORY (INHALATION) at 13:53

## 2021-01-01 RX ADMIN — ASPIRIN 81 MG: 81 TABLET, CHEWABLE ORAL at 09:14

## 2021-01-01 RX ADMIN — DOCUSATE SODIUM 100 MG: 100 CAPSULE, LIQUID FILLED ORAL at 09:05

## 2021-01-01 RX ADMIN — POLYETHYLENE GLYCOL 3350 17 G: 17 POWDER, FOR SOLUTION ORAL at 08:28

## 2021-01-01 RX ADMIN — INSULIN GLARGINE 15 UNITS: 100 INJECTION, SOLUTION SUBCUTANEOUS at 08:32

## 2021-01-01 RX ADMIN — TIOTROPIUM BROMIDE 18 MCG: 18 CAPSULE ORAL; RESPIRATORY (INHALATION) at 08:43

## 2021-01-01 RX ADMIN — NYSTATIN 500000 UNITS: 100000 SUSPENSION ORAL at 11:56

## 2021-01-01 RX ADMIN — Medication 9 MG: at 21:42

## 2021-01-01 RX ADMIN — METHYLPREDNISOLONE SODIUM SUCCINATE 40 MG: 40 INJECTION, POWDER, FOR SOLUTION INTRAMUSCULAR; INTRAVENOUS at 06:06

## 2021-01-01 RX ADMIN — Medication 12.5 MG: at 21:39

## 2021-01-01 RX ADMIN — ISODIUM CHLORIDE 3 ML: 0.03 SOLUTION RESPIRATORY (INHALATION) at 07:07

## 2021-01-01 RX ADMIN — LEVALBUTEROL HYDROCHLORIDE 1.25 MG: 1.25 SOLUTION, CONCENTRATE RESPIRATORY (INHALATION) at 19:58

## 2021-01-01 RX ADMIN — POLYETHYLENE GLYCOL 3350 17 G: 17 POWDER, FOR SOLUTION ORAL at 22:23

## 2021-01-01 RX ADMIN — ISODIUM CHLORIDE 3 ML: 0.03 SOLUTION RESPIRATORY (INHALATION) at 07:21

## 2021-01-01 RX ADMIN — LORAZEPAM 0.5 MG: 0.5 TABLET ORAL at 22:23

## 2021-01-01 RX ADMIN — NYSTATIN 1 APPLICATION: 100000 POWDER TOPICAL at 18:35

## 2021-01-01 RX ADMIN — ISODIUM CHLORIDE 3 ML: 0.03 SOLUTION RESPIRATORY (INHALATION) at 07:40

## 2021-01-01 RX ADMIN — TIOTROPIUM BROMIDE 18 MCG: 18 CAPSULE ORAL; RESPIRATORY (INHALATION) at 07:55

## 2021-01-01 RX ADMIN — LEVALBUTEROL HYDROCHLORIDE 1.25 MG: 1.25 SOLUTION, CONCENTRATE RESPIRATORY (INHALATION) at 13:33

## 2021-01-01 RX ADMIN — LEVALBUTEROL HYDROCHLORIDE 1.25 MG: 1.25 SOLUTION, CONCENTRATE RESPIRATORY (INHALATION) at 13:36

## 2021-01-01 RX ADMIN — LEVALBUTEROL HYDROCHLORIDE 1.25 MG: 1.25 SOLUTION, CONCENTRATE RESPIRATORY (INHALATION) at 07:26

## 2021-01-01 RX ADMIN — TIOTROPIUM BROMIDE 18 MCG: 18 CAPSULE ORAL; RESPIRATORY (INHALATION) at 11:27

## 2021-01-01 RX ADMIN — LORAZEPAM 0.5 MG: 0.5 TABLET ORAL at 22:09

## 2021-01-01 RX ADMIN — DEXAMETHASONE SODIUM PHOSPHATE 6 MG: 4 INJECTION INTRA-ARTICULAR; INTRALESIONAL; INTRAMUSCULAR; INTRAVENOUS; SOFT TISSUE at 11:24

## 2021-01-01 RX ADMIN — Medication 9 MG: at 21:44

## 2021-01-01 RX ADMIN — MICONAZOLE NITRATE: 20 CREAM TOPICAL at 18:06

## 2021-01-01 RX ADMIN — ENOXAPARIN SODIUM 80 MG: 80 INJECTION SUBCUTANEOUS at 08:02

## 2021-01-01 RX ADMIN — LEVALBUTEROL HYDROCHLORIDE 1.25 MG: 1.25 SOLUTION, CONCENTRATE RESPIRATORY (INHALATION) at 19:25

## 2021-01-01 RX ADMIN — INSULIN GLARGINE 15 UNITS: 100 INJECTION, SOLUTION SUBCUTANEOUS at 08:25

## 2021-01-01 RX ADMIN — WARFARIN SODIUM 5 MG: 5 TABLET ORAL at 18:02

## 2021-01-01 RX ADMIN — HEPARIN SODIUM 14 UNITS/KG/HR: 10000 INJECTION, SOLUTION INTRAVENOUS at 11:36

## 2021-01-01 RX ADMIN — Medication 12.5 MG: at 09:11

## 2021-01-01 RX ADMIN — NYSTATIN: 100000 POWDER TOPICAL at 17:28

## 2021-01-01 RX ADMIN — LEVALBUTEROL HYDROCHLORIDE 1.25 MG: 1.25 SOLUTION, CONCENTRATE RESPIRATORY (INHALATION) at 20:11

## 2021-01-01 RX ADMIN — METHYLPREDNISOLONE SODIUM SUCCINATE 40 MG: 40 INJECTION, POWDER, FOR SOLUTION INTRAMUSCULAR; INTRAVENOUS at 21:35

## 2021-01-01 RX ADMIN — WARFARIN SODIUM 10 MG: 5 TABLET ORAL at 20:43

## 2021-01-01 RX ADMIN — PANTOPRAZOLE SODIUM 40 MG: 40 INJECTION, POWDER, FOR SOLUTION INTRAVENOUS at 21:45

## 2021-01-01 RX ADMIN — GUAIFENESIN 1200 MG: 600 TABLET, EXTENDED RELEASE ORAL at 08:44

## 2021-01-01 RX ADMIN — ISODIUM CHLORIDE 3 ML: 0.03 SOLUTION RESPIRATORY (INHALATION) at 19:46

## 2021-01-01 RX ADMIN — VANCOMYCIN HYDROCHLORIDE 750 MG: 750 INJECTION, SOLUTION INTRAVENOUS at 21:47

## 2021-01-01 RX ADMIN — DOCUSATE SODIUM 100 MG: 100 CAPSULE, LIQUID FILLED ORAL at 17:25

## 2021-01-01 RX ADMIN — NYSTATIN 500000 UNITS: 100000 SUSPENSION ORAL at 11:35

## 2021-01-01 RX ADMIN — OXYCODONE HYDROCHLORIDE 10 MG: 10 TABLET ORAL at 08:21

## 2021-01-01 RX ADMIN — METOPROLOL TARTRATE 50 MG: 50 TABLET, FILM COATED ORAL at 08:31

## 2021-01-01 RX ADMIN — NYSTATIN: 100000 POWDER TOPICAL at 21:38

## 2021-01-01 RX ADMIN — LEVALBUTEROL HYDROCHLORIDE 1.25 MG: 1.25 SOLUTION, CONCENTRATE RESPIRATORY (INHALATION) at 13:16

## 2021-01-01 RX ADMIN — POLYETHYLENE GLYCOL 3350 17 G: 17 POWDER, FOR SOLUTION ORAL at 21:38

## 2021-01-01 RX ADMIN — NYSTATIN: 100000 POWDER TOPICAL at 21:59

## 2021-01-01 RX ADMIN — ENOXAPARIN SODIUM 80 MG: 80 INJECTION SUBCUTANEOUS at 08:12

## 2021-01-01 RX ADMIN — METOPROLOL TARTRATE 25 MG: 25 TABLET, FILM COATED ORAL at 08:58

## 2021-01-01 RX ADMIN — LEVALBUTEROL HYDROCHLORIDE 1.25 MG: 1.25 SOLUTION, CONCENTRATE RESPIRATORY (INHALATION) at 20:36

## 2021-01-01 RX ADMIN — ENOXAPARIN SODIUM 80 MG: 80 INJECTION SUBCUTANEOUS at 21:51

## 2021-01-01 RX ADMIN — POLYETHYLENE GLYCOL 3350 17 G: 17 POWDER, FOR SOLUTION ORAL at 08:32

## 2021-01-01 RX ADMIN — NYSTATIN: 100000 POWDER TOPICAL at 09:20

## 2021-01-01 RX ADMIN — TRASTUZUMAB 503 MG: 150 INJECTION, POWDER, LYOPHILIZED, FOR SOLUTION INTRAVENOUS at 09:25

## 2021-01-01 RX ADMIN — TIOTROPIUM BROMIDE 18 MCG: 18 CAPSULE ORAL; RESPIRATORY (INHALATION) at 13:02

## 2021-01-01 RX ADMIN — PANTOPRAZOLE SODIUM 40 MG: 40 INJECTION, POWDER, FOR SOLUTION INTRAVENOUS at 22:49

## 2021-01-01 RX ADMIN — B-COMPLEX W/ C & FOLIC ACID TAB 1 TABLET: TAB at 08:31

## 2021-01-01 RX ADMIN — PREDNISONE 40 MG: 20 TABLET ORAL at 08:32

## 2021-01-01 RX ADMIN — ISODIUM CHLORIDE 3 ML: 0.03 SOLUTION RESPIRATORY (INHALATION) at 07:38

## 2021-01-01 RX ADMIN — NYSTATIN 500000 UNITS: 100000 SUSPENSION ORAL at 17:18

## 2021-01-01 RX ADMIN — SODIUM CHLORIDE 20 ML/HR: 0.9 INJECTION, SOLUTION INTRAVENOUS at 15:25

## 2021-01-01 RX ADMIN — ISODIUM CHLORIDE 3 ML: 0.03 SOLUTION RESPIRATORY (INHALATION) at 19:44

## 2021-01-01 RX ADMIN — PANTOPRAZOLE SODIUM 40 MG: 40 TABLET, DELAYED RELEASE ORAL at 05:11

## 2021-01-01 RX ADMIN — POLYETHYLENE GLYCOL 3350 34 G: 17 POWDER, FOR SOLUTION ORAL at 09:03

## 2021-01-01 RX ADMIN — DOCUSATE SODIUM AND SENNOSIDES 2 TABLET: 8.6; 5 TABLET, FILM COATED ORAL at 18:02

## 2021-01-01 RX ADMIN — PANTOPRAZOLE SODIUM 40 MG: 40 INJECTION, POWDER, FOR SOLUTION INTRAVENOUS at 08:10

## 2021-01-01 RX ADMIN — TIOTROPIUM BROMIDE 18 MCG: 18 CAPSULE ORAL; RESPIRATORY (INHALATION) at 09:22

## 2021-01-01 RX ADMIN — Medication 12.5 MG: at 09:05

## 2021-01-01 RX ADMIN — LEVALBUTEROL HYDROCHLORIDE 1.25 MG: 1.25 SOLUTION, CONCENTRATE RESPIRATORY (INHALATION) at 07:44

## 2021-01-01 RX ADMIN — NYSTATIN: 100000 POWDER TOPICAL at 16:09

## 2021-01-01 RX ADMIN — GUAIFENESIN 1200 MG: 600 TABLET, EXTENDED RELEASE ORAL at 22:15

## 2021-01-01 RX ADMIN — CEFEPIME HYDROCHLORIDE 2000 MG: 2 INJECTION, POWDER, FOR SOLUTION INTRAVENOUS at 08:35

## 2021-01-01 RX ADMIN — PANTOPRAZOLE SODIUM 40 MG: 40 TABLET, DELAYED RELEASE ORAL at 05:22

## 2021-01-01 RX ADMIN — LEVALBUTEROL HYDROCHLORIDE 1.25 MG: 1.25 SOLUTION, CONCENTRATE RESPIRATORY (INHALATION) at 13:30

## 2021-01-01 RX ADMIN — HEPARIN SODIUM 16 UNITS/KG/HR: 10000 INJECTION, SOLUTION INTRAVENOUS at 07:55

## 2021-01-01 RX ADMIN — TIOTROPIUM BROMIDE 18 MCG: 18 CAPSULE ORAL; RESPIRATORY (INHALATION) at 08:45

## 2021-01-01 RX ADMIN — METHYLPREDNISOLONE SODIUM SUCCINATE 40 MG: 40 INJECTION, POWDER, FOR SOLUTION INTRAMUSCULAR; INTRAVENOUS at 21:59

## 2021-01-01 RX ADMIN — METHYLPREDNISOLONE SODIUM SUCCINATE 40 MG: 40 INJECTION, POWDER, FOR SOLUTION INTRAMUSCULAR; INTRAVENOUS at 13:31

## 2021-01-01 RX ADMIN — POLYETHYLENE GLYCOL 3350 34 G: 17 POWDER, FOR SOLUTION ORAL at 22:04

## 2021-01-01 RX ADMIN — Medication 9 MG: at 21:59

## 2021-01-01 RX ADMIN — POLYETHYLENE GLYCOL 3350, SODIUM CHLORIDE, SODIUM BICARBONATE AND POTASSIUM CHLORIDE WITH LEMON FLAVOR 2000 ML: 420; 11.2; 5.72; 1.48 POWDER, FOR SOLUTION ORAL at 22:06

## 2021-01-01 RX ADMIN — DOCUSATE SODIUM 100 MG: 100 CAPSULE, LIQUID FILLED ORAL at 18:35

## 2021-01-01 RX ADMIN — NYSTATIN 500000 UNITS: 100000 SUSPENSION ORAL at 22:14

## 2021-01-01 RX ADMIN — DEXTROSE, SODIUM CHLORIDE, SODIUM LACTATE, POTASSIUM CHLORIDE, AND CALCIUM CHLORIDE 75 ML/HR: 5; .6; .31; .03; .02 INJECTION, SOLUTION INTRAVENOUS at 19:54

## 2021-01-01 RX ADMIN — LEVALBUTEROL HYDROCHLORIDE 1.25 MG: 1.25 SOLUTION, CONCENTRATE RESPIRATORY (INHALATION) at 19:44

## 2021-01-01 RX ADMIN — POLYETHYLENE GLYCOL 3350 17 G: 17 POWDER, FOR SOLUTION ORAL at 17:37

## 2021-01-01 RX ADMIN — LEVALBUTEROL HYDROCHLORIDE 1.25 MG: 1.25 SOLUTION, CONCENTRATE RESPIRATORY (INHALATION) at 13:13

## 2021-01-01 RX ADMIN — LEVALBUTEROL HYDROCHLORIDE 1.25 MG: 1.25 SOLUTION, CONCENTRATE RESPIRATORY (INHALATION) at 07:45

## 2021-01-01 RX ADMIN — METRONIDAZOLE 500 MG: 500 INJECTION, SOLUTION INTRAVENOUS at 07:58

## 2021-01-01 RX ADMIN — ISODIUM CHLORIDE 3 ML: 0.03 SOLUTION RESPIRATORY (INHALATION) at 07:22

## 2021-01-01 RX ADMIN — METHYLPREDNISOLONE SODIUM SUCCINATE 40 MG: 40 INJECTION, POWDER, FOR SOLUTION INTRAMUSCULAR; INTRAVENOUS at 06:18

## 2021-01-01 RX ADMIN — FUROSEMIDE 20 MG: 20 TABLET ORAL at 08:17

## 2021-01-01 RX ADMIN — PRAVASTATIN SODIUM 80 MG: 80 TABLET ORAL at 18:19

## 2021-01-01 RX ADMIN — NYSTATIN: 100000 POWDER TOPICAL at 16:55

## 2021-01-01 RX ADMIN — PANTOPRAZOLE SODIUM 40 MG: 40 TABLET, DELAYED RELEASE ORAL at 05:28

## 2021-01-01 RX ADMIN — PANTOPRAZOLE SODIUM 40 MG: 40 TABLET, DELAYED RELEASE ORAL at 05:07

## 2021-01-01 RX ADMIN — DOCUSATE SODIUM AND SENNOSIDES 2 TABLET: 8.6; 5 TABLET, FILM COATED ORAL at 18:23

## 2021-01-01 RX ADMIN — ISODIUM CHLORIDE 3 ML: 0.03 SOLUTION RESPIRATORY (INHALATION) at 08:07

## 2021-01-01 RX ADMIN — Medication 12.5 MG: at 08:17

## 2021-01-01 RX ADMIN — SENNOSIDES 17.2 MG: 8.6 TABLET ORAL at 09:04

## 2021-01-01 RX ADMIN — DOCUSATE SODIUM 100 MG: 100 CAPSULE, LIQUID FILLED ORAL at 09:11

## 2021-01-01 RX ADMIN — SENNOSIDES 17.2 MG: 8.6 TABLET ORAL at 17:25

## 2021-01-01 RX ADMIN — NYSTATIN: 100000 POWDER TOPICAL at 08:31

## 2021-01-01 RX ADMIN — TIOTROPIUM BROMIDE 18 MCG: 18 CAPSULE ORAL; RESPIRATORY (INHALATION) at 08:39

## 2021-01-01 RX ADMIN — WARFARIN SODIUM 3 MG: 3 TABLET ORAL at 17:25

## 2021-01-01 RX ADMIN — ASPIRIN 81 MG: 81 TABLET, CHEWABLE ORAL at 09:03

## 2021-01-01 RX ADMIN — TIOTROPIUM BROMIDE 18 MCG: 18 CAPSULE ORAL; RESPIRATORY (INHALATION) at 09:01

## 2021-01-01 RX ADMIN — HYDROMORPHONE HYDROCHLORIDE 0.5 MG: 1 INJECTION, SOLUTION INTRAMUSCULAR; INTRAVENOUS; SUBCUTANEOUS at 11:46

## 2021-01-01 RX ADMIN — LIDOCAINE HYDROCHLORIDE 15 ML: 20 SOLUTION ORAL; TOPICAL at 12:27

## 2021-01-01 RX ADMIN — ASPIRIN 81 MG: 81 TABLET, CHEWABLE ORAL at 09:38

## 2021-01-01 RX ADMIN — SENNOSIDES 17.2 MG: 8.6 TABLET ORAL at 18:34

## 2021-01-01 RX ADMIN — SODIUM CHLORIDE 500 ML: 0.9 INJECTION, SOLUTION INTRAVENOUS at 08:54

## 2021-01-01 RX ADMIN — MICONAZOLE NITRATE 1 APPLICATION: 20 CREAM TOPICAL at 17:37

## 2021-01-01 RX ADMIN — MELATONIN 6 MG: 3 TAB ORAL at 21:45

## 2021-01-01 RX ADMIN — LEVALBUTEROL HYDROCHLORIDE 1.25 MG: 1.25 SOLUTION, CONCENTRATE RESPIRATORY (INHALATION) at 19:15

## 2021-01-01 RX ADMIN — GUAIFENESIN 1200 MG: 600 TABLET, EXTENDED RELEASE ORAL at 22:31

## 2021-01-01 RX ADMIN — MICONAZOLE NITRATE: 20 CREAM TOPICAL at 15:42

## 2021-01-01 RX ADMIN — ISODIUM CHLORIDE 3 ML: 0.03 SOLUTION RESPIRATORY (INHALATION) at 19:42

## 2021-01-01 RX ADMIN — LEVALBUTEROL HYDROCHLORIDE 1.25 MG: 1.25 SOLUTION, CONCENTRATE RESPIRATORY (INHALATION) at 07:04

## 2021-01-01 RX ADMIN — METHYLPREDNISOLONE SODIUM SUCCINATE 40 MG: 40 INJECTION, POWDER, FOR SOLUTION INTRAMUSCULAR; INTRAVENOUS at 07:50

## 2021-01-01 RX ADMIN — GUAIFENESIN 1200 MG: 600 TABLET, EXTENDED RELEASE ORAL at 09:37

## 2021-01-01 RX ADMIN — TIOTROPIUM BROMIDE 18 MCG: 18 CAPSULE ORAL; RESPIRATORY (INHALATION) at 09:09

## 2021-01-01 RX ADMIN — NYSTATIN 500000 UNITS: 100000 SUSPENSION ORAL at 22:01

## 2021-01-01 RX ADMIN — ISODIUM CHLORIDE 3 ML: 0.03 SOLUTION RESPIRATORY (INHALATION) at 07:52

## 2021-01-01 RX ADMIN — FUROSEMIDE 20 MG: 20 TABLET ORAL at 12:30

## 2021-01-01 RX ADMIN — METOPROLOL TARTRATE 25 MG: 25 TABLET, FILM COATED ORAL at 08:17

## 2021-01-01 RX ADMIN — METOPROLOL TARTRATE 25 MG: 25 TABLET, FILM COATED ORAL at 22:31

## 2021-01-01 RX ADMIN — ASPIRIN 81 MG: 81 TABLET, CHEWABLE ORAL at 08:49

## 2021-01-01 RX ADMIN — MICONAZOLE NITRATE 1 APPLICATION: 20 CREAM TOPICAL at 18:22

## 2021-01-01 RX ADMIN — DEXTROSE, SODIUM CHLORIDE, SODIUM LACTATE, POTASSIUM CHLORIDE, AND CALCIUM CHLORIDE 75 ML/HR: 5; .6; .31; .03; .02 INJECTION, SOLUTION INTRAVENOUS at 14:56

## 2021-01-01 RX ADMIN — TIOTROPIUM BROMIDE 18 MCG: 18 CAPSULE ORAL; RESPIRATORY (INHALATION) at 08:31

## 2021-01-01 RX ADMIN — METRONIDAZOLE 500 MG: 500 INJECTION, SOLUTION INTRAVENOUS at 16:06

## 2021-01-01 RX ADMIN — NYSTATIN: 100000 POWDER TOPICAL at 09:22

## 2021-01-01 RX ADMIN — OXYCODONE HYDROCHLORIDE 10 MG: 10 TABLET ORAL at 16:40

## 2021-01-01 RX ADMIN — NYSTATIN 500000 UNITS: 100000 SUSPENSION ORAL at 09:02

## 2021-01-01 RX ADMIN — GUAIFENESIN 1200 MG: 600 TABLET, EXTENDED RELEASE ORAL at 09:14

## 2021-01-01 RX ADMIN — WARFARIN SODIUM 3 MG: 3 TABLET ORAL at 17:23

## 2021-01-01 RX ADMIN — ISODIUM CHLORIDE 3 ML: 0.03 SOLUTION RESPIRATORY (INHALATION) at 13:19

## 2021-01-01 RX ADMIN — ISODIUM CHLORIDE 3 ML: 0.03 SOLUTION RESPIRATORY (INHALATION) at 07:50

## 2021-01-01 RX ADMIN — METHYLPREDNISOLONE SODIUM SUCCINATE 60 MG: 125 INJECTION, POWDER, FOR SOLUTION INTRAMUSCULAR; INTRAVENOUS at 12:31

## 2021-01-01 RX ADMIN — METHYLPREDNISOLONE SODIUM SUCCINATE 40 MG: 40 INJECTION, POWDER, FOR SOLUTION INTRAMUSCULAR; INTRAVENOUS at 20:21

## 2021-01-01 RX ADMIN — ASPIRIN 81 MG: 81 TABLET, CHEWABLE ORAL at 07:50

## 2021-01-01 RX ADMIN — HYDROMORPHONE HYDROCHLORIDE 0.5 MG: 1 INJECTION, SOLUTION INTRAMUSCULAR; INTRAVENOUS; SUBCUTANEOUS at 12:09

## 2021-01-01 RX ADMIN — TRASTUZUMAB 562 MG: 150 INJECTION, POWDER, LYOPHILIZED, FOR SOLUTION INTRAVENOUS at 15:23

## 2021-01-01 RX ADMIN — CEFEPIME HYDROCHLORIDE 2000 MG: 2 INJECTION, POWDER, FOR SOLUTION INTRAVENOUS at 18:35

## 2021-01-01 RX ADMIN — ISODIUM CHLORIDE 3 ML: 0.03 SOLUTION RESPIRATORY (INHALATION) at 07:49

## 2021-01-01 RX ADMIN — INSULIN LISPRO 4 UNITS: 100 INJECTION, SOLUTION INTRAVENOUS; SUBCUTANEOUS at 20:34

## 2021-01-01 RX ADMIN — SODIUM CHLORIDE, SODIUM LACTATE, POTASSIUM CHLORIDE, AND CALCIUM CHLORIDE 100 ML/HR: .6; .31; .03; .02 INJECTION, SOLUTION INTRAVENOUS at 10:58

## 2021-01-01 RX ADMIN — WARFARIN SODIUM 5 MG: 5 TABLET ORAL at 18:23

## 2021-01-01 RX ADMIN — MICONAZOLE NITRATE: 20 CREAM TOPICAL at 10:44

## 2021-01-01 RX ADMIN — METHYLPREDNISOLONE SODIUM SUCCINATE 40 MG: 40 INJECTION, POWDER, FOR SOLUTION INTRAMUSCULAR; INTRAVENOUS at 09:01

## 2021-01-01 RX ADMIN — LEVALBUTEROL HYDROCHLORIDE 1.25 MG: 1.25 SOLUTION, CONCENTRATE RESPIRATORY (INHALATION) at 07:34

## 2021-01-01 RX ADMIN — Medication 12.5 MG: at 09:00

## 2021-01-01 RX ADMIN — METHYLPREDNISOLONE SODIUM SUCCINATE 40 MG: 40 INJECTION, POWDER, FOR SOLUTION INTRAMUSCULAR; INTRAVENOUS at 21:03

## 2021-01-01 RX ADMIN — DOCUSATE SODIUM AND SENNOSIDES 2 TABLET: 8.6; 5 TABLET, FILM COATED ORAL at 17:08

## 2021-01-01 RX ADMIN — SODIUM CHLORIDE 20 ML/HR: 0.9 INJECTION, SOLUTION INTRAVENOUS at 09:10

## 2021-01-01 RX ADMIN — PANTOPRAZOLE SODIUM 40 MG: 40 TABLET, DELAYED RELEASE ORAL at 06:11

## 2021-01-01 RX ADMIN — SENNOSIDES 17.2 MG: 8.6 TABLET ORAL at 17:45

## 2021-01-01 RX ADMIN — POLYETHYLENE GLYCOL 3350 17 G: 17 POWDER, FOR SOLUTION ORAL at 08:59

## 2021-01-01 RX ADMIN — INSULIN LISPRO 3 UNITS: 100 INJECTION, SOLUTION INTRAVENOUS; SUBCUTANEOUS at 06:11

## 2021-01-01 RX ADMIN — NYSTATIN 500000 UNITS: 100000 SUSPENSION ORAL at 17:04

## 2021-01-01 RX ADMIN — LEVALBUTEROL HYDROCHLORIDE 1.25 MG: 1.25 SOLUTION, CONCENTRATE RESPIRATORY (INHALATION) at 07:19

## 2021-01-01 RX ADMIN — LEVALBUTEROL HYDROCHLORIDE 1.25 MG: 1.25 SOLUTION, CONCENTRATE RESPIRATORY (INHALATION) at 13:05

## 2021-01-01 RX ADMIN — GUAIFENESIN 1200 MG: 600 TABLET, EXTENDED RELEASE ORAL at 09:38

## 2021-01-01 RX ADMIN — ISODIUM CHLORIDE 3 ML: 0.03 SOLUTION RESPIRATORY (INHALATION) at 07:29

## 2021-01-01 RX ADMIN — PANTOPRAZOLE SODIUM 40 MG: 40 TABLET, DELAYED RELEASE ORAL at 06:33

## 2021-01-01 RX ADMIN — ISODIUM CHLORIDE 3 ML: 0.03 SOLUTION RESPIRATORY (INHALATION) at 20:30

## 2021-01-01 RX ADMIN — SODIUM CHLORIDE, SODIUM LACTATE, POTASSIUM CHLORIDE, AND CALCIUM CHLORIDE 100 ML/HR: .6; .31; .03; .02 INJECTION, SOLUTION INTRAVENOUS at 00:10

## 2021-01-01 RX ADMIN — PANTOPRAZOLE SODIUM 40 MG: 40 TABLET, DELAYED RELEASE ORAL at 05:06

## 2021-01-01 RX ADMIN — NYSTATIN: 100000 POWDER TOPICAL at 09:39

## 2021-01-01 RX ADMIN — FENTANYL CITRATE 50 MCG: 50 INJECTION INTRAMUSCULAR; INTRAVENOUS at 08:03

## 2021-01-01 RX ADMIN — MICONAZOLE NITRATE: 20 CREAM TOPICAL at 09:11

## 2021-01-01 RX ADMIN — SENNOSIDES 17.2 MG: 8.6 TABLET ORAL at 18:02

## 2021-01-01 RX ADMIN — NYSTATIN: 100000 POWDER TOPICAL at 22:01

## 2021-01-01 RX ADMIN — LEVALBUTEROL HYDROCHLORIDE 1.25 MG: 1.25 SOLUTION, CONCENTRATE RESPIRATORY (INHALATION) at 20:12

## 2021-01-01 RX ADMIN — PANTOPRAZOLE SODIUM 40 MG: 40 TABLET, DELAYED RELEASE ORAL at 06:07

## 2021-01-01 RX ADMIN — PHENYLEPHRINE HYDROCHLORIDE 200 MCG: 10 INJECTION INTRAVENOUS at 12:53

## 2021-01-01 RX ADMIN — NYSTATIN: 100000 POWDER TOPICAL at 17:54

## 2021-01-01 RX ADMIN — LEVALBUTEROL HYDROCHLORIDE 1.25 MG: 1.25 SOLUTION, CONCENTRATE RESPIRATORY (INHALATION) at 13:02

## 2021-01-01 RX ADMIN — WARFARIN SODIUM 3 MG: 3 TABLET ORAL at 17:00

## 2021-01-01 RX ADMIN — LORAZEPAM 0.5 MG: 0.5 TABLET ORAL at 21:47

## 2021-01-01 RX ADMIN — ISODIUM CHLORIDE 3 ML: 0.03 SOLUTION RESPIRATORY (INHALATION) at 14:58

## 2021-01-01 RX ADMIN — POLYETHYLENE GLYCOL 3350 17 G: 17 POWDER, FOR SOLUTION ORAL at 08:47

## 2021-01-01 RX ADMIN — PRAVASTATIN SODIUM 80 MG: 80 TABLET ORAL at 18:02

## 2021-01-01 RX ADMIN — CEFTRIAXONE 1000 MG: 1 INJECTION, SOLUTION INTRAVENOUS at 06:43

## 2021-01-01 RX ADMIN — SENNOSIDES 17.2 MG: 8.6 TABLET ORAL at 08:08

## 2021-01-01 RX ADMIN — INSULIN GLARGINE 12 UNITS: 100 INJECTION, SOLUTION SUBCUTANEOUS at 08:17

## 2021-01-01 RX ADMIN — LORAZEPAM 0.5 MG: 0.5 TABLET ORAL at 22:17

## 2021-01-01 RX ADMIN — GUAIFENESIN 1200 MG: 600 TABLET, EXTENDED RELEASE ORAL at 09:10

## 2021-01-01 RX ADMIN — LEVALBUTEROL HYDROCHLORIDE 1.25 MG: 1.25 SOLUTION, CONCENTRATE RESPIRATORY (INHALATION) at 07:22

## 2021-01-01 RX ADMIN — ISODIUM CHLORIDE 3 ML: 0.03 SOLUTION RESPIRATORY (INHALATION) at 19:29

## 2021-01-01 RX ADMIN — AZITHROMYCIN MONOHYDRATE 500 MG: 500 INJECTION, POWDER, LYOPHILIZED, FOR SOLUTION INTRAVENOUS at 06:12

## 2021-01-01 RX ADMIN — SODIUM CHLORIDE 20 ML/HR: 0.9 INJECTION, SOLUTION INTRAVENOUS at 08:35

## 2021-01-01 RX ADMIN — WARFARIN SODIUM 3 MG: 3 TABLET ORAL at 18:41

## 2021-01-01 RX ADMIN — GUAIFENESIN 1200 MG: 600 TABLET, EXTENDED RELEASE ORAL at 07:50

## 2021-01-01 RX ADMIN — PREDNISONE 30 MG: 20 TABLET ORAL at 09:11

## 2021-01-01 RX ADMIN — MICONAZOLE NITRATE 1 APPLICATION: 20 CREAM TOPICAL at 17:23

## 2021-01-01 RX ADMIN — TIOTROPIUM BROMIDE 18 MCG: 18 CAPSULE ORAL; RESPIRATORY (INHALATION) at 08:25

## 2021-01-01 RX ADMIN — ISODIUM CHLORIDE 3 ML: 0.03 SOLUTION RESPIRATORY (INHALATION) at 20:04

## 2021-01-01 RX ADMIN — GUAIFENESIN 1200 MG: 600 TABLET, EXTENDED RELEASE ORAL at 21:03

## 2021-01-01 RX ADMIN — MAGNESIUM CITRATE 296 ML: 1.75 LIQUID ORAL at 15:55

## 2021-01-01 RX ADMIN — LIDOCAINE HYDROCHLORIDE 15 ML: 20 SOLUTION ORAL; TOPICAL at 07:53

## 2021-01-01 RX ADMIN — ENOXAPARIN SODIUM 80 MG: 80 INJECTION SUBCUTANEOUS at 21:04

## 2021-01-01 RX ADMIN — SODIUM CHLORIDE 500 ML: 0.9 INJECTION, SOLUTION INTRAVENOUS at 06:00

## 2021-01-01 RX ADMIN — OXYCODONE HYDROCHLORIDE 10 MG: 10 TABLET ORAL at 10:04

## 2021-01-01 RX ADMIN — ASPIRIN 81 MG: 81 TABLET, CHEWABLE ORAL at 08:31

## 2021-01-01 RX ADMIN — WARFARIN SODIUM 3 MG: 3 TABLET ORAL at 17:58

## 2021-01-01 RX ADMIN — ISODIUM CHLORIDE 3 ML: 0.03 SOLUTION RESPIRATORY (INHALATION) at 07:16

## 2021-01-01 RX ADMIN — IOHEXOL 100 ML: 350 INJECTION, SOLUTION INTRAVENOUS at 11:20

## 2021-01-01 RX ADMIN — ASPIRIN 81 MG: 81 TABLET, CHEWABLE ORAL at 08:17

## 2021-01-01 RX ADMIN — LEVALBUTEROL HYDROCHLORIDE 1.25 MG: 1.25 SOLUTION, CONCENTRATE RESPIRATORY (INHALATION) at 19:12

## 2021-01-01 RX ADMIN — PANTOPRAZOLE SODIUM 40 MG: 40 TABLET, DELAYED RELEASE ORAL at 05:25

## 2021-01-01 RX ADMIN — FUROSEMIDE 40 MG: 40 TABLET ORAL at 08:12

## 2021-01-01 RX ADMIN — FUROSEMIDE 40 MG: 40 TABLET ORAL at 08:02

## 2021-01-01 RX ADMIN — Medication 9 MG: at 20:20

## 2021-01-01 RX ADMIN — INSULIN GLARGINE 12 UNITS: 100 INJECTION, SOLUTION SUBCUTANEOUS at 09:07

## 2021-01-01 RX ADMIN — ISODIUM CHLORIDE 3 ML: 0.03 SOLUTION RESPIRATORY (INHALATION) at 07:15

## 2021-01-01 RX ADMIN — NYSTATIN 1 APPLICATION: 100000 POWDER TOPICAL at 08:41

## 2021-01-01 RX ADMIN — SODIUM CHLORIDE 500 ML: 0.9 INJECTION, SOLUTION INTRAVENOUS at 07:56

## 2021-01-01 RX ADMIN — ISODIUM CHLORIDE 3 ML: 0.03 SOLUTION RESPIRATORY (INHALATION) at 19:58

## 2021-01-01 RX ADMIN — FUROSEMIDE 20 MG: 20 TABLET ORAL at 09:14

## 2021-01-01 RX ADMIN — ISODIUM CHLORIDE 3 ML: 0.03 SOLUTION RESPIRATORY (INHALATION) at 07:24

## 2021-01-01 RX ADMIN — METHYLPREDNISOLONE SODIUM SUCCINATE 40 MG: 40 INJECTION, POWDER, FOR SOLUTION INTRAMUSCULAR; INTRAVENOUS at 08:32

## 2021-01-01 RX ADMIN — NYSTATIN 500000 UNITS: 100000 SUSPENSION ORAL at 13:05

## 2021-01-01 RX ADMIN — OXYCODONE HYDROCHLORIDE 5 MG: 5 TABLET ORAL at 15:55

## 2021-01-01 RX ADMIN — ISODIUM CHLORIDE 3 ML: 0.03 SOLUTION RESPIRATORY (INHALATION) at 14:12

## 2021-01-01 RX ADMIN — TIOTROPIUM BROMIDE 18 MCG: 18 CAPSULE ORAL; RESPIRATORY (INHALATION) at 08:18

## 2021-01-01 RX ADMIN — NYSTATIN: 100000 POWDER TOPICAL at 08:12

## 2021-01-01 RX ADMIN — TIOTROPIUM BROMIDE 18 MCG: 18 CAPSULE ORAL; RESPIRATORY (INHALATION) at 08:33

## 2021-01-01 RX ADMIN — PREDNISONE 40 MG: 20 TABLET ORAL at 08:28

## 2021-01-01 RX ADMIN — LEVALBUTEROL HYDROCHLORIDE 1.25 MG: 1.25 SOLUTION, CONCENTRATE RESPIRATORY (INHALATION) at 07:52

## 2021-01-01 RX ADMIN — PANTOPRAZOLE SODIUM 40 MG: 40 TABLET, DELAYED RELEASE ORAL at 06:19

## 2021-01-01 RX ADMIN — LEVALBUTEROL HYDROCHLORIDE 1.25 MG: 1.25 SOLUTION, CONCENTRATE RESPIRATORY (INHALATION) at 07:38

## 2021-01-01 RX ADMIN — METOPROLOL TARTRATE 50 MG: 50 TABLET, FILM COATED ORAL at 07:58

## 2021-01-01 RX ADMIN — DICYCLOMINE HYDROCHLORIDE 10 MG: 10 CAPSULE ORAL at 11:41

## 2021-01-01 RX ADMIN — FENTANYL CITRATE 50 MCG: 50 INJECTION INTRAMUSCULAR; INTRAVENOUS at 12:53

## 2021-01-01 RX ADMIN — ENOXAPARIN SODIUM 40 MG: 40 INJECTION SUBCUTANEOUS at 13:11

## 2021-01-01 RX ADMIN — METHYLPREDNISOLONE SODIUM SUCCINATE 40 MG: 40 INJECTION, POWDER, FOR SOLUTION INTRAMUSCULAR; INTRAVENOUS at 21:04

## 2021-01-01 RX ADMIN — OXYCODONE HYDROCHLORIDE 10 MG: 10 TABLET ORAL at 01:24

## 2021-01-01 RX ADMIN — PANTOPRAZOLE SODIUM 40 MG: 40 TABLET, DELAYED RELEASE ORAL at 05:30

## 2021-01-01 RX ADMIN — ISODIUM CHLORIDE 3 ML: 0.03 SOLUTION RESPIRATORY (INHALATION) at 19:39

## 2021-01-01 RX ADMIN — SENNOSIDES 17.2 MG: 8.6 TABLET ORAL at 17:53

## 2021-01-01 RX ADMIN — AMLODIPINE BESYLATE 10 MG: 10 TABLET ORAL at 08:02

## 2021-01-01 RX ADMIN — Medication 100 MG: at 08:03

## 2021-01-01 RX ADMIN — CEFDINIR 300 MG: 250 POWDER, FOR SUSPENSION ORAL at 21:52

## 2021-01-01 RX ADMIN — ACETAMINOPHEN 650 MG: 325 TABLET, FILM COATED ORAL at 06:10

## 2021-01-01 RX ADMIN — HEPARIN SODIUM 5000 UNITS: 5000 INJECTION INTRAVENOUS; SUBCUTANEOUS at 05:33

## 2021-01-01 RX ADMIN — NYSTATIN: 100000 POWDER TOPICAL at 08:40

## 2021-01-01 RX ADMIN — ISODIUM CHLORIDE 3 ML: 0.03 SOLUTION RESPIRATORY (INHALATION) at 20:11

## 2021-01-01 RX ADMIN — HYDROMORPHONE HYDROCHLORIDE 0.5 MG: 1 INJECTION, SOLUTION INTRAMUSCULAR; INTRAVENOUS; SUBCUTANEOUS at 13:48

## 2021-01-01 RX ADMIN — WARFARIN SODIUM 5 MG: 5 TABLET ORAL at 17:52

## 2021-01-01 RX ADMIN — Medication 9 MG: at 22:15

## 2021-01-01 RX ADMIN — Medication 12.5 MG: at 12:28

## 2021-01-01 RX ADMIN — POLYETHYLENE GLYCOL 3350 17 G: 17 POWDER, FOR SOLUTION ORAL at 15:43

## 2021-01-01 RX ADMIN — METRONIDAZOLE 500 MG: 500 INJECTION, SOLUTION INTRAVENOUS at 08:51

## 2021-01-01 RX ADMIN — ISODIUM CHLORIDE 3 ML: 0.03 SOLUTION RESPIRATORY (INHALATION) at 07:12

## 2021-01-01 RX ADMIN — NYSTATIN 1 APPLICATION: 100000 POWDER TOPICAL at 08:59

## 2021-01-01 RX ADMIN — METHYLPREDNISOLONE SODIUM SUCCINATE 40 MG: 40 INJECTION, POWDER, FOR SOLUTION INTRAMUSCULAR; INTRAVENOUS at 15:16

## 2021-01-01 RX ADMIN — PANTOPRAZOLE SODIUM 40 MG: 40 INJECTION, POWDER, FOR SOLUTION INTRAVENOUS at 08:51

## 2021-01-01 RX ADMIN — DEXAMETHASONE SODIUM PHOSPHATE 10 MG: 10 INJECTION, SOLUTION INTRAMUSCULAR; INTRAVENOUS at 08:03

## 2021-01-01 RX ADMIN — METHYLPREDNISOLONE SODIUM SUCCINATE 20 MG: 40 INJECTION, POWDER, FOR SOLUTION INTRAMUSCULAR; INTRAVENOUS at 09:17

## 2021-01-01 RX ADMIN — Medication 12.5 MG: at 22:04

## 2021-01-01 RX ADMIN — METOPROLOL TARTRATE 25 MG: 25 TABLET, FILM COATED ORAL at 23:55

## 2021-01-01 RX ADMIN — GUAIFENESIN 1200 MG: 600 TABLET, EXTENDED RELEASE ORAL at 08:58

## 2021-01-01 RX ADMIN — AZITHROMYCIN MONOHYDRATE 500 MG: 500 INJECTION, POWDER, LYOPHILIZED, FOR SOLUTION INTRAVENOUS at 06:29

## 2021-01-01 RX ADMIN — HEPARIN SODIUM 11 UNITS/KG/HR: 10000 INJECTION, SOLUTION INTRAVENOUS at 06:26

## 2021-01-01 RX ADMIN — NYSTATIN: 100000 POWDER TOPICAL at 17:50

## 2021-01-01 RX ADMIN — NYSTATIN: 100000 POWDER TOPICAL at 21:13

## 2021-01-01 RX ADMIN — LEVALBUTEROL HYDROCHLORIDE 1.25 MG: 1.25 SOLUTION, CONCENTRATE RESPIRATORY (INHALATION) at 07:30

## 2021-01-01 RX ADMIN — PANTOPRAZOLE SODIUM 40 MG: 40 INJECTION, POWDER, FOR SOLUTION INTRAVENOUS at 08:36

## 2021-01-01 RX ADMIN — FUROSEMIDE 40 MG: 40 TABLET ORAL at 09:11

## 2021-01-01 RX ADMIN — METOPROLOL TARTRATE 25 MG: 25 TABLET, FILM COATED ORAL at 09:02

## 2021-01-01 RX ADMIN — ISODIUM CHLORIDE 3 ML: 0.03 SOLUTION RESPIRATORY (INHALATION) at 19:15

## 2021-01-01 RX ADMIN — LORAZEPAM 0.5 MG: 0.5 TABLET ORAL at 22:05

## 2021-01-01 RX ADMIN — ASPIRIN 81 MG: 81 TABLET, CHEWABLE ORAL at 08:02

## 2021-01-01 RX ADMIN — POLYETHYLENE GLYCOL 3350 34 G: 17 POWDER, FOR SOLUTION ORAL at 17:46

## 2021-01-01 RX ADMIN — PRAVASTATIN SODIUM 80 MG: 80 TABLET ORAL at 16:26

## 2021-01-01 RX ADMIN — WARFARIN SODIUM 1 MG: 1 TABLET ORAL at 18:03

## 2021-01-01 RX ADMIN — LEVALBUTEROL HYDROCHLORIDE 1.25 MG: 1.25 SOLUTION, CONCENTRATE RESPIRATORY (INHALATION) at 20:24

## 2021-01-01 RX ADMIN — LEVALBUTEROL HYDROCHLORIDE 1.25 MG: 1.25 SOLUTION, CONCENTRATE RESPIRATORY (INHALATION) at 13:10

## 2021-01-01 RX ADMIN — KETOROLAC TROMETHAMINE 15 MG: 30 INJECTION, SOLUTION INTRAMUSCULAR at 18:39

## 2021-01-01 RX ADMIN — LEVALBUTEROL HYDROCHLORIDE 1.25 MG: 1.25 SOLUTION, CONCENTRATE RESPIRATORY (INHALATION) at 08:06

## 2021-01-01 RX ADMIN — LORAZEPAM 0.5 MG: 0.5 TABLET ORAL at 01:25

## 2021-01-01 RX ADMIN — ISODIUM CHLORIDE 3 ML: 0.03 SOLUTION RESPIRATORY (INHALATION) at 07:19

## 2021-01-01 RX ADMIN — METHYLPREDNISOLONE SODIUM SUCCINATE 40 MG: 40 INJECTION, POWDER, FOR SOLUTION INTRAMUSCULAR; INTRAVENOUS at 21:45

## 2021-01-01 RX ADMIN — LEVALBUTEROL HYDROCHLORIDE 1.25 MG: 1.25 SOLUTION, CONCENTRATE RESPIRATORY (INHALATION) at 20:04

## 2021-01-01 RX ADMIN — INSULIN LISPRO 5 UNITS: 100 INJECTION, SOLUTION INTRAVENOUS; SUBCUTANEOUS at 13:01

## 2021-01-01 RX ADMIN — IOHEXOL 50 ML: 350 INJECTION, SOLUTION INTRAVENOUS at 09:30

## 2021-01-01 RX ADMIN — DOCUSATE SODIUM AND SENNOSIDES 2 TABLET: 8.6; 5 TABLET, FILM COATED ORAL at 08:17

## 2021-01-01 RX ADMIN — LEVALBUTEROL HYDROCHLORIDE 1.25 MG: 1.25 SOLUTION, CONCENTRATE RESPIRATORY (INHALATION) at 13:19

## 2021-01-01 RX ADMIN — ASPIRIN 81 MG: 81 TABLET, CHEWABLE ORAL at 08:11

## 2021-01-01 RX ADMIN — ACETAMINOPHEN 650 MG: 325 TABLET, FILM COATED ORAL at 05:37

## 2021-01-01 RX ADMIN — Medication 12.5 MG: at 22:08

## 2021-01-01 RX ADMIN — POLYETHYLENE GLYCOL 3350 17 G: 17 POWDER, FOR SOLUTION ORAL at 16:14

## 2021-01-01 RX ADMIN — CEFDINIR 300 MG: 250 POWDER, FOR SUSPENSION ORAL at 21:03

## 2021-01-01 RX ADMIN — NYSTATIN 500000 UNITS: 100000 SUSPENSION ORAL at 09:00

## 2021-01-01 RX ADMIN — OXYCODONE HYDROCHLORIDE 10 MG: 10 TABLET ORAL at 09:46

## 2021-01-01 RX ADMIN — ASPIRIN 81 MG: 81 TABLET, CHEWABLE ORAL at 09:39

## 2021-01-01 RX ADMIN — SENNOSIDES 17.2 MG: 8.6 TABLET ORAL at 09:20

## 2021-01-01 RX ADMIN — SODIUM CHLORIDE, SODIUM LACTATE, POTASSIUM CHLORIDE, AND CALCIUM CHLORIDE: .6; .31; .03; .02 INJECTION, SOLUTION INTRAVENOUS at 08:03

## 2021-01-01 RX ADMIN — PANTOPRAZOLE SODIUM 40 MG: 40 INJECTION, POWDER, FOR SOLUTION INTRAVENOUS at 09:30

## 2021-01-01 RX ADMIN — INSULIN GLARGINE 10 UNITS: 100 INJECTION, SOLUTION SUBCUTANEOUS at 08:55

## 2021-01-01 RX ADMIN — PREDNISONE 30 MG: 20 TABLET ORAL at 09:22

## 2021-01-01 RX ADMIN — INSULIN GLARGINE 12 UNITS: 100 INJECTION, SOLUTION SUBCUTANEOUS at 09:21

## 2021-01-01 RX ADMIN — NYSTATIN: 100000 POWDER TOPICAL at 09:04

## 2021-01-01 RX ADMIN — LEVALBUTEROL HYDROCHLORIDE 1.25 MG: 1.25 SOLUTION, CONCENTRATE RESPIRATORY (INHALATION) at 19:53

## 2021-01-01 RX ADMIN — PANTOPRAZOLE SODIUM 40 MG: 40 TABLET, DELAYED RELEASE ORAL at 05:54

## 2021-01-01 RX ADMIN — DOCUSATE SODIUM AND SENNOSIDES 2 TABLET: 8.6; 5 TABLET, FILM COATED ORAL at 17:49

## 2021-01-01 RX ADMIN — NYSTATIN 500000 UNITS: 100000 SUSPENSION ORAL at 18:04

## 2021-01-01 RX ADMIN — DOCUSATE SODIUM 100 MG: 100 CAPSULE, LIQUID FILLED ORAL at 12:30

## 2021-01-01 RX ADMIN — POLYETHYLENE GLYCOL 3350 17 G: 17 POWDER, FOR SOLUTION ORAL at 09:14

## 2021-01-01 RX ADMIN — OXYCODONE HYDROCHLORIDE 10 MG: 10 TABLET ORAL at 21:38

## 2021-01-01 RX ADMIN — Medication 9 MG: at 21:37

## 2021-01-01 RX ADMIN — PANTOPRAZOLE SODIUM 40 MG: 40 INJECTION, POWDER, FOR SOLUTION INTRAVENOUS at 09:17

## 2021-01-01 RX ADMIN — LORAZEPAM 0.5 MG: 0.5 TABLET ORAL at 21:42

## 2021-01-01 RX ADMIN — ISODIUM CHLORIDE 3 ML: 0.03 SOLUTION RESPIRATORY (INHALATION) at 13:05

## 2021-01-01 RX ADMIN — PANTOPRAZOLE SODIUM 40 MG: 40 INJECTION, POWDER, FOR SOLUTION INTRAVENOUS at 21:38

## 2021-01-01 RX ADMIN — PANTOPRAZOLE SODIUM 40 MG: 40 TABLET, DELAYED RELEASE ORAL at 05:24

## 2021-01-01 RX ADMIN — NYSTATIN: 100000 POWDER TOPICAL at 20:21

## 2021-01-01 RX ADMIN — GUAIFENESIN 1200 MG: 600 TABLET, EXTENDED RELEASE ORAL at 21:26

## 2021-01-01 RX ADMIN — LEVALBUTEROL HYDROCHLORIDE 1.25 MG: 1.25 SOLUTION, CONCENTRATE RESPIRATORY (INHALATION) at 19:29

## 2021-01-01 RX ADMIN — PREDNISONE 40 MG: 20 TABLET ORAL at 08:08

## 2021-01-01 RX ADMIN — Medication 9 MG: at 21:03

## 2021-01-01 RX ADMIN — METOPROLOL TARTRATE 50 MG: 50 TABLET, FILM COATED ORAL at 09:59

## 2021-01-01 RX ADMIN — NYSTATIN: 100000 CREAM TOPICAL at 18:04

## 2021-01-01 RX ADMIN — ISODIUM CHLORIDE 3 ML: 0.03 SOLUTION RESPIRATORY (INHALATION) at 13:30

## 2021-01-01 RX ADMIN — LEVALBUTEROL HYDROCHLORIDE 1.25 MG: 1.25 SOLUTION, CONCENTRATE RESPIRATORY (INHALATION) at 07:15

## 2021-01-01 RX ADMIN — GUAIFENESIN 1200 MG: 600 TABLET, EXTENDED RELEASE ORAL at 20:19

## 2021-01-01 RX ADMIN — METRONIDAZOLE 500 MG: 500 INJECTION, SOLUTION INTRAVENOUS at 00:00

## 2021-01-01 RX ADMIN — POLYETHYLENE GLYCOL 3350 17 G: 17 POWDER, FOR SOLUTION ORAL at 21:19

## 2021-01-01 RX ADMIN — INSULIN GLARGINE 15 UNITS: 100 INJECTION, SOLUTION SUBCUTANEOUS at 08:00

## 2021-01-01 RX ADMIN — ISODIUM CHLORIDE 3 ML: 0.03 SOLUTION RESPIRATORY (INHALATION) at 15:05

## 2021-01-01 RX ADMIN — METOPROLOL TARTRATE 25 MG: 25 TABLET, FILM COATED ORAL at 09:03

## 2021-01-01 RX ADMIN — ISODIUM CHLORIDE 3 ML: 0.03 SOLUTION RESPIRATORY (INHALATION) at 19:51

## 2021-01-01 RX ADMIN — POLYETHYLENE GLYCOL 3350 17 G: 17 POWDER, FOR SOLUTION ORAL at 09:05

## 2021-01-01 RX ADMIN — DOCUSATE SODIUM AND SENNOSIDES 1 TABLET: 8.6; 5 TABLET, FILM COATED ORAL at 07:51

## 2021-01-01 RX ADMIN — PIPERACILLIN AND TAZOBACTAM 3.38 G: 36; 4.5 INJECTION, POWDER, FOR SOLUTION INTRAVENOUS at 13:11

## 2021-01-01 RX ADMIN — ISODIUM CHLORIDE 3 ML: 0.03 SOLUTION RESPIRATORY (INHALATION) at 19:50

## 2021-01-01 RX ADMIN — METHYLPREDNISOLONE SODIUM SUCCINATE 40 MG: 40 INJECTION, POWDER, FOR SOLUTION INTRAMUSCULAR; INTRAVENOUS at 05:30

## 2021-01-01 RX ADMIN — ASPIRIN 81 MG: 81 TABLET, CHEWABLE ORAL at 08:29

## 2021-01-01 RX ADMIN — NYSTATIN 500000 UNITS: 100000 SUSPENSION ORAL at 08:44

## 2021-01-01 RX ADMIN — ISODIUM CHLORIDE 3 ML: 0.03 SOLUTION RESPIRATORY (INHALATION) at 13:54

## 2021-01-01 RX ADMIN — DOCUSATE SODIUM 100 MG: 100 CAPSULE, LIQUID FILLED ORAL at 09:20

## 2021-01-01 RX ADMIN — PANTOPRAZOLE SODIUM 40 MG: 40 TABLET, DELAYED RELEASE ORAL at 05:44

## 2021-01-01 RX ADMIN — Medication 12.5 MG: at 09:46

## 2021-01-01 RX ADMIN — METHYLPREDNISOLONE SODIUM SUCCINATE 40 MG: 40 INJECTION, POWDER, FOR SOLUTION INTRAMUSCULAR; INTRAVENOUS at 05:29

## 2021-01-01 RX ADMIN — INSULIN LISPRO 1 UNITS: 100 INJECTION, SOLUTION INTRAVENOUS; SUBCUTANEOUS at 21:38

## 2021-01-01 RX ADMIN — SENNOSIDES 8.6 MG: 8.6 TABLET, FILM COATED ORAL at 21:18

## 2021-01-01 RX ADMIN — LEVALBUTEROL HYDROCHLORIDE 1.25 MG: 1.25 SOLUTION, CONCENTRATE RESPIRATORY (INHALATION) at 20:00

## 2021-01-01 RX ADMIN — METHYLPREDNISOLONE SODIUM SUCCINATE 60 MG: 125 INJECTION, POWDER, FOR SOLUTION INTRAMUSCULAR; INTRAVENOUS at 09:05

## 2021-01-01 RX ADMIN — Medication 9 MG: at 22:31

## 2021-01-01 RX ADMIN — LEVALBUTEROL HYDROCHLORIDE 1.25 MG: 1.25 SOLUTION, CONCENTRATE RESPIRATORY (INHALATION) at 20:30

## 2021-01-01 RX ADMIN — OXYCODONE HYDROCHLORIDE 10 MG: 10 TABLET ORAL at 14:53

## 2021-01-01 RX ADMIN — TIOTROPIUM BROMIDE 18 MCG: 18 CAPSULE ORAL; RESPIRATORY (INHALATION) at 09:39

## 2021-01-01 RX ADMIN — ISODIUM CHLORIDE 3 ML: 0.03 SOLUTION RESPIRATORY (INHALATION) at 20:36

## 2021-01-01 RX ADMIN — Medication 9 MG: at 21:01

## 2021-01-01 RX ADMIN — POLYETHYLENE GLYCOL 3350 17 G: 17 POWDER, FOR SOLUTION ORAL at 16:34

## 2021-01-01 RX ADMIN — AMLODIPINE BESYLATE 10 MG: 10 TABLET ORAL at 08:44

## 2021-01-01 RX ADMIN — LIDOCAINE HYDROCHLORIDE 15 ML: 20 SOLUTION ORAL; TOPICAL at 09:08

## 2021-01-01 RX ADMIN — METHYLPREDNISOLONE SODIUM SUCCINATE 20 MG: 40 INJECTION, POWDER, FOR SOLUTION INTRAMUSCULAR; INTRAVENOUS at 21:50

## 2021-01-01 RX ADMIN — NYSTATIN 500000 UNITS: 100000 SUSPENSION ORAL at 17:56

## 2021-01-01 RX ADMIN — HYDROMORPHONE HYDROCHLORIDE 0.5 MG: 1 INJECTION, SOLUTION INTRAMUSCULAR; INTRAVENOUS; SUBCUTANEOUS at 11:31

## 2021-01-01 RX ADMIN — PANTOPRAZOLE SODIUM 40 MG: 40 TABLET, DELAYED RELEASE ORAL at 06:18

## 2021-01-01 RX ADMIN — FUROSEMIDE 20 MG: 10 INJECTION, SOLUTION INTRAMUSCULAR; INTRAVENOUS at 12:31

## 2021-01-01 RX ADMIN — WARFARIN SODIUM 3 MG: 3 TABLET ORAL at 18:02

## 2021-01-01 RX ADMIN — METRONIDAZOLE 500 MG: 500 INJECTION, SOLUTION INTRAVENOUS at 15:58

## 2021-01-01 RX ADMIN — DOCUSATE SODIUM 100 MG: 100 CAPSULE, LIQUID FILLED ORAL at 08:11

## 2021-01-01 RX ADMIN — FUROSEMIDE 40 MG: 40 TABLET ORAL at 08:31

## 2021-01-01 RX ADMIN — Medication 9 MG: at 20:43

## 2021-01-01 RX ADMIN — GUAIFENESIN 1200 MG: 600 TABLET, EXTENDED RELEASE ORAL at 21:50

## 2021-01-01 RX ADMIN — TRASTUZUMAB 562 MG: 150 INJECTION, POWDER, LYOPHILIZED, FOR SOLUTION INTRAVENOUS at 14:37

## 2021-01-01 RX ADMIN — LABETALOL 20 MG/4 ML (5 MG/ML) INTRAVENOUS SYRINGE 10 MG: at 14:49

## 2021-01-01 RX ADMIN — VANCOMYCIN HYDROCHLORIDE 750 MG: 750 INJECTION, SOLUTION INTRAVENOUS at 21:11

## 2021-01-01 RX ADMIN — DOCUSATE SODIUM AND SENNOSIDES 1 TABLET: 8.6; 5 TABLET, FILM COATED ORAL at 17:37

## 2021-01-01 RX ADMIN — DOCUSATE SODIUM AND SENNOSIDES 2 TABLET: 8.6; 5 TABLET, FILM COATED ORAL at 09:02

## 2021-01-01 RX ADMIN — HEPARIN SODIUM 2000 UNITS: 1000 INJECTION INTRAVENOUS; SUBCUTANEOUS at 16:03

## 2021-01-01 RX ADMIN — CEFDINIR 300 MG: 250 POWDER, FOR SUSPENSION ORAL at 21:59

## 2021-01-01 RX ADMIN — NYSTATIN: 100000 POWDER TOPICAL at 09:37

## 2021-01-01 RX ADMIN — METHYLPREDNISOLONE SODIUM SUCCINATE 40 MG: 40 INJECTION, POWDER, FOR SOLUTION INTRAMUSCULAR; INTRAVENOUS at 21:47

## 2021-01-01 RX ADMIN — SENNOSIDES 17.2 MG: 8.6 TABLET ORAL at 09:15

## 2021-01-01 RX ADMIN — NYSTATIN 500000 UNITS: 100000 SUSPENSION ORAL at 13:13

## 2021-01-01 RX ADMIN — ISODIUM CHLORIDE 3 ML: 0.03 SOLUTION RESPIRATORY (INHALATION) at 07:39

## 2021-01-01 RX ADMIN — MICONAZOLE NITRATE: 20 CREAM TOPICAL at 17:33

## 2021-01-01 RX ADMIN — GUAIFENESIN 1200 MG: 600 TABLET, EXTENDED RELEASE ORAL at 21:44

## 2021-01-01 RX ADMIN — WARFARIN SODIUM 3 MG: 3 TABLET ORAL at 18:43

## 2021-01-01 RX ADMIN — FUROSEMIDE 40 MG: 40 TABLET ORAL at 09:38

## 2021-01-01 RX ADMIN — CEFDINIR 300 MG: 250 POWDER, FOR SUSPENSION ORAL at 21:13

## 2021-01-06 NOTE — PROGRESS NOTES
Pt tolerated treatment well without any adverse reactions  Aware of next appointment 2/3   Declines AVS

## 2021-01-06 NOTE — PROGRESS NOTES
Pt here for chemotherapy  Pt reports he notices some foods are a little more difficult to swallow  Pt plans to f/u with GI provider  Vitals stable upon admission  Labs are not ordered for treatment  EF from 7/8 reviewed    Call bell in reach, will continue to monitor

## 2021-01-20 NOTE — TELEPHONE ENCOUNTER
Nabor Scott called in regards to SABIHA having swallowing issues; looked through Alonso's chart and spoke to [facility] about the swallowing issues patient is having and patient last saw Dr Damon Brambila for surgery on 2/11/20 and has not had a follow up with Dr Damon Brambila, patient had an appointment with Gastroenterology on 12/9/20 and Gastroenterology is aware of patient's swallowing limitations, spoke to Nabor Scott and told her to contact Gastroenterology

## 2021-01-25 PROBLEM — I65.29 CAROTID STENOSIS: Status: ACTIVE | Noted: 2017-08-09

## 2021-01-25 PROBLEM — C15.9 ESOPHAGEAL CANCER (HCC): Status: ACTIVE | Noted: 2018-02-13

## 2021-01-25 PROBLEM — N18.30 CHRONIC KIDNEY DISEASE, STAGE 3 (HCC): Status: ACTIVE | Noted: 2021-01-01

## 2021-01-25 PROBLEM — K22.2 ESOPHAGEAL STRICTURE: Status: ACTIVE | Noted: 2017-08-09

## 2021-01-25 NOTE — ASSESSMENT & PLAN NOTE
With associated esophageal cancer  Underwent barium swallow today which revealed "severe distal esophageal stricture, located 3 cm proximal to the gastroesophageal junction, spanning 2 5 cm in length    The luminal diameter of the stricture is only about 0 25 cm "  Seen by thoracic surgery in the ED -> tentative plan for EGD w/ esophageal dilatation and possible stenting tomorrow  PRN pain control - supportive care  Clear liquids today - NPO after midnight

## 2021-01-25 NOTE — ANESTHESIA PREPROCEDURE EVALUATION
Procedure:  ESOPHAGOGASTRODUODENOSCOPY (EGD), ESOPHAGEAL DILATION, POSSIBLE STENT PLACEMENT (N/A Esophagus)    2 5 cm long esophgeal stricture 3 cm from GE junction  Prior hx of esophgral cancer and prior stents placed  MONAE EF 60% with G1dd  ECG: NSR with 1 degree AV block    Prior airway Mac 3 G1v with 8 0 ETT    Denies CP/SOB with exertion, COPD, asthma, STEPHANIE, DM, stroke/TIA, seizure     Has essential tremor    Relevant Problems   CARDIO   (+) Essential hypertension      GI/HEPATIC   (+) Dysphagia   (+) Esophageal cancer    (+) GERD (gastroesophageal reflux disease)      /RENAL   (+) Chronic kidney disease stage 3        Physical Exam    Airway    Mallampati score: I  TM Distance: >3 FB  Neck ROM: full     Dental   upper dentures and lower dentures,     Cardiovascular      Pulmonary      Other Findings        Anesthesia Plan  ASA Score- 3     Anesthesia Type- general with ASA Monitors  Additional Monitors:   Airway Plan: ETT  Plan Factors-Exercise tolerance (METS): >4 METS  Chart reviewed  EKG reviewed  Existing labs reviewed  Patient summary reviewed  Patient is not a current smoker  Induction- intravenous and rapid sequence induction  Postoperative Plan-     Informed Consent- Anesthetic plan and risks discussed with patient  I personally reviewed this patient with the CRNA  Discussed and agreed on the Anesthesia Plan with the CRNA  Priyank Robles

## 2021-01-25 NOTE — ED ATTENDING ATTESTATION
1/25/2021  IEran DO, saw and evaluated the patient  I have discussed the patient with the resident/non-physician practitioner and agree with the resident's/non-physician practitioner's findings, Plan of Care, and MDM as documented in the resident's/non-physician practitioner's note, except where noted  All available labs and Radiology studies were reviewed  I was present for key portions of any procedure(s) performed by the resident/non-physician practitioner and I was immediately available to provide assistance  At this point I agree with the current assessment done in the Emergency Department  I have conducted an independent evaluation of this patient a history and physical is as follows:    65yo male presents with difficulty swallowing  Pt has h/o esophageal cancer  Has h/o dysphagia but it is getting worse and now only able to swallow liquids  No fevers, no n/v  On exam - nad, heart reg, no resp distress    Plan - wife requesting thoracic surgery consult and will also check electrolytes    ED Course         Critical Care Time  Procedures

## 2021-01-25 NOTE — ASSESSMENT & PLAN NOTE
Continue ASA/statin  Follows outpatient vascular surgery - last carotid duplex on 11/2020 revealed < 50% stenosis on right ICA and 50-69% stenosis on left ICA

## 2021-01-25 NOTE — H&P
History & Physical - [facility] Internal Medicine  Patient: Faith Zimmer 65 y o  male MRN: [id number]  Unit/Bed#: ED 25 Encounter: [id number]  Primary Care Provider: Tapan Rodriguez DO  Date & Time of Admission: 1/25/2021  5:37 AM        Assessment & Plan:    * Esophageal stricture  Assessment & Plan  With associated esophageal cancer  Underwent barium swallow today which revealed "severe distal esophageal stricture, located 3 cm proximal to the gastroesophageal junction, spanning 2 5 cm in length    The luminal diameter of the stricture is only about 0 25 cm "  Seen by thoracic surgery in the ED -> tentative plan for EGD w/ esophageal dilatation and possible stenting tomorrow  PRN pain control - supportive care  Clear liquids today - NPO after midnight    Esophageal cancer   Assessment & Plan  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 s/p chemotherapy (FOLFOX) and currently on Trastuzumab (HER-2 positive disease)  Outpatient follow-up with oncology  PRN pain control - supportive care otherwise    Chronic kidney disease stage 3  Assessment & Plan  Baseline creatinine of approximately 1 2-1 3 - presents @ 1 35  Monitor renal function and urine output - limit/avoid nephrotoxins as possible    Essential hypertension  Assessment & Plan  Continue Lopressor/Norvasc  PRN IV Hydralazine on board for BP spikes  PRN pain control    Carotid stenosis  Assessment & Plan  Continue ASA/statin  Follows outpatient vascular surgery - last carotid duplex on 11/2020 revealed < 50% stenosis on right ICA and 50-69% stenosis on left ICA    PAD (peripheral artery disease)  Assessment & Plan  Follows outpatient vascular surgery  LEADs on 11/2020 revealed "High grade stenosis versus occlusion in the mid superficial femoral artery with  reconstitution at the distal superficial femoral artery" on the RLE and "Diffuse disease throughout the femoral and popliteal arteries without evidence of a focal stenosis" on the LLE  Continue ASA/statin    GERD (gastroesophageal reflux disease)  Assessment & Plan  Continue PPI      DVT Prophylaxis: Heparin SC    Code Status:  DNR/DNI    Discussion with:  Patient, and wife, at bedside    Anticipated Length of Stay:  Patient will be admitted on an Inpatient basis with an anticipated length of stay of greater than 2 midnights  Justification for Hospital Stay: Esophageal stricture in the setting of esophageal cancer requiring thoracic surgery evaluation for tentative EGD with esophageal dilatation/stenting  Which revealed    Total Time for Visit, including Counseling / Coordination of Care: 72 minutes  Greater than 50% of this total time spent on direct patient counseling and coordination of care  Chief Complaint:  Difficulty swallowing      History of Present Illness:    Javi Reeves is a 65 y o  male who presents with complaints of difficulty swallowing food over the last few days  Ability to swallow liquids is present however  His past medical history is notable for esophageal cancer status post chemotherapy and current Trastuzumab monotherapy  He underwent a video barium swallow earlier today which revealed a severe distal esophageal stricture  He is tentatively planned for EGD tomorrow per thoracic surgery for an attempt at esophageal dilatation and possible stenting  At the time my encounter, he is resting bed fairly comfortably  Denies any pain at this time  States that prior to arrival, he did have intermittent episodes of nausea  His wife is present bedside during my encounter  Overall, he remains in positive and pleasant spirits  Review of Systems:    Review of Systems - A thorough 12 point review systems was conducted  Pertinent positives and negatives are mentioned in the history of present illness        Past Medical and Surgical History:     Past Medical History:   Diagnosis Date    Anxiety     Cancer [facility])     Esophageal    Dysphagia     Esophageal abnormality     Esophageal cancer (HCC)     GERD (gastroesophageal reflux disease)     Hyperlipidemia     Hypertension     Occasional tremors        Past Surgical History:   Procedure Laterality Date    ESOPHAGOGASTRODUODENOSCOPY N/A 8/9/2017    Procedure: ESOPHAGOGASTRODUODENOSCOPY (EGD); Surgeon: Tommy Swan MD;  [facility]; Service: Gastroenterology    ESOPHAGOGASTRODUODENOSCOPY N/A 8/11/2017    Procedure: ESOPHAGOGASTRODUODENOSCOPY (EGD) w/ stent;  Surgeon: Tommy Swan MD;  [facility]; Service: Gastroenterology    LAPAROTOMY N/A 2/11/2020    Procedure: EGD; REMOVAL OF ESOPHAGEAL STENTS X 3;  Surgeon: Clayton Musa MD;  [facility];  Service: Thoracic    PORTACATH PLACEMENT      PORTACATH PLACEMENT      UPPER GASTROINTESTINAL ENDOSCOPY      VASCULAR SURGERY  2008    blockage in neck          Medications & Allergies:    Prior to Admission medications    Medication Sig Start Date End Date Taking? Authorizing Provider   amLODIPine (NORVASC) 5 mg tablet Take 5 mg by mouth daily   Yes Historical Provider, MD   aspirin 81 mg chewable tablet Chew 81 mg daily   Yes Historical Provider, MD   LORazepam (ATIVAN) 0 5 mg tablet Take 1 tablet (0 5 mg total) by mouth 2 (two) times a day as needed for anxiety 10/6/20  Yes Flaco Agosto MD   metoprolol tartrate (LOPRESSOR) 50 mg tablet Take 50 mg by mouth 2 (two) times a day   Yes Historical Provider, MD   Multiple Vitamin (MULTIVITAMIN) tablet Take 1 tablet by mouth daily   Yes Historical Provider, MD   pantoprazole (PROTONIX) 40 mg tablet Take 1 tablet by mouth daily in the early morning 8/12/17  Yes Wyvonne Shone, MD   simvastatin (ZOCOR) 40 mg tablet Take 40 mg by mouth daily at bedtime   Yes Historical Provider, MD         Allergies:    Allergies   Allergen Reactions    Other      Cat Dander         Social History:    Substance Use History:   Social History     Substance and Sexual Activity Alcohol Use Not Currently    Frequency: Monthly or less     Social History     Tobacco Use   Smoking Status Former Smoker    Packs/day: 1 00    Years: 30 00    Pack years: 30 00    Quit date: 2008    Years since quittin 3   Smokeless Tobacco Never Used     Social History     Substance and Sexual Activity   Drug Use Yes    Types: Marijuana    Comment: has medical card for this         Family History:    Significant for cerebral hemorrhaging in mother, and for hypertension and heart disease in father        Physical Exam:     Vitals:   Blood Pressure: 119/59 (21 0902)  Pulse: 61 (21)  Temperature: 97 8 °F (36 6 °C) (21 0651)  Temp Source: Oral (21 4334)  Respirations: 18 (21)  Weight - Scale: 91 6 kg (201 lb 15 1 oz) (21 0542)  SpO2: 98 % (21)      GENERAL:  Well-developed/nourished - no immediate distress at rest  HEAD:  Normocephalic - atraumatic  EYES: PERRL - EOMI   MOUTH:  Mucosa moist  NECK:  Supple - full range of motion - no adenopathy  CARDIAC:  Rate control - S1/S2 positive  PULMONARY:  Clear breath sounds bilaterally - nonlabored respirations  ABDOMEN:  Soft - nontender/nondistended - active bowel sounds  MUSCULOSKELETAL:  Motor strength/range of motion intact  NEUROLOGIC:  Alert/oriented at baseline  SKIN:  Chronic wrinkles/blemishes   PSYCHIATRIC:  Mood/affect stable      Additional Data:     Labs & Recent Cultures:    Results from last 7 days   Lab Units 21  0648   WBC Thousand/uL 6 98   HEMOGLOBIN g/dL 14 7   HEMATOCRIT % 43 5   PLATELETS Thousands/uL 223   NEUTROS PCT % 54   LYMPHS PCT % 32   MONOS PCT % 9   EOS PCT % 4     Results from last 7 days   Lab Units 21  0648   SODIUM mmol/L 141   POTASSIUM mmol/L 4 2   CHLORIDE mmol/L 108   CO2 mmol/L 27   BUN mg/dL 20   CREATININE mg/dL 1 35*   ANION GAP mmol/L 6   CALCIUM mg/dL 9 4   GLUCOSE RANDOM mg/dL 148*                   Imaging:     Fl Barium Swallow    Result Date: 1/25/2021  Narrative: CONTRAST SWALLOW-ESOPHAGRAM INDICATION:   esophageal stricture  Patient has history of distal esophageal cancer, treated with chemotherapy  Over the last few days, patient describes worsening dysphagia with difficulty swallowing solid foods  This swallow study was performed to evaluate for possible stricture  COMPARISON:  Chest, abdomen, and pelvic CT from 7/6/2020, and upper GI series from 2/11/2020  IMAGES:  25 FLUOROSCOPY TIME:   1 minute 14 seconds  TECHNIQUE: Patient was initially given 150 mL of Omnipaque 350 to swallow  When no gross leak was visualized, we switched over to the Barium E--Paque  Patient swallowed about 150 mL  FINDINGS: There is a severe distal esophageal stricture, located 3 cm proximal to the gastroesophageal junction, spanning 2 5 cm in length  The luminal diameter of the stricture is only about 0 25 cm (Image 23 ) There is no evidence of esophageal leak  Impression: There is a severe distal esophageal stricture, located 3 cm proximal to the gastroesophageal junction, spanning 2 5 cm in length  The luminal diameter of the stricture is only about 0 25 cm (Image 23 ) Workstation performed: [id number]                     ** Please Note: This note is constructed using a voice recognition dictation system  An occasional wrong word/phrase or sound-a-like substitution may have been picked up by dictation device due to the inherent limitations of voice recognition software  Read the chart carefully and recognize, using reasonable context, where substitutions may have occurred  **

## 2021-01-25 NOTE — ED PROVIDER NOTES
History  Chief Complaint   Patient presents with    Difficulty Swallowing     Pt has hx of esophageal CA and has not been able to swallow any food for the past few days  Able to drink liquids still  HPI   33-year-old with history of esophageal cancer gentleman presents to the emergency department for dysphagia  Patient has a history of dysphagia to solids, and it has been getting progressively worse  His wife says that over the weekend patient progressed to the point where he was essentially not able to swallow foods at all  He has been subsisting on an almost entirely liquid diet, protein shakes, fluids  Patient has previously had esophageal stents that unfortunately migrated into his stomach and had to be removed endoscopically by Dr Shira Iyer  The patient also follows with GI as well as Oncology  He takes Herceptin, no radiation therapy or chemotherapy currently  Patient has not been febrile  He has no throat pain  He has no nausea or vomiting  No fever  No weight loss  Patient and wife requesting evaluation by thoracic team     Prior to Admission Medications   Prescriptions Last Dose Informant Patient Reported? Taking?    LORazepam (ATIVAN) 0 5 mg tablet 1/24/2021 at Unknown time Self No Yes   Sig: Take 1 tablet (0 5 mg total) by mouth 2 (two) times a day as needed for anxiety   Multiple Vitamin (MULTIVITAMIN) tablet 1/24/2021 at Unknown time Self Yes Yes   Sig: Take 1 tablet by mouth daily   amLODIPine (NORVASC) 5 mg tablet 1/24/2021 at Unknown time Self Yes Yes   Sig: Take 5 mg by mouth daily   aspirin 81 mg chewable tablet 1/24/2021 at Unknown time Self Yes Yes   Sig: Chew 81 mg daily   metoprolol tartrate (LOPRESSOR) 50 mg tablet 1/24/2021 at Unknown time Self Yes Yes   Sig: Take 50 mg by mouth 2 (two) times a day   pantoprazole (PROTONIX) 40 mg tablet 1/24/2021 at Unknown time Self No Yes   Sig: Take 1 tablet by mouth daily in the early morning   simvastatin (ZOCOR) 40 mg tablet 2021 at Unknown time Self Yes Yes   Sig: Take 40 mg by mouth daily at bedtime      Facility-Administered Medications: None       Past Medical History:   Diagnosis Date    Anxiety     Cancer ([facility] [address] )     Esophageal    Dysphagia     Esophageal abnormality     Esophageal cancer (HCC)     GERD (gastroesophageal reflux disease)     Hyperlipidemia     Hypertension     Occasional tremors        Past Surgical History:   Procedure Laterality Date    ESOPHAGOGASTRODUODENOSCOPY N/A 2017    Procedure: ESOPHAGOGASTRODUODENOSCOPY (EGD); Surgeon: Celso Brady MD;  [facility]; Service: Gastroenterology    ESOPHAGOGASTRODUODENOSCOPY N/A 2017    Procedure: ESOPHAGOGASTRODUODENOSCOPY (EGD) w/ stent;  Surgeon: Celso Brady MD;  [facility]; Service: Gastroenterology    LAPAROTOMY N/A 2020    Procedure: EGD; REMOVAL OF ESOPHAGEAL STENTS X 3;  Surgeon: Valerio Hodgson MD;  [facility];  Service: Thoracic    PORTACATH PLACEMENT      PORTACATH PLACEMENT      UPPER GASTROINTESTINAL ENDOSCOPY      VASCULAR SURGERY      blockage in neck        History reviewed  No pertinent family history  I have reviewed and agree with the history as documented  E-Cigarette/Vaping    E-Cigarette Use Never User      E-Cigarette/Vaping Substances     Social History     Tobacco Use    Smoking status: Former Smoker     Packs/day: 1 00     Years: 30 00     Pack years: 30 00     Quit date: 2008     Years since quittin 3    Smokeless tobacco: Never Used   Substance Use Topics    Alcohol use: Not Currently     Frequency: Monthly or less    Drug use: Yes     Types: Marijuana     Comment: has medical card for this        Review of Systems   Constitutional: Negative for chills and fever  HENT: Positive for trouble swallowing  Negative for sore throat  Respiratory: Negative for shortness of breath  Cardiovascular: Negative for chest pain     Gastrointestinal: Negative for abdominal pain, nausea and vomiting  All other systems reviewed and are negative  Physical Exam  ED Triage Vitals   Temperature Pulse Respirations Blood Pressure SpO2   01/25/21 0651 01/25/21 0542 01/25/21 0542 01/25/21 0542 01/25/21 0542   97 8 °F (36 6 °C) 73 18 169/76 99 %      Temp Source Heart Rate Source Patient Position - Orthostatic VS BP Location FiO2 (%)   01/25/21 0651 01/25/21 0542 01/25/21 0542 01/25/21 0542 --   Oral Monitor Lying Right arm       Pain Score       01/25/21 1356       No Pain             Orthostatic Vital Signs  Vitals:    01/25/21 0902 01/25/21 1356 01/25/21 1630 01/25/21 2235   BP: 119/59 151/70 113/78 112/57   Pulse: 61 76 74 61   Patient Position - Orthostatic VS: Lying          Physical Exam  Vitals signs and nursing note reviewed  Constitutional:       General: He is not in acute distress  Appearance: He is well-developed  He is not diaphoretic  HENT:      Head: Normocephalic and atraumatic  Eyes:      General: No scleral icterus  Conjunctiva/sclera: Conjunctivae normal       Pupils: Pupils are equal, round, and reactive to light  Neck:      Musculoskeletal: Normal range of motion and neck supple  Cardiovascular:      Rate and Rhythm: Normal rate and regular rhythm  Heart sounds: No murmur  No friction rub  No gallop  Pulmonary:      Breath sounds: Normal breath sounds  No wheezing or rales  Abdominal:      General: There is no distension  Palpations: Abdomen is soft  Tenderness: There is no abdominal tenderness  There is no guarding or rebound  Musculoskeletal: Normal range of motion  General: No tenderness  Skin:     General: Skin is warm and dry  Coloration: Skin is not pale  Findings: No erythema  Neurological:      Mental Status: He is alert and oriented to person, place, and time  Cranial Nerves: No cranial nerve deficit  Sensory: No sensory deficit  Motor: No abnormal muscle tone  Psychiatric:         Behavior: Behavior normal          ED Medications  Medications   lactated ringers infusion (100 mL/hr Intravenous New Bag 1/25/21 1058)   amLODIPine (NORVASC) tablet 5 mg (has no administration in time range)   aspirin chewable tablet 81 mg (has no administration in time range)   LORazepam (ATIVAN) tablet 0 5 mg (has no administration in time range)   metoprolol tartrate (LOPRESSOR) tablet 50 mg (50 mg Oral Given 1/25/21 1812)   multivitamin stress formula tablet 1 tablet (has no administration in time range)   pantoprazole (PROTONIX) EC tablet 40 mg (has no administration in time range)   pravastatin (PRAVACHOL) tablet 80 mg (80 mg Oral Given 1/25/21 1812)   ondansetron (ZOFRAN) injection 4 mg (has no administration in time range)   acetaminophen (TYLENOL) tablet 650 mg (has no administration in time range)   morphine injection 2 mg (has no administration in time range)   morphine (PF) 4 mg/mL injection 4 mg (has no administration in time range)   HYDROmorphone (DILAUDID) injection 0 5 mg (has no administration in time range)   heparin (porcine) subcutaneous injection 5,000 Units (5,000 Units Subcutaneous Given 1/25/21 2127)   hydrALAZINE (APRESOLINE) injection 5 mg (has no administration in time range)       Diagnostic Studies  Results Reviewed     Procedure Component Value Units Date/Time    Basic metabolic panel [[id number]]  (Abnormal) Collected: 01/25/21 0648    Lab Status: Final result Specimen: Blood from Arm, Right Updated: 01/25/21 0746     Sodium 141 mmol/L      Potassium 4 2 mmol/L      Chloride 108 mmol/L      CO2 27 mmol/L      ANION GAP 6 mmol/L      BUN 20 mg/dL      Creatinine 1 35 mg/dL      Glucose 148 mg/dL      Calcium 9 4 mg/dL      eGFR 52 ml/min/1 73sq m     Narrative:      [place] guidelines for Chronic Kidney Disease (CKD):     Stage 1 with normal or high GFR (GFR > 90 mL/min/1 73 square meters)    Stage 2 Mild CKD (GFR = 60-89 mL/min/1 73 square meters)    Stage 3A Moderate CKD (GFR = 45-59 mL/min/1 73 square meters)    Stage 3B Moderate CKD (GFR = 30-44 mL/min/1 73 square meters)    Stage 4 Severe CKD (GFR = 15-29 mL/min/1 73 square meters)    Stage 5 End Stage CKD (GFR <15 mL/min/1 73 square meters)  Note: GFR calculation is accurate only with a steady state creatinine    CBC and differential [[id number]] Collected: 01/25/21 0648    Lab Status: Final result Specimen: Blood from Arm, Right Updated: 01/25/21 0712     WBC 6 98 Thousand/uL      RBC 4 74 Million/uL      Hemoglobin 14 7 g/dL      Hematocrit 43 5 %      MCV 92 fL      MCH 31 0 pg      MCHC 33 8 g/dL      RDW 12 5 %      MPV 8 9 fL      Platelets 136 Thousands/uL      nRBC 0 /100 WBCs      Neutrophils Relative 54 %      Immat GRANS % 0 %      Lymphocytes Relative 32 %      Monocytes Relative 9 %      Eosinophils Relative 4 %      Basophils Relative 1 %      Neutrophils Absolute 3 80 Thousands/µL      Immature Grans Absolute 0 03 Thousand/uL      Lymphocytes Absolute 2 22 Thousands/µL      Monocytes Absolute 0 61 Thousand/µL      Eosinophils Absolute 0 25 Thousand/µL      Basophils Absolute 0 07 Thousands/µL                  FL barium swallow   Final Result by Janice Holland MD (01/25 1007)      There is a severe distal esophageal stricture, located 3 cm proximal to the gastroesophageal junction, spanning 2 5 cm in length    The luminal diameter of the stricture is only about 0 25 cm (Image 23 )         Workstation performed: [id number]               Procedures  Procedures      ED Course  ED Course as of Jan 25 2239   Mon Jan 25, 2021   8011 Thoracic will come see on rounds and come up with plan, anticipate discharge with outpatient follow-up       0732 chronic   Creatinine(!): 1 80 9931 Pending: esophagram and thoracic consult              MDM  Number of Diagnoses or Management Options  Esophageal cancer [facility]): new and requires workup  Esophageal dysphagia: new and requires workup  Esophageal stricture: new and requires workup     Amount and/or Complexity of Data Reviewed  Clinical lab tests: ordered and reviewed  Tests in the radiology section of CPT®: ordered and reviewed  Tests in the medicine section of CPT®: ordered and reviewed  Decide to obtain previous medical records or to obtain history from someone other than the patient: yes  Review and summarize past medical records: yes  Discuss the patient with other providers: yes  Independent visualization of images, tracings, or specimens: yes    Patient Progress  Patient progress: stable     75-year-old here with esophageal dysphagia in setting of known esophageal cancer  Plan is CBC and BMP given decreased p o  Intake to check electrolytes  Discussed with Dr Jesus Aguilar on thoracic surgery  Thoracic surgery team will see patient during rounds and come up with a plan  Thoracic surgery requesting barium esophagram   This revealed a severe stricture in the distal esophagus  Patient to be admitted medically with plan for esophageal dilation tomorrow  Disposition  Final diagnoses:   Esophageal dysphagia   Esophageal cancer ([facility] [address] )   Esophageal stricture     Time reflects when diagnosis was documented in both MDM as applicable and the Disposition within this note     Time User Action Codes Description Comment    1/25/2021  6:32 AM Kate Finely Add [R13 10] Esophageal dysphagia     1/25/2021  6:32 AM Kate Finely Add [C15 9] Esophageal cancer ([facility] [address] )     1/25/2021 10:39 PM Kate Finely Add [K22 2] Esophageal stricture       ED Disposition     ED Disposition Condition Date/Time Comment    Admit Stable Mon Jan 25, 2021 10:47 AM Case was discussed with AMADOR and the patient's admission status was agreed to be Admission Status: inpatient status to the service of Dr Brown Reyez           Follow-up Information    None         Current Discharge Medication List      CONTINUE these medications which have NOT CHANGED    Details amLODIPine (NORVASC) 5 mg tablet Take 5 mg by mouth daily      aspirin 81 mg chewable tablet Chew 81 mg daily      LORazepam (ATIVAN) 0 5 mg tablet Take 1 tablet (0 5 mg total) by mouth 2 (two) times a day as needed for anxiety  Qty: 60 tablet, Refills: 0    Associated Diagnoses: Anxiety      metoprolol tartrate (LOPRESSOR) 50 mg tablet Take 50 mg by mouth 2 (two) times a day      Multiple Vitamin (MULTIVITAMIN) tablet Take 1 tablet by mouth daily      pantoprazole (PROTONIX) 40 mg tablet Take 1 tablet by mouth daily in the early morning  Qty: 30 tablet, Refills: 0      simvastatin (ZOCOR) 40 mg tablet Take 40 mg by mouth daily at bedtime           No discharge procedures on file  PDMP Review     None           ED Provider  Attending physically available and evaluated Areli Zandra WILLIAM managed the patient along with the ED Attending      Electronically Signed by         Annie Mcleod MD  01/25/21 6998

## 2021-01-25 NOTE — ASSESSMENT & PLAN NOTE
Follows outpatient vascular surgery  LEADs on 11/2020 revealed "High grade stenosis versus occlusion in the mid superficial femoral artery with  reconstitution at the distal superficial femoral artery" on the RLE and "Diffuse disease throughout the femoral and popliteal arteries without evidence of a focal stenosis" on the LLE  Continue ASA/statin

## 2021-01-25 NOTE — CONSULTS
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, s/p FOLFOX-6 therapy and currently on trastuzumab monotherapy, who presents with progressive dysphagia to solid foods over the past 3 weeks  Plan:  · Will obtain barium swallow study to evaluate for any evidence of esophageal stricture/stenosis  · May ultimately require EGD and esophageal dilation pending results of above  · Should patient require admission, would recommend that he be admitted to medicine service as he is not a surgical candidate given his stage IV esophageal cancer    History of Present Illness   Reason for Consult / Principal Problem: dysphagia i/s/o stage IV esophageal cancer    HPI: Cal Bey is a 65y o  year old male with metastatic adenocarcinoma of distal esophagus with pulmonary and RP lypmh node metastases, s/p FOLFOX-6 therapy and currently on trastuzumab monotherapy, who presents with progressive dysphagia to solids over the past 3 weeks  He has previously had 3 esophageal stents placed at on OSH, all of which migrated into the stomach and were removed endoscopically by Dr Jaylen Lerma in 2/2020  He had been doing well since then  About 3 weeks ago, he began to develop progressive dysphagia to solids, which became worse in the past week or so  Per his wife who accompanied him to the ED, he has also had a small amount of regurgitation of undigested food recently  He is able to tolerate liquids, although endorses very mild dysphagia with liquids in the past week as well  He has not had any recent weight loss  He has no other complaints currently      Inpatient consult to Thoracic Surgery  Consult performed by: Reva Mahajan MD  Consult ordered by: Jomar Hernandes MD          Review of Systems   HENT: Positive for trouble swallowing  All other systems reviewed and are negative  Historical Information   Past Medical History:   Diagnosis Date    Anxiety     Cancer [facility])     Esophageal    Dysphagia     Esophageal abnormality     Esophageal cancer (HCC)     GERD (gastroesophageal reflux disease)     Hyperlipidemia     Hypertension     Occasional tremors      Past Surgical History:   Procedure Laterality Date    ESOPHAGOGASTRODUODENOSCOPY N/A 2017    Procedure: ESOPHAGOGASTRODUODENOSCOPY (EGD); Surgeon: Alcon Ayala MD;  Location: [facility]; Service: Gastroenterology    ESOPHAGOGASTRODUODENOSCOPY N/A 2017    Procedure: ESOPHAGOGASTRODUODENOSCOPY (EGD) w/ stent;  Surgeon: Alcon Ayala MD;  Location: [facility]; Service: Gastroenterology    LAPAROTOMY N/A 2020    Procedure: EGD; REMOVAL OF ESOPHAGEAL STENTS X 3;  Surgeon: Anabella Genao MD;  [facility];  Service: Thoracic    PORTACATH PLACEMENT      PORTACATH PLACEMENT      UPPER GASTROINTESTINAL ENDOSCOPY      VASCULAR SURGERY      blockage in neck      Social History     Substance and Sexual Activity   Alcohol Use Not Currently    Frequency: Monthly or less     Social History     Substance and Sexual Activity   Drug Use Yes    Types: Marijuana    Comment: has medical card for this     E-Cigarette/Vaping    E-Cigarette Use Never User      E-Cigarette/Vaping Substances     Social History     Tobacco Use   Smoking Status Former Smoker    Packs/day: 1 00    Years: 30 00    Pack years: 30 00    Quit date: 2008    Years since quittin 3   Smokeless Tobacco Never Used     Family History: History reviewed  No pertinent family history  Meds/Allergies   all current active meds have been reviewed and PTA meds:   Prior to Admission Medications   Prescriptions Last Dose Informant Patient Reported? Taking?    LORazepam (ATIVAN) 0 5 mg tablet 2021 at Unknown time Self No Yes   Sig: Take 1 tablet (0 5 mg total) by mouth 2 (two) times a day as needed for anxiety   Multiple Vitamin (MULTIVITAMIN) tablet 1/24/2021 at Unknown time Self Yes Yes   Sig: Take 1 tablet by mouth daily   amLODIPine (NORVASC) 5 mg tablet 1/24/2021 at Unknown time Self Yes Yes   Sig: Take 5 mg by mouth daily   aspirin 81 mg chewable tablet 1/24/2021 at Unknown time Self Yes Yes   Sig: Chew 81 mg daily   metoprolol tartrate (LOPRESSOR) 50 mg tablet 1/24/2021 at Unknown time Self Yes Yes   Sig: Take 50 mg by mouth 2 (two) times a day   pantoprazole (PROTONIX) 40 mg tablet 1/24/2021 at Unknown time Self No Yes   Sig: Take 1 tablet by mouth daily in the early morning   simvastatin (ZOCOR) 40 mg tablet 1/24/2021 at Unknown time Self Yes Yes   Sig: Take 40 mg by mouth daily at bedtime      Facility-Administered Medications: None     Allergies   Allergen Reactions    Other      Cat Dander       Objective   Vitals: Blood pressure 169/76, pulse 70, temperature 97 8 °F (36 6 °C), temperature source Oral, resp  rate 18, weight 91 6 kg (201 lb 15 1 oz), SpO2 98 %  Invasive Devices     Central Venous Catheter Line            Port A Cath 08/28/17 Right Chest 1246 days          Peripheral Intravenous Line            Peripheral IV 01/25/21 Right Arm less than 1 day                Physical Exam  Vitals signs reviewed  Constitutional:       General: He is not in acute distress  Appearance: He is well-developed  He is not diaphoretic  HENT:      Head: Normocephalic and atraumatic  Mouth/Throat:      Mouth: Mucous membranes are moist       Pharynx: Oropharynx is clear  Eyes:      Extraocular Movements: Extraocular movements intact  Neck:      Musculoskeletal: Normal range of motion  Trachea: No tracheal deviation  Cardiovascular:      Rate and Rhythm: Normal rate and regular rhythm  Pulmonary:      Effort: Pulmonary effort is normal  No respiratory distress  Abdominal:      General: There is no distension        Palpations: Abdomen is soft  Tenderness: There is no abdominal tenderness  Musculoskeletal: Normal range of motion  General: No deformity  Skin:     General: Skin is warm and dry  Neurological:      Mental Status: He is alert and oriented to person, place, and time  Lab Results:   I have personally reviewed pertinent reports  , CBC:   Lab Results   Component Value Date    WBC 6 98 01/25/2021    HGB 14 7 01/25/2021    HCT 43 5 01/25/2021    MCV 92 01/25/2021     01/25/2021    MCH 31 0 01/25/2021    MCHC 33 8 01/25/2021    RDW 12 5 01/25/2021    MPV 8 9 01/25/2021    NRBC 0 01/25/2021   , CMP:   Lab Results   Component Value Date    SODIUM 141 01/25/2021     01/25/2021    CO2 27 01/25/2021    BUN 20 01/25/2021    CREATININE 1 35 (H) 01/25/2021    CALCIUM 9 4 01/25/2021    EGFR 52 01/25/2021     Imaging Studies: I have personally reviewed pertinent reports  EKG, Pathology, and Other Studies: I have personally reviewed pertinent reports          Code Status: Prior  Advance Directive and Living Will:      Power of :    POLST:

## 2021-01-25 NOTE — ASSESSMENT & PLAN NOTE
Baseline creatinine of approximately 1 2-1 3 - presents @ 1 35  Monitor renal function and urine output - limit/avoid nephrotoxins as possible

## 2021-01-25 NOTE — TREATMENT PLAN
Barium swallow study demonstrated a severe distal esophageal stricture 2 5 cm in length and located 3 cm proximal to GEJ, with luminal diameter of approximately 0 25 cm  As such, will plan for EGD with esophageal dilation and possible stent placement in OR on Tuesday 1/26/21  Please keep patient NPO after midnight  Okay for clear liquid diet until that time      Shanell Sheffield MD

## 2021-01-25 NOTE — ASSESSMENT & PLAN NOTE
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 s/p chemotherapy (FOLFOX) and currently on Trastuzumab (HER-2 positive disease)  Outpatient follow-up with oncology  PRN pain control - supportive care otherwise

## 2021-01-26 NOTE — ASSESSMENT & PLAN NOTE
With associated esophageal cancer  Underwent barium swallow today which revealed "severe distal esophageal stricture, located 3 cm proximal to the gastroesophageal junction, spanning 2 5 cm in length    The luminal diameter of the stricture is only about 0 25 cm "  Seen by thoracic surgery in the ED -> tentative plan for EGD w/ esophageal dilatation and possible stenting tomorrow  PRN pain control - supportive care  Went to OR for EGD today

## 2021-01-26 NOTE — UTILIZATION REVIEW
Notification of Inpatient Admission/Inpatient Authorization Request   This is a Notification of Inpatient Admission for [address]  Be advised that this patient was admitted to our facility under Inpatient Status  Contact Jody Sim at [phone] for additional admission information  [facility]  DEDICATED -[phone]  Patient Name:   Bernie Morales   YOB: 1950       [address]   [address]:   [address]  Tax ID: [id number]  NPI: [id number] Attending Provider/NPI:  Phone:  Address: Dano Cox [[id number]]   [phone]  Same as Facility   Place of Service Code: 24 Place of Service Name:  [address] [place] [address]   Start Date: 1/25/21 1047 Discharge Date & Time: No discharge date for patient encounter  Type of Admission: Inpatient Status Discharge Disposition (if discharged): Home/Self Care   Patient Diagnoses: Esophageal dysphagia [R13 10]  Esophageal cancer ([facility] [address] ) [C15 9]  History of throat problem [Z87 09]     Orders: Admission Orders (From admission, onward)     Ordered        01/25/21 1047  Inpatient Admission  Once                    Assigned Utilization Review Contact: Jody Sim  Utilization   Network Utilization Review Department  Phone: [phone]; Fax [phone]  Email: [email or web address]   ATTENTION PAYERS: Please call the assigned Utilization  directly with any questions or concerns ALL voicemails in the department are confidential  Send all requests for admission clinical reviews, approved or denied determinations and any other requests to dedicated fax number belonging to the campus where the patient is receiving treatment

## 2021-01-26 NOTE — ANESTHESIA POSTPROCEDURE EVALUATION
Post-Op Assessment Note    CV Status:  Stable    Pain management: adequate     Mental Status:  Alert and awake   Hydration Status:  Euvolemic   PONV Controlled:  Controlled   Airway Patency:  Patent  Airway: intubated   Two or more mitigation strategies used for obstructive sleep apnea   Post Op Vitals Reviewed: Yes      Staff: Anesthesiologist, CRNA         No complications documented      BP   178/89   Temp   97 1   Pulse  86   Resp   16   SpO2   100%

## 2021-01-26 NOTE — PERIOPERATIVE NURSING NOTE
CXR done  Dr Reese Hylton contacted about bp of   Pt had Lopressor and Norvasc this am   Has Hydralazine prn on floor and lopressor ordered this evening  Ordered 10mg Labatelol

## 2021-01-26 NOTE — UTILIZATION REVIEW
Initial Clinical Review    Admission: Date/Time/Statement:   Admission Orders (From admission, onward)     Ordered        01/25/21 1047  Inpatient Admission  Once                   Orders Placed This Encounter   Procedures    Inpatient Admission     Standing Status:   Standing     Number of Occurrences:   1     Order Specific Question:   Level of Care     Answer:   Med Surg [16]     Order Specific Question:   Estimated length of stay     Answer:   More than 2 Midnights     Order Specific Question:   Certification     Answer:   I certify that inpatient services are medically necessary for this patient for a duration of greater than two midnights  See H&P and MD Progress Notes for additional information about the patient's course of treatment  ED Arrival Information     Expected Arrival Acuity Means of Arrival Escorted By Service Admission Type    - 1/25/2021 05:24 Urgent Walk-In Family Member Hospitalist Urgent    Arrival Complaint    Throat problem        Chief Complaint   Patient presents with    Difficulty Swallowing     Pt has hx of esophageal CA and has not been able to swallow any food for the past few days  Able to drink liquids still  Assessment/Plan:   Mr Adilson Mccallum is a 66 yo male who presents to the ED from home with c/o difficulty swallowing food in last several days  He can swallow liquids  PMH: esophageal cancer s/p chemotherapy and current Trastuzumab monotherapy, CKD stage 3, HTN, carotid stenosis, PAD, GERD  He had Barium swallow earlier in the day which showed severe distal esophageal stricture  He has no c/o pain    He is admitted to INPATIENT status with Esophageal Stricture with associated Esophageal CA  - plan for EGD 1/26 with Thoracic surgery with planned attempt at esophageal dilatation and possible stenting, PRN analgesia, clear liquid diet, NPO p MN       1/25 Thoracic Surgery Consult - stage IV esophageal cancer status post 12 cycles of FOLFOX now on Herceptin who presents with worsening dysphagia to solid foods and and a long segment stenosis in the distal esophagus found on barium swallow  He had a previous history of esophageal stricture status post stenting  I personally removed 3 separate stents on 02/11/2020  He notes that his swallowing has worsened over the past few months  Fortunately his weight has remained stable - diagnostic EGD, if mass then biopsy, otherwise dilation and possible stent       +++++++++++++++++++++++++++++  1/26 OPERATIVE NOTE    Preop Diagnosis:  Esophageal dysphagia [R13 10]     Post-Op Diagnosis Codes:     * Esophageal dysphagia [R13 10]     Procedure(s) (LRB):  ESOPHAGOGASTRODUODENOSCOPY (EGD), BIOPSY, ESOPHAGEAL DILATION,  STENT PLACEMENT (N/A)  1  EGD, esophageal and gastric biopsy, esophageal dilation and esophageal stent placement using fluoroscopy     Anesthesia Type:   General     Operative Findings:  Irregular distal esophagus starting from 31 cm from the lips to 36 cm from the lips  Initially unable to pass through this area  Mucosa the had masslike appearance  Biopsied at the esophagus and multiple locations were sent for pathology  Dilated from 29-43 [place]  Afterwards able to get past the distal esophagus  Duodenum appeared normal   There was a gastric mass in the antrum that was also biopsied  Retroflexion view showed no mass around the GE junction    GE junction located at 37 cm from the lips     +++++++++++++++++++++++++++++++     ED Triage Vitals   Temperature Pulse Respirations Blood Pressure SpO2   01/25/21 0651 01/25/21 0542 01/25/21 0542 01/25/21 0542 01/25/21 0542   97 8 °F (36 6 °C) 73 18 169/76 99 %      Temp Source Heart Rate Source Patient Position - Orthostatic VS BP Location FiO2 (%)   01/25/21 0651 01/25/21 0542 01/25/21 0542 01/25/21 0542 --   Oral Monitor Lying Right arm       Pain Score       01/25/21 1356       No Pain          Wt Readings from Last 1 Encounters:   01/25/21 91 6 kg (201 lb 15 1 oz)     Additional Vital Signs:     01/26/21 09:57:21  --  71  --  112/65  81  94 %  --  --   01/26/21 08:01:10  98 3 °F (36 8 °C)  65  18  119/86  97  94 %  --  --   01/25/21 22:35:53  97 6 °F (36 4 °C)  61  17  112/57  75  94 %  --  --   01/25/21 1648  --  --  --  --  --  --  None (Room air)  --   01/25/21 16:30:07  97 9 °F (36 6 °C)  74  17  113/78  90  96 %  --  --   01/25/21 1356  --  76  18  151/70  --  96 %  None (Room air)  --   01/25/21 0902  --  61  18  119/59  --  98 %  None (Room air)  Lying   01/25/21 0651  97 8 °F (36 6 °C)  --  --  --  --  --  --  --   01/25/21 0545  --  70  18  169/76  109  98 %  None (Room air)  Lying     Pertinent Labs/Diagnostic Test Results:     1/25 Barium Swallow - There is a severe distal esophageal stricture, located 3 cm proximal to the gastroesophageal junction, spanning 2 5 cm in length  The luminal diameter of the stricture is only about 0 25 cm        Results from last 7 days   Lab Units 01/25/21  0648   WBC Thousand/uL 6 98   HEMOGLOBIN g/dL 14 7   HEMATOCRIT % 43 5   PLATELETS Thousands/uL 223   NEUTROS ABS Thousands/µL 3 80     Results from last 7 days   Lab Units 01/25/21  0648   SODIUM mmol/L 141   POTASSIUM mmol/L 4 2   CHLORIDE mmol/L 108   CO2 mmol/L 27   ANION GAP mmol/L 6   BUN mg/dL 20   CREATININE mg/dL 1 35*   EGFR ml/min/1 73sq m 52   CALCIUM mg/dL 9 4     Results from last 7 days   Lab Units 01/25/21  0648   GLUCOSE RANDOM mg/dL 148*     ED Treatment:   Medication Administration from 01/25/2021 0521 to 01/25/2021 1549    Date/Time Order Dose Route Action   01/25/2021 1058 lactated ringers infusion 100 mL/hr Intravenous New Bag        Past Medical History:   Diagnosis Date    Anxiety     Cancer [facility])     Esophageal    Dysphagia     Esophageal abnormality     Esophageal cancer (HCC)     GERD (gastroesophageal reflux disease)     Hyperlipidemia     Hypertension     Occasional tremors      Present on Admission:   Essential hypertension   GERD (gastroesophageal reflux disease)    Admitting Diagnosis: Esophageal dysphagia [R13 10]  Esophageal cancer (HCC) [C15 9]  History of throat problem [Z87 09]     Age/Sex: 65 y o  male     Admission Orders:    Scheduled Medications:  amLODIPine, 5 mg, Oral, Daily  aspirin, 81 mg, Oral, Daily  heparin (porcine), 5,000 Units, Subcutaneous, Q8H SENG  metoprolol tartrate, 50 mg, Oral, BID  multivitamin stress formula, 1 tablet, Oral, Daily  pantoprazole, 40 mg, Oral, Early Morning  pravastatin, 80 mg, Oral, Daily With Dinner    Continuous IV Infusions:  lactated ringers, 100 mL/hr, Intravenous, Continuous      PRN Meds:  acetaminophen, 650 mg, Oral, Q6H PRN  hydrALAZINE, 5 mg, Intravenous, Q6H PRN  HYDROmorphone, 0 5 mg, Intravenous, Q3H PRN  LORazepam, 0 5 mg, Oral, BID PRN  morphine injection, 4 mg, Intravenous, Q4H PRN  morphine injection, 2 mg, Intravenous, Q4H PRN  ondansetron, 4 mg, Intravenous, Q4H PRN    SCDs  Continue IV fluids  NPO   IP CONSULT TO THORACIC SURGERY  IP CONSULT TO CASE MANAGEMENT    Network Utilization Review Department  ATTENTION: Please call with any questions or concerns to [phone] and carefully listen to the prompts so that you are directed to the right person  All voicemails are confidential   Vianney Mcfarland all requests for admission clinical reviews, approved or denied determinations and any other requests to dedicated fax number below belonging to the campus where the patient is receiving treatment   List of dedicated fax numbers for the Facilities:  [address] DENIALS (Administrative/Medical Necessity) [phone]   [address] (Maternity/NICU/Pediatrics) [phone] [address] Kay Boyle 5138 (Migue Garcia "Kathleen" 103) [address] [facility] Jovani Monge [address] 1481 [address] [place]) [address] [place] [address] 951 [phone]

## 2021-01-26 NOTE — QUICK NOTE
Post-Op Check Note - Thoracic Surgery     S: Patient is s/p EGD with esophageal dilation and stent placement earlier today  Patient tolerated the procedure well without any complications  Post-operatively, patient states he is feeling well  He complains of a mild sore throat and indigestion  O:   Vitals:    01/26/21 1500   BP: 163/81   Pulse: 78   Resp: 12   Temp:    SpO2: 91%       I/O last 3 completed shifts: In: 2125 [P O :240; I V :1885]  Out: 1300 [Urine:1300]  I/O this shift:  In: 600 [I V :600]  Out: 375 [Urine:375]    PE:  NAD, alert and oriented x3  Normocephalic, atraumatic  MMM, EOMI, PERRLA  Norm resp effort on RA   RRR  Abd soft, NT/ND  No calf tenderness or peripheral edema  Motor/sensation intact in distal extremities  CN grossly intact  -rash/lesions      Lab Results   Component Value Date    WBC 6 98 01/25/2021    HGB 14 7 01/25/2021    HCT 43 5 01/25/2021    MCV 92 01/25/2021     01/25/2021     Lab Results   Component Value Date    GLUCOSE 117 08/09/2017    CALCIUM 9 4 01/25/2021    K 4 2 01/25/2021    CO2 27 01/25/2021     01/25/2021    BUN 20 01/25/2021    CREATININE 1 35 (H) 01/25/2021     PACU CXR: no ptx, or pneumomediastinum  Official read pending       A/P: 65 y o  M w/ history of stage IV esophageal cancer, presented with worsening dysphagia and a long segment stenosis in the distal esophagus, now s/p EGD with dilation and stent placement 1/26    - Full liquids   - Lucestefania@[employer]  - OOB/Ambulate   - PT/OT  - SQH/SCDs  - Remainder of care per primary team  - Anticipate that patient will be stable for discharge home from a surgical standpoint tomorrow, 1/27    Aneta Arredondo DO

## 2021-01-26 NOTE — CASE MANAGEMENT
Met with pt, explained CM program/CM role  LOS-1  Bundle-no  Unplanned readmission color-green  30 day readmission-no  Resides with SO in a ranch home, 1 BRET  I PTA for ADL's/ambulation, drives, retired  PCP Michael  Denies HHC/DME/IP Rehab  Denies MH illness, D&A abuse  Primary contact is SO Rubia POPEU-[phone]  No POA/LW  SO will transport home    CM reviewed d/c planning process including the following: identifying help at home, patient preference for d/c planning needs, Discharge Lounge, [facility] Meds to Bed program, availability of treatment team to discuss questions or concerns patient and/or family may have regarding understanding medications and recognizing signs and symptoms once discharged  CM also encouraged patient to follow up with all recommended appointments after discharge  Patient advised of importance for patient and family to participate in managing patients medical well being  Patient/caregiver received discharge checklist  Content reviewed  Patient/caregiver encouraged to participate in discharge plan of care prior to discharge home

## 2021-01-26 NOTE — PROGRESS NOTES
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, s/p FOLFOX-6 therapy and currently on trastuzumab monotherapy, who presents with progressive dysphagia to solid foods over the past 3 weeks found to have severe distal esophageal stricture   -GERD  -CKD stage 3  -essential hypertension  -carotid stenosis  -peripheral artery disease    Plan:  -barium swallow demonstrated severe distal esophageal stricture 2 5 cm in length and located 3 cm proximal to GE junction with luminal diameter approximately 0 25 cm  - OR today for EGD with esophageal dilation and possible stent placement  -if any suspicious mass is seen, will plan on performing biopsies  -NPO, continue IV fluids     Subjective/Objective     Subjective:   Patient seen examined at bedside  No acute events overnight  Tolerated liquids yesterday, now NPO  Denies chest pain, shortness of breath, fever, chills  States that he did not sleep well overnight  Blood pressure 112/57, pulse 61, temperature 97 6 °F (36 4 °C), resp  rate 17, height 5' 7" (1 702 m), weight 91 6 kg (201 lb 15 1 oz), SpO2 94 %  ,Body mass index is 31 63 kg/m²        Intake/Output Summary (Last 24 hours) at 1/26/2021 0606  Last data filed at 1/26/2021 0549  Gross per 24 hour   Intake 2125 ml   Output 1300 ml   Net 825 ml       Invasive Devices     Central Venous Catheter Line            Port A Cath 08/28/17 Right Chest 1246 days          Peripheral Intravenous Line            Peripheral IV 01/25/21 Right Arm less than 1 day                Physical Exam:   General:  No acute distress, resting comfortably on room air  HEENT:  Normocephalic, atraumatic, moist mucous membranes, clear oropharynx  Cardiac:  Regular rate and rhythm, no murmurs  Respiratory:  Clear lungs, no rales/rhonchi/wheezing  Abdomen:  Soft, nontender  Extremities:  No edema    Lab, Imaging and other studies:I have personally reviewed pertinent lab results      VTE Pharmacologic Prophylaxis: Heparin  VTE Mechanical Prophylaxis: sequential compression device

## 2021-01-26 NOTE — PROGRESS NOTES
Progress Note - Jose M Cadena 1950, 65 y o  male MRN: [id number]    Unit/Bed#: OR POOL Encounter: [id number]    Primary Care Provider: Dedra Gutierrez DO   Date and time admitted to hospital: 1/25/2021  5:37 AM        Chronic kidney disease stage 3  Assessment & Plan  Baseline creatinine of approximately 1 2-1 3 - presents @ 1 35  Monitor renal function and urine output - limit/avoid nephrotoxins as possible    PAD (peripheral artery disease)  Assessment & Plan  Follows outpatient vascular surgery  LEADs on 11/2020 revealed "High grade stenosis versus occlusion in the mid superficial femoral artery with  reconstitution at the distal superficial femoral artery" on the RLE and "Diffuse disease throughout the femoral and popliteal arteries without evidence of a focal stenosis" on the LLE  Continue ASA/statin    Esophageal cancer   Assessment & Plan  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 s/p chemotherapy (FOLFOX) and currently on Trastuzumab (HER-2 positive disease)  Outpatient follow-up with oncology  PRN pain control - supportive care otherwise    Carotid stenosis  Assessment & Plan  Continue ASA/statin  Follows outpatient vascular surgery - last carotid duplex on 11/2020 revealed < 50% stenosis on right ICA and 50-69% stenosis on left ICA    Essential hypertension  Assessment & Plan  Continue Lopressor/Norvasc  PRN IV Hydralazine on board for BP spikes  PRN pain control    GERD (gastroesophageal reflux disease)  Assessment & Plan  Continue PPI    * Esophageal stricture  Assessment & Plan  With associated esophageal cancer  Underwent barium swallow today which revealed "severe distal esophageal stricture, located 3 cm proximal to the gastroesophageal junction, spanning 2 5 cm in length    The luminal diameter of the stricture is only about 0 25 cm "  Seen by thoracic surgery in the ED -> tentative plan for EGD w/ esophageal dilatation and possible stenting tomorrow  PRN pain control - supportive care  Went to OR for EGD today         VTE Pharmacologic Prophylaxis:   Pharmacologic: Heparin  Mechanical VTE Prophylaxis in Place: Yes    Patient Centered Rounds: I have performed bedside rounds with nursing staff today  Discussions with Specialists or Other Care Team Provider: thoracic surgery     Education and Discussions with Family / Patient: patient     Time Spent for Care: 45 minutes  More than 50% of total time spent on counseling and coordination of care as described above  Current Length of Stay: 1 day(s)    Current Patient Status: Inpatient   Certification Statement: The patient will continue to require additional inpatient hospital stay due to thoracic surgical intervention if stent placement    Discharge Plan: pending surgical intervention    Code Status: Level 3 - DNAR and DNI      Subjective:   Patient is doing well  No questions at this time  Objective:     Vitals:   Temp (24hrs), Av 8 °F (36 6 °C), Min:97 1 °F (36 2 °C), Max:98 3 °F (36 8 °C)    Temp:  [97 1 °F (36 2 °C)-98 3 °F (36 8 °C)] 98 1 °F (36 7 °C)  HR:  [57-97] 78  Resp:  [11-18] 12  BP: (112-180)/(57-91) 163/81  SpO2:  [91 %-97 %] 91 %  Body mass index is 31 63 kg/m²  Input and Output Summary (last 24 hours): Intake/Output Summary (Last 24 hours) at 2021 1525  Last data filed at 2021 1401  Gross per 24 hour   Intake 2725 ml   Output 1675 ml   Net 1050 ml       Physical Exam:     Physical Exam  Vitals signs and nursing note reviewed  Constitutional:       General: He is not in acute distress  Appearance: Normal appearance  He is obese  He is not ill-appearing, toxic-appearing or diaphoretic  HENT:      Head: Normocephalic and atraumatic  Nose: No congestion or rhinorrhea  Mouth/Throat:      Mouth: Mucous membranes are dry  Pharynx: Oropharynx is clear  No oropharyngeal exudate or posterior oropharyngeal erythema     Eyes: General: No scleral icterus  Right eye: No discharge  Left eye: No discharge  Extraocular Movements: Extraocular movements intact  Conjunctiva/sclera: Conjunctivae normal       Pupils: Pupils are equal, round, and reactive to light  Neck:      Musculoskeletal: Normal range of motion and neck supple  No neck rigidity or muscular tenderness  Vascular: No carotid bruit  Cardiovascular:      Rate and Rhythm: Normal rate  Heart sounds: No murmur  No friction rub  No gallop  Pulmonary:      Effort: Pulmonary effort is normal       Breath sounds: Normal breath sounds  Abdominal:      General: Abdomen is flat  Bowel sounds are normal  There is distension  Palpations: Abdomen is soft  Tenderness: There is abdominal tenderness  Musculoskeletal: Normal range of motion  General: No swelling, tenderness, deformity or signs of injury  Right lower leg: No edema  Left lower leg: No edema  Lymphadenopathy:      Cervical: No cervical adenopathy  Skin:     General: Skin is warm and dry  Coloration: Skin is not jaundiced or pale  Findings: No bruising  Neurological:      General: No focal deficit present  Mental Status: He is alert and oriented to person, place, and time  Mental status is at baseline  Cranial Nerves: No cranial nerve deficit  Sensory: No sensory deficit  Motor: No weakness  Coordination: Coordination normal       Gait: Gait normal       Deep Tendon Reflexes: Reflexes normal    Psychiatric:         Mood and Affect: Mood normal          Behavior: Behavior normal          Thought Content:  Thought content normal          Judgment: Judgment normal          Additional Data:     Labs:    Results from last 7 days   Lab Units 01/25/21  0648   WBC Thousand/uL 6 98   HEMOGLOBIN g/dL 14 7   HEMATOCRIT % 43 5   PLATELETS Thousands/uL 223   NEUTROS PCT % 54   LYMPHS PCT % 32   MONOS PCT % 9   EOS PCT % 4     Results from last 7 days   Lab Units 01/25/21  0648   POTASSIUM mmol/L 4 2   CHLORIDE mmol/L 108   CO2 mmol/L 27   BUN mg/dL 20   CREATININE mg/dL 1 35*   CALCIUM mg/dL 9 4           * I Have Reviewed All Lab Data Listed Above  * Additional Pertinent Lab Tests Reviewed: Alisia 64 Admission Reviewed    Imaging:    Imaging Reports Reviewed Today Include:    Imaging Personally Reviewed by Myself Includes:      Recent Cultures (last 7 days):           Last 24 Hours Medication List:   Current Facility-Administered Medications   Medication Dose Route Frequency Provider Last Rate    acetaminophen  650 mg Oral Q6H PRN Mohinder Hayes MD      amLODIPine  5 mg Oral Daily Mohinder Hayes MD      aspirin  81 mg Oral Daily Mohinder Hayes MD      fentaNYL  25 mcg Intravenous Q3 min PRN Mitchel Joya CRNA      heparin (porcine)  5,000 Units Subcutaneous [facility] Russ Larson MD      hydrALAZINE  5 mg Intravenous Q6H PRN Mohinder Hayes MD      HYDROmorphone  0 4 mg Intravenous Q5 Min PRN Mitchel Joya CRNA      HYDROmorphone  0 5 mg Intravenous Q3H PRN Mohinder Hayes MD      lactated ringers  75 mL/hr Intravenous Continuous Russ Larson MD Stopped (01/26/21 1348)    LORazepam  0 5 mg Oral BID PRN Mohinder Hayes MD      metoprolol tartrate  50 mg Oral BID Mohinder Hayes MD      morphine injection  4 mg Intravenous Q4H PRN Mohinder Hayes MD      morphine injection  2 mg Intravenous Q4H PRN Mohinder Hayes MD      multivitamin stress formula  1 tablet Oral Daily Mohinder Hayes MD      ondansetron  4 mg Intravenous Q4H PRN Mohinder Hayes MD      ondansetron  4 mg Intravenous Once PRN Mitchel Joya CRNA      pantoprazole  40 mg Oral Early Morning Mohinder Hayes MD      pravastatin  80 mg Oral Daily With Chuck Nation MD          Today, Patient Was Seen By: Angie Reyez MD    ** Please Note: Dictation voice to text software may have been used in the creation of this document  **

## 2021-01-26 NOTE — DISCHARGE INSTRUCTIONS
Please obtain pa/lateral chest Xray within 1-2 days of your appointment with Dr Brandt Gupta at any [facility] to check for stent placement

## 2021-01-26 NOTE — PLAN OF CARE
Problem: SAFETY ADULT  Goal: Patient will remain free of falls  Description: INTERVENTIONS:  - Assess patient frequently for physical needs  -  Identify cognitive and physical deficits and behaviors that affect risk of falls    -  [place] fall precautions as indicated by assessment   - Educate patient/family on patient safety including physical limitations  - Instruct patient to call for assistance with activity based on assessment  - Modify environment to reduce risk of injury  - Consider OT/PT consult to assist with strengthening/mobility  Outcome: Progressing  Goal: Maintain or return to baseline ADL function  Description: INTERVENTIONS:  -  Assess patient's ability to carry out ADLs; assess patient's baseline for ADL function and identify physical deficits which impact ability to perform ADLs (bathing, care of mouth/teeth, toileting, grooming, dressing, etc )  - Assess/evaluate cause of self-care deficits   - Assess range of motion  - Assess patient's mobility; develop plan if impaired  - Assess patient's need for assistive devices and provide as appropriate  - Encourage maximum independence but intervene and supervise when necessary  - Involve family in performance of ADLs  - Assess for home care needs following discharge   - Consider OT consult to assist with ADL evaluation and planning for discharge  - Provide patient education as appropriate  Outcome: Progressing  Goal: Maintain or return mobility status to optimal level  Description: INTERVENTIONS:  - Assess patient's baseline mobility status (ambulation, transfers, stairs, etc )    - Identify cognitive and physical deficits and behaviors that affect mobility  - Identify mobility aids required to assist with transfers and/or ambulation (gait belt, sit-to-stand, lift, walker, cane, etc )  - [place] fall precautions as indicated by assessment  - Record patient progress and toleration of activity level on Mobility SBAR; progress patient to next Phase/Stage  - Instruct patient to call for assistance with activity based on assessment  - Consider rehabilitation consult to assist with strengthening/weightbearing, etc   Outcome: Progressing

## 2021-01-26 NOTE — OP NOTE
OPERATIVE REPORT  PATIENT NAME: Rodríguez Harris    :  1950  MRN: [id number]  Pt Location: BE OR ROOM 04    SURGERY DATE: 2021    Surgeon(s) and Role:     * Madelyn Benito MD - Primary     * Gurinder Naylor MD - Assisting    Preop Diagnosis:  Esophageal dysphagia [R13 10]    Post-Op Diagnosis Codes:     * Esophageal dysphagia [R13 10]    Procedure(s) (LRB):  ESOPHAGOGASTRODUODENOSCOPY (EGD), BIOPSY, ESOPHAGEAL DILATION,  STENT PLACEMENT (N/A)  1  EGD, esophageal and gastric biopsy, esophageal dilation and esophageal stent placement using fluoroscopy    Specimen(s):  ID Type Source Tests Collected by Time Destination   1 : esophageal mass  Tissue Esophagus TISSUE EXAM Madelyn Benito MD 2021 1310    2 : Gastric Mass  Tissue Stomach TISSUE EXAM Madelyn Benito MD 2021 1324        Estimated Blood Loss:   Minimal    Drains:  * No LDAs found *    Anesthesia Type:   General    Operative Indications:  Esophageal dysphagia []  73-year-old male with history of stage IV esophageal cancer now with worsening dysphagia and a long segment stenosis in the distal esophagus on barium swallow    Operative Findings:  Irregular distal esophagus starting from 31 cm from the lips to 36 cm from the lips  Initially unable to pass through this area  Mucosa the had masslike appearance  Biopsied at the esophagus and multiple locations were sent for pathology  Dilated from 29-43 [place]  Afterwards able to get past the distal esophagus  Duodenum appeared normal   There was a gastric mass in the antrum that was also biopsied  Retroflexion view showed no mass around the GE junction  GE junction located at 37 cm from the lips  Complications:   None    Procedure and Technique:  After obtaining informed consent the patient was brought back to the operating room placed supine on the OR table  General anesthesia was induced without incident    A formal time-out was performed at this time verifying patient, date of birth, procedure, fire risk score, ASA score, antibiotic usage, need for blood products, anticipate specimens, beta-blocker usage, imaging and any other questions or concerns  At this point in adult endoscope was inserted into the patient's oropharynx and directed down the esophagus  Here we encountered a slightly dilated mid esophagus  The distal esophagus was significantly narrowed  There is an irregular masslike appearance to the mucosa  We biopsy this in multiple locations  The stenosis began approximately 31 cm from the lips and extended to 36 cm from the lips  We were unable to get past the stenosis at this point  Next a 0 038 Jagwire was placed down the EGD and directed into the stomach under fluoro visualization  The endoscope was removed and we began serial dilation with Savary dilators  We dilated from 29-43 [place]  Repeat endoscopy was done at this time and we were able to get past the stenosis  Full EGD was performed  The duodenum appeared normal   There was a 1 cm masslike follow up with regular mucosa in the gastric antrum  This was biopsied and sent for permanent pathology  Retroflexion view showed no mass at the GE junction  We pulled the scope back through the GE junction which was located approximately 37 cm from the lips  We marked the GE junction with a paper clip on the skin  Pulling the scope back further we marked the proximal aspect of the stenosis as well with a paper clip  Then based on the long segment narrowing I felt this was most amenable to an esophageal stent placement  A 10 cm x 18 mm stent was selected  Using fluoro and our percutaneous markers we placed this distal esophagus  I initially tried to place this maintaining the GE junction  Due to the stenosis and proximity to the GE junction this was not possible  Our stent was partly spanning the GE junction by approximately 1 cm  Repeat endoscopy is done at this time    We could not fully get our scope through the stent  We were able to fully suction out the stomach at this time  And the patient tolerated the procedure well without issue  He was extubated transferred to PACU in stable condition         I was present for the entire procedure    Patient Disposition:  PACU    SIGNATURE: Madelyn Benito MD  DATE: January 26, 2021  TIME: 1:37 PM

## 2021-01-27 NOTE — PROGRESS NOTES
Progress Note - ThoracicSurgery   Mian Reed 65 y o  male MRN: [id number]  Unit/Bed#: [facility] 904-01 Encounter: [id number]    Assessment:  63yo M with metastatic adenocarcinoma of distal esophagus with pulmonary and retroperitoneal lymph node metastases, s/p FOLFOX-6 therapy and currently on trastuzumab monotherapy, who presents with progressive dysphagia to solid foods over the past 3 weeks found to have severe distal esophageal stricture   -GERD  -CKD stage 3  -essential hypertension  -carotid stenosis  -peripheral artery disease     Plan:  -barium swallow demonstrated severe distal esophageal stricture 2 5 cm in length and located 3 cm proximal to GE junction with luminal diameter approximately 0 25 cm  - pod 1 s/p EGD with dilation, stent, and gastric/esophageal biopsy  - tolerating full liquids, dc IVFs  - c/w PPI   - OOB/ambulate/ICS  - likely dc today on soft diet for next few weeks with outpatient Thoracic f/u with Dr Yane Colon  - f/u pathology from esophageal/gastric biopsies     Subjective/Objective     Subjective:    Patient seen and examined at bedside  States that he is feeling well, complains of sore throat and feeling tired, otherwise tolerating full liquid diet  Denies chest pain, fever, chills, nausea, vomiting  Objective:     Blood pressure 143/51, pulse 82, temperature 98 1 °F (36 7 °C), resp  rate 19, height 5' 7" (1 702 m), weight 91 6 kg (201 lb 15 1 oz), SpO2 94 %  ,Body mass index is 31 63 kg/m²        Intake/Output Summary (Last 24 hours) at 1/27/2021 6771  Last data filed at 1/26/2021 1401  Gross per 24 hour   Intake 600 ml   Output 375 ml   Net 225 ml       Invasive Devices     Central Venous Catheter Line            Port A Cath 08/28/17 Right Chest 1247 days          Peripheral Intravenous Line            Peripheral IV 01/25/21 Right Arm 1 day                Physical Exam:  General:  No acute distress, alert and oriented  HEENT:  Normocephalic, atraumatic, moist mucus membranes, clear oropharynx  Cardiac:  RRR, no murmurs/rubs/gallops  Respiratory:  Nonlabored, clear to auscultation bilaterally; right-sided chest port without erythema or tenderness  Abdomen:  Soft, nontender, nondistended  Extremities:  No edema  Neuro:  Alert and oriented, no focal deficit    Lab, Imaging and other studies:I have personally reviewed pertinent lab results      VTE Pharmacologic Prophylaxis: Heparin  VTE Mechanical Prophylaxis: sequential compression device

## 2021-01-27 NOTE — PLAN OF CARE
Problem: SAFETY ADULT  Goal: Patient will remain free of falls  Description: INTERVENTIONS:  - Assess patient frequently for physical needs  -  Identify cognitive and physical deficits and behaviors that affect risk of falls    -  [place] fall precautions as indicated by assessment   - Educate patient/family on patient safety including physical limitations  - Instruct patient to call for assistance with activity based on assessment  - Modify environment to reduce risk of injury  - Consider OT/PT consult to assist with strengthening/mobility  Outcome: Progressing  Goal: Maintain or return to baseline ADL function  Description: INTERVENTIONS:  -  Assess patient's ability to carry out ADLs; assess patient's baseline for ADL function and identify physical deficits which impact ability to perform ADLs (bathing, care of mouth/teeth, toileting, grooming, dressing, etc )  - Assess/evaluate cause of self-care deficits   - Assess range of motion  - Assess patient's mobility; develop plan if impaired  - Assess patient's need for assistive devices and provide as appropriate  - Encourage maximum independence but intervene and supervise when necessary  - Involve family in performance of ADLs  - Assess for home care needs following discharge   - Consider OT consult to assist with ADL evaluation and planning for discharge  - Provide patient education as appropriate  Outcome: Progressing  Goal: Maintain or return mobility status to optimal level  Description: INTERVENTIONS:  - Assess patient's baseline mobility status (ambulation, transfers, stairs, etc )    - Identify cognitive and physical deficits and behaviors that affect mobility  - Identify mobility aids required to assist with transfers and/or ambulation (gait belt, sit-to-stand, lift, walker, cane, etc )  - [place] fall precautions as indicated by assessment  - Record patient progress and toleration of activity level on Mobility SBAR; progress patient to next Phase/Stage  - Instruct patient to call for assistance with activity based on assessment  - Consider rehabilitation consult to assist with strengthening/weightbearing, etc   Outcome: Progressing     Problem: Potential for Falls  Goal: Patient will remain free of falls  Description: INTERVENTIONS:  - Assess patient frequently for physical needs  -  Identify cognitive and physical deficits and behaviors that affect risk of falls    -  [place] fall precautions as indicated by assessment   - Educate patient/family on patient safety including physical limitations  - Instruct patient to call for assistance with activity based on assessment  - Modify environment to reduce risk of injury  - Consider OT/PT consult to assist with strengthening/mobility  Outcome: Progressing

## 2021-01-28 NOTE — DISCHARGE SUMMARY
Discharge- Jose Bell 1950, 65 y o  male MRN: [id number]    Unit/Bed#: [facility] 904-01 Encounter: [id number]    Primary Care Provider: Michelle Arellano DO   Date and time admitted to hospital: 1/25/2021  5:37 AM        Chronic kidney disease stage 3  Assessment & Plan  Baseline creatinine of approximately 1 2-1 3 - presents @ 1 35  Monitor renal function and urine output - limit/avoid nephrotoxins as possible    PAD (peripheral artery disease)  Assessment & Plan  Follows outpatient vascular surgery  LEADs on 11/2020 revealed "High grade stenosis versus occlusion in the mid superficial femoral artery with  reconstitution at the distal superficial femoral artery" on the RLE and "Diffuse disease throughout the femoral and popliteal arteries without evidence of a focal stenosis" on the LLE  Continue ASA/statin    Esophageal cancer   Assessment & Plan  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 s/p chemotherapy (FOLFOX) and currently on Trastuzumab (HER-2 positive disease)  Outpatient follow-up with oncology  PRN pain control - supportive care otherwise    Carotid stenosis  Assessment & Plan  Continue ASA/statin  Follows outpatient vascular surgery - last carotid duplex on 11/2020 revealed < 50% stenosis on right ICA and 50-69% stenosis on left ICA    Essential hypertension  Assessment & Plan  Continue Lopressor/Norvasc  PRN IV Hydralazine on board for BP spikes  PRN pain control    GERD (gastroesophageal reflux disease)  Assessment & Plan  Continue PPI    * Esophageal stricture  Assessment & Plan  With associated esophageal cancer  Underwent barium swallow today which revealed "severe distal esophageal stricture, located 3 cm proximal to the gastroesophageal junction, spanning 2 5 cm in length    The luminal diameter of the stricture is only about 0 25 cm "  Seen by thoracic surgery in the ED -> tentative plan for EGD w/ esophageal dilatation and possible stenting tomorrow  PRN pain control - supportive care  Went to OR for EGD today             Discharging Physician / Practitioner: Serena Morton MD  PCP: Augustin Figueroa DO  Admission Date:   Admission Orders (From admission, onward)     Ordered        01/25/21 1047  Inpatient Admission  Once                   Discharge Date: 01/27/21    Resolved Problems  Date Reviewed: 1/27/2021    None          Consultations During Hospital Stay:  ·  thoracic surgery     Procedures Performed:         Progress Note - Georgette Head 1950, 65 y o  male MRN: [id number]     Unit/Bed#: OR POOL Encounter: [id number]     Primary Care Provider: Augustin Figueroa DO   Date and time admitted to hospital: 1/25/2021  5:37 AM           Chronic kidney disease stage 3  Assessment & Plan  Baseline creatinine of approximately 1 2-1 3 - presents @ 1 35  Monitor renal function and urine output - limit/avoid nephrotoxins as possible     PAD (peripheral artery disease)  Assessment & Plan  Follows outpatient vascular surgery  LEADs on 11/2020 revealed "High grade stenosis versus occlusion in the mid superficial femoral artery with  reconstitution at the distal superficial femoral artery" on the RLE and "Diffuse disease throughout the femoral and popliteal arteries without evidence of a focal stenosis" on the LLE  Continue ASA/statin     Esophageal cancer   Assessment & Plan  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 s/p chemotherapy (FOLFOX) and currently on Trastuzumab (HER-2 positive disease)  Outpatient follow-up with oncology  PRN pain control - supportive care otherwise     Carotid stenosis  Assessment & Plan  Continue ASA/statin  Follows outpatient vascular surgery - last carotid duplex on 11/2020 revealed < 50% stenosis on right ICA and 50-69% stenosis on left ICA     Essential hypertension  Assessment & Plan  Continue Lopressor/Norvasc  PRN IV Hydralazine on board for BP spikes  PRN pain control     GERD (gastroesophageal reflux disease)  Assessment & Plan  Continue PPI     * Esophageal stricture  Assessment & Plan   Barium swallow  which revealed "severe distal esophageal stricture, located 3 cm proximal to the gastroesophageal junction, spanning 2 5 cm in length   The luminal diameter of the stricture is only about 0 25 cm "    EGD    Significant Findings / Test Results:   · As above     Incidental Findings:   ·  none     Test Results Pending at Discharge (will require follow up):   ·  tissue surgical pathology      Outpatient Tests Requested:  · none    Complications:  None     Reason for Admission: difficulty swallowing     Hospital Course:     Georgette Head is a 65 y o  male patient who originally presented to the hospital on 1/25/2021 due to difficulty swallowing  He has history of esophageal cancer  He has video barium, showed severe esophageal stricture  Thoracic surgery consulted they have done EGD biopsy was done  Multiple area are dialated  There is a gastric mass in the antrum that was also biopsied  He is able to tolerate diet  He was discharged home to follow up with thoracic surgery  Please see above list of diagnoses and related plan for additional information  Condition at Discharge: stable     Discharge Day Visit / Exam:     * Please refer to separate progress note for these details *    Discussion with Family: updated family on the phone     Discharge instructions/Information to patient and family:   See after visit summary for information provided to patient and family  Provisions for Follow-Up Care:  See after visit summary for information related to follow-up care and any pertinent home health orders  Disposition:     Home    For Discharges to [facility] SNF:   · Not Applicable to this Patient - Not Applicable to this Patient    Planned Readmission: no      Discharge Statement:  I spent 45 minutes discharging the patient   This time was spent on the day of discharge  I had direct contact with the patient on the day of discharge  Greater than 50% of the total time was spent examining patient, answering all patient questions, arranging and discussing plan of care with patient as well as directly providing post-discharge instructions  Additional time then spent on discharge activities  Discharge Medications:  See after visit summary for reconciled discharge medications provided to patient and family        ** Please Note: This note has been constructed using a voice recognition system **

## 2021-02-09 NOTE — PROGRESS NOTES
Patient presents today for treatment with Herceptin  Patient offers no complaints  VSS  Port accessed without incident with excellent blood return noted

## 2021-02-11 NOTE — TELEPHONE ENCOUNTER
Pt's family member called to cancel gi procedure w/ dr Marcelino Nixon that was scheduled for tomorrow   They will call back to reschedule per pt

## 2021-02-11 NOTE — TELEPHONE ENCOUNTER
Left message to check in with the patient and discuss his pathology  I have asked him to call us back

## 2021-02-12 NOTE — TELEPHONE ENCOUNTER
I called and left a message for Mr Medina Roni after reviewing his x-ray  His esophageal stent is in good position  This is not migrated  Resident now will plan on keeping her appointment next Wednesday  Will discuss stent removal at that time  He will call us sooner if he cannot tolerate things until then

## 2021-02-17 NOTE — PROGRESS NOTES
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  He has an appointment with Dr Shiv Chiu in 2 weeks, and next week he has a CT scan scheduled  The patient has never had radiation for this, can consider in future  At this time, we will leave the stent in place as he seems to have a better control on his pain  He wants to try to leave it for a few more weeks since his swallowing is dramatically improved  Patient signed consent for removal should his pain worsen over the next few weeks  He will call our office if anything worsens  At that point, would obtain CXR as first step  We will schedule follow up in 6 weeks to discuss removal at that time if he continues to do well  Diagnoses and all orders for this visit:    Malignant neoplasm of esophagus, unspecified location [facility])    Esophageal stricture    Odynophagia          Thoracic History    Diagnosis: esophageal stenosis   Procedure: EGD with dilation and stent placement 1/26/21     Subjective:    Patient ID: Cisco Gibson is a 65 y o  male  HPI   Mr Dana Leon is a 65year old man with 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 s/p FOLFOX-6, currently on trastuzumab monotherapy who was evaluated in the hospital 1/26/21 for progressive dysphagia  He was taken to the OR that day for EGD with dilation, and was dilated up to 39Fr  An 18mm-wide stent was placed partly spanning the GE junction by approximately 1 cm  He was discharged 1/27/21 on a soft diet  CXR 2/12/21 demonstrated stable positioning of stent  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     On discussion, he reports severe pain with swallowing when food bolus reaches top of stent  He is eating any food that he can cut with a fork  Nothing gets stuck and he does not need to regurgitate or vomit  His weight has remained stable  He takes Advil and Tylenol PM for his pain which seem to keep it under somewhat better control  His swallowing is "100% better" than prior to procedure  He has an appointment with Dr Manasa Alamo in 2 weeks  The following portions of the patient's history were reviewed and updated as appropriate: allergies, current medications, past family history, past medical history, past social history, past surgical history and problem list         Review of Systems   Constitutional: Negative for activity change, appetite change, chills, diaphoresis, fever and unexpected weight change  HENT: Positive for trouble swallowing (odynphagia)  Respiratory: Negative for cough and shortness of breath  Cardiovascular: Positive for chest pain (odynophagia)  Gastrointestinal: Negative for abdominal pain, nausea and vomiting  Musculoskeletal: Negative  Skin: Negative  Neurological: Negative  Hematological: Negative  Psychiatric/Behavioral: Negative  Objective:   Physical Exam  Constitutional:       General: He is not in acute distress  Appearance: Normal appearance  HENT:      Head: Normocephalic and atraumatic  Eyes:      General: No scleral icterus  Conjunctiva/sclera: Conjunctivae normal    Cardiovascular:      Rate and Rhythm: Normal rate and regular rhythm  Pulmonary:      Effort: Pulmonary effort is normal  No respiratory distress  Breath sounds: Normal breath sounds  Abdominal:      General: There is no distension  Palpations: Abdomen is soft  Skin:     General: Skin is warm and dry  Neurological:      General: No focal deficit present  Mental Status: He is alert and oriented to person, place, and time     Psychiatric:         Mood and Affect: Mood normal          Behavior: Behavior normal          Thought Content: Thought content normal          Judgment: Judgment normal      /69   Pulse 71   Temp 98 8 °F (37 1 °C) (Tympanic)   Resp 14   Ht 5' 7" (1 702 m)   Wt 89 5 kg (197 lb 5 oz)   SpO2 98%   BMI 30 90 kg/m²     Xr Chest Pa & Lateral    Result Date: 2/12/2021  Impression No acute cardiopulmonary disease  Workstation performed: [id number]        Ct Chest Abdomen Pelvis W Contrast    Result Date: 7/9/2020  Narrative CT CHEST, ABDOMEN AND PELVIS WITH IV CONTRAST INDICATION:   C15 5: Malignant neoplasm of lower third of esophagus R13 10: Dysphagia, unspecified  COMPARISON:  CT chest abdomen and pelvis 12/30/2019 and CT chest 6/29/2018 TECHNIQUE: CT examination of the chest, abdomen and pelvis was performed  Axial, sagittal, and coronal 2D reformatted images were created from the source data and submitted for interpretation  Radiation dose length product (DLP) for this visit:  832 mGy-cm   This examination, like all CT scans performed in the [facility], was performed utilizing techniques to minimize radiation dose exposure, including the use of iterative reconstruction and automated exposure control  IV Contrast:  100 mL of iohexol (OMNIPAQUE)   350 Enteric Contrast: Enteric contrast was administered  FINDINGS: CHEST LUNGS:  3 mm subpleural nodule in the lingula image 76 series 3 unchanged as far back as June 2018  Stable bilateral fissural intrapulmonary lymph nodes  No new suspicious lung nodule  Stable bilateral multi lobar subpleural reticular scarring  No infiltrate  Central airways are clear  PLEURA:  Unremarkable  HEART/GREAT VESSELS:  Heart is not enlarged  Stable trace pericardial effusion  Aortic and coronary artery calcification  Tip of portacatheter in the superior cavoatrial junction  MEDIASTINUM AND ZAYNAB:  Stable minimal thickening of the distal esophagus  No enlarged lymph nodes   CHEST WALL AND LOWER NECK:   Right anterior chest wall maya catheter  Stable tiny clips or punctate calcifications adjacent to right lobe of thyroid gland and right superior mediastinum  ABDOMEN LIVER/BILIARY TREE:  Unremarkable  GALLBLADDER:  No calcified gallstones  No pericholecystic inflammatory change  SPLEEN:  Unremarkable  PANCREAS:  Unremarkable  ADRENAL GLANDS:  Unremarkable  KIDNEYS/URETERS:  Unremarkable  No hydronephrosis  STOMACH AND BOWEL:  Ascending through sigmoid colonic diverticulosis without immediate adjacent stranding  Interval removal of esophageal stents from the stomach  No bowel obstruction  Minimal smooth thickening at the gastroduodenal junction and second duodenal segment  APPENDIX:  A normal appendix was visualized  ABDOMINOPELVIC CAVITY:  No ascites  No pneumoperitoneum  No lymphadenopathy  VESSELS:  Aortoiliac calcification  Stable minimal distal infrarenal aortic ectasia maximum diameter 2 4 cm  Stable focal moderate proximal right common iliac artery stenosis  PELVIS REPRODUCTIVE ORGANS:  Unremarkable for patient's age  URINARY BLADDER:  Unremarkable  ABDOMINAL WALL/INGUINAL REGIONS:  Stable small fat-containing left periumbilical hernia  OSSEOUS STRUCTURES:  No acute fracture or osseous destructive lesion identified  Degenerative changes of the spine, pubic symphysis, and multiple joints  Impression CT chest: Stable minimal thickening of the distal esophagus  No evidence of metastatic disease  Stable lingular nodule and bilateral fissural intrapulmonary lymph nodes unchanged as far back as June 2018  CT abdomen and pelvis: No evidence of abdominopelvic metastatic disease  Interval removal of migrated esophageal stents from the stomach and proximal duodenum  Minimal nonspecific submucosal thickening gastroduodenal junction and proximal second duodenal segment may be sequela of chronic peptic ulcer disease  Otherwise, no significant interval change   Workstation performed: [id number] Fl Barium Swallow    Result Date: 1/25/2021  Narrative CONTRAST SWALLOW-ESOPHAGRAM INDICATION:   esophageal stricture  Patient has history of distal esophageal cancer, treated with chemotherapy  Over the last few days, patient describes worsening dysphagia with difficulty swallowing solid foods  This swallow study was performed to evaluate for possible stricture  COMPARISON:  Chest, abdomen, and pelvic CT from 7/6/2020, and upper GI series from 2/11/2020  IMAGES:  25 FLUOROSCOPY TIME:   1 minute 14 seconds  TECHNIQUE: Patient was initially given 150 mL of Omnipaque 350 to swallow  When no gross leak was visualized, we switched over to the Barium E--Paque  Patient swallowed about 150 mL  FINDINGS: There is a severe distal esophageal stricture, located 3 cm proximal to the gastroesophageal junction, spanning 2 5 cm in length  The luminal diameter of the stricture is only about 0 25 cm (Image 23 ) There is no evidence of esophageal leak  Impression There is a severe distal esophageal stricture, located 3 cm proximal to the gastroesophageal junction, spanning 2 5 cm in length    The luminal diameter of the stricture is only about 0 25 cm (Image 23 ) Workstation performed: [id number]

## 2021-02-17 NOTE — ASSESSMENT & PLAN NOTE
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  He has an appointment with Dr Tamra Grijalva in 2 weeks, and next week he has a CT scan scheduled  The patient has never had radiation for this, can consider in future  At this time, we will leave the stent in place as he seems to have a better control on his pain  He wants to try to leave it for a few more weeks since his swallowing is dramatically improved  Patient signed consent for removal should his pain worsen over the next few weeks  He will call our office if anything worsens  At that point, would obtain CXR as first step  We will schedule follow up in 6 weeks to discuss removal at that time if he continues to do well

## 2021-03-03 NOTE — LETTER
March 3, 2021     Theo Suarez, [address] [place] [address]    Patient: Saskia Zelaya   YOB: 1950   Date of Visit: 3/3/2021       Dear Dr Daniela Moreno:    Thank you for referring Evaristo Day to me for evaluation  Below are my notes for this consultation  If you have questions, please do not hesitate to call me  I look forward to following your patient along with you  Sincerely,        Indigo Gasca MD        CC: MD Indigo Peoples MD  3/3/2021  8:59 AM  Sign when Signing Visit  Hematology / Oncology Outpatient Follow Up Note    Saskia Zelaya 65 y o  male :1950 FOW:[id number]         Date:  3/3/2021    Assessment / Plan:  A 64 year old gentleman with no significant past medical history who has normal performance status  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, diagnosed in 2017  His pulmonary metastasis was confirmed by biopsy  He has HER-2 positive disease   He completed 12 cycle of FOLFOX -6 with trastuzumab with good partial response   He is currently on trastuzumab monotherapy as maintenance therapy with no cardiac toxicity  He recently underwent EGD and biopsy of esophagus as well as stent placement for progressive dysphagia  Biopsy showed intramucosal carcinoma  Systemically, he has very minimal tumor burden with no evidence of progression  Therefore, I recommended him to continue with trastuzumab every 4 weeks  In order to keep esophagus open, I will refer him to Radiation Oncology for palliative radiation  He is in agreement with my recommendation  I will see him again in 4 months with CBC, CMP, echocardiogram as well as CT scan of chest abdomen pelvis                                Subjective:      HPI:             Interval History:  A 64 year old gentleman with no significant past medical history   He was hospitalized in early 2017 with progressive dysphagia  However, he had minimal weight loss  He was found to have mass in distal esophagus, based on the EGD  Biopsy was positive for adenocarcinoma with mucinous features  CT scan of chest, abdomen pelvis showed not only mass in the distal esophagus, but also multiple pulmonary masses, consistent with metastatic disease  He also had retroperitoneal adenopathy measuring 2 1 cm  Lung biopsy showed adenocarcinoma, consistent with esophageal primary  His tumor was subsequently tested for HER-2 by immunohistochemistry as well as fish  Tumor from esophagus was HER-2 3+ and Fish positive with ratio of 6 5  Tumor in the lung was HER-2 negative  HER-2 Fish was also negative  This was considered to be HER-2 positive disease  Therefore, she started systemic chemotherapy with trastuzumab and FOLFOX-6  After 3 months of treatment, CT scan showed partial response   He received 12 cycle of FOLFOX which was completed in February 2018 with slowly progressive neuropathy  Miguel Boateng is currently on trastuzumab monotherapy every 4 weeks as maintenance therapy  In late January 2021, he had progressive dysphagia  He underwent EGD which showed severe narrowing of esophagus or which he underwent stent placement  Biopsy from esophagus showed intramucosal carcinoma  He is eating much better with no difficulty  However, he still have some discomfort in the lower chest   His weight is stable  He has no respiratory symptoms  He denied any other pain  His recent CT scan showed minimal tumor burden  His performance status is normal                                               Objective:      Primary Diagnosis:     Metastatic adenocarcinoma of distal esophagus with multiple lung metastasis as well as retroperitoneal adenopathy, HER-2 positive disease   diagnosed in July 2017       Cancer Staging:  No matching staging information was found for the patient         Previous Hematologic/ Oncologic Treatment:       FOLFOX-6 with trastuzumab times 12 cycles   Completed in February 2018      Current Hematologic/ Oncologic Treatment:       Trastuzumab monotherapy since February 2018      Disease Status:      Near complete response      Test Results:     Pathology:     Biopsy from December esophagus showed adenocarcinoma  biopsy showed adenocarcinoma with mucinous features  TTF-1 negative  from esophagus was HER-2 3+ and Fish positive with ratio of 6 5  from long was HER-2 negative and Fish negative        Radiology:     CT scan of chest abdomen pelvis in   February 2021 showed no evidence of progression  Thickening of esophageal wall      Echocardiogram in July 2020 showed ejection fraction 60%     Laboratory:      See below      Physical Exam:        General Appearance:    Alert, oriented          Eyes:    PERRL   Ears:    Normal external ear canals, both ears   Nose:   Nares normal, septum midline   Throat:   Mucosa moist  Pharynx without injection  Neck:   Supple         Lungs:     Clear to auscultation bilaterally   Chest Wall:    No tenderness or deformity    Heart:    Regular rate and rhythm         Abdomen:     Soft, non-tender, bowel sounds +, no organomegaly               Extremities:   Extremities no cyanosis or edema         Skin:   Band like erythematous rash around right ptosis or consistent with shingle  Lymph nodes:   Cervical, supraclavicular, and axillary nodes normal   Neurologic:   CNII-XII intact, normal strength, sensation and reflexes     Throughout             Breast exam: Not performed             ROS: Review of Systems   All other systems reviewed and are negative  Imaging: Xr Chest Pa & Lateral    Result Date: 2/12/2021  Narrative: CHEST INDICATION:   R13 10: Dysphagia, unspecified T85 528A: Displacement of other gastrointestinal prosthetic devices, implants and grafts, initial encounter   COMPARISON:  January 26, 2021 EXAM PERFORMED/VIEWS:  XR CHEST PA & LATERAL  The frontal view was performed utilizing dual energy radiographic technique  FINDINGS:  Right chest wall Port-A-Cath with esophageal stent also noted in place  Cardiomediastinal silhouette appears unremarkable  The lungs are clear  No pneumothorax or pleural effusion  Osseous structures appear within normal limits for patient age  Impression: No acute cardiopulmonary disease  Workstation performed: [id number]     Ct Chest Abdomen Pelvis W Contrast    Result Date: 3/1/2021  Narrative: CT CHEST, ABDOMEN AND PELVIS WITH IV CONTRAST INDICATION:   C15 5: Malignant neoplasm of lower third of esophagus R13 10: Dysphagia, unspecified  COMPARISON:  CT from July 6, 2020 TECHNIQUE: CT examination of the chest, abdomen and pelvis was performed  Axial, sagittal, and coronal 2D reformatted images were created from the source data and submitted for interpretation  Radiation dose length product (DLP) for this visit:  704 mGy-cm   This examination, like all CT scans performed in the [facility], was performed utilizing techniques to minimize radiation dose exposure, including the use of iterative reconstruction and automated exposure control  IV Contrast:  100 mL of iohexol (OMNIPAQUE) Enteric Contrast: Enteric contrast was administered  FINDINGS: CHEST LUNGS:  Again noted is peripheral reticulation with mild arthritic distortion and minimal groundglass density related to interstitial lung disease, unchanged A lingular region nodule, seen in image 73, series 3, stable Perifissural nodules representing intrapulmonary lymph node along the left fissure, stable A linear appearing density seen in the left lower lobe in image 50 series 604, along the fissure, does not have a masslike appearance probably scarring or fissural thickening PLEURA:  Unremarkable  HEART/GREAT VESSELS:  Ascending aorta measures 3 7 cm  Diffuse coronary artery calcification seen MEDIASTINUM AND ZAYNAB:  Thickening of the mid to lower thoracic esophagus seen    An esophageal stent is in place  Small periesophageal lymph nodes are noted, 5 to 6 mm near the GE junction, mildly more pronounced  These are seen in image 53 series 2 CHEST WALL AND LOWER NECK:   Unremarkable  ABDOMEN LIVER/BILIARY TREE:  Unremarkable  GALLBLADDER:  No calcified gallstones  No pericholecystic inflammatory change  SPLEEN:  Unremarkable  PANCREAS:  Unremarkable  ADRENAL GLANDS:  Unremarkable  KIDNEYS/URETERS:  Unremarkable  No hydronephrosis  STOMACH AND BOWEL:  Diverticulosis seen No abnormal dilation of the small bowel loops seen APPENDIX:  No findings to suggest appendicitis  ABDOMINOPELVIC CAVITY:  No ascites  No pneumoperitoneum  No lymphadenopathy  VESSELS:  Unremarkable for patient's age  Aortic ectasia in the infrarenal aorta measuring 2 4 cm PELVIS REPRODUCTIVE ORGANS:  Unremarkable for patient's age  URINARY BLADDER:  Unremarkable  ABDOMINAL WALL/INGUINAL REGIONS:  Umbilical hernia containing fat OSSEOUS STRUCTURES:  No acute fracture or destructive osseous lesion       Impression: No new measurable disease with no new liver lesion No new lung nodular mass Esophageal stent remains in place Small 5 to 6 mm nonmeasurable lymph node near the GE junction and in the gastrohepatic ligament minimally more pronounced from the previous study Workstation performed: [id number]         Labs:   Lab Results   Component Value Date    WBC 8 16 02/19/2021    HGB 14 2 02/19/2021    HCT 43 3 02/19/2021    MCV 94 02/19/2021     02/19/2021     Lab Results   Component Value Date    K 4 4 02/19/2021     02/19/2021    CO2 28 02/19/2021    BUN 16 02/19/2021    CREATININE 1 26 02/19/2021    GLUCOSE 117 08/09/2017    GLUF 127 (H) 09/09/2020    CALCIUM 8 7 02/19/2021    CORRECTEDCA 9 2 02/19/2021    AST 14 02/19/2021    ALT 22 02/19/2021    ALKPHOS 86 02/19/2021    EGFR 57 02/19/2021           Current Medications: Reviewed  Allergies: Reviewed  PMH/FH/SH:  Reviewed      Vital Sign:    Body surface area is 2 01 meters squared      Wt Readings from Last 3 Encounters:   03/03/21 89 8 kg (198 lb)   02/17/21 89 5 kg (197 lb 5 oz)   02/09/21 90 3 kg (199 lb)        Temp Readings from Last 3 Encounters:   03/03/21 97 5 °F (36 4 °C) (Tympanic Core)   02/17/21 98 8 °F (37 1 °C) (Tympanic)   02/09/21 (!) 96 9 °F (36 1 °C) (Temporal)        BP Readings from Last 3 Encounters:   03/03/21 136/78   02/17/21 137/69   02/09/21 131/66         Pulse Readings from Last 3 Encounters:   03/03/21 72   02/17/21 71   02/09/21 (!) 50     @LASTSAO2(3)@

## 2021-03-03 NOTE — PROGRESS NOTES
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, diagnosed in 2017  His pulmonary metastasis was confirmed by biopsy  He has HER-2 positive disease   He completed 12 cycle of FOLFOX -6 with trastuzumab with good partial response   He is currently on trastuzumab monotherapy as maintenance therapy with no cardiac toxicity  He recently underwent EGD and biopsy of esophagus as well as stent placement for progressive dysphagia  Biopsy showed intramucosal carcinoma  Systemically, he has very minimal tumor burden with no evidence of progression  Therefore, I recommended him to continue with trastuzumab every 4 weeks  In order to keep esophagus open, I will refer him to Radiation Oncology for palliative radiation  He is in agreement with my recommendation  I will see him again in 4 months with CBC, CMP, echocardiogram as well as CT scan of chest abdomen pelvis                                Subjective:      HPI:             Interval History:  A 64 year old gentleman with no significant past medical history  He was hospitalized in early 2017 with progressive dysphagia  However, he had minimal weight loss  He was found to have mass in distal esophagus, based on the EGD  Biopsy was positive for adenocarcinoma with mucinous features  CT scan of chest, abdomen pelvis showed not only mass in the distal esophagus, but also multiple pulmonary masses, consistent with metastatic disease  He also had retroperitoneal adenopathy measuring 2 1 cm  Lung biopsy showed adenocarcinoma, consistent with esophageal primary  His tumor was subsequently tested for HER-2 by immunohistochemistry as well as fish  Tumor from esophagus was HER-2 3+ and Fish positive with ratio of 6 5  Tumor in the lung was HER-2 negative  HER-2 Fish was also negative  This was considered to be HER-2 positive disease  Therefore, she started systemic chemotherapy with trastuzumab and FOLFOX-6  After 3 months of treatment, CT scan showed partial response   He received 12 cycle of FOLFOX which was completed in February 2018 with slowly progressive neuropathy  Deepti Grace is currently on trastuzumab monotherapy every 4 weeks as maintenance therapy  In late January 2021, he had progressive dysphagia  He underwent EGD which showed severe narrowing of esophagus or which he underwent stent placement  Biopsy from esophagus showed intramucosal carcinoma  He is eating much better with no difficulty  However, he still have some discomfort in the lower chest   His weight is stable  He has no respiratory symptoms  He denied any other pain  His recent CT scan showed minimal tumor burden  His performance status is normal                                               Objective:      Primary Diagnosis:     Metastatic adenocarcinoma of distal esophagus with multiple lung metastasis as well as retroperitoneal adenopathy, HER-2 positive disease  diagnosed in July 2017       Cancer Staging:  No matching staging information was found for the patient         Previous Hematologic/ Oncologic Treatment:       FOLFOX-6 with trastuzumab times 12 cycles   Completed in February 2018      Current Hematologic/ Oncologic Treatment:       Trastuzumab monotherapy since February 2018      Disease Status:      Near complete response      Test Results:     Pathology:     Biopsy from December esophagus showed adenocarcinoma  biopsy showed adenocarcinoma with mucinous features  TTF-1 negative   from esophagus was HER-2 3+ and Fish positive with ratio of 6 5  from long was HER-2 negative and Fish negative        Radiology:     CT scan of chest abdomen pelvis in   February 2021 showed no evidence of progression  Thickening of esophageal wall      Echocardiogram in July 2020 showed ejection fraction 60%     Laboratory:      See below      Physical Exam:        General Appearance:    Alert, oriented          Eyes:    PERRL   Ears:    Normal external ear canals, both ears   Nose:   Nares normal, septum midline   Throat:   Mucosa moist  Pharynx without injection  Neck:   Supple         Lungs:     Clear to auscultation bilaterally   Chest Wall:    No tenderness or deformity    Heart:    Regular rate and rhythm         Abdomen:     Soft, non-tender, bowel sounds +, no organomegaly               Extremities:   Extremities no cyanosis or edema         Skin:   Band like erythematous rash around right ptosis or consistent with shingle  Lymph nodes:   Cervical, supraclavicular, and axillary nodes normal   Neurologic:   CNII-XII intact, normal strength, sensation and reflexes     Throughout             Breast exam: Not performed             ROS: Review of Systems   All other systems reviewed and are negative  Imaging: Xr Chest Pa & Lateral    Result Date: 2/12/2021  Narrative: CHEST INDICATION:   R13 10: Dysphagia, unspecified T85 528A: Displacement of other gastrointestinal prosthetic devices, implants and grafts, initial encounter  COMPARISON:  January 26, 2021 EXAM PERFORMED/VIEWS:  XR CHEST PA & LATERAL  The frontal view was performed utilizing dual energy radiographic technique  FINDINGS:  Right chest wall Port-A-Cath with esophageal stent also noted in place  Cardiomediastinal silhouette appears unremarkable  The lungs are clear  No pneumothorax or pleural effusion  Osseous structures appear within normal limits for patient age  Impression: No acute cardiopulmonary disease   Workstation performed: [id number]     Ct Chest Abdomen Pelvis W Contrast    Result Date: 3/1/2021  Narrative: CT CHEST, ABDOMEN AND PELVIS WITH IV CONTRAST INDICATION:   C15 5: Malignant neoplasm of lower third of esophagus R13 10: Dysphagia, unspecified  COMPARISON:  CT from July 6, 2020 TECHNIQUE: CT examination of the chest, abdomen and pelvis was performed  Axial, sagittal, and coronal 2D reformatted images were created from the source data and submitted for interpretation  Radiation dose length product (DLP) for this visit:  704 mGy-cm   This examination, like all CT scans performed in the [facility], was performed utilizing techniques to minimize radiation dose exposure, including the use of iterative reconstruction and automated exposure control  IV Contrast:  100 mL of iohexol (OMNIPAQUE) Enteric Contrast: Enteric contrast was administered  FINDINGS: CHEST LUNGS:  Again noted is peripheral reticulation with mild arthritic distortion and minimal groundglass density related to interstitial lung disease, unchanged A lingular region nodule, seen in image 73, series 3, stable Perifissural nodules representing intrapulmonary lymph node along the left fissure, stable A linear appearing density seen in the left lower lobe in image 50 series 604, along the fissure, does not have a masslike appearance probably scarring or fissural thickening PLEURA:  Unremarkable  HEART/GREAT VESSELS:  Ascending aorta measures 3 7 cm  Diffuse coronary artery calcification seen MEDIASTINUM AND ZAYNAB:  Thickening of the mid to lower thoracic esophagus seen  An esophageal stent is in place  Small periesophageal lymph nodes are noted, 5 to 6 mm near the GE junction, mildly more pronounced  These are seen in image 53 series 2 CHEST WALL AND LOWER NECK:   Unremarkable  ABDOMEN LIVER/BILIARY TREE:  Unremarkable  GALLBLADDER:  No calcified gallstones  No pericholecystic inflammatory change  SPLEEN:  Unremarkable  PANCREAS:  Unremarkable  ADRENAL GLANDS:  Unremarkable  KIDNEYS/URETERS:  Unremarkable  No hydronephrosis   STOMACH AND BOWEL:  Diverticulosis seen No abnormal dilation of the small bowel loops seen APPENDIX:  No findings to suggest appendicitis  ABDOMINOPELVIC CAVITY:  No ascites  No pneumoperitoneum  No lymphadenopathy  VESSELS:  Unremarkable for patient's age  Aortic ectasia in the infrarenal aorta measuring 2 4 cm PELVIS REPRODUCTIVE ORGANS:  Unremarkable for patient's age  URINARY BLADDER:  Unremarkable  ABDOMINAL WALL/INGUINAL REGIONS:  Umbilical hernia containing fat OSSEOUS STRUCTURES:  No acute fracture or destructive osseous lesion  Impression: No new measurable disease with no new liver lesion No new lung nodular mass Esophageal stent remains in place Small 5 to 6 mm nonmeasurable lymph node near the GE junction and in the gastrohepatic ligament minimally more pronounced from the previous study Workstation performed: [id number]         Labs:   Lab Results   Component Value Date    WBC 8 16 02/19/2021    HGB 14 2 02/19/2021    HCT 43 3 02/19/2021    MCV 94 02/19/2021     02/19/2021     Lab Results   Component Value Date    K 4 4 02/19/2021     02/19/2021    CO2 28 02/19/2021    BUN 16 02/19/2021    CREATININE 1 26 02/19/2021    GLUCOSE 117 08/09/2017    GLUF 127 (H) 09/09/2020    CALCIUM 8 7 02/19/2021    CORRECTEDCA 9 2 02/19/2021    AST 14 02/19/2021    ALT 22 02/19/2021    ALKPHOS 86 02/19/2021    EGFR 57 02/19/2021           Current Medications: Reviewed  Allergies: Reviewed  PMH/FH/SH:  Reviewed      Vital Sign:    Body surface area is 2 01 meters squared      Wt Readings from Last 3 Encounters:   03/03/21 89 8 kg (198 lb)   02/17/21 89 5 kg (197 lb 5 oz)   02/09/21 90 3 kg (199 lb)        Temp Readings from Last 3 Encounters:   03/03/21 97 5 °F (36 4 °C) (Tympanic Core)   02/17/21 98 8 °F (37 1 °C) (Tympanic)   02/09/21 (!) 96 9 °F (36 1 °C) (Temporal)        BP Readings from Last 3 Encounters:   03/03/21 136/78   02/17/21 137/69   02/09/21 131/66         Pulse Readings from Last 3 Encounters:   03/03/21 72   02/17/21 71   02/09/21 (!) 50     @LASTSAO2(3)@

## 2021-03-05 NOTE — PROGRESS NOTES
[facility] 1950 is a 65 y o  male    Oncology History Overview Note   Pt is referred to our department by Dr Donna Pinon, for consideration of receiving palliative radiation, for esophageal cancer  Pt initially in August 2017, was hospitalized with worsening dysphagia  He underwent EGD at that time, that revealed stricture, with ulcerations, that were biopsy proven adenocarcinoma  He had a stent placed at that time  Further work up revealed biopsy proven metastatic diease with multiple lung mets as well as retroperitoneal adenopathy  Esophageal Her 2/ FISH analysis , by IHC was positive   Following stent placement he was able to swallow  He then began chemotherapy under the direction of Dr Otf Tony, on 9/13/17 trastuzumab and FOLFOX-6  He completed 12 cycles with good partial response  He then began Trastuzumab monotherapy as maintenance therapy, 2/28/18 6/2018 Ct scan showed no new metastatic lesions  He then continued to receive Herception Monotherapy q 3 weeks  11/18 Ct scan showed no new disease    6/25/19 had ct scan, had new sx of dysphagia/ having EGD    7/3/19 EGD: previous stent had migrated to stomach  Single invasive mass (traversable after intervention) measuring 6 mm, covering the whole circumference; placed covered stent with length of 23 mm at proximal edge using fluoroscopic guidance    8/1/19 he now had 3 migrated stents  One obstructing the pylorus  It was advised he undergo laparoscopic extraction   He would need to be off chemo for 6 weeks, if he would undergo surgery  12/2019 ct scan: Stable exam   Numerous stable pulmonary nodules  No evidence of recurrent or new metastatic disease within the abdomen or pelvis  There are now three migrated esophageal stents visible in the stomach  Mild inflammation the region of the pylorus and 1st portion of duodenum      1/22/2020 consult [facility] DR Bradford Palacio to discuss removal of migrated stents, surgery was scheduled    2/11/2020 Had EGD; Laparoscopic REMOVAL OF ESOPHAGEAL STENTS X 3    4/22/2020 f/u with Dr Leander Vidal  Recommends continue with trastuzumab (herceptin) every 4 weeks    7/15/20 F/U with med onc:  Based on recent CT scan, he appeared to be in near complete response  12/9/2020 had + cologard test  Was then scheduled for his first Colonoscopy    1/25/21 He was admitted with swallowing difficulty  video barium, showed severe esophageal stricture  Thoracic surgery consulted, EGD biopsy and stent placement was done on 1/26/21  Distal esophagus significantly narrowed  Esophageal stent placed  There is a gastric mass in the antrum that was also biopsied  He is able to tolerate diet  He was discharged home to follow up with thoracic surgery  Colonoscopy was cancelled  2/17/2021 CT surgery f/u  Tolerating new stent  with a pain med regimen of Tylenol and advil  Consider Radiation at this time  2/24/2021 CT scan:    No new measurable disease with no new liver lesion  No new lung nodular mass  Esophageal stent remains in place  Small 5 to 6 mm nonmeasurable lymph node near the GE junction and in the gastrohepatic ligament minimally more pronounced from the previous study       3/3/21 F/u with Dr Leander Vidal  Recommend continue Herceptin q 4 weeks  Consult with Radiation for palliative radiation  Pt agrees to this plan  3/9/21 next infusion  3/31/21 f/u with Dr Jaylen Lerma     Esophageal cancer    8/9/2017 Biopsy    A  Distal esophageal mass, cold biopsy:     - Focal invasive adenocarcinoma arising in a background of high grade dysplasia/adenocarcinoma in situ  - Portion of skeletal muscle with bacterial colonization, favor food material   Her 2 status is positive      8/11/2017 Biopsy    A   Lung, right middle lobe nodule (core needle biopsy):  - Adenocarcinoma with mucinous features     9/13/2017 -  Chemotherapy    FOLFOX-6 with trastuzumab began 9/13/17-2/14/18  Herceptin monotherapy 2/28/18-    trastuzumab (HERCEPTIN) 750 mg in sodium chloride 0 9 % 250 mL chemo infusion, 8 mg/kg, Intravenous, Once, 48 of 56 cycles    Administration: 562 mg (5/15/2019), 562 mg (6/5/2019), 562 mg (6/26/2019), 562 mg (7/24/2019), 562 mg (8/14/2019), 562 mg (9/4/2019), 562 mg (9/25/2019), 562 mg (10/16/2019), 562 mg (11/6/2019), 562 mg (11/27/2019), 562 mg (12/18/2019), 562 mg (1/8/2020), 562 mg (1/29/2020), 562 mg (2/19/2020), 562 mg (3/11/2020), 562 mg (4/1/2020), 562 mg (4/22/2020), 562 mg (5/20/2020), 562 mg (6/17/2020), 562 mg (7/15/2020), 562 mg (8/12/2020), 562 mg (9/9/2020), 562 mg (10/7/2020), 562 mg (11/11/2020), 562 mg (12/10/2020), 562 mg (1/6/2021), 562 mg (2/9/2021)       2/11/2020 Surgery    Foreign bodies, esophageal stents, removal:  -  Grossly identified form bodies compatible with stents,      1/26/2021 Biopsy    EGD and biopsy:    A  Esophageal mass, biopsy:  - Intramucosal carcinoma in a background of high grade columnar epithelial atypia  Associated squamous mucosa with "Pill" esophagitis  Note: Immunohistochemistry for p53 shows a point mutation pattern; iron stain is negative  Note: The H & E slides and immunohistochemistry slides were sent to Dr Jacquelyn MD, pathology department at [facility], for her expert opinion and review     B  Stomach, mass, biopsy:  - Gastric antral mucosa with mild chronic, inactive gastritis  - Negative for intestinal metaplasia, dysplasia or carcinoma  - No Helicobacter pylori is identified on H&E stained slides             Clinical Trial: no      Health Maintenance   Topic Date Due    Hepatitis C Screening  1950   Emaline Folds Medicare Annual Wellness Visit (AWV)  1950    Depression Screening PHQ  12/13/1960    COVID-19 Vaccine (1 of 2) 12/13/1964    BMI: Followup Plan  12/13/1966    DTaP,Tdap,and Td Vaccines (1 - Tdap) 12/13/1969    Colorectal Cancer Screening  12/13/1998    Fall Risk  12/13/2013    Pneumococcal Vaccine: 65+ Years (1 of 1 - PPSV23) 12/13/2013  Influenza Vaccine (1) 2020    BMI: Adult  2022    HIB Vaccine  Aged Out    Hepatitis B Vaccine  Aged Out    IPV Vaccine  Aged Out    Hepatitis A Vaccine  Aged Out    Meningococcal ACWY Vaccine  Aged Out    HPV Vaccine  Aged Out       Past Medical History:   Diagnosis Date    Anxiety     Cancer ([facility] )     Esophageal    Dysphagia     Esophageal abnormality     Esophageal cancer ([facility] )     GERD (gastroesophageal reflux disease)     Hyperlipidemia     Hypertension     Occasional tremors        Past Surgical History:   Procedure Laterality Date    ESOPHAGOGASTRODUODENOSCOPY N/A 2017    Procedure: ESOPHAGOGASTRODUODENOSCOPY (EGD); Surgeon: French Jamil MD;  [facility]: [facility]; Service: Gastroenterology    ESOPHAGOGASTRODUODENOSCOPY N/A 2017    Procedure: ESOPHAGOGASTRODUODENOSCOPY (EGD) w/ stent;  Surgeon: French Jamil MD;  [facility];   Service: Gastroenterology    ESOPHAGOGASTRODUODENOSCOPY N/A 2021    Procedure: ESOPHAGOGASTRODUODENOSCOPY (EGD), BIOPSY, ESOPHAGEAL DILATION,  STENT PLACEMENT;  Surgeon: Andrey Cifuentes MD;  [facility];  Service: Thoracic    LAPAROTOMY N/A 2020    Procedure: EGD; REMOVAL OF ESOPHAGEAL STENTS X 3;  Surgeon: Andrey Cifuentes MD;  [facility];  Service: Thoracic    PORTACATH PLACEMENT      PORTACATH PLACEMENT      UPPER GASTROINTESTINAL ENDOSCOPY      VASCULAR SURGERY      blockage in neck        Family History   Problem Relation Age of Onset    Liver cancer Paternal Uncle        Social History     Tobacco Use    Smoking status: Former Smoker     Packs/day: 1 00     Years: 30 00     Pack years: 30 00     Types: Cigarettes     Quit date: 2008     Years since quittin 4    Smokeless tobacco: Never Used    Tobacco comment: started around age 22    Substance Use Topics    Alcohol use: Not Currently     Frequency: Monthly or less    Drug use: Yes     Types: Marijuana     Comment: has medical card for this          Current Outpatient Medications:     amLODIPine (NORVASC) 10 mg tablet, Take 1 tablet (10 mg total) by mouth daily, Disp: 30 tablet, Rfl: 0    aspirin 81 mg chewable tablet, Chew 81 mg daily, Disp: , Rfl:     LORazepam (ATIVAN) 0 5 mg tablet, Take 1 tablet (0 5 mg total) by mouth 2 (two) times a day as needed for anxiety, Disp: 60 tablet, Rfl: 1    melatonin 3 mg, Take 1 tablet (3 mg total) by mouth daily at bedtime, Disp: 30 each, Rfl: 0    metoprolol tartrate (LOPRESSOR) 50 mg tablet, Take 50 mg by mouth 2 (two) times a day, Disp: , Rfl:     Multiple Vitamin (MULTIVITAMIN) tablet, Take 1 tablet by mouth daily, Disp: , Rfl:     ondansetron (ZOFRAN-ODT) 4 mg disintegrating tablet, Take 1 tablet (4 mg total) by mouth every 6 (six) hours as needed for nausea or vomiting, Disp: 20 tablet, Rfl: 0    pantoprazole (PROTONIX) 40 mg tablet, Take 1 tablet by mouth daily in the early morning, Disp: 30 tablet, Rfl: 0    simvastatin (ZOCOR) 40 mg tablet, Take 40 mg by mouth daily at bedtime, Disp: , Rfl:     Allergies   Allergen Reactions    Other      Cat Dander        Review of Systems:  Review of Systems   Constitutional: Positive for appetite change (poor appetite and pain after eating since stent placed ), fatigue and unexpected weight change (5-10 lb weight loss with recent stent placement)  HENT: Positive for trouble swallowing (tolerating soft foods and cutting up food into small pieces  Supplements with nutrition supplement drinks and muscle milk)  Eyes: Negative  Respiratory: Negative  Cardiovascular: Negative  Gastrointestinal: Positive for abdominal pain (pain after eating) and nausea (taking zofran prn)  Endocrine: Negative  Genitourinary: Negative  Musculoskeletal: Positive for arthralgias (hands )  Skin: Negative  Allergic/Immunologic: Negative  Neurological: Positive for light-headedness (mild ) and numbness (fingers and feet )  Hematological: Negative  Psychiatric/Behavioral: Negative  Vitals:    03/05/21 0932   BP: 108/64   BP Location: Right arm   Pulse: 74   Resp: 18   Temp: 97 9 °F (36 6 °C)   TempSrc: Temporal   SpO2: 98%   Weight: 88 9 kg (196 lb)            Pain assessment: 7/10 worsening after eating    PFT: n/a    Imaging:No images are attached to the encounter       Teaching: NCI radiation packet, palliative radiation     MST completed    Implantable Devices (Port, pacemaker, pain stimulator): RCW port    Hip Replacement: n/a

## 2021-03-05 NOTE — TELEPHONE ENCOUNTER
Contacted [facility] to set up nutrition consultation after receiving notification by [facility] RN (Kiana Andrews ) on 3/5/21 that pt has triggered for oncology nutrition care (reason for referral: Esophageal CA dx and Malnutrition Screening Tool (MST) Triggers: scored a 2 indicating 2-13# (0 9-6 kg) recent wt loss and is eating poorly due to a decreased appetite  (Date of MST: 3/5/21) MST Notes: "about 5 lbs since esophageal stent placed the end of Jan 2021" and "pain after eating")  No answer  Left voice message with the reason for today's call and this RDs contact information asking that Stef[facility] [place] call back as able/desired  Oncology Diagnosis & Treatments:  Oncology History   Esophageal cancer    8/9/2017 Biopsy    A  Distal esophageal mass, cold biopsy:     - Focal invasive adenocarcinoma arising in a background of high grade dysplasia/adenocarcinoma in situ  - Portion of skeletal muscle with bacterial colonization, favor food material   Her 2 status is positive      8/11/2017 Biopsy    A   Lung, right middle lobe nodule (core needle biopsy):  - Adenocarcinoma with mucinous features     9/13/2017 -  Chemotherapy    FOLFOX-6 with trastuzumab began 9/13/17-2/14/18  Herceptin monotherapy 2/28/18-    trastuzumab (HERCEPTIN) 750 mg in sodium chloride 0 9 % 250 mL chemo infusion, 8 mg/kg, Intravenous, Once, 48 of 56 cycles    Administration: 562 mg (5/15/2019), 562 mg (6/5/2019), 562 mg (6/26/2019), 562 mg (7/24/2019), 562 mg (8/14/2019), 562 mg (9/4/2019), 562 mg (9/25/2019), 562 mg (10/16/2019), 562 mg (11/6/2019), 562 mg (11/27/2019), 562 mg (12/18/2019), 562 mg (1/8/2020), 562 mg (1/29/2020), 562 mg (2/19/2020), 562 mg (3/11/2020), 562 mg (4/1/2020), 562 mg (4/22/2020), 562 mg (5/20/2020), 562 mg (6/17/2020), 562 mg (7/15/2020), 562 mg (8/12/2020), 562 mg (9/9/2020), 562 mg (10/7/2020), 562 mg (11/11/2020), 562 mg (12/10/2020), 562 mg (1/6/2021), 562 mg (2/9/2021)       2/11/2020 Surgery    Foreign bodies, esophageal stents, removal:  -  Grossly identified form bodies compatible with stents,      1/26/2021 Biopsy    EGD and biopsy:    A  Esophageal mass, biopsy:  - Intramucosal carcinoma in a background of high grade columnar epithelial atypia  Associated squamous mucosa with "Pill" esophagitis  Note: Immunohistochemistry for p53 shows a point mutation pattern; iron stain is negative  Note: The H & E slides and immunohistochemistry slides were sent to Dr Hellen Hall MD, pathology department at [facility], for her expert opinion and review     B  Stomach, mass, biopsy:  - Gastric antral mucosa with mild chronic, inactive gastritis  - Negative for intestinal metaplasia, dysplasia or carcinoma  - No Helicobacter pylori is identified on H&E stained slides

## 2021-03-05 NOTE — PROGRESS NOTES
Consultation - Radiation Oncology      [facility]:[id number] : 1950  Encounter: [id number]  Patient Information: [address]  Chief Complaint   Patient presents with    Consult     Radiation Oncology      Cancer Staging  No matching staging information was found for the patient  History of Present Illness   J Carlos Marquez is a 65y o  year old male who presents with A history of metastatic esophageal cancer diagnosed initially in   He has been on systemic therapy since that time and currently has minimal if any systemic disease burden  However, he does have persistent disease in the distal esophagus, recently leading to progressive dysphagia and stent placement  He has been referred for consideration of radiation therapy to the esophagus in attempt to obtain local control disease  He remains on Herceptin  Workup to date as below:    Pt is referred to our department by Dr Marquis Marina, for consideration of receiving palliative radiation, for esophageal cancer  Pt initially in 2017, was hospitalized with worsening dysphagia  He underwent EGD at that time, that revealed stricture, with ulcerations, that were biopsy proven adenocarcinoma  He had a stent placed at that time  Further work up revealed biopsy proven metastatic diease with multiple lung mets as well as retroperitoneal adenopathy  Esophageal Her 2/ FISH analysis , by IHC was positive   Following stent placement he was able to swallow  He then began chemotherapy under the direction of Dr Rakel Kelly, on 17 trastuzumab and FOLFOX-6  He completed 12 cycles with good partial response  He then began Trastuzumab monotherapy as maintenance therapy, 18 Ct scan showed no new metastatic lesions  He then continued to receive Herception Monotherapy q 3 weeks     Ct scan showed no new disease    19 had ct scan, had new sx of dysphagia/ having EGD    7/3/19 EGD: previous stent had migrated to stomach  Single invasive mass (traversable after intervention) measuring 6 mm, covering the whole circumference; placed covered stent with length of 23 mm at proximal edge using fluoroscopic guidance    8/1/19 he now had 3 migrated stents  One obstructing the pylorus  It was advised he undergo laparoscopic extraction   He would need to be off chemo for 6 weeks, if he would undergo surgery  12/2019 ct scan: Stable exam   Numerous stable pulmonary nodules  No evidence of recurrent or new metastatic disease within the abdomen or pelvis  There are now three migrated esophageal stents visible in the stomach  Mild inflammation the region of the pylorus and 1st portion of duodenum  1/22/2020 consult [facility] DR Vasyl Lockwood to discuss removal of migrated stents, surgery was scheduled    2/11/2020 Had EGD; Laparoscopic REMOVAL OF ESOPHAGEAL STENTS X 3    4/22/2020 f/u with Dr Lisa Gordillo  Recommends continue with trastuzumab (herceptin) every 4 weeks    7/15/20 F/U with med onc:  Based on recent CT scan, he appeared to be in near complete response  12/9/2020 had + cologard test  Was then scheduled for his first Colonoscopy    1/25/21 He was admitted with swallowing difficulty  video barium, showed severe esophageal stricture  Thoracic surgery consulted, EGD biopsy and stent placement was done on 1/26/21  Distal esophagus significantly narrowed  Esophageal stent placed  There is a gastric mass in the antrum that was also biopsied  He is able to tolerate diet  He was discharged home to follow up with thoracic surgery  Colonoscopy was cancelled  2/17/2021 CT surgery f/u  Tolerating new stent  with a pain med regimen of Tylenol and advil  Consider Radiation at this time       2/24/2021 CT scan:    No new measurable disease with no new liver lesion  No new lung nodular mass  Esophageal stent remains in place  Small 5 to 6 mm nonmeasurable lymph node near the GE junction and in the gastrohepatic ligament minimally more pronounced from the previous study       3/3/21 F/u with Dr Pamela Contreras  Recommend continue Herceptin q 4 weeks  Consult with Radiation for palliative radiation  Pt agrees to this plan  3/9/21 next infusion  3/31/21 f/u with Dr Gallo Reas   Oncology History   Esophageal cancer    8/9/2017 Biopsy    A  Distal esophageal mass, cold biopsy:     - Focal invasive adenocarcinoma arising in a background of high grade dysplasia/adenocarcinoma in situ  - Portion of skeletal muscle with bacterial colonization, favor food material   Her 2 status is positive      8/11/2017 Biopsy    A  Lung, right middle lobe nodule (core needle biopsy):  - Adenocarcinoma with mucinous features     9/13/2017 -  Chemotherapy    FOLFOX-6 with trastuzumab began 9/13/17-2/14/18  Herceptin monotherapy 2/28/18-    trastuzumab (HERCEPTIN) 750 mg in sodium chloride 0 9 % 250 mL chemo infusion, 8 mg/kg, Intravenous, Once, 48 of 56 cycles    Administration: 562 mg (5/15/2019), 562 mg (6/5/2019), 562 mg (6/26/2019), 562 mg (7/24/2019), 562 mg (8/14/2019), 562 mg (9/4/2019), 562 mg (9/25/2019), 562 mg (10/16/2019), 562 mg (11/6/2019), 562 mg (11/27/2019), 562 mg (12/18/2019), 562 mg (1/8/2020), 562 mg (1/29/2020), 562 mg (2/19/2020), 562 mg (3/11/2020), 562 mg (4/1/2020), 562 mg (4/22/2020), 562 mg (5/20/2020), 562 mg (6/17/2020), 562 mg (7/15/2020), 562 mg (8/12/2020), 562 mg (9/9/2020), 562 mg (10/7/2020), 562 mg (11/11/2020), 562 mg (12/10/2020), 562 mg (1/6/2021), 562 mg (2/9/2021)       2/11/2020 Surgery    Foreign bodies, esophageal stents, removal:  -  Grossly identified form bodies compatible with stents,      1/26/2021 Biopsy    EGD and biopsy:    A  Esophageal mass, biopsy:  - Intramucosal carcinoma in a background of high grade columnar epithelial atypia  Associated squamous mucosa with "Pill" esophagitis  Note: Immunohistochemistry for p53 shows a point mutation pattern; iron stain is negative  Note: The H & E slides and immunohistochemistry slides were sent to Dr Rendell Kawasaki, MD, pathology department at [facility], for her expert opinion and review     B  Stomach, mass, biopsy:  - Gastric antral mucosa with mild chronic, inactive gastritis  - Negative for intestinal metaplasia, dysplasia or carcinoma  - No Helicobacter pylori is identified on H&E stained slides  Past Medical History:   Diagnosis Date    Anxiety     Cancer [facility])     Esophageal    Dysphagia     Esophageal abnormality     Esophageal cancer (HCC)     GERD (gastroesophageal reflux disease)     Hyperlipidemia     Hypertension     Occasional tremors      Past Surgical History:   Procedure Laterality Date    ESOPHAGOGASTRODUODENOSCOPY N/A 8/9/2017    Procedure: ESOPHAGOGASTRODUODENOSCOPY (EGD); Surgeon: Krish Lange MD;  Location: [facility]; Service: Gastroenterology    ESOPHAGOGASTRODUODENOSCOPY N/A 8/11/2017    Procedure: ESOPHAGOGASTRODUODENOSCOPY (EGD) w/ stent;  Surgeon: Krish Lange MD;  Location: [facility];   Service: Gastroenterology    ESOPHAGOGASTRODUODENOSCOPY N/A 1/26/2021    Procedure: ESOPHAGOGASTRODUODENOSCOPY (EGD), BIOPSY, ESOPHAGEAL DILATION,  STENT PLACEMENT;  Surgeon: Marques Whipple MD;  [facility]: [facility];  Service: Thoracic    LAPAROTOMY N/A 2/11/2020    Procedure: EGD; REMOVAL OF ESOPHAGEAL STENTS X 3;  Surgeon: Marques Whipple MD;  [facility];  Service: Thoracic    PORTACATH PLACEMENT      PORTACATH PLACEMENT      UPPER GASTROINTESTINAL ENDOSCOPY      VASCULAR SURGERY  2008    blockage in neck        Family History   Problem Relation Age of Onset    Liver cancer Paternal Uncle        Social History   Social History     Substance and Sexual Activity   Alcohol Use Not Currently    Frequency: Monthly or less     Social History     Substance and Sexual Activity   Drug Use Yes    Types: Marijuana    Comment: has medical card for this     Social History Tobacco Use   Smoking Status Former Smoker    Packs/day:     Years: 30 00    Pack years: 30 00    Types: Cigarettes    Quit date: 2008    Years since quittin 4   Smokeless Tobacco Never Used   Tobacco Comment    started around age 22          Meds/Allergies     Current Outpatient Medications:     amLODIPine (NORVASC) 10 mg tablet, Take 1 tablet (10 mg total) by mouth daily, Disp: 30 tablet, Rfl: 0    aspirin 81 mg chewable tablet, Chew 81 mg daily, Disp: , Rfl:     LORazepam (ATIVAN) 0 5 mg tablet, Take 1 tablet (0 5 mg total) by mouth 2 (two) times a day as needed for anxiety, Disp: 60 tablet, Rfl: 1    melatonin 3 mg, Take 1 tablet (3 mg total) by mouth daily at bedtime, Disp: 30 each, Rfl: 0    metoprolol tartrate (LOPRESSOR) 50 mg tablet, Take 50 mg by mouth 2 (two) times a day, Disp: , Rfl:     Multiple Vitamin (MULTIVITAMIN) tablet, Take 1 tablet by mouth daily, Disp: , Rfl:     ondansetron (ZOFRAN-ODT) 4 mg disintegrating tablet, Take 1 tablet (4 mg total) by mouth every 6 (six) hours as needed for nausea or vomiting, Disp: 20 tablet, Rfl: 0    pantoprazole (PROTONIX) 40 mg tablet, Take 1 tablet by mouth daily in the early morning, Disp: 30 tablet, Rfl: 0    simvastatin (ZOCOR) 40 mg tablet, Take 40 mg by mouth daily at bedtime, Disp: , Rfl:     Lidocaine Viscous HCl (XYLOCAINE) 2 % mucosal solution, Swish and swallow 15 mL 4 (four) times a day as needed for mouth pain or discomfort, Disp: 100 mL, Rfl: 3    sucralfate (CARAFATE) 1 g/10 mL suspension, Take 10 mL (1 g total) by mouth 4 (four) times a day, Disp: 420 mL, Rfl: 3  Allergies   Allergen Reactions    Other      Cat Dander         Review of Systems   Review of Systems   Constitutional: Positive for appetite change (poor appetite and pain after eating since stent placed ), fatigue and unexpected weight change (5-10 lb weight loss with recent stent placement)     HENT: Positive for trouble swallowing (tolerating soft foods and cutting up food into small pieces  Supplements with nutrition supplement drinks and muscle milk)  Eyes: Negative  Respiratory: Negative  Cardiovascular: Negative  Gastrointestinal: Positive for abdominal pain (pain after eating) and nausea (taking zofran prn)  Endocrine: Negative  Genitourinary: Negative  Musculoskeletal: Positive for arthralgias (hands )  Skin: Negative  Allergic/Immunologic: Negative  Neurological: Positive for light-headedness (mild ) and numbness (fingers and feet )  Hematological: Negative  Psychiatric/Behavioral: Negative  OBJECTIVE:   /64 (BP Location: Right arm)   Pulse 74   Temp 97 9 °F (36 6 °C) (Temporal)   Resp 18   Wt 88 9 kg (196 lb)   SpO2 98%   BMI 30 70 kg/m²   Pain Assessment:  0  Performance Status: Karnofsky: 90 - Able to carry on normal activity; minor signs or symptoms of disease     Physical Exam     The patient presents today no apparent distress  Sclera anicteric  No palpable cervical or supraclavicular lymphadenopathy  Lungs clear to auscultation bilaterally  Normal S1-S2 regular rate and rhythm  Normal speech  Normal affect  RESULTS  Lab Results    Chemistry        Component Value Date/Time    K 4 4 02/19/2021 1541     02/19/2021 1541    CO2 28 02/19/2021 1541    CO2 25 08/09/2017 1457    BUN 16 02/19/2021 1541    CREATININE 1 26 02/19/2021 1541        Component Value Date/Time    CALCIUM 8 7 02/19/2021 1541    ALKPHOS 86 02/19/2021 1541    AST 14 02/19/2021 1541    ALT 22 02/19/2021 1541            Lab Results   Component Value Date    WBC 8 16 02/19/2021    HGB 14 2 02/19/2021    HCT 43 3 02/19/2021    MCV 94 02/19/2021     02/19/2021         Imaging Studies  Xr Chest Pa & Lateral    Result Date: 2/12/2021  Narrative: CHEST INDICATION:   R13 10: Dysphagia, unspecified T85 528A: Displacement of other gastrointestinal prosthetic devices, implants and grafts, initial encounter  COMPARISON:  January 26, 2021 EXAM PERFORMED/VIEWS:  XR CHEST PA & LATERAL  The frontal view was performed utilizing dual energy radiographic technique  FINDINGS:  Right chest wall Port-A-Cath with esophageal stent also noted in place  Cardiomediastinal silhouette appears unremarkable  The lungs are clear  No pneumothorax or pleural effusion  Osseous structures appear within normal limits for patient age  Impression: No acute cardiopulmonary disease  Workstation performed: [id number]     Ct Chest Abdomen Pelvis W Contrast    Result Date: 3/1/2021  Narrative: CT CHEST, ABDOMEN AND PELVIS WITH IV CONTRAST INDICATION:   C15 5: Malignant neoplasm of lower third of esophagus R13 10: Dysphagia, unspecified  COMPARISON:  CT from July 6, 2020 TECHNIQUE: CT examination of the chest, abdomen and pelvis was performed  Axial, sagittal, and coronal 2D reformatted images were created from the source data and submitted for interpretation  Radiation dose length product (DLP) for this visit:  704 mGy-cm   This examination, like all CT scans performed in the [facility], was performed utilizing techniques to minimize radiation dose exposure, including the use of iterative reconstruction and automated exposure control  IV Contrast:  100 mL of iohexol (OMNIPAQUE) Enteric Contrast: Enteric contrast was administered  FINDINGS: CHEST LUNGS:  Again noted is peripheral reticulation with mild arthritic distortion and minimal groundglass density related to interstitial lung disease, unchanged A lingular region nodule, seen in image 73, series 3, stable Perifissural nodules representing intrapulmonary lymph node along the left fissure, stable A linear appearing density seen in the left lower lobe in image 50 series 604, along the fissure, does not have a masslike appearance probably scarring or fissural thickening PLEURA:  Unremarkable  HEART/GREAT VESSELS:  Ascending aorta measures 3 7 cm   Diffuse coronary artery calcification seen MEDIASTINUM AND ZAYNAB:  Thickening of the mid to lower thoracic esophagus seen  An esophageal stent is in place  Small periesophageal lymph nodes are noted, 5 to 6 mm near the GE junction, mildly more pronounced  These are seen in image 53 series 2 CHEST WALL AND LOWER NECK:   Unremarkable  ABDOMEN LIVER/BILIARY TREE:  Unremarkable  GALLBLADDER:  No calcified gallstones  No pericholecystic inflammatory change  SPLEEN:  Unremarkable  PANCREAS:  Unremarkable  ADRENAL GLANDS:  Unremarkable  KIDNEYS/URETERS:  Unremarkable  No hydronephrosis  STOMACH AND BOWEL:  Diverticulosis seen No abnormal dilation of the small bowel loops seen APPENDIX:  No findings to suggest appendicitis  ABDOMINOPELVIC CAVITY:  No ascites  No pneumoperitoneum  No lymphadenopathy  VESSELS:  Unremarkable for patient's age  Aortic ectasia in the infrarenal aorta measuring 2 4 cm PELVIS REPRODUCTIVE ORGANS:  Unremarkable for patient's age  URINARY BLADDER:  Unremarkable  ABDOMINAL WALL/INGUINAL REGIONS:  Umbilical hernia containing fat OSSEOUS STRUCTURES:  No acute fracture or destructive osseous lesion  Impression: No new measurable disease with no new liver lesion No new lung nodular mass Esophageal stent remains in place Small 5 to 6 mm nonmeasurable lymph node near the GE junction and in the gastrohepatic ligament minimally more pronounced from the previous study Workstation performed: [id number]     ASSESSMENT  1  Malignant neoplasm of lower third of esophagus (HCC)  sucralfate (CARAFATE) 1 g/10 mL suspension    Lidocaine Viscous HCl (XYLOCAINE) 2 % mucosal solution     Cancer Staging  No matching staging information was found for the patient  PLAN/DISCUSSION  No orders of the defined types were placed in this encounter  Kimberley Olmos is a 65y o  year old male with   A history of metastatic esophageal cancer diagnosed in 2017  He has never received radiation therapy    He remains on Herceptin monotherapy with minimal  If any systemic disease burden, but does have persistent/ progressive local disease in the distal esophagus  This recently lead to increased dysphagia requiring stent placement  While the stent does allow him to swallow, he does have significant pain with eating that often last for a few hours following completion of a meal   Given his excellent response to date and minimal if any systemic disease burden, with progressive primary disease in the distal esophagus, we believe it is appropriate to deliver palliative radiation therapy directed at the distal esophagus in an attempt to minimize the residual tumor burden with the hope that he can have the stent removed and would not need it replaced  While a typical palliative course can be delivered over 2-3 weeks, given his prolonged disease course with minimal systemic burden, we have recommended a full 5 weeks of treatment in order to hopefully provide much more durable local control  The associated risks and toxicities of treatment were discussed with the patient in detail, including, not limited to, fatigue, esophagitis, parenchymal lung scarring, cardiac toxicity, pneumonitis, and esophageal injury such as benign stricture [place]  The patient will return shortly for CT planning with treatment to begin approximately 1 week thereafter  Verona Murphy MD  3/5/2021,11:07 AM      Portions of the record may have been created with voice recognition software  Occasional wrong word or "sound a like" substitutions may have occurred due to the inherent limitations of voice recognition software  Read the chart carefully and recognize, using context, where substitutions have occurred

## 2021-03-09 NOTE — TELEPHONE ENCOUNTER
Second attempt made today to reach Julia Aguiar to establish care and discuss his nutrition  No answer  Left a voice message requesting a call back at [facility]

## 2021-03-09 NOTE — PROGRESS NOTES
Patient tolerated his chemo without any adverse reactions   Next appointment confirmed and he declined avs

## 2021-03-09 NOTE — PROGRESS NOTES
Patient is here for chemo  He offers no complaints at this time   No labs required and last echo on 7/8/20 EF=60%

## 2021-03-10 NOTE — TELEPHONE ENCOUNTER
Received voice message from pt returning this RD's call  Called pt back  No answer  Left another voice message requesting a call back with my availability to talk over the next 2 days

## 2021-03-16 NOTE — TELEPHONE ENCOUNTER
Fourth attempt made today to reach pt  Phone call went straight to voice mailbox  Left another voice message requesting that pt please return my call if he would like to set up a nutrition consultation    Will remain available per pt request

## 2021-03-23 NOTE — PROGRESS NOTES
Spoke to Jannet Burton today in [address] [place] [address] to discuss his nutrition after receiving notification by [address] [place] [address] RN on 3/23/21 that pt is appropriate for oncology nutrition care (reason for referral: Esophageal CA dx)  Jannet Burton reports that he has been consuming softer foods and liquids  He states he is able to eat anything that he can cut or chop  He also has been consuming Muscle Milk 3x daily  He does report some constipation, reviewed importance of hydration  Reviewed soft/easy to swallow foods that are high in calories and protein to try, suggestions include:   -casseroles  -chicken/egg/tuna salad with extra hickey to add calories and moisture   -oatmeal/cream of wheat made with whole milk  -cottage cheese, greek yogurt  -scrambled eggs with cheese  -macaroni and cheese  -mashed potatoes made with butter and whole milk  -peanut butter  -cream soups (cream of chicken, cream of mushroom, broccoli cheddar)  -pudding, custard, ice cream   -banana  -milkshakes/smoothies  -Review page 52 of Eating Hints Book for more ideas      Provided this RDs contact information asking that Jannet Burton reach out with nutrition questions/concerns  Will touch base in near future if pt does not reach out

## 2021-03-31 NOTE — ASSESSMENT & PLAN NOTE
Mr Elo Prasad is stable from a thoracic surgery standpoint  He is getting food down and his odynophagia has improved with use of Tylenol TID  Dr Man Pelletier would like to plan for EGD with stent removal, possible dilation, and possible stent replacement on April 26, 2021  This will be right after he finishes radiation and prior to his May immunotherapy infusion  He will undergo an EKG and some blood work prior to the procedure  He is in agreement with the plan

## 2021-03-31 NOTE — PROGRESS NOTES
Thoracic Follow-Up  Assessment/Plan:    Esophageal stricture  Mr Lynelle Schilder is stable from a thoracic surgery standpoint  He is getting food down and his odynophagia has improved with use of Tylenol TID  Dr Claudeen Allis would like to plan for EGD with stent removal, possible dilation, and possible stent replacement on April 26, 2021  This will be right after he finishes radiation and prior to his May immunotherapy infusion  He will undergo an EKG and some blood work prior to the procedure  He is in agreement with the plan  Diagnoses and all orders for this visit:    Need for follow-up by   -     Ambulatory referral to social work care management program; Future    Esophageal stricture  -     EKG 12 lead; Future  -     Type and screen; Future  -     APTT; Future  -     Protime-INR; Future  -     Case request operating room: ESOPHAGOGASTRODUODENOSCOPY (EGD); stent removal, INSERTION STENT ESOPHAGEAL, DILATATION ESOPHAGEAL; Standing  -     Case request operating room: ESOPHAGOGASTRODUODENOSCOPY (EGD); stent removal, INSERTION STENT ESOPHAGEAL, DILATATION ESOPHAGEAL    Other orders  -     Diet NPO; Sips with meds; Standing  -     Void on call to OR; Standing  -     Insert peripheral IV; Standing  -     Place sequential compression device; Standing             Thoracic History       Diagnosis: Esophageal carcinoma with stenosis   Procedure: EGD with dilation and stent placement 1/26/21      Patient ID: Negar Mancuso is a 65 y o  male  ECOG 1     HPI    Mr Lynelle Schilder is a 66 yo gentleman with 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 s/p FOLFOX-6, was on trastuzumab monotherapy who was evaluated in the hospital in January 2021 for dysphagia  He underwent an EGD with dilation and stent placement on 1/26/21  Biopsies of the mass revealed intramucosal carcinoma  He was last seen on 2/17/21, at which point he was having odynophagia   Dr Claudeen Allis recommended a follow up appt in 6 weeks with a cxr to discuss removal of the stent  He returns today, with a cxr from 3/30/21  This has not officially been read yet  He has lost 6 lbs since the beginning of the month  He eats slow and feels like he fills up faster  He denies shortness of breath, cough, fever  He continues to take Tylenol 1000 mg tid and feels his odynophagia is somewhat improved  He was seen by Dr Hector James on 3/3/21, at which point he recommended to continue with Trastuzumab every 4 weeks and return to the office in 4 months with a new CT scan  He had an infusion on 3/9/21 and next couple of infusions are scheduled for 4/6/21 and 5/4/21  He was seen by Dr Christine Lundberg on 3/5/21, who recommended palliative radiation therapy to the distal esophagus for a full 5 weeks  This was started on 3/22/21  The following portions of the patient's history were reviewed and updated as appropriate: allergies, current medications, past family history, past medical history, past social history, past surgical history and problem list     Past Medical History:   Diagnosis Date    Anxiety     [facility]     Esophageal    Dysphagia     Esophageal abnormality     [facility] ([facility] )     GERD (gastroesophageal reflux disease)     Hyperlipidemia     Hypertension     Occasional tremors      Past Surgical History:   Procedure Laterality Date    ESOPHAGOGASTRODUODENOSCOPY N/A 8/9/2017    Procedure: ESOPHAGOGASTRODUODENOSCOPY (EGD); Surgeon: Sveta Wagner MD;  [facility]; Service: Gastroenterology    ESOPHAGOGASTRODUODENOSCOPY N/A 8/11/2017    Procedure: ESOPHAGOGASTRODUODENOSCOPY (EGD) w/ stent;  Surgeon: Sveta Wagner MD;  [facility];   Service: Gastroenterology    ESOPHAGOGASTRODUODENOSCOPY N/A 1/26/2021    Procedure: ESOPHAGOGASTRODUODENOSCOPY (EGD), BIOPSY, ESOPHAGEAL DILATION,  STENT PLACEMENT;  Surgeon: Gabrielle Bey MD;  [facility];  Service: Thoracic    LAPAROTOMY N/A 2/11/2020    Procedure: EGD; REMOVAL OF ESOPHAGEAL STENTS X 3;  Surgeon: Louise River MD;  [facility];  Service: Thoracic    PORTACATH PLACEMENT      PORTACATH PLACEMENT      UPPER GASTROINTESTINAL ENDOSCOPY      VASCULAR SURGERY  2008    blockage in neck      Family History   Problem Relation Age of Onset    Liver cancer Paternal Uncle        Social History     Social History Narrative    Not on file           Allergies   Allergen Reactions    Other      Cat Dander     Current Outpatient Medications on File Prior to Visit   Medication Sig Dispense Refill    amLODIPine (NORVASC) 10 mg tablet Take 1 tablet (10 mg total) by mouth daily 30 tablet 0    aspirin 81 mg chewable tablet Chew 81 mg daily      Lidocaine Viscous HCl (XYLOCAINE) 2 % mucosal solution Swish and swallow 15 mL 4 (four) times a day as needed for mouth pain or discomfort 100 mL 3    LORazepam (ATIVAN) 0 5 mg tablet Take 1 tablet (0 5 mg total) by mouth 2 (two) times a day as needed for anxiety 60 tablet 1    melatonin 3 mg Take 1 tablet (3 mg total) by mouth daily at bedtime 30 each 0    metoprolol tartrate (LOPRESSOR) 50 mg tablet Take 50 mg by mouth 2 (two) times a day      Multiple Vitamin (MULTIVITAMIN) tablet Take 1 tablet by mouth daily      ondansetron (ZOFRAN-ODT) 4 mg disintegrating tablet Take 1 tablet (4 mg total) by mouth every 6 (six) hours as needed for nausea or vomiting 20 tablet 0    pantoprazole (PROTONIX) 40 mg tablet Take 1 tablet by mouth daily in the early morning 30 tablet 0    simvastatin (ZOCOR) 40 mg tablet Take 40 mg by mouth daily at bedtime      sucralfate (CARAFATE) 1 g/10 mL suspension Take 10 mL (1 g total) by mouth 4 (four) times a day 420 mL 3     No current facility-administered medications on file prior to visit  Review of Systems   Constitutional: Positive for unexpected weight change  Negative for chills and fever  HENT: Positive for trouble swallowing  Negative for congestion, sore throat and voice change  Respiratory: Negative for cough and shortness of breath  Cardiovascular: Negative for chest pain  Gastrointestinal: Negative for abdominal pain  Musculoskeletal: Negative for back pain and gait problem  Neurological: Negative for dizziness and headaches  Hematological: Negative for adenopathy  Psychiatric/Behavioral: Negative for agitation, behavioral problems and confusion  All other systems reviewed and are negative  Objective:   Physical Exam  Vitals signs reviewed  Constitutional:       General: He is not in acute distress  Appearance: Normal appearance  He is not toxic-appearing  HENT:      Head:      Comments: Hat on      Mouth/Throat:      Comments: Masked   Eyes:      General: No scleral icterus  Extraocular Movements: Extraocular movements intact  Neck:      Musculoskeletal: Normal range of motion and neck supple  Cardiovascular:      Rate and Rhythm: Normal rate and regular rhythm  Heart sounds: Normal heart sounds  No murmur  Pulmonary:      Effort: Pulmonary effort is normal  No respiratory distress  Breath sounds: Normal breath sounds  No wheezing  Skin:     General: Skin is warm and dry  Neurological:      Mental Status: He is alert and oriented to person, place, and time  Cranial Nerves: No cranial nerve deficit  Psychiatric:         Mood and Affect: Mood normal          Behavior: Behavior normal          Thought Content: Thought content normal      /80   Pulse 84   Temp 97 8 °F (36 6 °C) (Tympanic)   Resp 16   Ht 5' 7" (1 702 m)   Wt 87 5 kg (192 lb 14 4 oz)   SpO2 97%   BMI 30 21 kg/m²       Ct Chest Abdomen Pelvis W Contrast    Result Date: 3/1/2021  Narrative CT CHEST, ABDOMEN AND PELVIS WITH IV CONTRAST INDICATION:   C15 5: Malignant neoplasm of lower third of esophagus R13 10: Dysphagia, unspecified   COMPARISON:  CT from July 6, 2020 TECHNIQUE: CT examination of the chest, abdomen and pelvis was performed  Axial, sagittal, and coronal 2D reformatted images were created from the source data and submitted for interpretation  Radiation dose length product (DLP) for this visit:  704 mGy-cm   This examination, like all CT scans performed in the [facility], was performed utilizing techniques to minimize radiation dose exposure, including the use of iterative reconstruction and automated exposure control  IV Contrast:  100 mL of iohexol (OMNIPAQUE) Enteric Contrast: Enteric contrast was administered  FINDINGS: CHEST LUNGS:  Again noted is peripheral reticulation with mild arthritic distortion and minimal groundglass density related to interstitial lung disease, unchanged A lingular region nodule, seen in image 73, series 3, stable Perifissural nodules representing intrapulmonary lymph node along the left fissure, stable A linear appearing density seen in the left lower lobe in image 50 series 604, along the fissure, does not have a masslike appearance probably scarring or fissural thickening PLEURA:  Unremarkable  HEART/GREAT VESSELS:  Ascending aorta measures 3 7 cm  Diffuse coronary artery calcification seen MEDIASTINUM AND ZAYNAB:  Thickening of the mid to lower thoracic esophagus seen  An esophageal stent is in place  Small periesophageal lymph nodes are noted, 5 to 6 mm near the GE junction, mildly more pronounced  These are seen in image 53 series 2 CHEST WALL AND LOWER NECK:   Unremarkable  ABDOMEN LIVER/BILIARY TREE:  Unremarkable  GALLBLADDER:  No calcified gallstones  No pericholecystic inflammatory change  SPLEEN:  Unremarkable  PANCREAS:  Unremarkable  ADRENAL GLANDS:  Unremarkable  KIDNEYS/URETERS:  Unremarkable  No hydronephrosis  STOMACH AND BOWEL:  Diverticulosis seen No abnormal dilation of the small bowel loops seen APPENDIX:  No findings to suggest appendicitis  ABDOMINOPELVIC CAVITY:  No ascites  No pneumoperitoneum  No lymphadenopathy  VESSELS:  Unremarkable for patient's age  Aortic ectasia in the infrarenal aorta measuring 2 4 cm PELVIS REPRODUCTIVE ORGANS:  Unremarkable for patient's age  URINARY BLADDER:  Unremarkable  ABDOMINAL WALL/INGUINAL REGIONS:  Umbilical hernia containing fat OSSEOUS STRUCTURES:  No acute fracture or destructive osseous lesion       Impression No new measurable disease with no new liver lesion No new lung nodular mass Esophageal stent remains in place Small 5 to 6 mm nonmeasurable lymph node near the GE junction and in the gastrohepatic ligament minimally more pronounced from the previous study Workstation performed: [id number]

## 2021-03-31 NOTE — H&P (VIEW-ONLY)
Thoracic Follow-Up  Assessment/Plan:    Esophageal stricture  Mr Luis Pathak is stable from a thoracic surgery standpoint  He is getting food down and his odynophagia has improved with use of Tylenol TID  Dr Indira Murphy would like to plan for EGD with stent removal, possible dilation, and possible stent replacement on April 26, 2021  This will be right after he finishes radiation and prior to his May immunotherapy infusion  He will undergo an EKG and some blood work prior to the procedure  He is in agreement with the plan  Diagnoses and all orders for this visit:    Need for follow-up by   -     Ambulatory referral to social work care management program; Future    Esophageal stricture  -     EKG 12 lead; Future  -     Type and screen; Future  -     APTT; Future  -     Protime-INR; Future  -     Case request operating room: ESOPHAGOGASTRODUODENOSCOPY (EGD); stent removal, INSERTION STENT ESOPHAGEAL, DILATATION ESOPHAGEAL; Standing  -     Case request operating room: ESOPHAGOGASTRODUODENOSCOPY (EGD); stent removal, INSERTION STENT ESOPHAGEAL, DILATATION ESOPHAGEAL    Other orders  -     Diet NPO; Sips with meds; Standing  -     Void on call to OR; Standing  -     Insert peripheral IV; Standing  -     Place sequential compression device; Standing             Thoracic History       Diagnosis: Esophageal carcinoma with stenosis   Procedure: EGD with dilation and stent placement 1/26/21      Patient ID: Meghana Archuleta is a 65 y o  male  ECOG 1     HPI    Mr Luis Pathak is a 64 yo gentleman with 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 s/p FOLFOX-6, was on trastuzumab monotherapy who was evaluated in the hospital in January 2021 for dysphagia  He underwent an EGD with dilation and stent placement on 1/26/21  Biopsies of the mass revealed intramucosal carcinoma  He was last seen on 2/17/21, at which point he was having odynophagia   Dr Indira Murphy recommended a follow up appt in 6 weeks with a cxr to discuss removal of the stent  He returns today, with a cxr from 3/30/21  This has not officially been read yet  He has lost 6 lbs since the beginning of the month  He eats slow and feels like he fills up faster  He denies shortness of breath, cough, fever  He continues to take Tylenol 1000 mg tid and feels his odynophagia is somewhat improved  He was seen by Dr Elizabeth Linda on 3/3/21, at which point he recommended to continue with Trastuzumab every 4 weeks and return to the office in 4 months with a new CT scan  He had an infusion on 3/9/21 and next couple of infusions are scheduled for 4/6/21 and 5/4/21  He was seen by Dr Everardo Chapa on 3/5/21, who recommended palliative radiation therapy to the distal esophagus for a full 5 weeks  This was started on 3/22/21  The following portions of the patient's history were reviewed and updated as appropriate: allergies, current medications, past family history, past medical history, past social history, past surgical history and problem list     Past Medical History:   Diagnosis Date    Anxiety     [facility]     Esophageal    Dysphagia     Esophageal abnormality     [facility] ([facility] )     GERD (gastroesophageal reflux disease)     Hyperlipidemia     Hypertension     Occasional tremors      Past Surgical History:   Procedure Laterality Date    ESOPHAGOGASTRODUODENOSCOPY N/A 8/9/2017    Procedure: ESOPHAGOGASTRODUODENOSCOPY (EGD); Surgeon: Jacinda Hernandez MD;  [facility]; Service: Gastroenterology    ESOPHAGOGASTRODUODENOSCOPY N/A 8/11/2017    Procedure: ESOPHAGOGASTRODUODENOSCOPY (EGD) w/ stent;  Surgeon: Jacinda Hernandez MD;  [facility];   Service: Gastroenterology    ESOPHAGOGASTRODUODENOSCOPY N/A 1/26/2021    Procedure: ESOPHAGOGASTRODUODENOSCOPY (EGD), BIOPSY, ESOPHAGEAL DILATION,  STENT PLACEMENT;  Surgeon: Sarika Hartley MD;  [facility];  Service: Thoracic    LAPAROTOMY N/A 2/11/2020    Procedure: EGD; REMOVAL OF ESOPHAGEAL STENTS X 3;  Surgeon: Davida Vigil MD;  [facility];  Service: Thoracic    PORTACATH PLACEMENT      PORTACATH PLACEMENT      UPPER GASTROINTESTINAL ENDOSCOPY      VASCULAR SURGERY  2008    blockage in neck      Family History   Problem Relation Age of Onset    Liver cancer Paternal Uncle        Social History     Social History Narrative    Not on file           Allergies   Allergen Reactions    Other      Cat Dander     Current Outpatient Medications on File Prior to Visit   Medication Sig Dispense Refill    amLODIPine (NORVASC) 10 mg tablet Take 1 tablet (10 mg total) by mouth daily 30 tablet 0    aspirin 81 mg chewable tablet Chew 81 mg daily      Lidocaine Viscous HCl (XYLOCAINE) 2 % mucosal solution Swish and swallow 15 mL 4 (four) times a day as needed for mouth pain or discomfort 100 mL 3    LORazepam (ATIVAN) 0 5 mg tablet Take 1 tablet (0 5 mg total) by mouth 2 (two) times a day as needed for anxiety 60 tablet 1    melatonin 3 mg Take 1 tablet (3 mg total) by mouth daily at bedtime 30 each 0    metoprolol tartrate (LOPRESSOR) 50 mg tablet Take 50 mg by mouth 2 (two) times a day      Multiple Vitamin (MULTIVITAMIN) tablet Take 1 tablet by mouth daily      ondansetron (ZOFRAN-ODT) 4 mg disintegrating tablet Take 1 tablet (4 mg total) by mouth every 6 (six) hours as needed for nausea or vomiting 20 tablet 0    pantoprazole (PROTONIX) 40 mg tablet Take 1 tablet by mouth daily in the early morning 30 tablet 0    simvastatin (ZOCOR) 40 mg tablet Take 40 mg by mouth daily at bedtime      sucralfate (CARAFATE) 1 g/10 mL suspension Take 10 mL (1 g total) by mouth 4 (four) times a day 420 mL 3     No current facility-administered medications on file prior to visit  Review of Systems   Constitutional: Positive for unexpected weight change  Negative for chills and fever  HENT: Positive for trouble swallowing  Negative for congestion, sore throat and voice change  Respiratory: Negative for cough and shortness of breath  Cardiovascular: Negative for chest pain  Gastrointestinal: Negative for abdominal pain  Musculoskeletal: Negative for back pain and gait problem  Neurological: Negative for dizziness and headaches  Hematological: Negative for adenopathy  Psychiatric/Behavioral: Negative for agitation, behavioral problems and confusion  All other systems reviewed and are negative  Objective:   Physical Exam  Vitals signs reviewed  Constitutional:       General: He is not in acute distress  Appearance: Normal appearance  He is not toxic-appearing  HENT:      Head:      Comments: Hat on      Mouth/Throat:      Comments: Masked   Eyes:      General: No scleral icterus  Extraocular Movements: Extraocular movements intact  Neck:      Musculoskeletal: Normal range of motion and neck supple  Cardiovascular:      Rate and Rhythm: Normal rate and regular rhythm  Heart sounds: Normal heart sounds  No murmur  Pulmonary:      Effort: Pulmonary effort is normal  No respiratory distress  Breath sounds: Normal breath sounds  No wheezing  Skin:     General: Skin is warm and dry  Neurological:      Mental Status: He is alert and oriented to person, place, and time  Cranial Nerves: No cranial nerve deficit  Psychiatric:         Mood and Affect: Mood normal          Behavior: Behavior normal          Thought Content: Thought content normal      /80   Pulse 84   Temp 97 8 °F (36 6 °C) (Tympanic)   Resp 16   Ht 5' 7" (1 702 m)   Wt 87 5 kg (192 lb 14 4 oz)   SpO2 97%   BMI 30 21 kg/m²       Ct Chest Abdomen Pelvis W Contrast    Result Date: 3/1/2021  Narrative CT CHEST, ABDOMEN AND PELVIS WITH IV CONTRAST INDICATION:   C15 5: Malignant neoplasm of lower third of esophagus R13 10: Dysphagia, unspecified   COMPARISON:  CT from July 6, 2020 TECHNIQUE: CT examination of the chest, abdomen and pelvis was performed  Axial, sagittal, and coronal 2D reformatted images were created from the source data and submitted for interpretation  Radiation dose length product (DLP) for this visit:  704 mGy-cm   This examination, like all CT scans performed in the [facility], was performed utilizing techniques to minimize radiation dose exposure, including the use of iterative reconstruction and automated exposure control  IV Contrast:  100 mL of iohexol (OMNIPAQUE) Enteric Contrast: Enteric contrast was administered  FINDINGS: CHEST LUNGS:  Again noted is peripheral reticulation with mild arthritic distortion and minimal groundglass density related to interstitial lung disease, unchanged A lingular region nodule, seen in image 73, series 3, stable Perifissural nodules representing intrapulmonary lymph node along the left fissure, stable A linear appearing density seen in the left lower lobe in image 50 series 604, along the fissure, does not have a masslike appearance probably scarring or fissural thickening PLEURA:  Unremarkable  HEART/GREAT VESSELS:  Ascending aorta measures 3 7 cm  Diffuse coronary artery calcification seen MEDIASTINUM AND ZAYNAB:  Thickening of the mid to lower thoracic esophagus seen  An esophageal stent is in place  Small periesophageal lymph nodes are noted, 5 to 6 mm near the GE junction, mildly more pronounced  These are seen in image 53 series 2 CHEST WALL AND LOWER NECK:   Unremarkable  ABDOMEN LIVER/BILIARY TREE:  Unremarkable  GALLBLADDER:  No calcified gallstones  No pericholecystic inflammatory change  SPLEEN:  Unremarkable  PANCREAS:  Unremarkable  ADRENAL GLANDS:  Unremarkable  KIDNEYS/URETERS:  Unremarkable  No hydronephrosis  STOMACH AND BOWEL:  Diverticulosis seen No abnormal dilation of the small bowel loops seen APPENDIX:  No findings to suggest appendicitis  ABDOMINOPELVIC CAVITY:  No ascites  No pneumoperitoneum  No lymphadenopathy  VESSELS:  Unremarkable for patient's age  Aortic ectasia in the infrarenal aorta measuring 2 4 cm PELVIS REPRODUCTIVE ORGANS:  Unremarkable for patient's age  URINARY BLADDER:  Unremarkable  ABDOMINAL WALL/INGUINAL REGIONS:  Umbilical hernia containing fat OSSEOUS STRUCTURES:  No acute fracture or destructive osseous lesion       Impression No new measurable disease with no new liver lesion No new lung nodular mass Esophageal stent remains in place Small 5 to 6 mm nonmeasurable lymph node near the GE junction and in the gastrohepatic ligament minimally more pronounced from the previous study Workstation performed: [id number]

## 2021-04-02 NOTE — PROGRESS NOTES
MSW received a Distress Thermometer form Thoracic Surgery, where the pt self scored an 8 to indicate his level of stress  The pt marked off worry as his psychosocial stressor  He marked off 6 out of 21 physical stressors  MSW made outreach to pt this day and received his voicemail  Message was left, along with a contact number and the pt was encouraged to return the call

## 2021-04-06 NOTE — PROGRESS NOTES
Spoke to Joel Sullivan today in [facility] to touch base in regards to his nutrition  He states that he is doing okay and does not have any questions/concerns at this time, but he will reach out to this RD in the event that changes  He has RD contact info

## 2021-04-06 NOTE — PROGRESS NOTES
Patient is here for chemo  he offers no complaints at this time  no labs required and last echo 7/8/20 EF= 60%

## 2021-04-20 NOTE — PROGRESS NOTES
MSW made second attempt to reach pt following a high scoring Distress Thermometer  MSW received pt's voicemail this day and another message ws left, along with a contact number and the pt was encouraged to return the call

## 2021-04-26 NOTE — ANESTHESIA PREPROCEDURE EVALUATION
Procedure:  ESOPHAGOGASTRODUODENOSCOPY (EGD); stent removal (N/A Esophagus)  INSERTION STENT ESOPHAGEAL (N/A Esophagus)  DILATATION ESOPHAGEAL (N/A Esophagus)  REMOVAL FOREIGN BODY ESOPHAGUS (N/A Esophagus)    Relevant Problems   CARDIO   (+) Essential hypertension      GI/HEPATIC   (+) Dysphagia   (+) Esophageal cancer    (+) GERD (gastroesophageal reflux disease)      /RENAL   (+) Chronic kidney disease stage 3        Physical Exam    Airway    Mallampati score: II  TM Distance: >3 FB  Neck ROM: full     Dental   upper dentures and lower dentures,     Cardiovascular      Pulmonary  Pulmonary exam normal     Other Findings        Anesthesia Plan  ASA Score- 3     Anesthesia Type- general with ASA Monitors  Additional Monitors:   Airway Plan: ETT  Plan Factors-Exercise tolerance (METS): >4 METS  Chart reviewed  Existing labs reviewed  Patient is not a current smoker  Patient not instructed to abstain from smoking on day of procedure  Patient did not smoke on day of surgery  Induction- rapid sequence induction  Postoperative Plan-     Informed Consent- Anesthetic plan and risks discussed with patient  I personally reviewed this patient with the CRNA  Discussed and agreed on the Anesthesia Plan with the CRNA  Hema Pat

## 2021-04-26 NOTE — OP NOTE
OPERATIVE REPORT  PATIENT NAME: Zora Richmond    :  1950  MRN: [id number]  Pt Location:  OR ROOM 08    SURGERY DATE: 2021    Surgeon(s) and Role:     Jillian Poole MD - Primary    Preop Diagnosis:  Esophageal stricture [K22 2]    Post-Op Diagnosis Codes:     * Esophageal stricture [K22 2]    Procedure(s) (LRB):  ESOPHAGOGASTRODUODENOSCOPY (EGD); stent removal (N/A)    Specimen(s):  * No specimens in log *    Estimated Blood Loss:   Minimal    Drains:  * No LDAs found *    Anesthesia Type:   General    Operative Indications:  Esophageal stricture [K252]  68-year-old male with recurrent esophageal cancer status post chemotherapy and radiation  Had a esophageal stent placed in 2021 due to dysphagia    Operative Findings:  Esophageal stent removed with minimal trauma  No significant esophageal narrowing  Able to easily get scope through the esophagus and into the stomach  Minor scarring at the GE junction at the distal aspect of the stent secondary to stent trauma  No definite appreciable masses otherwise  Duodenum and stomach appeared normal     Complications:   None    Procedure and Technique:  After obtaining informed consent the patient was brought back to the operating room placed supine on the OR table  General anesthesia was induced without incident  A formal time-out was performed at this time verifying patient, date of birth, procedure, fire risk score, ASA score, antibiotic usage, need for blood products, anticipate specimens, beta-blocker usage, imaging and any other questions or concerns  At this point in adult endoscope was inserted into the patient's oropharynx and directed down the esophagus  Here we encountered the esophageal stent  This was grasped with a rat-tooth forceps and pulled taut  This was removed through the patient's mouth without issue  Repeat endoscopy at this time was performed  We could clearly see where the stent was in place    There was minimal trauma to the esophageal mucosa  We could easily get through the distal esophagus without significant issue  There is no major strictures  The GE junction was patent and could get through this without air  There was some minor trauma just distal to the GE junction from the distal aspect of affect of the stent  No other appreciable masses  Examination of the duodenum and stomach appeared normal   Retroflexion view again showed this area of stent inflammation but no other concerning findings  The EGD scope was fully withdrawn this time  Patient tolerated this portion of procedure well without complication    They were extubated transferred PACU in stable condition     I was present for the entire procedure    Patient Disposition:  PACU     SIGNATURE: Michael Cortez MD  DATE: April 26, 2021  TIME: 11:32 AM

## 2021-04-26 NOTE — DISCHARGE INSTRUCTIONS
Discharge Instructions  1  Please keep to a soft diet for 4-7 days  2  Follow up with Dr Dang Mcgee in 4-6 weeks    Soft Diet   WHAT YOU NEED TO KNOW:   A soft diet is made up of foods that are soft and easy to chew and swallow  These foods may be chopped, ground, mashed, pureed, and moist  You may need to follow this diet if you have had certain types of surgery, such as head, neck, or stomach surgery  You may also need to follow this diet if you have problems with your teeth or mouth that make it hard for you to chew or swallow food  Your dietitian will tell you how to follow this diet and what consistency of liquids you may have  DISCHARGE INSTRUCTIONS:   How to prepare soft food:   · Cut food into small pieces that are ½ inch or smaller in size because they are easier to swallow  · Use chicken broth, beef broth, gravy, or sauces to cook or moisten meats and vegetables  Cook vegetables until they are soft enough to be mashed with a fork  · Use a  to grind or puree foods to make them easier to chew and swallow  · Use fruit juice to blend fruit  · Strain soups that have pieces of meat or vegetables that are larger than ½ inch  Foods you can include:   · Breads, cereals, rice, and pasta:      ? Breads, muffins, pancakes, or waffles moistened with syrup, jelly, margarine or butter    ? Moist dry or cooked cereal    ? Macaroni, pasta, noodles, or rice    ? Saltine crackers moistened in soup or other liquid    · Fruits and vegetables:      ? Applesauce or canned fruit without seeds or skin    ? Cooked fruits or ripe, soft peeled fruits, such as bananas, peaches, or melon    ? Soft, well-cooked vegetables without seeds or skin    · Meat and other protein sources:      ? Poached, scrambled, or cooked eggs    ? Moist, tender meat, fish, or poultry that is ground or chopped into small pieces    ? Soups with small soft pieces of vegetables and meat    ?  Tofu or well-cooked, slightly mashed, moist legumes, such as baked beans    · Dairy:      ? Cheese (in sauces or melted in other dishes), cottage cheese, or ricotta cheese    ? Milk or milk drinks, milkshakes    ? Ice cream, sherbet, or frozen yogurt without fruit or nuts    ? Yogurt (plain or with soft fruits)    · Desserts:      ? Gelatin dessert with soft canned fruit, pudding, or custard    ? Fruit cobbler with soft breading or crumb mixture (no seeds or nuts), or fruit pie with soft bottom crust only    ? Soft, moist cake or cookie that has been moistened in milk, coffee, or other liquid    Foods to avoid:  Avoid any foods that are hard for you to chew or swallow, such as the following:  · Starches:      ? Dry bread, toast, crackers, and cereal    ? Cereal, cake, and breads with coconut, dried fruit, nuts, and other seeds    ? Corn, potato, and tortilla chips and taco shells    ? Breads with tough crusts, such as bagels, Western Jennifer bread, and sourdough bread    ? Popcorn    · Vegetables:      ? Corn and peas    ? Raw, hard vegetables that cannot be mashed easily, such as carrots, broccoli, cauliflower, and celery    ? Crisp fried vegetables, such as potatoes    · Fruits:      ? Raw, crisp fruits, such as apples and pears and dried fruit    ? Stringy fruits, such as pineapple and jens    ? Cooked fruit with skin and seeds    · Dairy, meats, and protein foods:      ? Yogurt or ice cream with coconut, nuts, and granola    ? Dry meats (beef jerky) and tough meats (such as duval, sausage, hot dogs, and bratwurst)    ? Casseroles with large chunks of meat    ? Peanut butter (creamy and crunchy)    © Copyright [employer] 2020 Information is for End User's use only and may not be sold, redistributed or otherwise used for commercial purposes  All illustrations and images included in CareNotes® are the copyrighted property of A D A M , Inc  or [facility] Sarah Mjeia  The above information is an  only   It is not intended as medical advice for individual conditions or treatments  Talk to your doctor, nurse or pharmacist before following any medical regimen to see if it is safe and effective for you  Upper Endoscopy   AMBULATORY CARE:   What you need to know about an upper endoscopy: An upper endoscopy is also called an upper gastrointestinal (GI) endoscopy, or an esophagogastroduodenoscopy (EGD)  A scope (thin, flexible tube with a light and camera) is used to examine the walls of your upper digestive tract  The upper digestive tract includes the esophagus, stomach, and duodenum (first part of the small intestine)  An upper endoscopy is used to look for problems, such as bleeding, polyps, ulcers, or infection  Today, we removed the stent that was placed in your Esophagus  What happens during an upper endoscopy:   · You will be given medicine through your IV to help you relax and make you drowsy  You will also be given medicine to numb your throat  You may need to wear a plastic mouthpiece to help hold your mouth open and protect your teeth and tongue  Your healthcare provider will gently insert the endoscope through your mouth and down into your throat  You may be asked to swallow once to help move the scope  You may feel pressure in your throat but you should not feel pain  The endoscope does not restrict your breathing  · Your healthcare provider will watch the scope on a monitor  He will take pictures with the scope  He may gently inject air so he can see your digestive tract clearly  Your healthcare provider may take tissue samples and send them to the lab for tests  He may remove foreign objects, tumors, or polyps that may be blocking your digestive tract  Your healthcare provider may also insert tools with the scope to treat bleeding or place a stent (tube)  When the procedure is finished, the endoscope will be slowly removed  What will happen after an upper endoscopy:   You may feel bloated, gassy, or have some abdominal discomfort  Your throat may be sore for 24 to 36 hours after the procedure  You may burp or pass gas from air that is still inside your body after your procedure  You may need to take short walks to help move the gas out  Eat small meals, if you feel bloated  Do not drive or make important decisions until the day after your procedure  Risks of an upper endoscopy: Your esophagus, stomach, or duodenum may be punctured or torn during the procedure  This is because of increased pressure as the scope and air are passing through  You may bleed more than expected or get an infection  You may have a slow or irregular heartbeat, or low blood pressure  This can cause sweating and fainting  Fluid may enter your lungs and you may have trouble breathing  These problems can be life-threatening  Call 911 if:   · You have sudden chest pain or trouble breathing  Seek care immediately if:   · You feel dizzy or faint  · You have trouble swallowing  · You have severe throat pain  · Your bowel movements are very dark or black  · Your abdomen is hard and firm and you have severe pain  · You vomit blood  Contact your healthcare provider if:   · You feel full or bloated and cannot burp or pass gas  · You have not had a bowel movement for 3 days after your procedure  · You have neck pain  · You have a fever or chills  · You have nausea or are vomiting  · You have a rash or hives  · You have questions or concerns about your endoscopy  Relieve a sore throat:  Suck on throat lozenges or crushed ice  Gargle with a small amount of warm salt water  Mix 1 teaspoon of salt and 1 cup of warm water to make salt water  Relieve gas and discomfort from bloating:  Lie on your right side with a heating pad on your abdomen  Take short walks to help pass gas  Eat small meals until bloating is relieved  Rest after your procedure: You have been given medicine to relax you   Do not  drive or make important decisions until the day after your procedure  Return to your normal activity as directed  You can usually return to work the day after your procedure  Follow up with your healthcare provider as directed:  Write down your questions so you remember to ask them during your visits  © Copyright [address] Information is for End User's use only and may not be sold, redistributed or otherwise used for commercial purposes  All illustrations and images included in CareNotes® are the copyrighted property of [employer]  or [facility] [place]note 2] Sarah Conklin   The above information is an  only  It is not intended as medical advice for individual conditions or treatments  Talk to your doctor, nurse or pharmacist before following any medical regimen to see if it is safe and effective for you

## 2021-04-26 NOTE — ANESTHESIA POSTPROCEDURE EVALUATION
Post-Op Assessment Note    CV Status:  Stable  Pain Score: 0    Pain management: adequate     Mental Status:  Alert and awake   Hydration Status:  Euvolemic   PONV Controlled:  Controlled   Airway Patency:  Patent      Post Op Vitals Reviewed: Yes      Staff: CRNA, Anesthesiologist         No complications documented      BP   128/70   Temp   97 7   Pulse  78   Resp   12   SpO2   99

## 2021-04-28 NOTE — PROGRESS NOTES
MSW made 3rd attempt to reach pt after receiving a high scoring Distress Thermometer  Pt's voicemail was received  MSW left a detailed message, along with contact information and encouraged the pt to call as needed

## 2021-05-04 NOTE — PROGRESS NOTES
Patient presents today for treatment with Herceptin  Patient offers no complaints  VSS  EF 60% from 7/8/2020  Port accessed without incident with excellent blood return noted  Glo Hinton

## 2021-05-05 NOTE — TELEPHONE ENCOUNTER
Patient asking for a refill on Zofran  Please contact patient when complete or with any questions concerns

## 2021-05-05 NOTE — TELEPHONE ENCOUNTER
Called and left message for patient to find out more information about his nausea and obtain pharmacy information

## 2021-05-06 NOTE — TELEPHONE ENCOUNTER
Left a message letting patient know prescription has been sent over to [facility]  Patient to call with any further questions

## 2021-05-06 NOTE — TELEPHONE ENCOUNTER
Patient's friend, Sharron Payton called regarding obtaining a prescription for nausea for the patient for Ondansetron ODT 4 mg  Please order through [facility], [address] [facility]  Please call Sharron Payton back to confirm prescription has been sent

## 2021-05-10 NOTE — TELEPHONE ENCOUNTER
Regarding: Covid exposure, asymptomatic  ----- Message from Heidi Sloan RN sent at 5/10/2021 12:33 PM EDT -----  Wife states, "I tested positive as of yesterday and my  wants to get tested now "  Denies symptoms at this time

## 2021-05-10 NOTE — TELEPHONE ENCOUNTER
1  Were you within 6 feet or less, for up to 15 minutes or more with a person that has a confirmed COVID-19 test?   Care giver tested positive on 5/7/2021  She became symptomatic on 5/5/2021   2  What was the date of your exposure? Lives in same home  3  Are you experiencing any symptoms attributed to the virus?  (Assess for SOB, cough, fever, difficulty breathing)    Asymptomatic  4  HIGH RISK: Do you have any history heart or lung conditions, weakened immune system, diabetes, Asthma, CHF, HIV, COPD, Chemo, renal failure, sickle cell, etc?  Esophageal cancer and getting treatments now  Denies heart, lung or kidney disease

## 2021-05-12 NOTE — TELEPHONE ENCOUNTER
First Attempt: The patient was called for notification of a test result for COVID-19  The patient did not answer the phone and a voicemail was left requesting a call back to [phone], Option 7

## 2021-05-12 NOTE — TELEPHONE ENCOUNTER
Second attempt-  The patient was called for notification of a test result for COVID-19  The patient did not answer the phone and a voicemail was left requesting a call back to [phone], Option 7

## 2021-05-13 NOTE — TELEPHONE ENCOUNTER
The patient was called for notification of a test result for COVID-19  The patient answered and was provided with the following information:    Your test for the novel coronavirus, also known as COVID-19, was positive  The sample showed that the virus was present  We are going to go through some general information to keep you safe at home and schedule a virtual visit for you to be evaluated by your provider  If your symptoms are generally mild and stable, you are safe to isolate yourself at home  If you develop difficulty breathing before your scheduled visit with your provider, you should contact the office immediately or go to the nearest ED for evaluation  Treatment of coronavirus does not require an antibiotic  The most important thing you can do is to remain home except to receive medical care  Do not go to work, school, or public areas  Wash your hands often with soap and water for at least 20 seconds  Alternatively, you may use hand  with at least 60% alcohol content  Cover your coughs and sneezes and use a facemask when around others if possible  Clean all high-touch surfaces every day such as doorknobs and cellphones  Positive COVID-19 test results are reportable to the [place] Department of Health  You may receive a call from trained public health staff to conduct an interview  It is important to answer their call  They will ask you to verify who you are  During the call they will ask you about what symptoms you have, what you did before you got sick, and who you were close to while sick  The health department does this to make sure everyone stays healthy and to reduce the spread of the virus  If you would like to verify if the caller does in fact work in contact tracing, call the [address] [place] [address] at [employer] ([phone])  For additional information, please visit the [facility] website: www health [place] gov      If you have any additional questions, we can schedule a virtual visit for you with a provider or call the [facility] hotline [phone], option 7, for care advice    For additional information, please visit the Coronavirus FAQ on the [facility] home page ([employer])  Lastly, we are going to set up your appointment  Pt declined setting up an appt with a [facility] provider because he is going to f/u with his out of network provider

## 2021-05-13 NOTE — TELEPHONE ENCOUNTER
Third attempt-  The patient was called for notification of a test result for COVID-19  The patient did not answer the phone and a voicemail was left requesting a call back to [phone], Option 7

## 2021-05-25 NOTE — PROGRESS NOTES
Virtual Brief Visit    Assessment/Plan: Mr Fer Jacobo  Has done well in follow-up, approximately 1 month status post completion of palliative radiation therapy to his recurrent esophageal cancer  His stent was removed shortly after completion of treatment and the esophageal lumen was noted to be rather patent  He is currently eating well with no significant dysphagia at this time  He will continue to follow closely with Medical Oncology will return to our department in 3 months for routine follow-up  Problem List Items Addressed This Visit     None                Reason for visit is No chief complaint on file  Encounter provider Mavis Whittaker MD    Provider located at [address] [address]    Recent Visits  No visits were found meeting these conditions  Showing recent visits within past 7 days and meeting all other requirements     Future Appointments  No visits were found meeting these conditions  Showing future appointments within next 150 days and meeting all other requirements        After connecting through telephone, the patient was identified by name and date of birth  Jesi Solorio was informed that this is a telemedicine visit and that the visit is being conducted through telephone  My office door was closed  No one else was in the room  He acknowledged consent and understanding of privacy and security of the platform  The patient has agreed to participate and understands he can discontinue the visit at any time  Patient is aware this is a billable service  Subjective    [address] today for routine follow-up approximately 1 month status post completion of palliative radiation therapy to his locally recurrent esophageal cancer  His stent was successfully removed shortly after completion of treatment and he has been swallowing well    His appetite is somewhat poor but nonetheless he denies any significant dysphagia or esophagitis  He is scheduled for repeat systemic imaging in July and will follow-up with Medical Oncology shortly thereafter  HPI   Oncology History Overview Note   Mr Aimee Cuba is a 49-year-old male with a history of metastatic esophageal cancer diagnosed in 2017  He has experienced a rather indolent disease course and has remained on Herceptin monotherapy with minimal if any systemic disease burden  However he does have persistent /progressive local disease in the distal esophagus recently requiring stent placement secondary to dysphagia  He completed a course of palliative radiation therapy to the distal esophagus on 4/23/21 4/26/21 EGD with stent removal  No significant esophageal narrowing    5/4/21 Herceptin    5/11/21 COVID positive    6/1/21 Herceptin  6/9/21 Dr Lorna Cortes  7/1/21 CT C/A/P (Dr Curly Lama)  7/6/21 Dr Curly Lama     Esophageal cancer    8/9/2017 Biopsy    A  Distal esophageal mass, cold biopsy:     - Focal invasive adenocarcinoma arising in a background of high grade dysplasia/adenocarcinoma in situ  - Portion of skeletal muscle with bacterial colonization, favor food material   Her 2 status is positive      8/11/2017 Biopsy    A   Lung, right middle lobe nodule (core needle biopsy):  - Adenocarcinoma with mucinous features     9/13/2017 -  Chemotherapy    FOLFOX-6 with trastuzumab began 9/13/17-2/14/18  Herceptin monotherapy 2/28/18-    trastuzumab (HERCEPTIN) 750 mg in sodium chloride 0 9 % 250 mL chemo infusion, 8 mg/kg, Intravenous, Once, 48 of 56 cycles    Administration: 562 mg (5/15/2019), 562 mg (6/5/2019), 562 mg (6/26/2019), 562 mg (7/24/2019), 562 mg (8/14/2019), 562 mg (9/4/2019), 562 mg (9/25/2019), 562 mg (10/16/2019), 562 mg (11/6/2019), 562 mg (11/27/2019), 562 mg (12/18/2019), 562 mg (1/8/2020), 562 mg (1/29/2020), 562 mg (2/19/2020), 562 mg (3/11/2020), 562 mg (4/1/2020), 562 mg (4/22/2020), 562 mg (5/20/2020), 562 mg (6/17/2020), 562 mg (7/15/2020), 562 mg (8/12/2020), 562 mg (9/9/2020), 562 mg (10/7/2020), 562 mg (11/11/2020), 562 mg (12/10/2020), 562 mg (1/6/2021), 562 mg (2/9/2021)       2/11/2020 Surgery    Foreign bodies, esophageal stents, removal:  -  Grossly identified form bodies compatible with stents,      1/26/2021 Biopsy    EGD and biopsy:    A  Esophageal mass, biopsy:  - Intramucosal carcinoma in a background of high grade columnar epithelial atypia  Associated squamous mucosa with "Pill" esophagitis  Note: Immunohistochemistry for p53 shows a point mutation pattern; iron stain is negative  Note: The H & E slides and immunohistochemistry slides were sent to Dr Wayne Cano MD, pathology department at [facility], for her expert opinion and review     B  Stomach, mass, biopsy:  - Gastric antral mucosa with mild chronic, inactive gastritis  - Negative for intestinal metaplasia, dysplasia or carcinoma  - No Helicobacter pylori is identified on H&E stained slides  3/22/2021 - 4/23/2021 Radiation    4500 cGy in 25 fractions to the gross tumor with margin  Past Medical History:   Diagnosis Date    Anxiety     Cancer [facility])     Esophageal    Dysphagia     Esophageal abnormality     Esophageal cancer (HCC)     GERD (gastroesophageal reflux disease)     Hyperlipidemia     Hypertension     Occasional tremors        Past Surgical History:   Procedure Laterality Date    ESOPHAGOGASTRODUODENOSCOPY N/A 8/9/2017    Procedure: ESOPHAGOGASTRODUODENOSCOPY (EGD); Surgeon: Bashir Potter MD;  Location: [facility]; Service: Gastroenterology    ESOPHAGOGASTRODUODENOSCOPY N/A 8/11/2017    Procedure: ESOPHAGOGASTRODUODENOSCOPY (EGD) w/ stent;  Surgeon: Bashir Potter MD;  Location: [facility];   Service: Gastroenterology    ESOPHAGOGASTRODUODENOSCOPY N/A 1/26/2021    Procedure: ESOPHAGOGASTRODUODENOSCOPY (EGD), BIOPSY, ESOPHAGEAL DILATION,  STENT PLACEMENT;  Surgeon: Abbey Weller MD; [facility];  Service: Thoracic    LAPAROTOMY N/A 2/11/2020    Procedure: EGD; REMOVAL OF ESOPHAGEAL STENTS X 3;  Surgeon: Kel Llanos MD;  [facility];  Service: Thoracic    PORTACATH PLACEMENT      PORTACATH PLACEMENT      NC ESOPHAGOGASTRODUODENOSCOPY TRANSORAL DIAGNOSTIC N/A 4/26/2021    Procedure: ESOPHAGOGASTRODUODENOSCOPY (EGD); stent removal;  Surgeon: Kel Llanos MD;  [facility];  Service: Thoracic    UPPER GASTROINTESTINAL ENDOSCOPY      VASCULAR SURGERY  2008    blockage in neck        Current Outpatient Medications   Medication Sig Dispense Refill    amLODIPine (NORVASC) 10 mg tablet Take 1 tablet (10 mg total) by mouth daily 30 tablet 0    aspirin 81 mg chewable tablet Chew 81 mg daily      Lidocaine Viscous HCl (XYLOCAINE) 2 % mucosal solution Swish and swallow 15 mL 4 (four) times a day as needed for mouth pain or discomfort 100 mL 3    LORazepam (ATIVAN) 0 5 mg tablet Take 1 tablet (0 5 mg total) by mouth 2 (two) times a day as needed for anxiety 60 tablet 1    melatonin 3 mg Take 1 tablet (3 mg total) by mouth daily at bedtime 30 each 0    metoprolol tartrate (LOPRESSOR) 50 mg tablet Take 50 mg by mouth 2 (two) times a day      Multiple Vitamin (MULTIVITAMIN) tablet Take 1 tablet by mouth daily      ondansetron (ZOFRAN-ODT) 4 mg disintegrating tablet Take 1 tablet (4 mg total) by mouth every 6 (six) hours as needed for nausea or vomiting 20 tablet 0    pantoprazole (PROTONIX) 40 mg tablet Take 1 tablet by mouth daily in the early morning 30 tablet 0    simvastatin (ZOCOR) 40 mg tablet Take 40 mg by mouth daily at bedtime      sucralfate (CARAFATE) 1 g/10 mL suspension Take 10 mL (1 g total) by mouth 4 (four) times a day 420 mL 3     No current facility-administered medications for this visit           Allergies   Allergen Reactions    Other      Cat Dander       VIRTUAL VISIT DISCLAIMER    Eusebio Tavia acknowledges that he has consented to an online visit or consultation  He understands that the online visit is based solely on information provided by him, and that, in the absence of a face-to-face physical evaluation by the physician, the diagnosis he receives is both limited and provisional in terms of accuracy and completeness  This is not intended to replace a full medical face-to-face evaluation by the physician  Min Cerna understands and accepts these terms

## 2021-05-26 NOTE — TELEPHONE ENCOUNTER
Sohan Rob to touch base in regards to his nutrition, no answer, LVM with reason for call and RD contact info asking that he call back as able/desired

## 2021-06-01 NOTE — PROGRESS NOTES
Patient to Natasha for Herceptin: Offers no complaints at present time: Lab work ( 03/29/21 ) reviewed:  Within parameters to treat: No labs needed per MD order: Right PAC accessed without difficulty: Good blood return noted

## 2021-06-02 NOTE — TELEPHONE ENCOUNTER
Janett Hager called in regards to patient having really bad tremors after he finished his radiation  Dr Paco Schumacher recommended for him to see a Neurologist would like to know who Dr Jace Rocha would recommend to see and if possible could it be a [facility] provider  She is just concerned because he can't hold anything and he won't be seen by Dr Jace Rocha until July  Please call Janett Hager back at [phone]

## 2021-06-02 NOTE — TELEPHONE ENCOUNTER
Left VM for Janett Hager to let her know that if radiation oncology is recommending this referral, then they will need to place it and get patient set up  I did reach out to radiation oncology team and discussed with Dr Anitha Johnson  He did not make this recommendation  Our office will not place this referral as the symptom came from the completion of radiation  I asked Janett Hager to contact their office to discuss further

## 2021-06-09 NOTE — PROGRESS NOTES
Thoracic Follow-Up  Assessment/Plan:     77-year-old male with history of recurrent esophageal cancer status post definitive chemotherapy and radiation with dysphagia status post esophageal stent placement and subsequent removal on 04/26/2021 who is doing well     from esophageal standpoint Imani Upton is doing well after his stent removal   He is tolerating most foods  I have advised him to slowly increase his diet back to a normal   He should continue to have liquids on hand to help wash foods down  He should continue to remain on his acid suppression medication  We will see him as needed based on swallowing difficulties  If he has more issues with food getting stuck and or regurgitation then we certainly can do a repeat EGD, dilation, and or stent placement  He will call us to arrange this if needed  He has been previously referred to neurology for his bilateral upper extremity tremors  I have had difficulty getting an appointment for this  My office will try and help facilitate this sooner  Diagnoses and all orders for this visit:    Tremors of nervous system  -     Ambulatory referral to Neurology; Future          Thoracic History   Problem: Esophageal carcinoma with stenosis   Procedure: 1/26/21 EGD with dilation and stent placement 1/26/21 4/26/21 EGD with stent removal       Subjective:    Patient ID: Eusebio Squires is a 65 y o  male  HPI    Imani Upton comes back to our office following his 04/26/2021 EGD and stent removal   Overall he has been doing fairly well since that procedure  He is tolerating soft foods and moist foods without issue  He has mild issues with drier foods  He is slowly increasing his diet back to normal   He has a small 10 lb weight loss but this is stabilizing  He denies any pain with swallowing but does have the impression that foods or going down more slowly  Some foods need to be washed out with fluid  No regurgitation  Minor reflux    No other complaints     he does have progressively worsening upper extremity tremors  The following portions of the patient's history were reviewed and updated as appropriate: allergies, current medications, past family history, past medical history, past social history, past surgical history and problem list     Review of Systems   Constitutional: Negative for activity change, appetite change, chills, diaphoresis, fatigue, fever and unexpected weight change  HENT: Positive for trouble swallowing  Eyes: Negative  Respiratory: Negative for apnea, cough, chest tightness, shortness of breath, wheezing and stridor  Cardiovascular: Negative for chest pain, palpitations and leg swelling  Gastrointestinal: Negative for abdominal distention, abdominal pain, blood in stool, constipation, diarrhea, nausea and vomiting  Endocrine: Negative  Genitourinary: Negative  Musculoskeletal: Negative  Skin: Negative  Allergic/Immunologic: Negative  Neurological: Positive for tremors  Hematological: Negative  Psychiatric/Behavioral: Negative  Objective:   Physical Exam  Vitals signs and nursing note reviewed  Constitutional:       General: He is not in acute distress  Appearance: Normal appearance  He is well-developed  He is not diaphoretic  Comments: Appears well in no acute distress   HENT:      Head: Normocephalic and atraumatic  Mouth/Throat:      Pharynx: No oropharyngeal exudate  Comments: Wearing a mask  Eyes:      General: No scleral icterus  Conjunctiva/sclera: Conjunctivae normal       Pupils: Pupils are equal, round, and reactive to light  Neck:      Musculoskeletal: Normal range of motion and neck supple  Thyroid: No thyromegaly  Vascular: No JVD  Trachea: No tracheal deviation  Cardiovascular:      Rate and Rhythm: Normal rate and regular rhythm  Heart sounds: Normal heart sounds  No murmur  No friction rub  No gallop      Pulmonary:      Effort: Pulmonary effort is normal  No respiratory distress  Breath sounds: Normal breath sounds  No wheezing or rales  Chest:      Chest wall: No tenderness  Abdominal:      General: Bowel sounds are normal  There is no distension  Palpations: Abdomen is soft  There is no mass  Tenderness: There is no abdominal tenderness  There is no guarding or rebound  Comments: No abdominal tenderness   Musculoskeletal: Normal range of motion  General: No tenderness or deformity  Skin:     General: Skin is warm and dry  Coloration: Skin is not pale  Findings: No erythema or rash  Neurological:      Mental Status: He is alert and oriented to person, place, and time  Coordination: Coordination abnormal       Comments: Bilateral upper extremity tremors   Psychiatric:         Mood and Affect: Mood normal          Behavior: Behavior normal          Thought Content: Thought content normal          Judgment: Judgment normal      /70   Pulse 85   Temp 98 2 °F (36 8 °C) (Temporal)   Resp 16   Ht 5' 7" (1 702 m)   Wt 78 7 kg (173 lb 8 oz)   SpO2 91%   BMI 27 17 kg/m²     Xr Chest Pa & Lateral    Result Date: 4/3/2021  Impression No migration of the esophageal stent, extending from the mid to the distal esophagus  Mild fibrosis in the right lung base  Workstation performed: [id number]     Xr Chest Pa & Lateral    Result Date: 2/12/2021  Impression No acute cardiopulmonary disease  Workstation performed: [id number]      No CT Chest results available for this patient  Ct Chest Abdomen Pelvis W Contrast    Result Date: 3/1/2021  Narrative CT CHEST, ABDOMEN AND PELVIS WITH IV CONTRAST INDICATION:   C15 5: Malignant neoplasm of lower third of esophagus R13 10: Dysphagia, unspecified  COMPARISON:  CT from July 6, 2020 TECHNIQUE: CT examination of the chest, abdomen and pelvis was performed   Axial, sagittal, and coronal 2D reformatted images were created from the source data and submitted for interpretation  Radiation dose length product (DLP) for this visit:  704 mGy-cm   This examination, like all CT scans performed in the [facility], was performed utilizing techniques to minimize radiation dose exposure, including the use of iterative reconstruction and automated exposure control  IV Contrast:  100 mL of iohexol (OMNIPAQUE) Enteric Contrast: Enteric contrast was administered  FINDINGS: CHEST LUNGS:  Again noted is peripheral reticulation with mild arthritic distortion and minimal groundglass density related to interstitial lung disease, unchanged A lingular region nodule, seen in image 73, series 3, stable Perifissural nodules representing intrapulmonary lymph node along the left fissure, stable A linear appearing density seen in the left lower lobe in image 50 series 604, along the fissure, does not have a masslike appearance probably scarring or fissural thickening PLEURA:  Unremarkable  HEART/GREAT VESSELS:  Ascending aorta measures 3 7 cm  Diffuse coronary artery calcification seen MEDIASTINUM AND ZAYNAB:  Thickening of the mid to lower thoracic esophagus seen  An esophageal stent is in place  Small periesophageal lymph nodes are noted, 5 to 6 mm near the GE junction, mildly more pronounced  These are seen in image 53 series 2 CHEST WALL AND LOWER NECK:   Unremarkable  ABDOMEN LIVER/BILIARY TREE:  Unremarkable  GALLBLADDER:  No calcified gallstones  No pericholecystic inflammatory change  SPLEEN:  Unremarkable  PANCREAS:  Unremarkable  ADRENAL GLANDS:  Unremarkable  KIDNEYS/URETERS:  Unremarkable  No hydronephrosis  STOMACH AND BOWEL:  Diverticulosis seen No abnormal dilation of the small bowel loops seen APPENDIX:  No findings to suggest appendicitis  ABDOMINOPELVIC CAVITY:  No ascites  No pneumoperitoneum  No lymphadenopathy  VESSELS:  Unremarkable for patient's age    Aortic ectasia in the infrarenal aorta measuring 2 4 cm PELVIS REPRODUCTIVE ORGANS:  Unremarkable for patient's age  URINARY BLADDER:  Unremarkable  ABDOMINAL WALL/INGUINAL REGIONS:  Umbilical hernia containing fat OSSEOUS STRUCTURES:  No acute fracture or destructive osseous lesion  Impression No new measurable disease with no new liver lesion No new lung nodular mass Esophageal stent remains in place Small 5 to 6 mm nonmeasurable lymph node near the GE junction and in the gastrohepatic ligament minimally more pronounced from the previous study Workstation performed: [id number]     Ct Chest Abdomen Pelvis W Contrast    Result Date: 7/9/2020  Narrative CT CHEST, ABDOMEN AND PELVIS WITH IV CONTRAST INDICATION:   C15 5: Malignant neoplasm of lower third of esophagus R13 10: Dysphagia, unspecified  COMPARISON:  CT chest abdomen and pelvis 12/30/2019 and CT chest 6/29/2018 TECHNIQUE: CT examination of the chest, abdomen and pelvis was performed  Axial, sagittal, and coronal 2D reformatted images were created from the source data and submitted for interpretation  Radiation dose length product (DLP) for this visit:  832 mGy-cm   This examination, like all CT scans performed in the [facility], was performed utilizing techniques to minimize radiation dose exposure, including the use of iterative reconstruction and automated exposure control  IV Contrast:  100 mL of iohexol (OMNIPAQUE)   350 Enteric Contrast: Enteric contrast was administered  FINDINGS: CHEST LUNGS:  3 mm subpleural nodule in the lingula image 76 series 3 unchanged as far back as June 2018  Stable bilateral fissural intrapulmonary lymph nodes  No new suspicious lung nodule  Stable bilateral multi lobar subpleural reticular scarring  No infiltrate  Central airways are clear  PLEURA:  Unremarkable  HEART/GREAT VESSELS:  Heart is not enlarged  Stable trace pericardial effusion  Aortic and coronary artery calcification  Tip of portacatheter in the superior cavoatrial junction   MEDIASTINUM AND ZAYNAB:  Stable minimal thickening of the distal esophagus  No enlarged lymph nodes  CHEST WALL AND LOWER NECK:   Right anterior chest wall maya catheter  Stable tiny clips or punctate calcifications adjacent to right lobe of thyroid gland and right superior mediastinum  ABDOMEN LIVER/BILIARY TREE:  Unremarkable  GALLBLADDER:  No calcified gallstones  No pericholecystic inflammatory change  SPLEEN:  Unremarkable  PANCREAS:  Unremarkable  ADRENAL GLANDS:  Unremarkable  KIDNEYS/URETERS:  Unremarkable  No hydronephrosis  STOMACH AND BOWEL:  Ascending through sigmoid colonic diverticulosis without immediate adjacent stranding  Interval removal of esophageal stents from the stomach  No bowel obstruction  Minimal smooth thickening at the gastroduodenal junction and second duodenal segment  APPENDIX:  A normal appendix was visualized  ABDOMINOPELVIC CAVITY:  No ascites  No pneumoperitoneum  No lymphadenopathy  VESSELS:  Aortoiliac calcification  Stable minimal distal infrarenal aortic ectasia maximum diameter 2 4 cm  Stable focal moderate proximal right common iliac artery stenosis  PELVIS REPRODUCTIVE ORGANS:  Unremarkable for patient's age  URINARY BLADDER:  Unremarkable  ABDOMINAL WALL/INGUINAL REGIONS:  Stable small fat-containing left periumbilical hernia  OSSEOUS STRUCTURES:  No acute fracture or osseous destructive lesion identified  Degenerative changes of the spine, pubic symphysis, and multiple joints  Impression CT chest: Stable minimal thickening of the distal esophagus  No evidence of metastatic disease  Stable lingular nodule and bilateral fissural intrapulmonary lymph nodes unchanged as far back as June 2018  CT abdomen and pelvis: No evidence of abdominopelvic metastatic disease  Interval removal of migrated esophageal stents from the stomach and proximal duodenum  Minimal nonspecific submucosal thickening gastroduodenal junction and proximal second duodenal segment may be sequela of chronic peptic ulcer disease   Otherwise, no significant interval change  Workstation performed: [id number]      No NM PET CT results available for this patient  Fl Barium Swallow    Result Date: 1/25/2021  Narrative CONTRAST SWALLOW-ESOPHAGRAM INDICATION:   esophageal stricture  Patient has history of distal esophageal cancer, treated with chemotherapy  Over the last few days, patient describes worsening dysphagia with difficulty swallowing solid foods  This swallow study was performed to evaluate for possible stricture  COMPARISON:  Chest, abdomen, and pelvic CT from 7/6/2020, and upper GI series from 2/11/2020  IMAGES:  25 FLUOROSCOPY TIME:   1 minute 14 seconds  TECHNIQUE: Patient was initially given 150 mL of Omnipaque 350 to swallow  When no gross leak was visualized, we switched over to the Barium E--Paque  Patient swallowed about 150 mL  FINDINGS: There is a severe distal esophageal stricture, located 3 cm proximal to the gastroesophageal junction, spanning 2 5 cm in length  The luminal diameter of the stricture is only about 0 25 cm (Image 23 ) There is no evidence of esophageal leak  Impression There is a severe distal esophageal stricture, located 3 cm proximal to the gastroesophageal junction, spanning 2 5 cm in length    The luminal diameter of the stricture is only about 0 25 cm (Image 23 ) Workstation performed: Yfn Goel MD  Thoracic Surgeon    (Available by [facility] Text)

## 2021-06-15 PROBLEM — R06.02 SOB (SHORTNESS OF BREATH): Status: ACTIVE | Noted: 2021-01-01

## 2021-06-15 PROBLEM — J18.9 PNEUMONIA: Status: ACTIVE | Noted: 2021-01-01

## 2021-06-15 NOTE — PROGRESS NOTES
Vancomycin Assessment    Lashell Pelayo is a 65 y o  male who is currently receiving vancomycin 1500 mg once, followed by 750 mg every 12 hours for Pneumonia     Relevant clinical data and objective history reviewed:  Creatinine   Date Value Ref Range Status   06/15/2021 1 44 (H) 0 60 - 1 30 mg/dL Final     Comment:     Standardized to IDMS reference method   03/29/2021 1 14 0 60 - 1 30 mg/dL Final     Comment:     Standardized to IDMS reference method   02/19/2021 1 26 0 60 - 1 30 mg/dL Final     Comment:     Standardized to IDMS reference method     /58 (BP Location: Right arm)   Pulse 94   Temp 98 4 °F (36 9 °C) (Oral)   Resp 22   Ht 5' 7" (1 702 m)   Wt 76 2 kg (167 lb 15 9 oz)   SpO2 92%   BMI 26 31 kg/m²   No intake/output data recorded  Lab Results   Component Value Date/Time    BUN 21 06/15/2021 07:28 AM    WBC 16 15 (H) 06/15/2021 07:28 AM    HGB 14 2 06/15/2021 07:28 AM    HCT 43 2 06/15/2021 07:28 AM    MCV 93 06/15/2021 07:28 AM     (H) 06/15/2021 07:28 AM     Temp Readings from Last 3 Encounters:   06/15/21 98 4 °F (36 9 °C) (Oral)   06/09/21 98 2 °F (36 8 °C) (Temporal)   06/01/21 98 6 °F (37 °C) (Temporal)     Vancomycin Days of Therapy: 1    Assessment/Plan  The patient is currently on vancomycin utilizing scheduled dosing based on actual body weight  Baseline risks associated with therapy include: concomitant nephrotoxic medications and advanced age  The patient is currently receiving 1500 mg once, followed by 750 mg every 12 hours and is clinically appropriate and dose will be continued  Pharmacy will also follow closely for s/sx of nephrotoxicity, infusion reactions, and appropriateness of therapy  BMP and CBC will be ordered per protocol  Plan for trough as patient approaches steady state, prior to the 4th  dose at approximately 2130 on 6/16  Due to infection severity, will target a trough of 15-20 (appropriate for most indications)     Pharmacy will continue to follow the patients culture results and clinical progress daily      Rosette Payton, Pharmacist

## 2021-06-15 NOTE — ASSESSMENT & PLAN NOTE
· Patient had COVID 19 about a month ago and he is also immunocompromised from his esophageal cancer  · Will start the patient on IV Zosyn and vancomycin  · Consult to Pulmonary Medicine placed  · Will also request Infectious Disease consultation

## 2021-06-15 NOTE — ASSESSMENT & PLAN NOTE
Lab Results   Component Value Date    EGFR 48 06/15/2021    EGFR 64 03/29/2021    EGFR 57 02/19/2021    CREATININE 1 44 (H) 06/15/2021    CREATININE 1 14 03/29/2021    CREATININE 1 26 02/19/2021     · Monitor for now

## 2021-06-15 NOTE — ASSESSMENT & PLAN NOTE
· Patient stated that he could tolerate a regular diet  · Will order speech pathology evaluation for possible dysphagia

## 2021-06-15 NOTE — ED PROVIDER NOTES
History  Chief Complaint   Patient presents with    Shortness of Breath     C/O INCREASED SOB WITH PRODUCTIVE COUGH, INCREASED WEAKNESS, HYPOXIA (RA O2 65% UPON ARRIVAL TO ER)     Patient is a 64-year-old male with a history of esophageal cancer who presents with shortness of breath  Patient states that he has had progressive shortness of breath over the past week  Shortness of breath worsens with minimal exertion  He admits to a mild, productive cough  He denies fever, chills, chest pain, lower extremity edema or other complaints  His wife was finally able to convince him to come to the emergency department for evaluation this morning  Patient states that he was previously on radiation therapy for his esophageal cancer but he completed his treatments in April 2021  He currently receives a Herceptin infusion monthly  History provided by:  Patient  Shortness of Breath  Severity:  Severe  Duration:  1 week  Timing:  Constant  Progression:  Worsening  Chronicity:  New  Ineffective treatments:  Sitting up  Associated symptoms: cough and sputum production    Associated symptoms: no abdominal pain, no chest pain, no diaphoresis, no fever, no headaches, no neck pain, no rash, no sore throat and no vomiting        Prior to Admission Medications   Prescriptions Last Dose Informant Patient Reported? Taking?    LORazepam (ATIVAN) 0 5 mg tablet 6/15/2021 at Unknown time Self No Yes   Sig: Take 1 tablet (0 5 mg total) by mouth 2 (two) times a day as needed for anxiety   Lidocaine Viscous HCl (XYLOCAINE) 2 % mucosal solution Not Taking at Unknown time Self No No   Sig: Swish and swallow 15 mL 4 (four) times a day as needed for mouth pain or discomfort   Patient not taking: Reported on 6/15/2021   Multiple Vitamin (MULTIVITAMIN) tablet More than a month at Unknown time Self Yes No   Sig: Take 1 tablet by mouth daily   amLODIPine (NORVASC) 10 mg tablet 6/15/2021 at Unknown time Self No Yes   Sig: Take 1 tablet (10 mg total) by mouth daily   aspirin 81 mg chewable tablet 6/15/2021 at Unknown time Self Yes Yes   Sig: Chew 81 mg daily   lidocaine-prilocaine (EMLA) cream   No Yes   Sig: Apply topically as needed for mild pain   melatonin 3 mg 6/14/2021 at Unknown time Self No Yes   Sig: Take 1 tablet (3 mg total) by mouth daily at bedtime   metoprolol tartrate (LOPRESSOR) 50 mg tablet 6/15/2021 at Unknown time Self Yes Yes   Sig: Take 50 mg by mouth 2 (two) times a day   ondansetron (ZOFRAN-ODT) 4 mg disintegrating tablet 6/14/2021 at Unknown time  No Yes   Sig: Take 1 tablet (4 mg total) by mouth every 6 (six) hours as needed for nausea or vomiting   pantoprazole (PROTONIX) 40 mg tablet 6/15/2021 at Unknown time Self No Yes   Sig: Take 1 tablet by mouth daily in the early morning   simvastatin (ZOCOR) 40 mg tablet 6/14/2021 at Unknown time Self Yes Yes   Sig: Take 40 mg by mouth daily at bedtime   sucralfate (CARAFATE) 1 g/10 mL suspension Not Taking at Unknown time Self No No   Sig: Take 10 mL (1 g total) by mouth 4 (four) times a day   Patient not taking: Reported on 6/15/2021      Facility-Administered Medications: None       Past Medical History:   Diagnosis Date    Anxiety     Cancer [facility])     Esophageal    Dysphagia     Esophageal abnormality     Esophageal cancer (HCC)     GERD (gastroesophageal reflux disease)     Hyperlipidemia     Hypertension     Occasional tremors        Past Surgical History:   Procedure Laterality Date    ESOPHAGOGASTRODUODENOSCOPY N/A 8/9/2017    Procedure: ESOPHAGOGASTRODUODENOSCOPY (EGD); Surgeon: Kwabena Rodriguez MD;  [facility]: [facility]; Service: Gastroenterology    ESOPHAGOGASTRODUODENOSCOPY N/A 8/11/2017    Procedure: ESOPHAGOGASTRODUODENOSCOPY (EGD) w/ stent;  Surgeon: Kwabena Rodriguez MD;  [facility]: [facility];   Service: Gastroenterology    ESOPHAGOGASTRODUODENOSCOPY N/A 1/26/2021    Procedure: ESOPHAGOGASTRODUODENOSCOPY (EGD), BIOPSY, ESOPHAGEAL DILATION,  STENT PLACEMENT;  Surgeon: Judson Lewis MD;  [facility];  Service: Thoracic    LAPAROTOMY N/A 2020    Procedure: EGD; REMOVAL OF ESOPHAGEAL STENTS X 3;  Surgeon: Judson Lewis MD;  [facility];  Service: Thoracic    PORTACATH PLACEMENT      PORTACATH PLACEMENT      UT ESOPHAGOGASTRODUODENOSCOPY TRANSORAL DIAGNOSTIC N/A 2021    Procedure: ESOPHAGOGASTRODUODENOSCOPY (EGD); stent removal;  Surgeon: Judson Lewis MD;  [facility];  Service: Thoracic    UPPER GASTROINTESTINAL ENDOSCOPY      VASCULAR SURGERY      blockage in neck        Family History   Problem Relation Age of Onset    Liver cancer Paternal Uncle      I have reviewed and agree with the history as documented  E-Cigarette/Vaping    E-Cigarette Use Never User      E-Cigarette/Vaping Substances     Social History     Tobacco Use    Smoking status: Former Smoker     Packs/day: 1 00     Years: 39 00     Pack years: 39 00     Types: Cigarettes     Start date:      Quit date: 2008     Years since quittin 7    Smokeless tobacco: Never Used    Tobacco comment: started around age 22    Vaping Use    Vaping Use: Never used   Substance Use Topics    Alcohol use: Not Currently    Drug use: Yes     Types: Marijuana     Comment: has medical card for this       Review of Systems   Constitutional: Negative for chills, diaphoresis and fever  HENT: Negative for nosebleeds, sore throat and trouble swallowing  Eyes: Negative for photophobia, pain and visual disturbance  Respiratory: Positive for cough, sputum production and shortness of breath  Negative for chest tightness  Cardiovascular: Negative for chest pain, palpitations and leg swelling  Gastrointestinal: Negative for abdominal pain, constipation, diarrhea, nausea and vomiting  Endocrine: Negative for polydipsia and polyuria  Genitourinary: Negative for difficulty urinating, dysuria and hematuria     Musculoskeletal: Negative for back pain, neck pain and neck stiffness  Skin: Negative for pallor and rash  Neurological: Negative for dizziness, syncope, light-headedness and headaches  All other systems reviewed and are negative  Physical Exam  Physical Exam  Vitals and nursing note reviewed  Constitutional:       General: He is not in acute distress  Appearance: He is well-developed  HENT:      Head: Normocephalic and atraumatic  Eyes:      Pupils: Pupils are equal, round, and reactive to light  Cardiovascular:      Rate and Rhythm: Normal rate and regular rhythm  Pulses: Normal pulses  Heart sounds: Normal heart sounds  Pulmonary:      Effort: Tachypnea and respiratory distress present  Breath sounds: Examination of the right-lower field reveals rhonchi  Examination of the left-lower field reveals rhonchi  Rhonchi present  Abdominal:      General: There is no distension  Palpations: Abdomen is soft  Abdomen is not rigid  Tenderness: There is no abdominal tenderness  There is no guarding or rebound  Musculoskeletal:         General: No tenderness  Normal range of motion  Cervical back: Normal range of motion and neck supple  Lymphadenopathy:      Cervical: No cervical adenopathy  Skin:     General: Skin is warm and dry  Capillary Refill: Capillary refill takes less than 2 seconds  Neurological:      Mental Status: He is alert and oriented to person, place, and time  Cranial Nerves: No cranial nerve deficit  Sensory: No sensory deficit           Vital Signs  ED Triage Vitals   Temperature Pulse Respirations Blood Pressure SpO2   06/15/21 0715 06/15/21 0715 06/15/21 0715 06/15/21 0715 06/15/21 0715   98 4 °F (36 9 °C) 99 (!) 28 132/60 (!) 62 %      Temp Source Heart Rate Source Patient Position - Orthostatic VS BP Location FiO2 (%)   06/15/21 0715 06/15/21 0715 06/15/21 0715 06/15/21 0715 --   Oral Monitor Lying Right arm       Pain Score       06/15/21 1900       2 Vitals:    06/15/21 1930 06/15/21 2000 06/15/21 2030 06/15/21 2204   BP: 96/61 102/60 104/62 104/60   Pulse: 96 98 94 83   Patient Position - Orthostatic VS: Lying Lying Lying          Visual Acuity  Visual Acuity      Most Recent Value   L Pupil Size (mm)  3   R Pupil Size (mm)  3   L Pupil Shape  Round   R Pupil Shape  Round          ED Medications  Medications   vancomycin (VANCOCIN) IVPB (premix in dextrose) 750 mg 150 mL (750 mg Intravenous New Bag 6/15/21 2111)   amLODIPine (NORVASC) tablet 10 mg (10 mg Oral Not Given 6/15/21 1512)   aspirin chewable tablet 81 mg (81 mg Oral Not Given 6/15/21 1513)   lidocaine (LMX) 4 % cream (has no administration in time range)   LORazepam (ATIVAN) tablet 0 5 mg (has no administration in time range)   melatonin tablet 3 mg (3 mg Oral Given 6/15/21 2103)   metoprolol tartrate (LOPRESSOR) tablet 50 mg (50 mg Oral Given 6/15/21 2104)   ondansetron (ZOFRAN) 8 mg in sodium chloride 0 9 % 50 mL IVPB (has no administration in time range)   pantoprazole (PROTONIX) EC tablet 40 mg (has no administration in time range)   furosemide (LASIX) injection 40 mg (40 mg Intravenous Given 6/15/21 1311)   acetaminophen (TYLENOL) tablet 650 mg (has no administration in time range)   benzonatate (TESSALON PERLES) capsule 100 mg (has no administration in time range)   methylPREDNISolone sodium succinate (Solu-MEDROL) injection 40 mg (40 mg Intravenous Given 6/15/21 2104)   cefepime (MAXIPIME) 2 g/50 mL dextrose IVPB (0 mg Intravenous Stopped 6/15/21 2142)   metroNIDAZOLE (FLAGYL) IVPB (premix) 500 mg 100 mL (0 mg Intravenous Stopped 6/15/21 1905)   enoxaparin (LOVENOX) subcutaneous injection 80 mg (80 mg Subcutaneous Given 6/15/21 2104)   albuterol inhalation solution 2 5 mg (has no administration in time range)   cefepime (MAXIPIME) 2 g/50 mL dextrose IVPB (0 mg Intravenous Stopped 6/15/21 0849)   vancomycin (VANCOCIN) 1500 mg in sodium chloride 0 9% 250 mL IVPB (0 mg/kg × 76 2 kg Intravenous Stopped 6/15/21 1130)   sodium chloride 0 9 % bolus 500 mL (0 mL Intravenous Stopped 6/15/21 1007)   iohexol (OMNIPAQUE) 350 MG/ML injection (SINGLE-DOSE) 85 mL (85 mL Intravenous Given 6/15/21 1001)   dexamethasone (DECADRON) injection 6 mg (6 mg Intravenous Given 6/15/21 1124)       Diagnostic Studies  Results Reviewed     Procedure Component Value Units Date/Time    C-reactive protein [[id number]]  (Abnormal) Collected: 06/15/21 1657    Lab Status: Final result Specimen: Blood from Hand, Right Updated: 06/15/21 1958      0 mg/L     Ferritin [[id number]]  (Abnormal) Collected: 06/15/21 1657    Lab Status: Final result Specimen: Blood from Arm, Right Updated: 06/15/21 1949     Ferritin 468 ng/mL     NOVEL CORONAVIRUS (COVID-19), PCR [facility] [[id number]]  (Normal) Collected: 06/15/21 1657    Lab Status: Final result Specimen: Nares from Nose Updated: 06/15/21 1840     SARS-CoV-2 Negative    Narrative: The specimen collection materials, transport medium, and/or testing methodology utilized in the production of these test results have been proven to be reliable in a limited validation with an abbreviated program under the Emergency Utilization Authorization provided by the FDA  Testing reported as "Presumptive positive" will be confirmed with secondary testing to ensure result accuracy  Clinical caution and judgement should be used with the interpretation of these results with consideration of the clinical impression and other laboratory testing  Testing reported as "Positive" or "Negative" has been proven to be accurate according to standard laboratory validation requirements  All testing is performed with control materials showing appropriate reactivity at standard intervals        Platelet count [[id number]]  (Abnormal) Collected: 06/15/21 1657    Lab Status: Final result Specimen: Blood from Hand, Right Updated: 06/15/21 1715     Platelets 817 Thousands/uL      MPV 8 6 fL     Legionella antigen, urine [[id number]] Collected: 06/15/21 1657    Lab Status: In process Specimen: Urine, Clean Catch Updated: 06/15/21 1707    MRSA culture [[id number]] Collected: 06/15/21 1657    Lab Status: In process Specimen: Nares from Nose Updated: 06/15/21 1707    Strep Pneumoniae, Urine [[id number]] Collected: 06/15/21 1323    Lab Status: In process Specimen: Urine, Clean Catch Updated: 06/15/21 1655    UA w Reflex to Microscopic w Reflex to Culture [[id number]] Collected: 06/15/21 1323    Lab Status: Final result Specimen: Urine, Clean Catch Updated: 06/15/21 1346     Color, UA Yellow     Clarity, UA Clear     Specific Gravity, UA 1 010     pH, UA 5 5     Leukocytes, UA Negative     Nitrite, UA Negative     Protein, UA Negative mg/dl      Glucose, UA Negative mg/dl      Ketones, UA Negative mg/dl      Urobilinogen, UA 0 2 E U /dl      Bilirubin, UA Negative     Blood, UA Negative    Blood culture #1 [[id number]] Collected: 06/15/21 0746    Lab Status: Preliminary result Specimen: Blood from Arm, Right Updated: 06/15/21 1301     Blood Culture Received in Microbiology Lab  Culture in Progress  Blood culture #2 [[id number]] Collected: 06/15/21 0732    Lab Status: Preliminary result Specimen: Blood from Arm, Left Updated: 06/15/21 1301     Blood Culture Received in Microbiology Lab  Culture in Progress      Procalcitonin with AM Reflex [[id number]]  (Abnormal) Collected: 06/15/21 0728    Lab Status: Final result Specimen: Blood from Arm, Left Updated: 06/15/21 1215     Procalcitonin 0 43 ng/ml     Procalcitonin Reflex [[id number]]     Lab Status: No result Specimen: Blood     D-dimer, quantitative [[id number]]  (Abnormal) Collected: 06/15/21 0923    Lab Status: Final result Specimen: Blood from Arm, Right Updated: 06/15/21 0958     D-Dimer, Quant 5 87 ug/ml FEU     Lactic acid [[id number]]  (Normal) Collected: 06/15/21 0728    Lab Status: Final result Specimen: Blood from Arm, Left Updated: 06/15/21 0811     LACTIC ACID 2 0 mmol/L     Narrative:      Result may be elevated if tourniquet was used during collection      Protime-INR [[id number]]  (Normal) Collected: 06/15/21 0728    Lab Status: Final result Specimen: Blood from Arm, Left Updated: 06/15/21 0809     Protime 13 6 seconds      INR 1 03    APTT [[id number]]  (Normal) Collected: 06/15/21 0728    Lab Status: Final result Specimen: Blood from Arm, Left Updated: 06/15/21 0809     PTT 28 seconds     Comprehensive metabolic panel [[id number]]  (Abnormal) Collected: 06/15/21 0728    Lab Status: Final result Specimen: Blood from Arm, Left Updated: 06/15/21 0805     Sodium 136 mmol/L      Potassium 4 5 mmol/L      Chloride 100 mmol/L      CO2 23 mmol/L      ANION GAP 13 mmol/L      BUN 21 mg/dL      Creatinine 1 44 mg/dL      Glucose 221 mg/dL      Calcium 9 2 mg/dL      Corrected Calcium 10 2 mg/dL      AST 69 U/L      ALT 85 U/L      Alkaline Phosphatase 283 U/L      Total Protein 8 3 g/dL      Albumin 2 8 g/dL      Total Bilirubin 0 66 mg/dL      eGFR 48 ml/min/1 73sq m     Narrative:      [place] guidelines for Chronic Kidney Disease (CKD):     Stage 1 with normal or high GFR (GFR > 90 mL/min/1 73 square meters)    Stage 2 Mild CKD (GFR = 60-89 mL/min/1 73 square meters)    Stage 3A Moderate CKD (GFR = 45-59 mL/min/1 73 square meters)    Stage 3B Moderate CKD (GFR = 30-44 mL/min/1 73 square meters)    Stage 4 Severe CKD (GFR = 15-29 mL/min/1 73 square meters)    Stage 5 End Stage CKD (GFR <15 mL/min/1 73 square meters)  Note: GFR calculation is accurate only with a steady state creatinine    NT-BNP PRO [[id number]]  (Abnormal) Collected: 06/15/21 0728    Lab Status: Final result Specimen: Blood from Arm, Left Updated: 06/15/21 0805     NT-proBNP 1,153 pg/mL     Troponin I [[id number]]  (Normal) Collected: 06/15/21 0728    Lab Status: Final result Specimen: Blood from Arm, Left Updated: 06/15/21 0800     Troponin I <0 02 ng/mL     CBC and differential [[id number]]  (Abnormal) Collected: 06/15/21 0728    Lab Status: Final result Specimen: Blood from Arm, Left Updated: 06/15/21 0740     WBC 16 15 Thousand/uL      RBC 4 63 Million/uL      Hemoglobin 14 2 g/dL      Hematocrit 43 2 %      MCV 93 fL      MCH 30 7 pg      MCHC 32 9 g/dL      RDW 13 0 %      MPV 8 5 fL      Platelets 429 Thousands/uL      nRBC 0 /100 WBCs      Neutrophils Relative 87 %      Immat GRANS % 1 %      Lymphocytes Relative 4 %      Monocytes Relative 7 %      Eosinophils Relative 1 %      Basophils Relative 0 %      Neutrophils Absolute 13 99 Thousands/µL      Immature Grans Absolute 0 11 Thousand/uL      Lymphocytes Absolute 0 63 Thousands/µL      Monocytes Absolute 1 15 Thousand/µL      Eosinophils Absolute 0 22 Thousand/µL      Basophils Absolute 0 05 Thousands/µL                  CTA ED chest PE Study   Final Result by Lester Gallo MD (06/15 1021)      No pulmonary embolism  Diffuse groundglass opacities in the lungs  Differential considerations include bacterial and viral pneumonia (including COVID 19), and vascular congestion  Minimal left pleural effusion  Workstation performed: [id number]         XR chest 1 view portable   ED Interpretation by Yfn Anderson DO (06/15 1287)   Bilateral infiltrates  Final Result by Lui Clifford MD (06/15 1611)      Moderate bilateral pulmonary infiltrates which are nonspecific and may represent pneumonia or edema  Workstation performed: [id number]                    Procedures  ECG 12 Lead Documentation Only    Date/Time: 6/15/2021 7:26 AM  Performed by: Yfn Anderson DO  Authorized by: Yfn Anderson DO     ECG reviewed by me, the ED Provider: yes    Patient location:  ED  Previous ECG:     Previous ECG:  Compared to current    Similarity:  Changes noted    Comparison to cardiac monitor: Yes    Comments:      Normal sinus rhythm at a rate of 99 beats per minute  Normal intervals  Normal axis  Normal QRS  Nonspecific ST T wave abnormalities  Occasional PVCs  Baseline artifact  PVCs new from previous EKG from 04/18/2021  CriticalCare Time  Performed by: Paulie Quinonze DO  Authorized by: Paulie Quinonez DO     Critical care provider statement:     Critical care time (minutes):  60    Critical care start time:  6/15/2021 7:30 AM    Critical care end time:  6/15/2021 8:30 AM    Critical care time was exclusive of:  Separately billable procedures and treating other patients    Critical care was necessary to treat or prevent imminent or life-threatening deterioration of the following conditions:  Respiratory failure    Critical care was time spent personally by me on the following activities:  Obtaining history from patient or surrogate, evaluation of patient's response to treatment, examination of patient, development of treatment plan with patient or surrogate, discussions with consultants, ordering and review of laboratory studies, ordering and review of radiographic studies, re-evaluation of patient's condition and review of old charts             ED Course  ED Course as of Efrain 15 2212   Tue Efrain 15, 2021   0728 Patient placed on non-rebreather for significant hypoxia  Oxygen saturation is currently 93% on 15 L  Called respiratory for mid flow  6532 Patient meets SIRS criteria with tachypnea and leukocytosis  Broad-spectrum antibiotics started for pneumonia  SBIRT 20yo+      Most Recent Value   SBIRT (24 yo +)   In order to provide better care to our patients, we are screening all of our patients for alcohol and drug use  Would it be okay to ask you these screening questions?   Unable to answer at this time Filed at: 06/15/2021 1900          Wells' Criteria for PE      Most Recent Value   Wells' Criteria for PE   Clinical signs and symptoms of DVT  0 Filed at: 06/15/2021 5407   PE is primary diagnosis or equally likely  3 Filed at: 06/15/2021 0726   HR >100  0 Filed at: 06/15/2021 0726   Immobilization at least 3 days or Surgery in the previous 4 weeks  0 Filed at: 06/15/2021 9003   Previous, objectively diagnosed PE or DVT  0 Filed at: 06/15/2021 0726   Hemoptysis  0 Filed at: 06/15/2021 5291   Malignancy with treatment within 6 months or palliative  1 Filed at: 06/15/2021 3915   Wells' Criteria Total  4 Filed at: 06/15/2021 6256                [facility]  Number of Diagnoses or Management Options  Hypoxia: new and requires workup  Pneumonia of both lungs due to infectious organism, unspecified part of lung: new and requires workup  Diagnosis management comments: Patient presents with respiratory distress and significant hypoxia  Patient placed on mid flow nasal canula  Improvement in O2 sat  CT shows diffuse infiltrates, possible bacterial vs viral pneumonia  Less likely CHF given lack of physical exam findings supporting fluid overload  Patient had COVID 19 last month  He may have underlying lung fibrosis as a result of this  Will treat with steroids and IV abx  Will hospitalize for further treatment          Amount and/or Complexity of Data Reviewed  Clinical lab tests: ordered and reviewed  Tests in the radiology section of CPT®: reviewed and ordered  Tests in the medicine section of CPT®: ordered and reviewed  Review and summarize past medical records: yes  Discuss the patient with other providers: yes  Independent visualization of images, tracings, or specimens: yes    Risk of Complications, Morbidity, and/or Mortality  Presenting problems: high  Diagnostic procedures: high  Management options: high    Patient Progress  Patient progress: stable      Disposition  Final diagnoses:   Hypoxia   Pneumonia of both lungs due to infectious organism, unspecified part of lung     Time reflects when diagnosis was documented in both MDM as applicable and the Disposition within this note     Time User Action Codes Description Comment    6/15/2021 11:53 AM Oc Genao Add [R09 02] Hypoxia     6/15/2021 11:53 AM Anh GALLOWAY Add [J18 9] Pneumonia of both lungs due to infectious organism, unspecified part of lung     6/15/2021 11:53 AM Luis Fitzpatrick Modify [R09 02] Hypoxia     6/15/2021 12:53 PM David Lacey Add [J18 9] Pneumonia       ED Disposition     ED Disposition Condition Date/Time Comment    Admit Stable Tue Efrain 15, 2021 11:53 AM Case was discussed with AMADOR and the patient's admission status was agreed to be Admission Status: inpatient status to the service of Dr Duane Bihari           Follow-up Information    None         Current Discharge Medication List      CONTINUE these medications which have NOT CHANGED    Details   amLODIPine (NORVASC) 10 mg tablet Take 1 tablet (10 mg total) by mouth daily  Qty: 30 tablet, Refills: 0    Associated Diagnoses: Esophageal dysphagia      aspirin 81 mg chewable tablet Chew 81 mg daily      lidocaine-prilocaine (EMLA) cream Apply topically as needed for mild pain  Qty: 30 g, Refills: 1    Associated Diagnoses: Port-A-Cath in place      LORazepam (ATIVAN) 0 5 mg tablet Take 1 tablet (0 5 mg total) by mouth 2 (two) times a day as needed for anxiety  Qty: 60 tablet, Refills: 1    Associated Diagnoses: Anxiety      melatonin 3 mg Take 1 tablet (3 mg total) by mouth daily at bedtime  Qty: 30 each, Refills: 0    Associated Diagnoses: Esophageal dysphagia      metoprolol tartrate (LOPRESSOR) 50 mg tablet Take 50 mg by mouth 2 (two) times a day      ondansetron (ZOFRAN-ODT) 4 mg disintegrating tablet Take 1 tablet (4 mg total) by mouth every 6 (six) hours as needed for nausea or vomiting  Qty: 20 tablet, Refills: 0    Associated Diagnoses: Esophageal dysphagia      pantoprazole (PROTONIX) 40 mg tablet Take 1 tablet by mouth daily in the early morning  Qty: 30 tablet, Refills: 0      simvastatin (ZOCOR) 40 mg tablet Take 40 mg by mouth daily at bedtime      Lidocaine Viscous HCl (XYLOCAINE) 2 % mucosal solution Swish and swallow 15 mL 4 (four) times a day as needed for mouth pain or discomfort  Qty: 100 mL, Refills: 3    Associated Diagnoses: Malignant neoplasm of lower third of esophagus (HCC)      Multiple Vitamin (MULTIVITAMIN) tablet Take 1 tablet by mouth daily      sucralfate (CARAFATE) 1 g/10 mL suspension Take 10 mL (1 g total) by mouth 4 (four) times a day  Qty: 420 mL, Refills: 3    Associated Diagnoses: Malignant neoplasm of lower third of esophagus (HCC)           No discharge procedures on file      PDMP Review     None          ED Provider  Electronically Signed by           Damaso Velasco DO  06/15/21 7690

## 2021-06-15 NOTE — ASSESSMENT & PLAN NOTE
Probably multifactorial in etiology    Due to pneumonia as well as mild CHF exacerbation  · Will order neb treatments as needed  · Will start IV steroids and IV antibiotics  · Will also start IV Lasix for diuresis  · Oxygen protocol

## 2021-06-15 NOTE — CONSULTS
Consultation - Pulmonary Medicine   Senia Mcclellan 65 y o  male MRN: [id number]  Unit/Bed#: ED 26 Encounter: [id number]      Assessment & Plan:  · Acute hypoxic respiratory failure  · Abnormal CT of the chest:  Diffuse ground-glass opacities  · Recent COVID-19 infection (+ 5/12/21)  · Metastatic esophageal cancer s/p chemo and radiation  · Dysphagia s/p esophageal stent placement  · Former smoker    Currently requiring 15 L nasal without baseline oxygen requirement, initially presented 62%  Continue to monitor and maintain SpO2 greater than 89%  Titrate as able and determine oxygen needs prior to discharge  Reviewed chest CT in PACS (negative for PE), showing diffuse GGO w differential including bacterial and viral pneumonia, vascular congestion  ID has been consulted, await recommendations  Echocardiogram pending  Follow-up on cultures, inflammatory markers   Will start IV Solu-Medrol 40 mg q 8 H  Duo-Nebs PRN  Repeat COVID testing  If positive, would start therapeutic anticoagulation  Airway clearance measures and supportive care  SPEECH eval pending   Patient follows with Oncology, recently completed radiation  Completed 12 cycle of FOLFOX -6 and remains on trastuzumab monotherapy    Will need follow-up imaging in the outpatient setting    History of Present Illness   Physician Requesting Consult: Arelis Hu MD  Reason for Consult / Principal Problem: hypoxia  Hx and PE limited by: none  HPI: Senia Mcclellan is a 65y o  year old male former smoker with past medical history including metastatic esophageal cancer status post chemo and radiation dysphagia status post esophageal stent placement by thoracic surgery, peripheral arterial disease, GERD, chronic kidney disease, hypertension who presents with shortness of breath  Patient states that symptoms started weeks ago and acutely worsened yesterday  He has been coughing up white mucus  No hemoptysis    He has not been checking his temperature but has been intermittently having "sweating spells"  He also c/o weakness and fatigue  He was diagnosed with COVID 5/12 and states he quarantined for the appropriate amount of time and didn't have any significant symptoms during that time  Right now he says he feels a little better now that he is wearing the oxygen  He denies any significant history of diagnosed pulmonary disease  Denies prior history of pneumonia  He quit smoking in 2008 and has approx 40 pack years  Inpatient consult to Pulmonology  Consult performed by: Jackie Breaux PA-C  Consult ordered by: Kavon Steen MD          Review of Systems   Constitutional: Positive for appetite change and fatigue  HENT: Positive for congestion  Respiratory: Positive for cough and shortness of breath  Cardiovascular: Positive for leg swelling (chronic, unchanged)  Negative for chest pain  Neurological: Positive for weakness  All other systems reviewed and are negative  Historical Information   Past Medical History:   Diagnosis Date    Anxiety     Cancer [facility])     Esophageal    Dysphagia     Esophageal abnormality     Esophageal cancer (HCC)     GERD (gastroesophageal reflux disease)     Hyperlipidemia     Hypertension     Occasional tremors      Past Surgical History:   Procedure Laterality Date    ESOPHAGOGASTRODUODENOSCOPY N/A 8/9/2017    Procedure: ESOPHAGOGASTRODUODENOSCOPY (EGD); Surgeon: Roberto Barahona MD;  [facility]; Service: Gastroenterology    ESOPHAGOGASTRODUODENOSCOPY N/A 8/11/2017    Procedure: ESOPHAGOGASTRODUODENOSCOPY (EGD) w/ stent;  Surgeon: Roberto Barahona MD;  [facility];   Service: Gastroenterology    ESOPHAGOGASTRODUODENOSCOPY N/A 1/26/2021    Procedure: ESOPHAGOGASTRODUODENOSCOPY (EGD), BIOPSY, ESOPHAGEAL DILATION,  STENT PLACEMENT;  Surgeon: Jane Lawler MD;  [facility];  Service: Thoracic    LAPAROTOMY N/A 2/11/2020    Procedure: EGD; REMOVAL OF ESOPHAGEAL STENTS X 3;  Surgeon: Felix Sanchez MD;  [facility];  Service: Thoracic    PORTACATH PLACEMENT      PORTACATH PLACEMENT      GA ESOPHAGOGASTRODUODENOSCOPY TRANSORAL DIAGNOSTIC N/A 2021    Procedure: ESOPHAGOGASTRODUODENOSCOPY (EGD); stent removal;  Surgeon: Felix Sanchez MD;  [facility];  Service: Thoracic    UPPER GASTROINTESTINAL ENDOSCOPY      VASCULAR SURGERY      blockage in neck      Social History   Social History     Substance and Sexual Activity   Alcohol Use Not Currently     Social History     Substance and Sexual Activity   Drug Use Yes    Types: Marijuana    Comment: has medical card for this     E-Cigarette/Vaping    E-Cigarette Use Never User      E-Cigarette/Vaping Substances     Social History     Tobacco Use   Smoking Status Former Smoker    Packs/day: 1 00    Years: 39 00    Pack years: 39 00    Types: Cigarettes    Start date:     Quit date: 2008    Years since quittin 7   Smokeless Tobacco Never Used   Tobacco Comment    started around age 22        Family History:   Family History   Problem Relation Age of Onset    Liver cancer Paternal Uncle        Meds/Allergies   all current active meds have been reviewed    Allergies   Allergen Reactions    Other      Cat Dander       Objective   Vitals: Blood pressure 105/61, pulse 94, temperature 98 4 °F (36 9 °C), temperature source Oral, resp  rate 22, height 5' 7" (1 702 m), weight 76 2 kg (167 lb 15 9 oz), SpO2 94 %  ,Body mass index is 26 31 kg/m²      Intake/Output Summary (Last 24 hours) at 6/15/2021 1506  Last data filed at 6/15/2021 1423  Gross per 24 hour   Intake --   Output 700 ml   Net -700 ml     Invasive Devices     Central Venous Catheter Line            Port A Cath 17 Right Chest 1387 days          Peripheral Intravenous Line            Peripheral IV 06/15/21 Distal;Left;Upper;Ventral (anterior) Arm <1 day    Peripheral IV 06/15/21 Right Arm <1 day                Physical Exam  Vitals and nursing note reviewed  Constitutional:       General: He is not in acute distress  Appearance: He is well-developed  He is not diaphoretic  HENT:      Head: Normocephalic and atraumatic  Right Ear: External ear normal       Left Ear: External ear normal       Nose: Nose normal    Eyes:      General: No scleral icterus  Right eye: No discharge  Left eye: No discharge  Conjunctiva/sclera: Conjunctivae normal    Neck:      Trachea: No tracheal deviation  Cardiovascular:      Rate and Rhythm: Normal rate and regular rhythm  Heart sounds: Normal heart sounds  No murmur heard  No friction rub  No gallop  Pulmonary:      Effort: Pulmonary effort is normal  No respiratory distress  Breath sounds: No stridor  Comments: Decreased breath sounds  Abdominal:      General: There is no distension  Tenderness: There is no guarding  Musculoskeletal:         General: No tenderness or deformity  Normal range of motion  Cervical back: Normal range of motion and neck supple  Comments: Chronic LE present   Skin:     General: Skin is warm and dry  Coloration: Skin is not pale  Findings: No erythema or rash  Neurological:      Mental Status: He is alert and oriented to person, place, and time  Cranial Nerves: No cranial nerve deficit  Motor: No abnormal muscle tone  Psychiatric:         Behavior: Behavior normal          Thought Content: Thought content normal          Judgment: Judgment normal          Lab Results: I have personally reviewed pertinent lab results  Imaging Studies: I have personally reviewed pertinent reports  and I have personally reviewed pertinent films in PACS  EKG, Pathology, and Other Studies: I have personally reviewed pertinent reports      VTE Prophylaxis: Enoxaparin (Lovenox)    Code Status: Level 1 - Full Code  Advance Directive and Living Will:      Power of :    POLST:

## 2021-06-15 NOTE — ASSESSMENT & PLAN NOTE
· Outpatient follow-up with Oncology  · Patient stated that he last had radiation about a month ago  · He is not on any special diet

## 2021-06-15 NOTE — CONSULTS
Consultation - Infectious Disease   Angela Garcia 65 y o  male MRN: [id number]  Unit/Bed#: ED 26 Encounter: [id number]      IMPRESSION & RECOMMENDATIONS:   1  Sepsis  POA  With tachycardia, tachypnea, leukocytosis, and ARISTIDES  In patient presenting with shortness of breath and acute respiratory failure; suspect pulmonary source as below  Admission blood cultures are pending negative thus far   -continue vancomycin pending MRSA nasal screen  -switch Zosyn to cefepime and Flagyl  -follow-up cultures and adjust antibiotics as needed  -check streptococcal and Legionella urinary antigen  -check MRSA nasal screen  -monitor temperature and hemodynamics  -serial exam  -respiratory support  -recheck CBC and BMP in a m   -trend procalcitonin level     2  Acute hypoxic respiratory failure  In setting of shortness of breath, CT scan with ground glass opacities and consolidation at the bases with leukocytosis, elevated procalcitonin level  Consider bacterial pneumonia status post COVID-19 positive on May 11, 2021  Patient clinically stabilizing on IV Lasix, steroid and antibiotics  -antibiotics as above  -monitor temperature and hemodynamics  -check streptococcal and Legionella urinary antigen  -check MRSA nasal screen  -monitor temperature and hemodynamics  -serial exam  -ongoing pulmonary follow-up and management  -IV steroids on board for pulmonary  -respiratory support  -recheck CBC and BMP in a m   -trend procalcitonin level  -recheck CBC and BMP   -monitor clinical response     3  Dysphasia in setting of esophageal cancer  Risk factor for aspiration  -continue antibiotics as above   -follow-up speech evaluation  -advancing diet as per speech therapy recommendation    4  ARISTIDES  POA  Creatinine 1 44  Likely prerenal in setting of #1    -renal dose adjust antibiotic as needed  -Pharmacy on consult for vancomycin trough dosing management  -volume management   -recheck BMP     5  Esophageal cancer    status post radiation therapy through April 2021 and now on Herceptin infusion monthly  Immunocompromised patient  -symptomatic management per primary care team  -advancing diet as per speech therapy recommendation    Antibiotics:  Vancomycin/Zosyn D1    I have discussed the above management plan in detail with patient, RN, and the primary service, and Dr Sánchez Quesada  Extensive review of the medical records in epic including review of the notes, radiographs, and laboratory results     HISTORY OF PRESENT ILLNESS:  Reason for Consult:  Pneumonia    HPI: Bill Torres is a 65y o  year old male with esophageal cancer status post radiation therapy through April 2021 and now on Herceptin infusion monthly, dysphagia, and COVID-19 positive 5/11/21 who presented to the ER today with complaints of progressive shortness of breath over the past week and now with minimal exertion   He reports associated productive cough  The patient denied subjective fevers/chills  In the ED, he arrived with tachycardia and tachypnea with O2 sats in this 62% region  06/15/2021 CTA chest:  No PE  Diffuse ground-glass opacities throughout lungs, with more confluent consolidation in the basesHe was placed on 15 L non-rebreather mask and now is on 15 L mid flow nasal cannula  He has leukocytosis, mild procalcitonin level elevation and elevated creatinine  Blood cultures were obtained and the patient was given loading doses of vancomycin and cefepime  He is now admitted on vancomycin and Zosyn  Infectious Disease now being consulted regarding evaluation and management of pneumonia  Patient reports yesterday he was in able to really get to the bathroom without shortness of breath  He did have some increased edema in his legs that he sometimes gets with the Herceptin  His last infusion was 2 weeks ago  He denies any nausea, vomiting, diarrhea recently    It does take him a long time to eat and he drinks a lot of fluids but he has not experienced any choking or concern for aspiration he reports    REVIEW OF SYSTEMS:  A complete review of systems is negative other than that noted in the HPI  PAST MEDICAL HISTORY:  Past Medical History:   Diagnosis Date    Anxiety     Cancer [facility])     Esophageal    Dysphagia     Esophageal abnormality     Esophageal cancer (HCC)     GERD (gastroesophageal reflux disease)     Hyperlipidemia     Hypertension     Occasional tremors      Past Surgical History:   Procedure Laterality Date    ESOPHAGOGASTRODUODENOSCOPY N/A 8/9/2017    Procedure: ESOPHAGOGASTRODUODENOSCOPY (EGD); Surgeon: Sangita Paredes MD;  [facility]; Service: Gastroenterology    ESOPHAGOGASTRODUODENOSCOPY N/A 8/11/2017    Procedure: ESOPHAGOGASTRODUODENOSCOPY (EGD) w/ stent;  Surgeon: Sangita Paredes MD;  [facility];   Service: Gastroenterology    ESOPHAGOGASTRODUODENOSCOPY N/A 1/26/2021    Procedure: ESOPHAGOGASTRODUODENOSCOPY (EGD), BIOPSY, ESOPHAGEAL DILATION,  STENT PLACEMENT;  Surgeon: Kassidy Babb MD;  [facility];  Service: Thoracic    LAPAROTOMY N/A 2/11/2020    Procedure: EGD; REMOVAL OF ESOPHAGEAL STENTS X 3;  Surgeon: Kassidy Babb MD;  [facility];  Service: Thoracic    PORTACATH PLACEMENT      PORTACATH PLACEMENT      SD ESOPHAGOGASTRODUODENOSCOPY TRANSORAL DIAGNOSTIC N/A 4/26/2021    Procedure: ESOPHAGOGASTRODUODENOSCOPY (EGD); stent removal;  Surgeon: Kassidy Babb MD;  [facility];  Service: Thoracic    UPPER GASTROINTESTINAL ENDOSCOPY      VASCULAR SURGERY  2008    blockage in neck        FAMILY HISTORY:  Non-contributory    SOCIAL HISTORY:  Social History   Social History     Substance and Sexual Activity   Alcohol Use Not Currently     Social History     Substance and Sexual Activity   Drug Use Yes    Types: Marijuana    Comment: has medical card for this     Social History     Tobacco Use   Smoking Status Former Smoker    Packs/day: 1 00    Years: 39 00    Pack years: 39 00  Types: Cigarettes    Start date: 54    Quit date: 2008    Years since quittin 7   Smokeless Tobacco Never Used   Tobacco Comment    started around age 22        ALLERGIES:  Allergies   Allergen Reactions    Other      Cat Dander       MEDICATIONS:  All current active medications have been reviewed  PHYSICAL EXAM:  Temp:  [98 4 °F (36 9 °C)] 98 4 °F (36 9 °C)  HR:  [] 114  Resp:  [22-28] 22  BP: (102-132)/(57-67) 102/65  SpO2:  [62 %-95 %] 95 %  Temp (24hrs), Av 4 °F (36 9 °C), Min:98 4 °F (36 9 °C), Max:98 4 °F (36 9 °C)  Current: Temperature: 98 4 °F (36 9 °C)    Intake/Output Summary (Last 24 hours) at 6/15/2021 1600  Last data filed at 6/15/2021 1423  Gross per 24 hour   Intake --   Output 700 ml   Net -700 ml       General Appearance:  78-year-old elderly male, appearing more comfortably breathing at 10 L nasal cannula O2, able to carry on full conversation at rest,    Head:  Normocephalic, without obvious abnormality, atraumatic   Eyes:  Conjunctiva pink and sclera anicteric, both eyes   Nose: Nares normal, mucosa normal, no drainage   Throat: Oropharynx moist without lesions   Neck: Supple, symmetrical, no adenopathy, no tenderness/mass/nodules   Back:   Symmetric, no curvature, ROM normal, no CVA tenderness   Lungs:   Decreased breath sounds throughout to auscultation bilaterally, respirations unlabored with full conversation on 10 L, no active cough on exam   Chest Wall:  No tenderness or deformity   Heart:  RRR; no murmur, rub or gallop   Abdomen:   Soft, non-tender, non-distended, positive bowel sounds    Extremities: No cyanosis, clubbing or edema, IV site nontender   Skin: No rashes or lesions  No draining wounds noted  Lymph nodes: Cervical, supraclavicular nodes normal   Neurologic: Alert and oriented times 3, extremity strength 5/5 and symmetric       LABS, IMAGING, & OTHER STUDIES:  Lab Results:  I have personally reviewed pertinent labs    Results from last 7 days Lab Units 06/15/21  0728   WBC Thousand/uL 16 15*   HEMOGLOBIN g/dL 14 2   PLATELETS Thousands/uL 442*     Results from last 7 days   Lab Units 06/15/21  0728   SODIUM mmol/L 136   POTASSIUM mmol/L 4 5   CHLORIDE mmol/L 100   CO2 mmol/L 23   BUN mg/dL 21   CREATININE mg/dL 1 44*   EGFR ml/min/1 73sq m 48   CALCIUM mg/dL 9 2   AST U/L 69*   ALT U/L 85*   ALK PHOS U/L 283*     Results from last 7 days   Lab Units 06/15/21  0746 06/15/21  0732   BLOOD CULTURE  Received in Microbiology Lab  Culture in Progress  Received in Microbiology Lab  Culture in Progress  Results from last 7 days   Lab Units 06/15/21  0728   PROCALCITONIN ng/ml 0 43*             Results from last 7 days   Lab Units 06/15/21  0923   D-DIMER QUANTITATIVE ug/ml FEU 5 87*       Imaging Studies:   06/15/2021 CTA chest:  No PE  Diffuse ground-glass opacities throughout lungs, with more confluent consolidation in the bases    Other Studies:   I have personally reviewed pertinent reports

## 2021-06-15 NOTE — H&P
[facility]  H&P- Arthurine Fearing 1950, 65 y o  male MRN: [id number]  Unit/Bed#: ED 26 Encounter: [id number]  Primary Care Provider: Yifan Marion DO   Date and time admitted to hospital: 6/15/2021  7:06 AM         [facility] - History and Physical:       Arthtonio Fearing 65 y o  male MRN: [id number]  Unit/Bed#: ED 26 Encounter: [id number]  Admitting Physician: Rosenda Conley MD  PCP: Yifan Marion DO  Date of Admission:  06/15/21        Assessment and Plan:     * SOB (shortness of breath)  Assessment & Plan  Probably multifactorial in etiology    Due to pneumonia as well as mild CHF exacerbation  · Will order neb treatments as needed  · Will start IV steroids and IV antibiotics  · Will also start IV Lasix for diuresis  · Oxygen protocol    Pneumonia  Assessment & Plan  · Patient had COVID 19 about a month ago and he is also immunocompromised from his esophageal cancer  · Will start the patient on IV Zosyn and vancomycin  · Consult to Pulmonary Medicine placed  · Will also request Infectious Disease consultation    Chronic kidney disease stage 3  Assessment & Plan  Lab Results   Component Value Date    EGFR 48 06/15/2021    EGFR 64 03/29/2021    EGFR 57 02/19/2021    CREATININE 1 44 (H) 06/15/2021    CREATININE 1 14 03/29/2021    CREATININE 1 26 02/19/2021     · Monitor for now    PAD (peripheral artery disease)  Assessment & Plan  · Continue aspirin and statin    Esophageal cancer   Assessment & Plan  · Outpatient follow-up with Oncology  · Patient stated that he last had radiation about a month ago  · He is not on any special diet    Carotid stenosis  Assessment & Plan  · Continue aspirin and statin    Essential hypertension  Assessment & Plan  · Continue metoprolol and Norvasc    GERD (gastroesophageal reflux disease)  Assessment & Plan  · Continue Protonix    Dysphagia  Assessment & Plan  · Patient stated that he could tolerate a regular diet  · Will order speech pathology evaluation for possible dysphagia            VTE Prophylaxis: Enoxaparin (Lovenox)  / sequential compression device   Code Status: full    Anticipated Length of Stay:  Patient will be admitted on an Inpatient basis with an anticipated length of stay of  2 midnights  Justification for Hospital Stay:  Pneumonia    Total Time for Visit, including Counseling / Coordination of Care: 30 minutes  Greater than 50% of this total time spent on direct patient counseling and coordination of care  Chief Complaint:   Shortness of breath    History of Present Illness:    Shahram Carmen is a 65 y o  male who presented to the ED with complaints shortness of breath  Patient stated that he has had progressive shortness of breath over the past week  Shortness of breath worsens with minimal exertion  He also has associated productive cough  The patient stated that he had COVID-19 last month  In the ED he had a CT the chest which showed diffuse ground-glass opacities in the lungs  The patient denied subjective fevers/chills  Of note, the patient was previously on radiation therapy for his esophageal cancer but he completed his treatments in April 2021  He currently receives a Herceptin infusion monthly  Review of Systems:    Review of Systems   Constitutional: Negative  HENT: Negative  Eyes: Negative  Respiratory: Positive for cough and shortness of breath  Cardiovascular: Negative  Gastrointestinal: Negative  Endocrine: Negative  Genitourinary: Negative  Musculoskeletal: Negative  Skin: Negative  Neurological: Negative  Psychiatric/Behavioral: Negative          Past Medical and Surgical History:     Past Medical History:   Diagnosis Date    Anxiety     Cancer [facility])     Esophageal    Dysphagia     Esophageal abnormality     Esophageal cancer (HCC)     GERD (gastroesophageal reflux disease)     Hyperlipidemia     Hypertension     Occasional tremors        Past Surgical History:   Procedure Laterality Date    ESOPHAGOGASTRODUODENOSCOPY N/A 8/9/2017    Procedure: ESOPHAGOGASTRODUODENOSCOPY (EGD); Surgeon: Jacque Austin MD;  [facility]; Service: Gastroenterology    ESOPHAGOGASTRODUODENOSCOPY N/A 8/11/2017    Procedure: ESOPHAGOGASTRODUODENOSCOPY (EGD) w/ stent;  Surgeon: Jacque Austin MD;  [facility]; Service: Gastroenterology    ESOPHAGOGASTRODUODENOSCOPY N/A 1/26/2021    Procedure: ESOPHAGOGASTRODUODENOSCOPY (EGD), BIOPSY, ESOPHAGEAL DILATION,  STENT PLACEMENT;  Surgeon: Judson Lewis MD;  [facility];  Service: Thoracic    LAPAROTOMY N/A 2/11/2020    Procedure: EGD; REMOVAL OF ESOPHAGEAL STENTS X 3;  Surgeon: Judson Lewis MD;  [facility];  Service: Thoracic    PORTACATH PLACEMENT      PORTACATH PLACEMENT      ID ESOPHAGOGASTRODUODENOSCOPY TRANSORAL DIAGNOSTIC N/A 4/26/2021    Procedure: ESOPHAGOGASTRODUODENOSCOPY (EGD); stent removal;  Surgeon: Judson Lewis MD;  [facility];  Service: Thoracic    UPPER GASTROINTESTINAL ENDOSCOPY      VASCULAR SURGERY  2008    blockage in neck        Meds/Allergies:    Prior to Admission medications    Medication Sig Start Date End Date Taking?  Authorizing Provider   amLODIPine (NORVASC) 10 mg tablet Take 1 tablet (10 mg total) by mouth daily 1/28/21  Yes Ofelia Kwong MD   aspirin 81 mg chewable tablet Chew 81 mg daily   Yes Historical Provider, MD   lidocaine-prilocaine (EMLA) cream Apply topically as needed for mild pain 6/1/21  Yes Ella Farris MD   LORazepam (ATIVAN) 0 5 mg tablet Take 1 tablet (0 5 mg total) by mouth 2 (two) times a day as needed for anxiety 3/3/21  Yes Ella Farris MD   melatonin 3 mg Take 1 tablet (3 mg total) by mouth daily at bedtime 1/27/21  Yes Ofelia Kwong MD   metoprolol tartrate (LOPRESSOR) 50 mg tablet Take 50 mg by mouth 2 (two) times a day   Yes Historical Provider, MD   ondansetron (ZOFRAN-ODT) 4 mg disintegrating tablet Take 1 tablet (4 mg total) by mouth every 6 (six) hours as needed for nausea or vomiting 21  Yes Kay Castro PA-C   pantoprazole (PROTONIX) 40 mg tablet Take 1 tablet by mouth daily in the early morning 17  Yes Opal Jansen MD   simvastatin (ZOCOR) 40 mg tablet Take 40 mg by mouth daily at bedtime   Yes Historical Provider, MD   Lidocaine Viscous HCl (XYLOCAINE) 2 % mucosal solution Swish and swallow 15 mL 4 (four) times a day as needed for mouth pain or discomfort  Patient not taking: Reported on 6/15/2021 3/5/21   Ritika Becerril MD   Multiple Vitamin (MULTIVITAMIN) tablet Take 1 tablet by mouth daily    Historical Provider, MD   sucralfate (CARAFATE) 1 g/10 mL suspension Take 10 mL (1 g total) by mouth 4 (four) times a day  Patient not taking: Reported on 6/15/2021 3/5/21   Ritika Becerril MD     I have reviewed home medications with patient personally  Allergies:    Allergies   Allergen Reactions    Other      Cat Dander       Social History:     Marital Status: Single     Social History     Substance and Sexual Activity   Alcohol Use Not Currently     Social History     Tobacco Use   Smoking Status Former Smoker    Packs/day: 1 00    Years: 39 00    Pack years: 39 00    Types: Cigarettes    Start date: 54    Quit date: 2008    Years since quittin 7   Smokeless Tobacco Never Used   Tobacco Comment    started around age 22      Social History     Substance and Sexual Activity   Drug Use Yes    Types: Marijuana    Comment: has medical card for this       Family History:    non-contributory    Physical Exam:     Vitals:   Blood Pressure: 128/58 (06/15/21 1145)  Pulse: 94 (06/15/21 1145)  Temperature: 98 4 °F (36 9 °C) (06/15/21 0715)  Temp Source: Oral (06/15/21 0715)  Respirations: 22 (06/15/21 1145)  Height: 5' 7" (170 2 cm) (06/15/21 0715)  Weight - Scale: 76 2 kg (167 lb 15 9 oz) (06/15/21 0715)  SpO2: 92 % (06/15/21 1145)    Physical Exam  Constitutional: Appearance: Normal appearance  HENT:      Head: Normocephalic and atraumatic  Eyes:      Extraocular Movements: Extraocular movements intact  Pupils: Pupils are equal, round, and reactive to light  Cardiovascular:      Rate and Rhythm: Normal rate and regular rhythm  Pulmonary:      Effort: Pulmonary effort is normal       Breath sounds: Rhonchi present  Abdominal:      General: Bowel sounds are normal       Palpations: Abdomen is soft  Musculoskeletal:         General: Normal range of motion  Cervical back: Neck supple  Skin:     General: Skin is warm and dry  Neurological:      Mental Status: He is alert and oriented to person, place, and time  Psychiatric:         Mood and Affect: Mood normal      Additional Data:     Lab Results: I have personally reviewed pertinent reports  Results from last 7 days   Lab Units 06/15/21  0728   WBC Thousand/uL 16 15*   HEMOGLOBIN g/dL 14 2   HEMATOCRIT % 43 2   PLATELETS Thousands/uL 442*   NEUTROS PCT % 87*   LYMPHS PCT % 4*   MONOS PCT % 7   EOS PCT % 1     Results from last 7 days   Lab Units 06/15/21  0728   SODIUM mmol/L 136   POTASSIUM mmol/L 4 5   CHLORIDE mmol/L 100   CO2 mmol/L 23   BUN mg/dL 21   CREATININE mg/dL 1 44*   ANION GAP mmol/L 13   CALCIUM mg/dL 9 2   ALBUMIN g/dL 2 8*   TOTAL BILIRUBIN mg/dL 0 66   ALK PHOS U/L 283*   ALT U/L 85*   AST U/L 69*   GLUCOSE RANDOM mg/dL 221*     Results from last 7 days   Lab Units 06/15/21  0728   INR  1 03             Results from last 7 days   Lab Units 06/15/21  0728   LACTIC ACID mmol/L 2 0   PROCALCITONIN ng/ml 0 43*       Imaging: I have personally reviewed pertinent reports  CTA ED chest PE Study   Final Result by Angi Charlton MD (06/15 1021)      No pulmonary embolism  Diffuse groundglass opacities in the lungs  Differential considerations include bacterial and viral pneumonia (including COVID 19), and vascular congestion  Minimal left pleural effusion  Workstation performed: [id number]         XR chest 1 view portable   ED Interpretation by Chani Haque DO (06/15 0080)   Bilateral infiltrates  Allscripts / Epic Records Reviewed: Yes     ** Please Note: This note has been constructed using a voice recognition system   **

## 2021-06-16 PROBLEM — K21.9 GERD (GASTROESOPHAGEAL REFLUX DISEASE): Status: ACTIVE | Noted: 2017-08-09

## 2021-06-16 PROBLEM — R13.10 DYSPHAGIA: Status: ACTIVE | Noted: 2017-08-09

## 2021-06-16 PROBLEM — N17.9 ACUTE RENAL FAILURE SUPERIMPOSED ON STAGE 3 CHRONIC KIDNEY DISEASE (HCC): Status: ACTIVE | Noted: 2021-01-01

## 2021-06-16 PROBLEM — I10 ESSENTIAL HYPERTENSION: Status: ACTIVE | Noted: 2017-08-09

## 2021-06-16 PROBLEM — R74.01 TRANSAMINITIS: Status: ACTIVE | Noted: 2021-01-01

## 2021-06-16 NOTE — PROGRESS NOTES
Progress Note - Pulmonary   Aliene Leaver 65 y o  male MRN: [id number]  Unit/Bed#: S -01 Encounter: [id number]    Assessment:  Acute hypoxic respiratory failure next abnormal CT of the chest with diffuse ground-glass opacities  Recent COVID-19 infection  Metastatic esophageal cancer status post chemo and radiation  Dysphagia status post esophageal stent placement  Former smoker    Plan:  Patient continues to require 15 L mid flow still having some hypoxia discussed with nursing to add NRB if needed to maintain SpO2 greater than 88%  Titrate as able and determine oxygen needs prior to discharge  Reviewed chest CT in PACS (negative for PE), showing diffuse GGO w differential including bacterial and viral pneumonia, vascular congestion  Patient started on broad spectrum antibiotics cefepime, flagyl, vancomycin per ID  Trend procalcitonin  IV Solu-Medrol 40 mg q 8 hours initiated yesterday, continue same dose for now and continue to monitor inflammatory markers   Therapeutic Lovenox dosing was starting given significantly elevated D-dimer  Repeat COVID negative so no further COVID-directed therapy such as remdesivir or actemra indicated at this time  Echo shows normal EF, diuretics per primary  Duo-Nebs PRN  Airway clearance measures and supportive care  Speech and Swallow eval  Patient follows with Oncology, recently completed radiation  Completed 12 cycle of FOLFOX -6 and remains on trastuzumab monotherapy     Will need follow-up imaging in the outpatient setting    Discussed with nursing at bedside    Subjective:   Patient says he feels about the same as yesterday  No new complaints  12 point review of systems otherwise negative  Objective:     Vitals: Blood pressure 116/60, pulse 83, temperature 98 °F (36 7 °C), resp  rate 18, height 5' 7" (1 702 m), weight 76 2 kg (167 lb 15 9 oz), SpO2 (!) 88 %  ,Body mass index is 26 31 kg/m²        Intake/Output Summary (Last 24 hours) at 6/16/2021 1108  Last data filed at 6/16/2021 0907  Gross per 24 hour   Intake 251 67 ml   Output 1925 ml   Net -1673 33 ml       Invasive Devices     Central Venous Catheter Line            Port A Cath 08/28/17 Right Chest 1388 days          Peripheral Intravenous Line            Peripheral IV 06/15/21 Distal;Left;Upper;Ventral (anterior) Arm 1 day    Peripheral IV 06/15/21 Dorsal (posterior); Right Hand <1 day                Physical Exam:   General appearance: Alert and oriented, in no acute distress  Head: Normocephalic, without obvious abnormality, atraumatic  Eyes: EOMI  No discharge bilaterally  No scleral icterus  Neck: Supple, symmetrical, trachea midline  Lungs: Good air entry bilaterally no wheezing or rhonchi  Heart: Regular rate and rhythm, S1, S2 normal, no murmur  Abdomen:  No appreciable distension or tenderness  Extremities: Patient has chronic minimal LE edema  Skin: Warm and dry  Neurologic: No acute focal deficits are noted  +Tremor    Labs: I have personally reviewed pertinent lab results  Imaging and other studies: I have personally reviewed pertinent reports     and I have personally reviewed pertinent films in PACS

## 2021-06-16 NOTE — PROGRESS NOTES
Progress Note - Infectious Disease   Lashell Pelayo 65 y o  male MRN: [id number]  Unit/Bed#: S -01 Encounter: [id number]      Impression/Plan:  1  Sepsis  POA  With tachycardia, tachypnea, leukocytosis, and ARISTIDES  In patient presenting with shortness of breath and acute respiratory failure; suspect pulmonary source as below  Admission blood cultures are pending negative thus far   -continue vancomycin pending MRSA nasal screen  -continue Cefepime  -discontinue Flagyl  -follow-up cultures and adjust antibiotics as needed  -monitor temperature and hemodynamics  -serial exam  -respiratory support  -monitor CBC and BMP     2  Acute hypoxic respiratory failure  In setting of shortness of breath, CT scan with ground glass opacities and consolidation at the bases with leukocytosis, elevated procalcitonin level  Consider bacterial/viral pneumonia status post COVID-19 positive on May 11, 2021  Patient clinically stabilizing on IV Lasix, steroid and antibiotics  -continue antibiotics as above  -monitor temperature and hemodynamics  -serial exam  -ongoing pulmonary follow-up and management  -IV steroids on board for pulmonary  -respiratory support  -follow up imaging  -monitor CBC and BMP   -monitor clinical response     3  Dysphasia in setting of esophageal cancer  No aspiration events on bedside swallow evaluation  -continue antibiotics as above   -advancing diet as per speech therapy recommendation     4  ARISTIDES  POA  Creatinine 1 44 > 1 28  Likely prerenal in setting of #1    -renal dose adjust antibiotic as needed  -Pharmacy on consult for vancomycin trough dosing management  -volume management   -recheck BMP     5  Esophageal cancer  status post radiation therapy through April 2021 and now on Herceptin infusion monthly    Immunocompromised patient  -symptomatic management per primary care team  -advancing diet as per speech therapy recommendation     Antibiotics:  Vancomycin/Cefepime/Flagyl - abx D2     Above impression and plan discussed in detail with patient, RN, and primary care team     Subjective:  Patient reports feeling better  He has no fever, chills, sweats overnight; no nausea, vomiting, diarrhea; patient always eats slow precautionarily since esophageal CA but denies choking or mitchell aspiration of food or drink, + cough and feels like chest secretions are "breaking up," no increased shortness of breath; no pain complaints  Patient appears to be tolerating antibiotics    Objective:  Vitals:  Temp:  [98 °F (36 7 °C)-98 5 °F (36 9 °C)] 98 2 °F (36 8 °C)  HR:  [] 78  Resp:  [16-22] 18  BP: ()/(50-65) 99/56  SpO2:  [88 %-96 %] 88 %  Temp (24hrs), Av 3 °F (36 8 °C), Min:98 °F (36 7 °C), Max:98 5 °F (36 9 °C)  Current: Temperature: 98 2 °F (36 8 °C)    Physical Exam:   General Appearance:  More alert, interactive, nontoxic, in no clinical acute distress on 10 L NCO2 statting 88%, not dyspneic with conversation  Throat: Oropharynx moist without lesions  Lungs:   Decreased breath sounds fairly clear to auscultation bilaterally; no wheezes, rhonchi or rales; no active cough, unlabored conversation   Heart:  RRR; no murmur   Abdomen:   Soft, non-tender, non-distended, positive bowel sounds  Extremities: No clubbing, cyanosis or edema   : No fritz, no SPT   Skin: No new rashes or lesions  IV site nontender  No draining wounds noted         Labs, Imaging, & Other studies:   All pertinent labs and imaging studies were personally reviewed  Results from last 7 days   Lab Units 21  0506 06/15/21  1657 06/15/21  0728   WBC Thousand/uL 10 68*  --  16 15*   HEMOGLOBIN g/dL 12 1  --  14 2   PLATELETS Thousands/uL 370 387 442*     Results from last 7 days   Lab Units 21  0506 06/15/21  0728   SODIUM mmol/L 135* 136   POTASSIUM mmol/L 4 7 4 5   CHLORIDE mmol/L 102 100   CO2 mmol/L 21 23   BUN mg/dL 23 21   CREATININE mg/dL 1 28 1 44*   EGFR ml/min/1 73sq m 56 48   CALCIUM mg/dL 8 4 9 2   AST U/L 30 69*   ALT U/L 64 85*   ALK PHOS U/L 205* 283*     Results from last 7 days   Lab Units 06/15/21  1657 06/15/21  0746 06/15/21  0732   BLOOD CULTURE   --  No Growth at 24 hrs  No Growth at 24 hrs     LEGIONELLA URINARY ANTIGEN  Negative  --   --      Results from last 7 days   Lab Units 06/16/21  0651 06/15/21  0728   PROCALCITONIN ng/ml 0 30* 0 43*     Results from last 7 days   Lab Units 06/15/21  1657   CRP mg/L 242 0*     Results from last 7 days   Lab Units 06/15/21  1657   FERRITIN ng/mL 468*     Results from last 7 days   Lab Units 06/15/21  0923   D-DIMER QUANTITATIVE ug/ml FEU 5 87*

## 2021-06-16 NOTE — SPEECH THERAPY NOTE
Speech-Language Pathology Bedside Swallow Evaluation        Patient Name: Senia Mcclellan    QFLRY'N Date: 6/16/2021     Problem List  Principal Problem:    Pneumonia  Active Problems:    Dysphagia    GERD (gastroesophageal reflux disease)    Essential hypertension    Carotid stenosis    Esophageal cancer     PAD (peripheral artery disease)    Chronic kidney disease stage 3    SOB (shortness of breath)    Transaminitis         Past Medical History  Past Medical History:   Diagnosis Date    Anxiety     Cancer ([address] )     Esophageal    Dysphagia     Esophageal abnormality     Esophageal cancer (HCC)     GERD (gastroesophageal reflux disease)     Hyperlipidemia     Hypertension     Occasional tremors        Past Surgical History  Past Surgical History:   Procedure Laterality Date    ESOPHAGOGASTRODUODENOSCOPY N/A 8/9/2017    Procedure: ESOPHAGOGASTRODUODENOSCOPY (EGD); Surgeon: Clay Huizar MD;  [facility]; Service: Gastroenterology    ESOPHAGOGASTRODUODENOSCOPY N/A 8/11/2017    Procedure: ESOPHAGOGASTRODUODENOSCOPY (EGD) w/ stent;  Surgeon: Clay Huizar MD;  [facility];   Service: Gastroenterology    ESOPHAGOGASTRODUODENOSCOPY N/A 1/26/2021    Procedure: ESOPHAGOGASTRODUODENOSCOPY (EGD), BIOPSY, ESOPHAGEAL DILATION,  STENT PLACEMENT;  Surgeon: Donaldo Lew MD;  [facility];  Service: Thoracic    LAPAROTOMY N/A 2/11/2020    Procedure: EGD; REMOVAL OF ESOPHAGEAL STENTS X 3;  Surgeon: Donaldo Lew MD;  [facility];  Service: Thoracic    PORTACATH PLACEMENT      PORTACATH PLACEMENT      ID ESOPHAGOGASTRODUODENOSCOPY TRANSORAL DIAGNOSTIC N/A 4/26/2021    Procedure: ESOPHAGOGASTRODUODENOSCOPY (EGD); stent removal;  Surgeon: Donaldo Lew MD;  [facility];  Service: Thoracic    UPPER GASTROINTESTINAL ENDOSCOPY      VASCULAR SURGERY  2008    blockage in neck        Summary    Pt presents with oral and pharyngeal stages of swallowing appear within normal limits  Patient denies any food dysphagia symptoms since stent was removed in April 2021  No overt signs symptoms of aspiration, if wished to rule out silent aspiration would need a video barium swallow  Recommendations:   Diet: regular diet and thin liquids   Meds: whole with liquid   Frequent Oral care: 2x/day  Aspiration precautions   Other Recommendations/ considerations:  Consider VBS if wished to rule out silent aspiration, otherwise no follow-up speech therapy is needed at this time  Current Medical Status  Pt is a 65 y o  male who presented to [address] [place] [address]  with pneumonia  Pt complaints shortness of breath   Patient stated that he has had progressive shortness of breath over the past week   Shortness of breath worsens with minimal exertion   He also has associated productive cough  The patient stated that he had COVID-19 last month  In the ED he had a CT the chest which showed diffuse ground-glass opacities in the lungs  The patient denied subjective fevers/chills  Of note, the patient was previously on radiation therapy for his esophageal cancer but he completed his treatments in April 2021  He currently receives a Herceptin infusion monthly       Past medical history:   Please see H&P for details    Special Studies:  CTA cehst pe study: 6/15/21 No pulmonary embolism  Diffuse groundglass opacities in the lungs  Differential considerations include bacterial and viral pneumonia (including COVID 19), and vascular congestion  Minimal left pleural effusion      Social/Education/Vocational Hx:  Pt lives  At home     Swallow Information   Current Risks for Dysphagia & Aspiration: hx of esophageal ca; current pneumonia  Current Symptoms/Concerns: change in respiratory status  Current Diet: regular diet and thin liquids   Baseline Diet: regular diet and thin liquids  Takes pills- whole w/ water     Baseline Assessment   Behavior/Cognition: alert  Speech/Language Status: able to participate in conversation and able to follow commands  Patient Positioning: upright in bed     Swallow Mechanism Exam   Facial: symmetrical  Labial: WFL  Lingual: WFL  Velum: unable to visualize  Mandible: adequate ROM  Dentition: full dentures  Vocal quality:clear/adequate   Volitional Cough: strong/productive   Respiratory: NC- O2 increased to 15L due to O2 sats @ 82% at start of session  Sats remained 85-88% during session    Consistencies Assessed and Performance   Consistencies Administered: pt seen at breakfast w/ pancake, omelet, fresh fruit, apple juice by straw  Oral Stage:  Patient prefers to drink liquids by straw due to tremors  Mastication, manipulation, and transfer of foods appeared within normal limits  Patient with good oral control of thin liquids, usually taking single sips at a time  No oral residue was noted  Patient did eat slowly  Pharyngeal Stage:  Swallows were timely and complete with no overt signs symptoms of aspiration  O2 sats occasionally declined while eating, cannot rule out silent aspiration        Esophageal Concerns: none reported      Results Reviewed with: patient and RN   Dysphagia Goals: none at this time      April Mauricio Koch CCC-SLP  Speech Pathologist  [place] license # [id number]  [address] [place] [address] license # [id number]  Available via [employer]

## 2021-06-16 NOTE — PROGRESS NOTES
[facility]  Progress Note Laura Ceja 1950, 65 y o  male MRN: [id number]  Unit/Bed#: S -01 Encounter: [id number]  Primary Care Provider: Jamie Rodriguez DO   Date and time admitted to hospital: 6/15/2021  7:06 AM    * SOB (shortness of breath)  Assessment & Plan  Probably multifactorial in etiology  Due to pneumonia versus mild CHF exacerbation verses pulmonary fibrosis secondary to COVID  Patient is on Herceptin, echo showed 19% EF no diastolic dysfunction  · Will order neb treatments as needed  · Will start IV steroids and IV antibiotics  · Will also start IV Lasix for diuresis  · Oxygen protocol      Pneumonia  Assessment & Plan  · Patient had COVID 19 about a month ago and he is also immunocompromised from his esophageal cancer  · Inflammatory markers elevated  · Legionella and strep pneumo urine antigens negative  · Procalcitonin elevated, repeat pending  · Will start the patient on IV Zosyn and vancomycin  · Consult to Pulmonary Medicine placed  · Will also request Infectious Disease consultation    Esophageal cancer   Assessment & Plan  · Outpatient follow-up with Oncology  · Patient stated that he last had radiation about a month ago  · He is not on any special diet    Dysphagia  Assessment & Plan  · Patient stated that he could tolerate a regular diet  · Will order speech pathology evaluation for possible dysphagia    Transaminitis  Assessment & Plan  · Patient's transaminitis likely secondary to shock level in the setting of sepsis  · Patient's transaminitis improved today with resolution of ARISTIDES and sepsis  · Monitor CMP tomorrow a m   To ensure resolution    Chronic kidney disease stage 3  Assessment & Plan  Lab Results   Component Value Date    EGFR 56 06/16/2021    EGFR 48 06/15/2021    EGFR 64 03/29/2021    CREATININE 1 28 06/16/2021    CREATININE 1 44 (H) 06/15/2021    CREATININE 1 14 03/29/2021     · Improved with IV Lasix  · Will continue IV Lasix today, given patient's physical examination of crackles  · Patient's baseline creatinine between 1 14 and 1 2    PAD (peripheral artery disease)  Assessment & Plan  · Continue aspirin and statin    Carotid stenosis  Assessment & Plan  · Continue aspirin and statin    Essential hypertension  Assessment & Plan  · Continue metoprolol and Norvasc    GERD (gastroesophageal reflux disease)  Assessment & Plan  · Continue Protonix          VTE Pharmacologic Prophylaxis:   Pharmacologic: Enoxaparin (Lovenox)  Mechanical VTE Prophylaxis in Place: Yes    Discussions with Specialists or Other Care Team Provider:  Pulmonology, Infectious Disease    Education and Discussions with Family / Patient: Will discuss with patient's family today    Current Length of Stay: 1 day(s)    Current Patient Status: Inpatient     Discharge Plan / Estimated Discharge Date:  Once patient is medically cleared and stable    Code Status: Level 1 - Full Code      Subjective:   Patient feels slightly better than yesterday, still feels some shortness of breath    Objective:     Vitals:   Temp (24hrs), Av 3 °F (36 8 °C), Min:98 °F (36 7 °C), Max:98 5 °F (36 9 °C)    Temp:  [98 °F (36 7 °C)-98 5 °F (36 9 °C)] 98 °F (36 7 °C)  HR:  [] 72  Resp:  [16-22] 18  BP: ()/(50-67) 116/60  SpO2:  [88 %-96 %] 90 %  Body mass index is 26 31 kg/m²  Input and Output Summary (last 24 hours): Intake/Output Summary (Last 24 hours) at 2021 0858  Last data filed at 2021 0625  Gross per 24 hour   Intake 251 67 ml   Output 1525 ml   Net -1273 33 ml       Physical Exam:     Physical Exam  Vitals reviewed  Constitutional:       Appearance: Normal appearance  HENT:      Head: Normocephalic and atraumatic  Right Ear: Tympanic membrane normal       Left Ear: Tympanic membrane normal       Nose: Nose normal       Mouth/Throat:      Mouth: Mucous membranes are dry  Eyes:      Extraocular Movements: Extraocular movements intact  Pupils: Pupils are equal, round, and reactive to light  Cardiovascular:      Rate and Rhythm: Normal rate and regular rhythm  Pulses: Normal pulses  Heart sounds: No murmur heard  No gallop  Pulmonary:      Effort: Pulmonary effort is normal  No respiratory distress  Breath sounds: No stridor  Examination of the right-middle field reveals rales  Examination of the right-lower field reveals rhonchi and rales  Examination of the left-lower field reveals rhonchi and rales  Rhonchi and rales present  No wheezing  Chest:      Chest wall: No tenderness  Abdominal:      General: Abdomen is flat  Bowel sounds are normal  There is no distension  Palpations: Abdomen is soft  Tenderness: There is no abdominal tenderness  Musculoskeletal:         General: No swelling  Right lower leg: No edema  Left lower leg: No edema  Skin:     General: Skin is warm and dry  Neurological:      General: No focal deficit present  Mental Status: He is alert and oriented to person, place, and time  Cranial Nerves: No cranial nerve deficit  Motor: No weakness  Psychiatric:         Mood and Affect: Mood normal          Behavior: Behavior normal              Additional Data:     Labs:    Results from last 7 days   Lab Units 06/16/21  0506 06/15/21  0728   WBC Thousand/uL 10 68* 16 15*   HEMOGLOBIN g/dL 12 1 14 2   HEMATOCRIT % 36 8 43 2   PLATELETS Thousands/uL 370 442*   NEUTROS PCT %  --  87*   LYMPHS PCT %  --  4*   MONOS PCT %  --  7   EOS PCT %  --  1     Results from last 7 days   Lab Units 06/16/21  0506   POTASSIUM mmol/L 4 7   CHLORIDE mmol/L 102   CO2 mmol/L 21   BUN mg/dL 23   CREATININE mg/dL 1 28   CALCIUM mg/dL 8 4   ALK PHOS U/L 205*   ALT U/L 64   AST U/L 30     Results from last 7 days   Lab Units 06/15/21  0728   INR  1 03       * I Have Reviewed All Lab Data Listed Above  * Additional Pertinent Lab Tests Reviewed:  Alisia 64 Admission Reviewed    Imaging:    Imaging Reports Reviewed Today Include:   Imaging Personally Reviewed by Myself Includes:      Recent Cultures (last 7 days):     Results from last 7 days   Lab Units 06/15/21  1657 06/15/21  0746 06/15/21  0732   BLOOD CULTURE   --  Received in Microbiology Lab  Culture in Progress  Received in Microbiology Lab  Culture in Progress  LEGIONELLA URINARY ANTIGEN  Negative  --   --        Last 24 Hours Medication List:   Current Facility-Administered Medications   Medication Dose Route Frequency Provider Last Rate    acetaminophen  650 mg Oral Q6H PRN Keira Nunez MD      albuterol  2 5 mg Nebulization Q6H PRN Keira Nunez MD      amLODIPine  10 mg Oral Daily Keira Nunez MD      aspirin  81 mg Oral Daily Keira Nunez MD      benzonatate  100 mg Oral TID PRN Carmen Vo PA-C      cefepime  2,000 mg Intravenous Q12H MAIA MesaC 2,000 mg (06/16/21 0811)    enoxaparin  1 mg/kg Subcutaneous Q12H [address] Rowena East MD      furosemide  40 mg Intravenous Daily Keira Nunez MD      lidocaine   Topical Daily PRN Keira Nunez MD      LORazepam  0 5 mg Oral BID PRN Keira Nunez MD      melatonin  3 mg Oral HS Keira Nunez MD      methylPREDNISolone sodium succinate  40 mg Intravenous [facility] Carmen Vo PA-C      metoprolol tartrate  50 mg Oral BID Keira Nunez MD      metroNIDAZOLE  500 mg Intravenous Q8H MAIA MesaC 500 mg (06/16/21 1168)    ondansetron  8 mg Intravenous Q6H PRN Keira Nunez MD      pantoprazole  40 mg Oral Early Morning Keira Nunez MD      vancomycin  10 mg/kg Intravenous Q12H Keira Nunez MD Stopped (06/15/21 7599)        Today, Patient Was Seen By: Scott Arrington MD    ** Please Note: This note has been constructed using a voice recognition system   **

## 2021-06-16 NOTE — ASSESSMENT & PLAN NOTE
Probably multifactorial in etiology  Due to pneumonia versus mild CHF exacerbation verses pulmonary fibrosis secondary to COVID  Patient is on Herceptin, echo showed 26% EF no diastolic dysfunction    · Will order neb treatments as needed  · Will start IV steroids and IV antibiotics  · Will also start IV Lasix for diuresis  · Oxygen protocol

## 2021-06-16 NOTE — UTILIZATION REVIEW
Initial Clinical Review    Admission: Date/Time/Statement:   Admission Orders (From admission, onward)     Ordered        06/15/21 1153  Inpatient Admission  Once                   Orders Placed This Encounter   Procedures    Inpatient Admission     Standing Status:   Standing     Number of Occurrences:   1     Order Specific Question:   Level of Care     Answer:   Level 2 Stepdown / HOT [14]     Order Specific Question:   Estimated length of stay     Answer:   More than 2 Midnights     Order Specific Question:   Certification     Answer:   I certify that inpatient services are medically necessary for this patient for a duration of greater than two midnights  See H&P and MD Progress Notes for additional information about the patient's course of treatment  ED Arrival Information     Expected Arrival Acuity    - 6/15/2021 07:04 Emergent         Means of arrival Escorted by Service Admission type    Wheelchair Family Member Hospitalist Emergency         Arrival complaint    sob        Chief Complaint   Patient presents with    Shortness of Breath     C/O INCREASED SOB WITH PRODUCTIVE COUGH, INCREASED WEAKNESS, HYPOXIA (RA O2 65% UPON ARRIVAL TO ER)       Initial Presentation:   65 yom to ER from home c/o SOB, productive cough x 1 week  Hx COVID last month, esophageal cancer/radiation completed 4/21; receives Herceptin infusions monthly  Presents tachypneic, hypoxic in 60's, rhonchi noted  Admission work-up showing elevated creatinine, LFT's, d-dimer, procalcitonin, ferritin, CRP, BNP, opacities & L pleural effusion on imaging  Admitted to inpatient status for SOB 2nd pneumonia & CHF exacerbation  Started on IVABT & IV diuresls  Pulmonary & ID  consulted    Per pulm:  Acute Hypoxic Respiratory Insuffiencey with bilateral ground glass opacities on chest CT: Abnormal chest CT with bilateral GGO and mild traction bronchiectasis could be secondary to post COVID pneumonitis, persistent COVID infection, other atypical infection  No evidence of aspiration or gross volume overload at this time  Would increase steroids to solumedrol 40mg Q8H, d-dimer elevated >5 would transition to treatment dose of Lovenox  Continue broad spectrum antibiotics for now for possible super-imposed infection, await speech evaluation and trend PCT, appreciate ID recommendations  2D echocardiogram pending, will closely monitor fluid status, wean midflow for goal saturation >88%  Per ID:  1  Sepsis, POA:  in the setting of acute respiratory failure  Suspected pulmonary source of infection  Follow blood cultures  Continue broad-spectrum antibiotic therapy as below  2    Acute hypoxic respiratory failure: In the setting of sepsis  CT chest demonstrates ground-glass opacities and consolidation at the lung bases  Concern for acute bacterial pneumonia or viral pneumonia  Noted the patient's COVID-19 test was positive last month  Check urine strep and Legionella antigen  Check MRSA nares screen  Continue vancomycin IV  Change Zosyn to cefepime and metronidazole  Continue oxygen supplementation  Pulmonary service is following  Date: 6/16/21 Day 2:   Persistent SOB 2nd pneumonia vs CHF exacerbation vs pulmonary fibrosis 2nd COVID  Remains on IVABT & IV diuresis at this time  Lungs with rales & rhonchi noted, O2 requirements currently @ 10ltr mid flow nc       ED Triage Vitals   Temperature Pulse Respirations Blood Pressure SpO2   06/15/21 0715 06/15/21 0715 06/15/21 0715 06/15/21 0715 06/15/21 0715   98 4 °F (36 9 °C) 99 (!) 28 132/60 (!) 62 %      Temp Source Heart Rate Source Patient Position - Orthostatic VS BP Location FiO2 (%)   06/15/21 0715 06/15/21 0715 06/15/21 0715 06/15/21 0715 --   Oral Monitor Lying Right arm       Pain Score       06/15/21 1900       2          Wt Readings from Last 1 Encounters:   06/15/21 76 2 kg (167 lb 15 9 oz)     Additional Vital Signs:   Date/Time  Temp  Pulse  Resp  BP  MAP (mmHg)  SpO2  O2 Flow Rate (L/min) O2 Device  O2 Interface Device  Patient Position - Orthostatic VS   06/16/21 0907  --  83  --  --  --  88 %Abnormal   --  --  --  --   06/16/21 0758  --  --  --  116/60  --  --  --  --  --  --   06/16/21 0724  --  --  --  --  --  90 %  --  --  MFNC prongs  --   06/16/21 07:10:15  98 °F (36 7 °C)  72  18  101/57  72  88 %Abnormal   --  --  --  --   06/16/21 02:34:06  98 5 °F (36 9 °C)  76  16  91/50  64  94 %  10 L/min  Mid flow nasal cannula  --  --   06/15/21 2341  --  --  --  --  --  94 %  --  --  MFNC prongs  --   06/15/21 22:04:17  98 5 °F (36 9 °C)  83  22  104/60  75  90 %  --  --  --  --   06/15/21 2030  --  94  20  104/62  78  96 %  10 L/min  Mid flow nasal cannula  --  Lying   06/15/21 2000  --  98  20  102/60  76  96 %  10 L/min  Mid flow nasal cannula  --  Lying     Pertinent Labs/Diagnostic Test Results:   Results from last 7 days   Lab Units 06/15/21  1657   SARS-COV-2  Negative     Results from last 7 days   Lab Units 06/16/21  0506 06/15/21  1657 06/15/21  0728   WBC Thousand/uL 10 68*  --  16 15*   HEMOGLOBIN g/dL 12 1  --  14 2   HEMATOCRIT % 36 8  --  43 2   PLATELETS Thousands/uL 370 387 442*   NEUTROS ABS Thousands/µL  --   --  13 99*     Results from last 7 days   Lab Units 06/16/21  0506 06/15/21  0728   SODIUM mmol/L 135* 136   POTASSIUM mmol/L 4 7 4 5   CHLORIDE mmol/L 102 100   CO2 mmol/L 21 23   ANION GAP mmol/L 12 13   BUN mg/dL 23 21   CREATININE mg/dL 1 28 1 44*   EGFR ml/min/1 73sq m 56 48   CALCIUM mg/dL 8 4 9 2   MAGNESIUM mg/dL 2 2  --      Results from last 7 days   Lab Units 06/16/21  0506 06/15/21  0728   AST U/L 30 69*   ALT U/L 64 85*   ALK PHOS U/L 205* 283*   TOTAL PROTEIN g/dL 6 7 8 3*   ALBUMIN g/dL 2 2* 2 8*   TOTAL BILIRUBIN mg/dL 0 45 0 66     Results from last 7 days   Lab Units 06/16/21  0506 06/15/21  0728   GLUCOSE RANDOM mg/dL 184* 221*     Results from last 7 days   Lab Units 06/15/21  0728   TROPONIN I ng/mL <0 02     Results from last 7 days   Lab Units 06/15/21  3010   D-DIMER QUANTITATIVE ug/ml FEU 5 87*     Results from last 7 days   Lab Units 06/15/21  0728   PROTIME seconds 13 6   INR  1 03   PTT seconds 28     Results from last 7 days   Lab Units 06/15/21  0728   PROCALCITONIN ng/ml 0 43*     Results from last 7 days   Lab Units 06/15/21  0728   LACTIC ACID mmol/L 2 0     Results from last 7 days   Lab Units 06/15/21  0728   NT-PRO BNP pg/mL 1,153*     Results from last 7 days   Lab Units 06/15/21  1657   FERRITIN ng/mL 468*     Results from last 7 days   Lab Units 06/15/21  1657   CRP mg/L 242 0*     Results from last 7 days   Lab Units 06/15/21  1323   CLARITY UA  Clear   COLOR UA  Yellow   SPEC GRAV UA  1 010   PH UA  5 5   GLUCOSE UA mg/dl Negative   KETONES UA mg/dl Negative   BLOOD UA  Negative   PROTEIN UA mg/dl Negative   NITRITE UA  Negative   BILIRUBIN UA  Negative   UROBILINOGEN UA E U /dl 0 2   LEUKOCYTES UA  Negative     Results from last 7 days   Lab Units 06/15/21  1657 06/15/21  1323   STREP PNEUMONIAE ANTIGEN, URINE   --  Negative   LEGIONELLA URINARY ANTIGEN  Negative  --      Results from last 7 days   Lab Units 06/15/21  0746 06/15/21  0732   BLOOD CULTURE  Received in Microbiology Lab  Culture in Progress  Received in Microbiology Lab  Culture in Progress  6/15  Cxr=  Moderate bilateral pulmonary infiltrates which are nonspecific and may represent pneumonia or edema  CTA chest pe study=  No pulmonary embolism  Diffuse groundglass opacities in the lungs  Differential considerations include bacterial and viral pneumonia (including COVID 19), and vascular congestion  Minimal left pleural effusion    Ekg=  Sinus rhythm  Possible Left atrial enlargement  Nonspecific ST abnormality  Baseline artifact    ED Treatment:   Medication Administration from 06/15/2021 0701 to 06/15/2021 2202       Date/Time Order Dose Route Action     06/15/2021 0818 cefepime (MAXIPIME) 2 g/50 mL dextrose IVPB 2,000 mg Intravenous New Bag     06/15/2021 0924 vancomycin (VANCOCIN) 1500 mg in sodium chloride 0 9% 250 mL IVPB 1,500 mg Intravenous New Bag     06/15/2021 0854 sodium chloride 0 9 % bolus 500 mL 500 mL Intravenous New Bag     06/15/2021 1001 iohexol (OMNIPAQUE) 350 MG/ML injection (SINGLE-DOSE) 85 mL 85 mL Intravenous Given     06/15/2021 1124 dexamethasone (DECADRON) injection 6 mg 6 mg Intravenous Given     06/15/2021 1311 piperacillin-tazobactam (ZOSYN) 3 375 g in sodium chloride 0 9 % 100 mL IVPB 3 375 g Intravenous New Bag     06/15/2021 2111 vancomycin (VANCOCIN) IVPB (premix in dextrose) 750 mg 150 mL 750 mg Intravenous New Bag     06/15/2021 2103 melatonin tablet 3 mg 3 mg Oral Given     06/15/2021 2104 metoprolol tartrate (LOPRESSOR) tablet 50 mg 50 mg Oral Given     06/15/2021 1311 enoxaparin (LOVENOX) subcutaneous injection 40 mg 40 mg Subcutaneous Given     06/15/2021 1311 furosemide (LASIX) injection 40 mg 40 mg Intravenous Given     06/15/2021 2104 methylPREDNISolone sodium succinate (Solu-MEDROL) injection 40 mg 40 mg Intravenous Given     06/15/2021 1911 cefepime (MAXIPIME) 2 g/50 mL dextrose IVPB 2,000 mg Intravenous New Bag     06/15/2021 1656 metroNIDAZOLE (FLAGYL) IVPB (premix) 500 mg 100 mL 500 mg Intravenous New Bag     06/15/2021 2104 enoxaparin (LOVENOX) subcutaneous injection 80 mg 80 mg Subcutaneous Given        Past Medical History:   Diagnosis Date    Anxiety     [facility]     Esophageal    Dysphagia     Esophageal abnormality     [facility] ([facility] )     GERD (gastroesophageal reflux disease)     Hyperlipidemia     Hypertension     Occasional tremors      Present on Admission:   Essential hypertension   GERD (gastroesophageal reflux disease)   Esophageal cancer    Chronic kidney disease stage 3   PAD (peripheral artery disease)   Carotid stenosis   Dysphagia    Admitting Diagnosis: Pneumonia [J18 9]  Hypoxia [R09 02]  Pneumonia of both lungs due to infectious organism, unspecified part of lung [J18 9]  Age/Sex: 65 y o  male  Admission Orders:  Consult pulmonary  Scd/foot pumps  ST eval & tx  Consult ID  Contact & airborne isolation    Scheduled Medications:  amLODIPine, 10 mg, Oral, Daily  aspirin, 81 mg, Oral, Daily  cefepime, 2,000 mg, Intravenous, Q12H  enoxaparin, 1 mg/kg, Subcutaneous, Q12H SENG  furosemide, 40 mg, Intravenous, Daily  melatonin, 3 mg, Oral, HS  methylPREDNISolone sodium succinate, 40 mg, Intravenous, Q8H SENG  metoprolol tartrate, 50 mg, Oral, BID  metroNIDAZOLE, 500 mg, Intravenous, Q8H  pantoprazole, 40 mg, Oral, Early Morning  vancomycin, 10 mg/kg, Intravenous, Q12H    PRN Meds:  acetaminophen, 650 mg, Oral, Q6H PRN  albuterol, 2 5 mg, Nebulization, Q6H PRN  benzonatate, 100 mg, Oral, TID PRN  lidocaine, , Topical, Daily PRN  LORazepam, 0 5 mg, Oral, BID PRN  ondansetron, 8 mg, Intravenous, Q6H PRN    Network Utilization Review Department  ATTENTION: Please call with any questions or concerns to [phone] and carefully listen to the prompts so that you are directed to the right person  All voicemails are confidential   [place] Patient all requests for admission clinical reviews, approved or denied determinations and any other requests to dedicated fax number below belonging to the campus where the patient is receiving treatment   List of dedicated fax numbers for the Facilities:  [address] [place] [address] DENIALS (Administrative/Medical Necessity) [phone]   [address] [place] [address] (Maternity/NICU/Pediatrics) [phone]   [address]   [address] [address] [facility] [id number] [address] StreetmeaganThomas Jefferson University Hospital [facility] [phone]   [place] [id number] [phone]

## 2021-06-16 NOTE — ASSESSMENT & PLAN NOTE
· Patient's transaminitis likely secondary to shock level in the setting of sepsis  · Patient's transaminitis improved today with resolution of ARISTIDES and sepsis  · Monitor CMP tomorrow a m   To ensure resolution

## 2021-06-16 NOTE — ASSESSMENT & PLAN NOTE
· Patient had COVID 19 about a month ago and he is also immunocompromised from his esophageal cancer  · Inflammatory markers elevated  · Legionella and strep pneumo urine antigens negative  · Procalcitonin elevated, repeat pending  · Will start the patient on IV Zosyn and vancomycin  · Consult to Pulmonary Medicine placed  · Will also request Infectious Disease consultation

## 2021-06-16 NOTE — ASSESSMENT & PLAN NOTE
Lab Results   Component Value Date    EGFR 56 06/16/2021    EGFR 48 06/15/2021    EGFR 64 03/29/2021    CREATININE 1 28 06/16/2021    CREATININE 1 44 (H) 06/15/2021    CREATININE 1 14 03/29/2021     · Improved with IV Lasix  · Will continue IV Lasix today, given patient's physical examination of crackles  · Patient's baseline creatinine between 1 14 and 1 2

## 2021-06-16 NOTE — ASSESSMENT & PLAN NOTE
Lab Results   Component Value Date    EGFR 56 06/16/2021    EGFR 48 06/15/2021    EGFR 64 03/29/2021    CREATININE 1 28 06/16/2021    CREATININE 1 44 (H) 06/15/2021    CREATININE 1 14 03/29/2021

## 2021-06-17 PROBLEM — J96.01 ACUTE RESPIRATORY FAILURE WITH HYPOXIA (HCC): Status: ACTIVE | Noted: 2021-01-01

## 2021-06-17 NOTE — ASSESSMENT & PLAN NOTE
· Patient had COVID 19 about a month ago and he is also immunocompromised from his esophageal cancer  · Inflammatory markers elevated  · Legionella and strep pneumo urine antigens negative  · Procalcitonin elevated 0 43, down trended to 0 3 today    · Will continue cefepime  · Discontinue vancomycin as MRSA as negative   · Will also request Infectious Disease consultation English

## 2021-06-17 NOTE — PROGRESS NOTES
[facility]  Progress Note Coco Vora 1950, 65 y o  male MRN: [id number]  Unit/Bed#: S -01 Encounter: [id number]  Primary Care Provider: Fredy Gonsalves DO   Date and time admitted to hospital: 6/15/2021  7:06 AM    * Acute respiratory failure with hypoxia ([facility] [address] )  Assessment & Plan  POA  Patient on any home oxygen, is requiring 15 L of mid flow to maintain saturations greater than 88%  Patient's acute hypoxic respiratory failure likely secondary to pneumonia verses chronic fibrosis from COVID-19 versus Herceptin pneumo toxicity  Patient's echo showed EF 10% with diastolic dysfunction  Plan:  · Will continue nebulizations as needed  · Continue IV Solu-Medrol 40 mg t i d   · Continue antibiotics for pneumonia  · Continue 40 mg of p o  Lasix  · Incentive spirometry  · Appreciate pulmonology recommendations    Pneumonia  Assessment & Plan  · Patient had COVID 19 about a month ago and he is also immunocompromised from his esophageal cancer  · Inflammatory markers elevated  · Legionella and strep pneumo urine antigens negative  · Procalcitonin elevated 0 43, down trended to 0 3 today  · Will continue cefepime  · Discontinue vancomycin as MRSA as negative   · Will also request Infectious Disease consultation    SOB (shortness of breath)  Assessment & Plan  · Patient does not feel as short of breath as yesterday    · Still gets dyspneic on exertion  · Currently on 15 L mid flow to maintain O2 greater 88%  · See plan under acute hypoxic respiratory failure      Esophageal cancer   Assessment & Plan  · Outpatient follow-up with Oncology  · Patient stated that he last had radiation about a month ago  · He is not on any special diet    Dysphagia  Assessment & Plan  · Patient stated that he could tolerate a regular diet  · Will order speech pathology evaluation for possible dysphagia    Acute renal failure superimposed on stage 3 chronic kidney disease ([facility] [address] )  Assessment & Plan  Lab Results   Component Value Date    EGFR 56 2021    EGFR 56 2021    EGFR 48 06/15/2021    CREATININE 1 28 2021    CREATININE 1 28 2021    CREATININE 1 44 (H) 06/15/2021     · Improved with IV Lasix  · Patient switched over to p o  Lasix today, continues to bilateral crackles secondary to ground-glass opacities  · Patient's baseline creatinine between 1 14 and 1 2    Transaminitis  Assessment & Plan  · Patient's transaminitis likely secondary to shock level in the setting of sepsis  · Patient's transaminitis improved today with resolution of ARISTIDES and sepsis      PAD (peripheral artery disease)  Assessment & Plan  · Continue aspirin and statin    Carotid stenosis  Assessment & Plan  · Continue aspirin and statin    Essential hypertension  Assessment & Plan  · Continue metoprolol and Norvasc    GERD (gastroesophageal reflux disease)  Assessment & Plan  · Continue Protonix        VTE Pharmacologic Prophylaxis:   Pharmacologic: Enoxaparin (Lovenox)  Mechanical VTE Prophylaxis in Place: Yes    Discussions with Specialists or Other Care Team Provider:  Pulmonology, Infectious Disease    Education and Discussions with Family / Patient:  Discussed with patient, stated he will care plan to his family  Current Length of Stay: 2 day(s)    Current Patient Status: Inpatient     Discharge Plan / Estimated Discharge Date: once medically stable     Code Status: Level 1 - Full Code      Subjective:   Patient states he feels better compared to yesterday  Still feels dyspneic on exertion  No fevers or chills overnight  No acute overnight events  Objective:     Vitals:   Temp (24hrs), Av 1 °F (36 7 °C), Min:97 4 °F (36 3 °C), Max:98 4 °F (36 9 °C)    Temp:  [97 4 °F (36 3 °C)-98 4 °F (36 9 °C)] 98 3 °F (36 8 °C)  HR:  [72-89] 72  Resp:  [18] 18  BP: ()/(51-62) 90/52  SpO2:  [87 %-96 %] 88 %  Body mass index is 26 31 kg/m²       Input and Output Summary (last 24 hours): Intake/Output Summary (Last 24 hours) at 6/17/2021 1201  Last data filed at 6/17/2021 0339  Gross per 24 hour   Intake 650 ml   Output 1125 ml   Net -475 ml       Physical Exam:     Physical Exam  Vitals reviewed  Constitutional:       Appearance: Normal appearance  HENT:      Head: Normocephalic and atraumatic  Right Ear: Tympanic membrane normal       Left Ear: Tympanic membrane normal       Nose: Nose normal    Eyes:      Extraocular Movements: Extraocular movements intact  Pupils: Pupils are equal, round, and reactive to light  Neck:      Vascular: No JVD  Cardiovascular:      Rate and Rhythm: Normal rate and regular rhythm  Pulses: Normal pulses  Heart sounds: No murmur heard  No gallop  Pulmonary:      Effort: Pulmonary effort is normal  No respiratory distress  Breath sounds: No stridor  Examination of the right-middle field reveals rales  Examination of the right-lower field reveals rhonchi and rales  Examination of the left-lower field reveals rhonchi and rales  Rhonchi and rales present  No wheezing  Chest:      Chest wall: No tenderness  Abdominal:      General: Abdomen is flat  Bowel sounds are normal  There is no distension  Palpations: Abdomen is soft  Tenderness: There is no abdominal tenderness  Musculoskeletal:         General: No swelling  Right lower leg: No edema  Left lower leg: No edema  Skin:     General: Skin is warm and dry  Neurological:      General: No focal deficit present  Mental Status: He is alert and oriented to person, place, and time  Cranial Nerves: No cranial nerve deficit  Motor: No weakness     Psychiatric:         Mood and Affect: Mood normal          Behavior: Behavior normal              Additional Data:     Labs:    Results from last 7 days   Lab Units 06/17/21  0448   WBC Thousand/uL 17 53*   HEMOGLOBIN g/dL 12 6   HEMATOCRIT % 37 7   PLATELETS Thousands/uL 435*   NEUTROS PCT % 93* LYMPHS PCT % 3*   MONOS PCT % 3*   EOS PCT % 0     Results from last 7 days   Lab Units 06/17/21  0448   POTASSIUM mmol/L 4 9   CHLORIDE mmol/L 103   CO2 mmol/L 24   BUN mg/dL 28*   CREATININE mg/dL 1 28   CALCIUM mg/dL 8 6   ALK PHOS U/L 210*   ALT U/L 65   AST U/L 31     Results from last 7 days   Lab Units 06/15/21  0728   INR  1 03       * I Have Reviewed All Lab Data Listed Above  * Additional Pertinent Lab Tests Reviewed: [facility] 64 Admission Reviewed    Imaging:    Imaging Reports Reviewed Today Include:   Imaging Personally Reviewed by Myself Includes:      Recent Cultures (last 7 days):     Results from last 7 days   Lab Units 06/15/21  1657 06/15/21  0746 06/15/21  0732   BLOOD CULTURE   --  No Growth at 48 hrs  No Growth at 48 hrs     LEGIONELLA URINARY ANTIGEN  Negative  --   --        Last 24 Hours Medication List:   Current Facility-Administered Medications   Medication Dose Route Frequency Provider Last Rate    acetaminophen  650 mg Oral Q6H PRN Teri Humphrey MD      albuterol  2 5 mg Nebulization Q6H PRN Teri Humphrey MD      amLODIPine  10 mg Oral Daily Teri Humphrey MD      aspirin  81 mg Oral Daily Teri Humphrey MD      benzonatate  100 mg Oral TID PRN Sunitha Alvarez PA-C      cefepime  2,000 mg Intravenous Q12H Michelle Helms PA-C 2,000 mg (06/17/21 0802)    enoxaparin  1 mg/kg Subcutaneous Q12H [facility] Mara Casiano MD      furosemide  40 mg Oral Daily Yoan Duarte MD      lidocaine   Topical Daily PRN Teri Humphrey MD      LORazepam  0 5 mg Oral BID PRN Teri Humphrey MD      melatonin  9 mg Oral HS Yoan Duarte MD      methylPREDNISolone sodium succinate  40 mg Intravenous [facility] Sunitha Alvarez PA-C      metoprolol tartrate  50 mg Oral BID Teri Humphrey MD      ondansetron  8 mg Intravenous Q6H PRN Teri Humphrey MD      pantoprazole  40 mg Oral Early Morning Teri Humphrey MD      vancomycin  1,000 mg Intravenous Q12H Liu P Sumi Klein MD 1,000 mg (06/17/21 0802)        Today, Patient Was Seen By: Vee Roper MD    ** Please Note: This note has been constructed using a voice recognition system   **

## 2021-06-17 NOTE — PLAN OF CARE
Problem: MOBILITY - ADULT  Goal: Maintain or return to baseline ADL function  Description: INTERVENTIONS:  -  Assess patient's ability to carry out ADLs; assess patient's baseline for ADL function and identify physical deficits which impact ability to perform ADLs (bathing, care of mouth/teeth, toileting, grooming, dressing, etc )  - Assess/evaluate cause of self-care deficits   - Assess range of motion  - Assess patient's mobility; develop plan if impaired  - Assess patient's need for assistive devices and provide as appropriate  - Encourage maximum independence but intervene and supervise when necessary  - Involve family in performance of ADLs  - Assess for home care needs following discharge   - Consider OT consult to assist with ADL evaluation and planning for discharge  - Provide patient education as appropriate  Outcome: Progressing  Goal: Maintains/Returns to pre admission functional level  Description: INTERVENTIONS:  - Perform BMAT or MOVE assessment daily    - Set and communicate daily mobility goal to care team and patient/family/caregiver  - Collaborate with rehabilitation services on mobility goals if consulted  - Perform Range of Motion 2 times a day  - Reposition patient every 2 hours    - Dangle patient 2 times a day  - Stand patient 2 times a day  - Ambulate patient 2 times a day  - Out of bed to chair 2 times a day   - Out of bed for meals 2 times a day  - Out of bed for toileting  - Record patient progress and toleration of activity level   Outcome: Progressing     Problem: Prexisting or High Potential for Compromised Skin Integrity  Goal: Skin integrity is maintained or improved  Description: INTERVENTIONS:  - Identify patients at risk for skin breakdown  - Assess and monitor skin integrity  - Assess and monitor nutrition and hydration status  - Monitor labs   - Assess for incontinence   - Turn and reposition patient  - Assist with mobility/ambulation  - Relieve pressure over bony prominences  - Avoid friction and shearing  - Provide appropriate hygiene as needed including keeping skin clean and dry  - Evaluate need for skin moisturizer/barrier cream  - Collaborate with interdisciplinary team   - Patient/family teaching  - Consider wound care consult   Outcome: Progressing     Problem: Potential for Falls  Goal: Patient will remain free of falls  Description: INTERVENTIONS:  - Educate patient/family on patient safety including physical limitations  - Instruct patient to call for assistance with activity   - Consult OT/PT to assist with strengthening/mobility   - Keep Call bell within reach  - Keep bed low and locked with side rails adjusted as appropriate  - Keep care items and personal belongings within reach  - Initiate and maintain comfort rounds  - Make Fall Risk Sign visible to staff  - Offer Toileting every 2 Hours, in advance of need  - Initiate/Maintain bed alarm  - Obtain necessary fall risk management equipment: bed  - Apply yellow socks and bracelet for high fall risk patients  - Consider moving patient to room near nurses station  Outcome: Progressing

## 2021-06-17 NOTE — PROGRESS NOTES
Vancomycin Assessment    Jodi Khan is a 65 y o  male who is currently receiving vancomycin 750mg Q 12hrs for Pneumonia     Relevant clinical data and objective history reviewed:  Creatinine   Date Value Ref Range Status   06/16/2021 1 28 0 60 - 1 30 mg/dL Final     Comment:     Standardized to IDMS reference method   06/15/2021 1 44 (H) 0 60 - 1 30 mg/dL Final     Comment:     Standardized to IDMS reference method   03/29/2021 1 14 0 60 - 1 30 mg/dL Final     Comment:     Standardized to IDMS reference method     /56 (BP Location: Left arm)   Pulse 78   Temp 98 3 °F (36 8 °C) (Oral)   Resp 18   Ht 5' 7" (1 702 m)   Wt 76 2 kg (167 lb 15 9 oz)   SpO2 90%   BMI 26 31 kg/m²   I/O last 3 completed shifts: In: 251 7 [IV Piggyback:251 7]  Out: 1925 [Urine:1925]  Lab Results   Component Value Date/Time    BUN 23 06/16/2021 05:06 AM    WBC 10 68 (H) 06/16/2021 05:06 AM    HGB 12 1 06/16/2021 05:06 AM    HCT 36 8 06/16/2021 05:06 AM    MCV 92 06/16/2021 05:06 AM     06/16/2021 05:06 AM     Temp Readings from Last 3 Encounters:   06/17/21 98 3 °F (36 8 °C) (Oral)   06/09/21 98 2 °F (36 8 °C) (Temporal)   06/01/21 98 6 °F (37 °C) (Temporal)     Vancomycin Days of Therapy: 3    Assessment/Plan  The patient is currently on vancomycin utilizing scheduled dosing  Baseline risks associated with therapy include: pre-existing renal impairment, concomitant nephrotoxic medications, and advanced age  The patient is receiving 750mg Q 12hrs with the most recent vancomycin level being at steady-state and sub-therapeutic based on a goal of 15-20 (appropriate for most indications) ; therefore, after clinical evaluation will be changed to 1000mg Q 12hrs   Pharmacy will continue to follow closely for s/sx of nephrotoxicity, infusion reactions, and appropriateness of therapy  BMP and CBC will be ordered per protocol    Plan for trough as patient approaches steady state, prior to the 4th  dose at approximately 1830 on 6/18/21  Pharmacy will continue to follow the patients culture results and clinical progress daily      Sourav Goodwin, Pharmacist

## 2021-06-17 NOTE — CONSULTS
Vancomycin IV Pharmacy-to-Dose Consultation    Angela Garcia is a 65 y o  male who was receiving Vancomycin IV with management by the Pharmacy Consult service for treatment of Pneumonia  The patient's Vancomycin therapy has been completed / discontinued  Thank you for allowing us to take part in this patient's care  Pharmacy will sign-off now; please call or re-consult if there are any questions          Nori Acuña PharmD  Pharmacist

## 2021-06-17 NOTE — ASSESSMENT & PLAN NOTE
Lab Results   Component Value Date    EGFR 56 06/17/2021    EGFR 56 06/16/2021    EGFR 48 06/15/2021    CREATININE 1 28 06/17/2021    CREATININE 1 28 06/16/2021    CREATININE 1 44 (H) 06/15/2021     · Improved with IV Lasix  · Patient switched over to p o  Lasix today, continues to bilateral crackles secondary to ground-glass opacities    · Patient's baseline creatinine between 1 14 and 1 2

## 2021-06-17 NOTE — ASSESSMENT & PLAN NOTE
· Patient does not feel as short of breath as yesterday    · Still gets dyspneic on exertion  · Currently on 15 L mid flow to maintain O2 greater 88%  · See plan under acute hypoxic respiratory failure

## 2021-06-17 NOTE — PROGRESS NOTES
Vancomycin IV Pharmacy-to-Dose Consultation    [address] is a 65 y o  male who is currently receiving Vancomycin IV with management by the Pharmacy Consult service  Assessment/Plan:  The patient was reviewed  Renal function is stable and no signs or symptoms of nephrotoxicity and/or infusion reactions were documented in the chart  Based on todays assessment, continue current vancomycin (day # 3) dosing of 1000 mg every 12 hours, with a plan for trough to be drawn at 1830 on 6/18  We will continue to follow the patients culture results and clinical progress daily      Richard Kline, Pharmacist

## 2021-06-17 NOTE — ASSESSMENT & PLAN NOTE
Symptoms: shortness of breath at rest and wheezing    Lab Results   Component Value Date    SARSCOV2 Negative 06/15/2021    SARSCOV2 Positive (A) 05/11/2021     · Imaging:  XR chest 1 view portable    Result Date: 6/15/2021  Impression: Moderate bilateral pulmonary infiltrates which are nonspecific and may represent pneumonia or edema  CTA ED chest PE Study    Result Date: 6/15/2021  Impression: No pulmonary embolism  Diffuse groundglass opacities in the lungs  Differential considerations include bacterial and viral pneumonia (including COVID 19), and vascular congestion  Minimal left pleural effusion      Recent Labs     06/15/21  0728 06/16/21  0506 06/17/21  0448   WBC 16 15* 10 68* 17 53*     Recent Labs     06/15/21  0728 06/16/21  0651   PROCALCITONI 0 43* 0 30*     · Urine Strep and Legionella negative  · On no supplemental oxygen at baseline, currently requiring 13 L, status post COVID infection 5/12/21  · Started on IV Solu-Medrol and duo nebs  · Previously on trastuzumab for treatment of esophageal cancer  · Differential includes pneumonitis per Pneumotox versus post COVID syndrome  ·     Plan:  · Labs:  ? Obtain sputum cultures if producing sputum  ? Procalcitonin, CBC with Diff Tomorrow AM  · Antibiotics:  ? Cefepime 2 g q 12h #3  ? Vancomycin 1 g Q 12; #3  · Supportive care:  ? Incentive spirometry  ? Guaifenesin 1200 mg q 12 hours  ? Albuterol Nebs 2 5 mg q6 h PRN  ? Continue Midflow 13L, wean as tolerated  ? Continue Solumedrol 40 mg q8h scheduled; plan to reduce to BID tomorrow 6/18  ?  Tessalon Perles

## 2021-06-17 NOTE — CASE MANAGEMENT
LOS: 2 DAYS  PATIENT IS NOT A BUNDLE  PATIENT IS NOT A READMISSION  UNPLANNED RISK OF READMISSION: 17      CM met with the Patient and his friend/caregiver, Vanessa Kerr, at the bedside; Patient was alert and oriented, sitting up in bed  CM introduced self and role  Patient resides in a ranch style home with Vanessa Kerr  There is 1+1 BRET  Prior to admission, Patient was independent with all ADLs and utilized no DME  Patient states that he struggled with SOB with ADLs and needed to pace himself, but was able to complete  Patient denies any VNA/STR history  Patient utilizes [address] in [place] and is able to afford all of his medications  Patient denies any MH/substance use history or concerns  Patient identifies Vanessa Kerr as his health care representative and states that they are working on completing a POA and AD  Patient was receptive to receiving resources to complete the same  Patient sees his PCP, Dr Ivy Carlson, every 3 months  Patient is retired and receives [employer] as his main source of income  Patient is a community  and states that Vanessa Kerr will provide transportation upon dc  CM reviewed that CM dept will continue to follow the Patient throughout his admission and monitor for any dc needs  Patient denies any needs at this time, but understands that he may require O2 upon dc  CM reviewed discharge planning process including the following: identifying caregivers at home, preference for d/c planning needs,  availability of treatment team to discuss questions or concerns patient and/or family may have regarding diagnosis, plan of care, old or new medications and discharge planning   CM will continue to follow for care coordination and update assessment as appropriate

## 2021-06-17 NOTE — PROGRESS NOTES
Progress Note - Infectious Disease   Pedro Javier 65 y o  male MRN: [id number]  Unit/Bed#: S -01 Encounter: [id number]      Impression/Plan:  1  Sepsis  POA   With tachycardia, tachypnea, leukocytosis  In patient presenting with shortness of breath and acute respiratory failure; suspect pulmonary source as below  Admission blood cultures are pending negative thus far  MRSA nares negative  Soft blood pressure today may be volume related  Albumin hanging  -discontinue vancomycin   -continue Cefepime  -follow-up cultures and adjust antibiotics as needed  -monitor temperature and hemodynamics  -serial exam  -respiratory support  -monitor CBC and BMP      2  Acute hypoxic respiratory failure   In setting of shortness of breath, CT scan with ground glass opacities and consolidation at the bases with leukocytosis, elevated procalcitonin level   Consider bacterial/viral pneumonia status post COVID-19 positive on May 11, 2021  Patient clinically stabilizing on IV Lasix, steroid and antibiotics  WBC count up likely steroid effect  -continue Cefepime  as above  -monitor temperature and hemodynamics  -serial exam  -ongoing pulmonary follow-up and management  -IV steroids on board for pulmonary  -respiratory support  -follow up imaging  -monitor CBC and BMP   -monitor clinical response     3  Dysphasia in setting of esophageal cancer   No aspiration events on bedside swallow evaluation  -continue antibiotics as above   -advancing diet as per speech therapy recommendation     4  Renal Insufficiency/CKD III   POA  Creatinine 1 44 > 1 28   Likely prerenal in setting of #1    -renal dose adjust antibiotic as needed  -monitor BMP     5  Esophageal cancer   status post radiation therapy through April 2021 and now on Herceptin infusion monthly   Immunocompromised patient  -symptomatic management per primary care team  -advancing diet as per speech therapy recommendation     Antibiotics:  Cefepime - abx D3     Above impression and plan discussed in detail with patient, RN, and primary care team      Subjective:  Patient reports feeling better somewhat better  He has no fever, chills, sweats overnight; no nausea, vomiting, diarrhea; patient always eats slow precautionarily since esophageal CA but denies choking or mitchell aspiration of food or drink, + cough and feels like chest secretions are "breaking up" still, no increased shortness of breath; no pain complaints  Patient appears to be tolerating antibiotics    Objective:  Vitals:  Temp:  [97 4 °F (36 3 °C)-98 4 °F (36 9 °C)] 98 3 °F (36 8 °C)  HR:  [72-89] 76  Resp:  [18] 18  BP: ()/(51-62) 98/53  SpO2:  [85 %-96 %] 92 %  Temp (24hrs), Av 1 °F (36 7 °C), Min:97 4 °F (36 3 °C), Max:98 4 °F (36 9 °C)  Current: Temperature: 98 3 °F (36 8 °C)    Physical Exam:   General Appearance:  Alert, interactive, nontoxic, no acute distress with conversation on 13 L NCO2 today statting 92%, + hand tremors   Throat: Oropharynx moist without lesions  Lungs:    breath sounds throughout fairly clear to auscultation bilaterally; no wheezes, rhonchi or rales; respirations unlabored with conversation   Heart:  RRR; no murmur   Abdomen:   Soft, non-tender, non-distended, positive bowel sounds  Extremities: No clubbing, cyanosis or edema   : No fritz, no SPT   Skin: No new rashes or lesions  IV site nontender  No draining wounds noted         Labs, Imaging, & Other studies:   All pertinent labs and imaging studies were personally reviewed  Results from last 7 days   Lab Units 21  0448 21  0506 06/15/21  1657 06/15/21  0728   WBC Thousand/uL 17 53* 10 68*  --  16 15*   HEMOGLOBIN g/dL 12 6 12 1  --  14 2   PLATELETS Thousands/uL 435* 370 387 442*     Results from last 7 days   Lab Units 21  0448 21  0506 06/15/21  0728   SODIUM mmol/L 136 135* 136   POTASSIUM mmol/L 4 9 4 7 4 5   CHLORIDE mmol/L 103 102 100   CO2 mmol/L 24 21 23   BUN mg/dL 28* 23 21 CREATININE mg/dL 1 28 1 28 1 44*   EGFR ml/min/1 73sq m 56 56 48   CALCIUM mg/dL 8 6 8 4 9 2   AST U/L 31 30 69*   ALT U/L 65 64 85*   ALK PHOS U/L 210* 205* 283*     Results from last 7 days   Lab Units 06/15/21  1657 06/15/21  0746 06/15/21  0732   BLOOD CULTURE   --  No Growth at 48 hrs  No Growth at 48 hrs     MRSA CULTURE ONLY  No Methicillin Resistant Staphlyococcus aureus (MRSA) isolated  --   --    LEGIONELLA URINARY ANTIGEN  Negative  --   --      Results from last 7 days   Lab Units 06/16/21  0651 06/15/21  0728   PROCALCITONIN ng/ml 0 30* 0 43*     Results from last 7 days   Lab Units 06/15/21  1657   CRP mg/L 242 0*     Results from last 7 days   Lab Units 06/15/21  1657   FERRITIN ng/mL 468*     Results from last 7 days   Lab Units 06/17/21  0448 06/15/21  0923   D-DIMER QUANTITATIVE ug/ml FEU 2 41* 5 87*

## 2021-06-17 NOTE — ASSESSMENT & PLAN NOTE
History of some vaginal cancer since 2017 status post radiation and 12 cycle of FOLFOX -6 and continues to remain on Herceptin (trastuzumab)    Plan:  · Recommend changing from trastuzumab to alternate therapy  · Recommend outpatient follow up with Heme-Onc

## 2021-06-17 NOTE — PROGRESS NOTES
[facility]  Progress Note Jojo Cabrera 1950, 65 y o  male MRN: [id number]  Unit/Bed#: S -01 Encounter: [id number]  Primary Care Provider: Calvin Hussein DO   Date and time admitted to hospital: 6/15/2021  7:06 AM    Acute respiratory failure with hypoxia [facility])  Assessment & Plan  Symptoms: shortness of breath at rest and wheezing    Lab Results   Component Value Date    SARSCOV2 Negative 06/15/2021    SARSCOV2 Positive (A) 05/11/2021     · Imaging:  XR chest 1 view portable    Result Date: 6/15/2021  Impression: Moderate bilateral pulmonary infiltrates which are nonspecific and may represent pneumonia or edema  CTA ED chest PE Study    Result Date: 6/15/2021  Impression: No pulmonary embolism  Diffuse groundglass opacities in the lungs  Differential considerations include bacterial and viral pneumonia (including COVID 19), and vascular congestion  Minimal left pleural effusion      Recent Labs     06/15/21  0728 06/16/21  0506 06/17/21  0448   WBC 16 15* 10 68* 17 53*     Recent Labs     06/15/21  0728 06/16/21  0651   PROCALCITONI 0 43* 0 30*     · Urine Strep and Legionella negative  · On no supplemental oxygen at baseline, currently requiring 13 L, status post COVID infection 5/12/21  · Started on IV Solu-Medrol and duo nebs  · Previously on trastuzumab for treatment of esophageal cancer  · Differential includes pneumonitis per Pneumotox versus post COVID syndrome  ·     Plan:  · Labs:  ? Obtain sputum cultures if producing sputum  ? Procalcitonin, CBC with Diff Tomorrow AM  · Antibiotics:  ? Cefepime 2 g q 12h #3  ? Vancomycin 1 g Q 12; #3  · Supportive care:  ? Incentive spirometry  ? Guaifenesin 1200 mg q 12 hours  ? Albuterol Nebs 2 5 mg q6 h PRN  ? Continue Midflow 13L, wean as tolerated  ? Continue Solumedrol 40 mg q8h scheduled; plan to reduce to BID tomorrow 6/18  ?  Tessalon Perles    Esophageal cancer   Assessment & Plan  History of some vaginal cancer since 2017 status post radiation and 12 cycle of FOLFOX -6 and continues to remain on Herceptin (trastuzumab)    Plan:  · Recommend changing from trastuzumab to alternate therapy  · Recommend outpatient follow up with Heme-Onc      Chief Complaint:    Shortness of breath not improving    Subjective: Today, patient states he continues to feel shortness of breath with minimal changes compared to yesterday  Per nursing report, patient continues to desaturate while talking or eating  Appears the patient may be breathing through mouth instead of nasal cannula  Encourage patient to breathe through nasal cannula to prevent desaturation events  Objective:    Vitals: Blood pressure 113/61, pulse 76, temperature 98 3 °F (36 8 °C), resp  rate 18, height 5' 7" (1 702 m), weight 76 2 kg (167 lb 15 9 oz), SpO2 90 %  ,Body mass index is 26 31 kg/m²  Intake/Output Summary (Last 24 hours) at 6/17/2021 1032  Last data filed at 6/17/2021 0339  Gross per 24 hour   Intake 650 ml   Output 1125 ml   Net -475 ml       Invasive Devices     Central Venous Catheter Line            Port A Cath 08/28/17 Right Chest 1389 days          Peripheral Intravenous Line            Peripheral IV 06/15/21 Distal;Left;Upper;Ventral (anterior) Arm 2 days    Peripheral IV 06/15/21 Dorsal (posterior); Right Hand 1 day                Physical Exam:    Physical Exam  Vitals reviewed  Constitutional:       Appearance: Normal appearance  HENT:      Head: Normocephalic and atraumatic  Right Ear: Tympanic membrane normal       Left Ear: Tympanic membrane normal       Nose: Nose normal       Mouth/Throat:      Mouth: Mucous membranes are dry  Eyes:      Extraocular Movements: Extraocular movements intact  Pupils: Pupils are equal, round, and reactive to light  Cardiovascular:      Rate and Rhythm: Normal rate and regular rhythm  Pulses: Normal pulses  Heart sounds: No murmur heard  No gallop      Pulmonary:      Effort: Pulmonary effort is normal  No respiratory distress  Breath sounds: No stridor  Examination of the right-middle field reveals wheezing  Examination of the left-middle field reveals wheezing  Wheezing (Mild) present  No rhonchi or rales  Chest:      Chest wall: No tenderness  Abdominal:      General: Abdomen is flat  Bowel sounds are normal  There is no distension  Palpations: Abdomen is soft  Tenderness: There is no abdominal tenderness  Musculoskeletal:         General: No swelling  Right lower leg: No edema  Left lower leg: No edema  Skin:     General: Skin is warm and dry  Neurological:      General: No focal deficit present  Mental Status: He is alert and oriented to person, place, and time  Cranial Nerves: No cranial nerve deficit  Motor: No weakness  Psychiatric:         Mood and Affect: Mood normal          Behavior: Behavior normal          Labs: I have personally reviewed pertinent lab results  Imaging and other studies: I have personally reviewed pertinent reports     and I have personally reviewed pertinent films in PACS

## 2021-06-17 NOTE — ASSESSMENT & PLAN NOTE
POA   Patient on any home oxygen, is requiring 15 L of mid flow to maintain saturations greater than 88%  Patient's acute hypoxic respiratory failure likely secondary to pneumonia verses chronic fibrosis from COVID-19 versus Herceptin pneumo toxicity  Patient's echo showed EF 03% with diastolic dysfunction  Plan:  · Will continue nebulizations as needed  · Continue IV Solu-Medrol 40 mg t i d   · Continue antibiotics for pneumonia  · Continue 40 mg of p o   Lasix  · Incentive spirometry  · Appreciate pulmonology recommendations

## 2021-06-17 NOTE — ASSESSMENT & PLAN NOTE
· Patient's transaminitis likely secondary to shock level in the setting of sepsis    · Patient's transaminitis improved today with resolution of ARISTIDES and sepsis

## 2021-06-18 PROBLEM — R74.01 TRANSAMINITIS: Status: RESOLVED | Noted: 2021-01-01 | Resolved: 2021-01-01

## 2021-06-18 NOTE — ASSESSMENT & PLAN NOTE
POA   Patient is not on any home oxygen  Patient was on med for yesterday, is now requiring high-flow oxygen  Patient desaturated last night, an x-ray was obtained  On my read patient's x-ray does not show any evidence of worsening pulmonary edema or cephalization  Plan:  · Will continue nebulizations as needed  · Continue IV Solu-Medrol 40 mg t i d  Day #4  · Continue antibiotics for pneumonia  · Continue 40 mg of p o   Lasix  · Incentive spirometry  · Appreciate pulmonology recommendations

## 2021-06-18 NOTE — QUICK NOTE
Increased work of breathing , Desats in the low 80s  Escalated to High Flow 45L  CXR ordered  Critical Care notified and evaluated the patient- Input appreciated : Will try proning  Mucinex and Airway clearance protocol added

## 2021-06-18 NOTE — PROGRESS NOTES
Progress Note - Infectious Disease   Elizabeth Herrera 65 y o  male MRN: [id number]  Unit/Bed#: S -01 Encounter: [id number]      Impression/Plan:  1  Sepsis  POA   With tachycardia, tachypnea, leukocytosis  In patient presenting with shortness of breath and acute respiratory failure; suspect pulmonary source as below  Admission blood cultures are negative  MRSA nares negative  Blood pressure improved with volume management    -continues Cefepime  -Transition to Cefdinir as below  -monitor temperature and hemodynamics  -serial exam  -respiratory support  -monitor CBC and BMP      2  Acute hypoxic respiratory failure   In setting of shortness of breath, CT scan with ground glass opacities and consolidation at the bases with leukocytosis, elevated procalcitonin level   Consider bacterial/viral pneumonia status post COVID-19 positive on May 11, 2021  Patient clinically stable on IV Lasix, steroid and antibiotics  WBC count elevation likely steroid effect  6/18/21 PCXR: groundglass opacities  -Will transition to cefdinir 300 mg PO q 12 hours to complete 7 day total antibiotic course through 6/21/21  -monitor temperature and hemodynamics  -serial exam  -ongoing pulmonary follow-up and management  -IV steroids on board for pulmonary  -respiratory support with High flow O2 at present  -monitor CBC and BMP   -monitor clinical response     3  Dysphasia in setting of esophageal cancer   No aspiration events on bedside swallow evaluation  -continue antibiotics as above   -advanced diet as per speech therapy recommendation     4  Renal Insufficiency/CKD III   POA  Creatinine 1 44 > 1 19   Likely prerenal in setting of #1    -renal dose adjust antibiotic as needed  -monitor BMP     5  Esophageal cancer   status post radiation therapy through April 2021 and now on Herceptin infusion monthly   Immunocompromised patient  -symptomatic management per primary care team  -advancing diet as per speech therapy recommendation     Antibiotics:  Cefepime - abx D4     Above impression and plan discussed in detail with patient, RN, and primary care team   We will see patient again 21 if here  Please call ID on call physician in meantime if questions      Subjective:  Patient reports feeling ok  He has no fever, chills, sweats overnight; no nausea, vomiting, diarrhea; patient always eats slow precautionarily since esophageal CA but denies choking or mitchell aspiration of food or drink, + cough and feels like chest secretions are "breaking up" with mucinex, no increased shortness of breath now on High Flow NC; no pain complaints    Patient appears to be tolerating antibiotics    Objective:  Vitals:  Temp:  [97 7 °F (36 5 °C)-98 5 °F (36 9 °C)] 98 5 °F (36 9 °C)  HR:  [77-88] 82  Resp:  [17-20] 18  BP: (103-125)/(44-60) 118/55  SpO2:  [83 %-95 %] 93 %  Temp (24hrs), Av 2 °F (36 8 °C), Min:97 7 °F (36 5 °C), Max:98 5 °F (36 9 °C)  Current: Temperature: 98 5 °F (36 9 °C)    Physical Exam:   General Appearance:  Alert, interactive, nontoxic, no acute distress with conversation on high-flow oxygen statting 87% and less hand tremor noted today   Throat: Oropharynx moist without lesions  Lungs:   Fairly clear to auscultation bilaterally with increased aeration on high-flow oxygen noted today; no wheezes, rhonchi or rales; respirations unlabored with conversation   Heart:  RRR; no murmur   Abdomen:   Soft, non-tender, non-distended, positive bowel sounds  Extremities: No clubbing, cyanosis or edema   : No Esposito, no SPT   Skin: No new rashes or lesions  IV site nontender  No draining wounds noted    Right chest port not accessed, site nontender       Labs, Imaging, & Other studies:   All pertinent labs and imaging studies were personally reviewed  Results from last 7 days   Lab Units 21  1401 21  0448 21  0506   WBC Thousand/uL 14 96* 17 53* 10 68*   HEMOGLOBIN g/dL 12 3 12 6 12 1   PLATELETS Thousands/uL 431* 435* 370     Results from last 7 days   Lab Units 06/18/21  0741 06/17/21  0448 06/16/21  0506 06/15/21  0728   SODIUM mmol/L 137 136 135* 136   POTASSIUM mmol/L 4 5 4 9 4 7 4 5   CHLORIDE mmol/L 101 103 102 100   CO2 mmol/L 22 24 21 23   BUN mg/dL 29* 28* 23 21   CREATININE mg/dL 1 19 1 28 1 28 1 44*   EGFR ml/min/1 73sq m 61 56 56 48   CALCIUM mg/dL 8 7 8 6 8 4 9 2   AST U/L  --  31 30 69*   ALT U/L  --  65 64 85*   ALK PHOS U/L  --  210* 205* 283*     Results from last 7 days   Lab Units 06/15/21  1657 06/15/21  0746 06/15/21  0732   BLOOD CULTURE   --  No Growth at 72 hrs  No Growth at 72 hrs     MRSA CULTURE ONLY  No Methicillin Resistant Staphlyococcus aureus (MRSA) isolated  --   --    LEGIONELLA URINARY ANTIGEN  Negative  --   --      Results from last 7 days   Lab Units 06/16/21  0651 06/15/21  0728   PROCALCITONIN ng/ml 0 30* 0 43*     Results from last 7 days   Lab Units 06/18/21  0741 06/15/21  1657   CRP mg/L 52 3* 242 0*     Results from last 7 days   Lab Units 06/18/21  0741 06/15/21  1657   FERRITIN ng/mL 596* 468*     Results from last 7 days   Lab Units 06/18/21  0741 06/17/21  0448 06/15/21  0923   D-DIMER QUANTITATIVE ug/ml FEU 1 79* 2 41* 5 87*

## 2021-06-18 NOTE — ASSESSMENT & PLAN NOTE
Lab Results   Component Value Date    EGFR 56 06/17/2021    EGFR 56 06/16/2021    EGFR 48 06/15/2021    CREATININE 1 28 06/17/2021    CREATININE 1 28 06/16/2021    CREATININE 1 44 (H) 06/15/2021     · Continue with 40 mg of p o   Lasix  · Patient's baseline creatinine between 1 14 and 1 2

## 2021-06-18 NOTE — PROGRESS NOTES
[facility]  Progress Note Lorri Mendenhall 1950, 65 y o  male MRN: [id number]  Unit/Bed#: S -01 Encounter: [id number]  Primary Care Provider: Tapan Alston DO   Date and time admitted to hospital: 6/15/2021  7:06 AM    * Acute respiratory failure with hypoxia ([address] )  Assessment & Plan  POA  Patient is not on any home oxygen  Patient was on med for yesterday, is now requiring high-flow oxygen  Patient desaturated last night, an x-ray was obtained  On my read patient's x-ray does not show any evidence of worsening pulmonary edema or cephalization  Plan:  · Will continue nebulizations as needed  · Continue IV Solu-Medrol 40 mg t i d  Day #4  · Continue antibiotics for pneumonia  · Continue 40 mg of p o  Lasix  · Incentive spirometry  · Appreciate pulmonology recommendations    Pneumonia  Assessment & Plan  · Patient had COVID 19 about a month ago and he is also immunocompromised from his esophageal cancer  · Inflammatory markers elevated  · Legionella and strep pneumo urine antigens negative  · Procalcitonin elevated 0 43, down trended to 0 3 yesterday  · Will continue cefepime, Discontinue vancomycin as MRSA as negative   · Infectious disease recommendations appreciated    SOB (shortness of breath)  Assessment & Plan  · Patient does not feel as short of breath as yesterday    · Still gets dyspneic on exertion  · Currently on 40 L of high-flow to maintain O2 greater 88%  · See plan under acute hypoxic respiratory failure      Esophageal cancer   Assessment & Plan  · Outpatient follow-up with Oncology  · Patient stated that he last had radiation about a month ago  · He is not on any special diet    Dysphagia  Assessment & Plan  · Patient stated that he could tolerate a regular diet  · Will order speech pathology evaluation for possible dysphagia    Acute renal failure superimposed on stage 3 chronic kidney disease [facility])  Assessment & Plan  Lab Results   Component Value Date    EGFR 56 2021    EGFR 56 2021    EGFR 48 06/15/2021    CREATININE 1 28 2021    CREATININE 1 28 2021    CREATININE 1 44 (H) 06/15/2021     · Continue with 40 mg of p o  Lasix  · Patient's baseline creatinine between 1 14 and 1 2    PAD (peripheral artery disease)  Assessment & Plan  · Continue aspirin and statin    Carotid stenosis  Assessment & Plan  · Continue aspirin and statin    Essential hypertension  Assessment & Plan  · Patient is becoming hypotensive, likely secondary to hypoxia  · Continue amlodipine  · Will hold metoprolol for now    GERD (gastroesophageal reflux disease)  Assessment & Plan  · Continue Protonix    Transaminitis-resolved as of 2021  Assessment & Plan  · Patient's transaminitis likely secondary to shock level in the setting of sepsis  · Patient's transaminitis improved today with resolution of ARISTIDES and sepsis          VTE Pharmacologic Prophylaxis:   Pharmacologic: Enoxaparin (Lovenox)  Mechanical VTE Prophylaxis in Place: Yes    Discussions with Specialists or Other Care Team Provider:  Pulmonology, id    Education and Discussions with Family / Patient:  Discussed with patient, he will convey care plan to family    Current Length of Stay: 3 day(s)    Current Patient Status: Inpatient     Discharge Plan / Estimated Discharge Date:  Once patient is medically stable    Code Status: Level 1 - Full Code      Subjective:   Patient feels well, no complaints  Stated he had a little shortness of breath overnight was given high-flow, which he feels better with  Objective:     Vitals:   Temp (24hrs), Av 2 °F (36 8 °C), Min:97 7 °F (36 5 °C), Max:98 5 °F (36 9 °C)    Temp:  [97 7 °F (36 5 °C)-98 5 °F (36 9 °C)] 98 3 °F (36 8 °C)  HR:  [72-85] 80  Resp:  [17-20] 18  BP: ()/(44-60) 119/53  SpO2:  [83 %-95 %] 87 %  Body mass index is 26 31 kg/m²  Input and Output Summary (last 24 hours):        Intake/Output Summary (Last 24 hours) at [address] filed at 6/18/2021 0615  Gross per 24 hour   Intake 150 ml   Output 1575 ml   Net -1425 ml       Physical Exam:     Physical Exam  Vitals reviewed  Constitutional:       Appearance: Normal appearance  HENT:      Head: Normocephalic and atraumatic  Right Ear: Tympanic membrane normal       Left Ear: Tympanic membrane normal       Nose: Nose normal    Eyes:      Extraocular Movements: Extraocular movements intact  Pupils: Pupils are equal, round, and reactive to light  Neck:      Vascular: No JVD  Cardiovascular:      Rate and Rhythm: Normal rate and regular rhythm  Pulses: Normal pulses  Heart sounds: No murmur heard  No gallop  Pulmonary:      Effort: Pulmonary effort is normal  No respiratory distress  Breath sounds: No stridor  Examination of the right-middle field reveals rales  Examination of the right-lower field reveals rhonchi and rales  Examination of the left-lower field reveals rhonchi and rales  Rhonchi and rales present  No wheezing  Chest:      Chest wall: No tenderness  Abdominal:      General: Abdomen is flat  Bowel sounds are normal  There is no distension  Palpations: Abdomen is soft  Tenderness: There is no abdominal tenderness  Musculoskeletal:         General: No swelling  Right lower leg: No edema  Left lower leg: No edema  Skin:     General: Skin is warm and dry  Neurological:      General: No focal deficit present  Mental Status: He is alert and oriented to person, place, and time  Cranial Nerves: No cranial nerve deficit  Motor: No weakness     Psychiatric:         Mood and Affect: Mood normal          Behavior: Behavior normal            Additional Data:     Labs:    Results from last 7 days   Lab Units 06/17/21  0448   WBC Thousand/uL 17 53*   HEMOGLOBIN g/dL 12 6   HEMATOCRIT % 37 7   PLATELETS Thousands/uL 435*   NEUTROS PCT % 93*   LYMPHS PCT % 3*   MONOS PCT % 3*   EOS PCT % 0     Results from last 7 days   Lab Units 06/17/21  0448   POTASSIUM mmol/L 4 9   CHLORIDE mmol/L 103   CO2 mmol/L 24   BUN mg/dL 28*   CREATININE mg/dL 1 28   CALCIUM mg/dL 8 6   ALK PHOS U/L 210*   ALT U/L 65   AST U/L 31     Results from last 7 days   Lab Units 06/15/21  0728   INR  1 03       * I Have Reviewed All Lab Data Listed Above  * Additional Pertinent Lab Tests Reviewed: [address] Admission Reviewed    Imaging:    Imaging Reports Reviewed Today Include:   Imaging Personally Reviewed by Myself Includes:      Recent Cultures (last 7 days):     Results from last 7 days   Lab Units 06/15/21  1657 06/15/21  0746 06/15/21  0732   BLOOD CULTURE   --  No Growth at 48 hrs  No Growth at 48 hrs     LEGIONELLA URINARY ANTIGEN  Negative  --   --        Last 24 Hours Medication List:   Current Facility-Administered Medications   Medication Dose Route Frequency Provider Last Rate    acetaminophen  650 mg Oral Q6H PRN Patt Carrasquillo MD      albuterol  2 5 mg Nebulization Q6H PRN Patt Carrasquillo MD      amLODIPine  10 mg Oral Daily Patt Carrasquillo MD      aspirin  81 mg Oral Daily Patt Carrasquillo MD      benzonatate  100 mg Oral TID PRN Edvin Kline PA-C      cefepime  2,000 mg Intravenous Q12H Liu Monteiro PA-C 2,000 mg (06/17/21 2042)    enoxaparin  1 mg/kg Subcutaneous Q12H [facility] Grant Musa MD      furosemide  40 mg Oral Daily Annabel Palomares MD      guaiFENesin  1,200 mg Oral Q12H [facility] Mary Mcgee MD      lidocaine   Topical Daily PRN Patt Carrasquillo MD      LORazepam  0 5 mg Oral BID PRN Patt Carrasquillo MD      melatonin  9 mg Oral HS Annabel Palomares MD      methylPREDNISolone sodium succinate  40 mg Intravenous [facility] Edvin Kline PA-C      ondansetron  8 mg Intravenous Q6H PRN Patt Carrasquillo MD      pantoprazole  40 mg Oral Early Morning Patt Carrasquillo MD          Today, Patient Was Seen By: Annabel Palomares MD    ** Please Note: This note has been constructed using a voice recognition system   **

## 2021-06-18 NOTE — ASSESSMENT & PLAN NOTE
Symptoms: shortness of breath at rest and wheezing    Lab Results   Component Value Date    SARSCOV2 Negative 06/15/2021    SARSCOV2 Positive (A) 05/11/2021     · Imaging:  XR chest 1 view portable - Result Date: 6/15/2021  Impression: Moderate bilateral pulmonary infiltrates which are nonspecific and may represent pneumonia or edema  · CTA ED chest PE Study - Result Date: 6/15/2021  Impression: No pulmonary embolism  Diffuse groundglass opacities in the lungs  Differential considerations include bacterial and viral pneumonia (including COVID 19), and vascular congestion  Minimal left pleural effusion      Recent Labs     06/16/21  0506 06/17/21  0448   WBC 10 68* 17 53*     Recent Labs     06/15/21  1657 06/17/21  0448 06/18/21  0741   FERRITIN 468*  --  596*    0*  --  52 3*   DDIMER  --  2 41* 1 79*     · Urine Strep and Legionella negative  · On supplemental oxygen at baseline, currently requiring 13 L, status post COVID infection 5/12/21  · Started on IV Solu-Medrol and duo nebs  · Previously on trastuzumab for treatment of esophageal cancer  · Differential includes pneumonitis per Pneumotox versus post COVID syndrome  · Inflammatory markers decreasing    Plan:  · Labs:  ? Obtain sputum cultures if producing sputum  ? Procalcitonin, CBC with Diff Tomorrow AM  · Antibiotics:  ? Cefepime 2 g q 12h #4  · Supportive care:  ? Incentive spirometry  ? Guaifenesin 1200 mg q 12 hours  ? Albuterol Nebs 2 5 mg q6 h PRN  ? Increase to high-flow nasal cannula 40 L at 70% FiO2  ? Continue Solumedrol 40 mg q8h scheduled - consider dexamethasone weight based dosage  ?  Tessalon Perles

## 2021-06-18 NOTE — ASSESSMENT & PLAN NOTE
· Patient is becoming hypotensive, likely secondary to hypoxia  · Continue amlodipine  · Will hold metoprolol for now

## 2021-06-18 NOTE — ASSESSMENT & PLAN NOTE
· Patient does not feel as short of breath as yesterday    · Still gets dyspneic on exertion  · Currently on 40 L of high-flow to maintain O2 greater 88%  · See plan under acute hypoxic respiratory failure

## 2021-06-18 NOTE — ASSESSMENT & PLAN NOTE
· Patient had COVID 19 about a month ago and he is also immunocompromised from his esophageal cancer  · Inflammatory markers elevated  · Legionella and strep pneumo urine antigens negative  · Procalcitonin elevated 0 43, down trended to 0 3 yesterday    · Will continue cefepime, Discontinue vancomycin as MRSA as negative   · Infectious disease recommendations appreciated

## 2021-06-18 NOTE — PROGRESS NOTES
[facility]  Progress Note Haydee Stone 1950, 65 y o  male MRN: [id number]  Unit/Bed#: S -01 Encounter: [id number]  Primary Care Provider: Troy Macedo DO   Date and time admitted to hospital: 6/15/2021  7:06 AM    * Acute respiratory failure with hypoxia [facility])  Assessment & Plan  Symptoms: shortness of breath at rest and wheezing    Lab Results   Component Value Date    SARSCOV2 Negative 06/15/2021    SARSCOV2 Positive (A) 05/11/2021     · Imaging:  XR chest 1 view portable - Result Date: 6/15/2021  Impression: Moderate bilateral pulmonary infiltrates which are nonspecific and may represent pneumonia or edema  · CTA ED chest PE Study - Result Date: 6/15/2021  Impression: No pulmonary embolism  Diffuse groundglass opacities in the lungs  Differential considerations include bacterial and viral pneumonia (including COVID 19), and vascular congestion  Minimal left pleural effusion      Recent Labs     06/16/21  0506 06/17/21  0448   WBC 10 68* 17 53*     Recent Labs     06/15/21  1657 06/17/21  0448 06/18/21  0741   FERRITIN 468*  --  596*    0*  --  52 3*   DDIMER  --  2 41* 1 79*     · Urine Strep and Legionella negative  · On supplemental oxygen at baseline, currently requiring 13 L, status post COVID infection 5/12/21  · Started on IV Solu-Medrol and duo nebs  · Previously on trastuzumab for treatment of esophageal cancer  · Differential includes pneumonitis per Pneumotox versus post COVID syndrome  · Inflammatory markers decreasing    Plan:  · Labs:  ? Obtain sputum cultures if producing sputum  ? Procalcitonin, CBC with Diff Tomorrow AM  · Antibiotics:  ? Cefepime 2 g q 12h #4  · Supportive care:  ? Incentive spirometry  ? Guaifenesin 1200 mg q 12 hours  ? Albuterol Nebs 2 5 mg q6 h PRN  ? Increase to high-flow nasal cannula 40 L at 70% FiO2  ? Continue Solumedrol 40 mg q8h scheduled - consider dexamethasone weight based dosage  ?  Tessalon Perles    Esophageal cancer   Assessment & Plan  History of some vaginal cancer since 2017 status post radiation and 12 cycle of FOLFOX -6 and continues to remain on Herceptin (trastuzumab)    Plan:  · Recommend changing from trastuzumab to alternate therapy  · Recommend outpatient follow up with Heme-Onc    Subjective: Today patient states he feels slightly better compared to yesterday  Concerned about having increasing requirements oxygen  Discussed that we plan to wean as tolerated and may adjust steroid dosing to better address suspected pneumonitis  Objective:    Vitals: Blood pressure 105/55, pulse 88, temperature 97 8 °F (36 6 °C), resp  rate 18, height 5' 7" (1 702 m), weight 76 2 kg (167 lb 15 9 oz), SpO2 (!) 89 %  ,Body mass index is 26 31 kg/m²  Intake/Output Summary (Last 24 hours) at 6/18/2021 1148  Last data filed at 6/18/2021 0801  Gross per 24 hour   Intake 150 ml   Output 1825 ml   Net -1675 ml       Invasive Devices     Central Venous Catheter Line            Port A Cath 08/28/17 Right Chest 1390 days          Peripheral Intravenous Line            Peripheral IV 06/15/21 Dorsal (posterior); Right Hand 2 days    Peripheral IV 06/17/21 Right;Upper Arm <1 day                Physical Exam:    Physical Exam  Vitals reviewed  Constitutional:       Appearance: Normal appearance  Interventions: Nasal cannula in place  Comments: HFNC 40L   HENT:      Head: Normocephalic and atraumatic  Right Ear: Tympanic membrane normal       Left Ear: Tympanic membrane normal       Nose: Nose normal       Mouth/Throat:      Mouth: Mucous membranes are dry  Eyes:      Extraocular Movements: Extraocular movements intact  Pupils: Pupils are equal, round, and reactive to light  Cardiovascular:      Rate and Rhythm: Normal rate and regular rhythm  Pulses: Normal pulses  Heart sounds: No murmur heard  No gallop      Pulmonary:      Effort: Pulmonary effort is normal  No respiratory distress  Breath sounds: No stridor  Examination of the right-middle field reveals wheezing  Examination of the left-middle field reveals wheezing  Wheezing (Mild) present  No rhonchi or rales  Chest:      Chest wall: No tenderness  Abdominal:      General: Abdomen is flat  Bowel sounds are normal  There is no distension  Palpations: Abdomen is soft  Tenderness: There is no abdominal tenderness  Musculoskeletal:         General: No swelling  Right lower leg: No edema  Left lower leg: No edema  Skin:     General: Skin is warm and dry  Neurological:      General: No focal deficit present  Mental Status: He is alert and oriented to person, place, and time  Cranial Nerves: No cranial nerve deficit  Motor: No weakness  Psychiatric:         Mood and Affect: Mood normal          Behavior: Behavior normal  Behavior is cooperative  Thought Content: Thought content normal          Judgment: Judgment normal          Labs: I have personally reviewed pertinent lab results  Imaging and other studies: I have personally reviewed pertinent reports     and I have personally reviewed pertinent films in PACS

## 2021-06-19 NOTE — ASSESSMENT & PLAN NOTE
POA   Patient is not on any home oxygen  Patient was on med for yesterday, is now requiring high-flow oxygen  Patient desaturated last night, an x-ray was obtained  On my read patient's x-ray does not show any evidence of worsening pulmonary edema or cephalization  Plan:  · Will continue nebulizations as needed  · Continue IV Solu-Medrol 40 mg t i d  Day #5  · Continue antibiotics for pneumonia  · Continue 40 mg of p o   Lasix  · Incentive spirometry  · Appreciate pulmonology recommendations

## 2021-06-19 NOTE — ASSESSMENT & PLAN NOTE
Lab Results   Component Value Date    EGFR 61 06/18/2021    EGFR 56 06/17/2021    EGFR 56 06/16/2021    CREATININE 1 19 06/18/2021    CREATININE 1 28 06/17/2021    CREATININE 1 28 06/16/2021     · Continue with 40 mg of p o   Lasix  · Patient's baseline creatinine between 1 14 and 1 2

## 2021-06-19 NOTE — PROGRESS NOTES
[facility]  Progress Note Jenny Lew 1950, 65 y o  male MRN: [id number]  Unit/Bed#: S -01 Encounter: [id number]  Primary Care Provider: Homa Astorga DO   Date and time admitted to hospital: 6/15/2021  7:06 AM    * Acute respiratory failure with hypoxia ([address] )  Assessment & Plan  POA  Patient is not on any home oxygen  Patient was on med for yesterday, is now requiring high-flow oxygen  Patient desaturated last night, an x-ray was obtained  On my read patient's x-ray does not show any evidence of worsening pulmonary edema or cephalization  Plan:  · Will continue nebulizations as needed  · Continue IV Solu-Medrol 40 mg t i d  Day #5  · Continue antibiotics for pneumonia  · Continue 40 mg of p o  Lasix  · Incentive spirometry  · Appreciate pulmonology recommendations    Pneumonia  Assessment & Plan  · Patient had COVID 19 about a month ago and he is also immunocompromised from his esophageal cancer  · Inflammatory markers elevated, D-dimers trending down  · Legionella and strep pneumo urine antigens negative  · Procalcitonin trended down to normal  · Patient transition to cefdinir 300 mg q 12 day 5/7 (last day 06/21)  · Infectious disease recommendations appreciated    SOB (shortness of breath)  Assessment & Plan  · Still gets dyspneic on exertion    · Currently on 40 L of high-flow to maintain O2 greater 88%  · See plan under acute hypoxic respiratory failure      Esophageal cancer   Assessment & Plan  · Outpatient follow-up with Oncology  · Patient stated that he last had radiation about a month ago  · He is not on any special diet    Dysphagia  Assessment & Plan  · Patient able to tolerate regular diet      Acute renal failure superimposed on stage 3 chronic kidney disease [facility])  Assessment & Plan  Lab Results   Component Value Date    EGFR 61 06/18/2021    EGFR 56 06/17/2021    EGFR 56 06/16/2021    CREATININE 1 19 06/18/2021    CREATININE 1 28 06/17/2021 CREATININE 1 28 2021     · Continue with 40 mg of p o  Lasix  · Patient's baseline creatinine between 1 14 and 1 2    PAD (peripheral artery disease)  Assessment & Plan  · Continue aspirin and statin    Carotid stenosis  Assessment & Plan  · Continue aspirin and statin    Essential hypertension  Assessment & Plan  · Patient is becoming hypotensive, likely secondary to hypoxia  · Continue amlodipine  · Will hold metoprolol for now    GERD (gastroesophageal reflux disease)  Assessment & Plan  · Continue Protonix    Transaminitis-resolved as of 2021  Assessment & Plan  · Patient's transaminitis likely secondary to shock level in the setting of sepsis  · Patient's transaminitis improved today with resolution of ARISTIDES and sepsis            VTE Pharmacologic Prophylaxis:   Pharmacologic: Enoxaparin (Lovenox)  Mechanical VTE Prophylaxis in Place: Yes    Discussions with Specialists or Other Care Team Provider:  Pulmonology, Infectious Disease    Education and Discussions with Family / Patient:  Discussed with patient, he will discuss with his family  Current Length of Stay: 4 day(s)    Current Patient Status: Inpatient     Discharge Plan / Estimated Discharge Date:  Once patient is medically stable    Code Status: Level 1 - Full Code      Subjective:   Feels well, tried to get up but became dyspneic  Is still on 35L maintaining sats in 90%    Objective:     Vitals:   Temp (24hrs), Av 1 °F (36 7 °C), Min:97 7 °F (36 5 °C), Max:98 5 °F (36 9 °C)    Temp:  [97 7 °F (36 5 °C)-98 5 °F (36 9 °C)] 98 °F (36 7 °C)  HR:  [77-88] 77  Resp:  [18] 18  BP: (105-130)/(53-64) 122/62  SpO2:  [87 %-96 %] 96 %  Body mass index is 26 31 kg/m²  Input and Output Summary (last 24 hours): Intake/Output Summary (Last 24 hours) at 2021 0653  Last data filed at 2021 2207  Gross per 24 hour   Intake 120 ml   Output 1225 ml   Net -1105 ml       Physical Exam:     Physical Exam  Vitals reviewed     Constitutional: Appearance: Normal appearance  HENT:      Head: Normocephalic and atraumatic  Right Ear: Tympanic membrane normal       Left Ear: Tympanic membrane normal       Nose: Nose normal    Eyes:      Extraocular Movements: Extraocular movements intact  Pupils: Pupils are equal, round, and reactive to light  Neck:      Vascular: No JVD  Cardiovascular:      Rate and Rhythm: Normal rate and regular rhythm  Pulses: Normal pulses  Heart sounds: No murmur heard  No gallop  Pulmonary:      Effort: Pulmonary effort is normal  No respiratory distress  Breath sounds: No stridor  Examination of the right-middle field reveals rales  Examination of the right-lower field reveals rhonchi and rales  Examination of the left-lower field reveals rhonchi and rales  Rhonchi and rales present  No wheezing  Chest:      Chest wall: No tenderness  Abdominal:      General: Abdomen is flat  Bowel sounds are normal  There is no distension  Palpations: Abdomen is soft  Tenderness: There is no abdominal tenderness  Musculoskeletal:         General: No swelling  Right lower leg: No edema  Left lower leg: No edema  Skin:     General: Skin is warm and dry  Neurological:      General: No focal deficit present  Mental Status: He is alert and oriented to person, place, and time  Cranial Nerves: No cranial nerve deficit  Motor: No weakness     Psychiatric:         Mood and Affect: Mood normal          Behavior: Behavior normal          Additional Data:     Labs:    Results from last 7 days   Lab Units 06/19/21  0533 06/17/21  0448   WBC Thousand/uL 12 03* 17 53*   HEMOGLOBIN g/dL 12 8 12 6   HEMATOCRIT % 39 2 37 7   PLATELETS Thousands/uL 394* 435*   NEUTROS PCT %  --  93*   LYMPHS PCT %  --  3*   MONOS PCT %  --  3*   EOS PCT %  --  0     Results from last 7 days   Lab Units 06/18/21  0741 06/17/21  0448   POTASSIUM mmol/L 4 5 4 9   CHLORIDE mmol/L 101 103   CO2 mmol/L 22 24   BUN mg/dL 29* 28*   CREATININE mg/dL 1 19 1 28   CALCIUM mg/dL 8 7 8 6   ALK PHOS U/L  --  210*   ALT U/L  --  65   AST U/L  --  31     Results from last 7 days   Lab Units 06/15/21  0728   INR  1 03       * I Have Reviewed All Lab Data Listed Above  * Additional Pertinent Lab Tests Reviewed: [facility] 64 Admission Reviewed    Imaging:    Imaging Reports Reviewed Today Include:   Imaging Personally Reviewed by Myself Includes:      Recent Cultures (last 7 days):     Results from last 7 days   Lab Units 06/15/21  1657 06/15/21  0746 06/15/21  0732   BLOOD CULTURE   --  No Growth at 72 hrs  No Growth at 72 hrs  LEGIONELLA URINARY ANTIGEN  Negative  --   --        Last 24 Hours Medication List:   Current Facility-Administered Medications   Medication Dose Route Frequency Provider Last Rate    acetaminophen  650 mg Oral Q6H PRN Hilary Zimmer MD      albuterol  2 5 mg Nebulization Q6H PRN Hilary Zimmer MD      amLODIPine  10 mg Oral Daily Hilary Zimmer MD      aspirin  81 mg Oral Daily Hilary Zimmer MD      benzonatate  100 mg Oral TID PRN Anais Sanchez PA-C      cefdinir  300 mg Oral Q12H [address] Wali Rudd PA-C      enoxaparin  1 mg/kg Subcutaneous Q12H [address] Juany Syed MD      furosemide  40 mg Oral Daily Kirk Bailey MD      guaiFENesin  1,200 mg Oral Q12H [address] Kevin Sultana MD      lidocaine   Topical Daily PRN Hilary Zimmer MD      LORazepam  0 5 mg Oral BID PRN Hilary Zimmer MD      melatonin  9 mg Oral HS Kirk Bailey MD      methylPREDNISolone sodium succinate  40 mg Intravenous [facility] Anais Sanchez PA-C      ondansetron  8 mg Intravenous Q6H PRN Hilary Zimmer MD      pantoprazole  40 mg Oral Early Morning Hilary Zimmer MD          Today, Patient Was Seen By: Kirk Bailey MD    ** Please Note: This note has been constructed using a voice recognition system   **

## 2021-06-19 NOTE — ASSESSMENT & PLAN NOTE
· Patient had COVID 19 about a month ago and he is also immunocompromised from his esophageal cancer  · Inflammatory markers elevated, D-dimers trending down  · Legionella and strep pneumo urine antigens negative  · Procalcitonin trended down to normal  · Patient transition to cefdinir 300 mg q 12 day 5/7 (last day 06/21)  · Infectious disease recommendations appreciated

## 2021-06-19 NOTE — ASSESSMENT & PLAN NOTE
· Still gets dyspneic on exertion    · Currently on 40 L of high-flow to maintain O2 greater 88%  · See plan under acute hypoxic respiratory failure

## 2021-06-19 NOTE — PROGRESS NOTES
Progress Note - Pulmonary   Angela Garcia 65 y o  male MRN: [id number]  Unit/Bed#: S -01 Encounter: [id number]      Assessment:  1  Acute hypoxic respiratory failure  2  COVID19 pneumonia  3  Metastatic esophageal cancer s/p chemo and radiation  4  Dysphagia with esophageal stent placement  5  Former smoker - approximately 50 - 60 pack years  10  Previous occupational exposures working in car industry, metal dusts, smoke and debris     Plan:  1  Will trial some Spiriva given previous smoking history to see if this helps with dyspnea and what he states to be chest congestion  2  Can consider adding breo as well   3  Continue Solumedrol 40mg IV q8, wean to q12 tomorrow  4  Continue treatment dose Lovenox at 80 BID  5  Complete Cefdinir dosing after 7 doses  6  Continue mucinex to help with secretions       Subjective:   Patient seen and examined  He states that he is feeling ok  He notes dyspnea on exertion  He also feels chest congestion that will not loosen  Objective:   Vitals: Blood pressure 119/60, pulse 85, temperature 98 3 °F (36 8 °C), resp  rate 18, height 5' 7" (1 702 m), weight 76 2 kg (167 lb 15 9 oz), SpO2 (!) 86 %  , 12L midflow, Body mass index is 26 31 kg/m²  Intake/Output Summary (Last 24 hours) at 6/19/2021 1307  Last data filed at 6/18/2021 2207  Gross per 24 hour   Intake 120 ml   Output 475 ml   Net -355 ml       Physical Exam  Gen: Awake, alert, oriented x 3, no acute distress  HEENT: Mucous membranes moist, no oral lesions, no thrush  NECK: No accessory muscle use, JVP not elevated  Cardiac: Regular, single S1, single S2, no murmurs, no rubs, no gallops  Lungs: Decreased breath sounds, crackles in lung bases, scattered rhonchi  Abdomen: normoactive bowel sounds, soft nontender, nondistended, no rebound or rigidity, no guarding  Extremities: no cyanosis, no clubbing, no edema    Labs: I have personally reviewed pertinent lab results    Results from last 7 days   Lab Units 06/19/21  0533 06/18/21  1401 06/17/21  0448 06/15/21  1657 06/15/21  0728   WBC Thousand/uL 12 03* 14 96* 17 53*   < > 16 15*   HEMOGLOBIN g/dL 12 8 12 3 12 6   < > 14 2   HEMATOCRIT % 39 2 37 3 37 7   < > 43 2   PLATELETS Thousands/uL 394* 431* 435*  --  442*   NEUTROS PCT %  --   --  93*  --  87*   MONOS PCT %  --   --  3*  --  7    < > = values in this interval not displayed        Results from last 7 days   Lab Units 06/19/21  0533 06/18/21  0741 06/17/21  0448 06/16/21  0506 06/15/21  0728   POTASSIUM mmol/L 5 1 4 5 4 9 4 7 4 5   CHLORIDE mmol/L 101 101 103 102 100   CO2 mmol/L 25 22 24 21 23   BUN mg/dL 29* 29* 28* 23 21   CREATININE mg/dL 1 14 1 19 1 28 1 28 1 44*   CALCIUM mg/dL 8 7 8 7 8 6 8 4 9 2   ALK PHOS U/L  --   --  210* 205* 283*   ALT U/L  --   --  65 64 85*   AST U/L  --   --  31 30 69*     Results from last 7 days   Lab Units 06/16/21  0506   MAGNESIUM mg/dL 2 2          Results from last 7 days   Lab Units 06/15/21  0728   INR  1 03   PTT seconds 28     Results from last 7 days   Lab Units 06/15/21  0728   LACTIC ACID mmol/L 2 0     0   Lab Value Date/Time    TROPONINI <0 02 06/15/2021 2506         Meds/Allergies   Current Facility-Administered Medications   Medication Dose Route Frequency    acetaminophen (TYLENOL) tablet 650 mg  650 mg Oral Q6H PRN    albuterol inhalation solution 2 5 mg  2 5 mg Nebulization Q6H PRN    amLODIPine (NORVASC) tablet 10 mg  10 mg Oral Daily    aspirin chewable tablet 81 mg  81 mg Oral Daily    benzonatate (TESSALON PERLES) capsule 100 mg  100 mg Oral TID PRN    cefdinir (OMNICEF) oral suspension 300 mg  300 mg Oral Q12H [address]    enoxaparin (LOVENOX) subcutaneous injection 80 mg  1 mg/kg Subcutaneous Q12H SENG    furosemide (LASIX) tablet 40 mg  40 mg Oral Daily    guaiFENesin (MUCINEX) 12 hr tablet 1,200 mg  1,200 mg Oral Q12H SENG    lidocaine (LMX) 4 % cream   Topical Daily PRN    LORazepam (ATIVAN) tablet 0 5 mg  0 5 mg Oral BID PRN    melatonin tablet 9 mg  9 mg Oral HS    methylPREDNISolone sodium succinate (Solu-MEDROL) injection 40 mg  40 mg Intravenous Q8H [address]    nystatin (MYCOSTATIN) powder   Topical TID    ondansetron (ZOFRAN) 8 mg in sodium chloride 0 9 % 50 mL IVPB  8 mg Intravenous Q6H PRN    pantoprazole (PROTONIX) EC tablet 40 mg  40 mg Oral Early Morning     Medications Prior to Admission   Medication    amLODIPine (NORVASC) 10 mg tablet    aspirin 81 mg chewable tablet    lidocaine-prilocaine (EMLA) cream    LORazepam (ATIVAN) 0 5 mg tablet    melatonin 3 mg    metoprolol tartrate (LOPRESSOR) 50 mg tablet    ondansetron (ZOFRAN-ODT) 4 mg disintegrating tablet    pantoprazole (PROTONIX) 40 mg tablet    simvastatin (ZOCOR) 40 mg tablet    Lidocaine Viscous HCl (XYLOCAINE) 2 % mucosal solution    Multiple Vitamin (MULTIVITAMIN) tablet    sucralfate (CARAFATE) 1 g/10 mL suspension         Microbiology:  Lab Results   Component Value Date    BLOODCX No Growth After 4 Days  06/15/2021    BLOODCX No Growth After 4 Days  06/15/2021       Imaging and other studies: I have personally reviewed pertinent reports      CXR - IMPRESSION:  Persistent bibasal groundglass opacities       DO [facility]

## 2021-06-20 NOTE — PROGRESS NOTES
Progress Note - Pulmonary   Pedro Javier 65 y o  male MRN: [id number]  Unit/Bed#: S -01 Encounter: [id number]    Assessment:  1  Acute hypoxemic respiratory failure  2  COVID-19 pneumonia  3  Metastatic esophageal cancer status post chemo and radiation  4  Dysphagia with esophageal stent placement  5  Former smoker  6  Occupational exposure to metal dusts, smoke and debris    Plan:  Overnight patient inadvertently removed his mid flow and had sats drop into the 60s and 70s, he did require BiPAP for short time and then was placed on high-flow nasal cannula  Chest x-ray was ordered overnight, not yet done  He currently remains on high-flow nasal cannula with sats in the high 80s/low 90s  Not in any respiratory distress, no conversational dyspnea  Will keep steroids same dose today given the increased O2 requirement and overnight episode  Today is day 6/7 of antibiotics  Continue Spiriva  Continue treatment dose Lovenox  Continue Mucinex for mucus clearance  Will continue to follow  Subjective:   Patient resting in bed  While he was sleeping he removed his mid flow, was awakened by nursing and was significantly confused  Did take quite a while for him to become reoriented to his surroundings  Currently feeling improved from overnight  Objective:     Vitals: Blood pressure 116/73, pulse (!) 124, temperature 97 8 °F (36 6 °C), resp  rate 18, height 5' 7" (1 702 m), weight 76 2 kg (167 lb 15 9 oz), SpO2 91 %  ,Body mass index is 26 31 kg/m²        Intake/Output Summary (Last 24 hours) at 6/20/2021 1300  Last data filed at 6/20/2021 1159  Gross per 24 hour   Intake 240 ml   Output 2345 ml   Net -2105 ml       Invasive Devices     Central Venous Catheter Line            Port A Cath 08/28/17 Right Chest 1392 days          Peripheral Intravenous Line            Peripheral IV 06/17/21 Right;Upper Arm 2 days                Physical Exam: /73   Pulse (!) 124   Temp 97 8 °F (36 6 °C)   Resp 18   Ht 5' 7" (1 702 m)   Wt 76 2 kg (167 lb 15 9 oz)   SpO2 91%   BMI 26 31 kg/m²   General appearance: alert and oriented, in no acute distress  Head: Normocephalic, without obvious abnormality, atraumatic  Eyes: negative findings: conjunctivae and sclerae normal  Lungs: Occasional rhonchi and crackles  Heart: regular rate and rhythm and S1, S2 normal  Abdomen: normal findings: soft, non-tender  Extremities: No edema  Skin: Warm and dry  Neurologic: Mental status: Alert, oriented, thought content appropriate     Labs: I have personally reviewed pertinent lab results  , CBC: No results found for: WBC, HGB, HCT, MCV, PLT, ADJUSTEDWBC, MCH, MCHC, RDW, MPV, NRBC, CMP:   Lab Results   Component Value Date    SODIUM 136 06/20/2021    K 4 0 06/20/2021     06/20/2021    CO2 22 06/20/2021    BUN 32 (H) 06/20/2021    CREATININE 1 07 06/20/2021    CALCIUM 8 5 06/20/2021    EGFR 69 06/20/2021     Imaging and other studies: I have personally reviewed pertinent reports     and I have personally reviewed pertinent films in PACS

## 2021-06-20 NOTE — ASSESSMENT & PLAN NOTE
POA   Patient is not on any home oxygen  Patient was on med for yesterday, is now requiring high-flow oxygen  Patient desaturated last night, an x-ray was obtained  On my read patient's x-ray does not show any evidence of worsening pulmonary edema or cephalization  Plan:  · continue nebulizations as needed  · Continue IV Solu-Medrol 40 mg t i d  Day #6, pulmonology planning to wean to b i d  Today  · Continue antibiotics for pneumonia through 6/21 to complete course of 7 days total antibiotics  · Continue 40 mg of p o   Lasix  · Incentive spirometry  · Appreciate pulmonology recommendations  · Titrate oxygen to maintain SpO2 88%

## 2021-06-20 NOTE — PROGRESS NOTES
Around 3:40 on 6/20 pt was found confused and trying to get up from bed without his mid flow oxygen of 10L  Pt's O2 saturation was in high 50's and pt was very anxious  He states that he was woken up from the deep sleep around 3am for his vitals and than could not fall asleep and than felt confused and did not know where he was  Re-applied his mid-flow and adjusted it to 15L, but pt's O2 saturation was maintaining in 70's  Called respiratory to come and evaluate pt  Respiratory and Critical Care PA at bedside  Pt is currently on BI-PAP and maintaining his O2 >90%  Portable chest x-ray order present  Will continue to monitor pt closely

## 2021-06-20 NOTE — ASSESSMENT & PLAN NOTE
Lab Results   Component Value Date    EGFR 69 06/20/2021    EGFR 64 06/19/2021    EGFR 61 06/18/2021    CREATININE 1 07 06/20/2021    CREATININE 1 14 06/19/2021    CREATININE 1 19 06/18/2021     · Continue with 40 mg of p o   Lasix  · Patient's baseline creatinine between 1 14 and 1 2

## 2021-06-20 NOTE — PROGRESS NOTES
[facility]  Progress Note Gwen Grayson 1950, 65 y o  male MRN: [id number]  Unit/Bed#: S -01 Encounter: [id number]  Primary Care Provider: Mazin Valencia DO   Date and time admitted to hospital: 6/15/2021  7:06 AM    * Acute respiratory failure with hypoxia ([address] )  Assessment & Plan  POA  Patient is not on any home oxygen  Patient was on med for yesterday, is now requiring high-flow oxygen  Patient desaturated last night, an x-ray was obtained  On my read patient's x-ray does not show any evidence of worsening pulmonary edema or cephalization  Plan:  · continue nebulizations as needed  · Continue IV Solu-Medrol 40 mg t i d  Day #6, pulmonology planning to wean to b i d  Today  · Continue antibiotics for pneumonia through 6/21 to complete course of 7 days total antibiotics  · Continue 40 mg of p o  Lasix  · Incentive spirometry  · Appreciate pulmonology recommendations  · Titrate oxygen to maintain SpO2 88%    Pneumonia  Assessment & Plan  · Patient had COVID 19 about a month ago and he is also immunocompromised from his esophageal cancer  · Inflammatory markers elevated, D-dimers trending down  · Legionella and strep pneumo urine antigens negative  · Procalcitonin trended down to normal  · Patient transition to cefdinir 300 mg q 12 day 6/7 (last day 06/21)  · Infectious disease recommendations appreciated    Acute renal failure superimposed on stage 3 chronic kidney disease [facility])  Assessment & Plan  Lab Results   Component Value Date    EGFR 69 06/20/2021    EGFR 64 06/19/2021    EGFR 61 06/18/2021    CREATININE 1 07 06/20/2021    CREATININE 1 14 06/19/2021    CREATININE 1 19 06/18/2021     · Continue with 40 mg of p o   Lasix  · Patient's baseline creatinine between 1 14 and 1 2    PAD (peripheral artery disease)  Assessment & Plan  · Continue aspirin and statin    Esophageal cancer   Assessment & Plan  · Outpatient follow-up with Oncology  · Patient stated that he last had radiation about a month ago  · He is not on any special diet    Carotid stenosis  Assessment & Plan  · Continue aspirin and statin    Essential hypertension  Assessment & Plan  · Patient is becoming hypotensive, likely secondary to hypoxia  · Continue amlodipine  · Holding metoprolol for now    GERD (gastroesophageal reflux disease)  Assessment & Plan  · Continue Protonix    Dysphagia  Assessment & Plan  · Patient able to tolerate regular diet      Transaminitis-resolved as of 2021  Assessment & Plan  · Patient's transaminitis likely secondary to shock level in the setting of sepsis  · Patient's transaminitis improved today with resolution of ARISTIDES and sepsis          VTE Pharmacologic Prophylaxis:   Pharmacologic: Enoxaparin (Lovenox)  Mechanical VTE Prophylaxis in Place: Yes    Discussions with Specialists or Other Care Team Provider:  Nursing    Education and Discussions with Family / Patient:  Patient    Current Length of Stay: 5 day(s)    Current Patient Status: Inpatient     Discharge Plan / Estimated Discharge Date:  Not yet determined  Patient still requiring high-flow nasal cannula    Code Status: Level 1 - Full Code      Subjective:   Patient was laying comfortably in bed today  He has conversational dyspnea  He had an episode of desaturation to the low 80s last night and was placed on BiPAP and transitioned to high-flow nasal cannula  He is currently satting in the high 80s-low 90s on 65 L 40-45%    Objective:     Vitals:   Temp (24hrs), Av °F (36 7 °C), Min:97 6 °F (36 4 °C), Max:98 5 °F (36 9 °C)    Temp:  [97 6 °F (36 4 °C)-98 5 °F (36 9 °C)] 97 7 °F (36 5 °C)  HR:  [] 131  Resp:  [16-22] 18  BP: (106-124)/(53-82) 120/82  SpO2:  [84 %-94 %] 85 %  Body mass index is 26 31 kg/m²  Input and Output Summary (last 24 hours):        Intake/Output Summary (Last 24 hours) at 2021 0987  Last data filed at 2021 0401  Gross per 24 hour   Intake 240 ml   Output 1945 ml   Net -1705 ml       Physical Exam:     Physical Exam  Vitals and nursing note reviewed  Constitutional:       Appearance: Normal appearance  He is not diaphoretic  Interventions: Nasal cannula in place  HENT:      Head: Normocephalic and atraumatic  Right Ear: Tympanic membrane normal       Left Ear: Tympanic membrane normal       Nose: Nose normal    Eyes:      Extraocular Movements: Extraocular movements intact  Conjunctiva/sclera: Conjunctivae normal    Neck:      Vascular: No JVD  Cardiovascular:      Rate and Rhythm: Normal rate and regular rhythm  Heart sounds: No murmur heard  Pulmonary:      Effort: Pulmonary effort is normal  No respiratory distress  Breath sounds: No stridor  Examination of the right-lower field reveals rhonchi  Examination of the left-lower field reveals rhonchi  Rhonchi present  No wheezing or rales  Chest:      Chest wall: No tenderness  Abdominal:      General: Abdomen is flat  Bowel sounds are normal  There is no distension  Palpations: Abdomen is soft  Tenderness: There is no abdominal tenderness  Musculoskeletal:      Cervical back: Neck supple  Right lower leg: No edema  Left lower leg: No edema  Skin:     General: Skin is warm and dry  Neurological:      General: No focal deficit present  Mental Status: He is alert and oriented to person, place, and time  Cranial Nerves: No cranial nerve deficit  Motor: No weakness     Psychiatric:         Mood and Affect: Mood normal          Behavior: Behavior normal          Additional Data:     Labs:    Results from last 7 days   Lab Units 06/19/21  0533 06/17/21  0448   WBC Thousand/uL 12 03* 17 53*   HEMOGLOBIN g/dL 12 8 12 6   HEMATOCRIT % 39 2 37 7   PLATELETS Thousands/uL 394* 435*   NEUTROS PCT %  --  93*   LYMPHS PCT %  --  3*   MONOS PCT %  --  3*   EOS PCT %  --  0     Results from last 7 days   Lab Units 06/20/21  0440 06/17/21  0448   POTASSIUM mmol/L 4 0 4 9 CHLORIDE mmol/L 100 103   CO2 mmol/L 22 24   BUN mg/dL 32* 28*   CREATININE mg/dL 1 07 1 28   CALCIUM mg/dL 8 5 8 6   ALK PHOS U/L  --  210*   ALT U/L  --  65   AST U/L  --  31     Results from last 7 days   Lab Units 06/15/21  0728   INR  1 03       * I Have Reviewed All Lab Data Listed Above  * Additional Pertinent Lab Tests Reviewed: [address] 64 Admission Reviewed    Imaging:    Imaging Reports Reviewed Today Include:  None  Imaging Personally Reviewed by Myself Includes:  None    Recent Cultures (last 7 days):     Results from last 7 days   Lab Units 06/15/21  1657 06/15/21  0746 06/15/21  0732   BLOOD CULTURE   --  No Growth After 4 Days  No Growth After 4 Days     LEGIONELLA URINARY ANTIGEN  Negative  --   --        Last 24 Hours Medication List:   Current Facility-Administered Medications   Medication Dose Route Frequency Provider Last Rate    acetaminophen  650 mg Oral Q6H PRN Rosenda Conley MD      albuterol  2 5 mg Nebulization Q6H PRN Rosenda Conley MD      amLODIPine  10 mg Oral Daily Rosenda Conley MD      aspirin  81 mg Oral Daily Rosenda Conley MD      benzonatate  100 mg Oral TID PRN DESIRE Casillas      cefdinir  300 mg Oral Q12H [facility] Kourtney Farrell PA-C      enoxaparin  1 mg/kg Subcutaneous Q12H [facility] Thierry Quispe MD      furosemide  40 mg Oral Daily Vee Roper MD      guaiFENesin  1,200 mg Oral Q12H [facility] Patrice Lujan MD      lidocaine   Topical Daily PRN Rosenda Conley MD      LORazepam  0 5 mg Oral BID PRN Rosenda Conley MD      melatonin  9 mg Oral HS Vee Roper MD      methylPREDNISolone sodium succinate  40 mg Intravenous [facility] DESIRE Casillas      nystatin   Topical TID Noelle Garcia MD      ondansetron  8 mg Intravenous Q6H PRN Rosenda Conley MD      pantoprazole  40 mg Oral Early Morning Rosenda Conley MD      tiotropium  18 mcg Inhalation Daily Jose Mckeon DO          Today, Patient Was Seen By: Lucina Mendoza Randi Casas MD    ** Please Note: This note has been constructed using a voice recognition system   **

## 2021-06-20 NOTE — PLAN OF CARE
Problem: MOBILITY - ADULT  Goal: Maintain or return to baseline ADL function  Description: INTERVENTIONS:  -  Assess patient's ability to carry out ADLs; assess patient's baseline for ADL function and identify physical deficits which impact ability to perform ADLs (bathing, care of mouth/teeth, toileting, grooming, dressing, etc )  - Assess/evaluate cause of self-care deficits   - Assess range of motion  - Assess patient's mobility; develop plan if impaired  - Assess patient's need for assistive devices and provide as appropriate  - Encourage maximum independence but intervene and supervise when necessary  - Involve family in performance of ADLs  - Assess for home care needs following discharge   - Consider OT consult to assist with ADL evaluation and planning for discharge  - Provide patient education as appropriate  6/20/2021 0030 by Myranda Jimenez RN  Outcome: Progressing  6/20/2021 0029 by Myranda Jimenez RN  Outcome: Progressing  Goal: Maintains/Returns to pre admission functional level  Description: INTERVENTIONS:  - Perform BMAT or MOVE assessment daily    - Set and communicate daily mobility goal to care team and patient/family/caregiver  - Collaborate with rehabilitation services on mobility goals if consulted  - Perform Range of Motion 4 times a day  - Reposition patient every 2 hours    - Dangle patient 4 times a day  - Stand patient 4 times a day  - Ambulate patient 4  times a day  - Out of bed to chair 3 times a day   - Out of bed for meals 3 times a day  - Out of bed for toileting  - Record patient progress and toleration of activity level   6/20/2021 0030 by Myranda Jimenez RN  Outcome: Progressing  6/20/2021 0029 by Myranda Jimenez RN  Outcome: Progressing     Problem: Prexisting or High Potential for Compromised Skin Integrity  Goal: Skin integrity is maintained or improved  Description: INTERVENTIONS:  - Identify patients at risk for skin breakdown  - Assess and monitor skin integrity  - Assess and monitor nutrition and hydration status  - Monitor labs   - Assess for incontinence   - Turn and reposition patient  - Assist with mobility/ambulation  - Relieve pressure over bony prominences  - Avoid friction and shearing  - Provide appropriate hygiene as needed including keeping skin clean and dry  - Evaluate need for skin moisturizer/barrier cream  - Collaborate with interdisciplinary team   - Patient/family teaching  - Consider wound care consult   6/20/2021 0030 by Kisha Wren RN  Outcome: Progressing  6/20/2021 0029 by Kisha Wren RN  Outcome: Progressing     Problem: Potential for Falls  Goal: Patient will remain free of falls  Description: INTERVENTIONS:  - Educate patient/family on patient safety including physical limitations  - Instruct patient to call for assistance with activity   - Consult OT/PT to assist with strengthening/mobility   - Keep Call bell within reach  - Keep bed low and locked with side rails adjusted as appropriate  - Keep care items and personal belongings within reach  - Initiate and maintain comfort rounds  - Make Fall Risk Sign visible to staff  - Offer Toileting every 2 Hours, in advance of need  - Initiate/Maintain bed alarm  - Obtain necessary fall risk management equipment: bed alarm  - Apply yellow socks and bracelet for high fall risk patients  - Consider moving patient to room near nurses station  6/20/2021 0030 by Kisha Wren RN  Outcome: [facility]  6/20/2021 0029 by Kisha Wren RN  Outcome: Progressing     Problem: RESPIRATORY - ADULT  Goal: Achieves optimal ventilation and oxygenation  Description: INTERVENTIONS:  - Assess for changes in respiratory status  - Assess for changes in mentation and behavior  - Position to facilitate oxygenation and minimize respiratory effort  - Oxygen administered by appropriate delivery if ordered  - Initiate smoking cessation education as indicated  - Encourage broncho-pulmonary hygiene including cough, deep breathe, Incentive Spirometry  - Assess the need for suctioning and aspirate as needed  - Assess and instruct to report SOB or any respiratory difficulty  - Respiratory Therapy support as indicated  Outcome: Progressing     Problem: INFECTION - ADULT  Goal: Absence or prevention of progression during hospitalization  Description: INTERVENTIONS:  - Assess and monitor for signs and symptoms of infection  - Monitor lab/diagnostic results  - Monitor all insertion sites, i e  indwelling lines, tubes, and drains  - Monitor endotracheal if appropriate and nasal secretions for changes in amount and color  - [place] appropriate cooling/warming therapies per order  - Administer medications as ordered  - Instruct and encourage patient and family to use good hand hygiene technique  - Identify and instruct in appropriate isolation precautions for identified infection/condition  Outcome: Progressing  Goal: Absence of fever/infection during neutropenic period  Description: INTERVENTIONS:  - Monitor WBC    Outcome: Progressing

## 2021-06-20 NOTE — RESPIRATORY THERAPY NOTE
RT Ventilator Management Note  Roberto Patel 65 y o  male MRN: [id number]  Unit/Bed#: S -01 Encounter: [id number]    Was called to pt room to assess due to hypoxic event  Pt woke up confused and without oxygen on   Pt was on 15l mid-flow sating in low 80s and was then placed on non-rebreather  Pt was then placed on non-rebreather along with maxed out high-flow nasal cannula (60L 100%), sats continued in low to mid 80s   Placed pt on bipap and responded well  Pt placed on 14/6 80% maintaining sats in mid to low 90s  Pt had previous desaturation episode on the night of 6/17  Which required maxed out high-flow nasal cannula setting (60L 100%) to maintain stable saturation  Pt was to weaned down to 80% and 45L for the rest of that night   Will ctm

## 2021-06-20 NOTE — ASSESSMENT & PLAN NOTE
· Patient had COVID 19 about a month ago and he is also immunocompromised from his esophageal cancer  · Inflammatory markers elevated, D-dimers trending down  · Legionella and strep pneumo urine antigens negative  · Procalcitonin trended down to normal  · Patient transition to cefdinir 300 mg q 12 day 6/7 (last day 06/21)  · Infectious disease recommendations appreciated

## 2021-06-20 NOTE — ASSESSMENT & PLAN NOTE
· Patient is becoming hypotensive, likely secondary to hypoxia  · Continue amlodipine  · Holding metoprolol for now

## 2021-06-21 NOTE — PROGRESS NOTES
[facility]  Progress Note Alia De Los Santos 1950, 65 y o  male MRN: [id number]  Unit/Bed#: S -01 Encounter: [id number]  Primary Care Provider: Gualberto Mahoney DO   Date and time admitted to hospital: 6/15/2021  7:06 AM    * Acute respiratory failure with hypoxia ([address] )  Assessment & Plan  POA  Patient is not on any home oxygen  Patient was on med for yesterday, is now requiring high-flow oxygen  Patient desaturated last night, an x-ray was obtained  On my read patient's x-ray does not show any evidence of worsening pulmonary edema or cephalization  Plan:  · continue nebulizations as needed  · Continue IV Solu-Medrol 40 mg t i d  Day #7,   · Patient was recommended for BiPAP  · 0 5 mg Ativan p r n  B i d  to help patient tolerate BiPAP  · Last day of antibiotics for total of 7 day course  · Continue 40 mg of p o  Lasix  · Incentive spirometry  · Appreciate pulmonology recommendations  · Titrate oxygen to maintain SpO2 88%    Pneumonia  Assessment & Plan  · Patient had COVID 19 about a month ago and he is also immunocompromised from his esophageal cancer  · Inflammatory markers elevated, D-dimers trending down  · Legionella and strep pneumo urine antigens negative  · Procalcitonin trended down to normal  · Patient transition to cefdinir 300 mg q 12 last day today  · Infectious disease recommendations appreciated    Esophageal cancer   Assessment & Plan  · Outpatient follow-up with Oncology  · Patient stated that he last had radiation about a month ago  · He is not on any special diet    Dysphagia  Assessment & Plan  · Patient able to tolerate regular diet      Acute renal failure superimposed on stage 3 chronic kidney disease [facility])  Assessment & Plan  Lab Results   Component Value Date    EGFR 61 06/21/2021    EGFR 69 06/20/2021    EGFR 64 06/19/2021    CREATININE 1 18 06/21/2021    CREATININE 1 07 06/20/2021    CREATININE 1 14 06/19/2021     · Continue with 40 mg of p o  Lasix  · Patient's baseline creatinine between 1 14 and 1 2    PAD (peripheral artery disease)  Assessment & Plan  · Continue aspirin and statin    Carotid stenosis  Assessment & Plan  · Continue aspirin and statin    Essential hypertension  Assessment & Plan  · Patient is becoming hypotensive, likely secondary to hypoxia  · Continue amlodipine  · Holding metoprolol for now    GERD (gastroesophageal reflux disease)  Assessment & Plan  · Continue Protonix    Transaminitis-resolved as of 2021  Assessment & Plan  · Patient's transaminitis likely secondary to shock level in the setting of sepsis  · Patient's transaminitis improved today with resolution of ARISTIDES and sepsis            VTE Pharmacologic Prophylaxis:     Therapeutic anticoagulation    Mechanical VTE Prophylaxis in Place: Yes    Patient Centered Rounds: I have performed bedside rounds with nursing staff today  Discussions with Specialists or Other Care Team Provider:  Pulmonology    Education and Discussions with Family / Patient: Patient declined call to   Current Length of Stay: 6 day(s)    Current Patient Status: Inpatient     Discharge Plan / Estimated Discharge Date: Anticipate discharge in > 72 hrs to rehab facility  Code Status: Level 1 - Full Code      Subjective:   Patient continues to be optimistic, no shortness of breath subjectively  Patient states when he gets dyspneic while conversing, he will pause for his saturation to increase before continuing  Patient had episode of confusion overnight after multiple his mid flow off, patient improved once high-flow was it re-initiated  Objective:     Vitals:   Temp (24hrs), Av 9 °F (36 6 °C), Min:97 5 °F (36 4 °C), Max:98 4 °F (36 9 °C)    Temp:  [97 5 °F (36 4 °C)-98 4 °F (36 9 °C)] 97 5 °F (36 4 °C)  HR:  [] 85  Resp:  [18-22] 22  BP: (110-125)/(58-74) 125/74  SpO2:  [88 %-94 %] 93 %  Body mass index is 26 31 kg/m²       Input and Output Summary (last 24 hours): Intake/Output Summary (Last 24 hours) at 6/21/2021 1141  Last data filed at 6/21/2021 0601  Gross per 24 hour   Intake 120 ml   Output 1050 ml   Net -930 ml       Physical Exam:     Physical Exam  Vitals and nursing note reviewed  Constitutional:       Appearance: He is well-developed  HENT:      Head: Normocephalic and atraumatic  Eyes:      Conjunctiva/sclera: Conjunctivae normal    Cardiovascular:      Rate and Rhythm: Normal rate and regular rhythm  Heart sounds: No murmur heard  Pulmonary:      Effort: Pulmonary effort is normal  No respiratory distress  Breath sounds: Examination of the right-middle field reveals wheezing and rhonchi  Examination of the left-middle field reveals wheezing and rhonchi  Examination of the right-lower field reveals wheezing and rhonchi  Examination of the left-lower field reveals wheezing and rhonchi  Wheezing and rhonchi present  No rales  Abdominal:      Palpations: Abdomen is soft  Tenderness: There is no abdominal tenderness  Musculoskeletal:      Cervical back: Neck supple  Skin:     General: Skin is warm and dry  Capillary Refill: Capillary refill takes less than 2 seconds  Neurological:      General: No focal deficit present  Mental Status: He is alert            Additional Data:     Labs:  Results from last 7 days   Lab Units 06/21/21  0259 06/17/21  0448   WBC Thousand/uL 12 68* 17 53*   HEMOGLOBIN g/dL 13 0 12 6   HEMATOCRIT % 38 9 37 7   PLATELETS Thousands/uL 392* 435*   NEUTROS PCT %  --  93*   LYMPHS PCT %  --  3*   MONOS PCT %  --  3*   EOS PCT %  --  0     Results from last 7 days   Lab Units 06/21/21  0259 06/17/21  0448   SODIUM mmol/L 134* 136   POTASSIUM mmol/L 4 7 4 9   CHLORIDE mmol/L 100 103   CO2 mmol/L 25 24   BUN mg/dL 33* 28*   CREATININE mg/dL 1 18 1 28   ANION GAP mmol/L 9 9   CALCIUM mg/dL 8 6 8 6   ALBUMIN g/dL  --  2 4*   TOTAL BILIRUBIN mg/dL  --  0 38   ALK PHOS U/L  --  210*   ALT U/L  --  65   AST U/L  --  31   GLUCOSE RANDOM mg/dL 267* 180*     Results from last 7 days   Lab Units 06/15/21  0728   INR  1 03             Results from last 7 days   Lab Units 06/19/21  0533 06/18/21  1401 06/16/21  0651 06/15/21  0728   LACTIC ACID mmol/L  --   --   --  2 0   PROCALCITONIN ng/ml 0 13 0 21 0 30* 0 43*       Imaging: No pertinent imaging reviewed  Recent Cultures (last 7 days):     Results from last 7 days   Lab Units 06/15/21  1657 06/15/21  0746 06/15/21  0732   BLOOD CULTURE   --  No Growth After 5 Days  No Growth After 5 Days     LEGIONELLA URINARY ANTIGEN  Negative  --   --        Lines/Drains:  Invasive Devices     Central Venous Catheter Line            Port A Cath 08/28/17 Right Chest 1393 days          Peripheral Intravenous Line            Peripheral IV 06/17/21 Right;Upper Arm 3 days                Telemetry:        Last 24 Hours Medication List:   Current Facility-Administered Medications   Medication Dose Route Frequency Provider Last Rate    acetaminophen  650 mg Oral Q6H PRN Rosenda Conley MD      amLODIPine  10 mg Oral Daily Rosenda Conley MD      aspirin  81 mg Oral Daily Rosenda Conley MD      benzonatate  100 mg Oral TID PRN Blas Galloway PA-C      cefdinir  300 mg Oral Q12H [address] Kourtney Farrell PA-C      [START ON 6/22/2021] enoxaparin  40 mg Subcutaneous Daily Francisco J Rosenberg DO      furosemide  40 mg Oral Daily Vee Roper MD      guaiFENesin  1,200 mg Oral Q12H [address] Blaze Velázquez MD      levalbuterol  1 25 mg Nebulization TID Noelle Garcia MD      lidocaine   Topical Daily PRN Rosenda Conley MD      LORazepam  0 5 mg Oral BID PRN Vee Roper MD      melatonin  9 mg Oral HS Vee Roper MD      methylPREDNISolone sodium succinate  40 mg Intravenous Counts include [facility] at the [facility] Blas Galloway PA-C      nystatin   Topical TID Noelle Garcia MD      ondansetron  8 mg Intravenous Q6H PRN Rosenda Conley MD      pantoprazole  40 mg Oral Early Morning Rosenda Conley MD     [facility] sodium chloride  3 mL Nebulization TID Julio Cesar Choi MD      tiotropium  18 mcg Inhalation Daily Niels Runner, DO          Today, Patient Was Seen By: Yary Mckeon MD    ** Please Note: This note has been constructed using a voice recognition system   **

## 2021-06-21 NOTE — ASSESSMENT & PLAN NOTE
Lab Results   Component Value Date    EGFR 61 06/21/2021    EGFR 69 06/20/2021    EGFR 64 06/19/2021    CREATININE 1 18 06/21/2021    CREATININE 1 07 06/20/2021    CREATININE 1 14 06/19/2021     · Continue with 40 mg of p o   Lasix  · Patient's baseline creatinine between 1 14 and 1 2

## 2021-06-21 NOTE — PROGRESS NOTES
Progress Note - Infectious Disease   Senia Mcclellan 65 y o  male MRN: [id number]  Unit/Bed#: S -01 Encounter: [id number]      Impression/Plan:  1  Sepsis  POA   With tachycardia, tachypnea, leukocytosis  In patient presenting with shortness of breath and acute respiratory failure; suspect pulmonary source as below  Admission blood cultures are negative  MRSA nares negative  Blood pressure improved with volume management    -Finish Cefdinir as below  -monitor temperature and hemodynamics  -serial exam  -respiratory support  -monitor CBC and BMP      2  Acute hypoxic respiratory failure   In setting of shortness of breath, CT scan with ground glass opacities and consolidation at the bases with leukocytosis, elevated procalcitonin level   Consider bacterial/viral pneumonia status post COVID-19 PCR positive on May 11, 2021  Patient clinically stable on IV Lasix, steroid and antibiotics  WBC count elevation likely steroid effect  06/20/2021 portable chest x-ray with persistent peripheral and basilar ground-glass opacities  CRP trending down, Ferritin 500s  -Finish cefdinir 300 mg PO q 12 hours to complete 7 day total antibiotic course through today, 6/21/21  -monitor temperature and hemodynamics  -serial exam  -ongoing pulmonary follow-up and management  -IV steroids on board for pulmonary  -respiratory support with High flow O2 at present  -monitor CBC and BMP   -monitor clinical response     3  Dysphasia in setting of esophageal cancer   No aspiration events on bedside swallow evaluation  -continue antibiotics as above   -advanced diet as per speech therapy recommendation     4  Renal Insufficiency/CKD III   POA  Creatinine 1 44 > 1 18   Likely prerenal in setting of #1    -renal dose adjust antibiotic as needed  -monitor BMP     5  Esophageal cancer   status post radiation therapy through April 2021 and now on Herceptin infusion monthly   Immunocompromised patient  -symptomatic management per primary care team  -advancing diet as per speech therapy recommendation     Antibiotics:  Cefdinir - abx D7     Above impression and plan discussed in detail with patient, RN, and primary care team      Subjective:  Patient reports feeling ok  He has no fever, chills, sweats overnight; no nausea, vomiting, diarrhea; patient always eats slow precautionarily since esophageal CA but denies choking or mitchell aspiration of food or drink, no increased cough,   shortness of breath better on High Flow NC; no pain complaints    Patient appears to be tolerating antibiotics    Objective:  Vitals:  Temp:  [97 5 °F (36 4 °C)-98 4 °F (36 9 °C)] 97 8 °F (36 6 °C)  HR:  [] 88  Resp:  [18-22] 20  BP: (110-125)/(58-74) 116/59  SpO2:  [88 %-94 %] 93 %  Temp (24hrs), Av 9 °F (36 6 °C), Min:97 5 °F (36 4 °C), Max:98 4 °F (36 9 °C)  Current: Temperature: 97 8 °F (36 6 °C)    Physical Exam:   General Appearance:  Alert, interactive, nontoxic, no positional dyspnea on high-flow oxygen statting 93%   Throat: Oropharynx moist without lesions  Lungs:   Fairly clear to auscultation bilaterally; with some crepitation left lateral lung field; respirations unlabored with conversation   Heart:  RRR; no murmur   Abdomen:   Soft, non-tender, non-distended, positive bowel sounds  Extremities: No clubbing, cyanosis or edema   : No fritz, no SPT   Skin: No new rashes or lesions  IV site nontender  No draining wounds noted   Right chest port not accessed, site nontender       Labs, Imaging, & Other studies:   All pertinent labs and imaging studies were personally reviewed  Results from last 7 days   Lab Units 21  0259 21  0533 21  1401   WBC Thousand/uL 12 68* 12 03* 14 96*   HEMOGLOBIN g/dL 13 0 12 8 12 3   PLATELETS Thousands/uL 392* 394* 431*     Results from last 7 days   Lab Units 21  0259 21  0440 21  0533 21  0448 21  0506 06/15/21  0728   SODIUM mmol/L 134* 136 136 136 135* 136   POTASSIUM mmol/L 4 7 4 0 5 1 4 9 4 7 4 5   CHLORIDE mmol/L 100 100 101 103 102 100   CO2 mmol/L 25 22 25 24 21 23   BUN mg/dL 33* 32* 29* 28* 23 21   CREATININE mg/dL 1 18 1 07 1 14 1 28 1 28 1 44*   EGFR ml/min/1 73sq m 61 69 64 56 56 48   CALCIUM mg/dL 8 6 8 5 8 7 8 6 8 4 9 2   AST U/L  --   --   --  31 30 69*   ALT U/L  --   --   --  65 64 85*   ALK PHOS U/L  --   --   --  210* 205* 283*     Results from last 7 days   Lab Units 06/15/21  1657 06/15/21  0746 06/15/21  0732   BLOOD CULTURE   --  No Growth After 5 Days  No Growth After 5 Days     MRSA CULTURE ONLY  No Methicillin Resistant Staphlyococcus aureus (MRSA) isolated  --   --    LEGIONELLA URINARY ANTIGEN  Negative  --   --      Results from last 7 days   Lab Units 06/19/21  0533 06/18/21  1401 06/16/21  0651 06/15/21  0728   PROCALCITONIN ng/ml 0 13 0 21 0 30* 0 43*     Results from last 7 days   Lab Units 06/20/21  0440 06/19/21  0533 06/18/21  0741 06/15/21  1657   CRP mg/L 18 5* 29 7* 52 3* 242 0*     Results from last 7 days   Lab Units 06/20/21  0440 06/19/21  0533 06/18/21  0741 06/15/21  1657   FERRITIN ng/mL 579* 558* 596* 468*     Results from last 7 days   Lab Units 06/20/21  0440 06/19/21  0533 06/18/21  0741 06/17/21  0448 06/15/21  0923   D-DIMER QUANTITATIVE ug/ml FEU 1 56* 1 56* 1 79* 2 41* 5 87*   06/20/2021 portable chest x-ray with persistent peripheral and basilar ground-glass opacities

## 2021-06-21 NOTE — PLAN OF CARE
Problem: MOBILITY - ADULT  Goal: Maintain or return to baseline ADL function  Description: INTERVENTIONS:  -  Assess patient's ability to carry out ADLs; assess patient's baseline for ADL function and identify physical deficits which impact ability to perform ADLs (bathing, care of mouth/teeth, toileting, grooming, dressing, etc )  - Assess/evaluate cause of self-care deficits   - Assess range of motion  - Assess patient's mobility; develop plan if impaired  - Assess patient's need for assistive devices and provide as appropriate  - Encourage maximum independence but intervene and supervise when necessary  - Involve family in performance of ADLs  - Assess for home care needs following discharge   - Consider OT consult to assist with ADL evaluation and planning for discharge  - Provide patient education as appropriate  Outcome: Progressing     Problem: Prexisting or High Potential for Compromised Skin Integrity  Goal: Skin integrity is maintained or improved  Description: INTERVENTIONS:  - Identify patients at risk for skin breakdown  - Assess and monitor skin integrity  - Assess and monitor nutrition and hydration status  - Monitor labs   - Assess for incontinence   - Turn and reposition patient  - Assist with mobility/ambulation  - Relieve pressure over bony prominences  - Avoid friction and shearing  - Provide appropriate hygiene as needed including keeping skin clean and dry  - Evaluate need for skin moisturizer/barrier cream  - Collaborate with interdisciplinary team   - Patient/family teaching  - Consider wound care consult   Outcome: Progressing     Problem: Potential for Falls  Goal: Patient will remain free of falls  Description: INTERVENTIONS:  - Educate patient/family on patient safety including physical limitations  - Instruct patient to call for assistance with activity   - Consult OT/PT to assist with strengthening/mobility   - Keep Call bell within reach  - Keep bed low and locked with side rails adjusted as appropriate  - Keep care items and personal belongings within reach  - Initiate and maintain comfort rounds  - Make Fall Risk Sign visible to staff  - Offer Toileting every 2 Hours, in advance of need  - Initiate/Maintain bed alarm  - Obtain necessary fall risk management equipment: bed   - Apply yellow socks and bracelet for high fall risk patients  - Consider moving patient to room near nurses station  Outcome: Progressing     Problem: RESPIRATORY - ADULT  Goal: Achieves optimal ventilation and oxygenation  Description: INTERVENTIONS:  - Assess for changes in respiratory status  - Assess for changes in mentation and behavior  - Position to facilitate oxygenation and minimize respiratory effort  - Oxygen administered by appropriate delivery if ordered  - Initiate smoking cessation education as indicated  - Encourage broncho-pulmonary hygiene including cough, deep breathe, Incentive Spirometry  - Assess the need for suctioning and aspirate as needed  - Assess and instruct to report SOB or any respiratory difficulty  - Respiratory Therapy support as indicated  Outcome: Progressing     Problem: INFECTION - ADULT  Goal: Absence or prevention of progression during hospitalization  Description: INTERVENTIONS:  - Assess and monitor for signs and symptoms of infection  - Monitor lab/diagnostic results  - Monitor all insertion sites, i e  indwelling lines, tubes, and drains  - Monitor endotracheal if appropriate and nasal secretions for changes in amount and color  - [place] appropriate cooling/warming therapies per order  - Administer medications as ordered  - Instruct and encourage patient and family to use good hand hygiene technique  - Identify and instruct in appropriate isolation precautions for identified infection/condition  Outcome: Progressing  Goal: Absence of fever/infection during neutropenic period  Description: INTERVENTIONS:  - Monitor WBC    Outcome: Progressing

## 2021-06-21 NOTE — ASSESSMENT & PLAN NOTE
· Patient is becoming hypotensive, likely secondary to hypoxia  · Continue amlodipine  · Holding metoprolol for now 20

## 2021-06-21 NOTE — ASSESSMENT & PLAN NOTE
· Patient had COVID 19 about a month ago and he is also immunocompromised from his esophageal cancer  · Inflammatory markers elevated, D-dimers trending down  · Legionella and strep pneumo urine antigens negative  · Procalcitonin trended down to normal  · Patient transition to cefdinir 300 mg q 12 last day today  · Infectious disease recommendations appreciated

## 2021-06-21 NOTE — PROGRESS NOTES
[facility]  Progress Note Gonzalez Jara 1950, 65 y o  male MRN: [id number]  Unit/Bed#: S -01 Encounter: [id number]  Primary Care Provider: Jorgito Ibrahim DO   Date and time admitted to hospital: 6/15/2021  7:06 AM    * Acute respiratory failure with hypoxia [facility])  Assessment & Plan  Symptoms: shortness of breath at rest and wheezing    Lab Results   Component Value Date    SARSCOV2 Negative 06/15/2021    SARSCOV2 Positive (A) 05/11/2021     · Imaging:  XR chest 1 view portable - Result Date: 6/15/2021  Impression: Moderate bilateral pulmonary infiltrates which are nonspecific and may represent pneumonia or edema  · CTA ED chest PE Study - Result Date: 6/15/2021  Impression: No pulmonary embolism  Diffuse groundglass opacities in the lungs  Differential considerations include bacterial and viral pneumonia (including COVID 19), and vascular congestion  Minimal left pleural effusion      Recent Labs     06/18/21  1401 06/19/21  0533 06/21/21  0259   WBC 14 96* 12 03* 12 68*     Recent Labs     06/19/21  0533 06/20/21  0440   FERRITIN 558* 579*   CRP 29 7* 18 5*   DDIMER 1 56* 1 56*     · On no supplemental oxygen at baseline, status post COVID infection 5/12/21  · Increased from mid flow to HFNC, back to mid flow, and now back on HFNC due to hypoxemic event overnight after removing BiPap  · Started on IV Solu-Medrol and duo nebs  · Previously on trastuzumab for treatment of esophageal cancer  · Differential includes pneumonitis per Pneumotox versus post COVID syndrome  · Inflammatory markers decreasing  · Blood clx, legionella, strep Negative    Plan:  · Labs:  ? CBC tomorrow AM  · Antibiotics:  ? Cefdinir #7 - final day today per ID  · Supportive care:  ? Incentive spirometry  ? Guaifenesin 1200 mg q 12 hours  ? Change Albuterol Nebs 2 5 mg q6 h PRN To scheduled  ? Continue high-flow nasal cannula 40 L at 70% FiO2  ?  Continue Solumedrol 40 mg q8h scheduled, wean tomorrow? ? Tessalon Perles    Esophageal cancer   Assessment & Plan  History of some vaginal cancer since 2017 status post radiation and 12 cycle of FOLFOX -6 and continues to remain on Herceptin (trastuzumab)    Plan:  · Recommend changing from trastuzumab to alternate therapy  · Recommend outpatient follow up with Heme-Onc    Chief Complaint:    Continues to complain of shortness breath and wheezing but states that has improved    Subjective: Today, patient states shortness of breath is improved today compared to yesterday  Overnight, per nursing report, patient removed BiPAP earlier in the morning around 02:00 which point he was noted to be hypoxemic  Subsequently requiring placement on high-flow nasal cannula which he could tolerate  Patient stated that he removed the BiPAP due to increasing anxiety and sensation of nausea  Today, patient is currently on high-flow nasal cannula resting comfortably without any major complaints other than mild wheezes  Objective:    Vitals: Blood pressure 125/74, pulse 85, temperature 97 5 °F (36 4 °C), temperature source Axillary, resp  rate 22, height 5' 7" (1 702 m), weight 76 2 kg (167 lb 15 9 oz), SpO2 93 %  ,Body mass index is 26 31 kg/m²  Intake/Output Summary (Last 24 hours) at 6/21/2021 0941  Last data filed at 6/21/2021 0601  Gross per 24 hour   Intake 120 ml   Output 1050 ml   Net -930 ml       Invasive Devices     Central Venous Catheter Line            Port A Cath 08/28/17 Right Chest 1393 days          Peripheral Intravenous Line            Peripheral IV 06/17/21 Right;Upper Arm 3 days                Physical Exam:    Physical Exam  Vitals reviewed  Constitutional:       Appearance: Normal appearance  Interventions: Nasal cannula in place  Comments: HFNC 45L   HENT:      Head: Normocephalic and atraumatic        Right Ear: Tympanic membrane normal       Left Ear: Tympanic membrane normal       Nose: Nose normal       Mouth/Throat: Mouth: Mucous membranes are dry  Eyes:      Extraocular Movements: Extraocular movements intact  Pupils: Pupils are equal, round, and reactive to light  Cardiovascular:      Rate and Rhythm: Normal rate and regular rhythm  Pulses: Normal pulses  Heart sounds: No murmur heard  No gallop  Pulmonary:      Effort: Pulmonary effort is normal  No respiratory distress  Breath sounds: No stridor  Examination of the right-middle field reveals wheezing  Examination of the left-middle field reveals wheezing  Wheezing (Mild) present  No rhonchi or rales  Chest:      Chest wall: No tenderness  Abdominal:      General: Abdomen is flat  Bowel sounds are normal  There is no distension  Palpations: Abdomen is soft  Tenderness: There is no abdominal tenderness  Musculoskeletal:         General: No swelling  Right lower leg: No edema  Left lower leg: No edema  Skin:     General: Skin is warm and dry  Neurological:      General: No focal deficit present  Mental Status: He is alert and oriented to person, place, and time  Cranial Nerves: No cranial nerve deficit  Motor: No weakness  Psychiatric:         Mood and Affect: Mood normal          Behavior: Behavior normal  Behavior is cooperative  Thought Content: Thought content normal          Judgment: Judgment normal          Labs: I have personally reviewed pertinent lab results  Imaging and other studies: I have personally reviewed pertinent reports     and I have personally reviewed pertinent films in PACS

## 2021-06-22 PROBLEM — E44.0 MODERATE PROTEIN-CALORIE MALNUTRITION (HCC): Status: ACTIVE | Noted: 2021-01-01

## 2021-06-22 NOTE — ASSESSMENT & PLAN NOTE
Lab Results   Component Value Date    EGFR 79 06/22/2021    EGFR 61 06/21/2021    EGFR 69 06/20/2021    CREATININE 0 96 06/22/2021    CREATININE 1 18 06/21/2021    CREATININE 1 07 06/20/2021     · Continue with 40 mg of p o   Lasix  · Patient's baseline creatinine between 1 14 and 1 2

## 2021-06-22 NOTE — ASSESSMENT & PLAN NOTE
Symptoms: shortness of breath at rest and wheezing    Lab Results   Component Value Date    SARSCOV2 Negative 06/15/2021    SARSCOV2 Positive (A) 05/11/2021     · Imaging:  XR chest 1 view portable - Result Date: 6/15/2021  Impression: Moderate bilateral pulmonary infiltrates which are nonspecific and may represent pneumonia or edema  · CTA ED chest PE Study - Result Date: 6/15/2021  Impression: No pulmonary embolism  Diffuse groundglass opacities in the lungs  Differential considerations include bacterial and viral pneumonia (including COVID 19), and vascular congestion  Minimal left pleural effusion      Recent Labs     06/21/21  0259 06/22/21  0607   WBC 12 68* 13 43*     Recent Labs     06/20/21  0440   FERRITIN 579*   CRP 18 5*   DDIMER 1 56*     · On no supplemental oxygen at baseline, status post COVID infection 5/12/21  · Currently on high-flow nasal cannula 40 L at 60% FiO2 and BiPAP at night  · Patient had difficulty with BiPAP the night prior after removing the BiPAP from his face within minutes  · Patient was able to trial BiPAP overnight from 2:00 a m  To 05:00  · Started on IV Solu-Medrol and duo nebs  · Previously on trastuzumab for treatment of esophageal cancer  · Differential includes trastuzumab pneumonitis vs radiation pneumonitis (most recent radiation in April 2021) vs post COVID syndrome vs aspiration pneumonia  · Blood clx, legionella, strep Negative    Plan:  · Labs:  ? CBC tomorrow AM  · Completed antibiotics regimen 6/21  · Supportive care:  ? Incentive spirometry  ? Guaifenesin 1200 mg q 12 hours  ? Xopenex t i d  Nebs  ? Continue high-flow nasal cannula 40 L at 70% FiO2  ? Continue Solumedrol 40 mg q8h scheduled  ?  Tessalon Perles  · Concern for silent aspiration-order VBS after discussing with speech therapy

## 2021-06-22 NOTE — PROGRESS NOTES
[facility]  Progress Note Kirk Land 1950, 65 y o  male MRN: [id number]  Unit/Bed#: S -01 Encounter: [id number]  Primary Care Provider: Gia Braxton DO   Date and time admitted to hospital: 6/15/2021  7:06 AM    * Acute respiratory failure with hypoxia [facility])  Assessment & Plan  Symptoms: shortness of breath at rest and wheezing    Lab Results   Component Value Date    SARSCOV2 Negative 06/15/2021    SARSCOV2 Positive (A) 05/11/2021     · Imaging:  XR chest 1 view portable - Result Date: 6/15/2021  Impression: Moderate bilateral pulmonary infiltrates which are nonspecific and may represent pneumonia or edema  · CTA ED chest PE Study - Result Date: 6/15/2021  Impression: No pulmonary embolism  Diffuse groundglass opacities in the lungs  Differential considerations include bacterial and viral pneumonia (including COVID 19), and vascular congestion  Minimal left pleural effusion      Recent Labs     06/21/21  0259 06/22/21  0607   WBC 12 68* 13 43*     Recent Labs     06/20/21  0440   FERRITIN 579*   CRP 18 5*   DDIMER 1 56*     · On no supplemental oxygen at baseline, status post COVID infection 5/12/21  · Currently on high-flow nasal cannula 40 L at 60% FiO2 and BiPAP at night  · Patient had difficulty with BiPAP the night prior after removing the BiPAP from his face within minutes  · Patient was able to trial BiPAP overnight from 2:00 a m  To 05:00  · Started on IV Solu-Medrol and duo nebs  · Previously on trastuzumab for treatment of esophageal cancer  · Differential includes trastuzumab pneumonitis vs radiation pneumonitis (most recent radiation in April 2021) vs post COVID syndrome vs aspiration pneumonia  · Blood clx, legionella, strep Negative    Plan:  · Labs:  ? CBC tomorrow AM  · Completed antibiotics regimen 6/21  · Supportive care:  ? Incentive spirometry  ? Guaifenesin 1200 mg q 12 hours  ? Xopenex t i d  Nebs  ?  Continue high-flow nasal cannula 40 L at 70% FiO2  ? Continue Solumedrol 40 mg q8h scheduled  ? Tessalon Perles  · Concern for silent aspiration-order VBS after discussing with speech therapy    Esophageal cancer   Assessment & Plan  History of some vaginal cancer since 2017 status post radiation and 12 cycle of FOLFOX -6 and continues to remain on Herceptin (trastuzumab)    Radiation versus came may be also contributing factor to patient's presentation    Plan:  · Recommend changing from trastuzumab to alternate therapy  · Recommend outpatient follow up with Heme-Onc      Chief Complaint:    Shortness of breath    Subjective: Today, patient states he feels slightly improved from yesterday  Has been receiving Xopenex t i d  Treatments per respiratory, previously received 2 treatments yesterday and 1 this morning  Does not complain of wheezing but continues to have mild productive cough  Objective:    Vitals: Blood pressure 110/64, pulse 82, temperature 98 4 °F (36 9 °C), temperature source Oral, resp  rate 18, height 5' 7" (1 702 m), weight 76 2 kg (167 lb 15 9 oz), SpO2 90 %  ,Body mass index is 26 31 kg/m²  Intake/Output Summary (Last 24 hours) at 6/22/2021 1019  Last data filed at 6/22/2021 0735  Gross per 24 hour   Intake 120 ml   Output 1150 ml   Net -1030 ml       Invasive Devices     Central Venous Catheter Line            Port A Cath 08/28/17 Right Chest 1394 days          Peripheral Intravenous Line            Long-Dwell Peripheral IV (Midline) 76/73/97 Left Cephalic <1 day                Physical Exam:    Physical Exam  Vitals and nursing note reviewed  Constitutional:       Appearance: Normal appearance  Interventions: Nasal cannula in place  Comments: HFNC 40L   HENT:      Head: Normocephalic and atraumatic  Right Ear: Tympanic membrane normal       Left Ear: Tympanic membrane normal       Nose: Nose normal       Mouth/Throat:      Mouth: Mucous membranes are dry     Eyes:      Extraocular Movements: Extraocular movements intact  Pupils: Pupils are equal, round, and reactive to light  Cardiovascular:      Rate and Rhythm: Normal rate and regular rhythm  Pulses: Normal pulses  Heart sounds: No murmur heard  No gallop  Pulmonary:      Effort: Pulmonary effort is normal  No respiratory distress  Breath sounds: No stridor  Examination of the right-middle field reveals wheezing  Examination of the left-middle field reveals wheezing  Wheezing (Mild) present  No rhonchi or rales  Chest:      Chest wall: No tenderness  Abdominal:      General: Abdomen is flat  Bowel sounds are normal  There is no distension  Palpations: Abdomen is soft  Tenderness: There is no abdominal tenderness  Musculoskeletal:         General: No swelling  Right lower leg: No edema  Left lower leg: No edema  Skin:     General: Skin is warm and dry  Neurological:      General: No focal deficit present  Mental Status: He is alert and oriented to person, place, and time  Cranial Nerves: No cranial nerve deficit  Motor: No weakness  Psychiatric:         Mood and Affect: Mood normal          Behavior: Behavior normal  Behavior is cooperative  Thought Content: Thought content normal          Judgment: Judgment normal          Labs: I have personally reviewed pertinent lab results  Imaging and other studies: I have personally reviewed pertinent reports     and I have personally reviewed pertinent films in PACS

## 2021-06-22 NOTE — ASSESSMENT & PLAN NOTE
· Patient had COVID 19 about a month ago and he is also immunocompromised from his esophageal cancer  · Patient's inflammatory markers trending down  · Treatment with antibiotics completed as of 06/21/2021

## 2021-06-22 NOTE — ASSESSMENT & PLAN NOTE
POA   Patient is not on any home oxygen  Patient was on med for yesterday, is now requiring high-flow oxygen  Patient desaturated last night, an x-ray was obtained  On my read patient's x-ray does not show any evidence of worsening pulmonary edema or cephalization  Plan:  · continue nebulizations as needed  · Continue IV Solu-Medrol 40 mg t i d  Day #8,   · Patient was recommended for BiPAP  · 0 5 mg Ativan p r n  B i d  to help patient tolerate BiPAP  · Total course of antibiotics completed  · Continue 40 mg of p o   Lasix  · Will Continue Spiriva  · Incentive spirometry  · Appreciate pulmonology recommendations  · Titrate oxygen to maintain SpO2 88%

## 2021-06-22 NOTE — MALNUTRITION/BMI
This medical record reflects one or more clinical indicators suggestive of malnutrition and/or morbid obesity  Malnutrition Findings:   Adult Malnutrition type: Acute illness (in the setting of chronic illness)  Adult Degree of Malnutrition: Malnutrition of moderate degree (related to catabolic illness, respiratory distress)  Malnutrition Characteristics: Weight loss, Fat loss (as evidenced by loss of subcutaneous fat, 15 3% weight decrease x ~4 months  Currently treated with oral supplementation )    BMI Findings: Body mass index is 26 31 kg/m²  See Nutrition note dated 6/22/2021 for additional details  Completed nutrition assessment is viewable in the nutrition documentation

## 2021-06-22 NOTE — PLAN OF CARE
Problem: MOBILITY - ADULT  Goal: Maintain or return to baseline ADL function  Description: INTERVENTIONS:  -  Assess patient's ability to carry out ADLs; assess patient's baseline for ADL function and identify physical deficits which impact ability to perform ADLs (bathing, care of mouth/teeth, toileting, grooming, dressing, etc )  - Assess/evaluate cause of self-care deficits   - Assess range of motion  - Assess patient's mobility; develop plan if impaired  - Assess patient's need for assistive devices and provide as appropriate  - Encourage maximum independence but intervene and supervise when necessary  - Involve family in performance of ADLs  - Assess for home care needs following discharge   - Consider OT consult to assist with ADL evaluation and planning for discharge  - Provide patient education as appropriate  Outcome: Progressing    Problem: Prexisting or High Potential for Compromised Skin Integrity  Goal: Skin integrity is maintained or improved  Description: INTERVENTIONS:  - Identify patients at risk for skin breakdown  - Assess and monitor skin integrity  - Assess and monitor nutrition and hydration status  - Monitor labs   - Assess for incontinence   - Turn and reposition patient  - Assist with mobility/ambulation  - Relieve pressure over bony prominences  - Avoid friction and shearing  - Provide appropriate hygiene as needed including keeping skin clean and dry  - Evaluate need for skin moisturizer/barrier cream  - Collaborate with interdisciplinary team   - Patient/family teaching  - Consider wound care consult   Outcome: Progressing        Problem: RESPIRATORY - ADULT  Goal: Achieves optimal ventilation and oxygenation  Description: INTERVENTIONS:  - Assess for changes in respiratory status  - Assess for changes in mentation and behavior  - Position to facilitate oxygenation and minimize respiratory effort  - Oxygen administered by appropriate delivery if ordered  - Initiate smoking cessation education as indicated  - Encourage broncho-pulmonary hygiene including cough, deep breathe, Incentive Spirometry  - Assess the need for suctioning and aspirate as needed  - Assess and instruct to report SOB or any respiratory difficulty  - Respiratory Therapy support as indicated  Outcome: Progressing     Problem: INFECTION - ADULT  Goal: Absence or prevention of progression during hospitalization  Description: INTERVENTIONS:  - Assess and monitor for signs and symptoms of infection  - Monitor lab/diagnostic results  - Monitor all insertion sites, i e  indwelling lines, tubes, and drains  - Monitor endotracheal if appropriate and nasal secretions for changes in amount and color  - [place] appropriate cooling/warming therapies per order  - Administer medications as ordered  - Instruct and encourage patient and family to use good hand hygiene technique  - Identify and instruct in appropriate isolation precautions for identified infection/condition  Outcome: Progressing  Goal: Absence of fever/infection during neutropenic period  Description: INTERVENTIONS:  - Monitor WBC    Outcome: Progressing

## 2021-06-22 NOTE — PROCEDURES
fp                                   Video Swallow Study      Patient Name: Roberto Patel  WCGOA'O Date: 6/22/2021        Past Medical History  Past Medical History:   Diagnosis Date    Anxiety     Cancer [facility])     Esophageal    Dysphagia     Esophageal abnormality     Esophageal cancer (HCC)     GERD (gastroesophageal reflux disease)     Hyperlipidemia     Hypertension     Occasional tremors     Transaminitis 6/16/2021        Past Surgical History  Past Surgical History:   Procedure Laterality Date    ESOPHAGOGASTRODUODENOSCOPY N/A 8/9/2017    Procedure: ESOPHAGOGASTRODUODENOSCOPY (EGD); Surgeon: Rik Churchill MD;  [facility]; Service: Gastroenterology    ESOPHAGOGASTRODUODENOSCOPY N/A 8/11/2017    Procedure: ESOPHAGOGASTRODUODENOSCOPY (EGD) w/ stent;  Surgeon: Rik Churchill MD;  [facility]; Service: Gastroenterology    ESOPHAGOGASTRODUODENOSCOPY N/A 1/26/2021    Procedure: ESOPHAGOGASTRODUODENOSCOPY (EGD), BIOPSY, ESOPHAGEAL DILATION,  STENT PLACEMENT;  Surgeon: Ankit Singh MD;  [facility];  Service: Thoracic    LAPAROTOMY N/A 2/11/2020    Procedure: EGD; REMOVAL OF ESOPHAGEAL STENTS X 3;  Surgeon: Ankit Singh MD;  [facility];  Service: Thoracic    PORTACATH PLACEMENT      PORTACATH PLACEMENT      IA ESOPHAGOGASTRODUODENOSCOPY TRANSORAL DIAGNOSTIC N/A 4/26/2021    Procedure: ESOPHAGOGASTRODUODENOSCOPY (EGD); stent removal;  Surgeon: Ankit Singh MD;  [facility];  Service: Thoracic    UPPER GASTROINTESTINAL ENDOSCOPY      VASCULAR SURGERY  2008    blockage in neck          General Information:    66 yo gentleman referred  for a VBS by Dr Kori Hernandez for dysphagia; assess for aspiration  Patient was admitted to [facility] due to progressive shortness of breath x1 week  Shortness of breath worsens with minimal exertion    Patient with known history of esophageal cancer status post radiation treatments  Patient was seen for a clinical bedside swallow eval on 06/16 which showed swallowing to be within normal limits  Cognition:  WNL    Speech/Swallow Mech: Oral motor movements appeared  WNL; Dentition was full dentures ; Respiratory Status: 15L midflow, O2 sats 88% to 92%;   Current diet: regular w/ thin liquids  Prior VBS none  Pt was seen in radiology for a Video Barium Swallow Study, seated in the upright position and viewed laterally with the following consistencies: puree, soft/solid, hard solid, HTL, NTL, thin liquids, half barium pill w/ water by straw  Results are as follows:     **Images are available for review on PACS          Oral Stage:  Mildly impaired    patient with adequate bolus retrieval, able to bite a cookie drink from cup and straw  Patient stated he prefers to drink from a straw  Mastication was effective, bolus formation/manipulation was somewhat prolonged and disorganized  Mild oral residue was noted  Patient with good oral control and transfer of liquids  Patient needed rest breaks in between bites of food, sips of liquids  Pharyngeal Stage:  Within normal limits    swallow initiation was prompt with complete epiglottic inversion and airway closure  No pharyngeal retention was noted as well as no penetration or aspiration  Esophageal Stage:    briefly assessed; no overt abnormality noted  Assessment Summary:   Mild Oral dysphagia due to decreased oral processing of soft and hard solids; pharyngeal stage  appeared within normal limits  No pharyngeal retention, laryngeal penetration, or aspiration observed    Diagnosis/Prognosis:            Recommendations:    continue regular diet with thin liquids  Patient to eat slowly, taking rest breaks as needed  Meds as tolerated  No follow-up therapy needed      April Wilver Garcia MA CCC-SLP  Speech Pathologist  [place] [facility] [id number]  [place] license # [id number]  Available via [employer]

## 2021-06-22 NOTE — ASSESSMENT & PLAN NOTE
· Patient had video barium swallow test today, which did not show significant risk of aspiration  · Can not continue regular diet

## 2021-06-22 NOTE — PLAN OF CARE
Pt agreeable to BiPAP this shift  Pt slept intermittently  SOB improved on BiPAP per pt  Pt with reddened buttock/sacrum  Pressure ulcer teaching and need for frequent shifts in weight/repositioning discussed  Pt verbalized understanding  Pt capable of self-positioning and states he will do same  Problem: MOBILITY - ADULT  Goal: Maintain or return to baseline ADL function  Description: INTERVENTIONS:  -  Assess patient's ability to carry out ADLs; assess patient's baseline for ADL function and identify physical deficits which impact ability to perform ADLs (bathing, care of mouth/teeth, toileting, grooming, dressing, etc )  - Assess/evaluate cause of self-care deficits   - Assess range of motion  - Assess patient's mobility; develop plan if impaired  - Assess patient's need for assistive devices and provide as appropriate  - Encourage maximum independence but intervene and supervise when necessary  - Involve family in performance of ADLs  - Assess for home care needs following discharge   - Consider OT consult to assist with ADL evaluation and planning for discharge  - Provide patient education as appropriate  6/22/2021 7259 by Keturah Beckford RN  Outcome: Progressing  6/22/2021 9389 by Keturah Beckford RN  Outcome: Progressing  Goal: Maintains/Returns to pre admission functional level  Description: INTERVENTIONS:  - Perform BMAT or MOVE assessment daily    - Set and communicate daily mobility goal to care team and patient/family/caregiver  - Collaborate with rehabilitation services on mobility goals if consulted  - Perform Range of Motion  times a day  - Reposition patient every  hours    - Dangle patient  times a day  - Stand patient  times a day  - Ambulate patient  times a day  - Out of bed to chair  times a day   - Out of bed for meals  times a day  - Out of bed for toileting  - Record patient progress and toleration of activity level   6/22/2021 8902 by Keturah Beckford, RN  Outcome: Progressing  6/22/2021 7010 by Alex Moore RN  Outcome: Progressing     Problem: Prexisting or High Potential for Compromised Skin Integrity  Goal: Skin integrity is maintained or improved  Description: INTERVENTIONS:  - Identify patients at risk for skin breakdown  - Assess and monitor skin integrity  - Assess and monitor nutrition and hydration status  - Monitor labs   - Assess for incontinence   - Turn and reposition patient  - Assist with mobility/ambulation  - Relieve pressure over bony prominences  - Avoid friction and shearing  - Provide appropriate hygiene as needed including keeping skin clean and dry  - Evaluate need for skin moisturizer/barrier cream  - Collaborate with interdisciplinary team   - Patient/family teaching  - Consider wound care consult   6/22/2021 0461 by Alex Moore RN  Outcome: Progressing  6/22/2021 5895 by Alex Moore RN  Outcome: Progressing     Problem: Potential for Falls  Goal: Patient will remain free of falls  Description: INTERVENTIONS:  - Educate patient/family on patient safety including physical limitations  - Instruct patient to call for assistance with activity   - Consult OT/PT to assist with strengthening/mobility   - Keep Call bell within reach  - Keep bed low and locked with side rails adjusted as appropriate  - Keep care items and personal belongings within reach  - Initiate and maintain comfort rounds  - Make Fall Risk Sign visible to staff  - Offer Toileting every  Hours, in advance of need  - Initiate/Maintain alarm  - Obtain necessary fall risk management equipment:   - Apply yellow socks and bracelet for high fall risk patients  - Consider moving patient to room near nurses station  6/22/2021 9100 by Alex Moore RN  Outcome: Progressing  6/22/2021 4001 by Alex Moore RN  Outcome: Progressing     Problem: RESPIRATORY - ADULT  Goal: Achieves optimal ventilation and oxygenation  Description: INTERVENTIONS:  - Assess for changes in respiratory status  - Assess for changes in mentation and behavior  - Position to facilitate oxygenation and minimize respiratory effort  - Oxygen administered by appropriate delivery if ordered  - Initiate smoking cessation education as indicated  - Encourage broncho-pulmonary hygiene including cough, deep breathe, Incentive Spirometry  - Assess the need for suctioning and aspirate as needed  - Assess and instruct to report SOB or any respiratory difficulty  - Respiratory Therapy support as indicated  6/22/2021 5838 by Cheli Villafana RN  Outcome: Progressing  6/22/2021 4666 by Cheli Villafana RN  Outcome: Progressing     Problem: INFECTION - ADULT  Goal: Absence or prevention of progression during hospitalization  Description: INTERVENTIONS:  - Assess and monitor for signs and symptoms of infection  - Monitor lab/diagnostic results  - Monitor all insertion sites, i e  indwelling lines, tubes, and drains  - Monitor endotracheal if appropriate and nasal secretions for changes in amount and color  - [place] appropriate cooling/warming therapies per order  - Administer medications as ordered  - Instruct and encourage patient and family to use good hand hygiene technique  - Identify and instruct in appropriate isolation precautions for identified infection/condition  6/22/2021 8147 by Cheli Villafana RN  Outcome: Progressing  6/22/2021 2064 by Cheli Villafana RN  Outcome: Progressing  Goal: Absence of fever/infection during neutropenic period  Description: INTERVENTIONS:  - Monitor WBC    6/22/2021 5525 by Chlei Villafana RN  Outcome: Progressing  6/22/2021 9443 by Cheli Villafana RN  Outcome: Progressing

## 2021-06-22 NOTE — ASSESSMENT & PLAN NOTE
History of some vaginal cancer since 2017 status post radiation and 12 cycle of FOLFOX -6 and continues to remain on Herceptin (trastuzumab)    Radiation versus came may be also contributing factor to patient's presentation    Plan:  · Recommend changing from trastuzumab to alternate therapy  · Recommend outpatient follow up with Heme-Onc

## 2021-06-22 NOTE — PROGRESS NOTES
Progress Note - Infectious Disease   Dewey Barone 65 y o  male MRN: [id number]  Unit/Bed#: S -01 Encounter: [id number]      Impression/Plan:  1  Sepsis  POA   With tachycardia, tachypnea, leukocytosis  In patient presenting with shortness of breath and acute respiratory failure; suspect pulmonary source as below  Admission blood cultures are negative  MRSA nares negative  Blood pressure improved with volume management    -monitoring off antibiotics hereafter  -monitor temperature and hemodynamics  -serial exam  -respiratory support  -monitor CBC and BMP      2  Acute hypoxic respiratory failure   In setting of shortness of breath, CT scan with ground glass opacities and consolidation at the bases with leukocytosis, elevated procalcitonin level   Consider bacterial/viral pneumonia status post COVID-19 PCR positive on May 11, 2021  Patient clinically stable on IV Lasix, steroid and antibiotics  WBC count elevation likely steroid effect  06/20/2021 portable chest x-ray with persistent peripheral and basilar ground-glass opacities  CRP trending down, Ferritin 500s  -Completed 7 day total antibiotic course through 6/21/21  -monitor off antibotics hereafter  -monitor temperature and hemodynamics  -serial exam  -ongoing pulmonary follow-up and management  -IV steroids on board for pulmonary  -respiratory support with High flow O2 at present  -monitor CBC and BMP   -monitor clinical response     3  Oral candidiasis and groin intertrigo   -add Nystatin swish and swallow    -Continue topical nystatin powder to groin      4  Renal Insufficiency/CKD III   POA  Creatinine 1 44 > 1 18  Likely prerenal in setting of #1    -renal dose adjust antibiotic as needed  -monitor BMP     5  Esophageal cancer   status post radiation therapy through April 2021 and now on Herceptin infusion monthly   Immunocompromised patient   With chronic dysphasia   No aspiration events on bedside swallow evaluation  -symptomatic management per primary care team  -advancing diet as per speech therapy recommendation     Antibiotics:  None D1     Above impression and plan discussed in detail with patient, RN, and primary care team     Subjective:  Patient reports feeling ok  He has no fever, chills, sweats overnight; no nausea, vomiting, diarrhea; patient always eats slow precautionarily since esophageal CA but denies choking or mitchell aspiration of food or drink, no increased cough,   shortness of breath better on High Flow NC; no pain complaints    Patient appears to be tolerating antibiotics  His appetite is poor and he does have some throat discomfort  Objective:  Vitals:  Temp:  [97 8 °F (36 6 °C)-98 6 °F (37 °C)] 98 4 °F (36 9 °C)  HR:  [72-88] 82  Resp:  [18-20] 18  BP: (103-123)/(59-66) 110/64  SpO2:  [90 %-96 %] 90 %  Temp (24hrs), Av 2 °F (36 8 °C), Min:97 8 °F (36 6 °C), Max:98 6 °F (37 °C)  Current: Temperature: 98 4 °F (36 9 °C)    Physical Exam:   General Appearance:  Alert, interactive, nontoxic, no acute conversational distress on high flow O2 statting 90%   Throat: Oropharynx moist with white plaques noted on buccal and pharyngeal mucosa  Lungs:   Fairly clear to auscultation bilaterally; no wheezes, rhonchi or rales; respirations unlabored with conversation   Heart:  RRR; no murmur   Abdomen:   Soft, non-tender, non-distended, positive bowel sounds  Extremities: No clubbing, cyanosis or edema   : No fritz, no SPT, powder being applied to groin area   Skin: No new rashes  Left arm IV site nontender  No draining wounds noted   Right chest port not accessed, site nontender       Labs, Imaging, & Other studies:   All pertinent labs and imaging studies were personally reviewed  Results from last 7 days   Lab Units 21  0607 21  0259 21  0533   WBC Thousand/uL 13 43* 12 68* 12 03*   HEMOGLOBIN g/dL 13 3 13 0 12 8   PLATELETS Thousands/uL 383 392* 394*     Results from last 7 days   Lab Units 21  0607 06/21/21  0259 06/20/21  0440 06/17/21  0448 06/16/21  0506   SODIUM mmol/L 134* 134* 136 136 135*   POTASSIUM mmol/L 5 5* 4 7 4 0 4 9 4 7   CHLORIDE mmol/L 99* 100 100 103 102   CO2 mmol/L 27 25 22 24 21   BUN mg/dL 29* 33* 32* 28* 23   CREATININE mg/dL 0 96 1 18 1 07 1 28 1 28   EGFR ml/min/1 73sq m 79 61 69 56 56   CALCIUM mg/dL 8 6 8 6 8 5 8 6 8 4   AST U/L  --   --   --  31 30   ALT U/L  --   --   --  65 64   ALK PHOS U/L  --   --   --  210* 205*     Results from last 7 days   Lab Units 06/15/21  1657   MRSA CULTURE ONLY  No Methicillin Resistant Staphlyococcus aureus (MRSA) isolated   LEGIONELLA URINARY ANTIGEN  Negative     Results from last 7 days   Lab Units 06/19/21  0533 06/18/21  1401 06/16/21  0651   PROCALCITONIN ng/ml 0 13 0 21 0 30*     Results from last 7 days   Lab Units 06/20/21  0440 06/19/21  0533 06/18/21  0741 06/15/21  1657   CRP mg/L 18 5* 29 7* 52 3* 242 0*     Results from last 7 days   Lab Units 06/20/21  0440 06/19/21  0533 06/18/21  0741 06/15/21  1657   FERRITIN ng/mL 579* 558* 596* 468*     Results from last 7 days   Lab Units 06/20/21  0440 06/19/21  0533 06/18/21  0741 06/17/21  0448   D-DIMER QUANTITATIVE ug/ml FEU 1 56* 1 56* 1 79* 2 41*

## 2021-06-22 NOTE — ASSESSMENT & PLAN NOTE
Malnutrition Findings:   Adult Malnutrition type: Acute illness (in the setting of chronic illness)  Adult Degree of Malnutrition: Malnutrition of moderate degree (related to catabolic illness, respiratory distress)    BMI Findings: Body mass index is 26 31 kg/m²

## 2021-06-22 NOTE — PHYSICAL THERAPY NOTE
Physical Therapy Cancellation Note         06/22/21 1417   PT Last Visit   PT Visit Date 06/22/21   Note Type   Note type Evaluation   Cancel Reasons Patient off floor/test   Assessment   Assessment referral received for PT eval and tx  attempted to see pt for eval but he is off floor at procedure  will follow and initiate PT as appropriate       Bisi Diallo, PT

## 2021-06-22 NOTE — PROGRESS NOTES
[facility]  [facility] 1950, 65 y o  male MRN: [id number]  Unit/Bed#: S -01 Encounter: [id number]  Primary Care Provider: Yadira Dunn DO   Date and time admitted to hospital: 6/15/2021  7:06 AM    * Acute respiratory failure with hypoxia ([address] )  Assessment & Plan  POA  Patient is not on any home oxygen  Patient was on med for yesterday, is now requiring high-flow oxygen  Patient desaturated last night, an x-ray was obtained  On my read patient's x-ray does not show any evidence of worsening pulmonary edema or cephalization  Plan:  · continue nebulizations as needed  · Continue IV Solu-Medrol 40 mg t i d  Day #8,   · Patient was recommended for BiPAP  · 0 5 mg Ativan p r n  B i d  to help patient tolerate BiPAP  · Total course of antibiotics completed  · Continue 40 mg of p o  Lasix  · Will Continue Spiriva  · Incentive spirometry  · Appreciate pulmonology recommendations  · Titrate oxygen to maintain SpO2 88%    Pneumonia  Assessment & Plan  · Patient had COVID 19 about a month ago and he is also immunocompromised from his esophageal cancer  · Patient's inflammatory markers trending down  · Treatment with antibiotics completed as of 06/21/2021    Esophageal cancer   Assessment & Plan  · Outpatient follow-up with Oncology  · Patient stated that he last had radiation about a month ago  · He is not on any special diet    Dysphagia  Assessment & Plan  · Patient had video barium swallow test today, which did not show significant risk of aspiration  · Can not continue regular diet      Acute renal failure superimposed on stage 3 chronic kidney disease [facility])  Assessment & Plan  Lab Results   Component Value Date    EGFR 79 06/22/2021    EGFR 61 06/21/2021    EGFR 69 06/20/2021    CREATININE 0 96 06/22/2021    CREATININE 1 18 06/21/2021    CREATININE 1 07 06/20/2021     · Continue with 40 mg of p o   Lasix  · Patient's baseline creatinine between 1 14 and 1 2    Moderate protein-calorie malnutrition (HCC)  Assessment & Plan  Malnutrition Findings:   Adult Malnutrition type: Acute illness (in the setting of chronic illness)  Adult Degree of Malnutrition: Malnutrition of moderate degree (related to catabolic illness, respiratory distress)    BMI Findings: Body mass index is 26 31 kg/m²  PAD (peripheral artery disease)  Assessment & Plan  · Continue aspirin and statin    Carotid stenosis  Assessment & Plan  · Continue aspirin and statin    Essential hypertension  Assessment & Plan  · Patient is becoming hypotensive, likely secondary to hypoxia  · Continue amlodipine  · Holding metoprolol for now    GERD (gastroesophageal reflux disease)  Assessment & Plan  · Continue Protonix    Transaminitis-resolved as of 2021  Assessment & Plan  · Patient's transaminitis likely secondary to shock level in the setting of sepsis  · Patient's transaminitis improved today with resolution of ARISTIDES and sepsis            VTE Pharmacologic Prophylaxis:     Moderate Risk (Score 3-4) - Pharmacological DVT Prophylaxis Ordered: Heparin  Mechanical VTE Prophylaxis in Place: Yes    Patient Centered Rounds: I have performed bedside rounds with nursing staff today  Discussions with Specialists or Other Care Team Provider:  Discussed with pulmonology    Education and Discussions with Family / Patient: Discussed with patient's girlfriend at bedside    Current Length of Stay: 7 day(s)    Current Patient Status: Inpatient     Discharge Plan / Estimated Discharge Date: Anticipate discharge in > 72 hrs to rehab facility  Code Status: Level 1 - Full Code      Subjective:   Patient states that he feels better today  Was able to sleep bit more, and was able to tolerate BiPAP  No acute overnight events      Objective:     Vitals:   Temp (24hrs), Av °F (36 7 °C), Min:97 8 °F (36 6 °C), Max:98 4 °F (36 9 °C)    Temp:  [97 8 °F (36 6 °C)-98 4 °F (36 9 °C)] 97 8 °F (36 6 °C)  HR: [72-83] 83  Resp:  [16-18] 18  BP: (105-123)/(57-65) 120/60  SpO2:  [90 %-96 %] 91 %  Body mass index is 26 31 kg/m²  Input and Output Summary (last 24 hours): Intake/Output Summary (Last 24 hours) at 6/22/2021 1918  Last data filed at 6/22/2021 1832  Gross per 24 hour   Intake 480 ml   Output 1450 ml   Net -970 ml       Physical Exam:     Physical Exam  Vitals and nursing note reviewed  Constitutional:       Appearance: He is well-developed  HENT:      Head: Normocephalic and atraumatic  Eyes:      Conjunctiva/sclera: Conjunctivae normal    Cardiovascular:      Rate and Rhythm: Normal rate and regular rhythm  Heart sounds: No murmur heard  Pulmonary:      Effort: Pulmonary effort is normal  No respiratory distress  Breath sounds: Examination of the right-middle field reveals rhonchi  Examination of the left-middle field reveals rhonchi  Examination of the right-lower field reveals rhonchi  Examination of the left-lower field reveals rhonchi  Rhonchi present  No wheezing or rales  Abdominal:      Palpations: Abdomen is soft  Tenderness: There is no abdominal tenderness  Musculoskeletal:      Cervical back: Neck supple  Skin:     General: Skin is warm and dry  Capillary Refill: Capillary refill takes less than 2 seconds  Neurological:      General: No focal deficit present  Mental Status: He is alert            Additional Data:     Labs:  Results from last 7 days   Lab Units 06/22/21  0607 06/17/21  0448   WBC Thousand/uL 13 43* 17 53*   HEMOGLOBIN g/dL 13 3 12 6   HEMATOCRIT % 39 8 37 7   PLATELETS Thousands/uL 383 435*   NEUTROS PCT %  --  93*   LYMPHS PCT %  --  3*   MONOS PCT %  --  3*   EOS PCT %  --  0     Results from last 7 days   Lab Units 06/22/21  1529 06/22/21  0607 06/17/21  0448   SODIUM mmol/L  --  134* 136   POTASSIUM mmol/L 4 5 5 5* 4 9   CHLORIDE mmol/L  --  99* 103   CO2 mmol/L  --  27 24   BUN mg/dL  --  29* 28*   CREATININE mg/dL  --  0 96 1 28 ANION GAP mmol/L  --  8 9   CALCIUM mg/dL  --  8 6 8 6   ALBUMIN g/dL  --   --  2 4*   TOTAL BILIRUBIN mg/dL  --   --  0 38   ALK PHOS U/L  --   --  210*   ALT U/L  --   --  65   AST U/L  --   --  31   GLUCOSE RANDOM mg/dL  --  265* 180*                 Results from last 7 days   Lab Units 06/19/21  0533 06/18/21  1401 06/16/21  0651   PROCALCITONIN ng/ml 0 13 0 21 0 30*       Imaging: No pertinent imaging reviewed      Recent Cultures (last 7 days):           Lines/Drains:  Invasive Devices     Central Venous Catheter Line            Port A Cath 08/28/17 Right Chest 1394 days          Peripheral Intravenous Line            Long-Dwell Peripheral IV (Midline) 42/96/68 Left Cephalic 1 day                Telemetry:        Last 24 Hours Medication List:   Current Facility-Administered Medications   Medication Dose Route Frequency Provider Last Rate    acetaminophen  650 mg Oral Q6H PRN Allegra Luu MD      amLODIPine  10 mg Oral Daily Allegra Luu MD      aspirin  81 mg Oral Daily Allegra Luu MD      barium sulfate  1 tablet Oral Once in imaging Francisco J Rosenberg DO      benzonatate  100 mg Oral TID PRN Angie Bartlett PA-C      enoxaparin  40 mg Subcutaneous Daily Francisco J Rosenberg DO      furosemide  40 mg Oral Daily Quynh Dubose MD      guaiFENesin  1,200 mg Oral Q12H [address] Blaze Velázquez MD      levalbuterol  1 25 mg Nebulization TID Donna Pro MD      lidocaine   Topical Daily PRN Allegra Luu MD      LORazepam  0 5 mg Oral BID PRN Quynh Dubose MD      melatonin  9 mg Oral HS Quynh Dubose MD      methylPREDNISolone sodium succinate  40 mg Intravenous [facility] Angie Bartlett PA-C      nystatin  500,000 Units Swish & Swallow 4x Daily Ildefonso Junior PA-C      nystatin   Topical TID Donna Pro MD      ondansetron  8 mg Intravenous Q6H PRN Allegra Luu MD      pantoprazole  40 mg Oral Early Morning Allegra Luu MD      sodium chloride  3 mL Nebulization TID Sangita Sanya Barth MD      tiotropium  18 mcg Inhalation Daily Jose Carlos Almanza DO          Today, Patient Was Seen By: Flako Larson MD    ** Please Note: This note has been constructed using a voice recognition system   **

## 2021-06-23 PROBLEM — E11.9 TYPE 2 DIABETES MELLITUS, WITHOUT LONG-TERM CURRENT USE OF INSULIN (HCC): Status: ACTIVE | Noted: 2021-01-01

## 2021-06-23 PROBLEM — T50.905A HYPERGLYCEMIA, DRUG-INDUCED: Status: ACTIVE | Noted: 2021-01-01

## 2021-06-23 PROBLEM — R73.9 HYPERGLYCEMIA, DRUG-INDUCED: Status: ACTIVE | Noted: 2021-01-01

## 2021-06-23 NOTE — RESPIRATORY THERAPY NOTE
Called to pt room, pt just finished therapy desating, on arrival pt on NRB and 13L mid flow pt sats 94% after talking to me and telling me what happened sats decreased to 90%  Will leave on NRB and Midflow  Til breakfast comes to help re coup pt, sats now 96% on both, nurse aware  Nurse reports to me sats were 70s after therapy before she placed NRB on  Will hold off on HFNC for now

## 2021-06-23 NOTE — ASSESSMENT & PLAN NOTE
Lab Results   Component Value Date    EGFR 90 06/23/2021    EGFR 79 06/22/2021    EGFR 61 06/21/2021    CREATININE 0 78 06/23/2021    CREATININE 0 96 06/22/2021    CREATININE 1 18 06/21/2021     · Continue with 40 mg of p o   Lasix  · Patient's baseline creatinine between 1 14 and 1 2

## 2021-06-23 NOTE — PLAN OF CARE
Problem: OCCUPATIONAL THERAPY ADULT  Goal: Performs self-care activities at highest level of function for planned discharge setting  See evaluation for individualized goals  Description: Treatment Interventions: ADL retraining, Functional transfer training, UE strengthening/ROM, Endurance training, Cognitive reorientation, Patient/family training, Equipment evaluation/education, Compensatory technique education, Energy conservation, Activityengagement          See flowsheet documentation for full assessment, interventions and recommendations  6/23/2021 1041 by Elise Samuels OTR  Note: Limitation: Decreased ADL status, Decreased UE strength, Decreased Safe judgement during ADL, Decreased endurance, Decreased self-care trans, Decreased high-level ADLs  Prognosis: Good  Assessment: Patient is a 65 y o  male admitted to [address] [place] [address] on 6/15/2021 due to Acute respiratory failure with hypoxia ([address] )  Comorbidities affecting pt's physical performance at time of assessment include dysphagia, GERD, HTN, carotid stenosis, esophageal cancer, PAD, pneumonia  Patient has active OT orders  PTA pt living with wife in [facility], pt (I) with ADLs and IADLs, no use of AD at baseline, (-)falls, (+)drives  Personal factors affecting pt at time of IE include:steps to enter environment, limited home support, difficulty performing ADLS and difficulty performing IADLS   At the time of evaluation patient currently requires (S) for UB ADLs, min-mod A for LB ADLs, (S) for functional transfers, and unable to assess functional mobility due to dropping O2 sats upon sitting EOB  The following deficits affected patient's occupational performance weakness, decreased functional strength, decreased functional balance, decreased activity tolerance, decreased safety awareness and decreased endurance  Patient would benefit from skilled OT services while in the hospital to address above deficits   Occupational performance areas to be addressed include ADL retraining, bed mobility, functional transfer training, endurance training, patient/family training, equipment evaluation/education, compensatory technique education, energy conservation, activity engagement and activity tolerance in order to maximize patient's level of function  The patient's raw score on the AM-PAC Daily Activity inpatient short form is 18, standardized score is 38 66, less than 39 4  Patients at this level are likely to benefit from discharge to post-acute rehabilitation services  Recommend d/c to PAR vs HHOT pending medical optimization  Will continue to follow 2-3x/wk to address goals listed below  OT Discharge Recommendation: Post acute rehabilitation services (vs HHOT pending medical optimization)       6/23/2021 1041 by Scott Cunha OTR  Note: Limitation: Decreased ADL status, Decreased UE strength, Decreased Safe judgement during ADL, Decreased endurance, Decreased self-care trans, Decreased high-level ADLs  Prognosis: Good  Assessment: Patient is a 65 y o  male [place] on 6[place]15/2021 due to Acute respiratory failure with hypoxia ([address] )  Comorbidities affecting pt's physical performance at time of assessment include dysphagia, GERD, HTN, carotid stenosis, esophageal cancer, PAD, pneumonia  Patient has active OT orders  PTA pt living with wife in [facility], pt (I) with ADLs and IADLs, no use of AD at baseline, (-)falls, (+)drives  Personal factors affecting pt at time of IE include:steps to enter environment, limited home support, difficulty performing ADLS and difficulty performing IADLS   At the time of evaluation patient currently requires (S) for UB ADLs, min-mod A for LB ADLs, (S) for functional transfers, and unable to assess functional mobility due to dropping O2 sats upon sitting EOB   The following deficits affected patient's occupational performance weakness, decreased functional strength, decreased functional balance, decreased activity tolerance, decreased safety awareness and decreased endurance  Patient would benefit from skilled OT services while in the hospital to address above deficits  Occupational performance areas to be addressed include ADL retraining, bed mobility, functional transfer training, endurance training, patient/family training, equipment evaluation/education, compensatory technique education, energy conservation, activity engagement and activity tolerance in order to maximize patient's level of function  The patient's raw score on the AM-PAC Daily Activity inpatient short form is 18, standardized score is 38 66, less than 39 4  Patients at this level are likely to benefit from discharge to post-acute rehabilitation services  Recommend d/c to PAR vs HHOT pending medical optimization  Will continue to follow 2-3x/wk to address goals listed below       OT Discharge Recommendation: Post acute rehabilitation services (vs HHOT pending medical optimization)

## 2021-06-23 NOTE — PROGRESS NOTES
[facility]  Progress Note Alia De Los Santos 1950, 65 y o  male MRN: [id number]  Unit/Bed#: S -01 Encounter: [id number]  Primary Care Provider: Gualberto Mahoney DO   Date and time admitted to hospital: 6/15/2021  7:06 AM    * Acute respiratory failure with hypoxia ([address] )  Assessment & Plan  POA  Patient is not on any home oxygen  Patient was on med for yesterday, is now requiring high-flow oxygen  Patient desaturated last night, an x-ray was obtained  On my read patient's x-ray does not show any evidence of worsening pulmonary edema or cephalization  Plan:  · continue nebulizations as needed  · Continue IV Solu-Medrol 40 mg t i d  Day #9,   · Continue BiPAP:0 5 mg Ativan p r n  B i d  to help patient tolerate BiPAP  · Continue 40 mg of p o  Lasix  Was given extra dose of lasix yesterday with good response     · No change to PE (rales/ronchi present)   · Will Continue Spiriva  · Incentive spirometry  · Appreciate pulmonology recommendations  · Titrate oxygen to maintain SpO2 88%    Hyperglycemia, drug-induced  Assessment & Plan  2/2 to IV steroids  · Carb controlled diet   · Hypoglycemia   · SSI ( Will do Q6 BG checks since patient is insulin naive)    Pneumonia  Assessment & Plan  · Patient had COVID 19 about a month ago and he is also immunocompromised from his esophageal cancer  · Patient's inflammatory markers trending down  · Treatment with antibiotics completed as of 06/21/2021    Esophageal cancer   Assessment & Plan  · Outpatient follow-up with Oncology  · Patient stated that he last had radiation about a month ago  · He is not on any special diet    Dysphagia  Assessment & Plan  · Patient had video barium swallow test today, which did not show significant risk of aspiration  · Can continue carb controlled diet      Acute renal failure superimposed on stage 3 chronic kidney disease [facility])  Assessment & Plan  Lab Results   Component Value Date    EGFR 90 06/23/2021 EGFR 79 06/22/2021    EGFR 61 06/21/2021    CREATININE 0 78 06/23/2021    CREATININE 0 96 06/22/2021    CREATININE 1 18 06/21/2021     · Continue with 40 mg of p o  Lasix  · Patient's baseline creatinine between 1 14 and 1 2    Moderate protein-calorie malnutrition (HCC)  Assessment & Plan  Malnutrition Findings:   Adult Malnutrition type: Acute illness (in the setting of chronic illness)  Adult Degree of Malnutrition: Malnutrition of moderate degree (related to catabolic illness, respiratory distress)    BMI Findings: Body mass index is 26 31 kg/m²  PAD (peripheral artery disease)  Assessment & Plan  · Continue aspirin and statin    Carotid stenosis  Assessment & Plan  · Continue aspirin and statin    Essential hypertension  Assessment & Plan  · Patient is becoming hypotensive, likely secondary to hypoxia  · Continue amlodipine  · Holding metoprolol for now    GERD (gastroesophageal reflux disease)  Assessment & Plan  · Continue Protonix    Transaminitis-resolved as of 6/18/2021  Assessment & Plan  · Patient's transaminitis likely secondary to shock level in the setting of sepsis  · Patient's transaminitis improved today with resolution of ARISTIDES and sepsis            VTE Pharmacologic Prophylaxis:     Moderate Risk (Score 3-4) - Pharmacological DVT Prophylaxis Ordered: Enoxaparin (Lovenox)  Mechanical VTE Prophylaxis in Place: Yes    Patient Centered Rounds: I have performed bedside rounds with nursing staff today  Discussions with Specialists or Other Care Team Provider: Pulmonology     Education and Discussions with Family / Patient: Updated  (significant other) at bedside  Current Length of Stay: 8 day(s)    Current Patient Status: Inpatient     Discharge Plan / Estimated Discharge Date: Anticipate discharge in > 72 hrs to rehab facility  Code Status: Level 1 - Full Code      Subjective:   Feels "ok"  No acute overnight events  Has had PT and had episodes of desaturating  Objective:     Vitals:   Temp (24hrs), Av 8 °F (36 6 °C), Min:97 6 °F (36 4 °C), Max:97 9 °F (36 6 °C)    Temp:  [97 6 °F (36 4 °C)-97 9 °F (36 6 °C)] 97 7 °F (36 5 °C)  HR:  [] 96  Resp:  [16-20] 18  BP: (105-142)/(52-83) 142/83  SpO2:  [84 %-99 %] 90 %  Body mass index is 26 31 kg/m²  Input and Output Summary (last 24 hours): Intake/Output Summary (Last 24 hours) at 2021 1010  Last data filed at 2021 0901  Gross per 24 hour   Intake 360 ml   Output 1650 ml   Net -1290 ml       Physical Exam:     Physical Exam  Vitals and nursing note reviewed  Constitutional:       Appearance: He is well-developed  HENT:      Head: Normocephalic and atraumatic  Eyes:      Conjunctiva/sclera: Conjunctivae normal    Cardiovascular:      Rate and Rhythm: Normal rate  Rhythm irregular  Heart sounds: No murmur heard  Pulmonary:      Effort: Pulmonary effort is normal  No respiratory distress  Breath sounds: Examination of the right-middle field reveals rhonchi  Examination of the left-middle field reveals rhonchi  Examination of the right-lower field reveals rhonchi  Examination of the left-lower field reveals rhonchi  Wheezing and rhonchi present  No rales  Abdominal:      Palpations: Abdomen is soft  Tenderness: There is no abdominal tenderness  Musculoskeletal:      Cervical back: Neck supple  Right lower leg: No edema  Left lower leg: No edema  Skin:     General: Skin is warm and dry  Capillary Refill: Capillary refill takes less than 2 seconds  Neurological:      General: No focal deficit present  Mental Status: He is alert     Psychiatric:         Mood and Affect: Mood normal          Behavior: Behavior normal           Additional Data:     Labs:  Results from last 7 days   Lab Units 21  0523 21  0448   WBC Thousand/uL 14 26* 17 53*   HEMOGLOBIN g/dL 14 3 12 6   HEMATOCRIT % 41 7 37 7   PLATELETS Thousands/uL 390 435*   NEUTROS PCT % --  93*   LYMPHS PCT %  --  3*   MONOS PCT %  --  3*   EOS PCT %  --  0     Results from last 7 days   Lab Units 06/23/21  0652 06/17/21  0448   SODIUM mmol/L 137 136   POTASSIUM mmol/L 5 6* 4 9   CHLORIDE mmol/L 103 103   CO2 mmol/L 22 24   BUN mg/dL 31* 28*   CREATININE mg/dL 0 78 1 28   ANION GAP mmol/L 12 9   CALCIUM mg/dL 7 2* 8 6   ALBUMIN g/dL  --  2 4*   TOTAL BILIRUBIN mg/dL  --  0 38   ALK PHOS U/L  --  210*   ALT U/L  --  65   AST U/L  --  31   GLUCOSE RANDOM mg/dL 278* 180*                 Results from last 7 days   Lab Units 06/19/21  0533 06/18/21  1401   PROCALCITONIN ng/ml 0 13 0 21       Imaging: No pertinent imaging reviewed      Recent Cultures (last 7 days):           Lines/Drains:  Invasive Devices     Central Venous Catheter Line            Port A Cath 08/28/17 Right Chest 1395 days          Peripheral Intravenous Line            Long-Dwell Peripheral IV (Midline) 19/60/97 Left Cephalic 1 day                Telemetry:        Last 24 Hours Medication List:   Current Facility-Administered Medications   Medication Dose Route Frequency Provider Last Rate    acetaminophen  650 mg Oral Q6H PRN Wisam Tran MD      amLODIPine  10 mg Oral Daily Wisam Tran MD      aspirin  81 mg Oral Daily Wisam Tran MD      barium sulfate  1 tablet Oral Once in imaging Francisco J Rosenberg DO      benzonatate  100 mg Oral TID PRN Hollie Good PA-C      enoxaparin  40 mg Subcutaneous Daily Francisco J Rosenberg DO      furosemide  40 mg Oral Daily Kaitlyn Faustin MD      guaiFENesin  1,200 mg Oral Q12H [address] Blaze Velázquez MD      insulin lispro  1-5 Units Subcutaneous Q6H [address] Kaitlyn Faustin MD      levalbuterol  1 25 mg Nebulization TID Gerry Gonzales MD      lidocaine   Topical Daily PRN Wisam Tran MD      LORazepam  0 5 mg Oral BID PRN Kaitlyn Faustin MD      melatonin  9 mg Oral HS Kaitlyn Faustin MD      methylPREDNISolone sodium succinate  40 mg Intravenous [facility] Hollie Good PA-C  nystatin  500,000 Units Swish & Swallow 4x Daily Estrella Moralez PA-C      nystatin   Topical TID Caprice Orellana MD      ondansetron  8 mg Intravenous Q6H PRN Arelis Hu MD      pantoprazole  40 mg Oral Early Morning Arelis Hu MD      sodium chloride  3 mL Nebulization TID Caprice Orellana MD      tiotropium  18 mcg Inhalation Daily Domingo Gallegos DO          Today, Patient Was Seen By: Lazarus Forte, MD    ** Please Note: This note has been constructed using a voice recognition system   **

## 2021-06-23 NOTE — ASSESSMENT & PLAN NOTE
2/2 to IV steroids  · Carb controlled diet   · Hypoglycemia   · SSI ( Will do Q6 BG checks since patient is insulin naive)

## 2021-06-23 NOTE — OCCUPATIONAL THERAPY NOTE
Occupational Therapy Evaluation     Patient Name: Cristian Aguilar  [id number] Date: 6/23/2021  Problem List  Principal Problem:    Acute respiratory failure with hypoxia ([facility] )  Active Problems:    Dysphagia    GERD (gastroesophageal reflux disease)    Essential hypertension    Carotid stenosis    Esophageal cancer     PAD (peripheral artery disease)    Acute renal failure superimposed on stage 3 chronic kidney disease (HCC)    Pneumonia    Moderate protein-calorie malnutrition ([facility] )    Hyperglycemia, drug-induced    Past Medical History  Past Medical History:   Diagnosis Date    Anxiety     Cancer ([place] [address] )     Esophageal    Dysphagia     Esophageal abnormality     Esophageal cancer ([place] [address] )     GERD (gastroesophageal reflux disease)     Hyperlipidemia     Hypertension     Occasional tremors     Transaminitis 6/16/2021     Past Surgical History  Past Surgical History:   Procedure Laterality Date    ESOPHAGOGASTRODUODENOSCOPY N/A 8/9/2017    Procedure: ESOPHAGOGASTRODUODENOSCOPY (EGD); Surgeon: Ayana Cote MD;  [facility]: [facility]; Service: Gastroenterology    ESOPHAGOGASTRODUODENOSCOPY N/A 8/11/2017    Procedure: ESOPHAGOGASTRODUODENOSCOPY (EGD) w/ stent;  Surgeon: Ayana Cote MD;  [facility];   Service: Gastroenterology    ESOPHAGOGASTRODUODENOSCOPY N/A 1/26/2021    Procedure: ESOPHAGOGASTRODUODENOSCOPY (EGD), BIOPSY, ESOPHAGEAL DILATION,  STENT PLACEMENT;  Surgeon: Deidra Reese MD;  [facility];  Service: Thoracic    LAPAROTOMY N/A 2/11/2020    Procedure: EGD; REMOVAL OF ESOPHAGEAL STENTS X 3;  Surgeon: Deidra Reese MD;  [facility];  Service: Thoracic    PORTACATH PLACEMENT      PORTACATH PLACEMENT      NM ESOPHAGOGASTRODUODENOSCOPY TRANSORAL DIAGNOSTIC N/A 4/26/2021    Procedure: ESOPHAGOGASTRODUODENOSCOPY (EGD); stent removal;  Surgeon: Deidra Reese MD;  [facility];  Service: Thoracic    UPPER GASTROINTESTINAL ENDOSCOPY      VASCULAR SURGERY  2008    blockage in neck              06/23/21 0800   OT Last Visit   OT Visit Date 06/23/21   Note Type   Note type Evaluation   Restrictions/Precautions   Weight Bearing Precautions Per Order No   Other Precautions Chair Alarm; Bed Alarm; Fall Risk;O2  (13L Midflow)   Pain Assessment   Pain Assessment Tool Pain Assessment not indicated - pt denies pain   Pain Score No Pain   Home Living   Type of [address] One level; Able to live on main level with bedroom/bathroom; Performs ADLs on one level;Stairs to enter without rails  (2 BRET vs 1+1 BRET)   Bathroom Shower/Tub Walk-in shower   Bathroom Toilet Raised   Bathroom Equipment Shower chair;Grab bars around toilet   [address]   (none)   Additional Comments no use of AD at baseline   Prior Function   Lives With Telly Help From Family   ADL Assistance Independent   IADLs Independent  (tremors impacting with cooking)   Falls in the last 6 months 0   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)   Comments (+)drives, enjoys yard work   Lifestyle   Autonomy PTA pt living with wife in [facility], pt (I) with ADLs and IADLs, no use of AD at baseline, (-)falls, (+)drives   Reciprocal Relationships supportive wife   Service to Others retired   Intrinsic Gratification keeping up with property   Subjective   Subjective "You know I am really looking forward to getting up"   ADL   Eating Assistance 6  Modified independent   Eating Deficit   (with tremors, edu provided on weighted utensils+plate guards)   Grooming Assistance 6  Modified Independent   UB Bathing Assistance 6  [address] 5  Supervision/Setup   LB Dressing Assistance 3  Moderate Assistance   LB Dressing Deficit Increased time to complete;Supervision/safety;Verbal cueing; Don/doff R sock; Don/doff L sock  (becoming SOB and requiring (A) to complete )   Toileting Assistance 6  Modified independent  (use of urinal in supine )   Bed Mobility   Supine to Sit 5  Supervision   Additional items Increased time required   Sit to Supine 5  Supervision   Additional items Increased time required   Additional Comments Upon sitting EOB pt O2 sats dropping, pt encouraged to participate in PLB, O2 sats unchanging  O2 dropping to low 70's, lowest seen at 68%  HEDY Albarado and HEDY Greer present and aware and applying non-rebreather mask, pt returning to supine   Transfers   Sit to Stand Unable to assess   Additional Comments unable to trial transfers due to O2 saturations   Functional Mobility   Additional Comments DNT   Balance   Static Sitting Good   Dynamic Sitting Good   Activity Tolerance   Activity Tolerance Patient limited by fatigue;Treatment limited secondary to medical complications (Comment)  (O2 sats)   Medical Staff Made Aware PT HEDY Roca, RN [place]   RUE Assessment   RUE Assessment WFL   LUE Assessment   LUE Assessment WFL   Hand Function   Gross Motor Coordination Functional   Fine Motor Coordination Functional   Sensation   Light Touch No apparent deficits   Sharp/Dull No apparent deficits   Cognition   Overall Cognitive Status [facility]   Arousal/Participation Alert; Cooperative   Attention Within functional limits   Orientation Level Oriented X4   Memory Within functional limits   Following Commands Follows one step commands without difficulty   Comments Pleasant and cooperative   Assessment   Limitation Decreased ADL status; Decreased UE strength;Decreased Safe judgement during ADL;Decreased endurance;Decreased self-care trans;Decreased high-level ADLs   Prognosis Good   Assessment Patient is a 65 y o  male admitted to [address] [place] [address] on 6/15/2021 due to Acute respiratory failure with hypoxia ([facility] )  Comorbidities affecting pt's physical performance at time of assessment include dysphagia, GERD, HTN, carotid stenosis, esophageal cancer, PAD, pneumonia   Patient has active OT orders  PTA pt living with wife in [facility], pt (I) with ADLs and IADLs, no use of AD at baseline, (-)falls, (+)drives  Personal factors affecting pt at time of IE include:steps to enter environment, limited home support, difficulty performing ADLS and difficulty performing IADLS   At the time of evaluation patient currently requires (S) for UB ADLs, min-mod A for LB ADLs, (S) for functional transfers, and unable to assess functional mobility due to dropping O2 sats upon sitting EOB  The following deficits affected patient's occupational performance weakness, decreased functional strength, decreased functional balance, decreased activity tolerance, decreased safety awareness and decreased endurance  Patient would benefit from skilled OT services while in the hospital to address above deficits  Occupational performance areas to be addressed include ADL retraining, bed mobility, functional transfer training, endurance training, patient/family training, equipment evaluation/education, compensatory technique education, energy conservation, activity engagement and activity tolerance in order to maximize patient's level of function  The patient's raw score on the AM-PAC Daily Activity inpatient short form is 18, standardized score is 38 66, less than 39 4  Patients at this level are likely to benefit from discharge to post-acute rehabilitation services  Recommend d/c to PAR vs HHOT pending medical optimization  Will continue to follow 2-3x/wk to address goals listed below  Goals   Patient Goals to go home   LTG Time Frame 10-14   Long Term Goal see goals listed below   Plan   Treatment Interventions ADL retraining;Functional transfer training;UE strengthening/ROM; Endurance training;Cognitive reorientation;Patient/family training;Equipment evaluation/education; Compensatory technique education; Energy conservation; Activityengagement   Goal Expiration Date 07/07/21   OT Treatment Day 0   OT Frequency 2-3x/wk   Recommendation OT Discharge Recommendation Post acute rehabilitation services  (vs HHOT pending medical optimization)   AM-PAC Daily Activity Inpatient   Lower Body Dressing 2   Bathing 2   Toileting 3   Upper Body Dressing 3   Grooming 4   Eating 4   Daily Activity Raw Score 18   Daily Activity Standardized Score (Calc for Raw Score >=11) 38 66   AM-PAC Applied Cognition Inpatient   Following a Speech/Presentation 4   Understanding Ordinary Conversation 4   Taking Medications 4   Remembering Where Things Are Placed or Put Away 4   Remembering List of 4-5 Errands 4   Taking Care of Complicated Tasks 4   Applied Cognition Raw Score 24   Applied Cognition Standardized Score 62 21   Barthel Index   Feeding 10   Bathing 0   Grooming Score 0   Dressing Score 5   Bladder Score 10   Bowels Score 10   Toilet Use Score 5   Transfers (Bed/Chair) Score 10   Mobility (Level Surface) Score 0   Stairs Score 0   Barthel Index Score 50     Goals    -Patient will complete UB ADLs w/ mod I using AE and AD as needed    -Patient will complete LB ADLs w/ mod I using AE and AD as needed    -Patient will complete toileting w/ mod I w/ G hygiene/thoroughness    -Patient will complete bed mobility with Mod I without use of bed rails    -Patient will tolerate therapeutic activities for greater than 15 min, in order to increase tolerance for functional activities      -Patient will demonstrate 100% carryover of energy conservation techniques t/o functional I/ADL/leisure tasks w/o cues s/p skilled education to increase endurance during functional tasks    -Patient will participate in simulated IADL management task to increase independence to Mod I w/ G safety and endurance      At the end of the session, all needs met and pt supine in bed, bed alarm activated, HOB elevated and HEDY Albarado present in room    [facility], OTR/L

## 2021-06-23 NOTE — ASSESSMENT & PLAN NOTE
History of some vaginal cancer since 2017 status post radiation and 12 cycle of FOLFOX -6 and continues to remain on Herceptin (trastuzumab)    Radiation versus came may be also contributing factor to patient's presentation  Previous showing mild oropharyngeal dysphagia which may be effect radiation as well    Plan:  · Recommend changing from trastuzumab to alternate therapy  · Recommend outpatient follow up with Heme-Onc

## 2021-06-23 NOTE — ASSESSMENT & PLAN NOTE
POA   Patient is not on any home oxygen  Patient was on med for yesterday, is now requiring high-flow oxygen  Patient desaturated last night, an x-ray was obtained  On my read patient's x-ray does not show any evidence of worsening pulmonary edema or cephalization  Plan:  · continue nebulizations as needed  · Continue IV Solu-Medrol 40 mg t i d  Day #9,   · Continue BiPAP:0 5 mg Ativan p r n  B i d  to help patient tolerate BiPAP  · Continue 40 mg of p o  Lasix  Was given extra dose of lasix yesterday with good response     · No change to PE (rales/ronchi present)   · Will Continue Spiriva  · Incentive spirometry  · Appreciate pulmonology recommendations  · Titrate oxygen to maintain SpO2 88%

## 2021-06-23 NOTE — PROGRESS NOTES
Progress Note - Infectious Disease   Alanis Armandor 65 y o  male MRN: [id number]  Unit/Bed#: S -01 Encounter: [id number]      Impression/Plan:  1  Sepsis  POA   With tachycardia, tachypnea, leukocytosis  In patient presenting with shortness of breath and acute respiratory failure; suspect pulmonary source as below  Admission blood cultures are negative  MRSA nares negative  Blood pressure improved with volume management    -monitoring off antibiotics hereafter  -monitor temperature and hemodynamics  -serial exam  -respiratory support  -monitor CBC and BMP      2  Acute hypoxic respiratory failure   In setting of shortness of breath, CT scan with ground glass opacities and consolidation at the bases with leukocytosis, elevated procalcitonin level   Consider bacterial/viral pneumonia status post COVID-19 PCR positive on May 11, 2021  Patient clinically stable on PO Lasix, IV steroid and s/p antibiotics  WBC count elevation likely steroid effect  06/20/2021 portable chest x-ray with persistent peripheral and basilar ground-glass opacities  CRP trending down, Ferritin 500s  -Completed 7 day total antibiotic course through 6/21/21  -monitor off antibotics hereafter  -monitor temperature and hemodynamics  -serial exam  -ongoing pulmonary follow-up and management  -IV steroids on board for pulmonary  -respiratory support with Mid flow O2 at present  -monitor CBC and BMP   -monitor clinical response     3  Oral candidiasis and groin intertrigo  Oral thrush decreasing  -Continue Nystatin swish and swallow    -Continue topical nystatin powder to groin      4  Renal Insufficiency/CKD III   POA  Creatinine 1 44 > 0 78  Likely prerenal in setting of #1    -renal dose adjust antibiotic as needed  -monitor BMP     5  Esophageal cancer   status post radiation therapy through April 2021 and now on Herceptin infusion monthly   Immunocompromised patient   With chronic dysphasia   No aspiration events on bedside swallow evaluation and on VBS  -symptomatic management per primary care team  -regular/thin liquid diet as per speech therapy recommendation     Antibiotics:  None D2     Above impression and plan discussed in detail with patient, RN, and primary care team      Subjective:  Patient reports feeling ok  He has no fever, chills, sweats overnight; no nausea, vomiting, diarrhea; patient always eats slow precautionarily since esophageal CA but denies choking or mitchell aspiration of food or drink, no increased cough,   shortness of breath better on Mid Flow NC at present; no pain complaints    Patient appears to be tolerating antibiotics  His appetite is poor but he did pass VBS for regular diet with thin liquids    Objective:  Vitals:  Temp:  [97 6 °F (36 4 °C)-98 °F (36 7 °C)] 98 °F (36 7 °C)  HR:  [] 92  Resp:  [18-21] 21  BP: (105-142)/(52-83) 124/62  SpO2:  [84 %-99 %] 87 %  Temp (24hrs), Av 8 °F (36 6 °C), Min:97 6 °F (36 4 °C), Max:98 °F (36 7 °C)  Current: Temperature: 98 °F (36 7 °C)    Physical Exam:   General Appearance:  Alert, interactive, nontoxic, no acute conversational distress on 15 L mid flow statting 90%   Throat: Oropharynx moist without decreased white coating of posterior pharyngeal mucosa, still quite prominent on buccal mucosa   Lungs:   Fairly clear to auscultation bilaterally; no wheezes, rhonchi or rales; respirations unlabored with conversation    Heart:  RRR; no murmur   Abdomen:   Soft, non-tender, non-distended, positive bowel sounds  Extremities: No clubbing, cyanosis or edema   : No fritz, no SPT   Skin: No new rashes or lesions  Left arm IV site nontender  No draining wounds noted    Right chest port not accessed, site nontender       Labs, Imaging, & Other studies:   All pertinent labs and imaging studies were personally reviewed  Results from last 7 days   Lab Units 21  0621  0259   WBC Thousand/uL 14 26* 13 43* 12 68*   HEMOGLOBIN g/dL 14 3 13 3 13 0   PLATELETS Thousands/uL 390 383 392*     Results from last 7 days   Lab Units 06/23/21  0652 06/22/21  0607 06/21/21  0259 06/17/21  0448   SODIUM mmol/L 137 134* 134* 136   POTASSIUM mmol/L 5 6* 5 5* 4 7 4 9   CHLORIDE mmol/L 103 99* 100 103   CO2 mmol/L 22 27 25 24   BUN mg/dL 31* 29* 33* 28*   CREATININE mg/dL 0 78 0 96 1 18 1 28   EGFR ml/min/1 73sq m 90 79 61 56   CALCIUM mg/dL 7 2* 8 6 8 6 8 6   AST U/L  --   --   --  31   ALT U/L  --   --   --  65   ALK PHOS U/L  --   --   --  210*         Results from last 7 days   Lab Units 06/19/21  0533 06/18/21  1401   PROCALCITONIN ng/ml 0 13 0 21     Results from last 7 days   Lab Units 06/20/21  0440 06/19/21  0533 06/18/21  0741   CRP mg/L 18 5* 29 7* 52 3*     Results from last 7 days   Lab Units 06/20/21  0440 06/19/21  0533 06/18/21  0741   FERRITIN ng/mL 579* 558* 596*     Results from last 7 days   Lab Units 06/20/21  0440 06/19/21  0533 06/18/21  0741 06/17/21  0448   D-DIMER QUANTITATIVE ug/ml FEU 1 56* 1 56* 1 79* 2 41*

## 2021-06-23 NOTE — PLAN OF CARE
Problem: PHYSICAL THERAPY ADULT  Goal: Performs mobility at highest level of function for planned discharge setting  See evaluation for individualized goals  Description: Treatment/Interventions: Functional transfer training, LE strengthening/ROM, Elevations, Therapeutic exercise, Endurance training, Equipment eval/education, Bed mobility, Gait training, Spoke to nursing, OT          See flowsheet documentation for full assessment, interventions and recommendations  Outcome: Progressing  Note: Prognosis: Poor  Problem List: Decreased strength, Decreased range of motion, Decreased endurance, Impaired balance, Decreased mobility, Decreased skin integrity  Assessment: Pt is a 65 y o  male seen for PT evaluation s/p admit to [facility] on 6/15/2021  Pt was admitted with a primary dx of: pneumonia, hypoxia, pneumonia of both lungs due to infectious organism  PT now consulted for assessment of mobility and d/c needs  Pt with PT evaluate and treat  Pts current comorbidities effecting treatment include: PAD, GERD, CKD, HTN, history of metastatic esophageal cancer  Pts current clinical presentation is Unstable/ Unpredictable (high complexity) due to Ongoing medical management for primary dx, Decreased activity tolerance compared to baseline, Fall risk, Increased assistance needed from caregiver at current time, Increased O2 via NC from pts baseline, Continuous pulse oximetry monitoring   Prior to admission, pt was indepdnent with all aspects of functional mobility  Upon evaluation, pt currently is requiring supervision for bed mobility;unable to assess transfers or ambulation at this time secondary to O2 saturation   Pt presents at PT eval functioning below baseline and currently w/ overall mobility deficits 2* to: BLE weakness, decreased ROM, impaired balance, decreased endurance, decreased activity tolerance compared to baseline, decreased functional mobility tolerance compared to baseline, SOB upon exertion  Pt currently at a fall risk 2* to impairments listed above  Pt will continue to benefit from skilled acute PT interventions to address stated impairments; to maximize functional mobility; for ongoing pt/ family training; and DME needs  At conclusion of PT session all needs in reach and RN notified of session findings/recommendations with phone and call huang within reach  RN wolfgang and RN althea attending to patient O2 Saturation  Pt denies any further questions at this time  Recommend post acute rehabilitation upon hospital D/C  PT Discharge Recommendation: Post acute rehabilitation services (pending progress)          See flowsheet documentation for full assessment

## 2021-06-23 NOTE — ASSESSMENT & PLAN NOTE
Symptoms: shortness of breath at rest and wheezing    Lab Results   Component Value Date    SARSCOV2 Negative 06/15/2021    SARSCOV2 Positive (A) 05/11/2021     · Imaging:  XR chest 1 view portable - Result Date: 6/15/2021  Impression: Moderate bilateral pulmonary infiltrates which are nonspecific and may represent pneumonia or edema  · CTA ED chest PE Study - Result Date: 6/15/2021  Impression: No pulmonary embolism  Diffuse groundglass opacities in the lungs  Differential considerations include bacterial and viral pneumonia (including COVID 19), and vascular congestion  Minimal left pleural effusion      Recent Labs     06/21/21  0259 06/22/21  0607 06/23/21  0523   WBC 12 68* 13 43* 14 26*     No results for input(s): FERRITIN, CRP, DDIMER in the last 72 hours  · On no supplemental oxygen at baseline, status post COVID infection 5/12/21  · Currently on mid-flow nasal cannula 15L transitioned off high-flow yesterday afternoon  · No longer on BiPAP HS  · Patient had desaturation event this a m  during physical therapy, also had mild desaturation event at 02:00 overnight which nursing reports likely due to patient repositioning  · Started on IV Solu-Medrol and duo nebs  · Previously on trastuzumab for treatment of esophageal cancer  · Differential includes trastuzumab pneumonitis vs radiation pneumonitis (most recent radiation in April 2021) vs post COVID syndrome vs aspiration pneumonia  · Blood clx, legionella, strep Negative  · Completed antibiotics regimen 6/21  · VBS showing mild oropharyngeal dysphagia with decreased processing of soft and hard solids recommending regular diet with thin liquids    Plan:  · Labs:  ? CBC tomorrow AM  · Supportive care:  ? Incentive spirometry  ? Guaifenesin 1200 mg q 12 hours  ? Xopenex t i d  Nebs  ? Continue mid flow and titrate as appropriate  ? Encourage out of bed and physical therapy along with repositioning  ?  Taper Solu-Medrol 40 mg q 12 hours scheduled  ?  Tessalon Perles

## 2021-06-23 NOTE — CASE MANAGEMENT
Patient remains medically unstable for dc at this time  Patient continues to require [address] and IV steroids  Patient desated to the 60's when attempting to work with PT/OT; CM dept will continue to monitor for improvement with O2 needs and progress with therapy and address DCP and recommendations

## 2021-06-23 NOTE — ASSESSMENT & PLAN NOTE
· Patient had video barium swallow test today, which did not show significant risk of aspiration  · Can continue carb controlled diet

## 2021-06-23 NOTE — PROGRESS NOTES
[facility]  Progress Note Helen Deter 1950, 65 y o  male MRN: [id number]  Unit/Bed#: S -01 Encounter: [id number]  Primary Care Provider: Amy Lr DO   Date and time admitted to hospital: 6/15/2021  7:06 AM    * Acute respiratory failure with hypoxia [facility])  Assessment & Plan  Symptoms: shortness of breath at rest and wheezing    Lab Results   Component Value Date    SARSCOV2 Negative 06/15/2021    SARSCOV2 Positive (A) 05/11/2021     · Imaging:  XR chest 1 view portable - Result Date: 6/15/2021  Impression: Moderate bilateral pulmonary infiltrates which are nonspecific and may represent pneumonia or edema  · CTA ED chest PE Study - Result Date: 6/15/2021  Impression: No pulmonary embolism  Diffuse groundglass opacities in the lungs  Differential considerations include bacterial and viral pneumonia (including COVID 19), and vascular congestion  Minimal left pleural effusion      Recent Labs     06/21/21  0259 06/22/21  0607 06/23/21  0523   WBC 12 68* 13 43* 14 26*     No results for input(s): FERRITIN, CRP, DDIMER in the last 72 hours    · On no supplemental oxygen at baseline, status post COVID infection 5/12/21  · Currently on mid-flow nasal cannula 15L transitioned off high-flow yesterday afternoon  · No longer on BiPAP HS  · Patient had desaturation event this a m  during physical therapy, also had mild desaturation event at 02:00 overnight which nursing reports likely due to patient repositioning  · Started on IV Solu-Medrol and duo nebs  · Previously on trastuzumab for treatment of esophageal cancer  · Differential includes trastuzumab pneumonitis vs radiation pneumonitis (most recent radiation in April 2021) vs post COVID syndrome vs aspiration pneumonia  · Blood clx, legionella, strep Negative  · Completed antibiotics regimen 6/21  · VBS showing mild oropharyngeal dysphagia with decreased processing of soft and hard solids recommending regular diet with thin liquids    Plan:  · Labs:  ? CBC tomorrow AM  · Supportive care:  ? Incentive spirometry  ? Guaifenesin 1200 mg q 12 hours  ? Xopenex t i d  Nebs  ? Continue mid flow and titrate as appropriate  ? Encourage out of bed and physical therapy along with repositioning  ? Taper Solu-Medrol 40 mg q 12 hours scheduled  ? Tessalon Perles    Esophageal cancer   Assessment & Plan  History of some vaginal cancer since 2017 status post radiation and 12 cycle of FOLFOX -6 and continues to remain on Herceptin (trastuzumab)    Radiation versus came may be also contributing factor to patient's presentation  Previous showing mild oropharyngeal dysphagia which may be effect radiation as well    Plan:  · Recommend changing from trastuzumab to alternate therapy  · Recommend outpatient follow up with Heme-Onc        Subjective:    Checkup patient noted to have desaturation event earlier in the morning at around 08:30 while receiving physical therapy  Patient desaturated down to 70s and was immediately placed on non-rebreather  Maintained on 13 L mid flow  Denies any cough, wheezing, chest pain, or productive sputum  Encourage repositioning and discussed plan to taper steroids  Plan discussed with primary team    Objective:    Vitals: Blood pressure 142/83, pulse 96, temperature 97 7 °F (36 5 °C), resp  rate 18, height 5' 7" (1 702 m), weight 76 2 kg (167 lb 15 9 oz), SpO2 90 %  ,Body mass index is 26 31 kg/m²  Intake/Output Summary (Last 24 hours) at 6/23/2021 0953  Last data filed at 6/23/2021 0901  Gross per 24 hour   Intake 360 ml   Output 1650 ml   Net -1290 ml       Invasive Devices     Central Venous Catheter Line            Port A Cath 08/28/17 Right Chest 1395 days          Peripheral Intravenous Line            Long-Dwell Peripheral IV (Midline) 21/48/56 Left Cephalic 1 day                Physical Exam:    Physical Exam  Vitals and nursing note reviewed  Constitutional:       Appearance: Normal appearance  Interventions: Nasal cannula in place  Comments: [address] [facility] 13L   HENT:      Head: Normocephalic and atraumatic  Right Ear: Tympanic membrane normal       Left Ear: Tympanic membrane normal       Nose: Nose normal       Mouth/Throat:      Mouth: Mucous membranes are dry  Eyes:      Extraocular Movements: Extraocular movements intact  Pupils: Pupils are equal, round, and reactive to light  Cardiovascular:      Rate and Rhythm: Normal rate and regular rhythm  Pulses: Normal pulses  Heart sounds: No murmur heard  No gallop  Pulmonary:      Effort: Pulmonary effort is normal  No respiratory distress  Breath sounds: No stridor  Examination of the right-middle field reveals wheezing  Examination of the left-middle field reveals wheezing  Wheezing (Mild) present  No rhonchi or rales  Chest:      Chest wall: No tenderness  Abdominal:      General: Abdomen is flat  Bowel sounds are normal  There is no distension  Palpations: Abdomen is soft  Tenderness: There is no abdominal tenderness  Musculoskeletal:         General: No swelling  Right lower leg: No edema  Left lower leg: No edema  Skin:     General: Skin is warm and dry  Neurological:      General: No focal deficit present  Mental Status: He is alert and oriented to person, place, and time  Cranial Nerves: No cranial nerve deficit  Motor: No weakness  Psychiatric:         Mood and Affect: Mood normal          Behavior: Behavior normal  Behavior is cooperative  Thought Content: Thought content normal          Judgment: Judgment normal          Labs: I have personally reviewed pertinent lab results  Imaging and other studies: I have personally reviewed pertinent reports     and I have personally reviewed pertinent films in PACS

## 2021-06-23 NOTE — PHYSICAL THERAPY NOTE
Physical Therapy Evaluation    Patient's Name: Tor Hu    Admitting Diagnosis  Pneumonia [J18 9]  Hypoxia [R09 02]  Pneumonia of both lungs due to infectious organism, unspecified part of lung [J18 9]    Problem List  Patient Active Problem List   Diagnosis    Odynophagia    Dysphagia    GERD (gastroesophageal reflux disease)    Essential hypertension    Carotid stenosis    Esophageal stricture    Esophageal cancer     Migrated esophageal stent    Migration of esophageal stent    PAD (peripheral artery disease)    Bilateral carotid artery stenosis    Positive colorectal cancer screening using Cologuard test    Acute renal failure superimposed on stage 3 chronic kidney disease (HCC)    SOB (shortness of breath)    Pneumonia    Acute respiratory failure with hypoxia ([facility] )    Moderate protein-calorie malnutrition (HCC)    Type 2 diabetes mellitus, without long-term current use of insulin ([facility] )       Past Medical History  Past Medical History:   Diagnosis Date    Anxiety     [facility] )     Esophageal    Dysphagia     Esophageal abnormality     Esophageal cancer ([facility] )     GERD (gastroesophageal reflux disease)     Hyperlipidemia     Hypertension     Occasional tremors     Transaminitis 6/16/2021       Past Surgical History  Past Surgical History:   Procedure Laterality Date    ESOPHAGOGASTRODUODENOSCOPY N/A 8/9/2017    Procedure: ESOPHAGOGASTRODUODENOSCOPY (EGD); Surgeon: Bam Warner MD;  [facility]; Service: Gastroenterology    ESOPHAGOGASTRODUODENOSCOPY N/A 8/11/2017    Procedure: ESOPHAGOGASTRODUODENOSCOPY (EGD) w/ stent;  Surgeon: Bam Warner MD;  [facility];   Service: Gastroenterology    ESOPHAGOGASTRODUODENOSCOPY N/A 1/26/2021    Procedure: ESOPHAGOGASTRODUODENOSCOPY (EGD), BIOPSY, ESOPHAGEAL DILATION,  STENT PLACEMENT;  Surgeon: Dontae Bejarano MD;  [facility];  Service: Thoracic    LAPAROTOMY N/A 2/11/2020    Procedure: EGD; REMOVAL OF ESOPHAGEAL STENTS X 3;  Surgeon: Deidra Reese MD;  [facility];  Service: Thoracic    PORTACATH PLACEMENT      PORTACATH PLACEMENT      CT ESOPHAGOGASTRODUODENOSCOPY TRANSORAL DIAGNOSTIC N/A 2021    Procedure: ESOPHAGOGASTRODUODENOSCOPY (EGD); stent removal;  Surgeon: Deidra Reese MD;  [facility];  Service: Thoracic    UPPER GASTROINTESTINAL ENDOSCOPY      VASCULAR SURGERY      blockage in neck         21 0817   PT Last Visit   PT Visit Date 21   Note Type   Note type Evaluation   Pain Assessment   Pain Assessment Tool 0-10   Pain Score No Pain   Home Living   Type of [address] [place] [address] One level; Able to live on main level with bedroom/bathroom;Stairs to enter with rails;Stairs to enter without rails  (2 BRET from garage, 1 + 1 from front)   Bathroom Shower/Tub Walk-in shower   Bathroom Toilet Raised   [address] chair;Grab bars around toilet   Bathroom Accessibility Accessible   Additional Comments no AD at baseline   Prior Function   Lives With Skyler Knowles in the last 6 months 0   Vocational Retired   Comments pt drives at baseline   Restrictions/Precautions   [facility] Bearing Precautions Per Order No   Other Precautions Chair Alarm; Bed Alarm;Telemetry;O2;Fall Risk  (13 L mid flod)   General   Additional Pertinent History Pt brought to ED with O2 Saturation of 65% on room air  Has had signficiant difficulty breathing recently      Cognition   Overall Cognitive Status WFL   Orientation Level Oriented X4    Pt Id by wristband, name and    Following Commands Follows one step commands without difficulty   RLE Assessment   RLE Assessment WFL  (grossly 3-/5)   LLE Assessment   LLE Assessment WFL  (grossly 3-/5)   Bed Mobility   Supine to Sit 5  Supervision   Additional items Increased time required   Sit to Supine 5  Supervision   Additional items Increased time required   Additional Comments while sitting edge of bed patient O2 saturation to 68%, with max cues for pursed lip breathing, O2 does not improve, HEDY Oh and Les Shirley enter room and place rebreather on patient, patient returned back to bed at this time, throughout evaluation with OT, patient HR ranges from 108-151   Transfers   Sit to Stand Unable to assess   Stand to Sit Unable to assess   Ambulation/Elevation   Gait pattern Not appropriate   Balance   Static Sitting Fair -   Endurance Deficit   Endurance Deficit Yes   Endurance Deficit Description pt with significnat SOB sitting edge of bed, with sharp decline of O2 saturation   Activity Tolerance   Activity Tolerance Patient limited by fatigue;Treatment limited secondary to medical complications (Comment)  (O2 sats)   Medical Staff Made Aware OT HEDY Zepeda, HEDY oh   Assessment   Prognosis Poor   Problem List Decreased strength;Decreased range of motion;Decreased endurance; Impaired balance;Decreased mobility; Decreased skin integrity   Assessment Pt is a 65 y o  male seen for PT evaluation s/p admit to [address] [place] [address] on 6/15/2021  Pt was admitted with a primary dx of: pneumonia, hypoxia, pneumonia of both lungs due to infectious organism  PT now consulted for assessment of mobility and d/c needs  Pt with PT evaluate and treat  Pts current comorbidities effecting treatment include: PAD, GERD, CKD, HTN, history of metastatic esophageal cancer  Pts current clinical presentation is Unstable/ Unpredictable (high complexity) due to Ongoing medical management for primary dx, Decreased activity tolerance compared to baseline, Fall risk, Increased assistance needed from caregiver at current time, Increased O2 via NC from pts baseline, Continuous pulse oximetry monitoring   Prior to admission, pt was indepdnent with all aspects of functional mobility   Upon evaluation, pt currently is requiring supervision for bed mobility;unable to assess transfers or ambulation at this time secondary to O2 saturation  Pt presents at PT eval functioning below baseline and currently w/ overall mobility deficits 2* to: BLE weakness, decreased ROM, impaired balance, decreased endurance, decreased activity tolerance compared to baseline, decreased functional mobility tolerance compared to baseline, SOB upon exertion  Pt currently at a fall risk 2* to impairments listed above  Pt will continue to benefit from skilled acute PT interventions to address stated impairments; to maximize functional mobility; for ongoing pt/ family training; and DME needs  At conclusion of PT session all needs in reach and RN notified of session findings/recommendations with phone and call huang within reach  HEDY goss and RN althea attending to patient O2 Saturation  Pt denies any further questions at this time  Recommend post acute rehabilitation upon hospital D/C  Goals   Patient Goals to go home   STG Expiration Date 07/03/21   Short Term Goal #1 In 10 days pt will be able to: 1  Demonstrate ability to perform all aspects of bed mobility with independently  to increase functional independence  2  Perform functional transfers with independently to facilitate safe return to previous living environment  3  Will be appropriate for further PT evaluation to address ambulation  4  Improve LE strength grades by 1 to increase ease of functional mobility with transfers and gait  5  Pt will demonstrate improved balance by one grade in order to decrease risk of falls  6  Climb 2 steps indepdnently and 0 HR to simulate entrance to home  7  Improve barthel index to score of 75 for improved independence and decreased caregiver burden  PT Treatment Day 0   Plan   Treatment/Interventions Functional transfer training;LE strengthening/ROM; Elevations; Therapeutic exercise; Endurance training;Equipment eval/education; Bed mobility;Gait training;Spoke to nursing;OT   PT Frequency Other (Comment)  (3-5x/wk)   Recommendation   PT Discharge Recommendation Post acute rehabilitation services  (pending progress)   AM-PAC Basic Mobility Inpatient   Turning in Bed Without Bedrails 4   Lying on Back to Sitting on Edge of Flat Bed 3   Moving Bed to Chair 2   Standing Up From Chair 2   Walk in Room 1   Climb 3-5 Stairs 1   Basic Mobility Inpatient Raw Score 13   Basic Mobility Standardized Score 33 99   Barthel Index   Feeding 10   Bathing 0   Grooming Score 0   Dressing Score 5   Bladder Score 10   Bowels Score 10   Toilet Use Score 5   Transfers (Bed/Chair) Score 10   Mobility (Level Surface) Score 0   Stairs Score 0   Barthel Index Score 50   The patient's AM-PAC Basic Mobility Inpatient Short Form Raw Score is 13, Standardized Score is 33 99  A standardized score less than 42 9 suggests the patient may benefit from discharge to post-acute rehabilitation services  Please also refer to the recommendation of the Physical Therapist for safe discharge planning        Martinez Kowalski, PT, DPT

## 2021-06-24 NOTE — ASSESSMENT & PLAN NOTE
Lab Results   Component Value Date    EGFR 56 06/24/2021    EGFR 90 06/23/2021    EGFR 79 06/22/2021    CREATININE 1 28 06/24/2021    CREATININE 0 78 06/23/2021    CREATININE 0 96 06/22/2021     · Continue with 40 mg of p o   Lasix  · Patient's baseline creatinine between 1 14 and 1 2

## 2021-06-24 NOTE — QUICK NOTE
Received 9% Sepsis BPA  Chart reviewed, discussed with Primary RN  Patient reported to be improved from prior days  Primary RN unsure why alert fired as she she hasn't been in the patients chart  Vital reviewed, noted Temp 97 with documented SpO2 89%  No acute issues upon patient evaluation      Any further specific concerns, please do not hesitate to contact me directly [phone]

## 2021-06-24 NOTE — PROGRESS NOTES
Progress Note - Infectious Disease   Eudora Epley 65 y o  male MRN: [id number]  Unit/Bed#: S -01 Encounter: [id number]      Impression/Plan:  1  Sepsis  POA   With tachycardia, tachypnea, leukocytosis  In patient presenting with shortness of breath and acute respiratory failure; suspect pulmonary source as below  Admission blood cultures are negative  MRSA nares negative  Blood pressure improved with volume management    -monitoring off antibiotics hereafter  -monitor temperature and hemodynamics  -serial exam  -respiratory support  -monitor CBC and BMP      2  Acute hypoxic respiratory failure   In setting of shortness of breath, CT scan with ground glass opacities and consolidation at the bases with leukocytosis, elevated procalcitonin level   Consider bacterial/viral pneumonia status post COVID-19 PCR positive on May 11, 2021  Patient clinically stable on PO Lasix, IV steroid and s/p antibiotics  WBC count elevation likely steroid effect  06/20/2021 portable chest x-ray with persistent peripheral and basilar ground-glass opacities  CRP trending down, Ferritin 500s  -Completed 7 day total antibiotic course through 6/21/21  -monitor off antibotics hereafter  -monitor temperature and hemodynamics  -serial exam  -ongoing pulmonary follow-up and management  -IV steroid weaning per pulmonary  -respiratory support with Mid flow O2 at present  -monitor CBC and BMP   -monitor clinical response     3  Oral candidiasis and groin intertrigo  Oral thrush improving  -Continue Nystatin swish and swallow x 1 week   -Continue topical nystatin powder to groin      4  Renal Insufficiency/CKD III   POA  Creatinine 1 44 > 0 78  Likely prerenal in setting of #1    -renal dose adjust antibiotic as needed  -monitor BMP     5  Esophageal cancer   status post radiation therapy through April 2021 and now on Herceptin infusion monthly   Immunocompromised patient   With chronic dysphasia   No aspiration events on bedside swallow evaluation and on VBS  -symptomatic management per primary care team  -regular/thin liquid diet as per speech therapy recommendation     Antibiotics:  None D3     Above impression and plan discussed in detail with patient, RN, and primary care team      Subjective:  Patient reports feeling ok  He has no fever, chills, sweats overnight; no nausea, vomiting, diarrhea; patient always eats slow precautionarily since esophageal CA but denies choking or mitchell aspiration of food or drink, no increased cough,   shortness of breath better on Mid Flow NC at present; no pain complaints    Patient appears to be tolerating Nystatin S&S  His appetite is fair    Objective:  Vitals:  Temp:  [97 °F (36 1 °C)-98 3 °F (36 8 °C)] 97 °F (36 1 °C)  HR:  [] 75  Resp:  [16-18] 16  BP: (112-123)/(60-74) 114/74  SpO2:  [85 %-97 %] 91 %  Temp (24hrs), Av 7 °F (36 5 °C), Min:97 °F (36 1 °C), Max:98 3 °F (36 8 °C)  Current: Temperature: (!) 97 °F (36 1 °C)    Physical Exam:   General Appearance:  Alert, interactive, nontoxic, no acute conversational distress on 11 L mid flow statting 91%   Throat: Oropharynx moist, decreased white coating of buccal and pharyngeal mucosa   Lungs:   Fairly clear decreased breath sounds to auscultation bilaterally; no wheezes, rhonchi or rales; respirations unlabored   Heart:  RRR; no murmur   Abdomen:   Soft, non-tender, non-distended, positive bowel sounds  Extremities: No clubbing, cyanosis or edema   : Esposito with clear, yellow urine in bag, no SPT   Skin: No new rashes or lesions  Left arm IV site nontender  No draining wounds noted         Labs, Imaging, & Other studies:   All pertinent labs and imaging studies were personally reviewed  Results from last 7 days   Lab Units 21  0523 21  0607   WBC Thousand/uL 14 75* 14 26* 13 43*   HEMOGLOBIN g/dL 15 0 14 3 13 3   PLATELETS Thousands/uL 406* 390 383     Results from last 7 days   Lab Units 21 06/23/21  0652 06/22/21  0607   SODIUM mmol/L 134* 137 134*   POTASSIUM mmol/L 4 8 5 6* 5 5*   CHLORIDE mmol/L 97* 103 99*   CO2 mmol/L 28 22 27   BUN mg/dL 41* 31* 29*   CREATININE mg/dL 1 28 0 78 0 96   EGFR ml/min/1 73sq m 56 90 79   CALCIUM mg/dL 8 3 7 2* 8 6         Results from last 7 days   Lab Units 06/19/21  0533 06/18/21  1401   PROCALCITONIN ng/ml 0 13 0 21     Results from last 7 days   Lab Units 06/20/21  0440 06/19/21  0533 06/18/21  0741   CRP mg/L 18 5* 29 7* 52 3*     Results from last 7 days   Lab Units 06/20/21  0440 06/19/21  0533 06/18/21  0741   FERRITIN ng/mL 579* 558* 596*     Results from last 7 days   Lab Units 06/20/21  0440 06/19/21  0533 06/18/21  0741   D-DIMER QUANTITATIVE ug/ml FEU 1 56* 1 56* 1 79*

## 2021-06-24 NOTE — ASSESSMENT & PLAN NOTE
Symptoms: shortness of breath at rest and wheezing    Lab Results   Component Value Date    SARSCOV2 Negative 06/15/2021    SARSCOV2 Positive (A) 05/11/2021     · Imaging:  XR chest 1 view portable - Result Date: 6/15/2021  Impression: Moderate bilateral pulmonary infiltrates which are nonspecific and may represent pneumonia or edema  · CTA ED chest PE Study - Result Date: 6/15/2021  Impression: No pulmonary embolism  Diffuse groundglass opacities in the lungs  Differential considerations include bacterial and viral pneumonia (including COVID 19), and vascular congestion  Minimal left pleural effusion      Recent Labs     06/22/21  0607 06/23/21  0523 06/24/21  0449   WBC 13 43* 14 26* 14 75*     No results for input(s): FERRITIN, CRP, DDIMER in the last 72 hours  · On no supplemental oxygen at baseline, status post COVID infection 5/12/21  · Currently on mid-flow nasal cannula 11L  · No longer on BiPAP HS  · Started on IV Solu-Medrol and duo nebs  · Previously on trastuzumab for treatment of esophageal cancer  · Differential includes trastuzumab pneumonitis vs radiation pneumonitis (most recent radiation in April 2021) vs post COVID syndrome vs aspiration pneumonia  · Blood clx, legionella, strep Negative  · Completed antibiotics regimen 6/21  · VBS negative - regular thin liquids    Plan:  · Labs:  ? CBC tomorrow AM  · Supportive care:  ? Incentive spirometry  ? Guaifenesin 1200 mg q 12 hours  ? Xopenex t i d  Nebs  ? Continue mid flow and titrate As tolerated  ? Encourage out of bed and physical therapy along with repositioning  ? Taper Solu-Medrol 40 mg q 12 hours scheduled For total of 4 additional days followed by taper of q d  dosing and subsequent slow taper reducing by 10 mg of prednisone every 4 days  §  oral thrush from steroids have improved from yesterday on Nystatin  ? Tessalon Perles  ?  Patient also received additional dose of Lasix yesterday would do was spiking creatinine from 0 78 -1 28 -hold off on further diuresis

## 2021-06-24 NOTE — PROGRESS NOTES
[facility]  Progress Note Deepti Mcrae 1950, 65 y o  male MRN: [id number]  Unit/Bed#: S -01 Encounter: [id number]  Primary Care Provider: Heather Alex DO   Date and time admitted to hospital: 6/15/2021  7:06 AM    * Acute respiratory failure with hypoxia [facility])  Assessment & Plan  Symptoms: shortness of breath at rest and wheezing    Lab Results   Component Value Date    SARSCOV2 Negative 06/15/2021    SARSCOV2 Positive (A) 05/11/2021     · Imaging:  XR chest 1 view portable - Result Date: 6/15/2021  Impression: Moderate bilateral pulmonary infiltrates which are nonspecific and may represent pneumonia or edema  · CTA ED chest PE Study - Result Date: 6/15/2021  Impression: No pulmonary embolism  Diffuse groundglass opacities in the lungs  Differential considerations include bacterial and viral pneumonia (including COVID 19), and vascular congestion  Minimal left pleural effusion      Recent Labs     06/22/21  0607 06/23/21  0523 06/24/21  0449   WBC 13 43* 14 26* 14 75*     No results for input(s): FERRITIN, CRP, DDIMER in the last 72 hours  · On no supplemental oxygen at baseline, status post COVID infection 5/12/21  · Currently on mid-flow nasal cannula 11L  · No longer on BiPAP HS  · Started on IV Solu-Medrol and duo nebs  · Previously on trastuzumab for treatment of esophageal cancer  · Differential includes trastuzumab pneumonitis vs radiation pneumonitis (most recent radiation in April 2021) vs post COVID syndrome vs aspiration pneumonia  · Blood clx, legionella, strep Negative  · Completed antibiotics regimen 6/21  · VBS negative - regular thin liquids    Plan:  · Labs:  ? CBC tomorrow AM  · Supportive care:  ? Incentive spirometry  ? Guaifenesin 1200 mg q 12 hours  ? Xopenex t i d  Nebs  ? Continue mid flow and titrate As tolerated  ? Encourage out of bed and physical therapy along with repositioning  ?  Taper Solu-Medrol 40 mg q 12 hours scheduled For total of 4 additional days followed by taper of q d  dosing and subsequent slow taper reducing by 10 mg of prednisone every 4 days  §  oral thrush from steroids have improved from yesterday on Nystatin  ? Tessalon Perles  ? Patient also received additional dose of Lasix yesterday would do was spiking creatinine from 0 78 -1 28 -hold off on further diuresis    Esophageal cancer   Assessment & Plan  History of some vaginal cancer since 2017 status post radiation and 12 cycle of FOLFOX -6 and continues to remain on Herceptin (trastuzumab)    Radiation versus came may be also contributing factor to patient's presentation  Previous showing mild oropharyngeal dysphagia which may be effect radiation as well    Plan:  · Recommend changing from trastuzumab to alternate therapy  · Recommend outpatient follow up with Heme-Onc    Subjective: The patient states he has no complaints and notes that his symptoms are improving  Denies any wheezes, chest pain, cough  No productive sputum noted either  No acute events reported overnight with nursing  PT/OT has not evaluated him after desaturation incident yesterday but patient has been instructed to reposition himself and also discussed with nursing to get patient of bed and placed on chair  All questions answered  Objective:    Vitals: Blood pressure 114/74, pulse 75, temperature (!) 97 °F (36 1 °C), resp  rate 16, height 5' 7" (1 702 m), weight 76 2 kg (167 lb 15 9 oz), SpO2 (!) 89 %  ,Body mass index is 26 31 kg/m²        Intake/Output Summary (Last 24 hours) at 6/24/2021 0947  Last data filed at 6/24/2021 0601  Gross per 24 hour   Intake 540 ml   Output 1025 ml   Net -485 ml       Invasive Devices     Central Venous Catheter Line            Port A Cath 08/28/17 Right Chest 1396 days          Peripheral Intravenous Line            Long-Dwell Peripheral IV (Midline) 79/95/56 Left Cephalic 2 days                Physical Exam:    Physical Exam  Vitals and nursing note reviewed  Constitutional:       Appearance: Normal appearance  Interventions: Nasal cannula in place  Comments: [address] 11L   HENT:      Head: Normocephalic and atraumatic  Right Ear: Tympanic membrane normal       Left Ear: Tympanic membrane normal       Nose: Nose normal       Mouth/Throat:      Mouth: Mucous membranes are dry  Eyes:      Extraocular Movements: Extraocular movements intact  Pupils: Pupils are equal, round, and reactive to light  Cardiovascular:      Rate and Rhythm: Normal rate and regular rhythm  Pulses: Normal pulses  Heart sounds: No murmur heard  No gallop  Pulmonary:      Effort: Pulmonary effort is normal  No respiratory distress  Breath sounds: No stridor  Examination of the right-middle field reveals wheezing  Examination of the left-middle field reveals wheezing  Wheezing (Mild) present  No rhonchi or rales  Chest:      Chest wall: No tenderness  Abdominal:      General: Abdomen is flat  Bowel sounds are normal  There is no distension  Palpations: Abdomen is soft  Tenderness: There is no abdominal tenderness  Musculoskeletal:         General: No swelling  Right lower leg: No edema  Left lower leg: No edema  Skin:     General: Skin is warm and dry  Neurological:      General: No focal deficit present  Mental Status: He is alert and oriented to person, place, and time  Cranial Nerves: No cranial nerve deficit  Motor: No weakness  Psychiatric:         Mood and Affect: Mood normal          Behavior: Behavior normal  Behavior is cooperative  Thought Content: Thought content normal          Judgment: Judgment normal          Labs: I have personally reviewed pertinent lab results  Imaging and other studies: I have personally reviewed pertinent reports     and I have personally reviewed pertinent films in PACS

## 2021-06-24 NOTE — ASSESSMENT & PLAN NOTE
2/2 to IV steroids  · Carb controlled diet   · Hypoglycemia protocol  · Will start patient on 10 units Lantus (patient's daily requirement calculus to 20 units, can titrate up to this if BGs are not control)  · Sliding scale algorithm 3

## 2021-06-24 NOTE — ASSESSMENT & PLAN NOTE
POA   Patient is not on any home oxygen  Patient was on med for yesterday, is now requiring high-flow oxygen  Patient desaturated last night, an x-ray was obtained  On my read patient's x-ray does not show any evidence of worsening pulmonary edema or cephalization  Plan:  · continue nebulizations as needed  · Patient's IV Solu-Medrol 40 mg weaned down to q 12 yesterday  · Will try to wean patient down to 40 q d  Tomorrow  · Continue BiPAP:0 5 mg Ativan p r n  B i d  to help patient tolerate BiPAP  · Continue 40 mg of p o  Lasix  Was given extra dose of lasix yesterday with good response     · No change to PE (rales/ronchi present)   · Will Continue Spiriva  · Incentive spirometry  · Appreciate pulmonology recommendations  · Titrate oxygen to maintain SpO2 88%

## 2021-06-24 NOTE — PLAN OF CARE
Problem: PHYSICAL THERAPY ADULT  Goal: Performs mobility at highest level of function for planned discharge setting  See evaluation for individualized goals  Description: Treatment/Interventions: Functional transfer training, LE strengthening/ROM, Elevations, Therapeutic exercise, Endurance training, Equipment eval/education, Bed mobility, Gait training, Spoke to nursing          See flowsheet documentation for full assessment, interventions and recommendations  Outcome: Progressing  Note: Prognosis: Poor  Problem List: Decreased strength, Decreased range of motion, Decreased endurance, Impaired balance, Decreased mobility, Decreased skin integrity  Assessment: Patient agreeable and motivated to participate in therapy session  Patient remains consistent with supervision for supine<>sit transfers with instruction for technique and pacing  Pt tolerated sitting EOB x 4 minutes with decreasing SpO2 to 84-86% on 11L mid flow and nonrebreather mask  Pt demonstrated ability to reposition and bridge in bed for pad adjustment with no assistance  Provided and reviewed supine B LE exercise program with AROM and expressed understanding  Reviewed and educated patient on pacing and hold times with expressed understanding  Continue to focus on bed mobility and sitting EOB tolerance with progression to transfer trials as appropriate and able  PT Discharge Recommendation: Post acute rehabilitation services          See flowsheet documentation for full assessment

## 2021-06-24 NOTE — PROGRESS NOTES
BG taken at 1758 was 432, however patient was eating at that time    Will retake blood glucose 2 hours after meal @ 2000

## 2021-06-24 NOTE — PHYSICAL THERAPY NOTE
PHYSICAL THERAPY NOTE    Patient Name: Zurdo Cruz  WDYOW'O Date: 21 1540   PT Last Visit   PT Visit Date 21   Note Type   Note Type Treatment   Pain Assessment   Pain Assessment Tool Pain Assessment not indicated - pt denies pain   Pain Score No Pain   Restrictions/Precautions   Weight Bearing Precautions Per Order No   Other Precautions Chair Alarm; Bed Alarm;Telemetry;O2;Fall Risk  (11L mid flow, non rebreather with mobility as needed)   General   Family/Caregiver Present Yes   Subjective   Subjective Patient supine in bed and is agreeable to participate in therapy session  Patient identifers obtained from name &   Bed Mobility   Supine to Sit 5  Supervision   Additional items Assist x 1;HOB elevated; Bedrails; Increased time required;Verbal cues   Sit to Supine 5  Supervision   Additional items Assist x 1;HOB elevated; Bedrails; Increased time required;Verbal cues   Additional Comments Pt supine in bed post session with call bell and belongings in reach   Balance   Static Sitting Fair -   Endurance Deficit   Endurance Deficit Yes   Endurance Deficit Description limited sitting tolerance with decreasing SpO2 to 84-86% on 11L mid flow and nonrebreather mask   Activity Tolerance   Activity Tolerance Patient limited by fatigue   Medical Staff Made Aware Spoke to Tiffany Hassan RN   Exercises   [employer] Supine;10 reps;AROM; Bilateral   Heelslides Supine;5 reps;AROM; Bilateral   Hip Abduction Supine;5 reps;AROM; Bilateral   Ankle Pumps Supine;15 reps;AROM; Bilateral   Assessment   Prognosis Poor   Problem List Decreased strength;Decreased range of motion;Decreased endurance; Impaired balance;Decreased mobility; Decreased skin integrity   Assessment Patient agreeable and motivated to participate in therapy session   Patient remains consistent with supervision for supine<>sit transfers with instruction for technique and pacing  Pt tolerated sitting EOB x 4 minutes with decreasing SpO2 to 84-86% on 11L mid flow and nonrebreather mask  Pt demonstrated ability to reposition and bridge in bed for pad adjustment with no assistance  Provided and reviewed supine B LE exercise program with AROM and expressed understanding  Reviewed and educated patient on pacing and hold times with expressed understanding  Continue to focus on bed mobility and sitting EOB tolerance with progression to transfer trials as appropriate and able  Goals   Patient Goals to get better   STG Expiration Date 07/03/21   PT Treatment Day 1   Plan   Treatment/Interventions Functional transfer training;LE strengthening/ROM; Elevations; Therapeutic exercise; Endurance training;Equipment eval/education; Bed mobility;Gait training;Spoke to nursing   PT Frequency Other (Comment)  (3-5x/week)   Recommendation   PT Discharge Recommendation Post acute rehabilitation services   AM-PAC Basic Mobility Inpatient   Turning in Bed Without Bedrails 4   Lying on Back to Sitting on Edge of Flat Bed 4   Moving Bed to Chair 2   Standing Up From Chair 2   Walk in Room 1   Climb 3-5 Stairs 1   Basic Mobility Inpatient Raw Score 14   Basic Mobility Standardized Score 35 55     The patient's AM-PAC Basic Mobility Inpatient Short Form Raw Score is 14, Standardized Score is 35 55  A standardized score less than 42 9 suggests the patient may benefit from discharge to post-acute rehabilitation services  Please also refer to the recommendation of the Physical Therapist for safe discharge planning        Jamie Salmon PTA

## 2021-06-24 NOTE — PROGRESS NOTES
[facility]  Progress Note Star Cancer 1950, 65 y o  male MRN: [id number]  Unit/Bed#: S -01 Encounter: [id number]  Primary Care Provider: Luigi Baldwin DO   Date and time admitted to hospital: 6/15/2021  7:06 AM    * Acute respiratory failure with hypoxia ([address] )  Assessment & Plan  POA  Patient is not on any home oxygen  Patient was on med for yesterday, is now requiring high-flow oxygen  Patient desaturated last night, an x-ray was obtained  On my read patient's x-ray does not show any evidence of worsening pulmonary edema or cephalization  Plan:  · continue nebulizations as needed  · Patient's IV Solu-Medrol 40 mg weaned down to q 12 yesterday  · Will try to wean patient down to 40 q d  Tomorrow  · Continue BiPAP:0 5 mg Ativan p r n  B i d  to help patient tolerate BiPAP  · Continue 40 mg of p o  Lasix  Was given extra dose of lasix yesterday with good response  · No change to PE (rales/ronchi present)   · Will Continue Spiriva  · Incentive spirometry  · Appreciate pulmonology recommendations  · Titrate oxygen to maintain SpO2 88%    Type 2 diabetes mellitus, without long-term current use of insulin (HCC)  Assessment & Plan  2/2 to IV steroids  · Carb controlled diet   · Hypoglycemia protocol  · Will start patient on 10 units Lantus (patient's daily requirement calculus to 20 units, can titrate up to this if BGs are not control)  · Sliding scale algorithm 3    Pneumonia  Assessment & Plan  · Patient had COVID 19 about a month ago and he is also immunocompromised from his esophageal cancer  · Patient's inflammatory markers trending down  · Treatment with antibiotics completed as of 06/21/2021    Esophageal cancer   Assessment & Plan  · Outpatient follow-up with Oncology  · Patient stated that he last had radiation about a month ago  · He is not on any special diet    Dysphagia  Assessment & Plan  Can continue regular diet   no risk of aspiration seen on VBS    Acute renal failure superimposed on stage 3 chronic kidney disease [facility])  Assessment & Plan  Lab Results   Component Value Date    EGFR 56 06/24/2021    EGFR 90 06/23/2021    EGFR 79 06/22/2021    CREATININE 1 28 06/24/2021    CREATININE 0 78 06/23/2021    CREATININE 0 96 06/22/2021     · Continue with 40 mg of p o  Lasix  · Patient's baseline creatinine between 1 14 and 1 2    Moderate protein-calorie malnutrition (HCC)  Assessment & Plan  Malnutrition Findings:   Adult Malnutrition type: Acute illness (in the setting of chronic illness)  Adult Degree of Malnutrition: Malnutrition of moderate degree (related to catabolic illness, respiratory distress)    BMI Findings: Body mass index is 26 31 kg/m²  PAD (peripheral artery disease)  Assessment & Plan  · Continue aspirin and statin    Carotid stenosis  Assessment & Plan  · Continue aspirin and statin    Essential hypertension  Assessment & Plan  · Patient is becoming hypotensive, likely secondary to hypoxia  · Continue amlodipine  · Holding metoprolol for now    GERD (gastroesophageal reflux disease)  Assessment & Plan  · Continue Protonix    Transaminitis-resolved as of 6/18/2021  Assessment & Plan  · Patient's transaminitis likely secondary to shock level in the setting of sepsis  · Patient's transaminitis improved today with resolution of ARISTIDES and sepsis            VTE Pharmacologic Prophylaxis:     Moderate Risk (Score 3-4) - Pharmacological DVT Prophylaxis Ordered: Enoxaparin (Lovenox)  Mechanical VTE Prophylaxis in Place: Yes    Patient Centered Rounds: I have performed bedside rounds with nursing staff today  Discussions with Specialists or Other Care Team Provider:  Pulmonology/infectious disease    Education and Discussions with Family / Patient: Discussed with patient's significant other yesterday  Will updated today as well      Current Length of Stay: 9 day(s)    Current Patient Status: Inpatient     Discharge Plan / Estimated Discharge Date: Anticipate discharge in 48-72 hrs to rehab facility  Code Status: Level 1 - Full Code      Subjective:   Patient is feeling better today  Is down from high-flow 2 days ago to 11 L on med tele  Patient is no longer dyspneic during conversations  Patient to try physical therapy  Objective:     Vitals:   Temp (24hrs), Av 7 °F (36 5 °C), Min:97 °F (36 1 °C), Max:98 3 °F (36 8 °C)    Temp:  [97 °F (36 1 °C)-98 3 °F (36 8 °C)] 97 °F (36 1 °C)  HR:  [] 75  Resp:  [16-21] 16  BP: (112-124)/(60-74) 114/74  SpO2:  [85 %-97 %] 89 %  Body mass index is 26 31 kg/m²  Input and Output Summary (last 24 hours): Intake/Output Summary (Last 24 hours) at 2021 0805  Last data filed at 2021 0601  Gross per 24 hour   Intake 540 ml   Output 1225 ml   Net -685 ml       Physical Exam:     Physical Exam  Vitals and nursing note reviewed  Constitutional:       Appearance: He is well-developed  HENT:      Head: Normocephalic and atraumatic  Eyes:      Conjunctiva/sclera: Conjunctivae normal    Cardiovascular:      Rate and Rhythm: Normal rate  Rhythm irregular  Heart sounds: No murmur heard  Pulmonary:      Effort: Pulmonary effort is normal  No respiratory distress  Breath sounds: Examination of the right-middle field reveals rhonchi  Examination of the left-middle field reveals rhonchi  Examination of the right-lower field reveals rhonchi  Examination of the left-lower field reveals rhonchi  Rhonchi present  No wheezing or rales  Abdominal:      Palpations: Abdomen is soft  Tenderness: There is no abdominal tenderness  Musculoskeletal:      Cervical back: Neck supple  Right lower leg: No edema  Left lower leg: No edema  Skin:     General: Skin is warm and dry  Capillary Refill: Capillary refill takes less than 2 seconds  Neurological:      General: No focal deficit present  Mental Status: He is alert     Psychiatric:         Mood and Affect: Mood normal          Behavior: Behavior normal           Additional Data:     Labs:  Results from last 7 days   Lab Units 06/24/21  0449   WBC Thousand/uL 14 75*   HEMOGLOBIN g/dL 15 0   HEMATOCRIT % 45 5   PLATELETS Thousands/uL 406*   BANDS PCT % 1   LYMPHO PCT % 3*   MONO PCT % 2*   EOS PCT % 1     Results from last 7 days   Lab Units 06/24/21  0449   SODIUM mmol/L 134*   POTASSIUM mmol/L 4 8   CHLORIDE mmol/L 97*   CO2 mmol/L 28   BUN mg/dL 41*   CREATININE mg/dL 1 28   ANION GAP mmol/L 9   CALCIUM mg/dL 8 3   GLUCOSE RANDOM mg/dL 360*         Results from last 7 days   Lab Units 06/24/21  0738 06/24/21  0736 06/24/21  0610 06/23/21  2345 06/23/21  1734 06/23/21  1155   POC GLUCOSE mg/dl 299* 300* 304* 269* 336* 336*     Results from last 7 days   Lab Units 06/23/21  0523   HEMOGLOBIN A1C % 6 5*     Results from last 7 days   Lab Units 06/19/21  0533 06/18/21  1401   PROCALCITONIN ng/ml 0 13 0 21       Imaging: No pertinent imaging reviewed      Recent Cultures (last 7 days):           Lines/Drains:  Invasive Devices     Central Venous Catheter Line            Port A Cath 08/28/17 Right Chest 1395 days          Peripheral Intravenous Line            Long-Dwell Peripheral IV (Midline) 12/65/78 Left Cephalic 2 days                Telemetry:        Last 24 Hours Medication List:   Current Facility-Administered Medications   Medication Dose Route Frequency Provider Last Rate    acetaminophen  650 mg Oral Q6H PRN Tita Sierra MD      amLODIPine  10 mg Oral Daily Tita Sierra MD      aspirin  81 mg Oral Daily Tita Sierra MD      barium sulfate  1 tablet Oral Once in imaging Francisco J Rosenberg DO      benzonatate  100 mg Oral TID PRN Lupillo Johnson PA-C      docusate sodium  100 mg Oral Daily PRN Blaze Velázquez MD      enoxaparin  40 mg Subcutaneous Daily Francisco J Rosenberg DO      furosemide  40 mg Oral Daily Demian Serna MD      guaiFENesin  1,200 mg Oral Q12H [facility] Kyle Crowley MD  insulin glargine  10 Units Subcutaneous HS Arlene Hurtado MD      insulin lispro  1-6 Units Subcutaneous 4 times day Arlene Hurtado MD      levalbuterol  1 25 mg Nebulization TID Bret Bess MD      lidocaine   Topical Daily PRN Chidi Burgos MD      LORazepam  0 5 mg Oral BID PRN Arlene Hurtado MD      melatonin  9 mg Oral HS Arlene Hurtado MD      methylPREDNISolone sodium succinate  40 mg Intravenous Q12H [facility] Francisco J Rosenberg DO      nystatin  500,000 Units Swish & Swallow 4x Daily Katie Sanabria PA-C      nystatin   Topical TID Bret Bess MD      ondansetron  8 mg Intravenous Q6H PRN Chidi Burgos MD      pantoprazole  40 mg Oral Early Morning Chidi Burgos MD      polyethylene glycol  17 g Oral Once Arlene Hurtado MD      sodium chloride  3 mL Nebulization TID Bret Bess MD      tiotropium  18 mcg Inhalation Daily Galena DO Alberto          Today, Patient Was Seen By: Arlene Hurtado MD    ** Please Note: This note has been constructed using a voice recognition system   **

## 2021-06-25 PROBLEM — I48.0 PAROXYSMAL A-FIB (HCC): Status: ACTIVE | Noted: 2021-01-01

## 2021-06-25 NOTE — ASSESSMENT & PLAN NOTE
Lab Results   Component Value Date    EGFR 56 06/24/2021    EGFR 90 06/23/2021    EGFR 79 06/22/2021    CREATININE 1 28 06/24/2021    CREATININE 0 78 06/23/2021    CREATININE 0 96 06/22/2021     · Patient still at baseline creatinine of 1 14-1 12  · Will hold Lasix today  · Consider resuming once BMP is back

## 2021-06-25 NOTE — ASSESSMENT & PLAN NOTE
POA   Patient is not on any home oxygen  Patient is now on mid flow of 10 L     Plan:  · Continue nebulizations as needed  · Patient's IV Solu-Medrol 40 mg weaned down to q 12 yesterday  · Continue BiPAP:0 5 mg Ativan p r n  B i d  to help patient tolerate BiPAP  · Will hold off Lasix for now    Re-evaluate after BMP  · Will Continue Spiriva  · Incentive spirometry  · Appreciate pulmonology recommendations  · Titrate oxygen to maintain SpO2 88%

## 2021-06-25 NOTE — PROGRESS NOTES
[facility]  Progress Note Flakito Cook 1950, 65 y o  male MRN: [id number]  Unit/Bed#: S -01 Encounter: [id number]  Primary Care Provider: Piter Cope DO   Date and time admitted to hospital: 6/15/2021  7:06 AM    * Acute respiratory failure with hypoxia [facility])  Assessment & Plan  Symptoms: shortness of breath at rest and wheezing    Lab Results   Component Value Date    SARSCOV2 Negative 06/15/2021    SARSCOV2 Positive (A) 05/11/2021     · Imaging:  XR chest 1 view portable - Result Date: 6/15/2021  Impression: Moderate bilateral pulmonary infiltrates which are nonspecific and may represent pneumonia or edema  · CTA ED chest PE Study - Result Date: 6/15/2021  Impression: No pulmonary embolism  Diffuse groundglass opacities in the lungs  Differential considerations include bacterial and viral pneumonia (including COVID 19), and vascular congestion  Minimal left pleural effusion      Recent Labs     06/23/21  0523 06/24/21  0449 06/25/21  0545   WBC 14 26* 14 75* 12 66*     No results for input(s): FERRITIN, CRP, DDIMER in the last 72 hours  · On no supplemental oxygen at baseline, status post COVID infection 5/12/21  · Currently on mid-flow nasal cannula 10L  · Previously on trastuzumab for treatment of esophageal cancer  · Differential includes most likely post COVID syndrome vs trastuzumab pneumonitis vs radiation pneumonitis (most recent radiation in April 2021) vs aspiration pneumonia (less likely given negative VBS)  · Blood clx, legionella, strep Negative  · Completed antibiotics regimen 6/21  · VBS negative - regular thin liquids  · Patient having more wheezes on exam today compared to yesterday  · Patient had desaturation events while working with physical therapy to get to edge of bed, desaturated down to 82%    Plan:  · Labs:  ? BMP/CBC tomorrow AM  · Supportive care:  ? Incentive spirometry  ? Guaifenesin 1200 mg q 12 hours  ?  Xopenex t i d  Nebs  ? Continue mid flow and titrate As tolerated  ? Encourage out of bed and physical therapy along with repositioning  ? Taper Solu-Medrol 40 mg q 12 hours scheduled For total of 4 additional days followed by taper of q d  dosing and subsequent slow taper reducing by 10 mg of prednisone every 4 days  § Further prolong steroid taper due to presence of wheezes today; continue Solu-Medrol 40 q 12h  § Patient concerned about persistent sure use as he has been having worsening difficulty with sleeping ever since being started on steroids  §  oral thrush from steroids improving on Nystatin  ? Tessalon Perles  ? Not currently on diuretics and continue to hold off due to ARISTIDES    Subjective: Today, patient states feels slightly improved from yesterday  However, patient noted to be saturating 83% while talking and on nasal cannula of 11 L  Discussed with patient importance of repositioning which patient understands and states he is doing regularly  Patient has concerns about continued use of steroids that he has been having difficulty sleeping ever since starting on steroids  Objective:    Vitals: Blood pressure 115/68, pulse (!) 115, temperature 97 8 °F (36 6 °C), resp  rate 18, height 5' 7" (1 702 m), weight 76 2 kg (167 lb 15 9 oz), SpO2 92 %  ,Body mass index is 26 31 kg/m²  Intake/Output Summary (Last 24 hours) at 6/25/2021 1013  Last data filed at 6/25/2021 0234  Gross per 24 hour   Intake 0 ml   Output 750 ml   Net -750 ml       Invasive Devices     Central Venous Catheter Line            Port A Cath 08/28/17 Right Chest 1397 days          Peripheral Intravenous Line            Long-Dwell Peripheral IV (Midline) 42/28/03 Left Cephalic 3 days                Physical Exam:    Physical Exam  Vitals and nursing note reviewed  Constitutional:       Appearance: Normal appearance  Interventions: Nasal cannula in place  Comments: [address] 11L   HENT:      Head: Normocephalic and atraumatic        Right Ear: Tympanic membrane normal       Left Ear: Tympanic membrane normal       Nose: Nose normal       Mouth/Throat:      Mouth: Mucous membranes are dry  Eyes:      Extraocular Movements: Extraocular movements intact  Pupils: Pupils are equal, round, and reactive to light  Cardiovascular:      Rate and Rhythm: Normal rate and regular rhythm  Pulses: Normal pulses  Heart sounds: No murmur heard  No gallop  Pulmonary:      Effort: Pulmonary effort is normal  No respiratory distress  Breath sounds: No stridor  Examination of the right-upper field reveals wheezing  Examination of the left-upper field reveals wheezing  Examination of the right-middle field reveals wheezing  Examination of the left-middle field reveals wheezing  Examination of the right-lower field reveals wheezing  Examination of the left-lower field reveals wheezing  Wheezing (Wheezes worsening compared to yesterday) and rales (Fine rales present) present  No rhonchi  Chest:      Chest wall: No tenderness  Abdominal:      General: Abdomen is flat  Bowel sounds are normal  There is no distension  Palpations: Abdomen is soft  Tenderness: There is no abdominal tenderness  Musculoskeletal:         General: No swelling  Right lower leg: No edema  Left lower leg: No edema  Skin:     General: Skin is warm and dry  Neurological:      General: No focal deficit present  Mental Status: He is alert and oriented to person, place, and time  Cranial Nerves: No cranial nerve deficit  Motor: No weakness  Psychiatric:         Mood and Affect: Mood normal          Behavior: Behavior normal  Behavior is cooperative  Thought Content: Thought content normal          Judgment: Judgment normal          Labs: I have personally reviewed pertinent lab results  Imaging and other studies: I have personally reviewed pertinent reports     and I have personally reviewed pertinent films in PACS

## 2021-06-25 NOTE — ASSESSMENT & PLAN NOTE
· Patient's EKG shows paroxysmal atrial fibrillation  Patient's AFib likely secondary to acute respiratory failure with hypoxia  · Patient is currently rate controlled with metoprolol  · Chads Vasc score:  3   Will start patient on heparin drip and try to bridge to warfarin or Eliquis  · Given patient is rate controlled and asymptomatic will hold off on Cardiology consult

## 2021-06-25 NOTE — QUICK NOTE
Received 9% Sepsis BPA this am again    Chart reviewed, discussed with Primary RN whom has no specific concerns, currently being closely monitored off antibiotics, id following  Vital reviewed, noted Temp 97 8, , SpO2 91% currently on 10L via Midflow  No acute issues upon patient evaluation  Any further specific concerns, please do not hesitate to contact me directly [phone]

## 2021-06-25 NOTE — PROGRESS NOTES
Progress Note - Infectious Disease   Marybeth Sexton 65 y o  male MRN: [id number]  Unit/Bed#: S -01 Encounter: [id number]      Impression/Plan:  1  Sepsis  POA   With tachycardia, tachypnea, leukocytosis  In patient presenting with shortness of breath and acute respiratory failure; suspect pulmonary source as below  Admission blood cultures are negative  MRSA nares negative  Blood pressure improved with volume management    -monitoring off antibiotics hereafter  -monitor temperature and hemodynamics  -serial exam  -respiratory support  -monitor CBC and BMP      2  Acute hypoxic respiratory failure   In setting of shortness of breath, CT scan with ground glass opacities and consolidation at the bases with leukocytosis, elevated procalcitonin level   Consider bacterial/viral pneumonia status post COVID-19 PCR positive on May 11, 2021  Patient clinically stable on PO Lasix, IV steroid and s/p antibiotics  WBC count elevation likely steroid effect  06/20/2021 portable chest x-ray with persistent peripheral and basilar ground-glass opacities  CRP trending down, Ferritin 500s  -Completed 7 day total antibiotic course through 6/21/21  -monitor off antibotics hereafter  -monitor temperature and hemodynamics  -serial exam  -ongoing pulmonary follow-up and management  -IV steroid weaning per pulmonary   -respiratory support with Mid flow O2 at present  -monitor CBC and BMP   -monitor clinical response     3  Oral candidiasis and groin intertrigo  Oral thrush improving  -Continue Nystatin swish and swallow x 1 week through 6/28/21  -Continue topical nystatin powder to groin      4  Renal Insufficiency/CKD III   POA  Creatinine 1 44 > 0 78  Likely prerenal in setting of #1    -renal dose adjust antibiotic as needed  -monitor BMP     5  Esophageal cancer   status post radiation therapy through April 2021 and now on Herceptin infusion monthly   Immunocompromised patient   With chronic dysphasia   No aspiration events on bedside swallow evaluation and on VBS  -symptomatic management per primary care team  -regular/thin liquid diet as per speech therapy recommendation     Antibiotics:  None D4     Above impression and plan discussed in detail with patient, RN, and primary care team   We will see patient again 21 if here  Please call ID on call physician in meantime if questions  Subjective:  Patient reports feeling ok  He has no fever, chills, sweats overnight; no nausea, vomiting, diarrhea; patient always eats slow precautionarily since esophageal CA and denies choking or mitchell aspiration of food or drink, no increased cough,   shortness of breath better on Mid Flow NC at present; no pain complaints    Patient is tolerating Nystatin S&S  His appetite is fair    Objective:  Vitals:  Temp:  [97 6 °F (36 4 °C)-98 5 °F (36 9 °C)] 97 6 °F (36 4 °C)  HR:  [] 65  Resp:  [18] 18  BP: (101-141)/(64-73) 122/73  SpO2:  [88 %-96 %] 88 %  Temp (24hrs), Av °F (36 7 °C), Min:97 6 °F (36 4 °C), Max:98 5 °F (36 9 °C)  Current: Temperature: 97 6 °F (36 4 °C)    Physical Exam:   General Appearance:  Alert, interactive, nontoxic, no acute conversational distress on 10 L midflow statting 92%   Throat: Oropharynx moist without lesions, white coating decreasing   Lungs:   Fairly clear decreased breath sounds to auscultation bilaterally; no wheezes, rhonchi or rales; respirations unlabored at rest   Heart:  RRR; no murmur   Abdomen:   Soft, non-tender, non-distended, positive bowel sounds  Extremities: No clubbing, cyanosis or edema   : No fritz, no SPT   Skin: No new rashes or lesions  arm IV site nontender  No draining wounds noted         Labs, Imaging, & Other studies:   All pertinent labs and imaging studies were personally reviewed  Results from last 7 days   Lab Units 21  0545 21  0449 21  0523   WBC Thousand/uL 12 66* 14 75* 14 26*   HEMOGLOBIN g/dL 14 7 15 0 14 3   PLATELETS Thousands/uL 332 406* 390 Results from last 7 days   Lab Units 06/25/21  0721 06/24/21  0449 06/23/21  0652   SODIUM mmol/L 133* 134* 137   POTASSIUM mmol/L 5 0 4 8 5 6*   CHLORIDE mmol/L 98* 97* 103   CO2 mmol/L 29 28 22   BUN mg/dL 42* 41* 31*   CREATININE mg/dL 1 24 1 28 0 78   EGFR ml/min/1 73sq m 58 56 90   CALCIUM mg/dL 8 6 8 3 7 2*         Results from last 7 days   Lab Units 06/19/21  0533 06/18/21  1401   PROCALCITONIN ng/ml 0 13 0 21     Results from last 7 days   Lab Units 06/20/21  0440 06/19/21  0533   CRP mg/L 18 5* 29 7*     Results from last 7 days   Lab Units 06/20/21  0440 06/19/21  0533   FERRITIN ng/mL 579* 558*     Results from last 7 days   Lab Units 06/20/21  0440 06/19/21  0533   D-DIMER QUANTITATIVE ug/ml FEU 1 56* 1 56*

## 2021-06-25 NOTE — ASSESSMENT & PLAN NOTE
· Patient is becoming hypotensive, likely secondary to hypoxia  · Continue amlodipine  · Will resume metoprolol at a lower dose

## 2021-06-25 NOTE — ASSESSMENT & PLAN NOTE
Symptoms: shortness of breath at rest and wheezing    Lab Results   Component Value Date    SARSCOV2 Negative 06/15/2021    SARSCOV2 Positive (A) 05/11/2021     · Imaging:  XR chest 1 view portable - Result Date: 6/15/2021  Impression: Moderate bilateral pulmonary infiltrates which are nonspecific and may represent pneumonia or edema  · CTA ED chest PE Study - Result Date: 6/15/2021  Impression: No pulmonary embolism  Diffuse groundglass opacities in the lungs  Differential considerations include bacterial and viral pneumonia (including COVID 19), and vascular congestion  Minimal left pleural effusion      Recent Labs     06/23/21  0523 06/24/21  0449 06/25/21  0545   WBC 14 26* 14 75* 12 66*     No results for input(s): FERRITIN, CRP, DDIMER in the last 72 hours  · On no supplemental oxygen at baseline, status post COVID infection 5/12/21  · Currently on mid-flow nasal cannula 10L  · Previously on trastuzumab for treatment of esophageal cancer  · Differential includes most likely post COVID syndrome vs trastuzumab pneumonitis vs radiation pneumonitis (most recent radiation in April 2021) vs aspiration pneumonia (less likely given negative VBS)  · Blood clx, legionella, strep Negative  · Completed antibiotics regimen 6/21  · VBS negative - regular thin liquids  · Patient having more wheezes on exam today compared to yesterday  · Patient had desaturation events while working with physical therapy to get to edge of bed, desaturated down to 82%    Plan:  · Labs:  ? BMP/CBC tomorrow AM  · Supportive care:  ? Incentive spirometry  ? Guaifenesin 1200 mg q 12 hours  ? Xopenex t i d  Nebs  ? Continue mid flow and titrate As tolerated  ? Encourage out of bed and physical therapy along with repositioning  ?  Taper Solu-Medrol 40 mg q 12 hours scheduled For total of 4 additional days followed by taper of q d  dosing and subsequent slow taper reducing by 10 mg of prednisone every 4 days  § Further prolong steroid taper due to presence of wheezes today; continue Solu-Medrol 40 q 12h  § Patient concerned about persistent sure use as he has been having worsening difficulty with sleeping ever since being started on steroids  §  oral thrush from steroids improving on Nystatin  ? Tessalon Perles  ?  Not currently on diuretics and continue to hold off due to ARISTIDES

## 2021-06-25 NOTE — PROGRESS NOTES
[facility]  Progress Note Skye Sánchez 1950, 65 y o  male MRN: [id number]  Unit/Bed#: S -01 Encounter: [id number]  Primary Care Provider: Zion Garduno DO   Date and time admitted to hospital: 6/15/2021  7:06 AM    * Acute respiratory failure with hypoxia ([address] )  Assessment & Plan  POA  Patient is not on any home oxygen  Patient is now on mid flow of 10 L     Plan:  · Continue nebulizations as needed  · Patient's IV Solu-Medrol 40 mg weaned down to q 12 yesterday  · Continue BiPAP:0 5 mg Ativan p r n  B i d  to help patient tolerate BiPAP  · Will hold off Lasix for now  Re-evaluate after BMP  · Will Continue Spiriva  · Incentive spirometry  · Appreciate pulmonology recommendations  · Titrate oxygen to maintain SpO2 88%    Type 2 diabetes mellitus, without long-term current use of insulin (HCC)  Assessment & Plan  2/2 to IV steroids  · Carb controlled diet   · Hypoglycemia protocol  · Will start patient on 10 units Lantus (patient's daily requirement calculus to 20 units, can titrate up to this if BGs are not control)  · Sliding scale algorithm 3    Paroxysmal A-fib (HCC)  Assessment & Plan  · Patient's EKG shows paroxysmal atrial fibrillation  Patient's AFib likely secondary to acute respiratory failure with hypoxia  · Patient is currently rate controlled with metoprolol  · Chads Vasc score:  3   Will start patient on heparin drip and try to bridge to warfarin or Eliquis  · Given patient is rate controlled and asymptomatic will hold off on Cardiology consult      Pneumonia  Assessment & Plan  · Patient had COVID 19 about a month ago and he is also immunocompromised from his esophageal cancer  · Patient's inflammatory markers trending down  · Treatment with antibiotics completed as of 06/21/2021    Esophageal cancer   Assessment & Plan  · Outpatient follow-up with Oncology  · Patient stated that he last had radiation about a month ago  · He is not on any special diet    Dysphagia  Assessment & Plan  Can continue regular diet  no risk of aspiration seen on VBS    Acute renal failure superimposed on stage 3 chronic kidney disease [facility])  Assessment & Plan  Lab Results   Component Value Date    EGFR 56 06/24/2021    EGFR 90 06/23/2021    EGFR 79 06/22/2021    CREATININE 1 28 06/24/2021    CREATININE 0 78 06/23/2021    CREATININE 0 96 06/22/2021     · Patient still at baseline creatinine of 1 14-1 12  · Will hold Lasix today  · Consider resuming once BMP is back    Moderate protein-calorie malnutrition (HCC)  Assessment & Plan  Malnutrition Findings:   Adult Malnutrition type: Acute illness (in the setting of chronic illness)  Adult Degree of Malnutrition: Malnutrition of moderate degree (related to catabolic illness, respiratory distress)    BMI Findings: Body mass index is 26 31 kg/m²  PAD (peripheral artery disease)  Assessment & Plan  · Continue aspirin and statin    Carotid stenosis  Assessment & Plan  · Continue aspirin and statin    Essential hypertension  Assessment & Plan  · Patient is becoming hypotensive, likely secondary to hypoxia  · Continue amlodipine  · Will resume metoprolol at a lower dose    GERD (gastroesophageal reflux disease)  Assessment & Plan  · Continue Protonix    Transaminitis-resolved as of 6/18/2021  Assessment & Plan  · Patient's transaminitis likely secondary to shock level in the setting of sepsis  · Patient's transaminitis improved today with resolution of ARISTIDES and sepsis            VTE Pharmacologic Prophylaxis:     High Risk (Score >/= 5) - Pharmacological DVT Prophylaxis Ordered: Heparin Drip  Sequential Compression Devices Ordered  Mechanical VTE Prophylaxis in Place: Yes    Patient Centered Rounds: I have performed bedside rounds with nursing staff today      Discussions with Specialists or Other Care Team Provider:  Pulmonology, Infectious Disease    Education and Discussions with Family / Patient: Patient's significant other is in surgery today, will contact her tomorrow    Current Length of Stay: 10 day(s)    Current Patient Status: Inpatient     Discharge Plan / Estimated Discharge Date: Anticipate discharge in > 72 hrs to rehab facility  Code Status: Level 1 - Full Code      Subjective:   Patient is feeling okay  No acute overnight events no complaints  Denies any chest pain, shortness of breath, palpitations  Objective:     Vitals:   Temp (24hrs), Av °F (36 7 °C), Min:97 7 °F (36 5 °C), Max:98 5 °F (36 9 °C)    Temp:  [97 7 °F (36 5 °C)-98 5 °F (36 9 °C)] 97 8 °F (36 6 °C)  HR:  [] 115  Resp:  [18] 18  BP: (101-141)/(64-72) 115/68  SpO2:  [90 %-96 %] 92 %  Body mass index is 26 31 kg/m²  Input and Output Summary (last 24 hours): Intake/Output Summary (Last 24 hours) at 2021 0755  Last data filed at 2021 0234  Gross per 24 hour   Intake 0 ml   Output 750 ml   Net -750 ml       Physical Exam:     Physical Exam  Vitals and nursing note reviewed  Constitutional:       Appearance: He is well-developed  HENT:      Head: Normocephalic and atraumatic  Eyes:      Conjunctiva/sclera: Conjunctivae normal    Cardiovascular:      Rate and Rhythm: Tachycardia present  Rhythm irregular  Heart sounds: No murmur heard  Pulmonary:      Effort: Pulmonary effort is normal  No respiratory distress  Breath sounds: Examination of the right-middle field reveals rhonchi  Examination of the left-middle field reveals rhonchi  Examination of the right-lower field reveals rhonchi  Examination of the left-lower field reveals rhonchi  Rhonchi present  No wheezing or rales  Abdominal:      Palpations: Abdomen is soft  Tenderness: There is no abdominal tenderness  Musculoskeletal:      Cervical back: Neck supple  Right lower leg: No edema  Left lower leg: No edema  Skin:     General: Skin is warm and dry  Capillary Refill: Capillary refill takes less than 2 seconds  Neurological:      General: No focal deficit present  Mental Status: He is alert  Psychiatric:         Mood and Affect: Mood normal          Behavior: Behavior normal           Additional Data:     Labs:  Results from last 7 days   Lab Units 06/25/21  0545 06/24/21  0449   WBC Thousand/uL 12 66* 14 75*   HEMOGLOBIN g/dL 14 7 15 0   HEMATOCRIT % 44 0 45 5   PLATELETS Thousands/uL 332 406*   BANDS PCT %  --  1   LYMPHO PCT %  --  3*   MONO PCT %  --  2*   EOS PCT %  --  1     Results from last 7 days   Lab Units 06/24/21  0449   SODIUM mmol/L 134*   POTASSIUM mmol/L 4 8   CHLORIDE mmol/L 97*   CO2 mmol/L 28   BUN mg/dL 41*   CREATININE mg/dL 1 28   ANION GAP mmol/L 9   CALCIUM mg/dL 8 3   GLUCOSE RANDOM mg/dL 360*         Results from last 7 days   Lab Units 06/25/21  0709 06/24/21  2353 06/24/21  2028 06/24/21  1758 06/24/21  1248 06/24/21  0738 06/24/21  0736 06/24/21  0610 06/23/21  2345 06/23/21  1734 06/23/21  1155   POC GLUCOSE mg/dl 227* 235* 380* 432* 325* 299* 300* 304* 269* 336* 336*     Results from last 7 days   Lab Units 06/23/21  0523   HEMOGLOBIN A1C % 6 5*     Results from last 7 days   Lab Units 06/19/21  0533 06/18/21  1401   PROCALCITONIN ng/ml 0 13 0 21       Imaging: No pertinent imaging reviewed      Recent Cultures (last 7 days):           Lines/Drains:  Invasive Devices     Central Venous Catheter Line            Port A Cath 08/28/17 Right Chest 1396 days          Peripheral Intravenous Line            Long-Dwell Peripheral IV (Midline) 15/35/37 Left Cephalic 3 days                Telemetry:   Telemetry Orders (From admission, onward)             48 Hour Telemetry Monitoring  Continuous x 48 hours     Question:  Reason for 48 Hour Telemetry  Answer:  Arrhythmias Requiring Medical Therapy (eg  SVT, Vtach/fib, Bradycardia, Uncontrolled A-fib)                    Last 24 Hours Medication List:   Current Facility-Administered Medications   Medication Dose Route Frequency Provider Last Rate  acetaminophen  650 mg Oral Q6H PRN Rosenda Conley MD      amLODIPine  10 mg Oral Daily Rosenda Conley MD      aspirin  81 mg Oral Daily Rosenda Conley MD      barium sulfate  1 tablet Oral Once in imaging Francisco J Rosenberg DO      benzonatate  100 mg Oral TID PRN Blas Galloway PA-C      docusate sodium  100 mg Oral Daily PRN Blaze Velázquez MD      guaiFENesin  1,200 mg Oral Q12H [address] Blaze Velázquez MD      heparin (porcine)  3-20 Units/kg/hr (Order-Specific) Intravenous Titrated Vee Roper MD      heparin (porcine)  2,000 Units Intravenous Q1H PRN Vee Roper MD      heparin (porcine)  4,000 Units Intravenous Once Vee MD Judson      heparin (porcine)  4,000 Units Intravenous Q1H PRN Vee Roper MD      insulin glargine  10 Units Subcutaneous QAM Estefanía Dasilva MD      insulin lispro  1-6 Units Subcutaneous 4 times day Vee Roper MD      levalbuterol  1 25 mg Nebulization TID Noelle Garcia MD      lidocaine   Topical Daily PRN Rosenda Conley MD      LORazepam  0 5 mg Oral BID PRN Vee Roper MD      melatonin  9 mg Oral HS Vee Roper MD      methylPREDNISolone sodium succinate  40 mg Intravenous Q12H [address] Francisco J Rosenberg DO      metoprolol tartrate  25 mg Oral Q12H Mita Serna MD      nystatin  500,000 Units Swish & Swallow 4x Daily Kourtney Farrell PA-C      nystatin   Topical TID Noelle Garcia MD      ondansetron  8 mg Intravenous Q6H PRN Rosenda Conley MD      pantoprazole  40 mg Oral Early Morning Rosenda Conley MD      sodium chloride  3 mL Nebulization TID Noelle Garcia MD      tiotropium  18 mcg Inhalation Daily Jose Mckeon DO          Today, Patient Was Seen By: Vee Roper MD    ** Please Note: This note has been constructed using a voice recognition system   **]

## 2021-06-26 NOTE — PROGRESS NOTES
Progress Note - Pulmonary   Daniarditanika Cruz 65 y o  male MRN: [id number]  Unit/Bed#: S -01 Encounter: [id number]    Assessment:  · Acute hypoxic respiratory failure  · Abnormal chest CT -scattered ground-glass opacities and basilar infiltrates (DDX including post-COVID pneumonitis which clinically seems most likely, XRT pneumonitis, drug induced ILD, possible aspiration pneumonitis/pneumonia, etc)  · Esophageal cancer status post XRT/Chemo, stenting now removed  · Dysphagia  · Former smoker  · Recent COVID-19 infection  · Thrush  · AFib    Plan:  Patient continues on 10 L mid flow  Titrate as able to maintain SpO2 greater than or equal to 88%  Patient without baseline oxygen requirement will need determine oxygen needs prior to discharge  Yesterday received IV Solu-Medrol 40 mg q 12 hours, will be reduced to daily today  Patient can be given an additional dose this PM if develops any increased SOB or worsening hypoxia  Completed antibiotics  Continue Spiriva, nebs, flutter  OOB as able, IS  Nystatin  Patient on Heparin drip    Discussed with [facility] (via [employer]) & Dr Xiomy Whittaker:   Patient says that his breathing is a little better today  He slept better last night than he has in the last 10 days  He complains of nasal congestion associated with the mid flow  He is constipated  12 point review of systems otherwise negative  Objective:     Vitals: Blood pressure 111/68, pulse 79, temperature 97 8 °F (36 6 °C), resp  rate 16, height 5' 7" (1 702 m), weight 76 2 kg (167 lb 15 9 oz), SpO2 (!) 88 %  ,Body mass index is 26 31 kg/m²        Intake/Output Summary (Last 24 hours) at 6/26/2021 0925  Last data filed at 6/26/2021 0755  Gross per 24 hour   Intake 813 08 ml   Output 1150 ml   Net -336 92 ml       Invasive Devices     Central Venous Catheter Line            Port A Cath 08/28/17 Right Chest 1398 days          Peripheral Intravenous Line            Long-Dwell Peripheral IV (Midline) 08/78/11 Left Cephalic 4 days                Physical Exam:   General appearance: Alert and oriented, in no acute distress  Head: Normocephalic, without obvious abnormality, atraumatic  Eyes: EOMI  No discharge bilaterally  No scleral icterus  Neck: Supple, symmetrical, trachea midline  Lungs:  Decreased breath sounds with faint expiratory wheezing present  Patient without any respiratory distress despite prolonged conversation  Sats remaining in the high 80s to low 90s on 10 L and follow up  Heart: Regular rate  Abdomen:  No appreciable tenderness  Extremities: No edema or tenderness  Skin: Warm and dry  Neurologic: No acute focal deficits are noted    Labs: I have personally reviewed pertinent lab results  Imaging and other studies: I have personally reviewed pertinent reports

## 2021-06-26 NOTE — PLAN OF CARE
Problem: MOBILITY - ADULT  Goal: Maintain or return to baseline ADL function  Description: INTERVENTIONS:  -  Assess patient's ability to carry out ADLs; assess patient's baseline for ADL function and identify physical deficits which impact ability to perform ADLs (bathing, care of mouth/teeth, toileting, grooming, dressing, etc )  - Assess/evaluate cause of self-care deficits   - Assess range of motion  - Assess patient's mobility; develop plan if impaired  - Assess patient's need for assistive devices and provide as appropriate  - Encourage maximum independence but intervene and supervise when necessary  - Involve family in performance of ADLs  - Assess for home care needs following discharge   - Consider OT consult to assist with ADL evaluation and planning for discharge  - Provide patient education as appropriate  Outcome: Progressing  Goal: Maintains/Returns to pre admission functional level  Description: INTERVENTIONS:  - Perform BMAT or MOVE assessment daily    - Set and communicate daily mobility goal to care team and patient/family/caregiver     - Collaborate with rehabilitation services on mobility goals if consulted    - Out of bed for toileting  - Record patient progress and toleration of activity level   Outcome: Progressing     Problem: Prexisting or High Potential for Compromised Skin Integrity  Goal: Skin integrity is maintained or improved  Description: INTERVENTIONS:  - Identify patients at risk for skin breakdown  - Assess and monitor skin integrity  - Assess and monitor nutrition and hydration status  - Monitor labs   - Assess for incontinence   - Turn and reposition patient  - Assist with mobility/ambulation  - Relieve pressure over bony prominences  - Avoid friction and shearing  - Provide appropriate hygiene as needed including keeping skin clean and dry  - Evaluate need for skin moisturizer/barrier cream  - Collaborate with interdisciplinary team   - Patient/family teaching  - Consider wound care consult   Outcome: Progressing     Problem: Potential for Falls  Goal: Patient will remain free of falls  Description: INTERVENTIONS:  - Educate patient/family on patient safety including physical limitations  - Instruct patient to call for assistance with activity   - Consult OT/PT to assist with strengthening/mobility   - Keep Call bell within reach  - Keep bed low and locked with side rails adjusted as appropriate  - Keep care items and personal belongings within reach  - Initiate and maintain comfort rounds  - Make Fall Risk Sign visible to staff    - Apply yellow socks and bracelet for high fall risk patients  - Consider moving patient to room near nurses station  Outcome: Progressing     Problem: RESPIRATORY - ADULT  Goal: Achieves optimal ventilation and oxygenation  Description: INTERVENTIONS:  - Assess for changes in respiratory status  - Assess for changes in mentation and behavior  - Position to facilitate oxygenation and minimize respiratory effort  - Oxygen administered by appropriate delivery if ordered  - Initiate smoking cessation education as indicated  - Encourage broncho-pulmonary hygiene including cough, deep breathe, Incentive Spirometry  - Assess the need for suctioning and aspirate as needed  - Assess and instruct to report SOB or any respiratory difficulty  - Respiratory Therapy support as indicated  Outcome: Progressing     Problem: INFECTION - ADULT  Goal: Absence or prevention of progression during hospitalization  Description: INTERVENTIONS:  - Assess and monitor for signs and symptoms of infection  - Monitor lab/diagnostic results  - Monitor all insertion sites, i e  indwelling lines, tubes, and drains  - Monitor endotracheal if appropriate and nasal secretions for changes in amount and color  - Wray appropriate cooling/warming therapies per order  - Administer medications as ordered  - Instruct and encourage patient and family to use good hand hygiene technique  - Identify and instruct in appropriate isolation precautions for identified infection/condition  Outcome: Progressing  Goal: Absence of fever/infection during neutropenic period  Description: INTERVENTIONS:  - Monitor WBC    Outcome: Progressing     Problem: Nutrition/Hydration-ADULT  Goal: Nutrient/Hydration intake appropriate for improving, restoring or maintaining nutritional needs  Description: Monitor and assess patient's nutrition/hydration status for malnutrition  Collaborate with interdisciplinary team and initiate plan and interventions as ordered  Monitor patient's weight and dietary intake as ordered or per policy  Utilize nutrition screening tool and intervene as necessary  Determine patient's food preferences and provide high-protein, high-caloric foods as appropriate       INTERVENTIONS:  - Monitor oral intake, urinary output, labs, and treatment plans  - Assess nutrition and hydration status and recommend course of action  - Evaluate amount of meals eaten  - Assist patient with eating if necessary   - Allow adequate time for meals  - Recommend/ encourage appropriate diets, oral nutritional supplements, and vitamin/mineral supplements  - Order, calculate, and assess calorie counts as needed  - Recommend, monitor, and adjust tube feedings and TPN/PPN based on assessed needs  - Assess need for intravenous fluids  - Provide specific nutrition/hydration education as appropriate  - Include patient/family/caregiver in decisions related to nutrition  Outcome: Progressing

## 2021-06-26 NOTE — ASSESSMENT & PLAN NOTE
· Episodes of hypotension, likely secondary to hypoxia  · Continue amlodipine  · Resumed metoprolol at a lower dose 25 mg q12  · Monitor BP closely

## 2021-06-26 NOTE — PROGRESS NOTES
[facility]  Progress Note Lorri Mendenhall 1950, 65 y o  male MRN: [id number]  Unit/Bed#: S -01 Encounter: [id number]  Primary Care Provider: Tapan Alston DO   Date and time admitted to hospital: 6/15/2021  7:06 AM    * Acute respiratory failure with hypoxia ([facility] [address] )  Assessment & Plan  POA  Patient is not on any home oxygen  Patient is now on mid flow of 10 L     Plan:  · Continue nebulizations as needed  · Patient's IV Solu-Medrol 40 mg weaned down to q 24 today  · Continue BiPAP:0 5 mg Ativan p r n  B i d  to help patient tolerate BiPAP  · Will hold off Lasix for now  Re-evaluate as needed  · Will Continue Spiriva  · Incentive spirometry  · Appreciate pulmonology recommendations  · Titrate oxygen to maintain SpO2 88%    Paroxysmal A-fib (HCC)  Assessment & Plan  · Patient's EKG shows paroxysmal atrial fibrillation  Patient's AFib likely secondary to acute respiratory failure with hypoxia  · Patient is currently rate controlled with metoprolol  · Chads Vasc score:  3  Will continue heparin drip and try to bridge to warfarin or Eliquis  · Given patient is rate controlled and asymptomatic will hold off on Cardiology consult      Type 2 diabetes mellitus, without long-term current use of insulin ([facility])  Assessment & Plan  2/2 to IV steroids  · Carb controlled diet   · Hypoglycemia protocol  · Will start patient on 10 units Lantus (patient's daily requirement calculus to 20 units, can titrate up to this if BGs are not control)  · Started 6/26/2021 on Humalog 3 units TID with meals  · Sliding scale algorithm 3    Moderate protein-calorie malnutrition ([facility]  Assessment & Plan  Malnutrition Findings:   Adult Malnutrition type: Acute illness (in the setting of chronic illness)  Adult Degree of Malnutrition: Malnutrition of moderate degree (related to catabolic illness, respiratory distress)    BMI Findings: Body mass index is 26 31 kg/m²         Pneumonia  Assessment & Plan  · Patient had COVID 19 about a month ago and he is also immunocompromised from his esophageal cancer  · Patient's inflammatory markers trending down  · Treatment with antibiotics completed as of 06/21/2021    Acute renal failure superimposed on stage 3 chronic kidney disease [facility])  Assessment & Plan  Lab Results   Component Value Date    EGFR 61 06/26/2021    EGFR 58 06/25/2021    EGFR 56 06/24/2021    CREATININE 1 18 06/26/2021    CREATININE 1 24 06/25/2021    CREATININE 1 28 06/24/2021     · Patient still at baseline creatinine of 1 14-1 2  · Will hold Lasix today  · Consider resuming once BMP is back    PAD (peripheral artery disease)  Assessment & Plan  · Continue aspirin and statin    Esophageal cancer   Assessment & Plan  · Outpatient follow-up with Oncology  · Patient stated that he last had radiation about a month ago  · He is not on any special diet    Carotid stenosis  Assessment & Plan  · Continue aspirin and statin    Essential hypertension  Assessment & Plan  · Episodes of hypotension, likely secondary to hypoxia  · Continue amlodipine  · Resumed metoprolol at a lower dose 25 mg q12  · Monitor BP closely    GERD (gastroesophageal reflux disease)  Assessment & Plan  · Continue Protonix    Dysphagia  Assessment & Plan  Can continue regular diet  no risk of aspiration seen on VBS      VTE Pharmacologic Prophylaxis:     Heparin drip (paroxysmal afib)    Mechanical VTE Prophylaxis in Place: Yes    Patient Centered Rounds: I have performed bedside rounds with nursing staff today  Discussions with Specialists or Other Care Team Provider:  Pulmonology, Infectious Disease    Education and Discussions with Family / Patient: Will call patient's significant other on the phone    Current Length of Stay: 11 day(s)    Current Patient Status: Inpatient     Discharge Plan / Estimated Discharge Date: Anticipate discharge in 48-72 hrs to rehab facility      Code Status: Level 1 - Full Code      Subjective: Patient seen and examined at bedside  No subjective complaints today  He is still on 10 L of oxygen  He denies any current chest pain or shortness of breath  Objective:     Vitals:   Temp (24hrs), Av 9 °F (36 6 °C), Min:97 8 °F (36 6 °C), Max:98 4 °F (36 9 °C)    Temp:  [97 8 °F (36 6 °C)-98 4 °F (36 9 °C)] 97 8 °F (36 6 °C)  HR:  [] 79  Resp:  [16-20] 16  BP: (100-127)/(66-79) 111/68  SpO2:  [79 %-98 %] 95 %  Body mass index is 26 31 kg/m²  Input and Output Summary (last 24 hours): Intake/Output Summary (Last 24 hours) at 2021 1114  Last data filed at 2021 0755  Gross per 24 hour   Intake 813 08 ml   Output 1150 ml   Net -336 92 ml       Physical Exam:     Physical Exam  Constitutional:       Appearance: Normal appearance  Cardiovascular:      Rate and Rhythm: Normal rate and regular rhythm  Pulmonary:      Effort: Pulmonary effort is normal       Breath sounds: Rhonchi present  Comments: 10 L nasal cannula  Abdominal:      General: Bowel sounds are normal       Palpations: Abdomen is soft  Tenderness: There is no abdominal tenderness  Musculoskeletal:      Right lower leg: No edema  Left lower leg: No edema  Skin:     General: Skin is warm and dry  Neurological:      General: No focal deficit present  Mental Status: He is alert and oriented to person, place, and time     Psychiatric:         Mood and Affect: Mood normal          Behavior: Behavior normal           Additional Data:     Labs:  Results from last 7 days   Lab Units 21  0545 21  0449   WBC Thousand/uL 12 66* 14 75*   HEMOGLOBIN g/dL 14 7 15 0   HEMATOCRIT % 44 0 45 5   PLATELETS Thousands/uL 332 406*   BANDS PCT %  --  1   LYMPHO PCT %  --  3*   MONO PCT %  --  2*   EOS PCT %  --  1     Results from last 7 days   Lab Units 21  0540   SODIUM mmol/L 130*   POTASSIUM mmol/L 5 1   CHLORIDE mmol/L 95*   CO2 mmol/L 26   BUN mg/dL 46*   CREATININE mg/dL 1 18   ANION GAP mmol/L 9   CALCIUM mg/dL 8 0*   GLUCOSE RANDOM mg/dL 311*     Results from last 7 days   Lab Units 06/25/21  0827   INR  0 93     Results from last 7 days   Lab Units 06/26/21  0654 06/25/21  1954 06/25/21  1318 06/25/21  0709 06/24/21  2353 06/24/21  2028 06/24/21  1758 06/24/21  1248 06/24/21  0738 06/24/21  0736 06/24/21  0610 06/23/21  2345   POC GLUCOSE mg/dl 275* 285* 234* 227* 235* 380* 432* 325* 299* 300* 304* 269*     Results from last 7 days   Lab Units 06/23/21  0523   HEMOGLOBIN A1C % 6 5*           Imaging: Reviewed radiology reports from this admission including: chest xray and procedure reports    Recent Cultures (last 7 days):           Lines/Drains:  Invasive Devices     Central Venous Catheter Line            Port A Cath 08/28/17 Right Chest 1398 days          Peripheral Intravenous Line            Long-Dwell Peripheral IV (Midline) 72/90/97 Left Cephalic 4 days                Telemetry:   Telemetry Orders (From admission, onward)             48 Hour Telemetry Monitoring  Continuous x 48 hours     Question:  Reason for 48 Hour Telemetry  Answer:  Arrhythmias Requiring Medical Therapy (eg  SVT, Vtach/fib, Bradycardia, Uncontrolled A-fib)                    Last 24 Hours Medication List:   Current Facility-Administered Medications   Medication Dose Route Frequency Provider Last Rate    acetaminophen  650 mg Oral Q6H PRN Carlita Wilson MD      amLODIPine  10 mg Oral Daily Carlita Wilson MD      aspirin  81 mg Oral Daily Carlita Wilson MD      barium sulfate  1 tablet Oral Once in imaging Francisco J Rosenberg DO      benzonatate  100 mg Oral TID PRN Chelle Alejo PA-C      docusate sodium  100 mg Oral Daily PRN Blaze Velázquez MD      guaiFENesin  1,200 mg Oral Q12H [address] Blaze Velázquez MD      heparin (porcine)  3-20 Units/kg/hr (Order-Specific) Intravenous Titrated Anali Malave MD 16 Units/kg/hr (06/26/21 0755)    heparin (porcine)  2,000 Units Intravenous Q1H PRN Anali Malave MD     Azul Day heparin (porcine)  4,000 Units Intravenous Q1H PRN David Leger MD      insulin glargine  15 Units Subcutaneous QAM David Leger MD      insulin lispro  1-6 Units Subcutaneous 4 times day David Leger MD      insulin lispro  3 Units Subcutaneous Daily With Breakfast Armando Raya DO      insulin lispro  3 Units Subcutaneous Daily With Lunch Armando Raya,       insulin lispro  3 Units Subcutaneous Daily With [employer], DO      levalbuterol  1 25 mg Nebulization TID Bianca Spears MD      lidocaine   Topical Daily PRN Jazmyne Sarah MD      LORazepam  0 5 mg Oral BID PRN David Leger MD      melatonin  9 mg Oral HS David Leger MD      methylPREDNISolone sodium succinate  40 mg Intravenous Daily Francisco J Rosenberg DO      metoprolol  5 mg Intravenous Q6H PRN David Leger MD      metoprolol tartrate  25 mg Oral Q12H [address] David Leger MD      nystatin  500,000 Units Swish & Swallow 4x Daily Lisa Bynum PA-C      nystatin   Topical TID Bianca Spears MD      ondansetron  8 mg Intravenous Q6H PRN Alirio Stark MD      pantoprazole  40 mg Oral Early Morning Alirio Stark MD      polyethylene glycol  17 g Oral Daily PRN Jazmyne Sarah MD      sodium chloride  1 spray Each Nare Q1H PRN Tammi Jiang PA-C      sodium chloride  3 mL Nebulization TID Bianca Spears MD      tiotropium  18 mcg Inhalation Daily Josselin Alex DO          Today, Patient Was Seen By: Armando Raya DO    ** Please Note: This note has been constructed using a voice recognition system   **

## 2021-06-26 NOTE — CASE MANAGEMENT
Per the Pt's local pharmacy [facility], the Pt would have a financial responsibility of $47 for his Eliquis  CM made Dr Bronwyn Ramon aware of the above

## 2021-06-26 NOTE — PROGRESS NOTES
Progress Note - Infectious Disease   Bill Torres 65 y o  male MRN: [id number]  Unit/Bed#: S -01 Encounter: [id number]      Impression/Plan:  1  Sepsis  POA   With tachycardia, tachypnea, leukocytosis  In patient presenting with shortness of breath and acute respiratory failure; suspect pulmonary source as below  Admission blood cultures have no growth  MRSA nares negative  Blood pressure improved with volume management   Patient is overall clinically and systemically much improved  He completed empiric antibiotic course   -monitoring off antibiotics hereafter  -monitor temperature and hemodynamics  -serial exam  -respiratory support  -monitor CBC and BMP      2  Acute hypoxic respiratory failure   In setting of shortness of breath, CT scan with ground glass opacities and consolidation at the bases with leukocytosis, elevated procalcitonin level   Consider bacterial/viral pneumonia status post COVID-19 PCR positive on May 11, 2021  Patient clinically stable on PO Lasix, IV steroid and s/p antibiotics  WBC count elevation likely steroid effect  06/20/2021 portable chest x-ray with persistent peripheral and basilar ground-glass opacities  CRP trending down, Ferritin 500s  -Completed 7 day total antibiotic course through 6/21/21  -monitor off antibotics hereafter  -monitor temperature and hemodynamics  -serial exam  -ongoing pulmonary follow-up and management  -IV steroid weaning per pulmonary   -respiratory support with Mid flow O2 at present  -monitor CBC and BMP   -monitor clinical response     3  Oral candidiasis and groin intertrigo  Oral thrush improving  -Continue Nystatin swish and swallow x 1 week through 6/28/21  -Continue topical nystatin powder to groin      4  Renal Insufficiency/CKD III   POA  Creatinine 1 44 > 0 78   Likely prerenal in setting of #1    -renal dose adjust antibiotic as needed  -monitor BMP     5  Esophageal cancer   status post radiation therapy through April 2021 and now on Herceptin infusion monthly   Immunocompromised patient  With chronic dysphasia   No aspiration events on bedside swallow evaluation and on VBS  -symptomatic management per primary care team  -regular/thin liquid diet as per speech therapy recommendation     Discussed with patient in detail regarding the above plan  Antibiotics:  None D5     Subjective:  Patient is comfortable  Dyspnea slowly improving  Cough mild, less productive  His appetite is fair  Temperature stays down  No chills      Objective:  Vitals:  Temp:  [97 6 °F (36 4 °C)-98 4 °F (36 9 °C)] 97 8 °F (36 6 °C)  HR:  [] 79  Resp:  [16-20] 16  BP: (100-127)/(66-79) 111/68  SpO2:  [79 %-98 %] 88 %  Temp (24hrs), Av 9 °F (36 6 °C), Min:97 6 °F (36 4 °C), Max:98 4 °F (36 9 °C)  Current: Temperature: 97 8 °F (36 6 °C)    Physical Exam:     General: Awake, alert, cooperative, no distress  Neck:  Supple  No mass  No lymphadenopathy  Lungs: Expansion symmetric, no rales, no wheezing, respirations unlabored  Heart:  Regular rate and rhythm, S1 and S2 normal, no murmur  Abdomen: Soft, nondistended, non-tender, bowel sounds active all four quadrants, no masses, no organomegaly  Extremities: No edema  No erythema/warmth  No ulcer  Nontender to palpation  Skin:  No rash  Neuro: Moves all extremities  Invasive Devices     Central Venous Catheter Line            Port A Cath 17 Right Chest 1398 days          Peripheral Intravenous Line            Long-Dwell Peripheral IV (Midline) 92 Left Cephalic 4 days                Labs studies:   I have personally reviewed pertinent labs    Results from last 7 days   Lab Units 21  0540 21  0721 21  0449   POTASSIUM mmol/L 5 1 5 0 4 8   CHLORIDE mmol/L 95* 98* 97*   CO2 mmol/L 26 29 28   BUN mg/dL 46* 42* 41*   CREATININE mg/dL 1 18 1 24 1 28   EGFR ml/min/1 73sq m 61 58 56   CALCIUM mg/dL 8 0* 8 6 8 3     Results from last 7 days   Lab Units 21  0545 06/24/21  0449 06/23/21  0523   WBC Thousand/uL 12 66* 14 75* 14 26*   HEMOGLOBIN g/dL 14 7 15 0 14 3   PLATELETS Thousands/uL 332 406* 390           Imaging Studies:   I have personally reviewed pertinent imaging study reports and images in PACS  EKG, Pathology, and Other Studies:   I have personally reviewed pertinent reports

## 2021-06-26 NOTE — ASSESSMENT & PLAN NOTE
2/2 to IV steroids  · Carb controlled diet   · Hypoglycemia protocol  · Will start patient on 10 units Lantus (patient's daily requirement calculus to 20 units, can titrate up to this if BGs are not control)  · Started 6/26/2021 on Humalog 3 units TID with meals  · Sliding scale algorithm 3

## 2021-06-26 NOTE — ASSESSMENT & PLAN NOTE
POA   Patient is not on any home oxygen  Patient is now on mid flow of 10 L     Plan:  · Continue nebulizations as needed  · Patient's IV Solu-Medrol 40 mg weaned down to q 24 today  · Continue BiPAP:0 5 mg Ativan p r n  B i d  to help patient tolerate BiPAP  · Will hold off Lasix for now    Re-evaluate as needed  · Will Continue Spiriva  · Incentive spirometry  · Appreciate pulmonology recommendations  · Titrate oxygen to maintain SpO2 88%

## 2021-06-26 NOTE — ASSESSMENT & PLAN NOTE
· Patient's EKG shows paroxysmal atrial fibrillation  Patient's AFib likely secondary to acute respiratory failure with hypoxia  · Patient is currently rate controlled with metoprolol  · Chads Vasc score:  3   Will continue heparin drip and try to bridge to warfarin or Eliquis  · Given patient is rate controlled and asymptomatic will hold off on Cardiology consult

## 2021-06-26 NOTE — ASSESSMENT & PLAN NOTE
Lab Results   Component Value Date    EGFR 61 06/26/2021    EGFR 58 06/25/2021    EGFR 56 06/24/2021    CREATININE 1 18 06/26/2021    CREATININE 1 24 06/25/2021    CREATININE 1 28 06/24/2021     · Patient still at baseline creatinine of 1 14-1 2  · Will hold Lasix today  · Consider resuming once BMP is back

## 2021-06-27 NOTE — ASSESSMENT & PLAN NOTE
2/2 to IV steroids  · Carb controlled diet   · Hypoglycemia protocol  · Will start patient on 10 units Lantus (patient's daily requirement calculus to 20 units, can titrate up to this if BGs are not control)  · Monitor glucose levels as they will start decreasing as steroid doses are titrated down    · Sliding scale algorithm 3

## 2021-06-27 NOTE — ASSESSMENT & PLAN NOTE
Lab Results   Component Value Date    EGFR 68 06/27/2021    EGFR 61 06/26/2021    EGFR 58 06/25/2021    CREATININE 1 08 06/27/2021    CREATININE 1 18 06/26/2021    CREATININE 1 24 06/25/2021     · Patient still at baseline creatinine of 1 14-1 2  · Will hold Lasix today  · Consider resuming once BMP is back

## 2021-06-27 NOTE — ASSESSMENT & PLAN NOTE
Lab Results   Component Value Date    EGFR 68 06/27/2021    EGFR 61 06/26/2021    EGFR 58 06/25/2021    CREATININE 1 08 06/27/2021    CREATININE 1 18 06/26/2021    CREATININE 1 24 06/25/2021     · Patient still at baseline creatinine of 1 14-1 2  · Lasix resumed  · Lab holiday tomorrow

## 2021-06-27 NOTE — ASSESSMENT & PLAN NOTE
2/2 to IV steroids  · Carb controlled diet   · Hypoglycemia protocol  · Will start patient on 10 units Lantus (patient's daily requirement calculus to 20 units, can titrate up to this if BGs are not control)  · Started 6/26/2021 on Humalog 3 units TID with meals  · Monitor glucose levels as they will start decreasing as steroid doses are titrated down    · Sliding scale algorithm 3

## 2021-06-27 NOTE — ASSESSMENT & PLAN NOTE
POA   Patient is not on any home oxygen  Patient is now on mid flow of 10 L     Plan:  · Continue nebulizations as needed  · Continue IV Solu-Medrol 40 mg q d  · Continue BiPAP:0 5 mg Ativan p r n  B i d  to help patient tolerate BiPAP  · Will hold off Lasix for now    Re-evaluate as needed  · Will Continue Spiriva  · Incentive spirometry  · Appreciate pulmonology recommendations  · Titrate oxygen to maintain SpO2 88%

## 2021-06-27 NOTE — PROGRESS NOTES
Progress Note - Infectious Disease   Jackie Escobedo 65 y o  male MRN: [id number]  Unit/Bed#: S -01 Encounter: [id number]      Impression/Plan:  1  Sepsis  POA   With tachycardia, tachypnea, leukocytosis  In patient presenting with shortness of breath and acute respiratory failure; suspect pulmonary source as below  Admission blood cultures have no growth  MRSA nares negative  Blood pressure improved with volume management   Patient is overall clinically and systemically much improved  He completed empiric antibiotic course   -monitoring off antibiotics hereafter  -monitor temperature and hemodynamics  -serial exam  -respiratory support  -monitor CBC and BMP      2  Acute hypoxic respiratory failure   In setting of shortness of breath, CT scan with ground glass opacities and consolidation at the bases with leukocytosis, elevated procalcitonin level   Consider bacterial/viral pneumonia status post COVID-19 PCR positive on May 11, 2021  Patient clinically stable on PO Lasix, IV steroid and s/p antibiotics  WBC count elevation likely steroid effect  06/20/2021 portable chest x-ray with persistent peripheral and basilar ground-glass opacities  CRP trending down, Ferritin 500s  Patient's respiratory status is improving  O2 support is slowly decreasing   -Completed 7 day total antibiotic course through 6/21/21  -monitor off antibotics hereafter  -monitor temperature and hemodynamics  -serial exam  -ongoing pulmonary follow-up and management  -IV steroid weaning per pulmonary   -O2 weaning per primary service  -monitor CBC and BMP   -monitor clinical response     3  Oral candidiasis and groin intertrigo  Oral thrush improving  -Continue Nystatin swish and swallow x 1 week through 6/28/21  -Continue topical nystatin powder to groin      4  Renal Insufficiency/CKD III   POA  Creatinine 1 44 > 0 78   Likely prerenal in setting of #1    -renal dose adjust antibiotic as needed  -monitor BMP     5  Esophageal cancer   status post radiation therapy through 2021 and now on Herceptin infusion monthly   Immunocompromised patient  With chronic dysphasia   No aspiration events on bedside swallow evaluation and on VBS  -symptomatic management per primary care team  -regular/thin liquid diet as per speech therapy recommendation     Discussed with patient in detail regarding the above plan      Antibiotics:  None D6     Subjective:  Patient is comfortable  Dyspnea continues to improve  Cough mild and improving, less productive  His appetite is fair  Temperature stays down  No chills      Objective:  Vitals:  Temp:  [97 7 °F (36 5 °C)-97 8 °F (36 6 °C)] 97 7 °F (36 5 °C)  HR:  [62-84] 71  Resp:  [16-18] 18  BP: ()/(60-69) 97/60  SpO2:  [89 %-96 %] 90 %  Temp (24hrs), Av 7 °F (36 5 °C), Min:97 7 °F (36 5 °C), Max:97 8 °F (36 6 °C)  Current: Temperature: 97 7 °F (36 5 °C)    Physical Exam:     General: Awake, alert, cooperative, no distress  Neck:  Supple  No mass  No lymphadenopathy  Lungs: Expansion symmetric, no rales, no wheezing, respirations unlabored  Heart:  Regular rate and rhythm, S1 and S2 normal, no murmur  Abdomen: Soft, nondistended, non-tender, bowel sounds active all four quadrants, no masses, no organomegaly  Extremities: No edema  No erythema/warmth  No ulcer  Nontender to palpation  Skin:  No rash  Neuro: Moves all extremities  Invasive Devices     Central Venous Catheter Line            Port A Cath 17 Right Chest 1399 days          Peripheral Intravenous Line            Long-Dwell Peripheral IV (Midline)  Left Cephalic 5 days                Labs studies:   I have personally reviewed pertinent labs    Results from last 7 days   Lab Units 21  0540 21  0540 21  0721   POTASSIUM mmol/L 5 2 5 1 5 0   CHLORIDE mmol/L 99* 95* 98*   CO2 mmol/L 29 26 29   BUN mg/dL 40* 46* 42*   CREATININE mg/dL 1 08 1 18 1 24   EGFR ml/min/1 73sq m 68 61 58 CALCIUM mg/dL 8 1* 8 0* 8 6     Results from last 7 days   Lab Units 06/27/21  0540 06/25/21  0545 06/24/21  0449   WBC Thousand/uL 15 31* 12 66* 14 75*   HEMOGLOBIN g/dL 13 8 14 7 15 0   PLATELETS Thousands/uL 306 332 406*           Imaging Studies:   I have personally reviewed pertinent imaging study reports and images in PACS  EKG, Pathology, and Other Studies:   I have personally reviewed pertinent reports

## 2021-06-27 NOTE — ASSESSMENT & PLAN NOTE
· Patient's EKG shows paroxysmal atrial fibrillation  Patient's AFib likely secondary to acute respiratory failure with hypoxia  · Patient is currently rate controlled with metoprolol  · Chads Vasc score: 3  Eliquis too expense   · Patient currently being bridged to warfarin, today's INR 1 59   · Continue checking INR warfarin 5 mg today  · Given patient is rate controlled and asymptomatic will hold off on Cardiology consult  · Will discontinue patient telemetry as patient has been rate controlled

## 2021-06-27 NOTE — ASSESSMENT & PLAN NOTE
POA   Patient is not on any home oxygen  Patient is now on mid flow of 8 L     Plan:  · Continue nebulizations as needed  · Patient's IV Solu-Medrol 40 mg weaned down to q 24 today  · Continue BiPAP:0 5 mg Ativan p r n  B i d  to help patient tolerate BiPAP  · Will hold off Lasix for now    Re-evaluate as needed  · Will Continue Spiriva  · Incentive spirometry  · Appreciate pulmonology recommendations  · Titrate oxygen to maintain SpO2 88%

## 2021-06-27 NOTE — PROGRESS NOTES
[facility]  Progress Note [facility] Course 1950, 65 y o  male MRN: [id number]  Unit/Bed#: S -01 Encounter: [id number]  Primary Care Provider: Chun Whelan,    Date and time admitted to hospital: 6/15/2021  7:06 AM    * Acute respiratory failure with hypoxia ([address] )  Assessment & Plan  POA  Patient is not on any home oxygen  Patient is now on mid flow of 8 L     Plan:  · Continue nebulizations as needed  · Patient's IV Solu-Medrol 40 mg weaned down to q 24 today  · Continue BiPAP:0 5 mg Ativan p r n  B i d  to help patient tolerate BiPAP  · Will hold off Lasix for now  Re-evaluate as needed  · Will Continue Spiriva  · Incentive spirometry  · Appreciate pulmonology recommendations  · Titrate oxygen to maintain SpO2 88%    Type 2 diabetes mellitus, without long-term current use of insulin (HCC)  Assessment & Plan  2/2 to IV steroids  · Carb controlled diet   · Hypoglycemia protocol  · Will start patient on 10 units Lantus (patient's daily requirement calculus to 20 units, can titrate up to this if BGs are not control)  · Started 6/26/2021 on Humalog 3 units TID with meals  · Monitor glucose levels as they will start decreasing as steroid doses are titrated down  · Sliding scale algorithm 3    Paroxysmal A-fib (HCC)  Assessment & Plan  · Patient's EKG shows paroxysmal atrial fibrillation  Patient's AFib likely secondary to acute respiratory failure with hypoxia  · Patient is currently rate controlled with metoprolol  · Chads Vasc score: 3  Will try to price check Eliquis for patient  · Given patient is rate controlled and asymptomatic will hold off on Cardiology consult  · Will discontinue patient telemetry as patient has been rate controlled        Pneumonia  Assessment & Plan  · Patient had COVID 19 about a month ago and he is also immunocompromised from his esophageal cancer  · Patient's inflammatory markers trending down  · Treatment with antibiotics completed as of 06/21/2021    Esophageal cancer   Assessment & Plan  · Outpatient follow-up with Oncology  · Patient stated that he last had radiation about a month ago  · He is not on any special diet    Dysphagia  Assessment & Plan  Can continue regular diet  no risk of aspiration seen on VBS    Acute renal failure superimposed on stage 3 chronic kidney disease [facility])  Assessment & Plan  Lab Results   Component Value Date    EGFR 68 06/27/2021    EGFR 61 06/26/2021    EGFR 58 06/25/2021    CREATININE 1 08 06/27/2021    CREATININE 1 18 06/26/2021    CREATININE 1 24 06/25/2021     · Patient still at baseline creatinine of 1 14-1 2  · Will resume Lasix today  · Lab holiday tomorrow    Moderate protein-calorie malnutrition ([address] )  Assessment & Plan  Malnutrition Findings:   Adult Malnutrition type: Acute illness (in the setting of chronic illness)  Adult Degree of Malnutrition: Malnutrition of moderate degree (related to catabolic illness, respiratory distress)    BMI Findings: Body mass index is 26 31 kg/m²  PAD (peripheral artery disease)  Assessment & Plan  · Continue aspirin and statin    Carotid stenosis  Assessment & Plan  · Continue aspirin and statin    Essential hypertension  Assessment & Plan  · Episodes of hypotension, likely secondary to hypoxia  · Continue amlodipine  · Resumed metoprolol at a lower dose 25 mg q12  · Monitor BP closely    GERD (gastroesophageal reflux disease)  Assessment & Plan  · Continue Protonix    Transaminitis-resolved as of 6/18/2021  Assessment & Plan  · Patient's transaminitis likely secondary to shock level in the setting of sepsis  · Patient's transaminitis improved today with resolution of ARISTIDES and sepsis          VTE Pharmacologic Prophylaxis:     High Risk (Score >/= 5) - Pharmacological DVT Prophylaxis Ordered: Heparin Drip  Sequential Compression Devices Ordered      Mechanical VTE Prophylaxis in Place: Yes    Patient Centered Rounds: I have performed bedside rounds with nursing staff today  Discussions with Specialists or Other Care Team Provider:  Pulmonology    Education and Discussions with Family / Patient: Updated  (significant other) via phone  Current Length of Stay: 12 day(s)    Current Patient Status: Inpatient     Discharge Plan / Estimated Discharge Date: Anticipate discharge in > 72 hrs to rehab facility  Code Status: Level 1 - Full Code      Subjective:   Patient feels breathing is slightly better today  States he was unable to get much sleep overnight and is tired no episodes of shortness of breath chest pain or palpitations  Objective:     Vitals:   Temp (24hrs), Av 8 °F (36 6 °C), Min:97 7 °F (36 5 °C), Max:97 8 °F (36 6 °C)    Temp:  [97 7 °F (36 5 °C)-97 8 °F (36 6 °C)] 97 7 °F (36 5 °C)  HR:  [62-84] 62  Resp:  [16-18] 17  BP: (108-121)/(61-69) 108/61  SpO2:  [88 %-96 %] 96 %  Body mass index is 26 31 kg/m²  Input and Output Summary (last 24 hours): Intake/Output Summary (Last 24 hours) at 2021 0729  Last data filed at 2021 0314  Gross per 24 hour   Intake 380 ml   Output 1100 ml   Net -720 ml       Physical Exam:     Physical Exam  Vitals and nursing note reviewed  Constitutional:       Appearance: He is well-developed  HENT:      Head: Normocephalic and atraumatic  Eyes:      Conjunctiva/sclera: Conjunctivae normal    Cardiovascular:      Rate and Rhythm: Normal rate  Rhythm irregular  Heart sounds: No murmur heard  Pulmonary:      Effort: Pulmonary effort is normal  No respiratory distress  Breath sounds: Examination of the right-middle field reveals rhonchi  Examination of the left-middle field reveals rhonchi  Examination of the right-lower field reveals rhonchi  Examination of the left-lower field reveals rhonchi  Rhonchi present  No wheezing or rales  Abdominal:      Palpations: Abdomen is soft  Tenderness: There is no abdominal tenderness     Musculoskeletal:      Cervical back: Neck supple  Right lower leg: No edema  Left lower leg: No edema  Skin:     General: Skin is warm and dry  Capillary Refill: Capillary refill takes less than 2 seconds  Neurological:      General: No focal deficit present  Mental Status: He is alert  Psychiatric:         Mood and Affect: Mood normal          Behavior: Behavior normal           Additional Data:     Labs:  Results from last 7 days   Lab Units 06/27/21  0540 06/24/21  0449   WBC Thousand/uL 15 31* 14 75*   HEMOGLOBIN g/dL 13 8 15 0   HEMATOCRIT % 42 1 45 5   PLATELETS Thousands/uL 306 406*   BANDS PCT %  --  1   LYMPHO PCT %  --  3*   MONO PCT %  --  2*   EOS PCT %  --  1     Results from last 7 days   Lab Units 06/27/21  0540   SODIUM mmol/L 134*   POTASSIUM mmol/L 5 2   CHLORIDE mmol/L 99*   CO2 mmol/L 29   BUN mg/dL 40*   CREATININE mg/dL 1 08   ANION GAP mmol/L 6   CALCIUM mg/dL 8 1*   GLUCOSE RANDOM mg/dL 119     Results from last 7 days   Lab Units 06/25/21  0827   INR  0 93     Results from last 7 days   Lab Units 06/26/21  2110 06/26/21  1610 06/26/21  1131 06/26/21  0654 06/25/21  1954 06/25/21  1318 06/25/21  0709 06/24/21  2353 06/24/21  2028 06/24/21  1758 06/24/21  1248 06/24/21  0738   POC GLUCOSE mg/dl 259* 181* 232* 275* 285* 234* 227* 235* 380* 432* 325* 299*     Results from last 7 days   Lab Units 06/23/21  0523   HEMOGLOBIN A1C % 6 5*           Imaging: No pertinent imaging reviewed      Recent Cultures (last 7 days):           Lines/Drains:  Invasive Devices     Central Venous Catheter Line            Port A Cath 08/28/17 Right Chest 1398 days          Peripheral Intravenous Line            Long-Dwell Peripheral IV (Midline) 62/56/43 Left Cephalic 5 days                Telemetry:        Last 24 Hours Medication List:   Current Facility-Administered Medications   Medication Dose Route Frequency Provider Last Rate    acetaminophen  650 mg Oral Q6H PRN Evangelist Gonzalez MD      amLODIPine  10 mg Oral Daily Amira Block Brown Tamez MD      aspirin  81 mg Oral Daily Kj Fraire MD      barium sulfate  1 tablet Oral Once in imaging Francisco Jtha Rosenberg DO      benzonatate  100 mg Oral TID PRN Baribe Pena PA-C      guaiFENesin  1,200 mg Oral Q12H [facility] Blaze Velázquez MD      heparin (porcine)  3-20 Units/kg/hr (Order-Specific) Intravenous Titrated Rhett Schulte MD 14 Units/kg/hr (06/27/21 0106)    heparin (porcine)  2,000 Units Intravenous Q1H PRN Rhett Schulte MD      heparin (porcine)  4,000 Units Intravenous Q1H PRN Rhett Schulte MD      insulin glargine  15 Units Subcutaneous QAM Rhett Schulte MD      insulin lispro  1-6 Units Subcutaneous 4 times day Kendra Ego, DO      insulin lispro  3 Units Subcutaneous Daily With Breakfast Kendra Ego, DO      insulin lispro  3 Units Subcutaneous Daily With Lunch Kendra Ego, DO      insulin lispro  3 Units Subcutaneous Daily With [employer], DO      levalbuterol  1 25 mg Nebulization TID Darshan Em MD      lidocaine   Topical Daily PRN Yary Kramer MD      LORazepam  0 5 mg Oral BID PRN Rhett Schulte MD      melatonin  9 mg Oral HS Rhett Schulte MD      methylPREDNISolone sodium succinate  40 mg Intravenous Daily Frnacisco J Rosenberg DO      metoprolol  5 mg Intravenous Q6H PRN Rhett Schulte MD      metoprolol tartrate  25 mg Oral Q12H [facility] Rhett Schulte MD      nystatin  500,000 Units Swish & Swallow 4x Daily Andrew Winston PA-C      nystatin   Topical TID Darshan Em MD      ondansetron  8 mg Intravenous Q6H PRN Kj Fraire MD      pantoprazole  40 mg Oral Early Morning Kj Fraire MD      polyethylene glycol  17 g Oral Daily Kendra Ego, DO      senna-docusate sodium  1 tablet Oral BID Kendra Ego, DO      sodium chloride  1 spray Each Nare Q1H PRN Tammi Jiang PA-C      sodium chloride  3 mL Nebulization TID Darshan Em MD      tiotropium  18 mcg Inhalation Daily Sergio Reyna DO          Today, Patient Was Seen By: Flako Larson MD    ** Please Note: This note has been constructed using a voice recognition system   **

## 2021-06-27 NOTE — PLAN OF CARE
Problem: MOBILITY - ADULT  Goal: Maintain or return to baseline ADL function  Description: INTERVENTIONS:  -  Assess patient's ability to carry out ADLs; assess patient's baseline for ADL function and identify physical deficits which impact ability to perform ADLs (bathing, care of mouth/teeth, toileting, grooming, dressing, etc )  - Assess/evaluate cause of self-care deficits   - Assess range of motion  - Assess patient's mobility; develop plan if impaired  - Assess patient's need for assistive devices and provide as appropriate  - Encourage maximum independence but intervene and supervise when necessary  - Involve family in performance of ADLs  - Assess for home care needs following discharge   - Consider OT consult to assist with ADL evaluation and planning for discharge  - Provide patient education as appropriate  Outcome: Progressing  Goal: Maintains/Returns to pre admission functional level  Description: INTERVENTIONS:  - Perform BMAT or MOVE assessment daily    - Set and communicate daily mobility goal to care team and patient/family/caregiver     - Collaborate with rehabilitation services on mobility goals if consulted    - Out of bed for toileting  - Record patient progress and toleration of activity level   Outcome: Progressing     Problem: Prexisting or High Potential for Compromised Skin Integrity  Goal: Skin integrity is maintained or improved  Description: INTERVENTIONS:  - Identify patients at risk for skin breakdown  - Assess and monitor skin integrity  - Assess and monitor nutrition and hydration status  - Monitor labs   - Assess for incontinence   - Turn and reposition patient  - Assist with mobility/ambulation  - Relieve pressure over bony prominences  - Avoid friction and shearing  - Provide appropriate hygiene as needed including keeping skin clean and dry  - Evaluate need for skin moisturizer/barrier cream  - Collaborate with interdisciplinary team   - Patient/family teaching  - Consider wound care consult   Outcome: Progressing     Problem: Potential for Falls  Goal: Patient will remain free of falls  Description: INTERVENTIONS:  - Educate patient/family on patient safety including physical limitations  - Instruct patient to call for assistance with activity   - Consult OT/PT to assist with strengthening/mobility   - Keep Call bell within reach  - Keep bed low and locked with side rails adjusted as appropriate  - Keep care items and personal belongings within reach  - Initiate and maintain comfort rounds  - Make Fall Risk Sign visible to staff    - Apply yellow socks and bracelet for high fall risk patients  - Consider moving patient to room near nurses station  Outcome: Progressing     Problem: RESPIRATORY - ADULT  Goal: Achieves optimal ventilation and oxygenation  Description: INTERVENTIONS:  - Assess for changes in respiratory status  - Assess for changes in mentation and behavior  - Position to facilitate oxygenation and minimize respiratory effort  - Oxygen administered by appropriate delivery if ordered  - Initiate smoking cessation education as indicated  - Encourage broncho-pulmonary hygiene including cough, deep breathe, Incentive Spirometry  - Assess the need for suctioning and aspirate as needed  - Assess and instruct to report SOB or any respiratory difficulty  - Respiratory Therapy support as indicated  Outcome: Progressing     Problem: INFECTION - ADULT  Goal: Absence or prevention of progression during hospitalization  Description: INTERVENTIONS:  - Assess and monitor for signs and symptoms of infection  - Monitor lab/diagnostic results  - Monitor all insertion sites, i e  indwelling lines, tubes, and drains  - Monitor endotracheal if appropriate and nasal secretions for changes in amount and color  - [place] appropriate cooling/warming therapies per order  - Administer medications as ordered  - Instruct and encourage patient and family to use good hand hygiene technique  - Identify and instruct in appropriate isolation precautions for identified infection/condition  Outcome: Progressing  Goal: Absence of fever/infection during neutropenic period  Description: INTERVENTIONS:  - Monitor WBC    Outcome: Progressing     Problem: Nutrition/Hydration-ADULT  Goal: Nutrient/Hydration intake appropriate for improving, restoring or maintaining nutritional needs  Description: Monitor and assess patient's nutrition/hydration status for malnutrition  Collaborate with interdisciplinary team and initiate plan and interventions as ordered  Monitor patient's weight and dietary intake as ordered or per policy  Utilize nutrition screening tool and intervene as necessary  Determine patient's food preferences and provide high-protein, high-caloric foods as appropriate       INTERVENTIONS:  - Monitor oral intake, urinary output, labs, and treatment plans  - Assess nutrition and hydration status and recommend course of action  - Evaluate amount of meals eaten  - Assist patient with eating if necessary   - Allow adequate time for meals  - Recommend/ encourage appropriate diets, oral nutritional supplements, and vitamin/mineral supplements  - Order, calculate, and assess calorie counts as needed  - Recommend, monitor, and adjust tube feedings and TPN/PPN based on assessed needs  - Assess need for intravenous fluids  - Provide specific nutrition/hydration education as appropriate  - Include patient/family/caregiver in decisions related to nutrition  Outcome: Progressing

## 2021-06-27 NOTE — PROGRESS NOTES
Progress Note - Pulmonary   Pedro Javier 65 y o  male MRN: [id number]  Unit/Bed#: S -01 Encounter: [id number]    Assessment:  · Acute hypoxic respiratory failure  · Abnormal chest CT -scattered ground-glass opacities and bibasilar infiltrates with DDX including post COVID pneumonitis (favored), XRT pneumonitis, drug-induced ILD, possible aspiration pneumonitis/pneumonia, etc  · Esophageal cancer status post XRT/chemo, stenting now removed  · Dysphagia  · Former smoker  · Recent COVID-19 infection  · Thrush  · AFib    Plan:  O2 requirements slowly improving yesterday on 10 L now currently on 6 L at rest  Titrate as able to maintain SpO2 greater than or equal to 88%  Patient without baseline oxygen requirement & will need to determine oxygen needs prior to discharge  Yesterday IV Solu-Medrol was decreased to 40 mg daily, would continue for now and consider transition to prednisone tomorrow if significant clinical improvement  Completed antibiotics  Continue Spiriva, nebs, flutter  OOB as able, IS  Nystatin  Currently on Heparin, plan is to price check Eliquis per SLIM     Discussed with nursing    Subjective:   Patient says that his breathing is better today  He has occasional cough  12 point review of systems otherwise negative  Objective:     Vitals: Blood pressure 97/60, pulse 71, temperature 97 7 °F (36 5 °C), temperature source Oral, resp  rate 18, height 5' 7" (1 702 m), weight 76 2 kg (167 lb 15 9 oz), SpO2 90 %  ,Body mass index is 26 31 kg/m²        Intake/Output Summary (Last 24 hours) at 6/27/2021 0586  Last data filed at 6/27/2021 0314  Gross per 24 hour   Intake 380 ml   Output 700 ml   Net -320 ml       Invasive Devices     Central Venous Catheter Line            Port A Cath 08/28/17 Right Chest 1398 days          Peripheral Intravenous Line            Long-Dwell Peripheral IV (Midline) 21/99/38 Left Cephalic 5 days                Physical Exam: General appearance: Alert and oriented, in no acute distress  Head: Normocephalic, without obvious abnormality, atraumatic  Eyes: EOMI  No discharge bilaterally  No scleral icterus  Neck: Supple, symmetrical, trachea midline  Lungs:  Decreased breath sounds  Bilateral faint expiratory wheezing  Heart: Regular rate   Abdomen:  No appreciable distension or tenderness  Extremities:  No significant edema or tenderness  Skin: Warm and dry  Neurologic: No acute focal deficits are noted     Labs: I have personally reviewed pertinent lab results  Imaging and other studies: I have personally reviewed pertinent reports

## 2021-06-27 NOTE — ASSESSMENT & PLAN NOTE
· Patient's EKG shows paroxysmal atrial fibrillation  Patient's AFib likely secondary to acute respiratory failure with hypoxia  · Patient is currently rate controlled with metoprolol  · Chads Vasc score: 3  Will try to price check Eliquis for patient  · Given patient is rate controlled and asymptomatic will hold off on Cardiology consult  · Will discontinue patient telemetry as patient has been rate controlled

## 2021-06-28 NOTE — ASSESSMENT & PLAN NOTE
Symptoms: shortness of breath at rest and wheezing    Lab Results   Component Value Date    SARSCOV2 Negative 06/15/2021    SARSCOV2 Positive (A) 05/11/2021     · Imaging:  XR chest 1 view portable - Result Date: 6/15/2021  Impression: Moderate bilateral pulmonary infiltrates which are nonspecific and may represent pneumonia or edema  · CTA ED chest PE Study - Result Date: 6/15/2021  Impression: No pulmonary embolism  Diffuse groundglass opacities in the lungs  Differential considerations include bacterial and viral pneumonia (including COVID 19), and vascular congestion  Minimal left pleural effusion      Recent Labs     06/27/21  0540   WBC 15 31*     · On no supplemental oxygen at baseline, status post COVID infection 5/12/21  · Currently on mid-flow nasal cannula 15L after having desaturation event while on bed and after receiving tap water enema  · Previously on trastuzumab for treatment of esophageal cancer  · Differential includes most likely post COVID syndrome vs trastuzumab pneumonitis vs radiation pneumonitis (most recent radiation in April 2021) vs aspiration pneumonia (less likely given negative VBS)  · Blood clx, legionella, strep Negative  · Completed antibiotics regimen 6/21  · VBS negative - regular thin liquids  · Improved wheezes on exam today    Plan:  · Labs:  ? BMP/CBC tomorrow AM    Meds/Interventions:  ? Incentive spirometry, Guaifenesin 1200 mg q 12 hours  ? Xopenex t i d  Nebs  ? Further prolong steroid taper due to presence of wheezes today; continue Solu-Medrol 40 q d today, transition to PO prednisone 40 mg tomorrow  § Patient concerned about persistent steroid use as he has been having worsening difficulty with sleeping ever since being started on steroidsl; on melatonin 9 mg hs  §  oral thrush from steroids improving on Nystatin  ? Tessalon Perles  ? Resumed on home diuretic;  ARISTIDES resolved    Oxygen:  · Continue mid flow and titrate As tolerated; attempt to wean down to 8L or below  ?  Encourage out of bed and physical therapy along with repositioning while monitoring saturations

## 2021-06-28 NOTE — PROGRESS NOTES
[facility]  Progress Note Flakito Million 1950, 65 y o  male MRN: [id number]  Unit/Bed#: S -01 Encounter: [id number]  Primary Care Provider: Piter Cope DO   Date and time admitted to hospital: 6/15/2021  7:06 AM    * Acute respiratory failure with hypoxia ([address] )  Assessment & Plan  POA  Patient is not on any home oxygen  Patient is now on mid flow of 10 L     Plan:  · Continue nebulizations as needed  · Continue IV Solu-Medrol 40 mg q d  · Continue BiPAP:0 5 mg Ativan p r n  B i d  to help patient tolerate BiPAP  · Will hold off Lasix for now  Re-evaluate as needed  · Will Continue Spiriva  · Incentive spirometry  · Appreciate pulmonology recommendations  · Titrate oxygen to maintain SpO2 88%    Type 2 diabetes mellitus, without long-term current use of insulin (HCC)  Assessment & Plan  2/2 to IV steroids  · Carb controlled diet   · Hypoglycemia protocol  · Will start patient on 10 units Lantus (patient's daily requirement calculus to 20 units, can titrate up to this if BGs are not control)  · Monitor glucose levels as they will start decreasing as steroid doses are titrated down  · Sliding scale algorithm 3    Paroxysmal A-fib (HCC)  Assessment & Plan  · Patient's EKG shows paroxysmal atrial fibrillation  Patient's AFib likely secondary to acute respiratory failure with hypoxia  · Patient is currently rate controlled with metoprolol  · Chads Vasc score: 3  Eliquis too expense   · Patient currently being bridged to warfarin, today's INR 1 59   · Continue checking INR warfarin 5 mg today  · Given patient is rate controlled and asymptomatic will hold off on Cardiology consult  · Will discontinue patient telemetry as patient has been rate controlled        Pneumonia  Assessment & Plan  · Patient had COVID 19 about a month ago and he is also immunocompromised from his esophageal cancer  · Patient's inflammatory markers trending down  · Treatment with antibiotics completed as of 06/21/2021    Esophageal cancer   Assessment & Plan  · Outpatient follow-up with Oncology  · Patient stated that he last had radiation about a month ago  · He is not on any special diet    Dysphagia  Assessment & Plan  Can continue regular diet  no risk of aspiration seen on VBS    Acute renal failure superimposed on stage 3 chronic kidney disease [facility])  Assessment & Plan  Lab Results   Component Value Date    EGFR 68 06/27/2021    EGFR 61 06/26/2021    EGFR 58 06/25/2021    CREATININE 1 08 06/27/2021    CREATININE 1 18 06/26/2021    CREATININE 1 24 06/25/2021     · Patient still at baseline creatinine of 1 14-1 2  · Lasix resumed  · Lab holiday tomorrow    Moderate protein-calorie malnutrition ([address] )  Assessment & Plan  Malnutrition Findings:   Adult Malnutrition type: Acute illness (in the setting of chronic illness)  Adult Degree of Malnutrition: Malnutrition of moderate degree (related to catabolic illness, respiratory distress)    BMI Findings: Body mass index is 26 31 kg/m²  PAD (peripheral artery disease)  Assessment & Plan  · Continue aspirin and statin    Carotid stenosis  Assessment & Plan  · Continue aspirin and statin    Essential hypertension  Assessment & Plan  · Episodes of hypotension, likely secondary to hypoxia  · Continue amlodipine  · Resumed metoprolol at a lower dose 25 mg q12  · Monitor BP closely    GERD (gastroesophageal reflux disease)  Assessment & Plan  · Continue Protonix    Transaminitis-resolved as of 6/18/2021  Assessment & Plan  · Patient's transaminitis likely secondary to shock level in the setting of sepsis  · Patient's transaminitis improved today with resolution of ARISTIDES and sepsis            VTE Pharmacologic Prophylaxis:     High Risk (Score >/= 5) - Pharmacological DVT Prophylaxis Ordered: Heparin Drip  Sequential Compression Devices Ordered      Mechanical VTE Prophylaxis in Place: Yes    Patient Centered Rounds: I have performed bedside rounds with nursing staff today  Discussions with Specialists or Other Care Team Provider:  Pulmonology, Infectious Disease    Education and Discussions with Family / Patient: Updated  (significant other) via phone  Current Length of Stay: 13 day(s)    Current Patient Status: Inpatient     Discharge Plan / Estimated Discharge Date: Anticipate discharge in > 72 hrs to discharge location to be determined pending rehab evaluations  Code Status: Level 1 - Full Code      Subjective:   Patient feels okay  No change from yesterday  Still has not had a bowel movement  Objective:     Vitals:   Temp (24hrs), Av 1 °F (36 7 °C), Min:97 7 °F (36 5 °C), Max:98 8 °F (37 1 °C)    Temp:  [97 7 °F (36 5 °C)-98 8 °F (37 1 °C)] 97 7 °F (36 5 °C)  HR:  [71-93] 71  Resp:  [14-18] 14  BP: ()/(56-69) 104/62  SpO2:  [85 %-96 %] 95 %  Body mass index is 26 31 kg/m²  Input and Output Summary (last 24 hours): Intake/Output Summary (Last 24 hours) at 2021 0705  Last data filed at 2021 0548  Gross per 24 hour   Intake 180 ml   Output 925 ml   Net -745 ml       Physical Exam:     Physical Exam  Vitals and nursing note reviewed  Constitutional:       Appearance: He is well-developed  HENT:      Head: Normocephalic and atraumatic  Eyes:      Conjunctiva/sclera: Conjunctivae normal    Cardiovascular:      Rate and Rhythm: Normal rate  Rhythm irregular  Heart sounds: No murmur heard  Pulmonary:      Effort: Pulmonary effort is normal  No respiratory distress  Breath sounds: Examination of the right-middle field reveals rhonchi  Examination of the left-middle field reveals rhonchi  Examination of the right-lower field reveals rhonchi  Examination of the left-lower field reveals rhonchi  Rhonchi present  No wheezing or rales  Abdominal:      Palpations: Abdomen is soft  Tenderness: There is no abdominal tenderness  Musculoskeletal:      Cervical back: Neck supple        Right lower leg: No edema  Left lower leg: No edema  Skin:     General: Skin is warm and dry  Capillary Refill: Capillary refill takes less than 2 seconds  Neurological:      General: No focal deficit present  Mental Status: He is alert  Psychiatric:         Mood and Affect: Mood normal          Behavior: Behavior normal           Additional Data:     Labs:  Results from last 7 days   Lab Units 06/27/21  0540 06/24/21  0449   WBC Thousand/uL 15 31* 14 75*   HEMOGLOBIN g/dL 13 8 15 0   HEMATOCRIT % 42 1 45 5   PLATELETS Thousands/uL 306 406*   BANDS PCT %  --  1   LYMPHO PCT %  --  3*   MONO PCT %  --  2*   EOS PCT %  --  1     Results from last 7 days   Lab Units 06/27/21  0540   SODIUM mmol/L 134*   POTASSIUM mmol/L 5 2   CHLORIDE mmol/L 99*   CO2 mmol/L 29   BUN mg/dL 40*   CREATININE mg/dL 1 08   ANION GAP mmol/L 6   CALCIUM mg/dL 8 1*   GLUCOSE RANDOM mg/dL 119     Results from last 7 days   Lab Units 06/28/21  0600   INR  1 59*     Results from last 7 days   Lab Units 06/27/21  2045 06/27/21  1543 06/27/21  1130 06/27/21  0746 06/26/21  2110 06/26/21  1610 06/26/21  1131 06/26/21  0654 06/25/21  1954 06/25/21  1318 06/25/21  0709 06/24/21  2353   POC GLUCOSE mg/dl 312* 218* 326* 128 259* 181* 232* 275* 285* 234* 227* 235*     Results from last 7 days   Lab Units 06/23/21  0523   HEMOGLOBIN A1C % 6 5*           Imaging: No pertinent imaging reviewed      Recent Cultures (last 7 days):           Lines/Drains:  Invasive Devices     Central Venous Catheter Line            Port A Cath 08/28/17 Right Chest 1399 days          Peripheral Intravenous Line            Long-Dwell Peripheral IV (Midline) 15/83/30 Left Cephalic 6 days                Telemetry:        Last 24 Hours Medication List:   Current Facility-Administered Medications   Medication Dose Route Frequency Provider Last Rate    acetaminophen  650 mg Oral Q6H PRN Allegra Luu MD      amLODIPine  10 mg Oral Daily Allegra Luu MD     [employer] aspirin  81 mg Oral Daily Paula Limon MD      barium sulfate  1 tablet Oral Once in imaging Francisco J Rosenberg DO      benzonatate  100 mg Oral TID PRN Steven Martin PA-C      furosemide  20 mg Oral Daily Mariel Taylor MD      guaiFENesin  1,200 mg Oral Q12H [facility] Blaze Velázquez MD      heparin (porcine)  3-20 Units/kg/hr (Order-Specific) Intravenous Titrated Mariel Taylor MD 14 Units/kg/hr (06/27/21 1235)    heparin (porcine)  2,000 Units Intravenous Q1H PRN Mariel Taylor MD      heparin (porcine)  4,000 Units Intravenous Q1H PRN Mariel Taylor MD      insulin glargine  15 Units Subcutaneous QAM Mariel Taylor MD      insulin lispro  1-6 Units Subcutaneous 4 times day [facility], DO      levalbuterol  1 25 mg Nebulization TID Silverio Driscoll MD      lidocaine   Topical Daily PRN Jordan Caruso MD      LORazepam  0 5 mg Oral BID PRN Mariel Taylor MD      melatonin  9 mg Oral HS Mariel Taylor MD      methylPREDNISolone sodium succinate  40 mg Intravenous Daily Francisco J Rosenberg DO      metoprolol  5 mg Intravenous Q6H PRN Mariel Taylor MD      metoprolol tartrate  25 mg Oral Q12H [facility] Mariel Taylor MD      nystatin  500,000 Units Swish & Swallow 4x Daily Claus Fraire PA-C      nystatin   Topical TID Silverio Driscoll MD      ondansetron  8 mg Intravenous Q6H PRN Paula Limon MD      pantoprazole  40 mg Oral Early Morning Paula Limon MD      polyethylene glycol  17 g Oral Daily [facility], DO      senna-docusate sodium  1 tablet Oral BID [facility],       sodium chloride  1 spray Each Nare Q1H PRN Tammi Jiang PA-C      sodium chloride  3 mL Nebulization TID Silverio Driscoll MD      tiotropium  18 mcg Inhalation Daily Julio Bautista DO      warfarin  5 mg Oral Daily (warfarin) Mariel Taylor MD          Today, Patient Was Seen By: Mariel Taylor MD    ** Please Note: This note has been constructed using a voice recognition system   **

## 2021-06-28 NOTE — PROGRESS NOTES
[facility]  Progress Note Jenny Lew 1950, 65 y o  male MRN: [id number]  Unit/Bed#: S -01 Encounter: [id number]  Primary Care Provider: Homa Astorga DO   Date and time admitted to hospital: 6/15/2021  7:06 AM    * Acute respiratory failure with hypoxia [facility])  Assessment & Plan  Symptoms: shortness of breath at rest and wheezing    Lab Results   Component Value Date    SARSCOV2 Negative 06/15/2021    SARSCOV2 Positive (A) 05/11/2021     · Imaging:  XR chest 1 view portable - Result Date: 6/15/2021  Impression: Moderate bilateral pulmonary infiltrates which are nonspecific and may represent pneumonia or edema  · CTA ED chest PE Study - Result Date: 6/15/2021  Impression: No pulmonary embolism  Diffuse groundglass opacities in the lungs  Differential considerations include bacterial and viral pneumonia (including COVID 19), and vascular congestion  Minimal left pleural effusion      Recent Labs     06/27/21  0540   WBC 15 31*     · On no supplemental oxygen at baseline, status post COVID infection 5/12/21  · Currently on mid-flow nasal cannula 15L after having desaturation event while on bed and after receiving tap water enema  · Previously on trastuzumab for treatment of esophageal cancer  · Differential includes most likely post COVID syndrome vs trastuzumab pneumonitis vs radiation pneumonitis (most recent radiation in April 2021) vs aspiration pneumonia (less likely given negative VBS)  · Blood clx, legionella, strep Negative  · Completed antibiotics regimen 6/21  · VBS negative - regular thin liquids  · Improved wheezes on exam today    Plan:  · Labs:  ? BMP/CBC tomorrow AM    Meds/Interventions:  ? Incentive spirometry, Guaifenesin 1200 mg q 12 hours  ? Xopenex t i d  Nebs  ?  Further prolong steroid taper due to presence of wheezes today; continue Solu-Medrol 40 q d today, transition to PO prednisone 40 mg tomorrow  § Patient concerned about persistent steroid use as he has been having worsening difficulty with sleeping ever since being started on steroidsl; on melatonin 9 mg hs  §  oral thrush from steroids improving on Nystatin  ? Tessalon Perles  ? Resumed on home diuretic; ARISTIDES resolved    Oxygen:  · Continue mid flow and titrate As tolerated; attempt to wean down to 8L or below  ? Encourage out of bed and physical therapy along with repositioning while monitoring saturations    Esophageal cancer   Assessment & Plan  History of some vaginal cancer since 2017 status post radiation and 12 cycle of FOLFOX -6 and continues to remain on Herceptin (trastuzumab)    Radiation versus came may be also contributing factor to patient's presentation  Previous showing mild oropharyngeal dysphagia which may be effect radiation as well    Plan:  · Recommend changing from trastuzumab to alternate therapy  · Recommend outpatient follow up with Heme-Onc      Subjective: Today the patient feels he is feeling similar to how he felt yesterday and states his breathing is only slightly worse today after receiving an enema and being placed on a bed pan  However after repositioning and resting for a few minutes, patient was saturating well after being placed on 15 L  I discussed with him the plan to wean him back down to 8 L which she was early this morning and he is agreeable  Aim to plan to further wean if tolerated  Objective:    Vitals: Blood pressure 98/53, pulse 77, temperature 97 6 °F (36 4 °C), resp  rate 16, height 5' 7" (1 702 m), weight 76 2 kg (167 lb 15 9 oz), SpO2 (!) 86 %  ,Body mass index is 26 31 kg/m²        Intake/Output Summary (Last 24 hours) at 6/28/2021 0943  Last data filed at 6/28/2021 0548  Gross per 24 hour   Intake --   Output 925 ml   Net -925 ml       Invasive Devices     Central Venous Catheter Line            Port A Cath 08/28/17 Right Chest 1400 days          Peripheral Intravenous Line            Long-Dwell Peripheral IV (Midline) 06/21/21 Left Cephalic 6 days                Physical Exam:    Physical Exam  Vitals and nursing note reviewed  Constitutional:       Appearance: Normal appearance  Interventions: Nasal cannula in place  Comments: [address] 10L   HENT:      Head: Normocephalic and atraumatic  Right Ear: Tympanic membrane normal       Left Ear: Tympanic membrane normal       Nose: Nose normal       Mouth/Throat:      Mouth: Mucous membranes are dry  Eyes:      Extraocular Movements: Extraocular movements intact  Pupils: Pupils are equal, round, and reactive to light  Cardiovascular:      Rate and Rhythm: Normal rate and regular rhythm  Pulses: Normal pulses  Heart sounds: No murmur heard  No gallop  Pulmonary:      Effort: Pulmonary effort is normal  No respiratory distress  Breath sounds: No stridor  Examination of the right-upper field reveals wheezing  Examination of the left-upper field reveals wheezing  Examination of the right-middle field reveals wheezing  Examination of the left-middle field reveals wheezing  Examination of the right-lower field reveals wheezing  Examination of the left-lower field reveals wheezing  Wheezing (Wheezes improving) and rales (Fine rales present but improving) present  No rhonchi  Chest:      Chest wall: No tenderness  Abdominal:      General: Abdomen is flat  Bowel sounds are normal  There is no distension  Palpations: Abdomen is soft  Tenderness: There is no abdominal tenderness  Musculoskeletal:         General: No swelling  Right lower leg: No edema  Left lower leg: No edema  Skin:     General: Skin is warm and dry  Neurological:      General: No focal deficit present  Mental Status: He is alert and oriented to person, place, and time  Cranial Nerves: No cranial nerve deficit  Motor: No weakness  Psychiatric:         Mood and Affect: Mood normal          Behavior: Behavior normal  Behavior is cooperative           Thought Content: Thought content normal          Judgment: Judgment normal        Labs: I have personally reviewed pertinent lab results  Imaging and other studies: I have personally reviewed pertinent reports     and I have personally reviewed pertinent films in PACS

## 2021-06-28 NOTE — PLAN OF CARE
Problem: MOBILITY - ADULT  Goal: Maintain or return to baseline ADL function  Description: INTERVENTIONS:  -  Assess patient's ability to carry out ADLs; assess patient's baseline for ADL function and identify physical deficits which impact ability to perform ADLs (bathing, care of mouth/teeth, toileting, grooming, dressing, etc )  - Assess/evaluate cause of self-care deficits   - Assess range of motion  - Assess patient's mobility; develop plan if impaired  - Assess patient's need for assistive devices and provide as appropriate  - Encourage maximum independence but intervene and supervise when necessary  - Involve family in performance of ADLs  - Assess for home care needs following discharge   - Consider OT consult to assist with ADL evaluation and planning for discharge  - Provide patient education as appropriate  Outcome: Progressing  Goal: Maintains/Returns to pre admission functional level  Description: INTERVENTIONS:  - Perform BMAT or MOVE assessment daily    - Set and communicate daily mobility goal to care team and patient/family/caregiver  - Collaborate with rehabilitation services on mobility goals if consulted  - Perform Range of Motion  times a day  - Reposition patient every  hours    - Dangle patient  times a day  - Stand patient  times a day  - Ambulate patient  times a day  - Out of bed to chair  times a day   - Out of bed for meals  times a day  - Out of bed for toileting  - Record patient progress and toleration of activity level   Outcome: Progressing     Problem: Prexisting or High Potential for Compromised Skin Integrity  Goal: Skin integrity is maintained or improved  Description: INTERVENTIONS:  - Identify patients at risk for skin breakdown  - Assess and monitor skin integrity  - Assess and monitor nutrition and hydration status  - Monitor labs   - Assess for incontinence   - Turn and reposition patient  - Assist with mobility/ambulation  - Relieve pressure over bony prominences  - Avoid friction and shearing  - Provide appropriate hygiene as needed including keeping skin clean and dry  - Evaluate need for skin moisturizer/barrier cream  - Collaborate with interdisciplinary team   - Patient/family teaching  - Consider wound care consult   Outcome: Progressing     Problem: Potential for Falls  Goal: Patient will remain free of falls  Description: INTERVENTIONS:  - Educate patient/family on patient safety including physical limitations  - Instruct patient to call for assistance with activity   - Consult OT/PT to assist with strengthening/mobility   - Keep Call bell within reach  - Keep bed low and locked with side rails adjusted as appropriate  - Keep care items and personal belongings within reach  - Initiate and maintain comfort rounds  - Make Fall Risk Sign visible to staff  - Offer Toileting every  Hours, in advance of need  - Initiate/Maintain alarm  - Obtain necessary fall risk management equipment:  - Apply yellow socks and bracelet for high fall risk patients  - Consider moving patient to room near nurses station  Outcome: Progressing     Problem: RESPIRATORY - ADULT  Goal: Achieves optimal ventilation and oxygenation  Description: INTERVENTIONS:  - Assess for changes in respiratory status  - Assess for changes in mentation and behavior  - Position to facilitate oxygenation and minimize respiratory effort  - Oxygen administered by appropriate delivery if ordered  - Initiate smoking cessation education as indicated  - Encourage broncho-pulmonary hygiene including cough, deep breathe, Incentive Spirometry  - Assess the need for suctioning and aspirate as needed  - Assess and instruct to report SOB or any respiratory difficulty  - Respiratory Therapy support as indicated  Outcome: Progressing     Problem: INFECTION - ADULT  Goal: Absence or prevention of progression during hospitalization  Description: INTERVENTIONS:  - Assess and monitor for signs and symptoms of infection  - Monitor lab/diagnostic results  - Monitor all insertion sites, i e  indwelling lines, tubes, and drains  - Monitor endotracheal if appropriate and nasal secretions for changes in amount and color  - [place] appropriate cooling/warming therapies per order  - Administer medications as ordered  - Instruct and encourage patient and family to use good hand hygiene technique  - Identify and instruct in appropriate isolation precautions for identified infection/condition  Outcome: Progressing  Goal: Absence of fever/infection during neutropenic period  Description: INTERVENTIONS:  - Monitor WBC    Outcome: Progressing     Problem: Nutrition/Hydration-ADULT  Goal: Nutrient/Hydration intake appropriate for improving, restoring or maintaining nutritional needs  Description: Monitor and assess patient's nutrition/hydration status for malnutrition  Collaborate with interdisciplinary team and initiate plan and interventions as ordered  Monitor patient's weight and dietary intake as ordered or per policy  Utilize nutrition screening tool and intervene as necessary  Determine patient's food preferences and provide high-protein, high-caloric foods as appropriate       INTERVENTIONS:  - Monitor oral intake, urinary output, labs, and treatment plans  - Assess nutrition and hydration status and recommend course of action  - Evaluate amount of meals eaten  - Assist patient with eating if necessary   - Allow adequate time for meals  - Recommend/ encourage appropriate diets, oral nutritional supplements, and vitamin/mineral supplements  - Order, calculate, and assess calorie counts as needed  - Recommend, monitor, and adjust tube feedings and TPN/PPN based on assessed needs  - Assess need for intravenous fluids  - Provide specific nutrition/hydration education as appropriate  - Include patient/family/caregiver in decisions related to nutrition  Outcome: Progressing

## 2021-06-28 NOTE — PROGRESS NOTES
Progress Note - Infectious Disease   Gerson Goel 65 y o  male MRN: [id number]  Unit/Bed#: S -01 Encounter: [id number]      Impression/Plan:  1  Sepsis  POA   With tachycardia, tachypnea, leukocytosis  In patient presenting with shortness of breath and acute respiratory failure; suspect pulmonary source as below  Admission blood cultures have no growth  MRSA nares negative  Blood pressure improved with volume management   Patient is overall clinically and systemically much improved   He completed empiric antibiotic course   -monitoring off antibiotics hereafter  -monitor temperature and hemodynamics  -serial exam  -respiratory support  -monitor CBC and BMP      2  Acute hypoxic respiratory failure   In setting of shortness of breath, CT scan with ground glass opacities and consolidation at the bases with leukocytosis, elevated procalcitonin level   Consider bacterial/viral pneumonia status post COVID-19 PCR positive on May 11, 2021  Patient clinically stable on PO Lasix, IV steroid and s/p antibiotics  WBC count elevation likely steroid effect  06/20/2021 portable chest x-ray with persistent peripheral and basilar ground-glass opacities  CRP trending down, Ferritin 500s  Patient's respiratory status is improving  O2 support is slowly decreasing   -Completed 7 day total antibiotic course through 6/21/21  -monitor off antibotics hereafter  -monitor temperature and hemodynamics  -serial exam  -ongoing pulmonary follow-up and management  -IV steroid weaning per pulmonary   -O2 weaning per primary service  -monitor CBC and BMP   -monitor clinical response     3  Oral candidiasis and groin intertrigo  Oral thrush improving  -Continue Nystatin swish and swallow x 1 week through today, 6/28/21  -Continue topical nystatin powder to groin      4  Renal Insufficiency/CKD III   POA  Creatinine 1 44 > 0 78   Likely prerenal in setting of #1    -renal dose adjust antibiotic as needed  -monitor BMP     5  Esophageal cancer   status post radiation therapy through 2021 and now on Herceptin infusion monthly   Immunocompromised patient  With chronic dysphasia   No aspiration events on bedside swallow evaluation and on VBS  -symptomatic management per primary care team  -regular/thin liquid diet as per speech therapy recommendation     Discussed with patient in detail regarding the above plan      Antibiotics:  None D7     Subjective:  Patient is comfortable     Dyspnea continues to improve   Cough mild and improving, less productive  His appetite is fair  Temperature stays down   No chills  Objective:  Vitals:  Temp:  [97 3 °F (36 3 °C)-98 8 °F (37 1 °C)] 97 3 °F (36 3 °C)  HR:  [71-93] 84  Resp:  [14-20] 20  BP: ()/(53-69) 116/60  SpO2:  [85 %-97 %] 91 %  Temp (24hrs), Av 8 °F (36 6 °C), Min:97 3 °F (36 3 °C), Max:98 8 °F (37 1 °C)  Current: Temperature: (!) 97 3 °F (36 3 °C)    Physical Exam:   General Appearance:  Alert, interactive, nontoxic, no acute distress with conversation statting 86-97% on 10 L midflow   Throat: Oropharynx moist without lesions  Thrush improved   Lungs:   Decreased breath sounds fairly clear to auscultation bilaterally; respirations unlabored at rest   Heart:  RRR; no murmur   Abdomen:   Soft, non-tender, non-distended, positive bowel sounds  Extremities: No clubbing, cyanosis or edema   : No fritz, no SPT   Skin: No new rashes or lesions  IV site nontender          Labs, Imaging, & Other studies:   All pertinent labs and imaging studies were personally reviewed  Results from last 7 days   Lab Units 21  0540 21  0545 21  0449   WBC Thousand/uL 15 31* 12 66* 14 75*   HEMOGLOBIN g/dL 13 8 14 7 15 0   PLATELETS Thousands/uL 306 332 406*     Results from last 7 days   Lab Units 21  0540 21  0540 21  0721   SODIUM mmol/L 134* 130* 133*   POTASSIUM mmol/L 5 2 5 1 5 0   CHLORIDE mmol/L 99* 95* 98*   CO2 mmol/L 29 26 29   BUN mg/dL 40* 46* 42* CREATININE mg/dL 1 08 1 18 1 24   EGFR ml/min/1 73sq m 68 61 58   CALCIUM mg/dL 8 1* 8 0* 8 6

## 2021-06-28 NOTE — PLAN OF CARE
Problem: PHYSICAL THERAPY ADULT  Goal: Performs mobility at highest level of function for planned discharge setting  See evaluation for individualized goals  Description: Treatment/Interventions: Functional transfer training, LE strengthening/ROM, Elevations, Therapeutic exercise, Endurance training, Patient/family training, Equipment eval/education, Bed mobility, Gait training          See flowsheet documentation for full assessment, interventions and recommendations  Outcome: Progressing  Note: Prognosis: Poor  Problem List: Decreased strength, Decreased range of motion, Decreased endurance, Decreased mobility, Impaired balance, Decreased skin integrity  Assessment: pt began tx session lying supine in the bed and was agreeable to participate in PT intervention  pt resting SP02 91, MN 86, active Sp02 80 and MN 92 pt currently on 11L mid flow NCtoo begin tx session  pt continues to remain consuistant with /s for all bed mobility including performing a supine<>sit EOB transfer  pt required several min rest breaks throughout tx session to all Sp02 to increase back into normal ranges  pt required non rebreather while sitting EOB and for all other activities throughout tx session  pt was able to complete TE with good form and no increase in pain but pt required rest breaks due to Sp02 decreases  pt was able to stand from EOB with min Ax1 and tolerate 1 min and 15 seconds of static standing balance before requiring a seated rest break  pt woukld benefit from contiuned focus on OOB mobility with progression of standing tolerance and ambulation  continue to recommend post acute rehab services at the time of D/C in order to [facility] pt functional independence with safety and mobility  post tx pt lying supine in bed with bed alarm activated and pt with call bell in hand  PT Discharge Recommendation: Post acute rehabilitation services          See flowsheet documentation for full assessment

## 2021-06-28 NOTE — PHYSICAL THERAPY NOTE
PHYSICAL THERAPY NOTE          Patient Name: Aurora Puga  NVSJS'Z Date: 6/28/2021 06/28/21 1526   PT Last Visit   PT Visit Date 06/28/21   Note Type   Note Type Treatment   Pain Assessment   Pain Assessment Tool 0-10   Pain Score No Pain   Restrictions/Precautions   Weight Bearing Precautions Per Order No   Other Precautions Chair Alarm; Bed Alarm;Telemetry;O2;Fall Risk  (11L mid flow 02 with non rebreather with activity )   General   Chart Reviewed Yes   Response to Previous Treatment Patient reporting fatigue but able to participate  Family/Caregiver Present Yes   Cognition   Overall Cognitive Status WFL   Arousal/Participation Alert; Responsive; Cooperative   Attention Within functional limits   Orientation Level Oriented X4   Memory Within functional limits   Following Commands Follows one step commands without difficulty   Comments pt was pleasant and cooperative throughout tx session    Subjective   Subjective pt is agreeable to participate in PT intervention    Bed Mobility   Supine to Sit 5  Supervision   Additional items Increased time required;Verbal cues;HOB elevated; Bedrails   Sit to Supine 5  Supervision   Additional items HOB elevated; Bedrails; Increased time required;Verbal cues   Additional Comments pt required therapeutic rest breaks after performing bed mobility and conmpleting supine<>sit EOB transfer   Transfers   Sit to Stand 4  Minimal assistance   Additional items Assist x 1; Increased time required;Verbal cues   Stand to Sit 4  Minimal assistance   Additional items Assist x 1; Increased time required;Verbal cues   Additional Comments pt able to perfrom sit<>stand transfer and stand <>sit transfer with min Ax1 with VC's for hand placement      Ambulation/Elevation   Gait pattern Not appropriate   Balance   Static Sitting Fair +   Dynamic Sitting Fair +   Static Standing Poor +   Ambulatory Zero   Endurance Deficit   Endurance Deficit Yes   Endurance Deficit Description limited standing tolerance    Activity Tolerance   Activity Tolerance Patient limited by fatigue   Medical Staff Made Aware Spoke to RN    Exercises   Quad Sets Supine;15 reps;AROM; Bilateral   Heelslides Supine;15 reps;AROM; Bilateral   Hip Abduction Supine;15 reps;AROM; Bilateral   Knee AROM Short Arc Quad Supine;15 reps;AROM; Bilateral   Knee AROM Long Arc Quad Sitting;15 reps;AROM; Bilateral   Ankle Pumps Supine;15 reps;AROM; Bilateral   Assessment   Prognosis Poor   Problem List Decreased strength;Decreased range of motion;Decreased endurance;Decreased mobility; Impaired balance;Decreased skin integrity   Assessment pt began tx session lying supine in the bed and was agreeable to participate in PT intervention  pt resting SP02 91, AL 86, active Sp02 80 and AL 92 pt currently on 11L mid flow NCtoo begin tx session  pt continues to remain consuistant with /s for all bed mobility including performing a supine<>sit EOB transfer  pt required several min rest breaks throughout tx session to all Sp02 to increase back into normal ranges  pt required non rebreather while sitting EOB and for all other activities throughout tx session  pt was able to complete TE with good form and no increase in pain but pt required rest breaks due to Sp02 decreases  pt was able to stand from EOB with min Ax1 and tolerate 1 min and 15 seconds of static standing balance before requiring a seated rest break  pt woukld benefit from contiuned focus on OOB mobility with progression of standing tolerance and ambulation  continue to recommend post acute rehab services at the time of D/C in order to [facility] pt functional independence with safety and mobility  post tx pt lying supine in bed with bed alarm activated and pt with call bell in hand      Goals   Patient Goals to get stronger    STG Expiration Date 07/03/21   PT Treatment Day 2   Plan   Treatment/Interventions Functional transfer training;LE strengthening/ROM; Elevations; Therapeutic exercise; Endurance training;Patient/family training;Equipment eval/education; Bed mobility;Gait training   PT Frequency Other (Comment)  (3-5x week )   Recommendation   PT Discharge Recommendation Post acute rehabilitation services   AM-PAC Basic Mobility Inpatient   Turning in Bed Without Bedrails 4   Lying on Back to Sitting on Edge of Flat Bed 4   Moving Bed to Chair 2   Standing Up From Chair 2   Walk in Room 1   Climb 3-5 Stairs 1   Basic Mobility Inpatient Raw Score 14   Basic Mobility Standardized Score 35 55     Shawn July, PTA

## 2021-06-29 NOTE — ASSESSMENT & PLAN NOTE
· Patient had COVID 19 about a month ago and he is also immunocompromised from his esophageal cancer  · Treatment with antibiotics completed as of 06/21/2021

## 2021-06-29 NOTE — PROGRESS NOTES
[facility]  Progress Note Ritchie Drain 1950, 65 y o  male MRN: [id number]  Unit/Bed#: S -01 Encounter: [id number]  Primary Care Provider: Daina Villarreal DO   Date and time admitted to hospital: 6/15/2021  7:06 AM    * Acute respiratory failure with hypoxia [facility])  Assessment & Plan  Symptoms: shortness of breath at rest and wheezing    Lab Results   Component Value Date    SARSCOV2 Negative 06/15/2021    SARSCOV2 Positive (A) 05/11/2021     · Imaging:  XR chest 1 view portable - Result Date: 6/15/2021  Impression: Moderate bilateral pulmonary infiltrates which are nonspecific and may represent pneumonia or edema  · CTA ED chest PE Study - Result Date: 6/15/2021  Impression: No pulmonary embolism  Diffuse groundglass opacities in the lungs  Differential considerations include bacterial and viral pneumonia (including COVID 19), and vascular congestion  Minimal left pleural effusion      Recent Labs     06/27/21  0540 06/29/21  0546   WBC 15 31* 12 28*     · On no supplemental oxygen at baseline, status post COVID infection 5/12/21  · Currently on mid-flow nasal cannula 8L  · Previously on trastuzumab for treatment of esophageal cancer  · Differential includes most likely post COVID syndrome vs trastuzumab pneumonitis vs radiation pneumonitis (most recent radiation in April 2021) vs aspiration pneumonia (less likely given negative VBS)  · Blood clx, legionella, strep Negative  · Completed antibiotics regimen 6/21  · VBS negative - regular thin liquids    Plan:  · Labs:  ? BMP/CBC tomorrow AM    Meds/Interventions:  ? Incentive spirometry, Guaifenesin 1200 mg q 12 hours  ? Xopenex t i d  Nebs  ? Continue prolonged steroid taper; transition to PO prednisone 40 mg on 6/30  §  oral thrush from steroids improving on Nystatin  ? Tessalon Perles  ? Resumed on home diuretic;  ARISTIDES resolved    Oxygen:  · Continue mid flow and titrate As tolerated; attempt to wean down to 6L or movements intact  Pupils: Pupils are equal, round, and reactive to light  Cardiovascular:      Rate and Rhythm: Normal rate and regular rhythm  Pulses: Normal pulses  Heart sounds: No murmur heard  No gallop  Pulmonary:      Effort: Pulmonary effort is normal  No respiratory distress  Breath sounds: No stridor  Examination of the right-upper field reveals wheezing  Examination of the left-upper field reveals wheezing  Examination of the right-middle field reveals wheezing  Examination of the left-middle field reveals wheezing  Examination of the right-lower field reveals wheezing  Examination of the left-lower field reveals wheezing  Wheezing (Wheezes improving) and rales (Fine rales present but improving) present  No rhonchi  Chest:      Chest wall: No tenderness  Abdominal:      General: Abdomen is flat  Bowel sounds are normal  There is no distension  Palpations: Abdomen is soft  Tenderness: There is no abdominal tenderness  Musculoskeletal:         General: No swelling  Right lower leg: No edema  Left lower leg: No edema  Skin:     General: Skin is warm and dry  Neurological:      General: No focal deficit present  Mental Status: He is alert and oriented to person, place, and time  Cranial Nerves: No cranial nerve deficit  Motor: No weakness  Psychiatric:         Mood and Affect: Mood normal          Behavior: Behavior normal  Behavior is cooperative  Thought Content: Thought content normal          Judgment: Judgment normal          Labs: I have personally reviewed pertinent lab results  Imaging and other studies: I have personally reviewed pertinent reports     and I have personally reviewed pertinent films in PACS

## 2021-06-29 NOTE — PLAN OF CARE
Problem: MOBILITY - ADULT  Goal: Maintain or return to baseline ADL function  Description: INTERVENTIONS:  -  Assess patient's ability to carry out ADLs; assess patient's baseline for ADL function and identify physical deficits which impact ability to perform ADLs (bathing, care of mouth/teeth, toileting, grooming, dressing, etc )  - Assess/evaluate cause of self-care deficits   - Assess range of motion  - Assess patient's mobility; develop plan if impaired  - Assess patient's need for assistive devices and provide as appropriate  - Encourage maximum independence but intervene and supervise when necessary  - Involve family in performance of ADLs  - Assess for home care needs following discharge   - Consider OT consult to assist with ADL evaluation and planning for discharge  - Provide patient education as appropriate  Outcome: Progressing  Goal: Maintains/Returns to pre admission functional level  Description: INTERVENTIONS:  - Perform BMAT or MOVE assessment daily    - Set and communicate daily mobility goal to care team and patient/family/caregiver  - Collaborate with rehabilitation services on mobility goals if consulted  - Perform Range of Motion  times a day  - Reposition patient every  hours    - Dangle patient  times a day  - Stand patient  times a day  - Ambulate patient  times a day  - Out of bed to chair  times a day   - Out of bed for meals  times a day  - Out of bed for toileting  - Record patient progress and toleration of activity level   Outcome: Progressing     Problem: Prexisting or High Potential for Compromised Skin Integrity  Goal: Skin integrity is maintained or improved  Description: INTERVENTIONS:  - Identify patients at risk for skin breakdown  - Assess and monitor skin integrity  - Assess and monitor nutrition and hydration status  - Monitor labs   - Assess for incontinence   - Turn and reposition patient  - Assist with mobility/ambulation  - Relieve pressure over bony prominences  - Avoid friction and shearing  - Provide appropriate hygiene as needed including keeping skin clean and dry  - Evaluate need for skin moisturizer/barrier cream  - Collaborate with interdisciplinary team   - Patient/family teaching  - Consider wound care consult   Outcome: Progressing     Problem: Potential for Falls  Goal: Patient will remain free of falls  Description: INTERVENTIONS:  - Educate patient/family on patient safety including physical limitations  - Instruct patient to call for assistance with activity   - Consult OT/PT to assist with strengthening/mobility   - Keep Call bell within reach  - Keep bed low and locked with side rails adjusted as appropriate  - Keep care items and personal belongings within reach  - Initiate and maintain comfort rounds  - Make Fall Risk Sign visible to staff  - Offer Toileting every Hours, in advance of need  - Initiate/Maintain alarm  - Obtain necessary fall risk management equipment:   - Apply yellow socks and bracelet for high fall risk patients  - Consider moving patient to room near nurses station  Outcome: Progressing     Problem: RESPIRATORY - ADULT  Goal: Achieves optimal ventilation and oxygenation  Description: INTERVENTIONS:  - Assess for changes in respiratory status  - Assess for changes in mentation and behavior  - Position to facilitate oxygenation and minimize respiratory effort  - Oxygen administered by appropriate delivery if ordered  - Initiate smoking cessation education as indicated  - Encourage broncho-pulmonary hygiene including cough, deep breathe, Incentive Spirometry  - Assess the need for suctioning and aspirate as needed  - Assess and instruct to report SOB or any respiratory difficulty  - Respiratory Therapy support as indicated  Outcome: Progressing     Problem: INFECTION - ADULT  Goal: Absence or prevention of progression during hospitalization  Description: INTERVENTIONS:  - Assess and monitor for signs and symptoms of infection  - Monitor lab/diagnostic results  - Monitor all insertion sites, i e  indwelling lines, tubes, and drains  - Monitor endotracheal if appropriate and nasal secretions for changes in amount and color  - [place] appropriate cooling/warming therapies per order  - Administer medications as ordered  - Instruct and encourage patient and family to use good hand hygiene technique  - Identify and instruct in appropriate isolation precautions for identified infection/condition  Outcome: Progressing  Goal: Absence of fever/infection during neutropenic period  Description: INTERVENTIONS:  - Monitor WBC    Outcome: Progressing     Problem: Nutrition/Hydration-ADULT  Goal: Nutrient/Hydration intake appropriate for improving, restoring or maintaining nutritional needs  Description: Monitor and assess patient's nutrition/hydration status for malnutrition  Collaborate with interdisciplinary team and initiate plan and interventions as ordered  Monitor patient's weight and dietary intake as ordered or per policy  Utilize nutrition screening tool and intervene as necessary  Determine patient's food preferences and provide high-protein, high-caloric foods as appropriate       INTERVENTIONS:  - Monitor oral intake, urinary output, labs, and treatment plans  - Assess nutrition and hydration status and recommend course of action  - Evaluate amount of meals eaten  - Assist patient with eating if necessary   - Allow adequate time for meals  - Recommend/ encourage appropriate diets, oral nutritional supplements, and vitamin/mineral supplements  - Order, calculate, and assess calorie counts as needed  - Recommend, monitor, and adjust tube feedings and TPN/PPN based on assessed needs  - Assess need for intravenous fluids  - Provide specific nutrition/hydration education as appropriate  - Include patient/family/caregiver in decisions related to nutrition  Outcome: Progressing

## 2021-06-29 NOTE — ASSESSMENT & PLAN NOTE
Lab Results   Component Value Date    EGFR 71 06/29/2021    EGFR 68 06/27/2021    EGFR 61 06/26/2021    CREATININE 1 04 06/29/2021    CREATININE 1 08 06/27/2021    CREATININE 1 18 06/26/2021     · Patient still at baseline creatinine of 1 14-1 2  · Lasix resumed  · Lab holiday tomorrow

## 2021-06-29 NOTE — ASSESSMENT & PLAN NOTE
2/2 to IV steroids  · Carb controlled diet   · Hypoglycemia protocol  · Patient is currently on 15 units  Blood glucose levels are slowly trending down  · Monitor glucose levels as they will start decreasing as steroid doses are titrated down    · Sliding scale algorithm 3

## 2021-06-29 NOTE — ASSESSMENT & PLAN NOTE
· Patient's EKG shows paroxysmal atrial fibrillation  Patient's AFib likely secondary to acute respiratory failure with hypoxia  · Patient is currently rate controlled with metoprolol  · Chads Vasc score: 3  Eliquis too expense   · Therapeutic INR  · Patient bridged to warfarin 5 mg q d    · Heparin drip stopped today

## 2021-06-29 NOTE — ASSESSMENT & PLAN NOTE
Symptoms: shortness of breath at rest and wheezing    Lab Results   Component Value Date    SARSCOV2 Negative 06/15/2021    SARSCOV2 Positive (A) 05/11/2021     · Imaging:  XR chest 1 view portable - Result Date: 6/15/2021  Impression: Moderate bilateral pulmonary infiltrates which are nonspecific and may represent pneumonia or edema  · CTA ED chest PE Study - Result Date: 6/15/2021  Impression: No pulmonary embolism  Diffuse groundglass opacities in the lungs  Differential considerations include bacterial and viral pneumonia (including COVID 19), and vascular congestion  Minimal left pleural effusion      Recent Labs     06/27/21  0540 06/29/21  0546   WBC 15 31* 12 28*     · On no supplemental oxygen at baseline, status post COVID infection 5/12/21  · Currently on mid-flow nasal cannula 8L  · Previously on trastuzumab for treatment of esophageal cancer  · Differential includes most likely post COVID syndrome vs trastuzumab pneumonitis vs radiation pneumonitis (most recent radiation in April 2021) vs aspiration pneumonia (less likely given negative VBS)  · Blood clx, legionella, strep Negative  · Completed antibiotics regimen 6/21  · VBS negative - regular thin liquids    Plan:  · Labs:  ? BMP/CBC tomorrow AM    Meds/Interventions:  ? Incentive spirometry, Guaifenesin 1200 mg q 12 hours  ? Xopenex t i d  Nebs  ? Continue prolonged steroid taper; transition to PO prednisone 40 mg on 6/30  §  oral thrush from steroids improving on Nystatin  ? Tessalon Perles  ? Resumed on home diuretic; ARISTIDES resolved    Oxygen:  · Continue mid flow and titrate As tolerated; attempt to wean down to 6L or below; plan for discharging on oxymizer  ?  Encourage out of bed and physical therapy along with repositioning while monitoring saturations

## 2021-06-29 NOTE — PROGRESS NOTES
Progress Note - Infectious Disease   Senia Mcclellan 65 y o  male MRN: [id number]  Unit/Bed#: S -01 Encounter: [id number]      Impression/Plan:  1  Sepsis  POA   With tachycardia, tachypnea, leukocytosis  In patient presenting with shortness of breath and acute respiratory failure; suspect pulmonary source as below  Admission blood cultures have no growth  MRSA nares negative  Blood pressure improved with volume management   Patient is overall clinically and systemically much improved   He completed empiric antibiotic course   -monitoring off antibiotics hereafter  -monitor temperature and hemodynamics  -serial exam  -respiratory support  -monitor CBC and BMP      2  Acute hypoxic respiratory failure   In setting of shortness of breath, CT scan with ground glass opacities and consolidation at the bases with leukocytosis, elevated procalcitonin level   Consider bacterial/viral pneumonia status post COVID-19 PCR positive on May 11, 2021  Patient clinically stable on PO Lasix, IV steroid and s/p antibiotics  WBC count elevation likely steroid effect  06/20/2021 portable chest x-ray with persistent peripheral and basilar ground-glass opacities  CRP trending down, Ferritin 500s    Patient's respiratory status is improving   O2 support is slowly decreasing   -Completed 7 day total antibiotic course through 6/21/21  -monitor off antibotics hereafter  -monitor temperature and hemodynamics  -serial exam  -ongoing pulmonary follow-up and management  -IV steroid weaning per pulmonary   -O2 weaning per primary service  -monitor CBC and BMP   -monitor clinical response     3  Oral candidiasis and groin intertrigo  Oral thrush improving  -Completed Nystatin swish and swallow x 1 week through today, 6/28/21  -Continue topical nystatin powder to groin      4  Renal Insufficiency/CKD III   POA  Creatinine 1 44 > 0 78   Likely prerenal in setting of #1    -renal dose adjust antibiotic as needed  -monitor BMP     5  Esophageal cancer   status post radiation therapy through 2021 and now on Herceptin infusion monthly   Immunocompromised patient  With chronic dysphasia   No aspiration events on bedside swallow evaluation and on VBS  -symptomatic management per primary care team  -regular/thin liquid diet as per speech therapy recommendation     Above impression and plan discussed in detail with patient, RN, and primary care team      Antibiotics:  None D8     Subjective:  Patient is comfortable     Dyspnea continues to improve   Cough mild and improving, less productive  His appetite is fair  Temperature stays down   No chills  He is tired, not sleeping well here    Objective:  Vitals:  Temp:  [97 3 °F (36 3 °C)-97 7 °F (36 5 °C)] 97 7 °F (36 5 °C)  HR:  [73-90] 73  Resp:  [16-20] 16  BP: (103-116)/(57-71) 110/57  SpO2:  [86 %-94 %] 90 %  Temp (24hrs), Av 5 °F (36 4 °C), Min:97 3 °F (36 3 °C), Max:97 7 °F (36 5 °C)  Current: Temperature: 97 7 °F (36 5 °C)    Physical Exam:   General Appearance:  Alert, tired appearance, nontoxic, no acute distress  Throat: Oropharynx moist without lesions  Lungs:   Decreased breath sounds clear to auscultation bilaterally; respirations unlabored with conversation on 6 L NCO2 statting 90%   Heart:  RRR; no murmur   Abdomen:   Soft, non-tender, non-distended, positive bowel sounds  Extremities: No clubbing, cyanosis or edema   : No fritz, no SPT   Skin: No new rashes or lesions  IV site nontender   Intact small blood blister left forearm nontender       Labs, Imaging, & Other studies:   All pertinent labs and imaging studies were personally reviewed  Results from last 7 days   Lab Units 21  0546 21  0540 21  0545   WBC Thousand/uL 12 28* 15 31* 12 66*   HEMOGLOBIN g/dL 13 5 13 8 14 7   PLATELETS Thousands/uL 256 306 332     Results from last 7 days   Lab Units 21  0546 21  0540 21  0540   SODIUM mmol/L 132* 134* 130*   POTASSIUM mmol/L 4 3 5 2 5 1 CHLORIDE mmol/L 98* 99* 95*   CO2 mmol/L 26 29 26   BUN mg/dL 30* 40* 46*   CREATININE mg/dL 1 04 1 08 1 18   EGFR ml/min/1 73sq m 71 68 61   CALCIUM mg/dL 7 7* 8 1* 8 0*

## 2021-06-29 NOTE — PROGRESS NOTES
[facility]  Progress Note Rafi Veliz 1950, 65 y o  male MRN: [id number]  Unit/Bed#: S -01 Encounter: [id number]  Primary Care Provider: Yao Vicente DO   Date and time admitted to hospital: 6/15/2021  7:06 AM    * Acute respiratory failure with hypoxia ([facility] [address] )  Assessment & Plan  POA  Patient is not on any home oxygen  Patient is now on mid flow of 10 L     Plan:  · Continue nebulizations as needed  · Continue IV Solu-Medrol 40 mg q d  · Continue BiPAP:0 5 mg Ativan p r n  B i d  to help patient tolerate BiPAP  · Will continue daily dose of Lasix  · Will Continue Spiriva  · Incentive spirometry  · Appreciate pulmonology recommendations  · Titrate oxygen to maintain SpO2 88%    Type 2 diabetes mellitus, without long-term current use of insulin ([facility] )  Assessment & Plan  2/2 to IV steroids  · Carb controlled diet   · Hypoglycemia protocol  · Patient is currently on 15 units  Blood glucose levels are slowly trending down  · Monitor glucose levels as they will start decreasing as steroid doses are titrated down  · Sliding scale algorithm 3    Paroxysmal A-fib (HCC)  Assessment & Plan  · Patient's EKG shows paroxysmal atrial fibrillation  Patient's AFib likely secondary to acute respiratory failure with hypoxia  · Patient is currently rate controlled with metoprolol  · Chads Vasc score: 3  Eliquis too expense   · Therapeutic INR  · Patient bridged to warfarin 5 mg q d  · Heparin drip stopped today    Pneumonia  Assessment & Plan  · Patient had COVID 19 about a month ago and he is also immunocompromised from his esophageal cancer  · Treatment with antibiotics completed as of 06/21/2021    Esophageal cancer   Assessment & Plan  · Outpatient follow-up with Oncology  · Patient is due for Herceptin fusion today  Will discuss with patient's oncologist   · He is not on any special diet    Dysphagia  Assessment & Plan  Can continue regular diet   no risk of aspiration seen on VBS    Acute renal failure superimposed on stage 3 chronic kidney disease [facility])  Assessment & Plan  Lab Results   Component Value Date    EGFR 71 06/29/2021    EGFR 68 06/27/2021    EGFR 61 06/26/2021    CREATININE 1 04 06/29/2021    CREATININE 1 08 06/27/2021    CREATININE 1 18 06/26/2021     · Patient still at baseline creatinine of 1 14-1 2  · Lasix resumed  · Lab holiday tomorrow    Moderate protein-calorie malnutrition ([address] )  Assessment & Plan  Malnutrition Findings:   Adult Malnutrition type: Acute illness (in the setting of chronic illness)  Adult Degree of Malnutrition: Malnutrition of moderate degree (related to catabolic illness, respiratory distress)    BMI Findings: Body mass index is 26 31 kg/m²  PAD (peripheral artery disease)  Assessment & Plan  · Continue aspirin and statin    Carotid stenosis  Assessment & Plan  · Continue aspirin and statin    Essential hypertension  Assessment & Plan  · Episodes of hypotension, likely secondary to hypoxia  · Continue amlodipine  · Resumed metoprolol at a lower dose 25 mg q12  · Monitor BP closely    GERD (gastroesophageal reflux disease)  Assessment & Plan  · Continue Protonix    Transaminitis-resolved as of 6/18/2021  Assessment & Plan  · Patient's transaminitis likely secondary to shock level in the setting of sepsis  · Patient's transaminitis improved today with resolution of ARISTIDES and sepsis            VTE Pharmacologic Prophylaxis:     High Risk (Score >/= 5) - Pharmacological DVT Prophylaxis Ordered: Warfarin (Coumadin)  Sequential Compression Devices Ordered  Mechanical VTE Prophylaxis in Place: Yes    Patient Centered Rounds: I have performed bedside rounds with nursing staff today  Discussions with Specialists or Other Care Team Provider:  Pulmonology, Infectious Disease    Education and Discussions with Family / Patient: Updated  (significant other) via phone      Current Length of Stay: 14 day(s)    Current Patient Status: Inpatient     Discharge Plan / Estimated Discharge Date: Anticipate discharge in 48-72 hrs to rehab facility  Code Status: Level 1 - Full Code      Subjective:   Denies any complaints  Denies shortness of breath, no fevers no chills  Patient appears more lethargic  Objective:     Vitals:   Temp (24hrs), Av 5 °F (36 4 °C), Min:97 4 °F (36 3 °C), Max:97 7 °F (36 5 °C)    Temp:  [97 4 °F (36 3 °C)-97 7 °F (36 5 °C)] 97 7 °F (36 5 °C)  HR:  [73-90] 73  Resp:  [16] 16  BP: (103-111)/(57-71) 110/57  SpO2:  [90 %-94 %] 93 %  Body mass index is 26 31 kg/m²  Input and Output Summary (last 24 hours): Intake/Output Summary (Last 24 hours) at 2021 1522  Last data filed at 2021 1333  Gross per 24 hour   Intake 400 ml   Output 1865 ml   Net -1465 ml       Physical Exam:     Physical Exam  Vitals and nursing note reviewed  Constitutional:       Appearance: He is well-developed  HENT:      Head: Normocephalic and atraumatic  Eyes:      Conjunctiva/sclera: Conjunctivae normal    Cardiovascular:      Rate and Rhythm: Normal rate  Rhythm irregular  Heart sounds: No murmur heard  Pulmonary:      Effort: Pulmonary effort is normal  No respiratory distress  Breath sounds: Examination of the right-middle field reveals rhonchi  Examination of the left-middle field reveals rhonchi  Examination of the right-lower field reveals rhonchi  Examination of the left-lower field reveals rhonchi  Rhonchi present  No wheezing or rales  Abdominal:      Palpations: Abdomen is soft  Tenderness: There is no abdominal tenderness  Musculoskeletal:      Cervical back: Neck supple  Right lower leg: No edema  Left lower leg: No edema  Skin:     General: Skin is warm and dry  Capillary Refill: Capillary refill takes less than 2 seconds  Neurological:      General: No focal deficit present  Mental Status: He is alert     Psychiatric:         Mood and Affect: Mood normal  Behavior: Behavior normal           Additional Data:     Labs:  Results from last 7 days   Lab Units 06/29/21  0546 06/24/21  0449   WBC Thousand/uL 12 28* 14 75*   HEMOGLOBIN g/dL 13 5 15 0   HEMATOCRIT % 40 1 45 5   PLATELETS Thousands/uL 256 406*   BANDS PCT %  --  1   LYMPHO PCT %  --  3*   MONO PCT %  --  2*   EOS PCT %  --  1     Results from last 7 days   Lab Units 06/29/21  0546   SODIUM mmol/L 132*   POTASSIUM mmol/L 4 3   CHLORIDE mmol/L 98*   CO2 mmol/L 26   BUN mg/dL 30*   CREATININE mg/dL 1 04   ANION GAP mmol/L 8   CALCIUM mg/dL 7 7*   GLUCOSE RANDOM mg/dL 139     Results from last 7 days   Lab Units 06/29/21  0546   INR  2 14*     Results from last 7 days   Lab Units 06/29/21  1126 06/29/21  0736 06/28/21  1648 06/28/21  1129 06/28/21  0751 06/27/21  2045 06/27/21  1543 06/27/21  1130 06/27/21  0746 06/26/21  2110 06/26/21  1610 06/26/21  1131   POC GLUCOSE mg/dl 295* 128 204* 281* 116 312* 218* 326* 128 259* 181* 232*     Results from last 7 days   Lab Units 06/23/21  0523   HEMOGLOBIN A1C % 6 5*           Imaging: No pertinent imaging reviewed      Recent Cultures (last 7 days):           Lines/Drains:  Invasive Devices     Central Venous Catheter Line            Port A Cath 08/28/17 Right Chest 1401 days          Peripheral Intravenous Line            Long-Dwell Peripheral IV (Midline) 55/11/39 Left Cephalic 7 days                Telemetry:        Last 24 Hours Medication List:   Current Facility-Administered Medications   Medication Dose Route Frequency Provider Last Rate    acetaminophen  650 mg Oral Q6H PRN Xenia Parsons MD      amLODIPine  5 mg Oral Daily Blaze Velázquez MD      aspirin  81 mg Oral Daily Xenia Parsons MD      barium sulfate  1 tablet Oral Once in imaging Francisco J Rosenberg DO      benzonatate  100 mg Oral TID PRN Sina Xiong PA-C      furosemide  20 mg Oral Daily Yary Mckeon MD      guaiFENesin  1,200 mg Oral Q12H [address] Blaze Velázquez MD      insulin glargine  15 Units Subcutaneous QAM Annabel Palomares MD      insulin lispro  1-6 Units Subcutaneous 4 times day Nino Phan DO      levalbuterol  1 25 mg Nebulization TID Riya Olmstead MD      lidocaine   Topical Daily PRN Mia Corado MD      LORazepam  0 5 mg Oral BID PRN Annabel Palomares MD      melatonin  9 mg Oral HS Annabel Palomares MD      methylPREDNISolone sodium succinate  40 mg Intravenous Daily Francisco J Rosenberg DO      metoprolol  5 mg Intravenous Q6H PRN Annabel Palomares MD      metoprolol tartrate  25 mg Oral Q12H [address] Blaze Velázquez MD      nystatin   Topical TID Riya Olmstead MD      ondansetron  8 mg Intravenous Q6H PRN Patt Carrasquillo MD      pantoprazole  40 mg Oral Early Morning Patt Carrasquillo MD      polyethylene glycol  17 g Oral BID Annabel Palomares MD      senna-docusate sodium  2 tablet Oral BID Annabel Palomares MD      sodium chloride  1 spray Each Nare Q1H PRN Tammi Jiang PA-C      sodium chloride  3 mL Nebulization TID Riya Olmstead MD      tiotropium  18 mcg Inhalation Daily Anna Michel DO      warfarin  5 mg Oral Daily (warfarin) Annabel Palomares MD          Today, Patient Was Seen By: Annabel Palomares MD    ** Please Note: This note has been constructed using a voice recognition system   **

## 2021-06-29 NOTE — ASSESSMENT & PLAN NOTE
· Outpatient follow-up with Oncology  · Patient is due for Herceptin fusion today    Will discuss with patient's oncologist   · He is not on any special diet

## 2021-06-29 NOTE — ASSESSMENT & PLAN NOTE
POA   Patient is not on any home oxygen  Patient is now on mid flow of 10 L     Plan:  · Continue nebulizations as needed  · Continue IV Solu-Medrol 40 mg q d    · Continue BiPAP:0 5 mg Ativan p r n  B i d  to help patient tolerate BiPAP  · Will continue daily dose of Lasix  · Will Continue Spiriva  · Incentive spirometry  · Appreciate pulmonology recommendations  · Titrate oxygen to maintain SpO2 88%

## 2021-06-30 NOTE — ASSESSMENT & PLAN NOTE
· Patient's EKG shows paroxysmal atrial fibrillation  Patient's AFib likely secondary to acute respiratory failure with hypoxia    · Patient is currently rate controlled with metoprolol  · Chads Vasc score: 3  Eliquis too expense   · INR was subtherapeutic today (6/30)  · Patient received 7 5 mg of warfarin today  · Please switch back to 5 mg tomorrow if therapeutic

## 2021-06-30 NOTE — PROGRESS NOTES
[facility]  Progress Note Crystal Gather 1950, 65 y o  male MRN: [id number]  Unit/Bed#: S -01 Encounter: [id number]  Primary Care Provider: Melody Rodriguez DO   Date and time admitted to hospital: 6/15/2021  7:06 AM    * Acute respiratory failure with hypoxia ([address] )  Assessment & Plan  POA  Patient is not on any home oxygen  Patient is now tolerating 7-8 L on nasal cannula  Patient's cause likely secondary to pulmonary fibrosis  Plan:  · Continue nebulizations as needed  · Patient will be transitioned to oral steroids tomorrow morning  40 mg of p o  Prednisone  · Continue BiPAP:0 5 mg Ativan p r n  B i d  to help patient tolerate BiPAP  · Will continue daily dose of Lasix  · Will Continue Spiriva  · Incentive spirometry  · Appreciate pulmonology recommendations  · Titrate oxygen to maintain SpO2 88%    Type 2 diabetes mellitus, without long-term current use of insulin (HCC)  Assessment & Plan  2/2 to IV steroids  · Carb controlled diet   · Hypoglycemia protocol  · Patient's morning glucose was 93  · Patient is currently on 15 units  Will decrease to 12 units tonight   · Monitor glucose levels as they will start decreasing as steroid doses are titrated down  · Sliding scale algorithm 3    Paroxysmal A-fib (HCC)  Assessment & Plan  · Patient's EKG shows paroxysmal atrial fibrillation  Patient's AFib likely secondary to acute respiratory failure with hypoxia    · Patient is currently rate controlled with metoprolol  · Chads Vasc score: 3  Eliquis too expense   · INR was subtherapeutic today (6/30)  · Patient received 7 5 mg of warfarin today  · Please switch back to 5 mg tomorrow if therapeutic      Pneumonia  Assessment & Plan  · Patient had COVID 19 about a month ago and he is also immunocompromised from his esophageal cancer  · Treatment with antibiotics completed as of 06/21/2021    Esophageal cancer   Assessment & Plan  · Outpatient follow-up with Oncology  · Patient was due for Herceptin infusion on 06/29/2021  · Patient's oncologist is aware      Dysphagia  Assessment & Plan  Can continue regular diet  no risk of aspiration seen on VBS    Acute renal failure superimposed on stage 3 chronic kidney disease [facility])  Assessment & Plan  Lab Results   Component Value Date    EGFR 79 06/30/2021    EGFR 71 06/29/2021    EGFR 68 06/27/2021    CREATININE 0 96 06/30/2021    CREATININE 1 04 06/29/2021    CREATININE 1 08 06/27/2021     · Patient still at baseline creatinine of 1 14-1 2  · Lasix resumed      Moderate protein-calorie malnutrition (HCC)  Assessment & Plan  Malnutrition Findings:   Adult Malnutrition type: Acute illness (in the setting of chronic illness)  Adult Degree of Malnutrition: Malnutrition of moderate degree (related to catabolic illness, respiratory distress)    BMI Findings: Body mass index is 26 31 kg/m²  PAD (peripheral artery disease)  Assessment & Plan  · Continue aspirin and statin    Carotid stenosis  Assessment & Plan  · Continue aspirin and statin    Essential hypertension  Assessment & Plan  · Episodes of hypotension, likely secondary to hypoxia  · Continue amlodipine  · Resumed metoprolol at a lower dose 25 mg q12  · Monitor BP closely    GERD (gastroesophageal reflux disease)  Assessment & Plan  · Continue Protonix    Transaminitis-resolved as of 6/18/2021  Assessment & Plan  · Patient's transaminitis likely secondary to shock level in the setting of sepsis  · Patient's transaminitis improved today with resolution of ARISTIDES and sepsis            VTE Pharmacologic Prophylaxis:     High Risk (Score >/= 5) - Pharmacological DVT Prophylaxis Ordered: Warfarin (Coumadin)  Sequential Compression Devices Ordered  Mechanical VTE Prophylaxis in Place: Yes    Patient Centered Rounds: I have performed bedside rounds with nursing staff today      Discussions with Specialists or Other Care Team Provider:  Pulmonology, Infectious Disease    Education and Discussions with Family / Patient: Updated  (significant other) via phone  Current Length of Stay: 15 day(s)    Current Patient Status: Inpatient     Discharge Plan / Estimated Discharge Date: Anticipate discharge in 48-72 hrs to rehab facility  Code Status: Level 1 - Full Code      Subjective:   Patient feels well  No complaints overnight  Denies any shortness of breath  Objective:     Vitals:   Temp (24hrs), Av 2 °F (36 8 °C), Min:98 °F (36 7 °C), Max:98 3 °F (36 8 °C)    Temp:  [98 °F (36 7 °C)-98 3 °F (36 8 °C)] 98 3 °F (36 8 °C)  HR:  [70-88] 81  Resp:  [16] 16  BP: ()/(55-77) 121/67  SpO2:  [87 %-97 %] 92 %  Body mass index is 26 31 kg/m²  Input and Output Summary (last 24 hours): Intake/Output Summary (Last 24 hours) at 2021 1216  Last data filed at 2021 1155  Gross per 24 hour   Intake --   Output 1050 ml   Net -1050 ml       Physical Exam:     Physical Exam  Vitals and nursing note reviewed  Constitutional:       Appearance: He is well-developed  HENT:      Head: Normocephalic and atraumatic  Eyes:      Conjunctiva/sclera: Conjunctivae normal    Cardiovascular:      Rate and Rhythm: Normal rate  Rhythm irregular  Heart sounds: No murmur heard  Pulmonary:      Effort: Pulmonary effort is normal  No respiratory distress  Breath sounds: Examination of the right-middle field reveals rhonchi  Examination of the left-middle field reveals rhonchi  Examination of the right-lower field reveals rhonchi  Examination of the left-lower field reveals rhonchi  Rhonchi present  No wheezing or rales  Abdominal:      Palpations: Abdomen is soft  Tenderness: There is no abdominal tenderness  Musculoskeletal:      Cervical back: Neck supple  Right lower leg: No edema  Left lower leg: No edema  Skin:     General: Skin is warm and dry  Capillary Refill: Capillary refill takes less than 2 seconds  Neurological:      General: No focal deficit present  Mental Status: He is alert  Psychiatric:         Mood and Affect: Mood normal          Behavior: Behavior normal           Additional Data:     Labs:  Results from last 7 days   Lab Units 06/29/21  0546 06/24/21  0449   WBC Thousand/uL 12 28* 14 75*   HEMOGLOBIN g/dL 13 5 15 0   HEMATOCRIT % 40 1 45 5   PLATELETS Thousands/uL 256 406*   BANDS PCT %  --  1   LYMPHO PCT %  --  3*   MONO PCT %  --  2*   EOS PCT %  --  1     Results from last 7 days   Lab Units 06/30/21  0631   SODIUM mmol/L 132*   POTASSIUM mmol/L 4 6   CHLORIDE mmol/L 100   CO2 mmol/L 25   BUN mg/dL 25   CREATININE mg/dL 0 96   ANION GAP mmol/L 7   CALCIUM mg/dL 8 3   GLUCOSE RANDOM mg/dL 93     Results from last 7 days   Lab Units 06/30/21  0631   INR  1 89*     Results from last 7 days   Lab Units 06/30/21  1119 06/30/21  0742 06/29/21  1712 06/29/21  1126 06/29/21  0736 06/28/21  1648 06/28/21  1129 06/28/21  0751 06/27/21  2045 06/27/21  1543 06/27/21  1130 06/27/21  0746   POC GLUCOSE mg/dl 229* 110 265* 295* 128 204* 281* 116 312* 218* 326* 128               Imaging: No pertinent imaging reviewed      Recent Cultures (last 7 days):           Lines/Drains:  Invasive Devices     Central Venous Catheter Line            Port A Cath 08/28/17 Right Chest 1402 days          Peripheral Intravenous Line            Long-Dwell Peripheral IV (Midline) 53/46/60 Left Cephalic 8 days                Telemetry:        Last 24 Hours Medication List:   Current Facility-Administered Medications   Medication Dose Route Frequency Provider Last Rate    acetaminophen  650 mg Oral Q6H PRN Dali Tejeda MD      amLODIPine  5 mg Oral Daily Blaze Velázquez MD      aspirin  81 mg Oral Daily Dali Tejeda MD      barium sulfate  1 tablet Oral Once in imaging Francisco J Rosenberg DO      benzonatate  100 mg Oral TID PRN Rafael Richards PA-C      furosemide  20 mg Oral Daily MD India Gilbert guaiFENesin  1,200 mg Oral Q12H [address] Blaze Velázquez MD      [START ON 7/1/2021] insulin glargine  12 Units Subcutaneous QAM Chata Mendenhall MD      insulin lispro  1-6 Units Subcutaneous 4 times day Shira Darby DO      levalbuterol  1 25 mg Nebulization TID Chandrika Hilton MD      lidocaine   Topical Daily PRN Nallely Montes MD      LORazepam  0 5 mg Oral BID PRN Chata Mendenhall MD      melatonin  9 mg Oral HS Chata Mendenhall MD      metoprolol  5 mg Intravenous Q6H PRN Chata Mendenhall MD      metoprolol tartrate  25 mg Oral Q12H [address] Blaze Velázquez MD      nystatin   Topical TID Chandrika Hilton MD      ondansetron  8 mg Intravenous Q6H PRN Dali Tejeda MD      pantoprazole  40 mg Oral Early Morning Dali Tejeda MD      polyethylene glycol  17 g Oral BID MD India Gilbert [facility] 7/1/2021] predniSONE  40 mg Oral Daily Francisco J Rosenberg DO      senna-docusate sodium  2 tablet Oral BID Chata Mendenhall MD      sodium chloride  1 spray Each Nare Q1H PRN Tammi Jiang PA-C      sodium chloride  3 mL Nebulization TID Chandrika Hilton MD      tiotropium  18 mcg Inhalation Daily Richie Earl DO      warfarin  7 5 mg Oral Daily (warfarin) Chata Mendenhall MD          Today, Patient Was Seen By: Chata Mendenhall MD    ** Please Note: This note has been constructed using a voice recognition system   **

## 2021-06-30 NOTE — ASSESSMENT & PLAN NOTE
POA   Patient is not on any home oxygen  Patient is now tolerating 7-8 L on nasal cannula  Patient's cause likely secondary to pulmonary fibrosis  Plan:  · Continue nebulizations as needed  · Patient will be transitioned to oral steroids tomorrow morning  40 mg of p o   Prednisone  · Continue BiPAP:0 5 mg Ativan p r n  B i d  to help patient tolerate BiPAP  · Will continue daily dose of Lasix  · Will Continue Spiriva  · Incentive spirometry  · Appreciate pulmonology recommendations  · Titrate oxygen to maintain SpO2 88%

## 2021-06-30 NOTE — PROGRESS NOTES
[facility]  Progress Note Haydee Stone 1950, 65 y o  male MRN: [id number]  Unit/Bed#: S -01 Encounter: [id number]  Primary Care Provider: Troy Macedo DO   Date and time admitted to hospital: 6/15/2021  7:06 AM    * Acute respiratory failure with hypoxia [facility])  Assessment & Plan  Symptoms: shortness of breath at rest and wheezing    Lab Results   Component Value Date    SARSCOV2 Negative 06/15/2021    SARSCOV2 Positive (A) 05/11/2021     · Imaging:  XR chest 1 view portable - Result Date: 6/15/2021  Impression: Moderate bilateral pulmonary infiltrates which are nonspecific and may represent pneumonia or edema  · CTA ED chest PE Study - Result Date: 6/15/2021  Impression: No pulmonary embolism  Diffuse groundglass opacities in the lungs  Differential considerations include bacterial and viral pneumonia (including COVID 19), and vascular congestion  Minimal left pleural effusion      Recent Labs     06/29/21  0546   WBC 12 28*     · On no supplemental oxygen at baseline, status post COVID infection 5/12/21  · Currently on mid-flow nasal cannula 7-8L  · Previously on trastuzumab for treatment of esophageal cancer  · Differential includes most likely post COVID syndrome vs trastuzumab pneumonitis vs radiation pneumonitis (most recent radiation in April 2021) vs aspiration pneumonia (less likely given negative VBS)  · Blood clx, legionella, strep Negative  · Completed antibiotics regimen 6/21  · VBS negative - regular thin liquids    Plan:  · Labs:  ? BMP/CBC tomorrow AM    Meds/Interventions:  ? Incentive spirometry, Guaifenesin 1200 mg q 12 hours  ? Xopenex t i d  Nebs  ? Patient received IV Solumedrol 40 mg today; transition to PO prednisone 40 mg on 7/1  ? Tessalon Perles  ? Resumed on home diuretic;  ARISTIDES resolved    Oxygen:  · Continue mid flow and titrate As tolerated; attempt to wean down to 6L or below; plan for discharging on oxymizer - discharge likely in 24-48 hours if patient continues improvement trajectory  ? Encourage out of bed and physical therapy along with repositioning while monitoring saturations      Subjective: Today the patient states he feels no different compared to yesterday though he is in a lower [place] O2 requirement  Reece Price to go home but understands our desire to keep him on low O2 reqs so that he can be eligible for oxymizer at home  Patient already received IV steroids today; will continue with steroid taper to prednisone p o  Tomorrow  All questions answered  Objective:    Vitals: Blood pressure 101/55, pulse 78, temperature 98 1 °F (36 7 °C), resp  rate 16, height 5' 7" (1 702 m), weight 76 2 kg (167 lb 15 9 oz), SpO2 (!) 87 %  ,Body mass index is 26 31 kg/m²  Intake/Output Summary (Last 24 hours) at 6/30/2021 6283  Last data filed at 6/30/2021 0501  Gross per 24 hour   Intake --   Output 650 ml   Net -650 ml       Invasive Devices     Central Venous Catheter Line            Port A Cath 08/28/17 Right Chest 1402 days          Peripheral Intravenous Line            Long-Dwell Peripheral IV (Midline) 15/11/84 Left Cephalic 8 days                Physical Exam:    Physical Exam  Vitals and nursing note reviewed  Constitutional:       Appearance: Normal appearance  Interventions: Nasal cannula in place  Comments: [address] 9L   HENT:      Head: Normocephalic and atraumatic  Right Ear: Tympanic membrane normal       Left Ear: Tympanic membrane normal       Nose: Nose normal       Mouth/Throat:      Mouth: Mucous membranes are dry  Eyes:      Extraocular Movements: Extraocular movements intact  Pupils: Pupils are equal, round, and reactive to light  Cardiovascular:      Rate and Rhythm: Normal rate and regular rhythm  Pulses: Normal pulses  Heart sounds: No murmur heard  No gallop  Pulmonary:      Effort: Pulmonary effort is normal  No respiratory distress  Breath sounds: No stridor   No wheezing (Wheezes improving), rhonchi or rales (Improving - fine rales consistently present)  Chest:      Chest wall: No tenderness  Abdominal:      General: Abdomen is flat  Bowel sounds are normal  There is no distension  Palpations: Abdomen is soft  Tenderness: There is no abdominal tenderness  Musculoskeletal:         General: No swelling  Right lower leg: No edema  Left lower leg: No edema  Skin:     General: Skin is warm and dry  Neurological:      General: No focal deficit present  Mental Status: He is alert and oriented to person, place, and time  Cranial Nerves: No cranial nerve deficit  Motor: No weakness  Psychiatric:         Mood and Affect: Mood normal          Behavior: Behavior normal  Behavior is cooperative  Thought Content: Thought content normal          Judgment: Judgment normal          Labs: I have personally reviewed pertinent lab results  Imaging and other studies: I have personally reviewed pertinent reports     and I have personally reviewed pertinent films in PACS

## 2021-06-30 NOTE — ASSESSMENT & PLAN NOTE
Symptoms: shortness of breath at rest and wheezing    Lab Results   Component Value Date    SARSCOV2 Negative 06/15/2021    SARSCOV2 Positive (A) 05/11/2021     · Imaging:  XR chest 1 view portable - Result Date: 6/15/2021  Impression: Moderate bilateral pulmonary infiltrates which are nonspecific and may represent pneumonia or edema  · CTA ED chest PE Study - Result Date: 6/15/2021  Impression: No pulmonary embolism  Diffuse groundglass opacities in the lungs  Differential considerations include bacterial and viral pneumonia (including COVID 19), and vascular congestion  Minimal left pleural effusion      Recent Labs     06/29/21  0546   WBC 12 28*     · On no supplemental oxygen at baseline, status post COVID infection 5/12/21  · Currently on mid-flow nasal cannula 7-8L  · Previously on trastuzumab for treatment of esophageal cancer  · Differential includes most likely post COVID syndrome vs trastuzumab pneumonitis vs radiation pneumonitis (most recent radiation in April 2021) vs aspiration pneumonia (less likely given negative VBS)  · Blood clx, legionella, strep Negative  · Completed antibiotics regimen 6/21  · VBS negative - regular thin liquids    Plan:  · Labs:  ? BMP/CBC tomorrow AM    Meds/Interventions:  ? Incentive spirometry, Guaifenesin 1200 mg q 12 hours  ? Xopenex t i d  Nebs  ? Patient received IV Solumedrol 40 mg today; transition to PO prednisone 40 mg on 7/1  ? Tessalon Perles  ? Resumed on home diuretic; ARISTIDES resolved    Oxygen:  · Continue mid flow and titrate As tolerated; attempt to wean down to 6L or below; plan for discharging on oxymizer - discharge likely in 24-48 hours if patient continues improvement trajectory  ?  Encourage out of bed and physical therapy along with repositioning while monitoring saturations

## 2021-06-30 NOTE — ASSESSMENT & PLAN NOTE
2/2 to IV steroids  · Carb controlled diet   · Hypoglycemia protocol  · Patient's morning glucose was 93  · Patient is currently on 15 units  Will decrease to 12 units tonight   · Monitor glucose levels as they will start decreasing as steroid doses are titrated down    · Sliding scale algorithm 3

## 2021-06-30 NOTE — PLAN OF CARE
Problem: PHYSICAL THERAPY ADULT  Goal: Performs mobility at highest level of function for planned discharge setting  See evaluation for individualized goals  Description: Treatment/Interventions: Functional transfer training, LE strengthening/ROM, Elevations, Therapeutic exercise, Endurance training, Patient/family training, Equipment eval/education, Bed mobility, Gait training, Spoke to nursing          See flowsheet documentation for full assessment, interventions and recommendations  Outcome: Progressing  Note: Prognosis: Poor  Problem List: Decreased strength, Decreased range of motion, Decreased endurance, Decreased mobility, Impaired balance, Decreased skin integrity  Assessment: Patient agreeable to participate in therapy session  Patient remains supervision for supine to sit with increased time  Sit<>stand from EOB with min anx1 and instruction for technique  Pt able to perform SPT from EOB to recliner with min ax1 and arm and arm with therapist  Pt requires some steadying balance assist intially  SpO2 decreased to 79% with norebreather and mid flow in palce with recovery to mid 80s in less than 30 seconds and to 90% after 2-3 minutes  Continue to focus on transfer progression and standing tolerance as appropriate and able  PT Discharge Recommendation: Post acute rehabilitation services          See flowsheet documentation for full assessment

## 2021-06-30 NOTE — PHYSICAL THERAPY NOTE
PHYSICAL THERAPY NOTE    Patient Name: Marybeth HERNÁNDEZ Date: 21 1533   PT Last Visit   PT Visit Date 21   Note Type   Note Type Treatment   Pain Assessment   Pain Assessment Tool Pain Assessment not indicated - pt denies pain   Pain Score No Pain   Restrictions/Precautions   Weight Bearing Precautions Per Order No   Other Precautions Chair Alarm; Bed Alarm;Telemetry;O2;Fall Risk   General   Family/Caregiver Present Yes  (spouse)   Subjective   Subjective Patient supine in bed and is agreeable to participate in therapy session  Patient identifers obtained from name &   Bed Mobility   Supine to Sit 5  Supervision   Additional items Assist x 1;HOB elevated; Bedrails; Increased time required;Verbal cues   Sit to Supine Unable to assess   Additional Comments Patient seated OOB in recliner post session with chair alarm engaged, call bell and belongings in reach  Transfers   Sit to Stand 4  Minimal assistance   Additional items Assist x 1; Armrests; Increased time required;Verbal cues   Stand to Sit 4  Minimal assistance   Additional items Assist x 1; Armrests; Increased time required;Verbal cues   Stand pivot 4  Minimal assistance   Additional items Assist x 1; Armrests; Increased time required;Verbal cues   Balance   Static Sitting Fair +   Dynamic Sitting Fair +   Static Standing Poor +   Endurance Deficit   Endurance Deficit Yes   Endurance Deficit Description limited activity tolerance    Activity Tolerance   Activity Tolerance Patient limited by fatigue; Other (Comment)  (decreasing SpO2)   Medical Staff Made Aware Spoke to [facility]    Assessment   Prognosis Poor   Problem List Decreased strength;Decreased range of motion;Decreased endurance;Decreased mobility; Impaired balance;Decreased skin integrity   Assessment Patient agreeable to participate in therapy session   Patient remains supervision for supine to sit with increased time  Sit<>stand from EOB with min anx1 and instruction for technique  Pt able to perform SPT from EOB to recliner with min ax1 and arm and arm with therapist  Pt requires some steadying balance assist intially  SpO2 decreased to 79% with norebreather and mid flow in palce with recovery to mid 80s in less than 30 seconds and to 90% after 2-3 minutes  Continue to focus on transfer progression and standing tolerance as appropriate and able  Goals   Patient Goals to get stronger   STG Expiration Date 07/03/21   PT Treatment Day 3   Plan   Treatment/Interventions Functional transfer training;LE strengthening/ROM; Elevations; Therapeutic exercise; Endurance training;Patient/family training;Equipment eval/education; Bed mobility;Gait training;Spoke to nursing   PT Frequency Other (Comment)  (3-5x/week)   Recommendation   PT Discharge Recommendation Post acute rehabilitation services   AM-PAC Basic Mobility Inpatient   Turning in Bed Without Bedrails 4   Lying on Back to Sitting on Edge of Flat Bed 4   Moving Bed to Chair 3   Standing Up From Chair 3   Walk in Room 1   Climb 3-5 Stairs 1   Basic Mobility Inpatient Raw Score 16   Basic Mobility Standardized Score 38 32       The patient's AM-[facility] Basic Mobility Inpatient Short Form Raw Score is 16, Standardized Score is 38 32  A standardized score less than 42 9 suggests the patient may benefit from discharge to post-acute rehabilitation services  Please also refer to the recommendation of the Physical Therapist for safe discharge planning      Palmira Gold PTA

## 2021-06-30 NOTE — ASSESSMENT & PLAN NOTE
· Outpatient follow-up with Oncology  · Patient was due for Herceptin infusion on 06/29/2021  · Patient's oncologist is aware

## 2021-06-30 NOTE — ASSESSMENT & PLAN NOTE
Lab Results   Component Value Date    EGFR 79 06/30/2021    EGFR 71 06/29/2021    EGFR 68 06/27/2021    CREATININE 0 96 06/30/2021    CREATININE 1 04 06/29/2021    CREATININE 1 08 06/27/2021     · Patient still at baseline creatinine of 1 14-1 2  · Lasix resumed

## 2021-07-01 NOTE — PROGRESS NOTES
[facility]  Progress Note Lorri Mendenhall 1950, 65 y o  male MRN: [id number]  Unit/Bed#: S -01 Encounter: [id number]  Primary Care Provider: Tapan Alston DO   Date and time admitted to hospital: 6/15/2021  7:06 AM    * Acute respiratory failure with hypoxia ([address] )  Assessment & Plan  POA  Patient is not on any home oxygen  Patient is now tolerating 8-10 L on nasal cannula  likely secondary to pulmonary fibrosis/pneumonitis      Plan:  · Continue nebulizations as needed  · Patient transitioned to oral steroids today 40 mg of p o  Prednisone  · continue daily dose of Lasix, decreased parameters to   · Continue Spiriva  · Incentive spirometry  · Appreciate pulmonology recommendations  · Titrate oxygen to maintain SpO2 88%  · pulm on board, appreciate recommendations and assistance: slow prednisone taper, patient will be evaluated for oximyzer     Paroxysmal A-fib ([address] )  Assessment & Plan  · Patient's EKG shows new onset paroxysmal atrial fibrillation  Patient's AFib likely secondary to acute respiratory failure with hypoxia  · Patient is currently rate controlled with metoprolol  · Chads Vasc score: 3  Eliquis too expense  Started on warfarin, dose adjustment pending INR result this am    Type 2 diabetes mellitus, without long-term current use of insulin (HCC)  Assessment & Plan  2/2 to steroids  · Carb controlled diet   · Hypoglycemia protocol  · Patient is currently on 12 units  · Monitor glucose levels as they will start decreasing as steroid doses are titrated down  · Sliding scale with accu checks     Moderate protein-calorie malnutrition (HCC)  Assessment & Plan  Malnutrition Findings:   Adult Malnutrition type: Acute illness (in the setting of chronic illness)  Adult Degree of Malnutrition: Malnutrition of moderate degree (related to catabolic illness, respiratory distress)    BMI Findings: Body mass index is 26 31 kg/m²       Encourage oral intake Pneumonia  Assessment & Plan  · Patient had COVID 23 about a month ago and he is also immunocompromised from his esophageal cancer  · Treatment with antibiotics completed as of 06/21/2021    Acute renal failure superimposed on stage 3 chronic kidney disease [facility])  Assessment & Plan  Lab Results   Component Value Date    EGFR 78 07/01/2021    EGFR 79 06/30/2021    EGFR 71 06/29/2021    CREATININE 0 97 07/01/2021    CREATININE 0 96 06/30/2021    CREATININE 1 04 06/29/2021     · Patient still at baseline creatinine of 1 14-1 2  · Lasix resumed      PAD (peripheral artery disease)  Assessment & Plan  · Continue aspirin and statin    Esophageal cancer   Assessment & Plan  · Outpatient follow-up with Oncology  · Patient was due for Herceptin infusion on 06/29/2021  · Patient's oncologist is aware  Carotid stenosis  Assessment & Plan  · Continue aspirin and statin    Essential hypertension  Assessment & Plan  · Episodes of hypotension, likely secondary to hypoxia  ·  amlodipine on hold  · Resumed metoprolol at a lower dose 25 mg q12  · Monitor BP closely    GERD (gastroesophageal reflux disease)  Assessment & Plan  · Continue Protonix    Dysphagia  Assessment & Plan  Continue regular diet  no risk of aspiration seen on VBS    Transaminitis-resolved as of 6/18/2021  Assessment & Plan  · Patient's transaminitis likely secondary to shock level in the setting of sepsis  · Patient's transaminitis improved with resolution of ARISTIDES and sepsis        VTE Pharmacologic Prophylaxis:   VTE Score: 3 Moderate Risk (Score 3-4) - Pharmacological DVT Prophylaxis Ordered: Warfarin (Coumadin)  Mechanical VTE Prophylaxis in Place: Yes    Patient Centered Rounds: I have performed bedside rounds with nursing staff today  Discussions with Specialists or Other Care Team Provider: Nursing     Education and Discussions with Family / Patient: Patient declined call to       Current Length of Stay: 16 day(s)    Current Patient Status: Inpatient     Discharge Plan / Estimated Discharge Date: Anticipate discharge in 48 hrs to rehab facility  Code Status: Level 1 - Full Code      Subjective:   Patient resting comfortably in bed  Feels about the same  No acute events overnight     Objective:     Vitals:   Temp (24hrs), Av °F (36 7 °C), Min:97 5 °F (36 4 °C), Max:98 3 °F (36 8 °C)    Temp:  [97 5 °F (36 4 °C)-98 3 °F (36 8 °C)] 97 7 °F (36 5 °C)  HR:  [70-89] 79  Resp:  [16-18] 18  BP: (101-121)/(56-75) 117/59  SpO2:  [91 %-99 %] 98 %  Body mass index is 26 31 kg/m²  Input and Output Summary (last 24 hours): Intake/Output Summary (Last 24 hours) at 2021 0845  Last data filed at 2021 0237  Gross per 24 hour   Intake 480 ml   Output 1225 ml   Net -745 ml       Physical Exam:     Physical Exam  Vitals and nursing note reviewed  Constitutional:       Appearance: He is well-developed  HENT:      Head: Normocephalic and atraumatic  Mouth/Throat:      Mouth: Mucous membranes are moist       Pharynx: Oropharynx is clear  Eyes:      General: No scleral icterus  Extraocular Movements: Extraocular movements intact  Conjunctiva/sclera: Conjunctivae normal    Cardiovascular:      Rate and Rhythm: Normal rate  Rhythm irregular  Heart sounds: No murmur heard  Pulmonary:      Effort: Pulmonary effort is normal  No respiratory distress  Breath sounds: Examination of the right-lower field reveals rhonchi  Examination of the left-lower field reveals rhonchi  Rhonchi present  No wheezing or rales  Abdominal:      Palpations: Abdomen is soft  Tenderness: There is no abdominal tenderness  Musculoskeletal:      Cervical back: Normal range of motion and neck supple  Right lower leg: No edema  Left lower leg: No edema  Skin:     General: Skin is warm and dry  Capillary Refill: Capillary refill takes less than 2 seconds  Neurological:      General: No focal deficit present   levalbuterol  1 25 mg Nebulization TID Darshan Em MD      lidocaine   Topical Daily PRN Yary Kramer MD      LORazepam  0 5 mg Oral BID PRN Rhett Schulte MD      melatonin  9 mg Oral HS Rhett Schulte MD      metoprolol  5 mg Intravenous Q6H PRN Rhett Schulte MD      metoprolol tartrate  25 mg Oral Q12H [address] Blaze Velázquez MD      nystatin   Topical TID Darshan Em MD      ondansetron  8 mg Intravenous Q6H PRN Kj Fraire MD      pantoprazole  40 mg Oral Early Morning Kj Fraire MD      polyethylene glycol  17 g Oral BID Rhett Schulte MD      predniSONE  40 mg Oral Daily Francisco J Rosenberg DO      senna-docusate sodium  2 tablet Oral BID Rhett Schulte MD      sodium chloride  1 spray Each Nare Q1H PRN Tammi Jiang PA-C      sodium chloride  3 mL Nebulization TID Darshan Em MD      tiotropium  18 mcg Inhalation Daily Sergio Reyna DO      warfarin  5 mg Oral Daily (warfarin) Rhett Schulte MD          Today, Patient Was Seen By: Tom Larson MD    ** Please Note: This note has been constructed using a voice recognition system   **

## 2021-07-01 NOTE — PROGRESS NOTES
Progress Note - Pulmonary   Narenmeagan Hunt 65 y o  male MRN: [id number]  Unit/Bed#: S -01 Encounter: [id number]    Assessment/Plan:    1  Acute hypoxic respiratory failure likely multifaceted as listed below       -  unfortunately patients hypoxia continues to wax and wane- currently 6 L-89%       -  patient does not wear home O2       -  continue saturations greater than 89%       -  pulmonary toileting:  Deep breathing cough, OOB as tolerated, IS Q 1 hr       -  home O2 eval placed-will evaluate for Oxymizer today    2  Abnormal chest x-ray      -  persistent peripheral and bibasilar GGO      -  Differentials:  Post COVID syndrome vs radiation/chemotherapy pneumonitis vs aspiration pneumonitis/PNA      -  completed 4 days cefepime/cefdinir, 3 days vancomycin , 2 days Flagyl      -  given continued hypoxia will repeat chest x-ray      -  Day #1/7 prednisone 40 mg, decreased by 10 mg Q 7 days, Xopenex/Atrovent t i d       -  VBS- regular diet with thin liquids      -  will need repeat imaging once completion of steroid    3  Proximal AFib w/ Mild AS and mild PHTN likely WHO group II       -  Echo 6/2021- valve gradient 12 mmHg       -  resume home dose Lasix 20 mg daily       -  given long course of steroids and diuretics recently on hold may consider repeating dose of diuretics today, will wait for imaging       -  continue Coumadin, metoprolol    4  Esophageal cancer with pulmonary and retroperitoneum lymph nodes       -  follows with Dr Malcolm Kline       -  4/2021- completed palliative radiation    5  Former smoker       -  approximately 50-60 pack year smoker       -  patient may have a component of obstruction       -  Inpatient:  Continue Spiriva daily, Xopenex/Atrovent t i d        -  would benefit from discharged on nebulizer          Chief Complaint:    "I feel weak"    Subjective:    Luly Sawyer was comfortably sitting in his bed  He reports that upon ambulation he feels very weak    Overnight events include patient increased O2 requirements to 10 L  Patient currently denying any fevers, chills, hemoptysis, headaches, night sweats, pleuritic chest pain, or palpitations  Objective:    Vitals: Blood pressure 109/75, pulse 80, temperature 97 5 °F (36 4 °C), resp  rate 18, height 5' 7" (1 702 m), weight 76 2 kg (167 lb 15 9 oz), SpO2 97 %  6L,Body mass index is 26 31 kg/m²  Intake/Output Summary (Last 24 hours) at 7/1/2021 0719  Last data filed at 7/1/2021 0237  Gross per 24 hour   Intake 480 ml   Output 1225 ml   Net -745 ml       Invasive Devices     Central Venous Catheter Line            Port A Cath 08/28/17 Right Chest 1402 days          Peripheral Intravenous Line            Long-Dwell Peripheral IV (Midline) 97/87/84 Left Cephalic 9 days                Physical Exam:   Physical Exam  Constitutional:       General: He is not in acute distress  Appearance: Normal appearance  He is normal weight  He is not ill-appearing  HENT:      Head: Normocephalic and atraumatic  Nose: Nose normal  No congestion or rhinorrhea  Mouth/Throat:      Mouth: Mucous membranes are dry  Pharynx: No oropharyngeal exudate or posterior oropharyngeal erythema  Cardiovascular:      Rate and Rhythm: Normal rate and regular rhythm  Pulses: Normal pulses  Heart sounds: Normal heart sounds  No murmur heard  No friction rub  No gallop  Pulmonary:      Effort: Pulmonary effort is normal  No tachypnea, bradypnea, accessory muscle usage or respiratory distress  Breath sounds: Decreased air movement present  No stridor or transmitted upper airway sounds  Decreased breath sounds and rales present  No wheezing or rhonchi  Comments: Lower lobe rales  Chest:      Chest wall: No tenderness  Abdominal:      General: Abdomen is flat  Bowel sounds are normal  There is no distension  Palpations: Abdomen is soft  There is no mass  Musculoskeletal:         General: No swelling or tenderness  Normal range of motion  Cervical back: Normal range of motion and neck supple  No rigidity or tenderness  Skin:     General: Skin is warm and dry  Coloration: Skin is not jaundiced or pale  Neurological:      General: No focal deficit present  Mental Status: He is alert and oriented to person, place, and time  Mental status is at baseline  Psychiatric:         Mood and Affect: Mood normal          Behavior: Behavior normal          Labs:    I have personally reviewed pertinent lab results CBC:   Lab Results   Component Value Date    WBC 8 98 07/01/2021    HGB 13 7 07/01/2021    HCT 41 1 07/01/2021    MCV 90 07/01/2021     07/01/2021    MCH 30 1 07/01/2021    MCHC 33 3 07/01/2021    RDW 13 4 07/01/2021    MPV 9 5 07/01/2021   , CMP:   Lab Results   Component Value Date    SODIUM 133 (L) 07/01/2021    K 4 7 07/01/2021    CL 99 (L) 07/01/2021    CO2 27 07/01/2021    BUN 23 07/01/2021    CREATININE 0 97 07/01/2021    CALCIUM 8 4 07/01/2021    EGFR 78 07/01/2021       Imaging and other studies: I have personally reviewed pertinent films in PACS     Chest x-ray- 6/20/2021- persistent peripheral and basilar ground-glass opacity

## 2021-07-01 NOTE — ASSESSMENT & PLAN NOTE
POA   Patient is not on any home oxygen  Patient is now tolerating 8-10 L on nasal cannula  likely secondary to pulmonary fibrosis/pneumonitis      Plan:  · Continue nebulizations as needed  · Patient transitioned to oral steroids today 40 mg of p o   Prednisone  · continue daily dose of Lasix, decreased parameters to   · Continue Spiriva  · Incentive spirometry  · Appreciate pulmonology recommendations  · Titrate oxygen to maintain SpO2 88%  · pulm on board, appreciate recommendations and assistance: slow prednisone taper, patient will be evaluated for oximyzer

## 2021-07-01 NOTE — PHYSICAL THERAPY NOTE
PHYSICAL THERAPY NOTE    Patient Name: Kelly Hunter  RCRPG'Z Date: 21 1454   PT Last Visit   PT Visit Date 21   Note Type   Note Type Treatment   Pain Assessment   Pain Assessment Tool Pain Assessment not indicated - pt denies pain   Pain Score No Pain   Restrictions/Precautions   Weight Bearing Precautions Per Order No   Other Precautions Chair Alarm; Bed Alarm;Telemetry;O2;Fall Risk  (nonrebreather mask with mobilty)   General   Family/Caregiver Present Yes   Subjective   Subjective Patient supine in bed and is agreeable to participate in therapy session  Patient identifers obtained from name &   Bed Mobility   Supine to Sit 5  Supervision   Additional items Assist x 1;HOB elevated; Bedrails; Increased time required   Sit to Supine Unable to assess   Additional Comments Pt seated OOB in recliner post session with call bell and belongings in reach  Transfers   Sit to Stand 4  Minimal assistance   Additional items Assist x 1; Armrests; Increased time required;Verbal cues   Stand to Sit 4  Minimal assistance   Additional items Assist x 1; Armrests; Increased time required;Verbal cues   Stand pivot 4  Minimal assistance  (arm and arm technique)   Additional items Assist x 1; Armrests; Increased time required;Verbal cues   Balance   Static Sitting Fair +   Dynamic Sitting Fair +   Static Standing Poor +   Endurance Deficit   Endurance Deficit Yes   Endurance Deficit Description limited by decreasing SpO2 and activity tolerance   Activity Tolerance   Activity Tolerance Patient limited by fatigue; Other (Comment)  (decreasing SpO2)   Medical Staff Made Aware Spoke to RN and respiratory therapist   Assessment   Prognosis Poor   Problem List Decreased strength;Decreased range of motion;Decreased endurance;Decreased mobility; Impaired balance;Decreased skin integrity   Assessment Patient agreeable to participate in therapy session  Care coordination with Laura Palacios OT and Respiratory therapy for mobilization  Supine to sit remains supervision with increased time and good technique  Sit<>stand and SPT with min ax1 for steadying and instruction for path navigation  Improvement noted in stability this session with less tremors and LE weakness/shaking  Decreasing SpO2 significantly impacts functional mobility with decreases to 72% on midflow and nonrebreather mask with exertion and transfer  Continue to focus on bed moblity and transfers progression as appropriate and able  Goals   Patient Goals none stated   STG Expiration Date 07/03/21   PT Treatment Day 4   Plan   Treatment/Interventions Functional transfer training;LE strengthening/ROM; Elevations; Therapeutic exercise; Endurance training;Patient/family training;Equipment eval/education; Bed mobility;Gait training;Spoke to nursing   PT Frequency Other (Comment)  (3-5x/week)   Recommendation   PT Discharge Recommendation Post acute rehabilitation services   AM-PAC Basic Mobility Inpatient   Turning in Bed Without Bedrails 4   Lying on Back to Sitting on Edge of Flat Bed 4   Moving Bed to Chair 3   Standing Up From Chair 3   Walk in Room 1   Climb 3-5 Stairs 1   Basic Mobility Inpatient Raw Score 16   Basic Mobility Standardized Score 38 32     Madhuri Rodriguez, JUJU

## 2021-07-01 NOTE — PLAN OF CARE
Problem: OCCUPATIONAL THERAPY ADULT  Goal: Performs self-care activities at highest level of function for planned discharge setting  See evaluation for individualized goals  Description: Treatment Interventions: ADL retraining, Functional transfer training, UE strengthening/ROM, Endurance training, Cognitive reorientation, Patient/family training, Equipment evaluation/education, Compensatory technique education, Energy conservation, Activityengagement          See flowsheet documentation for full assessment, interventions and recommendations  Outcome: Progressing  Note: Limitation: Decreased ADL status, Decreased UE strength, Decreased Safe judgement during ADL, Decreased endurance, Decreased self-care trans, Decreased high-level ADLs  Prognosis: Good  Assessment: Pt agreeable to participate in skilled OT treatment session to address ADL retraining, functional transfer training, endurance training, compensatory technique education, energy conservation, activity engagement and activity tolerance  Pt supine in bed upon arrival  This session, pt required and most appropriately benefited from partial or full skilled OT/PT/RT co-treat due to significant regression from baseline level of functional mobility, significant O2 requirements  PT and OT goals were addressed separately as seen in documentation  Pt completing supine > sit with supervision  Pt requiring increased time for recovery from decreased in O2 saturations  Pt initially on 6L NC upon arrival, RT Liu changing O2 to 15L midflow while sitting EOB  Pt able to thread LE into pants sitting EOB with MAXIMAL increased time for recovery with O2 saturation  Pt edu on PLB and techniques for energy conservation  Pt completing sit >< stand with min A x1, and min A for standing balance and to pull pants over hips  Pt edu on continued PLB techniques  During standing and tying pants pt's O2 sats ~82%   Completing side stepping to chair with hand held assist and overall min A  Pt requiring 5-8 min for recovery, noted that O2 sats s/p functional mobility at 72%  RT aware  Patient continues to be performing below their functional baseline with an increased risk for falls and injury, decreased endurance and decreased occupational performance, and would benefit from continued skilled OT treatment to address deficits  The patient's raw score on the AM-PAC Daily Activity inpatient short form is 19, standardized score is 40 22, greater than 39 4  Patients at this level are likely to benefit from discharge to home  However, pt extremely limited by functional endruance and would benefit d/c to PAR at this time  Cont to recommend d/c to rehab and will continue to follow        OT Discharge Recommendation: Post acute rehabilitation services

## 2021-07-01 NOTE — PLAN OF CARE
Problem: PHYSICAL THERAPY ADULT  Goal: Performs mobility at highest level of function for planned discharge setting  See evaluation for individualized goals  Description: Treatment/Interventions: Functional transfer training, LE strengthening/ROM, Elevations, Therapeutic exercise, Endurance training, Patient/family training, Equipment eval/education, Bed mobility, Gait training, Spoke to nursing          See flowsheet documentation for full assessment, interventions and recommendations  Outcome: Progressing  Note: Prognosis: Poor  Problem List: Decreased strength, Decreased range of motion, Decreased endurance, Decreased mobility, Impaired balance, Decreased skin integrity  Assessment: Patient agreeable to participate in therapy session  Care coordination with Sofy Gonzalez, OT and Respiratory therapy for mobilization  Supine to sit remains supervision with increased time and good technique  Sit<>stand and SPT with min ax1 for steadying and instruction for path navigation  Improvement noted in stability this session with less tremors and LE weakness/shaking  Decreasing SpO2 significantly impacts functional mobility with decreases to 72% on midflow and nonrebreather mask with exertion and transfer  Continue to focus on bed moblity and transfers progression as appropriate and able  PT Discharge Recommendation: Post acute rehabilitation services          See flowsheet documentation for full assessment

## 2021-07-01 NOTE — CASE MANAGEMENT
CM met with the Patient and his friend, Franklyn Chery, at the bedside to review recommendation for STR  Patient states that he feels that he needs rehab as he has become very weak over the course of his hospitalization  CM provided the Patient with a choice list of local facilities and reviewed that at his current oxygen needs can only be met at [facility] as they are able to accommodate high oxygen levels  Patient states that Pulm already informed him of the same and he would like a referral to [facility] so he is able to focus on his recovery and rehab  CM sent referral for STR to [facility] via [facility]  CM dept will follow for review

## 2021-07-01 NOTE — ASSESSMENT & PLAN NOTE
· Patient's EKG shows new onset paroxysmal atrial fibrillation  Patient's AFib likely secondary to acute respiratory failure with hypoxia  · Patient is currently rate controlled with metoprolol  · Chads Vasc score: 3  Eliquis too expense   Started on warfarin, dose adjustment pending INR result this am

## 2021-07-01 NOTE — PLAN OF CARE
Problem: MOBILITY - ADULT  Goal: Maintain or return to baseline ADL function  Description: INTERVENTIONS:  -  Assess patient's ability to carry out ADLs; assess patient's baseline for ADL function and identify physical deficits which impact ability to perform ADLs (bathing, care of mouth/teeth, toileting, grooming, dressing, etc )  - Assess/evaluate cause of self-care deficits   - Assess range of motion  - Assess patient's mobility; develop plan if impaired  - Assess patient's need for assistive devices and provide as appropriate  - Encourage maximum independence but intervene and supervise when necessary  - Involve family in performance of ADLs  - Assess for home care needs following discharge   - Consider OT consult to assist with ADL evaluation and planning for discharge  - Provide patient education as appropriate  Outcome: Progressing  Goal: Maintains/Returns to pre admission functional level  Description: INTERVENTIONS:  - Perform BMAT or MOVE assessment daily    - Set and communicate daily mobility goal to care team and patient/family/caregiver     - Collaborate with rehabilitation services on mobility goals if consulted  - Out of bed for toileting  - Record patient progress and toleration of activity level   Outcome: Progressing     Problem: Prexisting or High Potential for Compromised Skin Integrity  Goal: Skin integrity is maintained or improved  Description: INTERVENTIONS:  - Identify patients at risk for skin breakdown  - Assess and monitor skin integrity  - Assess and monitor nutrition and hydration status  - Monitor labs   - Assess for incontinence   - Turn and reposition patient  - Assist with mobility/ambulation  - Relieve pressure over bony prominences  - Avoid friction and shearing  - Provide appropriate hygiene as needed including keeping skin clean and dry  - Evaluate need for skin moisturizer/barrier cream  - Collaborate with interdisciplinary team   - Patient/family teaching  - Consider wound care consult   Outcome: Progressing     Problem: Potential for Falls  Goal: Patient will remain free of falls  Description: INTERVENTIONS:  - Educate patient/family on patient safety including physical limitations  - Instruct patient to call for assistance with activity   - Consult OT/PT to assist with strengthening/mobility   - Keep Call bell within reach  - Keep bed low and locked with side rails adjusted as appropriate  - Keep care items and personal belongings within reach  - Initiate and maintain comfort rounds  - Make Fall Risk Sign visible to staff  - Apply yellow socks and bracelet for high fall risk patients  - Consider moving patient to room near nurses station  Outcome: Progressing     Problem: RESPIRATORY - ADULT  Goal: Achieves optimal ventilation and oxygenation  Description: INTERVENTIONS:  - Assess for changes in respiratory status  - Assess for changes in mentation and behavior  - Position to facilitate oxygenation and minimize respiratory effort  - Oxygen administered by appropriate delivery if ordered  - Initiate smoking cessation education as indicated  - Encourage broncho-pulmonary hygiene including cough, deep breathe, Incentive Spirometry  - Assess the need for suctioning and aspirate as needed  - Assess and instruct to report SOB or any respiratory difficulty  - Respiratory Therapy support as indicated  Outcome: Progressing     Problem: INFECTION - ADULT  Goal: Absence or prevention of progression during hospitalization  Description: INTERVENTIONS:  - Assess and monitor for signs and symptoms of infection  - Monitor lab/diagnostic results  - Monitor all insertion sites, i e  indwelling lines, tubes, and drains  - Monitor endotracheal if appropriate and nasal secretions for changes in amount and color  - Malone appropriate cooling/warming therapies per order  - Administer medications as ordered  - Instruct and encourage patient and family to use good hand hygiene technique  - Identify and instruct in appropriate isolation precautions for identified infection/condition  Outcome: Progressing  Goal: Absence of fever/infection during neutropenic period  Description: INTERVENTIONS:  - Monitor WBC    Outcome: Progressing     Problem: Nutrition/Hydration-ADULT  Goal: Nutrient/Hydration intake appropriate for improving, restoring or maintaining nutritional needs  Description: Monitor and assess patient's nutrition/hydration status for malnutrition  Collaborate with interdisciplinary team and initiate plan and interventions as ordered  Monitor patient's weight and dietary intake as ordered or per policy  Utilize nutrition screening tool and intervene as necessary  Determine patient's food preferences and provide high-protein, high-caloric foods as appropriate       INTERVENTIONS:  - Monitor oral intake, urinary output, labs, and treatment plans  - Assess nutrition and hydration status and recommend course of action  - Evaluate amount of meals eaten  - Assist patient with eating if necessary   - Allow adequate time for meals  - Recommend/ encourage appropriate diets, oral nutritional supplements, and vitamin/mineral supplements  - Order, calculate, and assess calorie counts as needed  - Recommend, monitor, and adjust tube feedings and TPN/PPN based on assessed needs  - Assess need for intravenous fluids  - Provide specific nutrition/hydration education as appropriate  - Include patient/family/caregiver in decisions related to nutrition  Outcome: Progressing

## 2021-07-01 NOTE — RESPIRATORY THERAPY NOTE
Home Oxygen Qualifying Test       Patient name: Fifi Torres        : 1950   Date of Test:  2021  Diagnosis:      Home Oxygen Test:    **Medicare Guidelines require item(s) 1-5 on all ambulatory patients or 1 and 2 on non-ambulatory patients              *   3   SPO2 on Oxygen at rest 88 % 6 lpm     4   SPO2 during exercise on Oxygen  88% a liter flow of 15 lpm     5   Exercise performed:          distance 3 (feet)          [x]  Supplemental Home Oxygen is indicated  []  Client does not qualify for home oxygen        Respiratory Additional Notes- Pt required 100% Non rebreather plus 15 lpm Mid Flow just to sit at edge of bed  Iris Uribe RT

## 2021-07-01 NOTE — ASSESSMENT & PLAN NOTE
Malnutrition Findings:   Adult Malnutrition type: Acute illness (in the setting of chronic illness)  Adult Degree of Malnutrition: Malnutrition of moderate degree (related to catabolic illness, respiratory distress)    BMI Findings: Body mass index is 26 31 kg/m²       Encourage oral intake

## 2021-07-01 NOTE — ASSESSMENT & PLAN NOTE
Lab Results   Component Value Date    EGFR 78 07/01/2021    EGFR 79 06/30/2021    EGFR 71 06/29/2021    CREATININE 0 97 07/01/2021    CREATININE 0 96 06/30/2021    CREATININE 1 04 06/29/2021     · Patient still at baseline creatinine of 1 14-1 2  · Lasix resumed

## 2021-07-01 NOTE — ASSESSMENT & PLAN NOTE
· Patient's transaminitis likely secondary to shock level in the setting of sepsis    · Patient's transaminitis improved with resolution of ARISTIDES and sepsis

## 2021-07-01 NOTE — ASSESSMENT & PLAN NOTE
· Episodes of hypotension, likely secondary to hypoxia  ·  amlodipine on hold  · Resumed metoprolol at a lower dose 25 mg q12  · Monitor BP closely

## 2021-07-01 NOTE — OCCUPATIONAL THERAPY NOTE
OccupationalTherapy Progress Note     Patient Name: Susan Bauer  CXSUN'N Date: 7/1/2021  Problem List  Principal Problem:    Acute respiratory failure with hypoxia ([facility] )  Active Problems:    Dysphagia    GERD (gastroesophageal reflux disease)    Essential hypertension    Carotid stenosis    Esophageal cancer     PAD (peripheral artery disease)    Acute renal failure superimposed on stage 3 chronic kidney disease (HCC)    Pneumonia    Moderate protein-calorie malnutrition ([facility] )    Type 2 diabetes mellitus, without long-term current use of insulin (HCC)    Paroxysmal A-fib ([place] [id number] )              07/01/21 1440   OT Last Visit   OT Visit Date 07/01/21   Note Type   Note Type Treatment   Restrictions/Precautions   Weight Bearing Precautions Per Order No   Other Precautions Chair Alarm; Bed Alarm; Fall Risk;O2  (6L progressing to 15 L midflow)   Pain Assessment   Pain Assessment Tool Pain Assessment not indicated - pt denies pain   Pain Score No Pain   ADL   Where Assessed Edge of bed   LB Dressing Assistance 4  Minimal Assistance   LB Dressing Deficit Increased time to complete;Supervision/safety;Verbal cueing; Thread RLE into pants; Thread LLE into pants;Pull up over hips   LB Dressing Comments A for steadying and pulling pants over hips   Functional Standing Tolerance   Time 1 min 30 sec   Activity LB dressing tasks   Comments Pt's O2 sats dropping to 82% on 15L midflow and NRB mask while pulling up pants   Bed Mobility   Supine to Sit 5  Supervision   Additional items Increased time required;Verbal cues   Additional Comments Sitting EOB for 5-6 min for recovery, O2 noted to dip s/p activity   Transfers   Sit to Stand 4  Minimal assistance   Additional items Assist x 1; Increased time required;Verbal cues   Stand to Sit 4  Minimal assistance   Additional items Assist x 1; Increased time required;Verbal cues   Functional Mobility   Functional Mobility 4  Minimal assistance   Additional Comments Ax1, side stepping ~ 3ft to chair   Additional items Hand hold assistance   Cognition   Overall Cognitive Status WFL   Arousal/Participation Alert; Responsive; Cooperative   Attention Within functional limits   Orientation Level Oriented X4   Memory Within functional limits   Following Commands Follows one step commands without difficulty   Comments Cooperative, talkative and requires min VC for focusing on breathing  Edu provided on PLB techniques   Activity Tolerance   Activity Tolerance Treatment limited secondary to medical complications (Comment)  (O2 saturations)   Medical Staff Made Aware PTA Sarah Leone RT, Pulmonary team in room at end of session and made aware, spoke to [phone]  [facility] Pt agreeable to participate in skilled OT treatment session to address ADL retraining, functional transfer training, endurance training, compensatory technique education, energy conservation, activity engagement and activity tolerance  Pt supine in bed upon arrival  This session, pt required and most appropriately benefited from partial or full skilled OT/PT/RT co-treat due to significant regression from baseline level of functional mobility, significant O2 requirements  PT and OT goals were addressed separately as seen in documentation  Pt completing supine > sit with supervision  Pt requiring increased time for recovery from decreased in O2 saturations  Pt initially on 6L NC upon arrival, RT Liu changing O2 to 15L midflow while sitting EOB  Pt able to thread LE into pants sitting EOB with MAXIMAL increased time for recovery with O2 saturation  Pt edu on PLB and techniques for energy conservation  Pt completing sit >< stand with min A x1, and min A for standing balance and to pull pants over hips  Pt edu on continued PLB techniques  During standing and tying pants pt's O2 sats ~82%  Completing side stepping to chair with hand held assist and overall min A   Pt requiring 5-8 min for recovery, noted that O2 sats s/p functional mobility at 72%  RT aware  Patient continues to be performing below their functional baseline with an increased risk for falls and injury, decreased endurance and decreased occupational performance, and would benefit from continued skilled OT treatment to address deficits  The patient's raw score on the AM-PAC Daily Activity inpatient short form is 19, standardized score is 40 22, greater than 39 4  Patients at this level are likely to benefit from discharge to home  However, pt extremely limited by functional endruance and would benefit d/c to PAR at this time  Cont to recommend d/c to rehab and will continue to follow      Plan   Goal Expiration Date 07/07/21   OT Treatment Day 1   OT Frequency 2-3x/wk   Recommendation   OT Discharge Recommendation Post acute rehabilitation services   [facility] Daily Activity Inpatient   Lower Body Dressing 3   Bathing 2   Toileting 3   Upper Body Dressing 3   Grooming 4   Eating 4   Daily Activity Raw Score 19   Daily Activity Standardized Score (Calc for Raw Score >=11) 40 22   AM-[facility] Applied Cognition Inpatient   Following a Speech/Presentation 4   Understanding Ordinary Conversation 4   Taking Medications 4   Remembering Where Things Are Placed or Put Away 4   Remembering List of 4-5 Errands 4   Taking Care of Complicated Tasks 4   Applied Cognition Raw Score 24   Applied Cognition Standardized Score 62 21          At the end of the session, all needs met and pt seated in bedside chair, chair alarm activated, LEs elevated  and call bell within reach    [facility], OTR/L

## 2021-07-01 NOTE — ASSESSMENT & PLAN NOTE
2/2 to steroids  · Carb controlled diet   · Hypoglycemia protocol  · Patient is currently on 12 units  · Monitor glucose levels as they will start decreasing as steroid doses are titrated down    · Sliding scale with accu checks

## 2021-07-02 PROBLEM — R13.10 DYSPHAGIA: Status: RESOLVED | Noted: 2017-08-09 | Resolved: 2021-01-01

## 2021-07-02 NOTE — ASSESSMENT & PLAN NOTE
POA   Patient is not on any home oxygen    Patient is now tolerating 8-10 L on nasal cannula  likely secondary to pulmonary fibrosis/pneumonitis      Plan:  · Continue nebulizations as needed  · continue daily dose of Lasix, decreased parameters to   · Continue Spiriva  · Incentive spirometry  · Appreciate pulmonology recommendations  · Titrate oxygen to maintain SpO2 88%  · pulm on board, appreciate recommendations and assistance: slow prednisone taper, patient will be evaluated for oximyzer   · Did not qualify for oximyzer, still high oxygen requirements, will need d/c to LTAC

## 2021-07-02 NOTE — PROGRESS NOTES
[facility]  Progress Note Joann Germain 1950, 65 y o  male MRN: [id number]  Unit/Bed#: S -01 Encounter: [id number]  Primary Care Provider: Jennifer Valerio,    Date and time admitted to hospital: 6/15/2021  7:06 AM    Paroxysmal A-fib [facility])  Assessment & Plan  · Patient's EKG shows new onset paroxysmal atrial fibrillation  Patient's AFib likely secondary to acute respiratory failure with hypoxia  · Patient is currently rate controlled with metoprolol  · Chads Vasc score: 3  Continue Coumadin, maintain INR 2-3    Type 2 diabetes mellitus, without long-term current use of insulin (HCC)  Assessment & Plan  2/2 to steroids  · Carb controlled diet   · Hypoglycemia protocol  · Patient is currently on 12 units Lantus  · Monitor glucose levels as they will start decreasing as steroid doses are titrated down  · Sliding scale with accu checks     Moderate protein-calorie malnutrition (HCC)  Assessment & Plan  Malnutrition Findings:   Adult Malnutrition type: Acute illness (in the setting of chronic illness)  Adult Degree of Malnutrition: Malnutrition of moderate degree (related to catabolic illness, respiratory distress)    BMI Findings: Body mass index is 26 31 kg/m²     Encourage oral intake, nutrition supp    Pneumonia  Assessment & Plan  · Patient had COVID 19 about a month ago and he is also immunocompromised from his esophageal cancer  · Treatment with antibiotics completed as of 06/21/2021    Acute renal failure superimposed on stage 3 chronic kidney disease [facility])  Assessment & Plan  Lab Results   Component Value Date    EGFR 78 07/02/2021    EGFR 78 07/01/2021    EGFR 79 06/30/2021    CREATININE 0 97 07/02/2021    CREATININE 0 97 07/01/2021    CREATININE 0 96 06/30/2021     · Patient still at baseline creatinine of 1 14-1 2  · Lasix resumed  · ARISTIDES resolved    PAD (peripheral artery disease)  Assessment & Plan  · Continue aspirin and statin    Esophageal cancer Assessment & Plan  · Outpatient follow-up with Oncology  · Patient was due for Herceptin infusion on 06/29/2021  · Patient's oncologist is aware, recommends holding treatment until patient has improved       Carotid stenosis  Assessment & Plan  · Continue aspirin and statin    Essential hypertension  Assessment & Plan  · Episodes of hypotension, likely secondary to hypoxia  ·  amlodipine on hold  · Resumed metoprolol at a lower dose 25 mg q12  · Monitor BP closely    * Acute respiratory failure with hypoxia (HCC)  Assessment & Plan  POA  Patient is not on any home oxygen  Patient is now tolerating 8-10 L on nasal cannula  likely secondary to pulmonary fibrosis/pneumonitis      Plan:  · Continue nebulizations as needed  · continue daily dose of Lasix, decreased parameters to   · Continue Spiriva  · Incentive spirometry  · Appreciate pulmonology recommendations  · Titrate oxygen to maintain SpO2 88%  · pulm on board, appreciate recommendations and assistance: slow prednisone taper, patient will be evaluated for oximyzer   · Did not qualify for oximyzer, still high oxygen requirements, will need d/c to LTAC    Dysphagia-resolved as of 7/2/2021  Assessment & Plan  Continue regular diet  no risk of aspiration seen on VBS        VTE Pharmacologic Prophylaxis: VTE Score: 3 Moderate Risk (Score 3-4) - Pharmacological DVT Prophylaxis Ordered: warfarin (Coumadin)  Patient Centered Rounds: I performed bedside rounds with nursing staff today  Discussions with Specialists or Other Care Team Provider:  Case management    Education and Discussions with Family / Patient: Patient declined call to   Time Spent for Care: 30 minutes  More than 50% of total time spent on counseling and coordination of care as described above      Current Length of Stay: 17 day(s)  Current Patient Status: Inpatient   Certification Statement: The patient will continue to require additional inpatient hospital stay due to Pending placement  Discharge Plan: Anticipate discharge in 24-48 hrs to LTAC    Code Status: Level 1 - Full Code    Subjective:   Patient has no complaints this morning  He states he continues to have increased O2 requirements when moving around  Objective:     Vitals:   Temp (24hrs), Av 8 °F (36 6 °C), Min:97 7 °F (36 5 °C), Max:97 9 °F (36 6 °C)    Temp:  [97 7 °F (36 5 °C)-97 9 °F (36 6 °C)] 97 7 °F (36 5 °C)  HR:  [73-83] 73  Resp:  [16] 16  BP: (109-115)/(63-71) 113/63  SpO2:  [92 %-97 %] 92 %  Body mass index is 26 31 kg/m²  Input and Output Summary (last 24 hours): Intake/Output Summary (Last 24 hours) at 2021 1450  Last data filed at 2021 1312  Gross per 24 hour   Intake 60 ml   Output 1450 ml   Net -1390 ml       Physical Exam:   Physical Exam  Vitals and nursing note reviewed  Constitutional:       Appearance: He is normal weight  Comments: Chronically Ill-appearing   HENT:      Head: Normocephalic and atraumatic  Right Ear: External ear normal       Left Ear: External ear normal       Nose: Nose normal       Mouth/Throat:      Mouth: Mucous membranes are moist       Pharynx: Oropharynx is clear  Eyes:      Conjunctiva/sclera: Conjunctivae normal       Pupils: Pupils are equal, round, and reactive to light  Cardiovascular:      Rate and Rhythm: Normal rate and regular rhythm  Pulses: Normal pulses  Heart sounds: Normal heart sounds  Pulmonary:      Effort: Pulmonary effort is normal       Comments: Diminished breath sounds, on 7 L nasal cannula  Abdominal:      General: Abdomen is flat  Bowel sounds are normal       Palpations: Abdomen is soft  Musculoskeletal:         General: No swelling or tenderness  Cervical back: Neck supple  No muscular tenderness  Skin:     General: Skin is warm and dry  Capillary Refill: Capillary refill takes less than 2 seconds  Neurological:      General: No focal deficit present        Mental Status: He is alert and oriented to person, place, and time  Mental status is at baseline  Psychiatric:         Mood and Affect: Mood normal          Behavior: Behavior normal          Thought Content: Thought content normal          Judgment: Judgment normal         Additional Data:     Labs:  Results from last 7 days   Lab Units 07/01/21  0438   WBC Thousand/uL 8 98   HEMOGLOBIN g/dL 13 7   HEMATOCRIT % 41 1   PLATELETS Thousands/uL 237     Results from last 7 days   Lab Units 07/02/21  0545   SODIUM mmol/L 134*   POTASSIUM mmol/L 4 6   CHLORIDE mmol/L 99*   CO2 mmol/L 29   BUN mg/dL 24   CREATININE mg/dL 0 97   ANION GAP mmol/L 6   CALCIUM mg/dL 8 4   GLUCOSE RANDOM mg/dL 123     Results from last 7 days   Lab Units 07/02/21  0545   INR  2 47*     Results from last 7 days   Lab Units 07/02/21  1115 07/02/21  1019 07/02/21  0722 07/01/21  2110 07/01/21  1755 07/01/21  1312 07/01/21  1043 07/01/21  0554 06/30/21  2040 06/30/21  1610 06/30/21  1430 06/30/21  1119   POC GLUCOSE mg/dl 243* 255* 107 179* 248* 188* 263* 107 183* 277* 280* 229*               Lines/Drains:  Invasive Devices     Central Venous Catheter Line            Port A Cath 08/28/17 Right Chest 1404 days          Peripheral Intravenous Line            Long-Dwell Peripheral IV (Midline) 75/94/74 Left Cephalic 10 days                Central Line:  Goal for removal: Will discontinue when meds requiring line are completed  Imaging: No pertinent imaging reviewed      Recent Cultures (last 7 days):         Last 24 Hours Medication List:   Current Facility-Administered Medications   Medication Dose Route Frequency Provider Last Rate    acetaminophen  650 mg Oral Q6H PRN Armando Cortes MD      aspirin  81 mg Oral Daily Armando Cortes MD      barium sulfate  1 tablet Oral Once in imaging Francisco J Rosenberg DO      benzonatate  100 mg Oral TID PRN Otto Davenport PA-C      bisacodyl  10 mg Rectal Daily PRN Ruthie Stringer MD      furosemide  20 mg Oral Daily Dasha Orozco MD      guaiFENesin  1,200 mg Oral Q12H [address] Blaze Velázquez MD      insulin glargine  12 Units Subcutaneous QAM Dana Bynum MD      insulin lispro  1-6 Units Subcutaneous 4 times day Violet Colorado DO      levalbuterol  1 25 mg Nebulization TID Shamar Wilson MD      lidocaine   Topical Daily PRN Carlos Irby MD      LORazepam  0 5 mg Oral BID PRN Dana Bynum MD      melatonin  9 mg Oral HS Dana Bynum MD      metoprolol  5 mg Intravenous Q6H PRN Dana Bynum MD      metoprolol tartrate  25 mg Oral Q12H [address] Blaze Velázquez MD      nystatin   Topical TID Shamar Wilson MD      ondansetron  8 mg Intravenous Q6H PRN Gala James MD      pantoprazole  40 mg Oral Early Morning Gala James MD      polyethylene glycol  17 g Oral BID Dana Bynum MD      predniSONE  40 mg Oral Daily Francisco J Rosenberg DO      senna-docusate sodium  2 tablet Oral BID Dana Bynum MD      sodium chloride  1 spray Each Nare Q1H PRN Tammi Jiang PA-C      sodium chloride  3 mL Nebulization TID Shamar Wilson MD      tiotropium  18 mcg Inhalation Daily Charu Cortés DO      warfarin  5 mg Oral Daily (warfarin) Dana Bynum MD          Today, Patient Was Seen By: María Edwards MD    **Please Note: This note may have been constructed using a voice recognition system  **

## 2021-07-02 NOTE — PLAN OF CARE
Problem: MOBILITY - ADULT  Goal: Maintain or return to baseline ADL function  Description: INTERVENTIONS:  -  Assess patient's ability to carry out ADLs; assess patient's baseline for ADL function and identify physical deficits which impact ability to perform ADLs (bathing, care of mouth/teeth, toileting, grooming, dressing, etc )  - Assess/evaluate cause of self-care deficits   - Assess range of motion  - Assess patient's mobility; develop plan if impaired  - Assess patient's need for assistive devices and provide as appropriate  - Encourage maximum independence but intervene and supervise when necessary  - Involve family in performance of ADLs  - Assess for home care needs following discharge   - Consider OT consult to assist with ADL evaluation and planning for discharge  - Provide patient education as appropriate  Outcome: Progressing  Goal: Maintains/Returns to pre admission functional level  Description: INTERVENTIONS:  - Perform BMAT or MOVE assessment daily    - Set and communicate daily mobility goal to care team and patient/family/caregiver  - Collaborate with rehabilitation services on mobility goals if consulted  - Perform Range of Motion 4 times a day  - Reposition patient every 4 hours    - Dangle patient 4 times a day  - Stand patient 4 times a day  - Ambulate patient 4 times a day  - Out of bed to chair 3 times a day   - Out of bed for meals 3 times a day  - Out of bed for toileting  - Record patient progress and toleration of activity level   Outcome: Progressing     Problem: Prexisting or High Potential for Compromised Skin Integrity  Goal: Skin integrity is maintained or improved  Description: INTERVENTIONS:  - Identify patients at risk for skin breakdown  - Assess and monitor skin integrity  - Assess and monitor nutrition and hydration status  - Monitor labs   - Assess for incontinence   - Turn and reposition patient  - Assist with mobility/ambulation  - Relieve pressure over bony prominences  - Avoid friction and shearing  - Provide appropriate hygiene as needed including keeping skin clean and dry  - Evaluate need for skin moisturizer/barrier cream  - Collaborate with interdisciplinary team   - Patient/family teaching  - Consider wound care consult   Outcome: Progressing     Problem: Potential for Falls  Goal: Patient will remain free of falls  Description: INTERVENTIONS:  - Educate patient/family on patient safety including physical limitations  - Instruct patient to call for assistance with activity   - Consult OT/PT to assist with strengthening/mobility   - Keep Call bell within reach  - Keep bed low and locked with side rails adjusted as appropriate  - Keep care items and personal belongings within reach  - Initiate and maintain comfort rounds  - Make Fall Risk Sign visible to staff  - Offer Toileting every 2 Hours, in advance of need  - Initiate/Maintain bed alarm  - Obtain necessary fall risk management equipment: alarms  - Apply yellow socks and bracelet for high fall risk patients  - Consider moving patient to room near nurses station  Outcome: Progressing     Problem: RESPIRATORY - ADULT  Goal: Achieves optimal ventilation and oxygenation  Description: INTERVENTIONS:  - Assess for changes in respiratory status  - Assess for changes in mentation and behavior  - Position to facilitate oxygenation and minimize respiratory effort  - Oxygen administered by appropriate delivery if ordered  - Initiate smoking cessation education as indicated  - Encourage broncho-pulmonary hygiene including cough, deep breathe, Incentive Spirometry  - Assess the need for suctioning and aspirate as needed  - Assess and instruct to report SOB or any respiratory difficulty  - Respiratory Therapy support as indicated  Outcome: Progressing     Problem: INFECTION - ADULT  Goal: Absence or prevention of progression during hospitalization  Description: INTERVENTIONS:  - Assess and monitor for signs and symptoms of infection  - Monitor lab/diagnostic results  - Monitor all insertion sites, i e  indwelling lines, tubes, and drains  - Monitor endotracheal if appropriate and nasal secretions for changes in amount and color  - [place] appropriate cooling/warming therapies per order  - Administer medications as ordered  - Instruct and encourage patient and family to use good hand hygiene technique  - Identify and instruct in appropriate isolation precautions for identified infection/condition  Outcome: Progressing  Goal: Absence of fever/infection during neutropenic period  Description: INTERVENTIONS:  - Monitor WBC    Outcome: Progressing     Problem: Nutrition/Hydration-ADULT  Goal: Nutrient/Hydration intake appropriate for improving, restoring or maintaining nutritional needs  Description: Monitor and assess patient's nutrition/hydration status for malnutrition  Collaborate with interdisciplinary team and initiate plan and interventions as ordered  Monitor patient's weight and dietary intake as ordered or per policy  Utilize nutrition screening tool and intervene as necessary  Determine patient's food preferences and provide high-protein, high-caloric foods as appropriate       INTERVENTIONS:  - Monitor oral intake, urinary output, labs, and treatment plans  - Assess nutrition and hydration status and recommend course of action  - Evaluate amount of meals eaten  - Assist patient with eating if necessary   - Allow adequate time for meals  - Recommend/ encourage appropriate diets, oral nutritional supplements, and vitamin/mineral supplements  - Order, calculate, and assess calorie counts as needed  - Recommend, monitor, and adjust tube feedings and TPN/PPN based on assessed needs  - Assess need for intravenous fluids  - Provide specific nutrition/hydration education as appropriate  - Include patient/family/caregiver in decisions related to nutrition  Outcome: Progressing

## 2021-07-02 NOTE — CASE MANAGEMENT
CM spoke with the Patient's caregiver, Kirk Deng, via phone, to update on status of referral to [facility] and the need for improved O2 requirements before they can accept  CM explained LTAC LOC and the benefits to this service and accepting [address] [place] [address]  Patient's caregiver verbalized her understanding of the same and expressed her disappointment that the Patient is unable to go to [facility] at this time  CM explained that the Patient's insurance would need to approve this LOC and if approved, Patient could progress at [facility] and then transfer to [facility] to obtain rehab closer to home  Kirk Deng requested that CM review this with the Patient  CM met with the Patient at the bedside, with Kirk Deng present via phone  CM explained all of the above to the Patient and reviewed insurance authorization  Patient and Kirk Deng verbalized their understanding and are requesting to accept the bed and start insurance auth  CM updated [facility] via [facility] that Patient would like to accept bed  Facility will start insurance auth  Facility informed this CM that due to the Holiday, there may be a delay in [place]  CM dept will continue to follow for completion of auth

## 2021-07-02 NOTE — ASSESSMENT & PLAN NOTE
· Outpatient follow-up with Oncology  · Patient was due for Herceptin infusion on 06/29/2021  · Patient's oncologist is aware, recommends holding treatment until patient has improved

## 2021-07-02 NOTE — ASSESSMENT & PLAN NOTE
2/2 to steroids  · Carb controlled diet   · Hypoglycemia protocol  · Patient is currently on 12 units Lantus  · Monitor glucose levels as they will start decreasing as steroid doses are titrated down    · Sliding scale with accu checks

## 2021-07-02 NOTE — ASSESSMENT & PLAN NOTE
· Patient's EKG shows new onset paroxysmal atrial fibrillation  Patient's AFib likely secondary to acute respiratory failure with hypoxia  · Patient is currently rate controlled with metoprolol  · Chads Vasc score: 3   Continue Coumadin, maintain INR 2-3

## 2021-07-02 NOTE — ASSESSMENT & PLAN NOTE
Lab Results   Component Value Date    EGFR 78 07/02/2021    EGFR 78 07/01/2021    EGFR 79 06/30/2021    CREATININE 0 97 07/02/2021    CREATININE 0 97 07/01/2021    CREATININE 0 96 06/30/2021     · Patient still at baseline creatinine of 1 14-1 2  · Lasix resumed  · ARISTIDES resolved

## 2021-07-02 NOTE — ASSESSMENT & PLAN NOTE
Malnutrition Findings:   Adult Malnutrition type: Acute illness (in the setting of chronic illness)  Adult Degree of Malnutrition: Malnutrition of moderate degree (related to catabolic illness, respiratory distress)    BMI Findings: Body mass index is 26 31 kg/m²     Encourage oral intake, nutrition supp

## 2021-07-02 NOTE — CASE MANAGEMENT
CM informed by Kenna Dalton at [facility] that after discussing with their respiratory therapist, facility would like to see his O2 requirements more stable and manageable for rehab services  CM spoke with RT at [facility] to determine if Patient would be a candidate for Oxymizer and if equation can be completed to determine O2 requirements with oxymizer  Per RT, Patient is not an oxymizer candidate at this time as his O2 requirements are too high  CM sent referral to [place] [address] via [facility] to determine if Patient would qualify for LTAC  CM spoke with Saleem Velasco from [address]  to review referral  Facility inquiring if Patient will require Oncology treatment  CM informed by [facility] that Patient's oncology treatments are on hold until the Patient improves medically  CM LMOVM for Patient's caregiver, Jaycee Mckeon, to discuss referrals; per patient's preference  CM dept will follow for return call

## 2021-07-03 NOTE — ASSESSMENT & PLAN NOTE
POA   Patient is not on any home oxygen  Patient on nasal cannula at 5 L  Differential post COVID syndrome, post radiation/chemotherapy pulmonary fibrosis/pneumonitis  Plan:  · Continue nebulizations as needed  · continue daily dose of Lasix, decreased parameters to   · Continue Spiriva  · Incentive spirometry  · Titrate oxygen to maintain SpO2 above 88%  Continues to require 6 L oxygen at rest and up to 15 L with ambulation  · Patient on prednisone taper, decrease by 10 mg every 7 days  Currently on prednisone 40 mg for day 4/7

## 2021-07-03 NOTE — ASSESSMENT & PLAN NOTE
· Patient's EKG shows new onset paroxysmal atrial fibrillation  Patient's AFib likely secondary to acute respiratory failure with hypoxia  · Patient is currently rate controlled with metoprolol  · Chads Vasc score: 3    · INR is supratherapeutic at 3 17 we will hold Coumadin today recheck INR tomorrow

## 2021-07-03 NOTE — UTILIZATION REVIEW
Continued Stay Review    Date: 7/3/2021                           Current Patient Class: inpatient  Current Level of Care: level 2 stepdown    HPI:70 y o  male initially admitted on 6/15/2021 inpatient due to acute hypoxic respiratory failure/Sepsis with suspected pulmonary source  past medical history of esophageal cancer post palliative radiation and chemotherapy  Treatment with antibiotics completed as of 06/21/2021      Assessment/Plan: 7/3/2021:  Patient constipated, tried Dulcolax without effect  Feels constipation makes it harder to urinate  On exam pulse ox 87 % on 5 liters  Hypoactive bowel sounds  Plan to continue oxygen and titrate sat of 88%, continue nebulizer, spiriva, hold coumadin today as INR is 3 17  Will need LTAC with ability to provide high flow oxygen per Pulmonary       Vital Signs:   07/03/21 15:30:31  98 7 °F (37 1 °C)  83  17  110/65  80  95 %  --  --  --  --  --  --   07/03/21 12:07:15  98 4 °F (36 9 °C)  74  --  100/63  75  92 %  --  --  --  --  --  --   07/03/21 1200  --  --  --  --  --  --  --  5 L/min  --  Nasal cannula  --  --   07/03/21 0800  --  --  --  --  --  --  40  --  5 L/min  Nasal cannula  --  --   07/03/21 07:35:13  97 5 °F (36 4 °C)  73  --  113/67  82  96 %  --  --  --  --  --  --   07/03/21 0726  --  --  --  --  --  92 %  40  --  5 L/min  Nasal cannula  --  --   07/03/21 0257  98 7 °F (37 1 °C)  69  --  106/54  71  94 %  --  --  --  --  --  --   07/02/21 22:21:50  98 1 °F (36 7 °C)  82  --  108/66  80  90 %  --  --  --  --  --  --   07/02/21 22:20:30  98 1 °F (36 7 °C)  83  --  99/62  74  89 %Abnormal   --  --  --  --  --  --   07/02/21 22:19:07  98 1 °F (36 7 °C)  86  --  --  --  87 %Abnormal   --  --  --  --  --  --   07/02/21 19:34:29  98 5 °F (36 9 °C)  87  18  111/57  75  88 %Abnormal   --  --  --  --  --  --   07/02/21 1914  --  --  --  --  --  92 %  --  5 L/min  --  Nasal cannula           Pertinent Labs/Diagnostic Results:   7/1/2021 CxR Bilateral peripheral and groundglass opacities, no change    Results from last 7 days   Lab Units 07/01/21  0438 06/29/21  0546 06/27/21  0540   WBC Thousand/uL 8 98 12 28* 15 31*   HEMOGLOBIN g/dL 13 7 13 5 13 8   HEMATOCRIT % 41 1 40 1 42 1   PLATELETS Thousands/uL 237 256 306     Results from last 7 days   Lab Units 07/02/21  0545 07/01/21  0438 06/30/21  0631 06/29/21  0546 06/27/21  0540   SODIUM mmol/L 134* 133* 132* 132* 134*   POTASSIUM mmol/L 4 6 4 7 4 6 4 3 5 2   CHLORIDE mmol/L 99* 99* 100 98* 99*   CO2 mmol/L 29 27 25 26 29   ANION GAP mmol/L 6 7 7 8 6   BUN mg/dL 24 23 25 30* 40*   CREATININE mg/dL 0 97 0 97 0 96 1 04 1 08   EGFR ml/min/1 73sq m 78 78 79 71 68   CALCIUM mg/dL 8 4 8 4 8 3 7 7* 8 1*     Results from last 7 days   Lab Units 07/03/21  1631 07/03/21  1214 07/03/21  1021 07/03/21  0800 07/03/21  0154 07/02/21  2120 07/02/21  1614 07/02/21  1115 07/02/21  1019 07/02/21  0722 07/01/21  2110 07/01/21  1755   POC GLUCOSE mg/dl 181* 165* 252* 104 80 313* 171* 243* 255* 107 179* 248*     Results from last 7 days   Lab Units 07/02/21  0545 07/01/21  0438 06/30/21  0631 06/29/21  0546 06/27/21  0540   GLUCOSE RANDOM mg/dL 123 101 93 139 119     Results from last 7 days   Lab Units 07/03/21  0520 07/02/21  0545 07/01/21  1052 06/29/21  0546 06/28/21 2009 06/28/21  1408   PROTIME seconds 32 5* 26 8* 25 8* 24 0*  --   --    INR  3 17* 2 47* 2 35* 2 14*  --   --    PTT seconds  --   --   --  99* 90* 83*     Medications:   Scheduled Medications:  aspirin, 81 mg, Oral, Daily  furosemide, 20 mg, Oral, Daily  guaiFENesin, 1,200 mg, Oral, Q12H SENG  insulin glargine, 12 Units, Subcutaneous, QAM  insulin lispro, 1-6 Units, Subcutaneous, 4 times day  levalbuterol, 1 25 mg, Nebulization, TID  melatonin, 9 mg, Oral, HS  metoprolol tartrate, 25 mg, Oral, Q12H SENG  nystatin, , Topical, TID  pantoprazole, 40 mg, Oral, Early Morning  polyethylene glycol, 17 g, Oral, BID  polyethylene glycol-electrolytes, 2,000 mL, Oral, Once  predniSONE, 40 mg, Oral, Daily  senna, 2 tablet, Oral, BID  sodium chloride, 3 mL, Nebulization, TID  tiotropium, 18 mcg, Inhalation, Daily  warfarin, 5 mg, Oral, Daily (warfarin)    bisacodyl (DULCOLAX) rectal suppository 10 mg   Dose: 10 mg  Freq: Once Route: RE  Start: 07/03/21 0915 End: 07/03/21 1210    magnesium citrate (CITROMA) oral solution 296 mL   Dose: 296 mL  Freq: Once Route: PO  Start: 07/03/21 1445 End: 07/03/21 1555    polyethylene glycol-electrolytes (NULYTELY) bowel prep 2,000 mL   Dose: 2,000 mL  Freq: Once Route: PO  Start: 07/03/21 1445    Continuous IV Infusions:     PRN Meds: not used   acetaminophen, 650 mg, Oral, Q6H PRN  barium sulfate, 1 tablet, Oral, Once in imaging  benzonatate, 100 mg, Oral, TID PRN  lidocaine, , Topical, Daily PRN  LORazepam, 0 5 mg, Oral, BID PRN  metoprolol, 5 mg, Intravenous, Q6H PRN  ondansetron, 8 mg, Intravenous, Q6H PRN  sodium chloride, 1 spray, Each Nare, Q1H PRN        Discharge Plan: To be determined  Network Utilization Review Department  ATTENTION: Please call with any questions or concerns to [phone] and carefully listen to the prompts so that you are directed to the right person  All voicemails are confidential   Adan Dowd all requests for admission clinical reviews, approved or denied determinations and any other requests to dedicated fax number below belonging to the campus where the patient is receiving treatment   List of dedicated fax numbers for the Facilities:  [address] DENIALS (Administrative/Medical Necessity) [phone]   [address] (Maternity/NICU/Pediatrics) [phone]   [address] [place] [address] [facility] [address] [address] [place] [address] Tami Charles  Tong  41  [phone]   [address] [facility] [address] [facility] [place] [address] [phone]

## 2021-07-03 NOTE — ASSESSMENT & PLAN NOTE
POA   Patient is not on any home oxygen  Patient on nasal cannula at 5 L  Differential post COVID syndrome, post radiation/chemotherapy pulmonary fibrosis/pneumonitis  Plan:  · Continue nebulizations as needed  · continue daily dose of Lasix, decreased parameters to   · Continue Spiriva  · Incentive spirometry  · Titrate oxygen to maintain SpO2 88%  · pulm on board, will need d/c to LTAC where patient can not receive high-flow oxygen with physical therapy

## 2021-07-03 NOTE — ASSESSMENT & PLAN NOTE
· Patient's EKG shows new onset paroxysmal atrial fibrillation  Patient's AFib likely secondary to acute respiratory failure with hypoxia  · Patient is currently rate controlled with metoprolol  · Chads Vasc score: 3    · INR is 3 10  Give Coumadin 1 mg recheck INR tomorrow

## 2021-07-03 NOTE — ASSESSMENT & PLAN NOTE
· Episodes of hypotension, likely secondary to hypoxia  ·  amlodipine on hold  · Resumed metoprolol at a lower dose 25 mg q12  · Blood pressures remained soft continue current regimen

## 2021-07-03 NOTE — ASSESSMENT & PLAN NOTE
2/2 to steroids  · Carb controlled diet   · Hypoglycemia protocol  · Patient is currently on 12 units Lantus  · Monitor glucose levels as they will start decreasing as steroid doses are titrated down  · Sliding scale with accu checks 4 times a day

## 2021-07-03 NOTE — PROGRESS NOTES
[facility]  Progress Note Cara Roman 1950, 65 y o  male MRN: [id number]  Unit/Bed#: S -01 Encounter: [id number]  Primary Care Provider: Cassie De Paz DO   Date and time admitted to hospital: 6/15/2021  7:06 AM    * Acute respiratory failure with hypoxia ([facility] )  Assessment & Plan  POA  Patient is not on any home oxygen  Patient on nasal cannula at 5 L  Differential post COVID syndrome, post radiation/chemotherapy pulmonary fibrosis/pneumonitis  Plan:  · Continue nebulizations as needed  · continue daily dose of Lasix, decreased parameters to   · Continue Spiriva  · Incentive spirometry  · Titrate oxygen to maintain SpO2 88%  · pulm on board, will need d/c to LTAC where patient can receive high-flow oxygen with physical therapy  Paroxysmal A-fib (HCC)  Assessment & Plan  · Patient's EKG shows new onset paroxysmal atrial fibrillation  Patient's AFib likely secondary to acute respiratory failure with hypoxia  · Patient is currently rate controlled with metoprolol  · Chads Vasc score: 3    · INR is supratherapeutic at 3 17 we will hold Coumadin today recheck INR tomorrow  Esophageal cancer   Assessment & Plan  · Outpatient follow-up with Oncology  · Patient was due for Herceptin infusion on 06/29/2021  · Patient's oncologist is aware, recommends holding treatment until patient has improved       Type 2 diabetes mellitus, without long-term current use of insulin ([facility] )  Assessment & Plan  2/2 to steroids  · Carb controlled diet   · Hypoglycemia protocol  · Patient is currently on 12 units Lantus  · Monitor glucose levels as they will start decreasing as steroid doses are titrated down  · Sliding scale with accu checks 4 times a day      Essential hypertension  Assessment & Plan  · Episodes of hypotension, likely secondary to hypoxia  ·  amlodipine on hold  · Resumed metoprolol at a lower dose 25 mg q12  · Blood pressures remained soft continue current regimen  Moderate protein-calorie malnutrition ([facility] )  Assessment & Plan  Malnutrition Findings:   Adult Malnutrition type: Acute illness (in the setting of chronic illness)  Adult Degree of Malnutrition: Malnutrition of moderate degree (related to catabolic illness, respiratory distress)    BMI Findings: Body mass index is 26 31 kg/m²  Encourage oral intake, nutrition supp    Pneumonia  Assessment & Plan  · Patient had COVID 22 about a month ago and he is also immunocompromised from his esophageal cancer  · Treatment with antibiotics completed as of 2021    PAD (peripheral artery disease)  Assessment & Plan  · Continue aspirin and statin    Carotid stenosis  Assessment & Plan  · Continue aspirin and statin    GERD (gastroesophageal reflux disease)  Assessment & Plan  · Continue Protonix      VTE Pharmacologic Prophylaxis:   Pharmacologic: Warfarin (Coumadin)  Mechanical VTE Prophylaxis in Place: Yes    Patient Centered Rounds: I have performed bedside rounds with nursing staff today       Discussions with Specialists or Other Care Team Provider:  Nursing, Case Management and pulmonology  Education and Discussions with Family / Patient:  Patient and spouse Edie Gonzales    Time Spent for Care: 1 hour  More than 50% of total time spent on counseling and coordination of care as described above  Current Length of Stay: 18 day(s)    Current Patient Status: Inpatient   Certification Statement: The patient will continue to require additional inpatient hospital stay due to Continues to require high-flow oxygen support  Discharge Plan:  Pending medical stability  Code Status: Level 1 - Full Code      Subjective:   28-year-old male with a past medical history of esophageal cancer post palliative radiation and chemotherapy presented with acute hypoxic respiratory failure      Objective:     Vitals:   Temp (24hrs), Av 2 °F (36 8 °C), Min:97 5 °F (36 4 °C), Max:98 7 °F (37 1 °C)    Temp:  [97 5 °F (36 4 °C)-98 7 °F (37 1 °C)] 98 4 °F (36 9 °C)  HR:  [69-87] 74  Resp:  [18] 18  BP: ()/(54-67) 100/63  SpO2:  [87 %-96 %] 92 %  Body mass index is 26 31 kg/m²  Input and Output Summary (last 24 hours): Intake/Output Summary (Last 24 hours) at 7/3/2021 1215  Last data filed at 7/3/2021 1100  Gross per 24 hour   Intake --   Output 950 ml   Net -950 ml       Physical Exam:     Physical Exam  Constitutional:       General: He is not in acute distress  Appearance: He is ill-appearing  HENT:      Head: Normocephalic and atraumatic  Mouth/Throat:      Mouth: Mucous membranes are moist    Eyes:      Extraocular Movements: Extraocular movements intact  Conjunctiva/sclera: Conjunctivae normal       Pupils: Pupils are equal, round, and reactive to light  Cardiovascular:      Pulses: Normal pulses  Heart sounds: No murmur heard  No gallop  Pulmonary:      Comments: Lungs clear respiratory effort normal   Pulse ox 87% on 5 L nasal cannula  Abdominal:      Palpations: Abdomen is soft  Tenderness: There is no abdominal tenderness  There is no guarding  Comments: Hypoactive bowel sounds  Musculoskeletal:      Cervical back: Normal range of motion and neck supple  Right lower leg: No edema  Left lower leg: No edema  Skin:     General: Skin is warm and dry  Capillary Refill: Capillary refill takes less than 2 seconds  Neurological:      General: No focal deficit present  Mental Status: He is alert and oriented to person, place, and time     Psychiatric:         Mood and Affect: Mood normal          Behavior: Behavior normal            Additional Data:     Labs:    Results from last 7 days   Lab Units 07/01/21  0438   WBC Thousand/uL 8 98   HEMOGLOBIN g/dL 13 7   HEMATOCRIT % 41 1   PLATELETS Thousands/uL 237     Results from last 7 days   Lab Units 07/02/21  0545   SODIUM mmol/L 134*   POTASSIUM mmol/L 4 6   CHLORIDE mmol/L 99*   CO2 mmol/L 29   BUN mg/dL 24 CREATININE mg/dL 0 97   ANION GAP mmol/L 6   CALCIUM mg/dL 8 4   GLUCOSE RANDOM mg/dL 123     Results from last 7 days   Lab Units 07/03/21  0520   INR  3 17*     Results from last 7 days   Lab Units 07/03/21  1021 07/03/21  0800 07/03/21  0154 07/02/21  2120 07/02/21  1614 07/02/21  1115 07/02/21  1019 07/02/21  0722 07/01/21  2110 07/01/21  1755 07/01/21  1312 07/01/21  1043   POC GLUCOSE mg/dl 252* 104 80 313* 171* 243* 255* 107 179* 248* 188* 263*                   * I Have Reviewed All Lab Data Listed Above  * Additional Pertinent Lab Tests Reviewed: All Labs Within Last 24 Hours Reviewed    Imaging:    Imaging Reports Reviewed Today Include:  No new imaging report  Imaging Personally Reviewed by Myself Includes:  No new imaging    Recent Cultures (last 7 days):           Last 24 Hours Medication List:   Current Facility-Administered Medications   Medication Dose Route Frequency Provider Last Rate    acetaminophen  650 mg Oral Q6H PRN Tita Sierra MD      aspirin  81 mg Oral Daily Tita Sierra MD      barium sulfate  1 tablet Oral Once in imaging Francisco J Rosenberg DO      benzonatate  100 mg Oral TID PRN Lupillo Johnson PA-C      furosemide  20 mg Oral Daily Ezio Garcia MD      guaiFENesin  1,200 mg Oral Q12H [facility] Blaze Velázquez MD      insulin glargine  12 Units Subcutaneous QA Demian Serna MD      insulin lispro  1-6 Units Subcutaneous 4 times day Nessa Hurley DO      levalbuterol  1 25 mg Nebulization TID Patricia Ivy MD      lidocaine   Topical Daily PRN Everardo Araya MD      LORazepam  0 5 mg Oral BID PRN Demian Serna MD      melatonin  9 mg Oral HS Demian Serna MD      metoprolol  5 mg Intravenous Q6H PRN Demian Serna MD      metoprolol tartrate  25 mg Oral Q12H [facility] Blaze Velázquez MD      nystatin   Topical TID Patricia Ivy MD      ondansetron  8 mg Intravenous Q6H PRN Tita Sierra MD      pantoprazole  40 mg Oral Early Morning MD Leeanne Ellis

## 2021-07-04 PROBLEM — K59.00 CONSTIPATION: Status: ACTIVE | Noted: 2021-01-01

## 2021-07-04 NOTE — RESPIRATORY THERAPY NOTE
Respiratory Therapy Note        07/03/21 2000   Inhalation Therapy Tx   $ Inhalation Therapy Performed Yes   $ Pulse Oximetry Spot Check Charge Completed   SpO2 96 %   Pre-Treatment Pulse 88   Duration 10   Breath Sounds Pre-Treatment Bilateral Rales   Breath Sounds Post-Treatment Bilateral Rales   Position Semi Bartlett's   Treatment Tolerance Tolerated well   Resp Comments Scheduled neb tx is given as ordered; rales noted pre and post tx, RN notified Jose Angel Win)  Post tx rales are present just under clavicles bilaterally; pt is in semi-fowlers position

## 2021-07-04 NOTE — PROGRESS NOTES
[facility]  Progress Note Coco Vora 1950, 65 y o  male MRN: [id number]  Unit/Bed#: S -01 Encounter: [id number]  Primary Care Provider: Fredy Gonsalves DO   Date and time admitted to hospital: 6/15/2021  7:06 AM    * Acute respiratory failure with hypoxia ([address] )  Assessment & Plan  POA  Patient is not on any home oxygen  Patient on nasal cannula at 5 L  Differential post COVID syndrome, post radiation/chemotherapy pulmonary fibrosis/pneumonitis  Plan:  · Continue nebulizations as needed  · continue daily dose of Lasix, decreased parameters to   · Continue Spiriva  · Incentive spirometry  · Titrate oxygen to maintain SpO2 above 88%  Continues to require 6 L oxygen at rest and up to 15 L with ambulation  · Patient on prednisone taper, decrease by 10 mg every 7 days  Currently on prednisone 40 mg for day 4/7  Paroxysmal A-fib (HCC)  Assessment & Plan  · Patient's EKG shows new onset paroxysmal atrial fibrillation  Patient's AFib likely secondary to acute respiratory failure with hypoxia  · Patient is currently rate controlled with metoprolol  · Chads Vasc score: 3    · INR is 3 10  Give Coumadin 1 mg recheck INR tomorrow  Esophageal cancer   Assessment & Plan  · Outpatient follow-up with Oncology  · Patient was due for Herceptin infusion on 06/29/2021  · Patient's oncologist is aware, recommends holding treatment until patient has improved       Constipation  Assessment & Plan  · Complained of no stools are small hard bowel movement  · Bowel regimen ordered  · Bowel prep added  Type 2 diabetes mellitus, without long-term current use of insulin (HCC)  Assessment & Plan  2/2 to steroids  · Carb controlled diet   · Hypoglycemia protocol  · Patient is currently on 12 units Lantus  · Monitor glucose levels as they will start decreasing as steroid doses are titrated down  · Sliding scale with accu checks 4 times a day      Essential hypertension  Assessment & Plan  · Episodes of hypotension, likely secondary to hypoxia  ·  amlodipine on hold  · Resumed metoprolol at a lower dose 25 mg q12  · Blood pressures remained soft continue current regimen  Moderate protein-calorie malnutrition ([address] )  Assessment & Plan  Malnutrition Findings:   Adult Malnutrition type: Acute illness (in the setting of chronic illness)  Adult Degree of Malnutrition: Malnutrition of moderate degree (related to catabolic illness, respiratory distress)    BMI Findings: Body mass index is 26 31 kg/m²  Encourage oral intake, nutrition supp    Pneumonia  Assessment & Plan  · Patient had COVID 22 about a month ago and he is also immunocompromised from his esophageal cancer  · Treatment with antibiotics completed as of 2021    PAD (peripheral artery disease)  Assessment & Plan  · Continue aspirin and statin    Carotid stenosis  Assessment & Plan  · Continue aspirin and statin    GERD (gastroesophageal reflux disease)  Assessment & Plan  · Continue Protonix      VTE Pharmacologic Prophylaxis:   Pharmacologic: Warfarin (Coumadin)  Mechanical VTE Prophylaxis in Place: Yes    Patient Centered Rounds: I have performed bedside rounds with nursing staff today  Discussions with Specialists or Other Care Team Provider:  Nursing, Case Management  Education and Discussions with Family / Patient:  With patient and spouse    Time Spent for Care: 45 minutes  More than 50% of total time spent on counseling and coordination of care as described above  Current Length of Stay: 19 day(s)    Current Patient Status: Inpatient     Certification Statement: The patient will continue to require additional inpatient hospital stay due to Requires high-flow oxygen support  Discharge Plan: Pending medical stability  Code Status: Level 1 - Full Code      Subjective:   No acute events overnight       Objective:     Vitals:   Temp (24hrs), Av 6 °F (37 °C), Min:98 4 °F (36 9 °C), Max:98 8 °F (37 1 °C)    Temp:  [98 4 °F (36 9 °C)-98 8 °F (37 1 °C)] 98 6 °F (37 °C)  HR:  [69-89] 87  Resp:  [16-17] 16  BP: ()/(60-71) 105/63  SpO2:  [87 %-96 %] 92 %  Body mass index is 26 31 kg/m²  Input and Output Summary (last 24 hours): Intake/Output Summary (Last 24 hours) at 7/4/2021 1228  Last data filed at 7/4/2021 1003  Gross per 24 hour   Intake 980 ml   Output 975 ml   Net 5 ml       Physical Exam:     Physical Exam  Vitals and nursing note reviewed  Constitutional:       Appearance: He is well-developed  HENT:      Head: Normocephalic and atraumatic  Eyes:      Conjunctiva/sclera: Conjunctivae normal    Cardiovascular:      Rate and Rhythm: Normal rate and regular rhythm  Heart sounds: No murmur heard  Pulmonary:      Breath sounds: No wheezing, rhonchi or rales  Comments: Diminished lung sounds throughout  Poor respiratory effort  Abdominal:      Palpations: Abdomen is soft  Tenderness: There is no abdominal tenderness  There is no guarding or rebound  Comments: Hypoactive bowel sounds  Musculoskeletal:      Cervical back: Neck supple  Skin:     General: Skin is warm and dry  Neurological:      General: No focal deficit present  Mental Status: He is alert and oriented to person, place, and time     Psychiatric:         Mood and Affect: Mood normal          Behavior: Behavior normal            Additional Data:     Labs:    Results from last 7 days   Lab Units 07/01/21  0438   WBC Thousand/uL 8 98   HEMOGLOBIN g/dL 13 7   HEMATOCRIT % 41 1   PLATELETS Thousands/uL 237     Results from last 7 days   Lab Units 07/04/21  0509   SODIUM mmol/L 133*   POTASSIUM mmol/L 4 4   CHLORIDE mmol/L 100   CO2 mmol/L 28   BUN mg/dL 22   CREATININE mg/dL 0 96   ANION GAP mmol/L 5   CALCIUM mg/dL 8 4   GLUCOSE RANDOM mg/dL 128     Results from last 7 days   Lab Units 07/04/21  0509   INR  3 10*     Results from last 7 days   Lab Units 07/04/21  1101 07/04/21  0751 07/03/21  2130 07/03/21  1631 07/03/21  1214 07/03/21  1021 07/03/21  0800 07/03/21  0154 07/02/21  2120 07/02/21  1614 07/02/21  1115 07/02/21  1019   POC GLUCOSE mg/dl 308* 130 194* 181* 165* 252* 104 80 313* 171* 243* 255*                   * I Have Reviewed All Lab Data Listed Above  * Additional Pertinent Lab Tests Reviewed: All Labs Within Last 24 Hours Reviewed    Imaging:    Imaging Reports Reviewed Today Include: No new imaging reports today  Imaging Personally Reviewed by Myself Includes:  No new imaging       Recent Cultures (last 7 days):           Last 24 Hours Medication List:   Current Facility-Administered Medications   Medication Dose Route Frequency Provider Last Rate    acetaminophen  650 mg Oral Q6H PRN Russ Brand MD      aspirin  81 mg Oral Daily Russ Brand MD      barium sulfate  1 tablet Oral Once in imaging Francisco J Rosenberg DO      benzonatate  100 mg Oral TID PRN DESIRE Casillas      furosemide  20 mg Oral Daily Angel Calderon MD      guaiFENesin  1,200 mg Oral Q12H [facility] Blaze Velázquez MD      insulin glargine  12 Units Subcutaneous QAM Milena Cormier MD      insulin lispro  1-6 Units Subcutaneous TID AC Angel Calderon MD      levalbuterol  1 25 mg Nebulization TID Drake Vides MD      lidocaine   Topical Daily PRN Jadon Cantu MD      LORazepam  0 5 mg Oral BID PRN Milena Cormier MD      melatonin  9 mg Oral HS Milena Cormier MD      metoprolol  5 mg Intravenous Q6H PRN Milena Cormier MD      metoprolol tartrate  25 mg Oral Q12H [facility] Blaze Velázquez MD      nystatin   Topical TID Drake Vides MD      ondansetron  8 mg Intravenous Q6H PRN Russ Brand MD      pantoprazole  40 mg Oral Early Morning Russ Brand MD      polyethylene glycol  17 g Oral BID Milena Cormier MD      predniSONE  40 mg Oral Daily Francisco J Rosenberg DO      senna  2 tablet Oral BID Andrés Merrill MD      sodium chloride  1 spray Each Nare Q1H PRN Tammi Jiang PA-C      sodium chloride  3 mL Nebulization TID Davy Dykes MD      tiotropium  18 mcg Inhalation Daily Fredo Soto DO      warfarin  1 mg Oral Once (warfarin) Yuri Yip MD          Today, Patient Was Seen By: Martha Tim DO    ** Please Note: Dictation voice to text software may have been used in the creation of this document   **

## 2021-07-04 NOTE — ASSESSMENT & PLAN NOTE
· Complained of no stools are small hard bowel movement  · Bowel regimen ordered  · Bowel prep added

## 2021-07-04 NOTE — CASE MANAGEMENT
Message sent via [employer] to [address] [place] [address] to follow-up on status of auth  Possibility that unable to obtain today secondary to holiday/weekend  Awaiting response       DOM Werner  7/4/2021  9:22 AM

## 2021-07-05 NOTE — ASSESSMENT & PLAN NOTE
POA   Patient is not on any home oxygen  Patient on nasal cannula at 4L stating at 88%  Differential post COVID syndrome, post radiation/chemotherapy pulmonary fibrosis/pneumonitis  Plan:  · Continue nebulizations as needed  · continue daily dose of Lasix, decreased parameters to   · Continue Spiriva  · Incentive spirometry  · Titrate oxygen to maintain SpO2 above 88%  · Patient on prednisone taper, decrease by 10 mg every 7 days  Currently on prednisone 40 mg for day 5/7

## 2021-07-05 NOTE — PROGRESS NOTES
[facility]  Progress Note [facility] 1950, 65 y o  male MRN: [id number]  Unit/Bed#: S -01 Encounter: [id number]  Primary Care Provider: Luigi Baldwin DO   Date and time admitted to hospital: 6/15/2021  7:06 AM    * Acute respiratory failure with hypoxia ([facility] [address] )  Assessment & Plan  POA  Patient is not on any home oxygen  Patient on nasal cannula at 4L stating at 88%  Differential post COVID syndrome, post radiation/chemotherapy pulmonary fibrosis/pneumonitis  Plan:  · Continue nebulizations as needed  · continue daily dose of Lasix, decreased parameters to   · Continue Spiriva  · Incentive spirometry  · Titrate oxygen to maintain SpO2 above 88%  · Patient on prednisone taper, decrease by 10 mg every 7 days  Currently on prednisone 40 mg for day 5/7  Paroxysmal A-fib (HCC)  Assessment & Plan  · Patient's EKG shows new onset paroxysmal atrial fibrillation  Patient's AFib likely secondary to acute respiratory failure with hypoxia  · Patient is currently rate controlled with metoprolol  · Chads Vasc score: 3    · INR is 2 67  Gave Coumadin 3 mg recheck INR tomorrow  Constipation  Assessment & Plan  · Enema given this morning and pt feels a lot better  · Bowel sounds appreciated    Type 2 diabetes mellitus, without long-term current use of insulin (HCC)  Assessment & Plan  2/2 to steroids  · Carb controlled diet   · Hypoglycemia protocol  · Patient is currently on 12 units Lantus  · Monitor glucose levels as they will start decreasing as steroid doses are titrated down  · Sliding scale with accu checks 4 times a day  Moderate protein-calorie malnutrition ([facility] )  Assessment & Plan  Malnutrition Findings:   Adult Malnutrition type: Acute illness (in the setting of chronic illness)  Adult Degree of Malnutrition: Malnutrition of moderate degree (related to catabolic illness, respiratory distress)    BMI Findings: Body mass index is 26 31 kg/m²  Encourage oral intake, nutrition supp    Pneumonia  Assessment & Plan  · Patient had COVID 19 about a month ago and he is also immunocompromised from his esophageal cancer  · Treatment with antibiotics completed as of 06/21/2021    Acute renal failure superimposed on stage 3 chronic kidney disease [facility])  Assessment & Plan  Lab Results   Component Value Date    EGFR 70 07/05/2021    EGFR 79 07/04/2021    EGFR 78 07/02/2021    CREATININE 1 06 07/05/2021    CREATININE 0 96 07/04/2021    CREATININE 0 97 07/02/2021     · Patient still at baseline creatinine of 1 14-1 2  · Lasix resumed  · ARISTIDES resolved    PAD (peripheral artery disease)  Assessment & Plan  · Continue aspirin and statin    Esophageal cancer   Assessment & Plan  · Outpatient follow-up with Oncology  · Patient was due for Herceptin infusion on 06/29/2021  · Patient's oncologist is aware, recommends holding treatment until patient has improved       Carotid stenosis  Assessment & Plan  · Continue aspirin and statin    Essential hypertension  Assessment & Plan  · Episodes of hypotension, likely secondary to hypoxia  ·  amlodipine on hold  · Resumed metoprolol at a lower dose 12 5 mg q12  · Blood pressures remained soft continue current regimen  GERD (gastroesophageal reflux disease)  Assessment & Plan  · Continue Protonix    Transaminitis-resolved as of 6/18/2021  Assessment & Plan  · Patient's transaminitis likely secondary to shock level in the setting of sepsis  · Patient's transaminitis improved with resolution of ARISTIDES and sepsis      Dysphagia-resolved as of 7/2/2021  Assessment & Plan  Continue regular diet  no risk of aspiration seen on VBS          VTE Pharmacologic Prophylaxis:   VTE Score: 3 High Risk (Score >/= 5) - Pharmacological DVT Prophylaxis Ordered: Warfarin (Coumadin)  Sequential Compression Devices Ordered      Mechanical VTE Prophylaxis in Place: Yes    Patient Centered Rounds: I have performed bedside rounds with nursing staff today     Discussions with Specialists or Other Care Team Provider:     Education and Discussions with Family / Patient: Updated  (friend) via phone  Current Length of Stay: 20 day(s)    Current Patient Status: Inpatient     Discharge Plan / Estimated Discharge Date: Anticipate discharge tomorrow to rehab facility  Code Status: Level 1 - Full Code      Subjective:   Pt states his constipation is better after the enema this morning  His bowel sound is appreciated from this morning's exam  No change in his breathing  Objective:     Vitals:   Temp (24hrs), Av 2 °F (36 8 °C), Min:97 6 °F (36 4 °C), Max:98 6 °F (37 °C)    Temp:  [97 6 °F (36 4 °C)-98 6 °F (37 °C)] 98 4 °F (36 9 °C)  HR:  [81-89] 84  Resp:  [16-18] 18  BP: (101-115)/(62-67) 102/67  SpO2:  [91 %-95 %] 91 %  Body mass index is 26 31 kg/m²  Input and Output Summary (last 24 hours): Intake/Output Summary (Last 24 hours) at 2021 1224  Last data filed at 2021 0751  Gross per 24 hour   Intake 540 ml   Output 875 ml   Net -335 ml       Physical Exam:     Physical Exam  Vitals and nursing note reviewed  Constitutional:       General: He is not in acute distress  Appearance: Normal appearance  He is well-developed  He is not ill-appearing, toxic-appearing or diaphoretic  HENT:      Head: Normocephalic and atraumatic  Eyes:      Conjunctiva/sclera: Conjunctivae normal    Cardiovascular:      Rate and Rhythm: Normal rate  Rhythm irregular  Heart sounds: No murmur heard  No friction rub  No gallop  Pulmonary:      Effort: Pulmonary effort is normal  No respiratory distress  Breath sounds: Rales (Mild RL) present  Abdominal:      General: Bowel sounds are normal       Palpations: Abdomen is soft  Tenderness: There is no abdominal tenderness  There is no guarding  Musculoskeletal:         General: No deformity  Cervical back: Neck supple  Right lower leg: No edema        Left lower leg: No edema  Skin:     General: Skin is warm and dry  Coloration: Skin is not jaundiced or pale  Neurological:      General: No focal deficit present  Mental Status: He is alert and oriented to person, place, and time     Psychiatric:         Mood and Affect: Mood normal          Behavior: Behavior normal           Additional Data:     Labs:  Results from last 7 days   Lab Units 07/01/21  0438   WBC Thousand/uL 8 98   HEMOGLOBIN g/dL 13 7   HEMATOCRIT % 41 1   PLATELETS Thousands/uL 237     Results from last 7 days   Lab Units 07/05/21  0416   SODIUM mmol/L 134*   POTASSIUM mmol/L 4 2   CHLORIDE mmol/L 101   CO2 mmol/L 29   BUN mg/dL 21   CREATININE mg/dL 1 06   ANION GAP mmol/L 4   CALCIUM mg/dL 8 1*   GLUCOSE RANDOM mg/dL 129     Results from last 7 days   Lab Units 07/05/21  0714   INR  2 67*     Results from last 7 days   Lab Units 07/05/21  1059 07/05/21  0713 07/04/21  1627 07/04/21  1101 07/04/21  0751 07/03/21  2130 07/03/21  1631 07/03/21  1214 07/03/21  1021 07/03/21  0800 07/03/21  0154 07/02/21  2120   POC GLUCOSE mg/dl 238* 101 227* 308* 130 194* 181* 165* 252* 104 80 313*               Imaging: Reviewed radiology reports from this admission including: chest xray    Recent Cultures (last 7 days):           Lines/Drains:  Invasive Devices     Central Venous Catheter Line            Port A Cath 08/28/17 Right Chest 1407 days          Peripheral Intravenous Line            Long-Dwell Peripheral IV (Midline) 97/39/22 Left Cephalic 13 days                Telemetry:        Last 24 Hours Medication List:   Current Facility-Administered Medications   Medication Dose Route Frequency Provider Last Rate    acetaminophen  650 mg Oral Q6H PRN Francesca Barrett MD      aspirin  81 mg Oral Daily Frnacesca Barrett MD      barium sulfate  1 tablet Oral Once in imaging Francisco J Rosenberg DO      benzonatate  100 mg Oral TID PRN Antonia Grayson PA-C      furosemide  20 mg Oral Daily Danii Haque MD  guaiFENesin  1,200 mg Oral Q12H [address] Blaze Velázquez MD      insulin glargine  12 Units Subcutaneous QAM Annabel Palomares MD      insulin lispro  1-6 Units Subcutaneous TID AC Justen Santos MD      levalbuterol  1 25 mg Nebulization TID Riya Olmstead MD      lidocaine   Topical Daily PRN Mia Corado MD      LORazepam  0 5 mg Oral BID PRN Annabel Palomaers MD      melatonin  9 mg Oral HS Annabel Palomares MD      metoprolol  5 mg Intravenous Q6H PRN Annabel Palomares MD      metoprolol tartrate  12 5 mg Oral Q12H [address] Hortensia Lynn DO      nystatin   Topical TID Riya Olmstead MD      ondansetron  8 mg Intravenous Q6H PRN Patt Carrasquillo MD      pantoprazole  40 mg Oral Early Morning Patt Carrasquillo MD      polyethylene glycol  17 g Oral BID Annabel Palomares MD      [START ON 7/8/2021] predniSONE  30 mg Oral Daily Chaz Angel DO      Followed by   Mortimer Deep ON 7/15/2021] predniSONE  20 mg Oral Daily Hortensia Lynn DO      Followed by   Mort[facility] Deep ON 7/22/2021] predniSONE  10 mg Oral Daily Hortensia Lynn DO      [START ON 7/6/2021] predniSONE  40 mg Oral Daily Hortensia Lynn DO      senna  2 tablet Oral BID Nikole Rosenbaum MD      sodium chloride  1 spray Each Nare Q1H PRN Tammi Jiang PA-C      sodium chloride  3 mL Nebulization TID Riya Olmstead MD      tiotropium  18 mcg Inhalation Daily Anna Michel DO      warfarin  3 mg Oral Daily (warfarin) Justen Santos MD          Today, Patient Was Seen By: Hortensia Lynn DO    ** Please Note: This note has been constructed using a voice recognition system   **

## 2021-07-05 NOTE — ASSESSMENT & PLAN NOTE
· Patient's EKG shows new onset paroxysmal atrial fibrillation  Patient's AFib likely secondary to acute respiratory failure with hypoxia  · Patient is currently rate controlled with metoprolol  · Chads Vasc score: 3    · INR is 2 67  Gave Coumadin 3 mg recheck INR tomorrow

## 2021-07-05 NOTE — PHYSICAL THERAPY NOTE
PHYSICAL THERAPY TREATMENT  Time: 4439-8512    NAME:  Mannie Eaton  DATE: 07/05/21    AGE:   65 y o  Mrn:   [id number]  Length Of Stay: 20    ADMIT DX:  Pneumonia [J18 9]  Hypoxia [R09 02]  Pneumonia of both lungs due to infectious organism, unspecified part of lung [J18 9]    Past Medical History:   Diagnosis Date    Anxiety     Cancer ([address] )     Esophageal    Dysphagia     Esophageal abnormality     Esophageal cancer (HCC)     GERD (gastroesophageal reflux disease)     Hyperlipidemia     Hypertension     Occasional tremors     Transaminitis 6/16/2021     Past Surgical History:   Procedure Laterality Date    ESOPHAGOGASTRODUODENOSCOPY N/A 8/9/2017    Procedure: ESOPHAGOGASTRODUODENOSCOPY (EGD); Surgeon: Xavier Coppola MD;  [facility]: [facility]; Service: Gastroenterology    ESOPHAGOGASTRODUODENOSCOPY N/A 8/11/2017    Procedure: ESOPHAGOGASTRODUODENOSCOPY (EGD) w/ stent;  Surgeon: Xavier Coppola MD;  [facility]: [facility];   Service: Gastroenterology    ESOPHAGOGASTRODUODENOSCOPY N/A 1/26/2021    Procedure: ESOPHAGOGASTRODUODENOSCOPY (EGD), BIOPSY, ESOPHAGEAL DILATION,  STENT PLACEMENT;  Surgeon: Avtar Pratt MD;  [facility];  Service: Thoracic    LAPAROTOMY N/A 2/11/2020    Procedure: EGD; REMOVAL OF ESOPHAGEAL STENTS X 3;  Surgeon: Avtar Pratt MD;  [facility];  Service: Thoracic    PORTACATH PLACEMENT      PORTACATH PLACEMENT      SC ESOPHAGOGASTRODUODENOSCOPY TRANSORAL DIAGNOSTIC N/A 4/26/2021    Procedure: ESOPHAGOGASTRODUODENOSCOPY (EGD); stent removal;  Surgeon: Avtar Pratt MD;  [facility];  Service: Thoracic    UPPER GASTROINTESTINAL ENDOSCOPY      VASCULAR SURGERY  2008    blockage in neck        Performed at least 2 patient identifiers during session: Name, Kentrell Grijalva and ID bracelet        07/05/21 1150   PT Last Visit   PT Visit Date 07/05/21   Note Type   Note Type Treatment   Pain Assessment   Pain Assessment Tool 0-10   Pain Score No Pain   Effect of Pain on Daily Activities n/a   Hospital Pain Intervention(s) Repositioned; Ambulation/increased activity   Multiple Pain Sites No   Restrictions/Precautions   Weight Bearing Precautions Per Order No   Other Precautions Chair Alarm;Multiple lines;Telemetry; Fall Risk;O2   General   Chart Reviewed Yes   Additional Pertinent History Pt admitted 6/15/2021 with O2 saturation at 65% RA, recent increased difficulty with breathing  Dx: AHRF  Response to Previous Treatment Patient reporting fatigue but able to participate  Family/Caregiver Present No   Cognition   Overall Cognitive Status WFL   Arousal/Participation Alert; Cooperative   Attention Within functional limits   Orientation Level Oriented X4   Memory Within functional limits   Following Commands Follows multistep commands with increased time or repetition   Subjective   Subjective "I was never like this  My goal now is just to get to the real bathroom "   Bed Mobility   Supine to Sit 5  Supervision   Additional items HOB elevated; Bedrails; Increased time required   Sit to Supine Unable to assess   Additional Comments Pt was left seated OOB in recliner chair at end of session  Transfers   Sit to Stand 4  Minimal assistance   Additional items Assist x 1;Bedrails; Increased time required   Stand to Sit 4  Minimal assistance   Additional items Assist x 1; Armrests; Increased time required;Verbal cues   Stand pivot 4  Minimal assistance   Additional items Assist x 1;Bedrails;Armrests; Increased time required;Verbal cues   Ambulation/Elevation   Gait pattern Not tested; Not appropriate   Balance   Static Sitting Fair +   Dynamic Sitting Fair   Static Standing Fair -   Dynamic Standing Fair -   Ambulatory Fair -   Endurance Deficit   Endurance Deficit Yes   Endurance Deficit Description Pt with HIGH degree of fatigue and SOB t/o all aspects of mobility  Requiring extended functional seated rest break between each mobility task   SPO2 ranging 76-90% on 5L NC + 15L facemask  Activity Tolerance   Activity Tolerance Patient limited by fatigue; Other (Comment)  (SOB and dec O2 saturation)   Medical Staff Made Aware Spoke with HEDY Blackman pre/post session   Assessment   Prognosis Guarded   Problem List Decreased strength;Decreased endurance;Decreased range of motion; Impaired balance;Decreased mobility   Assessment Pt seen for PT treatment today with focus on bed mobility and transfer training  Pt presents semi-supine in bed, denies pain and is agreeable to participate  Pt presents on 4L O2 via NC, SPO2 94%  Face mask at 15L O2 applied for mobility training  Pt able to complete bed mobility with S, transfer STS and SPT bed>chair with PALOMO x1 with no device (B UE support on armrest/bedrail)  Between each mobility task pt requires max extended time (~10 minutes) for recovery of SOB and SPo2 to inc to baseline  SPO2 dec to lowest of 76% on 5L NC + 15L via facemask, reached highest of 90% after extended rest  Pt was left in recliner chair, needs within reach, RN in room  Continue to recommend STR upon d/c, in order to maximize indep and safety with mobility and return to PLOF  Next session, plan for continuation of strengthening HEP as able  Barriers to Discharge Decreased caregiver support; Inaccessible home environment   Goals   Patient Goals "to be able to walk into the real bathroom "   STG Expiration Date 07/15/21   Short Term Goal #1 PT goals made to address and facilitate pt's goal of being able to walk into the bathroom  1  Pt will demonstrate ability to perform all aspects of bed mobility with independence to increase functional independence  2  Pt will perform functional transfers independently to facilitate safe return to previous living environment  3  Pt will ambulate >20ft with LRAD and PALOMO, in order to facilitate pt's goal of in room ambulation   4  Pt will improve LE strength grade to >/= 4/5 MMT throughout, in order to increase ease of functional mobility with transfers and gait  5  Pt will demonstrate improved balance to >/= GOOD grades in order to decrease risk of falls  6  Pt will negotiate 2 steps independently with 0 HR to simulate entrance to home  7  Pt will improve barthel index to score >/= 75/100 for improved independence and decreased caregiver burden  8  Pt will improve AM-PAC score to >/= 20/24 in order to increase independence with mobility  PT Treatment Day 5   Plan   Treatment/Interventions Functional transfer training;LE strengthening/ROM; Elevations; Therapeutic exercise; Endurance training;Patient/family training;Equipment eval/education; Bed mobility;Gait training; Compensatory technique education;Continued evaluation;Spoke to nursing;OT   Progress Slow progress, decreased activity tolerance   PT Frequency Other (Comment)  (3-5x/wk)   Recommendation   PT Discharge Recommendation Post acute rehabilitation services   AM-PAC Basic Mobility Inpatient   Turning in Bed Without Bedrails 3   Lying on Back to Sitting on Edge of Flat Bed 3   Moving Bed to Chair 3   Standing Up From Chair 3   Walk in Room 2   Climb 3-5 Stairs 1   Basic Mobility Inpatient Raw Score 15   Basic Mobility Standardized Score 36 97       The patient's AM-PAC Basic Mobility Inpatient Short Form Raw Score is 15, Standardized Score is 36 97  A standardized score less than 42 9 suggests the patient may benefit from discharge to post-acute rehabilitation services  Please also refer to the recommendation of the Physical Therapist for safe discharge planning        Eartha Kocher, PT, DPT   Available via [employer]  I # [id number]  [place] License - [id number]  6/4/5180

## 2021-07-05 NOTE — ASSESSMENT & PLAN NOTE
· Episodes of hypotension, likely secondary to hypoxia  ·  amlodipine on hold  · Resumed metoprolol at a lower dose 12 5 mg q12  · Blood pressures remained soft continue current regimen

## 2021-07-05 NOTE — PLAN OF CARE
Problem: MOBILITY - ADULT  Goal: Maintain or return to baseline ADL function  Description: INTERVENTIONS:  -  Assess patient's ability to carry out ADLs; assess patient's baseline for ADL function and identify physical deficits which impact ability to perform ADLs (bathing, care of mouth/teeth, toileting, grooming, dressing, etc )  - Assess/evaluate cause of self-care deficits   - Assess range of motion  - Assess patient's mobility; develop plan if impaired  - Assess patient's need for assistive devices and provide as appropriate  - Encourage maximum independence but intervene and supervise when necessary  - Involve family in performance of ADLs  - Assess for home care needs following discharge   - Consider OT consult to assist with ADL evaluation and planning for discharge  - Provide patient education as appropriate  Outcome: Progressing  Goal: Maintains/Returns to pre admission functional level  Description: INTERVENTIONS:  - Perform BMAT or MOVE assessment daily    - Set and communicate daily mobility goal to care team and patient/family/caregiver  - Collaborate with rehabilitation services on mobility goals if consulted  - Perform Range of Motion 2 times a day  - Reposition patient every 2 hours    - Dangle patient 2 times a day  - Stand patient 2 times a day  - Ambulate patient 2 times a day  - Out of bed to chair 2 times a day   - Out of bed for meals 2 times a day  - Out of bed for toileting  - Record patient progress and toleration of activity level   Outcome: Progressing     Problem: Prexisting or High Potential for Compromised Skin Integrity  Goal: Skin integrity is maintained or improved  Description: INTERVENTIONS:  - Identify patients at risk for skin breakdown  - Assess and monitor skin integrity  - Assess and monitor nutrition and hydration status  - Monitor labs   - Assess for incontinence   - Turn and reposition patient  - Assist with mobility/ambulation  - Relieve pressure over bony prominences  - Avoid friction and shearing  - Provide appropriate hygiene as needed including keeping skin clean and dry  - Evaluate need for skin moisturizer/barrier cream  - Collaborate with interdisciplinary team   - Patient/family teaching  - Consider wound care consult   Outcome: Progressing     Problem: Potential for Falls  Goal: Patient will remain free of falls  Description: INTERVENTIONS:  - Educate patient/family on patient safety including physical limitations  - Instruct patient to call for assistance with activity   - Consult OT/PT to assist with strengthening/mobility   - Keep Call bell within reach  - Keep bed low and locked with side rails adjusted as appropriate  - Keep care items and personal belongings within reach  - Initiate and maintain comfort rounds  - Make Fall Risk Sign visible to staff  - Offer Toileting every 2 Hours, in advance of need  - Initiate/Maintain bed alarm  - Apply yellow socks and bracelet for high fall risk patients  - Consider moving patient to room near nurses station  Outcome: Progressing     Problem: RESPIRATORY - ADULT  Goal: Achieves optimal ventilation and oxygenation  Description: INTERVENTIONS:  - Assess for changes in respiratory status  - Assess for changes in mentation and behavior  - Position to facilitate oxygenation and minimize respiratory effort  - Oxygen administered by appropriate delivery if ordered  - Initiate smoking cessation education as indicated  - Encourage broncho-pulmonary hygiene including cough, deep breathe, Incentive Spirometry  - Assess the need for suctioning and aspirate as needed  - Assess and instruct to report SOB or any respiratory difficulty  - Respiratory Therapy support as indicated  Outcome: Progressing     Problem: INFECTION - ADULT  Goal: Absence or prevention of progression during hospitalization  Description: INTERVENTIONS:  - Assess and monitor for signs and symptoms of infection  - Monitor lab/diagnostic results  - Monitor all insertion sites, i e  indwelling lines, tubes, and drains  - Monitor endotracheal if appropriate and nasal secretions for changes in amount and color  - [place] appropriate cooling/warming therapies per order  - Administer medications as ordered  - Instruct and encourage patient and family to use good hand hygiene technique  - Identify and instruct in appropriate isolation precautions for identified infection/condition  Outcome: Progressing  Goal: Absence of fever/infection during neutropenic period  Description: INTERVENTIONS:  - Monitor WBC    Outcome: Progressing     Problem: Nutrition/Hydration-ADULT  Goal: Nutrient/Hydration intake appropriate for improving, restoring or maintaining nutritional needs  Description: Monitor and assess patient's nutrition/hydration status for malnutrition  Collaborate with interdisciplinary team and initiate plan and interventions as ordered  Monitor patient's weight and dietary intake as ordered or per policy  Utilize nutrition screening tool and intervene as necessary  Determine patient's food preferences and provide high-protein, high-caloric foods as appropriate       INTERVENTIONS:  - Monitor oral intake, urinary output, labs, and treatment plans  - Assess nutrition and hydration status and recommend course of action  - Evaluate amount of meals eaten  - Assist patient with eating if necessary   - Allow adequate time for meals  - Recommend/ encourage appropriate diets, oral nutritional supplements, and vitamin/mineral supplements  - Order, calculate, and assess calorie counts as needed  - Recommend, monitor, and adjust tube feedings and TPN/PPN based on assessed needs  - Assess need for intravenous fluids  - Provide specific nutrition/hydration education as appropriate  - Include patient/family/caregiver in decisions related to nutrition  Outcome: Progressing

## 2021-07-05 NOTE — ASSESSMENT & PLAN NOTE
Lab Results   Component Value Date    EGFR 70 07/05/2021    EGFR 79 07/04/2021    EGFR 78 07/02/2021    CREATININE 1 06 07/05/2021    CREATININE 0 96 07/04/2021    CREATININE 0 97 07/02/2021     · Patient still at baseline creatinine of 1 14-1 2  · Lasix resumed  · ARISITDES resolved

## 2021-07-05 NOTE — PLAN OF CARE
Problem: PHYSICAL THERAPY ADULT  Goal: Performs mobility at highest level of function for planned discharge setting  See evaluation for individualized goals  Description: Treatment/Interventions: Functional transfer training, LE strengthening/ROM, Elevations, Therapeutic exercise, Endurance training, Patient/family training, Equipment eval/education, Bed mobility, Gait training, Compensatory technique education, Continued evaluation, Spoke to nursing, OT     See flowsheet documentation for full assessment, interventions and recommendations  7/5/2021 1234 by Victoriano Salazar, PT  Outcome: Progressing  Note: Prognosis: Guarded  Problem List: Decreased strength, Decreased endurance, Decreased range of motion, Impaired balance, Decreased mobility  Assessment: Pt seen for PT treatment today with focus on bed mobility and transfer training  Pt presents semi-supine in bed, denies pain and is agreeable to participate  Pt presents on 4L O2 via NC, SPO2 94%  Face mask at 15L O2 applied for mobility training  Pt able to complete bed mobility with S, transfer STS and SPT bed>chair with PALOMO x1 with no device (B UE support on armrest/bedrail)  Between each mobility task pt requires max extended time (~10 minutes) for recovery of SOB and SPo2 to inc to baseline  SPO2 dec to lowest of 76% on 5L NC + 15L via facemask, reached highest of 90% after extended rest  Pt was left in recliner chair, needs within reach, RN in room  Continue to recommend STR upon d/c, in order to maximize indep and safety with mobility and return to PLOF  Next session, plan for continuation of strengthening HEP as able  Barriers to Discharge: Decreased caregiver support, Inaccessible home environment        PT Discharge Recommendation: Post acute rehabilitation services          See flowsheet documentation for full assessment       7/5/2021 1234 by Victoriano Salazar PT  Note: Prognosis: Guarded  Problem List: Decreased strength, Decreased endurance, Decreased range of motion, Impaired balance, Decreased mobility  Assessment: Pt seen for PT treatment today with focus on bed mobility and transfer training  Pt presents semi-supine in bed, denies pain and is agreeable to participate  Pt presents on 4L O2 via NC, SPO2 94%  Face mask at 15L O2 applied for mobility training  Pt able to complete bed mobility with S, transfer STS and SPT bed>chair with PALOMO x1 with no device (B UE support on armrest/bedrail)  Between each mobility task pt requires max extended time (~10 minutes) for recovery of SOB and SPo2 to inc to baseline  SPO2 dec to lowest of 76% on 5L NC + 15L via facemask, reached highest of 90% after extended rest  Pt was left in recliner chair, needs within reach, RN in room  Continue to recommend STR upon d/c, in order to maximize indep and safety with mobility and return to PLOF  Next session, plan for continuation of strengthening HEP as able  Barriers to Discharge: Decreased caregiver support, Inaccessible home environment     PT Discharge Recommendation: Post acute rehabilitation services    See flowsheet documentation for full assessment

## 2021-07-06 PROBLEM — E87.1 HYPONATREMIA: Status: ACTIVE | Noted: 2021-01-01

## 2021-07-06 NOTE — ASSESSMENT & PLAN NOTE
Lab Results   Component Value Date    EGFR 85 07/06/2021    EGFR 70 07/05/2021    EGFR 79 07/04/2021    CREATININE 0 90 07/06/2021    CREATININE 1 06 07/05/2021    CREATININE 0 96 07/04/2021     · Patient still at baseline creatinine of 1 14-1 2  · Lasix resumed  · ARISTIDES resolved

## 2021-07-06 NOTE — PLAN OF CARE
Problem: MOBILITY - ADULT  Goal: Maintain or return to baseline ADL function  Description: INTERVENTIONS:  -  Assess patient's ability to carry out ADLs; assess patient's baseline for ADL function and identify physical deficits which impact ability to perform ADLs (bathing, care of mouth/teeth, toileting, grooming, dressing, etc )  - Assess/evaluate cause of self-care deficits   - Assess range of motion  - Assess patient's mobility; develop plan if impaired  - Assess patient's need for assistive devices and provide as appropriate  - Encourage maximum independence but intervene and supervise when necessary  - Involve family in performance of ADLs  - Assess for home care needs following discharge   - Consider OT consult to assist with ADL evaluation and planning for discharge  - Provide patient education as appropriate  Outcome: Progressing  Goal: Maintains/Returns to pre admission functional level  Description: INTERVENTIONS:  - Perform BMAT or MOVE assessment daily    - Set and communicate daily mobility goal to care team and patient/family/caregiver  - Collaborate with rehabilitation services on mobility goals if consulted  - Perform Range of Motion  times a day  - Reposition patient every  hours    - Dangle patient  times a day  - Stand patient  times a day  - Ambulate patient  times a day  - Out of bed to chair  times a day   - Out of bed for meals  times a day  - Out of bed for toileting  - Record patient progress and toleration of activity level   Outcome: Progressing     Problem: Prexisting or High Potential for Compromised Skin Integrity  Goal: Skin integrity is maintained or improved  Description: INTERVENTIONS:  - Identify patients at risk for skin breakdown  - Assess and monitor skin integrity  - Assess and monitor nutrition and hydration status  - Monitor labs   - Assess for incontinence   - Turn and reposition patient  - Assist with mobility/ambulation  - Relieve pressure over bony prominences  - Avoid friction and shearing  - Provide appropriate hygiene as needed including keeping skin clean and dry  - Evaluate need for skin moisturizer/barrier cream  - Collaborate with interdisciplinary team   - Patient/family teaching  - Consider wound care consult   Outcome: Progressing     Problem: Potential for Falls  Goal: Patient will remain free of falls  Description: INTERVENTIONS:  - Educate patient/family on patient safety including physical limitations  - Instruct patient to call for assistance with activity   - Consult OT/PT to assist with strengthening/mobility   - Keep Call bell within reach  - Keep bed low and locked with side rails adjusted as appropriate  - Keep care items and personal belongings within reach  - Initiate and maintain comfort rounds  - Make Fall Risk Sign visible to staff  - Offer Toileting every  Hours, in advance of need  - Initiate/Maintain alarm  - Obtain necessary fall risk management equipment: Apply yellow socks and bracelet for high fall risk patients  - Consider moving patient to room near nurses station  Outcome: Progressing     Problem: RESPIRATORY - ADULT  Goal: Achieves optimal ventilation and oxygenation  Description: INTERVENTIONS:  - Assess for changes in respiratory status  - Assess for changes in mentation and behavior  - Position to facilitate oxygenation and minimize respiratory effort  - Oxygen administered by appropriate delivery if ordered  - Initiate smoking cessation education as indicated  - Encourage broncho-pulmonary hygiene including cough, deep breathe, Incentive Spirometry  - Assess the need for suctioning and aspirate as needed  - Assess and instruct to report SOB or any respiratory difficulty  - Respiratory Therapy support as indicated  Outcome: Progressing     Problem: INFECTION - ADULT  Goal: Absence or prevention of progression during hospitalization  Description: INTERVENTIONS:  - Assess and monitor for signs and symptoms of infection  - Monitor lab/diagnostic results  - Monitor all insertion sites, i e  indwelling lines, tubes, and drains  - Monitor endotracheal if appropriate and nasal secretions for changes in amount and color  - [place] appropriate cooling/warming therapies per order  - Administer medications as ordered  - Instruct and encourage patient and family to use good hand hygiene technique  - Identify and instruct in appropriate isolation precautions for identified infection/condition  Outcome: Progressing  Goal: Absence of fever/infection during neutropenic period  Description: INTERVENTIONS:  - Monitor WBC    Outcome: Progressing     Problem: Nutrition/Hydration-ADULT  Goal: Nutrient/Hydration intake appropriate for improving, restoring or maintaining nutritional needs  Description: Monitor and assess patient's nutrition/hydration status for malnutrition  Collaborate with interdisciplinary team and initiate plan and interventions as ordered  Monitor patient's weight and dietary intake as ordered or per policy  Utilize nutrition screening tool and intervene as necessary  Determine patient's food preferences and provide high-protein, high-caloric foods as appropriate       INTERVENTIONS:  - Monitor oral intake, urinary output, labs, and treatment plans  - Assess nutrition and hydration status and recommend course of action  - Evaluate amount of meals eaten  - Assist patient with eating if necessary   - Allow adequate time for meals  - Recommend/ encourage appropriate diets, oral nutritional supplements, and vitamin/mineral supplements  - Order, calculate, and assess calorie counts as needed  - Recommend, monitor, and adjust tube feedings and TPN/PPN based on assessed needs  - Assess need for intravenous fluids  - Provide specific nutrition/hydration education as appropriate  - Include patient/family/caregiver in decisions related to nutrition  Outcome: Progressing

## 2021-07-06 NOTE — ASSESSMENT & PLAN NOTE
· Patient's EKG shows new onset paroxysmal atrial fibrillation  Patient's AFib likely secondary to acute respiratory failure with hypoxia  · Patient is currently rate controlled with metoprolol  · Chads Vasc score: 3    · INR is 2 19  Coumadin 3 mg recheck INR tomorrow

## 2021-07-06 NOTE — UTILIZATION REVIEW
Continued Stay Review    Date: 7/3/2021                           Current Patient Class: inpatient  Current Level of Care: level 2 stepdown    HPI:70 y o  male initially admitted on 6/15/2021 inpatient due to acute hypoxic respiratory failure/Sepsis with suspected pulmonary source  past medical history of esophageal cancer post palliative radiation and chemotherapy  Treatment with antibiotics completed as of 06/21/2021      Assessment/Plan: 7/3/2021:  Patient constipated, tried Dulcolax without effect  Feels constipation makes it harder to urinate  On exam pulse ox 87 % on 5 liters  Hypoactive bowel sounds  Plan to continue oxygen and titrate sat of 88%, continue nebulizer, spiriva, hold coumadin today as INR is 3 17  Will need LTAC with ability to provide high flow oxygen per Pulmonary       Vital Signs:   07/03/21 15:30:31  98 7 °F (37 1 °C)  83  17  110/65  80  95 %  --  --  --  --  --  --   07/03/21 12:07:15  98 4 °F (36 9 °C)  74  --  100/63  75  92 %  --  --  --  --  --  --   07/03/21 1200  --  --  --  --  --  --  --  5 L/min  --  Nasal cannula  --  --   07/03/21 0800  --  --  --  --  --  --  40  --  5 L/min  Nasal cannula  --  --   07/03/21 07:35:13  97 5 °F (36 4 °C)  73  --  113/67  82  96 %  --  --  --  --  --  --   07/03/21 0726  --  --  --  --  --  92 %  40  --  5 L/min  Nasal cannula  --  --   07/03/21 0257  98 7 °F (37 1 °C)  69  --  106/54  71  94 %  --  --  --  --  --  --   07/02/21 22:21:50  98 1 °F (36 7 °C)  82  --  108/66  80  90 %  --  --  --  --  --  --   07/02/21 22:20:30  98 1 °F (36 7 °C)  83  --  99/62  74  89 %Abnormal   --  --  --  --  --  --   07/02/21 22:19:07  98 1 °F (36 7 °C)  86  --  --  --  87 %Abnormal   --  --  --  --  --  --   07/02/21 19:34:29  98 5 °F (36 9 °C)  87  18  111/57  75  88 %Abnormal   --  --  --  --  --  --   07/02/21 1914  --  --  --  --  --  92 %  --  5 L/min  --  Nasal cannula           Pertinent Labs/Diagnostic Results:   7/1/2021 CxR Bilateral peripheral and groundglass opacities, no change    Results from last 7 days   Lab Units 07/01/21  0438   WBC Thousand/uL 8 98   HEMOGLOBIN g/dL 13 7   HEMATOCRIT % 41 1   PLATELETS Thousands/uL 237     Results from last 7 days   Lab Units 07/06/21  0625 07/05/21  0416 07/04/21  0509 07/02/21  0545 07/01/21  0438   SODIUM mmol/L 133* 134* 133* 134* 133*   POTASSIUM mmol/L 3 9 4 2 4 4 4 6 4 7   CHLORIDE mmol/L 102 101 100 99* 99*   CO2 mmol/L 25 29 28 29 27   ANION GAP mmol/L 6 4 5 6 7   BUN mg/dL 22 21 22 24 23   CREATININE mg/dL 0 90 1 06 0 96 0 97 0 97   EGFR ml/min/1 73sq m 85 70 79 78 78   CALCIUM mg/dL 8 4 8 1* 8 4 8 4 8 4     Results from last 7 days   Lab Units 07/06/21  1548 07/06/21  1108 07/06/21  0814 07/05/21  1603 07/05/21  1059 07/05/21  0713 07/04/21  1627 07/04/21  1101 07/04/21  0751 07/03/21  2130 07/03/21  1631 07/03/21  1214   POC GLUCOSE mg/dl 164* 201* 106 203* 238* 101 227* 308* 130 194* 181* 165*     Results from last 7 days   Lab Units 07/06/21  0625 07/05/21  0416 07/04/21  0509 07/02/21  0545 07/01/21  0438 06/30/21  0631   GLUCOSE RANDOM mg/dL 110 129 128 123 101 93     Results from last 7 days   Lab Units 07/06/21  0619 07/05/21  0714 07/04/21  0509   PROTIME seconds 24 4* 28 5* 32 0*   INR  2 19* 2 67* 3 10*     Medications:   Scheduled Medications:  aspirin, 81 mg, Oral, Daily  furosemide, 20 mg, Oral, Daily  guaiFENesin, 1,200 mg, Oral, Q12H SENG  insulin glargine, 12 Units, Subcutaneous, QAM  insulin lispro, 1-6 Units, Subcutaneous, 4 times day  levalbuterol, 1 25 mg, Nebulization, TID  melatonin, 9 mg, Oral, HS  metoprolol tartrate, 25 mg, Oral, Q12H SENG  nystatin, , Topical, TID  pantoprazole, 40 mg, Oral, Early Morning  polyethylene glycol, 17 g, Oral, BID  polyethylene glycol-electrolytes, 2,000 mL, Oral, Once  predniSONE, 40 mg, Oral, Daily  senna, 2 tablet, Oral, BID  sodium chloride, 3 mL, Nebulization, TID  tiotropium, 18 mcg, Inhalation, Daily  warfarin, 5 mg, Oral, Daily (warfarin)    bisacodyl (DULCOLAX) rectal suppository 10 mg   Dose: 10 mg  Freq: Once Route: RE  Start: 07/03/21 0915 End: 07/03/21 1210    magnesium citrate (CITROMA) oral solution 296 mL   Dose: 296 mL  Freq: Once Route: PO  Start: 07/03/21 1445 End: 07/03/21 1555    polyethylene glycol-electrolytes (NULYTELY) bowel prep 2,000 mL   Dose: 2,000 mL  Freq: Once Route: PO  Start: 07/03/21 1445    Continuous IV Infusions:     PRN Meds: not used   acetaminophen, 650 mg, Oral, Q6H PRN  barium sulfate, 1 tablet, Oral, Once in imaging  benzonatate, 100 mg, Oral, TID PRN  lidocaine, , Topical, Daily PRN  LORazepam, 0 5 mg, Oral, BID PRN  metoprolol, 5 mg, Intravenous, Q6H PRN  ondansetron, 8 mg, Intravenous, Q6H PRN  sodium chloride, 1 spray, Each Nare, Q1H PRN        Discharge Plan: To be determined  Network Utilization Review Department  ATTENTION: Please call with any questions or concerns to [phone] and carefully listen to the prompts so that you are directed to the right person  All voicemails are confidential   [facility] all requests for admission clinical reviews, approved or denied determinations and any other requests to dedicated fax number below belonging to the campus where the patient is receiving treatment  List of dedicated fax numbers for the Facilities:  [address] DENIALS (Administrative/Medical Necessity) [phone]   [address] (Maternity/NICU/Pediatrics) [phone]   [address] Kay Boyle 7013 [address] [address] [phone]   [address] [place] [address]   [address] [id number] [phone]

## 2021-07-06 NOTE — CASE MANAGEMENT
CM received call from Nyasia at [facility]  Patient's insurance is requesting a Peer to Peer prior to making a determination  Peer to Peer with Medical Director, Dr Matty Sylvester  [phone]  Ref#: [id number]  CM informed SLIM of same; will complete Peer to Peer  CM met with the Patient and his SO, Rosa Maria Winston, at the bedside to review status of authorization and request for Peer to Peer  CM explained the peer to peer process  Patient and Rosa Maria Winston both verabalized their understanding  CM dept will continue to update Patient and SO as able  CM dept will follow for completion of Peer to peer and auth determination

## 2021-07-06 NOTE — ASSESSMENT & PLAN NOTE
POA   Patient is not on any home oxygen  Patient on nasal cannula at 4L stating at 88%  Differential post COVID syndrome, post radiation/chemotherapy pulmonary fibrosis/pneumonitis  Plan:  · Continue nebulizations as needed  · continue daily dose of Lasix, decreased parameters to   · Continue Spiriva  · Incentive spirometry  · Titrate oxygen to maintain SpO2 above 88%  · Patient on prednisone taper, decrease by 10 mg every 7 days  Currently on prednisone 40 mg for day 7/7

## 2021-07-06 NOTE — QUICK NOTE
Spoke to patient's spouse - Edie Gonzales  States that they are not happy with the plan to move patient to [facility]  Additionally states that patient's care has not been explained to her  I informed her that I spoke to her  and he said age normally comes in the afternoon therefore I will meet in the room to discuss current care and plan going forward  Meeting set for 2:30 p m  today

## 2021-07-06 NOTE — PROGRESS NOTES
[facility]  Progress Note Coco Vora 1950, 65 y o  male MRN: [id number]  Unit/Bed#: S -01 Encounter: [id number]  Primary Care Provider: Fredy Gonsalves DO   Date and time admitted to hospital: 6/15/2021  7:06 AM    * Acute respiratory failure with hypoxia ([address] )  Assessment & Plan  POA  Patient is not on any home oxygen  Patient on nasal cannula at 4L stating at 88%  Differential post COVID syndrome, post radiation/chemotherapy pulmonary fibrosis/pneumonitis  Plan:  · Continue nebulizations as needed  · continue daily dose of Lasix, decreased parameters to   · Continue Spiriva  · Incentive spirometry  · Titrate oxygen to maintain SpO2 above 88%  · Patient on prednisone taper, decrease by 10 mg every 7 days  Currently on prednisone 40 mg for day 7/7  Paroxysmal A-fib (HCC)  Assessment & Plan  · Patient's EKG shows new onset paroxysmal atrial fibrillation  Patient's AFib likely secondary to acute respiratory failure with hypoxia  · Patient is currently rate controlled with metoprolol  · Chads Vasc score: 3    · INR is 2 19  Coumadin 3 mg recheck INR tomorrow  Esophageal cancer   Assessment & Plan  · Outpatient follow-up with Oncology  · Patient was due for Herceptin infusion on 06/29/2021  · Patient's oncologist is aware, recommends holding treatment until patient has improved       Constipation  Assessment & Plan  · Enema given yesterday with small stool which was hard to pass  Colace 100 mg BID added  · Continue senokot  Type 2 diabetes mellitus, without long-term current use of insulin (HCC)  Assessment & Plan  2/2 to steroids  · Carb controlled diet   · Hypoglycemia protocol  · Patient is currently on 12 units Lantus  · Monitor glucose levels as they will start decreasing as steroid doses are titrated down  · Sliding scale with accu checks 4 times a day      Essential hypertension  Assessment & Plan  · Episodes of hypotension, likely secondary to hypoxia  ·  amlodipine on hold  · Resumed metoprolol at a lower dose 12 5 mg q12  · Blood pressures remained soft continue current regimen  Moderate protein-calorie malnutrition ([address] )  Assessment & Plan  Malnutrition Findings:   Adult Malnutrition type: Acute illness (in the setting of chronic illness)  Adult Degree of Malnutrition: Malnutrition of moderate degree (related to catabolic illness, respiratory distress)    BMI Findings: Body mass index is 26 31 kg/m²  Encourage oral intake, nutrition supp    Pneumonia  Assessment & Plan  · Patient had COVID 19 about a month ago and he is also immunocompromised from his esophageal cancer  · Treatment with antibiotics completed as of 06/21/2021    Acute renal failure superimposed on stage 3 chronic kidney disease [facility])  Assessment & Plan  Lab Results   Component Value Date    EGFR 85 07/06/2021    EGFR 70 07/05/2021    EGFR 79 07/04/2021    CREATININE 0 90 07/06/2021    CREATININE 1 06 07/05/2021    CREATININE 0 96 07/04/2021     · Patient still at baseline creatinine of 1 14-1 2  · Lasix resumed  · ARISTIDES resolved    PAD (peripheral artery disease)  Assessment & Plan  · Continue aspirin and statin    GERD (gastroesophageal reflux disease)  Assessment & Plan  · Continue Protonix      VTE Pharmacologic Prophylaxis:   Pharmacologic: Warfarin (Coumadin)  Mechanical VTE Prophylaxis in Place: Yes    Patient Centered Rounds: I have performed bedside rounds with nursing staff today  Discussions with Specialists or Other Care Team Provider: Nursing and case management  Education and Discussions with Family / Patient:  With patient and wife Jaycee Mckeon  Time Spent for Care: 1 hour  More than 50% of total time spent on counseling and coordination of care as described above      Current Length of Stay: 21 day(s)    Current Patient Status: Inpatient   Certification Statement: The patient will continue to require additional inpatient hospital stay due to Requires high-flow oxygen support  Discharge Plan:  Pending medical stability  Code Status: Level 1 - Full Code      Subjective:   No acute events overnight  Objective:     Vitals:   Temp (24hrs), Av 6 °F (37 °C), Min:98 4 °F (36 9 °C), Max:98 9 °F (37 2 °C)    Temp:  [98 4 °F (36 9 °C)-98 9 °F (37 2 °C)] 98 4 °F (36 9 °C)  HR:  [83-95] 83  Resp:  [17] 17  BP: ()/(63-83) 98/63  SpO2:  [92 %-95 %] 92 %  Body mass index is 26 31 kg/m²  Input and Output Summary (last 24 hours): Intake/Output Summary (Last 24 hours) at 2021 1548  Last data filed at 2021 1109  Gross per 24 hour   Intake --   Output 725 ml   Net -725 ml       Physical Exam:     Physical Exam  Vitals and nursing note reviewed  Constitutional:       Appearance: He is well-developed  HENT:      Head: Normocephalic and atraumatic  Eyes:      Conjunctiva/sclera: Conjunctivae normal    Cardiovascular:      Rate and Rhythm: Normal rate and regular rhythm  Heart sounds: No murmur heard  Pulmonary:      Effort: Pulmonary effort is normal       Breath sounds: Wheezing present  Abdominal:      General: Bowel sounds are normal       Palpations: Abdomen is soft  Tenderness: There is no guarding or rebound  Musculoskeletal:      Cervical back: Neck supple  Right lower leg: No edema  Left lower leg: No edema  Skin:     General: Skin is warm and dry  Capillary Refill: Capillary refill takes less than 2 seconds  Neurological:      General: No focal deficit present  Mental Status: He is alert and oriented to person, place, and time     Psychiatric:         Mood and Affect: Mood normal          Behavior: Behavior normal            Additional Data:     Labs:    Results from last 7 days   Lab Units 21  0438   WBC Thousand/uL 8 98   HEMOGLOBIN g/dL 13 7   HEMATOCRIT % 41 1   PLATELETS Thousands/uL 237     Results from last 7 days   Lab Units 21  0625   SODIUM mmol/L 133*   POTASSIUM mmol/L 3 9   CHLORIDE mmol/L 102   CO2 mmol/L 25   BUN mg/dL 22   CREATININE mg/dL 0 90   ANION GAP mmol/L 6   CALCIUM mg/dL 8 4   GLUCOSE RANDOM mg/dL 110     Results from last 7 days   Lab Units 07/06/21  0619   INR  2 19*     Results from last 7 days   Lab Units 07/06/21  1108 07/06/21  0814 07/05/21  1603 07/05/21  1059 07/05/21  0713 07/04/21  1627 07/04/21  1101 07/04/21  0751 07/03/21  2130 07/03/21  1631 07/03/21  1214 07/03/21  1021   POC GLUCOSE mg/dl 201* 106 203* 238* 101 227* 308* 130 194* 181* 165* 252*                   * I Have Reviewed All Lab Data Listed Above  * Additional Pertinent Lab Tests Reviewed: All Labs Within Last 24 Hours Reviewed    Imaging:    Imaging Reports Reviewed Today Include:  No new imaging reports  Imaging Personally Reviewed by Myself Includes:  None      Recent Cultures (last 7 days):           Last 24 Hours Medication List:   Current Facility-Administered Medications   Medication Dose Route Frequency Provider Last Rate    acetaminophen  650 mg Oral Q6H PRN Genaro Waller MD      aspirin  81 mg Oral Daily Genaro Waller MD      barium sulfate  1 tablet Oral Once in imaging Francisco J Rosenberg, DO      benzonatate  100 mg Oral TID PRN Jimena Lowery PA-C      docusate sodium  100 mg Oral BID Teresa Moore, DO      furosemide  20 mg Oral Daily Yuri Yip MD      guaiFENesin  1,200 mg Oral Q12H [address] Blaze Velázquez MD      insulin glargine  12 Units Subcutaneous QAM Morelia El MD      insulin lispro  1-6 Units Subcutaneous TID AC Yuri Yip MD      levalbuterol  1 25 mg Nebulization TID Davy Dykes MD      lidocaine   Topical Daily PRN Finn Barker MD      LORazepam  0 5 mg Oral BID PRN Morelia El MD      melatonin  9 mg Oral HS Morelia El MD      metoprolol  5 mg Intravenous Q6H PRN Morelia El MD      metoprolol tartrate  12 5 mg Oral Q12H [facility], DO      nystatin   Topical TID Davy Dykes MD      ondansetron  8 mg Intravenous Q6H PRN Chidi Burgos MD      pantoprazole  40 mg Oral Early Morning Chidi Burgos MD      polyethylene glycol  17 g Oral BID Arlene Hurtado MD      [START ON 7/8/2021] predniSONE  30 mg Oral Daily Maudine Havers, DO      Followed by   Carleen Diallo ON 7/15/2021] predniSONE  20 mg Oral Daily Maudine Havers, DO      Followed by   Carleen Diallo ON 7/22/2021] predniSONE  10 mg Oral Daily Maudine Havers, DO      predniSONE  40 mg Oral Daily Maudine Havers, DO      senna  2 tablet Oral BID Olman Faria MD      sodium chloride  1 spray Each Nare Q1H PRN Tammi Jiang PA-C      sodium chloride  3 mL Nebulization TID Bret Bess MD      tiotropium  18 mcg Inhalation Daily Media DO Alberto      warfarin  3 mg Oral Daily (warfarin) Prasad Kelsey MD          Today, Patient Was Seen By: Franc Berman DO    ** Please Note: Dictation voice to text software may have been used in the creation of this document   **

## 2021-07-07 NOTE — ASSESSMENT & PLAN NOTE
· Patient's EKG shows new onset paroxysmal atrial fibrillation  Patient's AFib likely secondary to acute respiratory failure with hypoxia  · Patient is currently rate controlled with metoprolol  · Chads Vasc score: 3    · INR is 2 47  Recheck INR tomorrow

## 2021-07-07 NOTE — PROGRESS NOTES
Tap water enema given to patient, 1500ml  Patient tolerated fairly well  Minimal output  Continue to monitor

## 2021-07-07 NOTE — ASSESSMENT & PLAN NOTE
Lab Results   Component Value Date    EGFR 85 07/07/2021    EGFR 85 07/06/2021    EGFR 70 07/05/2021    CREATININE 0 90 07/07/2021    CREATININE 0 90 07/06/2021    CREATININE 1 06 07/05/2021     · Patient still at baseline creatinine of 1 14-1 2  · Lasix resumed  · ARISTIDES resolved

## 2021-07-07 NOTE — PLAN OF CARE
Problem: MOBILITY - ADULT  Goal: Maintain or return to baseline ADL function  Description: INTERVENTIONS:  -  Assess patient's ability to carry out ADLs; assess patient's baseline for ADL function and identify physical deficits which impact ability to perform ADLs (bathing, care of mouth/teeth, toileting, grooming, dressing, etc )  - Assess/evaluate cause of self-care deficits   - Assess range of motion  - Assess patient's mobility; develop plan if impaired  - Assess patient's need for assistive devices and provide as appropriate  - Encourage maximum independence but intervene and supervise when necessary  - Involve family in performance of ADLs  - Assess for home care needs following discharge   - Consider OT consult to assist with ADL evaluation and planning for discharge  - Provide patient education as appropriate  Outcome: Progressing  Goal: Maintains/Returns to pre admission functional level  Description: INTERVENTIONS:  - Perform BMAT or MOVE assessment daily    - Set and communicate daily mobility goal to care team and patient/family/caregiver  - Collaborate with rehabilitation services on mobility goals if consulted  - Perform Range of Motion 2 times a day  - Reposition patient every 2 hours    - Dangle patient 2 times a day  - Stand patient 2 times a day  - Ambulate patient 2 times a day  - Out of bed to chair 2 times a day   - Out of bed for meals 2 times a day  - Out of bed for toileting  - Record patient progress and toleration of activity level   Outcome: Progressing     Problem: Prexisting or High Potential for Compromised Skin Integrity  Goal: Skin integrity is maintained or improved  Description: INTERVENTIONS:  - Identify patients at risk for skin breakdown  - Assess and monitor skin integrity  - Assess and monitor nutrition and hydration status  - Monitor labs   - Assess for incontinence   - Turn and reposition patient  - Assist with mobility/ambulation  - Relieve pressure over bony prominences  - Avoid friction and shearing  - Provide appropriate hygiene as needed including keeping skin clean and dry  - Evaluate need for skin moisturizer/barrier cream  - Collaborate with interdisciplinary team   - Patient/family teaching  - Consider wound care consult   Outcome: Progressing     Problem: Potential for Falls  Goal: Patient will remain free of falls  Description: INTERVENTIONS:  - Educate patient/family on patient safety including physical limitations  - Instruct patient to call for assistance with activity   - Consult OT/PT to assist with strengthening/mobility   - Keep Call bell within reach  - Keep bed low and locked with side rails adjusted as appropriate  - Keep care items and personal belongings within reach  - Initiate and maintain comfort rounds  - Make Fall Risk Sign visible to staff  - Offer Toileting every 2 Hours, in advance of need  - Initiate/Maintain bed alarm  - Apply yellow socks and bracelet for high fall risk patients  - Consider moving patient to room near nurses station  Outcome: Progressing     Problem: RESPIRATORY - ADULT  Goal: Achieves optimal ventilation and oxygenation  Description: INTERVENTIONS:  - Assess for changes in respiratory status  - Assess for changes in mentation and behavior  - Position to facilitate oxygenation and minimize respiratory effort  - Oxygen administered by appropriate delivery if ordered  - Initiate smoking cessation education as indicated  - Encourage broncho-pulmonary hygiene including cough, deep breathe, Incentive Spirometry  - Assess the need for suctioning and aspirate as needed  - Assess and instruct to report SOB or any respiratory difficulty  - Respiratory Therapy support as indicated  Outcome: Progressing     Problem: INFECTION - ADULT  Goal: Absence or prevention of progression during hospitalization  Description: INTERVENTIONS:  - Assess and monitor for signs and symptoms of infection  - Monitor lab/diagnostic results  - Monitor all insertion sites, i e  indwelling lines, tubes, and drains  - Monitor endotracheal if appropriate and nasal secretions for changes in amount and color  - [place] appropriate cooling/warming therapies per order  - Administer medications as ordered  - Instruct and encourage patient and family to use good hand hygiene technique  - Identify and instruct in appropriate isolation precautions for identified infection/condition  Outcome: Progressing  Goal: Absence of fever/infection during neutropenic period  Description: INTERVENTIONS:  - Monitor WBC    Outcome: Progressing     Problem: Nutrition/Hydration-ADULT  Goal: Nutrient/Hydration intake appropriate for improving, restoring or maintaining nutritional needs  Description: Monitor and assess patient's nutrition/hydration status for malnutrition  Collaborate with interdisciplinary team and initiate plan and interventions as ordered  Monitor patient's weight and dietary intake as ordered or per policy  Utilize nutrition screening tool and intervene as necessary  Determine patient's food preferences and provide high-protein, high-caloric foods as appropriate       INTERVENTIONS:  - Monitor oral intake, urinary output, labs, and treatment plans  - Assess nutrition and hydration status and recommend course of action  - Evaluate amount of meals eaten  - Assist patient with eating if necessary   - Allow adequate time for meals  - Recommend/ encourage appropriate diets, oral nutritional supplements, and vitamin/mineral supplements  - Order, calculate, and assess calorie counts as needed  - Recommend, monitor, and adjust tube feedings and TPN/PPN based on assessed needs  - Assess need for intravenous fluids  - Provide specific nutrition/hydration education as appropriate  - Include patient/family/caregiver in decisions related to nutrition  Outcome: Progressing

## 2021-07-07 NOTE — PLAN OF CARE
Problem: PHYSICAL THERAPY ADULT  Goal: Performs mobility at highest level of function for planned discharge setting  See evaluation for individualized goals  Description: Treatment/Interventions: Functional transfer training, LE strengthening/ROM, Elevations, Therapeutic exercise, Endurance training, Patient/family training, Equipment eval/education, Bed mobility, Gait training, Compensatory technique education, Continued evaluation, Spoke to nursing, OT     See flowsheet documentation for full assessment, interventions and recommendations  Outcome: Progressing  Note: Prognosis: Guarded  Problem List: Decreased strength, Decreased endurance, Decreased range of motion, Impaired balance, Decreased mobility  Assessment: Patient agreeable and motivated to participate in therapy session  Supine to sit with supervision and increased time with complaints of "sore" bottom, nursing aware  Multiple sit<>stand transfers with min ax1 for steadying balance  Standing tolerance x20 seconds with B UE support on therapist for adjustment of chair cushion  SPT from EOB to recliner with min ax1 and arm and arm technique  Requires steadying balance assist and verbal instruction for sequencing and direcitonal movements  SpO2 remained consistent with 89-91% on 5L O2 via NC and non-rebreather mask  Pt positioned in recliner with B LE elevated  Continue to focus on OOB mobility with progression of transfers and initation of gait training as appropriate and O2 sats allow  Barriers to Discharge: Decreased caregiver support, Inaccessible home environment        PT Discharge Recommendation: Post acute rehabilitation services          See flowsheet documentation for full assessment

## 2021-07-07 NOTE — CASE MANAGEMENT
CM informed by [facility] that [facility] was denied  Insurance is offering an appeal      CM informed by  [facility] of the following appeal information:   Appeal Phone: [phone]  The reference number is [id number]  CM informed [employer] of same; will initiate appeal      CM met with the Patient and his SO, Marek Sun, at the bedside  CM informed of initial denial for [facility] and reviewed that an appeal would be initiated  Patient and his SO stated their understanding   CM dept will continue to follow and update the Patient as able on the status of the appeal

## 2021-07-07 NOTE — ASSESSMENT & PLAN NOTE
· Enema given yesterday with small stool which was hard to pass  Colace 100 mg BID added  Bentyl 10mg added  · Continue senokot     · KUP abd x-ray ordered

## 2021-07-07 NOTE — ASSESSMENT & PLAN NOTE
· Episodes of hypotension when pt is sleeping, likely secondary to hypoxia  ·  amlodipine on hold  · Resumed metoprolol at a lower dose 12 5 mg q12  · Blood pressures remained soft   · continue current regimen

## 2021-07-07 NOTE — PHYSICAL THERAPY NOTE
PHYSICAL THERAPY NOTE    Patient Name: Ajith Kennedy  [id number] Date: 21 1520   PT Last Visit   PT Visit Date 21   Note Type   Note Type Treatment   Pain Assessment   Pain Assessment Tool Pain Assessment not indicated - pt denies pain   Pain Score No Pain   Restrictions/Precautions   Weight Bearing Precautions Per Order No   Other Precautions Chair Alarm;Multiple lines;Telemetry; Fall Risk;O2  (5L O2 via NC and nonrebreather mask with exertion)   General   Family/Caregiver Present Yes  (spouse)   Subjective   Subjective Patient supine in bed and is agreeable to participate in therapy session  Pt identifers obtained from name &   Bed Mobility   Supine to Sit 5  Supervision   Additional items Assist x 1;HOB elevated; Bedrails; Increased time required;Verbal cues   Sit to Supine Unable to assess   Additional Comments Pt seated OOB in recliner post session with call bell and belongings in reach  Transfers   Sit to Stand 4  Minimal assistance   Additional items Assist x 1; Armrests; Increased time required;Verbal cues   Stand to Sit 4  Minimal assistance   Additional items Assist x 1; Armrests; Increased time required;Verbal cues   Stand pivot 4  Minimal assistance  (arm and arm technique)   Additional items Assist x 1; Armrests; Increased time required;Verbal cues   Additional Comments Standing tolerance x20 seconds with B UE support on therapist for adjustment of chair cushion  Balance   Static Sitting Fair +   Dynamic Sitting Fair   Static Standing Fair -   Dynamic Standing Fair -   Ambulatory Fair -   Endurance Deficit   Endurance Deficit Yes   Endurance Deficit Description limited activity tolerance    Activity Tolerance   Activity Tolerance Patient limited by fatigue; Other (Comment)  (decreasing SpO2)   Medical Staff Made Aware Spoke to REJI Massey RN & Chetan Urena CM   Assessment   Prognosis Guarded   Problem List Decreased strength;Decreased endurance;Decreased range of motion; Impaired balance;Decreased mobility   Assessment Patient agreeable and motivated to participate in therapy session  Supine to sit with supervision and increased time with complaints of "sore" bottom, nursing aware  Multiple sit<>stand transfers with min ax1 for steadying balance  Standing tolerance x20 seconds with B UE support on therapist for adjustment of chair cushion  SPT from EOB to recliner with min ax1 and arm and arm technique  Requires steadying balance assist and verbal instruction for sequencing and direcitonal movements  SpO2 remained consistent with 89-91% on 5L O2 via NC and non-rebreather mask  Pt positioned in recliner with B LE elevated  Continue to focus on OOB mobility with progression of transfers and initation of gait training as appropriate and O2 sats allow  Barriers to Discharge Decreased caregiver support; Inaccessible home environment   Goals   Patient Goals to get better   STG Expiration Date 07/15/21   PT Treatment Day 6   Plan   Treatment/Interventions Functional transfer training;LE strengthening/ROM; Elevations; Therapeutic exercise; Endurance training;Patient/family training;Equipment eval/education; Bed mobility;Gait training; Compensatory technique education;Continued evaluation;Spoke to nursing   Progress Slow progress, decreased activity tolerance   PT Frequency Other (Comment)  (3-5x/week)   Recommendation   PT Discharge Recommendation Post acute rehabilitation services   AM-PAC Basic Mobility Inpatient   Turning in Bed Without Bedrails 3   Lying on Back to Sitting on Edge of Flat Bed 3   Moving Bed to Chair 3   Standing Up From Chair 3   Walk in Room 2   Climb 3-5 Stairs 1   Basic Mobility Inpatient Raw Score 15   Basic Mobility Standardized Score 36 97       The patient's AM-PAC Basic Mobility Inpatient Short Form Raw Score is 15, Standardized Score is 36 97  A standardized score less than 42 9 suggests the patient may benefit from discharge to post-acute rehabilitation services  Please also refer to the recommendation of the Physical Therapist for safe discharge planning      Homa Young PTA

## 2021-07-07 NOTE — PROGRESS NOTES
[facility]  Progress Note Cameron Cleveland 1950, 65 y o  male MRN: [id number]  Unit/Bed#: S -01 Encounter: [id number]  Primary Care Provider: Asuncion Gordon DO   Date and time admitted to hospital: 6/15/2021  7:06 AM    * Acute respiratory failure with hypoxia ([address] )  Assessment & Plan  POA  Patient is not on any home oxygen  Patient on nasal cannula at 4L stating at 88%  Differential post COVID syndrome, post radiation/chemotherapy pulmonary fibrosis/pneumonitis  Plan:  · Continue nebulizations as needed  · continue daily dose of Lasix, decreased parameters to   · Continue Spiriva  · Incentive spirometry  · Titrate oxygen to maintain SpO2 above 88%  · Patient on prednisone taper, decrease by 10 mg every 7 days  Currently on prednisone 40 mg for day 7/7  Paroxysmal A-fib (HCC)  Assessment & Plan  · Patient's EKG shows new onset paroxysmal atrial fibrillation  Patient's AFib likely secondary to acute respiratory failure with hypoxia  · Patient is currently rate controlled with metoprolol  · Chads Vasc score: 3    · INR is 2 47  Recheck INR tomorrow  Constipation  Assessment & Plan  · Enema given yesterday with small stool which was hard to pass  Colace 100 mg BID added  Bentyl 10mg added  · Continue senokot  · KUP abd x-ray ordered     Type 2 diabetes mellitus, without long-term current use of insulin ([facility] )  Assessment & Plan  2/2 to steroids  · Carb controlled diet   · Hypoglycemia protocol  · Patient is currently on 12 units Lantus  · Monitor glucose levels as they will start decreasing as steroid doses are titrated down  · Sliding scale with accu checks 4 times a day      Moderate protein-calorie malnutrition ([address] )  Assessment & Plan  Malnutrition Findings:   Adult Malnutrition type: Acute illness (in the setting of chronic illness)  Adult Degree of Malnutrition: Malnutrition of moderate degree (related to catabolic illness, respiratory distress)    BMI Findings: Body mass index is 26 31 kg/m²  Encourage oral intake, nutrition supp    Pneumonia  Assessment & Plan  · Patient had COVID 19 about a month ago and he is also immunocompromised from his esophageal cancer  · Treatment with antibiotics completed as of 06/21/2021    Acute renal failure superimposed on stage 3 chronic kidney disease [facility])  Assessment & Plan  Lab Results   Component Value Date    EGFR 85 07/07/2021    EGFR 85 07/06/2021    EGFR 70 07/05/2021    CREATININE 0 90 07/07/2021    CREATININE 0 90 07/06/2021    CREATININE 1 06 07/05/2021     · Patient still at baseline creatinine of 1 14-1 2  · Lasix resumed  · ARISTIDES resolved    PAD (peripheral artery disease)  Assessment & Plan  · Continue aspirin and statin    Esophageal cancer   Assessment & Plan  · Outpatient follow-up with Oncology  · Patient was due for Herceptin infusion on 06/29/2021  · Patient's oncologist is aware, recommends holding treatment until patient has improved       Carotid stenosis  Assessment & Plan  · Continue aspirin and statin    Essential hypertension  Assessment & Plan  · Episodes of hypotension when pt is sleeping, likely secondary to hypoxia  ·  amlodipine on hold  · Resumed metoprolol at a lower dose 12 5 mg q12  · Blood pressures remained soft   · continue current regimen  GERD (gastroesophageal reflux disease)  Assessment & Plan  · Continue Protonix    Dysphagia-resolved as of 7/2/2021  Assessment & Plan  Continue regular diet  no risk of aspiration seen on VBS        VTE Pharmacologic Prophylaxis: VTE Score: 3 Moderate Risk (Score 3-4) - Pharmacological DVT Prophylaxis Ordered: warfarin (Coumadin)  Patient Centered Rounds: I performed bedside rounds with nursing staff today  Discussions with Specialists or Other Care Team Provider: none    Education and Discussions with Family / Patient: Patient declined call to        Current Length of Stay: 22 day(s)  Current Patient Status: Inpatient   Discharge Plan: P2P communication for LTAC denied  Currently working on appeal    Code Status: Level 1 - Full Code    Subjective:   Pt still complains of constipation  Will continue titrating NC O2      Objective:     Vitals:   Temp (24hrs), Av °F (36 7 °C), Min:97 5 °F (36 4 °C), Max:98 4 °F (36 9 °C)    Temp:  [97 5 °F (36 4 °C)-98 4 °F (36 9 °C)] 97 9 °F (36 6 °C)  HR:  [] 80  Resp:  [17-18] 18  BP: ()/(57-91) 108/65  SpO2:  [85 %-94 %] 94 %  Body mass index is 26 31 kg/m²  Input and Output Summary (last 24 hours): Intake/Output Summary (Last 24 hours) at 2021 1215  Last data filed at 2021 1332  Gross per 24 hour   Intake 300 ml   Output 400 ml   Net -100 ml       Physical Exam:   Physical Exam  Vitals and nursing note reviewed  Constitutional:       General: He is not in acute distress  Appearance: Normal appearance  He is well-developed  He is not ill-appearing, toxic-appearing or diaphoretic  HENT:      Head: Normocephalic and atraumatic  Eyes:      Conjunctiva/sclera: Conjunctivae normal    Cardiovascular:      Rate and Rhythm: Normal rate and regular rhythm  Pulses: Normal pulses  Heart sounds: Normal heart sounds  No murmur heard  No friction rub  Pulmonary:      Effort: Pulmonary effort is normal  No respiratory distress  Breath sounds: Normal breath sounds  Abdominal:      Palpations: Abdomen is soft  Tenderness: There is no abdominal tenderness  Comments: Decreased bowel sounds   Musculoskeletal:      Cervical back: Neck supple  Skin:     General: Skin is warm and dry  Coloration: Skin is not jaundiced or pale  Findings: No bruising  Neurological:      Mental Status: He is alert  Mental status is at baseline     Psychiatric:         Mood and Affect: Mood normal          Behavior: Behavior normal           Additional Data:     Labs:  Results from last 7 days   Lab Units 21  0438   WBC Thousand/uL 8 98 HEMOGLOBIN g/dL 13 7   HEMATOCRIT % 41 1   PLATELETS Thousands/uL 237     Results from last 7 days   Lab Units 07/07/21  0633   SODIUM mmol/L 136   POTASSIUM mmol/L 3 7   CHLORIDE mmol/L 102   CO2 mmol/L 27   BUN mg/dL 19   CREATININE mg/dL 0 90   ANION GAP mmol/L 7   CALCIUM mg/dL 8 2*   GLUCOSE RANDOM mg/dL 101     Results from last 7 days   Lab Units 07/07/21  0633   INR  2 47*     Results from last 7 days   Lab Units 07/07/21  1121 07/07/21  0726 07/06/21  2106 07/06/21  1548 07/06/21  1108 07/06/21  0814 07/05/21  1603 07/05/21  1059 07/05/21  0713 07/04/21  1627 07/04/21  1101 07/04/21  0751   POC GLUCOSE mg/dl 264* 95 170* 164* 201* 106 203* 238* 101 227* 308* 130               Lines/Drains:  Invasive Devices     Central Venous Catheter Line            Port A Cath 08/28/17 Right Chest 1409 days          Peripheral Intravenous Line            Long-Dwell Peripheral IV (Midline) 25/32/55 Left Cephalic 15 days                Central Line:  Goal for removal: N/A - Chronic PICC             Imaging: Reviewed radiology reports from this admission including: chest xray and chest CT scan    Recent Cultures (last 7 days):         Last 24 Hours Medication List:   Current Facility-Administered Medications   Medication Dose Route Frequency Provider Last Rate    acetaminophen  650 mg Oral Q6H PRN Armando Cortes MD      aspirin  81 mg Oral Daily Armando Cortes MD      barium sulfate  1 tablet Oral Once in imaging Francisco J Rosenberg, DO      benzonatate  100 mg Oral TID PRN Otto Davenport PA-C      dicyclomine  10 mg Oral 4x Daily (AC & HS) Shiva Weller, DO      docusate sodium  100 mg Oral BID Destiny Sober, DO      furosemide  20 mg Oral Daily Tameka Lucio MD      guaiFENesin  1,200 mg Oral Q12H [facility] Blaze Velázquez MD      insulin glargine  12 Units Subcutaneous QAM Isiah Javier MD      insulin lispro  1-6 Units Subcutaneous TID AC Tameka Lucio MD      levalbuterol  1 25 mg Nebulization TID Deepti Neal, MD      lidocaine   Topical Daily PRN Prasanna Goldstein MD      LORazepam  0 5 mg Oral BID PRN Earline Conley MD      melatonin  9 mg Oral HS Earline Conley MD      metoprolol  5 mg Intravenous Q6H PRN Earline Conley MD      metoprolol tartrate  12 5 mg Oral Q12H [facility] Macario Polo DO      nystatin   Topical TID Sang Venegas MD      ondansetron  8 mg Intravenous Q6H PRN Evangelist Gonzalez MD      pantoprazole  40 mg Oral Early Morning Evangelist Gonzalez MD      polyethylene glycol  17 g Oral BID Earline Conley MD      [START ON 7/8/2021] predniSONE  30 mg Oral Daily Chaz Angel DO      Followed by   Gena Brody ON 7/15/2021] predniSONE  20 mg Oral Daily Macario Polo DO      Followed by   Gena Brody ON 7/22/2021] predniSONE  10 mg Oral Daily Macario Polo DO      senna  2 tablet Oral BID Vikram Garay MD      sodium chloride  1 spray Each Nare Q1H PRN Tammi Jiang PA-C      sodium chloride  3 mL Nebulization TID Sang Venegas MD      tiotropium  18 mcg Inhalation Daily Luis Blake DO      warfarin  3 mg Oral Daily (warfarin) Jaz Cardenas MD          Today, Patient Was Seen By: Macario Polo DO    **Please Note: This note may have been constructed using a voice recognition system  **

## 2021-07-08 NOTE — CONSULTS
Consultation - [facility] Gastroenterology   Gabriel Chan 65 y o  male MRN: [id number]  Unit/Bed#: S -01 Encounter: [id number]        Inpatient consult to gastroenterology  Consult performed by: DESTIN Damian  Consult ordered by: LONNIE ARMANDO DO          Reason for Consult / Principal Problem:     Constipation      ASSESSMENT AND PLAN:      1  Constipation with fecal impaction in the setting of prolonged hospitalization and history of positive Cologuard test  Constipation likely secondary to inactivity as patient's oxygen saturations drop with ambulation or pain, but must also rule out malignancy with history of positive Cologuard test  KUB reviewed with radiology and there was a large amount of stool in the rectum and mild fecal burden  Pt has had 1/2 bowel prep, mag citrate, multiple enemas with little results  He was disimpacted by nursing last night and again this morning by myself removing large amount of softly formed stool, however disimpaction limited by pain and desaturation     -Downgrade diet to clear liquids for tonight  -Soap suds enema q shift x2 days  -Continue oral bowel regimen colace BID, senokot BID, Miralax TID  Will increase MiraLax to 34 g t i d   -Not appropriate for colonoscopy at this time due to respiratory status, but recommend colonoscopy as an outpatient when respiratory status is improved and patient able to hold coumadin prior to procedure for evaluation of positive cologuard      Thank you for the consolation, case will be discussed with Dr Aracely Shaw        ______________________________________________________________________    HPI:  This is a 58-year-old male with PMH of metastatic esophageal cancer diagnosed in 2017 status post radiation and on monthly Herceptin infusions, dysphagia status post esophageal stent placement and removal, peripheral arterial disease, GERD, chronic kidney disease, and hypertension who presented to [address] [place] [address] Rd 6/15/2021 due to shortness of breath with minimal exertion and productive cough and admitted for treatment of sepsis and acute hypoxic respiratory failure due to post COVID syndrome, post radiation/chemotherapy pulmonary fibrosis/pneumonitis  He was seen by both ID, pulm and pt now on 4L NC  He still desats with ambulation and pain per nursing staff and has been unable to walk to the bathroom without oxygen saturations dropping  He was also diagnosed with new onset A-fib and started anticoagulation with Coumadin  GI consulted due to constipation and possible need for inpatient colonoscopy  Patient reports he's been constipated since admission, no history of constipation prior to hospitalization  He's had 1/2 bowel prep, magnesium citrate, multiple enemas, and bowel regimen with colace, miralax, senna with minimal results  Nursing reports he was disimpacted last night and they were able to remove some stool KUB reviewed with radiology and there appears to be mild stool burden and large amount of stool in the rectum  Patient is passing gas, denies any nausea/vomiting  He has abdominal discomfort but states he doesn't know if Bentyl is helping  He had positive Cologuard test on October 6 and had never had colonoscopy so he was scheduled for colonoscopy on February 21 but this was canceled he states due to [facility]  Denies family history of colon cancer, melena, hematochezia  He had significant weight loss over the last few years due to his esophageal cancer/treatment, denied any changes in bowel habit prior to this consipation  He reports history of esophageal stenting prior to esophageal radiation therapy, then stent migrated to his stomach and he needed endoscopic removal in 2019  He had another stent placed, but this was removed after radiation therapy was completed at the end of April  Pt denies any dysphagia or odynophagia   He was evaluated by speech with VBS and had mild oral dysphagia but no pharyngeal retention, laryngeal penetration, or aspiration observed  He was recommended for regular diet, think liquids  REVIEW OF SYSTEMS:    CONSTITUTIONAL: Denies any fever, chills, rigors, and weight loss  HEENT: No earache or tinnitus  Denies hearing loss or visual disturbances  CARDIOVASCULAR: No chest pain or palpitations  RESPIRATORY: Denies any cough, hemoptysis, shortness of breath or dyspnea on exertion  GASTROINTESTINAL: As noted in the History of Present Illness  GENITOURINARY: No problems with urination  Denies any hematuria or dysuria  NEUROLOGIC: No dizziness or vertigo, denies headaches  MUSCULOSKELETAL: Denies any muscle or joint pain  SKIN: Denies skin rashes or itching  ENDOCRINE: Denies excessive thirst  Denies intolerance to heat or cold  PSYCHOSOCIAL: Denies depression or anxiety  Denies any recent memory loss  Historical Information   Past Medical History:   Diagnosis Date    Anxiety     Cancer [facility])     Esophageal    Dysphagia     Esophageal abnormality     Esophageal cancer (HCC)     GERD (gastroesophageal reflux disease)     Hyperlipidemia     Hypertension     Occasional tremors     Transaminitis 6/16/2021     Past Surgical History:   Procedure Laterality Date    ESOPHAGOGASTRODUODENOSCOPY N/A 8/9/2017    Procedure: ESOPHAGOGASTRODUODENOSCOPY (EGD); Surgeon: Mack Castaneda MD;  [facility]; Service: Gastroenterology    ESOPHAGOGASTRODUODENOSCOPY N/A 8/11/2017    Procedure: ESOPHAGOGASTRODUODENOSCOPY (EGD) w/ stent;  Surgeon: Mack Castaneda MD;  [facility];   Service: Gastroenterology    ESOPHAGOGASTRODUODENOSCOPY N/A 1/26/2021    Procedure: ESOPHAGOGASTRODUODENOSCOPY (EGD), BIOPSY, ESOPHAGEAL DILATION,  STENT PLACEMENT;  Surgeon: Faith Capps MD;  [facility];  Service: Thoracic    LAPAROTOMY N/A 2/11/2020    Procedure: EGD; REMOVAL OF ESOPHAGEAL STENTS X 3;  Surgeon: Faith Capps MD;  [facility];  Service: Thoracic    PORTACATH PLACEMENT      PORTACATH PLACEMENT      SC ESOPHAGOGASTRODUODENOSCOPY TRANSORAL DIAGNOSTIC N/A 2021    Procedure: ESOPHAGOGASTRODUODENOSCOPY (EGD); stent removal;  Surgeon: Avtar Pratt MD;  [facility];  Service: Thoracic    UPPER GASTROINTESTINAL ENDOSCOPY      VASCULAR SURGERY      blockage in neck      Social History   Social History     Substance and Sexual Activity   Alcohol Use Not Currently     Social History     Substance and Sexual Activity   Drug Use Yes    Types: Marijuana    Comment: has medical card for this     Social History     Tobacco Use   Smoking Status Former Smoker    Packs/day: 1 00    Years: 39 00    Pack years: 39 00    Types: Cigarettes    Start date: 54    Quit date: 2008    Years since quittin 8   Smokeless Tobacco Never Used   Tobacco Comment    started around age 22      Family History   Problem Relation Age of Onset    Liver cancer Paternal Uncle        Meds/Allergies     Medications Prior to Admission   Medication    amLODIPine (NORVASC) 10 mg tablet    aspirin 81 mg chewable tablet    lidocaine-prilocaine (EMLA) cream    LORazepam (ATIVAN) 0 5 mg tablet    melatonin 3 mg    metoprolol tartrate (LOPRESSOR) 50 mg tablet    ondansetron (ZOFRAN-ODT) 4 mg disintegrating tablet    pantoprazole (PROTONIX) 40 mg tablet    simvastatin (ZOCOR) 40 mg tablet    Lidocaine Viscous HCl (XYLOCAINE) 2 % mucosal solution    Multiple Vitamin (MULTIVITAMIN) tablet    sucralfate (CARAFATE) 1 g/10 mL suspension     Current Facility-Administered Medications   Medication Dose Route Frequency    acetaminophen (TYLENOL) tablet 650 mg  650 mg Oral Q6H PRN    aspirin chewable tablet 81 mg  81 mg Oral Daily    barium sulfate tablet 1 tablet  1 tablet Oral Once in imaging    benzonatate (TESSALON PERLES) capsule 100 mg  100 mg Oral TID PRN    dicyclomine (BENTYL) capsule 10 mg  10 mg Oral 4x Daily (AC & HS)    docusate sodium (COLACE) capsule 100 mg  100 mg Oral BID    furosemide (LASIX) tablet 20 mg  20 mg Oral Daily    guaiFENesin (MUCINEX) 12 hr tablet 1,200 mg  1,200 mg Oral Q12H SENG    insulin glargine (LANTUS) subcutaneous injection 12 Units 0 12 mL  12 Units Subcutaneous QAM    insulin lispro (HumaLOG) 100 units/mL subcutaneous injection 1-6 Units  1-6 Units Subcutaneous TID AC    levalbuterol (XOPENEX) inhalation solution 1 25 mg  1 25 mg Nebulization TID    lidocaine (LMX) 4 % cream   Topical Daily PRN    LORazepam (ATIVAN) tablet 0 5 mg  0 5 mg Oral BID PRN    melatonin tablet 9 mg  9 mg Oral HS    metoprolol (LOPRESSOR) injection 5 mg  5 mg Intravenous Q6H PRN    metoprolol tartrate (LOPRESSOR) partial tablet 12 5 mg  12 5 mg Oral Q12H SENG    nystatin (MYCOSTATIN) powder   Topical TID    ondansetron (ZOFRAN) 8 mg in sodium chloride 0 9 % 50 mL IVPB  8 mg Intravenous Q6H PRN    pantoprazole (PROTONIX) EC tablet 40 mg  40 mg Oral Early Morning    polyethylene glycol (MIRALAX) packet 17 g  17 g Oral TID    predniSONE tablet 30 mg  30 mg Oral Daily    Followed by   Tierra Penaloza ON 7/15/2021] predniSONE tablet 20 mg  20 mg Oral Daily    Followed by   Tierra Penaloza ON 7/22/2021] predniSONE tablet 10 mg  10 mg Oral Daily    senna (SENOKOT) tablet 17 2 mg  2 tablet Oral BID    sodium chloride (OCEAN) 0 65 % nasal spray 1 spray  1 spray Each Nare Q1H PRN    sodium chloride 0 9 % inhalation solution 3 mL  3 mL Nebulization TID    tiotropium (SPIRIVA) capsule for inhaler 18 mcg  18 mcg Inhalation Daily    warfarin (COUMADIN) tablet 3 mg  3 mg Oral Daily (warfarin)       Allergies   Allergen Reactions    Other      Cat Dander           Objective     Blood pressure 105/71, pulse 83, temperature 97 8 °F (36 6 °C), resp  rate 16, height 5' 7" (1 702 m), weight 76 2 kg (167 lb 15 9 oz), SpO2 94 %  Body mass index is 26 31 kg/m²        Intake/Output Summary (Last 24 hours) at 7/8/2021 1115  Last data filed at 7/8/2021 0301  Gross per 24 hour Intake 150 ml   Output 100 ml   Net 50 ml         PHYSICAL EXAM:      General Appearance:   Alert, cooperative, no distress   HEENT:   Normocephalic, atraumatic, anicteric  Neck:  Supple, symmetrical, trachea midline   Lungs:   Clear to auscultation bilaterally; no rales, rhonchi or wheezing; respirations unlabored; 4LNC, desats to 85% with pain     Heart[de-identified]   Regular rate and rhythm; no murmur, rub, or gallop  Abdomen:   Soft, non-tender, distended; normal bowel sounds; no masses, no organomegaly    Genitalia:   Deferred    Rectal:   Large amount of softly formed brown stool removed from the rectum   Extremities:  No cyanosis, clubbing or edema    Pulses:  2+ and symmetric all extremities    Skin:  No jaundice, rashes, or lesions    Lymph nodes:  No palpable cervical lymphadenopathy        Lab Results:   No results displayed because visit has over 200 results  Imaging Studies: I have personally reviewed pertinent imaging studies

## 2021-07-08 NOTE — ASSESSMENT & PLAN NOTE
· Patient's EKG shows new onset paroxysmal atrial fibrillation  Patient's AFib likely secondary to acute respiratory failure with hypoxia  · Patient is currently rate controlled with metoprolol  · Chads Vasc score: 3    · INR is 2 34  Recheck INR tomorrow

## 2021-07-08 NOTE — CASE MANAGEMENT
CM informed by Janel Chavez from [facility] that facility is able to accept the Patient with his current respiratory requirements  CM informed Silvana that Patient is pending a GI consult pending  CM dept to update Silvana on medical stability  Patient will need an [facility]  CM met with the Patient at the bedside to update on acceptance to [facility]  CM reviewed that [facility] will continue with the appeal for LTAC to ensure that Patient would have a safe discharge plan, in the event that he is unable to go to [facility]  Patient verbalized his understanding of same and requested that CM come back and inform his SO when she arrives  CM met with the Patient and his SO, Darius Gómez, at the bedside to update Darius Gómez on the above conversation  Patient and his SO, both state that [facility] would be their preference  CM dept will continue to follow for medical stability and assist with a safe dc plan

## 2021-07-08 NOTE — PLAN OF CARE
Problem: OCCUPATIONAL THERAPY ADULT  Goal: Performs self-care activities at highest level of function for planned discharge setting  See evaluation for individualized goals  Description: Treatment Interventions: ADL retraining, Functional transfer training, UE strengthening/ROM, Endurance training, Cognitive reorientation, Patient/family training, Equipment evaluation/education, Compensatory technique education, Energy conservation, Activityengagement          See flowsheet documentation for full assessment, interventions and recommendations  Note: Limitation: Decreased ADL status, Decreased UE strength, Decreased Safe judgement during ADL, Decreased endurance, Decreased self-care trans, Decreased high-level ADLs  Prognosis: Good  Assessment: Pt agreeable to participate in skilled OT treatment session to address ADL retraining, functional transfer training, endurance training, energy conservation, activity engagement and activity tolerance  Pt supine in bed upon arrival  Pt motivated for OOB to commode  Pt completing supine to sit  Pt requiring increased time for energy conservation while sitting EOB  Pt with good pacing demonstrated throughout  Requiring min A x1 for sit >< stand and handheld assist for transfer to commode  Pt requiring max A x1 for toileting task including clothing management due to fatigue and SOB  Pt reported exhaustion and requesting to return back to bed at end of session  Patient continues to be performing below their functional baseline with an increased risk for falls and injury and decreased occupational performance, and would benefit from continued skilled OT treatment to address deficits  The patient's raw score on the AM-PAC Daily Activity inpatient short form is 18, standardized score is 38 66, less than 39 4  Patients at this level are likely to benefit from discharge to post-acute rehabilitation services  Continue to recommend PAR on d/c  Will continue to follow        OT Discharge Recommendation: Post acute rehabilitation services

## 2021-07-08 NOTE — OCCUPATIONAL THERAPY NOTE
OccupationalTherapy Progress Note     Patient Name: Gerson Goel  [id number] Date: 7/8/2021  Problem List  Principal Problem:    Acute respiratory failure with hypoxia ([facility] [address] )  Active Problems:    GERD (gastroesophageal reflux disease)    Essential hypertension    Carotid stenosis    Esophageal cancer     PAD (peripheral artery disease)    Acute renal failure superimposed on stage 3 chronic kidney disease (HCC)    Pneumonia    Moderate protein-calorie malnutrition (HCC)    Type 2 diabetes mellitus, without long-term current use of insulin (HCC)    Paroxysmal A-fib ([facility] [address] )    Constipation    Hyponatremia            07/08/21 1342   OT Last Visit   OT Visit Date 07/08/21   Note Type   Note Type Treatment   Pain Assessment   Pain Assessment Tool 0-10   Pain Score 4   Pain Location/Orientation Location: Buttocks   ADL   Grooming Assistance 5  Supervision/Setup   Grooming Deficit Setup; Increased time to complete;Wash/dry hands   Toileting Assistance  2  Maximal Assistance   Toileting Deficit Increased time to complete;Supervison/safety;Verbal cueing; Bedside commode;Clothing management up;Clothing management down   Toileting Comments Pt sitting on commode for toileting tasks, pt unable to have bowel movement  Pt requiring (A) with clothing management, hygiene not attempted at this time   Bed Mobility   Supine to Sit 5  Supervision   Additional items Increased time required;Verbal cues   Sit to Supine 4  Minimal assistance   Additional items Assist x 1; Increased time required;Verbal cues;LE management   Additional Comments O2 sats sitting EOB s/p activity ranging 80-83%, upon return to supine sats returning 85-92%   Transfers   Sit to Stand 4  Minimal assistance   Additional items Assist x 1; Increased time required;Verbal cues   Stand to Sit 4  Minimal assistance   Additional items Assist x 1; Increased time required;Verbal cues   Stand pivot 4  Minimal assistance   Additional items Assist x 1; Increased time required;Verbal cues   Toilet transfer 4  Minimal assistance   Additional items Assist x 1; Increased time required;Verbal cues; Commode   Additional Comments hand held assist for all sit >< stand and transfers   Functional Mobility   Functional Mobility 4  Minimal assistance   Additional Comments Ax1, side stepping to commode   Additional items Hand hold assistance   Cognition   Overall Cognitive Status [facility]   Arousal/Participation Alert; Cooperative   Attention Within functional limits   Orientation Level Oriented X4   Memory Within functional limits   Following Commands Follows multistep commands with increased time or repetition   Activity Tolerance   Activity Tolerance Patient limited by pain;Treatment limited secondary to medical complications (Comment)  (O2 saturations)   Medical Staff Made Aware HEDY Naylor and HEDY Blackman   Assessment   Assessment Pt agreeable to participate in skilled OT treatment session to address ADL retraining, functional transfer training, endurance training, energy conservation, activity engagement and activity tolerance  Pt supine in bed upon arrival  Pt motivated for OOB to commode  Pt completing supine to sit  Pt requiring increased time for energy conservation while sitting EOB  Pt with good pacing demonstrated throughout  Requiring min A x1 for sit >< stand and handheld assist for transfer to commode  Pt requiring max A x1 for toileting task including clothing management due to fatigue and SOB  Pt reported exhaustion and requesting to return back to bed at end of session  Patient continues to be performing below their functional baseline with an increased risk for falls and injury and decreased occupational performance, and would benefit from continued skilled OT treatment to address deficits  The patient's raw score on the AM-PAC Daily Activity inpatient short form is 18, standardized score is 38 66, less than 39 4   Patients at this level are likely to benefit from discharge to post-acute rehabilitation services  Continue to recommend PAR on d/c  Will continue to follow      Plan   Goal Expiration Date 07/18/21  (Goal date extended at this time, goals remain appropriate)   OT Treatment Day 2   OT Frequency 2-3x/wk   Recommendation   OT Discharge Recommendation Post acute rehabilitation services   AM-PAC Daily Activity Inpatient   Lower Body Dressing 3   Bathing 2   Toileting 2   Upper Body Dressing 3   Grooming 4   Eating 4   Daily Activity Raw Score 18   Daily Activity Standardized Score (Calc for Raw Score >=11) 38 66   AM-PAC Applied Cognition Inpatient   Following a Speech/Presentation 4   Understanding Ordinary Conversation 4   Taking Medications 4   Remembering Where Things Are Placed or Put Away 4   Remembering List of 4-5 Errands 4   Taking Care of Complicated Tasks 4   Applied Cognition Raw Score 24   Applied Cognition Standardized Score 62 21       At the end of the session, all needs met and pt supine in bed, bed alarm activated, SCDs on BLE and turned on and call bell within reach    [facility], OTR/L

## 2021-07-08 NOTE — ASSESSMENT & PLAN NOTE
POA   Patient is not on any home oxygen  Patient on nasal cannula at 4L stating at 88%  Differential post COVID syndrome, post radiation/chemotherapy pulmonary fibrosis/pneumonitis  Plan:  · Continue nebulizations as needed  · continue daily dose of Lasix, decreased parameters to   · Continue Spiriva  · Incentive spirometry  · Titrate oxygen to maintain SpO2 above 88%  · Patient on prednisone taper, decrease by 10 mg every 7 days  Currently on prednisone 30 mg for day 1/7  normal appearance , no deformities , trachea midline

## 2021-07-08 NOTE — PROGRESS NOTES
[facility]  Progress Note Genevie Denver 1950, 65 y o  male MRN: [id number]  Unit/Bed#: S -01 Encounter: [id number]  Primary Care Provider: Audrey Heredia DO   Date and time admitted to hospital: 6/15/2021  7:06 AM    * Acute respiratory failure with hypoxia ([address] )  Assessment & Plan  POA  Patient is not on any home oxygen  Patient on nasal cannula at 4L stating at 88%  Differential post COVID syndrome, post radiation/chemotherapy pulmonary fibrosis/pneumonitis  Plan:  · Continue nebulizations as needed  · continue daily dose of Lasix, decreased parameters to   · Continue Spiriva  · Incentive spirometry  · Titrate oxygen to maintain SpO2 above 88%  · Patient on prednisone taper, decrease by 10 mg every 7 days  Currently on prednisone 30 mg for day 1/7  Paroxysmal A-fib (HCC)  Assessment & Plan  · Patient's EKG shows new onset paroxysmal atrial fibrillation  Patient's AFib likely secondary to acute respiratory failure with hypoxia  · Patient is currently rate controlled with metoprolol  · Chads Vasc score: 3    · INR is 2 34  Recheck INR tomorrow  Constipation  Assessment & Plan  · Enema given yesterday with small stool which was hard to pass  Colace 100 mg BID added  Bentyl 10mg added  · Continue senokot  · KUP abd x-ray shows nonobstructive bowel gas pattern    Plan:  -GI recommendation appreciated  -Downgrade diet to clear liquids  -Soap suds enema q shift x2 days  -Continue oral bowel regimen colace BID, senokot BID, Miralax TID  -Not appropriate for colonoscopy at this time due to respiratory status, but recommend colonoscopy as an outpatient when respiratory status is improved and patient able to hold coumadin prior to procedure for evaluation of positive cologuard      Type 2 diabetes mellitus, without long-term current use of insulin ([facility] )  Assessment & Plan  2/2 to steroids  · Carb controlled diet   · Hypoglycemia protocol  · Patient is currently on 12 units Lantus  · Monitor glucose levels as they will start decreasing as steroid doses are titrated down  · Sliding scale with accu checks 4 times a day  Moderate protein-calorie malnutrition ([address] )  Assessment & Plan  Malnutrition Findings:   Adult Malnutrition type: Acute illness (in the setting of chronic illness)  Adult Degree of Malnutrition: Malnutrition of moderate degree (related to catabolic illness, respiratory distress)    BMI Findings: Body mass index is 26 31 kg/m²  Encourage oral intake, nutrition supp    Pneumonia  Assessment & Plan  · Patient had COVID 19 about a month ago and he is also immunocompromised from his esophageal cancer  · Treatment with antibiotics completed as of 06/21/2021    Acute renal failure superimposed on stage 3 chronic kidney disease [facility])  Assessment & Plan  Lab Results   Component Value Date    EGFR 85 07/07/2021    EGFR 85 07/06/2021    EGFR 70 07/05/2021    CREATININE 0 90 07/07/2021    CREATININE 0 90 07/06/2021    CREATININE 1 06 07/05/2021     · Patient still at baseline creatinine of 1 14-1 2  · Lasix resumed  · ARISTIDES resolved    PAD (peripheral artery disease)  Assessment & Plan  · Continue aspirin and statin    Esophageal cancer   Assessment & Plan  · Outpatient follow-up with Oncology  · Patient was due for Herceptin infusion on 06/29/2021  · Patient's oncologist is aware, recommends holding treatment until patient has improved       Carotid stenosis  Assessment & Plan  · Continue aspirin and statin    Essential hypertension  Assessment & Plan  · Episodes of hypotension when pt is sleeping, likely secondary to hypoxia  ·  amlodipine on hold  · Resumed metoprolol at a lower dose 12 5 mg q12  · Blood pressures remained soft   · continue current regimen      GERD (gastroesophageal reflux disease)  Assessment & Plan  · Continue Protonix    Transaminitis-resolved as of 6/18/2021  Assessment & Plan  · Patient's transaminitis likely secondary to shock level in the setting of sepsis  · Patient's transaminitis improved with resolution of ARISTIDES and sepsis      Dysphagia-resolved as of 2021  Assessment & Plan  Continue regular diet  no risk of aspiration seen on VBS      VTE Pharmacologic Prophylaxis: VTE Score: 3 High Risk (Score >/= 5) - Pharmacological DVT Prophylaxis Ordered: warfarin (Coumadin)  Sequential Compression Devices Ordered  Patient Centered Rounds: reviewed plan with nurse  Discussions with Specialists or Other Care Team Provider:  GI    Education and Discussions with Family / Patient: Attempted to update  (friend) via phone  Left voicemail  Current Length of Stay: 23 day(s)  Current Patient Status: Inpatient   Discharge Plan: Anticipate discharge in 24-48 hrs to Rehab or LTAC pending insurance approval    Code Status: Level 1 - Full Code    Subjective:   Patient still complaining of constipation  Small requiring oxygen therapy and tapered from 5 L to 4 L  Objective:     Vitals:   Temp (24hrs), Av °F (36 7 °C), Min:97 2 °F (36 2 °C), Max:99 °F (37 2 °C)    Temp:  [97 2 °F (36 2 °C)-99 °F (37 2 °C)] 97 8 °F (36 6 °C)  HR:  [83-89] 83  Resp:  [16-18] 16  BP: (100-114)/(60-71) 105/71  SpO2:  [88 %-95 %] 95 %  Body mass index is 26 31 kg/m²  Input and Output Summary (last 24 hours): Intake/Output Summary (Last 24 hours) at 2021 1346  Last data filed at 2021 0301  Gross per 24 hour   Intake 150 ml   Output 100 ml   Net 50 ml       Physical Exam:   Physical Exam  Vitals and nursing note reviewed  Constitutional:       General: He is not in acute distress  Appearance: Normal appearance  He is well-developed  He is not ill-appearing, toxic-appearing or diaphoretic  HENT:      Head: Normocephalic and atraumatic  Eyes:      Conjunctiva/sclera: Conjunctivae normal    Cardiovascular:      Rate and Rhythm: Normal rate and regular rhythm  Pulses: Normal pulses  Heart sounds: Normal heart sounds   No murmur heard  No friction rub  Pulmonary:      Effort: Pulmonary effort is normal  No respiratory distress  Breath sounds: Normal breath sounds  Abdominal:      General: Bowel sounds are normal       Palpations: Abdomen is soft  Tenderness: There is no abdominal tenderness  Comments: Decreased bowel sounds   Musculoskeletal:      Cervical back: Neck supple  Skin:     General: Skin is warm and dry  Coloration: Skin is not jaundiced or pale  Findings: No bruising  Neurological:      Mental Status: He is alert  Mental status is at baseline     Psychiatric:         Mood and Affect: Mood normal          Behavior: Behavior normal           Additional Data:     Labs:      Results from last 7 days   Lab Units 07/07/21  0633   SODIUM mmol/L 136   POTASSIUM mmol/L 3 7   CHLORIDE mmol/L 102   CO2 mmol/L 27   BUN mg/dL 19   CREATININE mg/dL 0 90   ANION GAP mmol/L 7   CALCIUM mg/dL 8 2*   GLUCOSE RANDOM mg/dL 101     Results from last 7 days   Lab Units 07/08/21  0709   INR  2 34*     Results from last 7 days   Lab Units 07/08/21  1053 07/08/21  0735 07/07/21  1957 07/07/21  1603 07/07/21  1121 07/07/21  0726 07/06/21  2106 07/06/21  1548 07/06/21  1108 07/06/21  0814 07/05/21  1603 07/05/21  1059   POC GLUCOSE mg/dl 209* 110 217* 200* 264* 95 170* 164* 201* 106 203* 238*               Lines/Drains:  Invasive Devices     Central Venous Catheter Line            Port A Cath 08/28/17 Right Chest 1410 days          Peripheral Intravenous Line            Long-Dwell Peripheral IV (Midline) 09/83/10 Left Cephalic 16 days                Central Line:  Goal for removal: N/A - Chronic PICC             Imaging: Reviewed radiology reports from this admission including: KUB x-ray    Recent Cultures (last 7 days):         Last 24 Hours Medication List:   Current Facility-Administered Medications   Medication Dose Route Frequency Provider Last Rate    acetaminophen  650 mg Oral Q6H PRN Arthur Rubio MD  aspirin  81 mg Oral Daily Keira Nunez MD      barium sulfate  1 tablet Oral Once in imaging Francisco J Gatosidmarj, DO      benzonatate  100 mg Oral TID PRN Carmen Vo PA-C      docusate sodium  100 mg Oral BID Ck Haas, DO      furosemide  20 mg Oral Daily Farnaz Kamara MD      guaiFENesin  1,200 mg Oral Q12H [facility] Blaze Velázquez MD      insulin glargine  12 Units Subcutaneous QAM Scott Arrington MD      insulin lispro  1-6 Units Subcutaneous TID AC Farnaz Kamara MD      levalbuterol  1 25 mg Nebulization TID Yaritza Garcia MD      lidocaine   Topical Daily PRN Ray Mahajan MD      LORazepam  0 5 mg Oral BID PRN Scott Arrington MD      melatonin  9 mg Oral HS Scott Arrington MD      metoprolol  5 mg Intravenous Q6H PRN Scott Arrington MD      metoprolol tartrate  12 5 mg Oral Q12H [facility] Ximena Felton DO      nystatin   Topical TID Yaritza Garcia MD      ondansetron  8 mg Intravenous Q6H PRN Keira Nunez MD      pantoprazole  40 mg Oral Early Morning Keira Nunez MD      polyethylene glycol  17 g Oral TID Farnaz Kamara MD      predniSONE  30 mg Oral Daily Ximena Felton DO      Followed by   Angela Duncan ON 7/15/2021] predniSONE  20 mg Oral Daily Ximena Felton DO      Followed by   Angela Duncan ON 7/22/2021] predniSONE  10 mg Oral Daily Ximena Felton DO      senna  2 tablet Oral BID Rick Rojas MD      sodium chloride  1 spray Each Nare Q1H PRN Tammi Jiang PA-C      sodium chloride  3 mL Nebulization TID Yaritza Garcia MD      tiotropium  18 mcg Inhalation Daily Carlos Montiel DO      warfarin  3 mg Oral Daily (warfarin) Farnaz Kamara MD          Today, Patient Was Seen By: Ximena Felton DO    **Please Note: This note may have been constructed using a voice recognition system  **

## 2021-07-08 NOTE — PLAN OF CARE
Problem: MOBILITY - ADULT  Goal: Maintain or return to baseline ADL function  Description: INTERVENTIONS:  -  Assess patient's ability to carry out ADLs; assess patient's baseline for ADL function and identify physical deficits which impact ability to perform ADLs (bathing, care of mouth/teeth, toileting, grooming, dressing, etc )  - Assess/evaluate cause of self-care deficits   - Assess range of motion  - Assess patient's mobility; develop plan if impaired  - Assess patient's need for assistive devices and provide as appropriate  - Encourage maximum independence but intervene and supervise when necessary  - Involve family in performance of ADLs  - Assess for home care needs following discharge   - Consider OT consult to assist with ADL evaluation and planning for discharge  - Provide patient education as appropriate  Outcome: Progressing  Goal: Maintains/Returns to pre admission functional level  Description: INTERVENTIONS:  - Perform BMAT or MOVE assessment daily    - Set and communicate daily mobility goal to care team and patient/family/caregiver  - Collaborate with rehabilitation services on mobility goals if consulted  - Perform Range of Motion 2 times a day  - Reposition patient every 2 hours    - Dangle patient 2 times a day  - Stand patient 2 times a day  - Ambulate patient 2 times a day  - Out of bed to chair 2 times a day   - Out of bed for meals 2 times a day  - Out of bed for toileting  - Record patient progress and toleration of activity level   Outcome: Progressing     Problem: Prexisting or High Potential for Compromised Skin Integrity  Goal: Skin integrity is maintained or improved  Description: INTERVENTIONS:  - Identify patients at risk for skin breakdown  - Assess and monitor skin integrity  - Assess and monitor nutrition and hydration status  - Monitor labs   - Assess for incontinence   - Turn and reposition patient  - Assist with mobility/ambulation  - Relieve pressure over bony prominences  - Avoid friction and shearing  - Provide appropriate hygiene as needed including keeping skin clean and dry  - Evaluate need for skin moisturizer/barrier cream  - Collaborate with interdisciplinary team   - Patient/family teaching  - Consider wound care consult   Outcome: Progressing     Problem: Potential for Falls  Goal: Patient will remain free of falls  Description: INTERVENTIONS:  - Educate patient/family on patient safety including physical limitations  - Instruct patient to call for assistance with activity   - Consult OT/PT to assist with strengthening/mobility   - Keep Call bell within reach  - Keep bed low and locked with side rails adjusted as appropriate  - Keep care items and personal belongings within reach  - Initiate and maintain comfort rounds  - Make Fall Risk Sign visible to staff  - Offer Toileting every 2 Hours, in advance of need  - Initiate/Maintain bed alarm  - Apply yellow socks and bracelet for high fall risk patients  - Consider moving patient to room near nurses station  Outcome: Progressing     Problem: RESPIRATORY - ADULT  Goal: Achieves optimal ventilation and oxygenation  Description: INTERVENTIONS:  - Assess for changes in respiratory status  - Assess for changes in mentation and behavior  - Position to facilitate oxygenation and minimize respiratory effort  - Oxygen administered by appropriate delivery if ordered  - Initiate smoking cessation education as indicated  - Encourage broncho-pulmonary hygiene including cough, deep breathe, Incentive Spirometry  - Assess the need for suctioning and aspirate as needed  - Assess and instruct to report SOB or any respiratory difficulty  - Respiratory Therapy support as indicated  Outcome: Progressing     Problem: INFECTION - ADULT  Goal: Absence or prevention of progression during hospitalization  Description: INTERVENTIONS:  - Assess and monitor for signs and symptoms of infection  - Monitor lab/diagnostic results  - Monitor all insertion sites, i e  indwelling lines, tubes, and drains  - Monitor endotracheal if appropriate and nasal secretions for changes in amount and color  - [place] appropriate cooling/warming therapies per order  - Administer medications as ordered  - Instruct and encourage patient and family to use good hand hygiene technique  - Identify and instruct in appropriate isolation precautions for identified infection/condition  Outcome: Progressing  Goal: Absence of fever/infection during neutropenic period  Description: INTERVENTIONS:  - Monitor WBC    Outcome: Progressing     Problem: Nutrition/Hydration-ADULT  Goal: Nutrient/Hydration intake appropriate for improving, restoring or maintaining nutritional needs  Description: Monitor and assess patient's nutrition/hydration status for malnutrition  Collaborate with interdisciplinary team and initiate plan and interventions as ordered  Monitor patient's weight and dietary intake as ordered or per policy  Utilize nutrition screening tool and intervene as necessary  Determine patient's food preferences and provide high-protein, high-caloric foods as appropriate       INTERVENTIONS:  - Monitor oral intake, urinary output, labs, and treatment plans  - Assess nutrition and hydration status and recommend course of action  - Evaluate amount of meals eaten  - Assist patient with eating if necessary   - Allow adequate time for meals  - Recommend/ encourage appropriate diets, oral nutritional supplements, and vitamin/mineral supplements  - Order, calculate, and assess calorie counts as needed  - Recommend, monitor, and adjust tube feedings and TPN/PPN based on assessed needs  - Assess need for intravenous fluids  - Provide specific nutrition/hydration education as appropriate  - Include patient/family/caregiver in decisions related to nutrition  Outcome: Progressing

## 2021-07-08 NOTE — ASSESSMENT & PLAN NOTE
· Enema given yesterday with small stool which was hard to pass  Colace 100 mg BID added  Bentyl 10mg added  · Continue senokot  · KUP abd x-ray shows nonobstructive bowel gas pattern    Plan:  -GI recommendation appreciated  -Downgrade diet to clear liquids  -Soap suds enema q shift x2 days  -Continue oral bowel regimen colace BID, senokot BID, Miralax TID  -Not appropriate for colonoscopy at this time due to respiratory status, but recommend colonoscopy as an outpatient when respiratory status is improved and patient able to hold coumadin prior to procedure for evaluation of positive cologuard

## 2021-07-09 NOTE — PHYSICAL THERAPY NOTE
PHYSICAL THERAPY TREATMENT NOTE    Patient Name: Pedro Javier  [id number] Date: 7/9/2021 07/09/21 1155   PT Last Visit   PT Visit Date 07/09/21   Pain Assessment   Pain Assessment Tool Pain Assessment not indicated - pt denies pain   Restrictions/Precautions   Other Precautions Chair Alarm; Bed Alarm;Multiple lines;O2;Fall Risk  (Masimo)   General   Chart Reviewed Yes   Family/Caregiver Present No   Cognition   Arousal/Participation Alert; Cooperative   Attention Within functional limits   Orientation Level Oriented to person; Other (Comment)  (pt was identified w/ full name, birth date)   Following Commands Follows one step commands without difficulty   Comments resting pulse ox 93% and 84 BPM, active 94% and 106 BPM    Subjective   Subjective pt seen supine in bed  agreed to participate in PT session  denied pain or dizziness  Bed Mobility   Supine to Sit 4  Minimal assistance   Additional items Assist x 1;HOB elevated; Bedrails; Increased time required;Verbal cues;LE management  (for trunk/LE positioning, breathing technique)   Sit to Supine 4  Minimal assistance   Additional items Assist x 1;HOB elevated; Increased time required;LE management;Verbal cues  (for trunk/LE positioning, breathing technique)   Additional Comments pt sat edge of bed 17 minutes w/ supervision to minx1  additional sitting not possible due to fatigue  pt was unable to transfer or mbilize out of bed  Transfers   Sit to Stand Unable to assess   Ambulation/Elevation   Curbs seatedl heel/toe raises 10 x2 each  long arc quads and seated hip flexion 5 x2 each     Balance   Static Sitting Poor +   Dynamic Sitting Poor   Static Standing Zero   Activity Tolerance   Activity Tolerance Patient limited by fatigue   Medical Staff Made Aware spoke to Luana CURTIS   Assessment   Prognosis Guarded   Problem List Decreased strength;Decreased endurance;Decreased range of motion; Impaired balance;Decreased mobility   Assessment pt showed some decline in mobility status from previous session w/ inability to transfer or mobilize out of bed  pt remains at risk for falling and continued inpatient PT is eneeded to reduce fall risk  inpatient rehab is recommended following discharge from hospital to [facility]  Goals   Patient Goals feel better  get out of the hospital     STG Expiration Date 07/15/21   Short Term Goal #1 PT goals made to address and facilitate pt's goal of being able to walk into the bathroom  1  Pt will demonstrate ability to perform all aspects of bed mobility with independence to increase functional independence  2  Pt will perform functional transfers independently to facilitate safe return to previous living environment  3  Pt will ambulate >20ft with LRAD and PALOMO, in order to facilitate pt's goal of in room ambulation  4  Pt will improve LE strength grade to >/= 4/5 MMT throughout, in order to increase ease of functional mobility with transfers and gait  5  Pt will demonstrate improved balance to >/= GOOD grades in order to decrease risk of falls  6  Pt will negotiate 2 steps independently with 0 HR to simulate entrance to home  7  Pt will improve barthel index to score >/= 75/100 for improved independence and decreased caregiver burden  8  Pt will improve AM-PAC score to >/= 20/24 in order to increase independence with mobility  PT Treatment Day 7   Plan   Treatment/Interventions Functional transfer training;LE strengthening/ROM; Elevations; Therapeutic exercise; Endurance training;Patient/family training;Equipment eval/education; Bed mobility;Gait training; Compensatory technique education;Continued evaluation   Progress Slow progress, decreased activity tolerance   PT Frequency Other (Comment)  (3 to 5x/week)   Recommendation   PT Discharge Recommendation Post acute rehabilitation services   AM-PAC Basic Mobility Inpatient   Turning in Bed Without Bedrails 3   Lying on Back to Sitting on Edge of Flat Bed 3   Moving Bed to Chair 1   Standing Up From Chair 1   Walk in Room 1   Climb 3-5 Stairs 1   Basic Mobility Inpatient Raw Score 10   Turning Head Towards Sound 4   Follow Simple Instructions 3   Low Function Basic Mobility Raw Score 17   Low Function Basic Mobility Standardized Score 27 46     Skilled inpatient PT recommended while in hospital to progress pt toward treatment goals      Caryn Mendoza, PT

## 2021-07-09 NOTE — ASSESSMENT & PLAN NOTE
· Clear liquid diet ordered  Enema given yesterday with small stool which was hard to pass  Colace 100 mg BID added  Bentyl 10mg added  Patient passed 2 last night  Did not complain of abdominal pain overnight  · Continue senokot  · KUP abd x-ray shows nonobstructive bowel gas pattern    Plan:  -GI recommendation appreciated  -Soap suds enema q shift x2 days  -Continue oral bowel regimen colace BID, senokot BID, Miralax TID  -Not appropriate for colonoscopy at this time due to respiratory status, but recommend colonoscopy as an outpatient when respiratory status is improved and patient able to hold coumadin prior to procedure for evaluation of positive cologuard

## 2021-07-09 NOTE — ASSESSMENT & PLAN NOTE
Lab Results   Component Value Date    EGFR 85 07/09/2021    EGFR 85 07/07/2021    EGFR 85 07/06/2021    CREATININE 0 90 07/09/2021    CREATININE 0 90 07/07/2021    CREATININE 0 90 07/06/2021     · Patient still at baseline creatinine of 1 14-1 2  · Lasix resumed  · ARISTIDES resolved

## 2021-07-09 NOTE — PLAN OF CARE
Problem: PHYSICAL THERAPY ADULT  Goal: Performs mobility at highest level of function for planned discharge setting  See evaluation for individualized goals  Description: Treatment/Interventions: Functional transfer training, LE strengthening/ROM, Elevations, Therapeutic exercise, Endurance training, Patient/family training, Equipment eval/education, Bed mobility, Gait training, Compensatory technique education, Continued evaluation, Spoke to nursing, OT     See flowsheet documentation for full assessment, interventions and recommendations  Outcome: Progressing  Note: Prognosis: Guarded  Problem List: Decreased strength, Decreased endurance, Decreased range of motion, Impaired balance, Decreased mobility  Assessment: pt showed some decline in mobility status from previous session w/ inability to transfer or mobilize out of bed  pt remains at risk for falling and continued inpatient PT is eneeded to reduce fall risk  inpatient rehab is recommended following discharge from hospital to [address] [place] [address]  Barriers to Discharge: Decreased caregiver support, Inaccessible home environment        PT Discharge Recommendation: Post acute rehabilitation services          See flowsheet documentation for full assessment

## 2021-07-09 NOTE — ASSESSMENT & PLAN NOTE
· Patient's EKG shows new onset paroxysmal atrial fibrillation  Patient's AFib likely secondary to acute respiratory failure with hypoxia  · Patient is currently rate controlled with metoprolol  · Chads Vasc score: 3    · Recheck INR tomorrow

## 2021-07-09 NOTE — PROGRESS NOTES
[facility]  Progress Note Armando Toribio 1950, 65 y o  male MRN: [id number]  Unit/Bed#: S -01 Encounter: [id number]  Primary Care Provider: Leeanne Iyer DO   Date and time admitted to hospital: 6/15/2021  7:06 AM    * Acute respiratory failure with hypoxia ([address] )  Assessment & Plan  POA  Patient is not on any home oxygen  Patient on nasal cannula at 4L stating at 88%  Differential post COVID syndrome, post radiation/chemotherapy pulmonary fibrosis/pneumonitis  Plan:  · Continue nebulizations as needed  · continue daily dose of Lasix, decreased parameters to   · Continue Spiriva  · Incentive spirometry  · Titrate oxygen to maintain SpO2 above 88%  · Patient on prednisone taper, decrease by 10 mg every 7 days  Currently on prednisone 30 mg for day 2/7  Paroxysmal A-fib (HCC)  Assessment & Plan  · Patient's EKG shows new onset paroxysmal atrial fibrillation  Patient's AFib likely secondary to acute respiratory failure with hypoxia  · Patient is currently rate controlled with metoprolol  · Chads Vasc score: 3    · Recheck INR tomorrow  Constipation  Assessment & Plan  · Clear liquid diet ordered  Enema given yesterday with small stool which was hard to pass  Colace 100 mg BID added  Bentyl 10mg added  Patient passed 2 last night  Did not complain of abdominal pain overnight  · Continue senokot  · KUP abd x-ray shows nonobstructive bowel gas pattern    Plan:  -GI recommendation appreciated  -Soap suds enema q shift x2 days  -Continue oral bowel regimen colace BID, senokot BID, Miralax TID  -Not appropriate for colonoscopy at this time due to respiratory status, but recommend colonoscopy as an outpatient when respiratory status is improved and patient able to hold coumadin prior to procedure for evaluation of positive cologuard      Hyponatremia  Assessment & Plan  Resolved    Type 2 diabetes mellitus, without long-term current use of insulin [facility])  Assessment & Plan  2/2 to steroids  · Carb controlled diet   · Hypoglycemia protocol  · Patient is currently on 12 units Lantus  · Monitor glucose levels as they will start decreasing as steroid doses are titrated down  · Sliding scale with accu checks 4 times a day  Moderate protein-calorie malnutrition ([address] )  Assessment & Plan  Malnutrition Findings:   Adult Malnutrition type: Acute illness (in the setting of chronic illness)  Adult Degree of Malnutrition: Malnutrition of moderate degree (related to catabolic illness, respiratory distress)    BMI Findings: Body mass index is 26 31 kg/m²  Encourage oral intake, nutrition supp    Pneumonia  Assessment & Plan  · Patient had COVID 19 about a month ago and he is also immunocompromised from his esophageal cancer  · Treatment with antibiotics completed as of 06/21/2021    Acute renal failure superimposed on stage 3 chronic kidney disease [facility])  Assessment & Plan  Lab Results   Component Value Date    EGFR 85 07/09/2021    EGFR 85 07/07/2021    EGFR 85 07/06/2021    CREATININE 0 90 07/09/2021    CREATININE 0 90 07/07/2021    CREATININE 0 90 07/06/2021     · Patient still at baseline creatinine of 1 14-1 2  · Lasix resumed  · ARISTIDES resolved    PAD (peripheral artery disease)  Assessment & Plan  · Continue aspirin and statin    Esophageal cancer   Assessment & Plan  · Outpatient follow-up with Oncology  · Patient was due for Herceptin infusion on 06/29/2021  · Patient's oncologist is aware, recommends holding treatment until patient has improved       Carotid stenosis  Assessment & Plan  · Continue aspirin and statin    Essential hypertension  Assessment & Plan  · Episodes of hypotension when pt is sleeping, likely secondary to hypoxia  ·  amlodipine on hold  · Resumed metoprolol at a lower dose 12 5 mg q12  · Blood pressures remained soft   · continue current regimen      GERD (gastroesophageal reflux disease)  Assessment & Plan  · Continue Protonix    Transaminitis-resolved as of 2021  Assessment & Plan  · Patient's transaminitis likely secondary to shock level in the setting of sepsis  · Patient's transaminitis improved with resolution of ARISTIDES and sepsis      Dysphagia-resolved as of 2021  Assessment & Plan  Continue regular diet  no risk of aspiration seen on VBS        VTE Pharmacologic Prophylaxis: VTE Score: 3 High Risk (Score >/= 5) - Pharmacological DVT Prophylaxis Ordered: warfarin (Coumadin)  Sequential Compression Devices Ordered  Patient Centered Rounds: Discussed plan with nurse  Discussions with Specialists or Other Care Team Provider:  No    Education and Discussions with Family / Patient: Updated  (friend) via phone  Current Length of Stay: 24 day(s)  Current Patient Status: Inpatient   Discharge Plan: Anticipate discharge tomorrow to rehab facility  Code Status: Level 1 - Full Code    Subjective:   Patient feels lot better today  He was able to pass stool last night  Did not have any pain overnight  Objective:     Vitals:   Temp (24hrs), Av 8 °F (36 6 °C), Min:96 8 °F (36 °C), Max:98 3 °F (36 8 °C)    Temp:  [96 8 °F (36 °C)-98 3 °F (36 8 °C)] 98 3 °F (36 8 °C)  HR:  [71-83] 71  Resp:  [16-18] 16  BP: ()/(58-70) 91/60  SpO2:  [90 %-97 %] 90 %  Body mass index is 26 31 kg/m²  Input and Output Summary (last 24 hours): Intake/Output Summary (Last 24 hours) at 2021 1449  Last data filed at 2021 0201  Gross per 24 hour   Intake 300 ml   Output 1375 ml   Net -1075 ml       Physical Exam:   Physical Exam  Vitals and nursing note reviewed  Constitutional:       General: He is not in acute distress  Appearance: Normal appearance  He is well-developed  He is not ill-appearing, toxic-appearing or diaphoretic  HENT:      Head: Normocephalic and atraumatic     Eyes:      Conjunctiva/sclera: Conjunctivae normal    Cardiovascular:      Rate and Rhythm: Normal rate and regular rhythm  Pulses: Normal pulses  Heart sounds: Normal heart sounds  No murmur heard  No friction rub  Pulmonary:      Effort: Pulmonary effort is normal  No respiratory distress  Breath sounds: Normal breath sounds  No wheezing  Abdominal:      General: There is no distension  Palpations: Abdomen is soft  Tenderness: There is no abdominal tenderness  There is no guarding  Musculoskeletal:         General: No swelling or deformity  Cervical back: Neck supple  Skin:     General: Skin is warm and dry  Coloration: Skin is not jaundiced or pale  Findings: No bruising  Neurological:      Mental Status: He is alert  Mental status is at baseline  Motor: No weakness  Psychiatric:         Mood and Affect: Mood normal          Behavior: Behavior normal           Additional Data:     Labs:      Results from last 7 days   Lab Units 07/09/21  0619   SODIUM mmol/L 137   POTASSIUM mmol/L 3 9   CHLORIDE mmol/L 103   CO2 mmol/L 28   BUN mg/dL 15   CREATININE mg/dL 0 90   ANION GAP mmol/L 6   CALCIUM mg/dL 8 1*   GLUCOSE RANDOM mg/dL 84     Results from last 7 days   Lab Units 07/08/21  0709   INR  2 34*     Results from last 7 days   Lab Units 07/09/21  1053 07/09/21  0727 07/08/21  2002 07/08/21  1633 07/08/21  1053 07/08/21  0735 07/07/21  1957 07/07/21  1603 07/07/21  1121 07/07/21  0726 07/06/21  2106 07/06/21  1548   POC GLUCOSE mg/dl 196* 83 158* 153* 209* 110 217* 200* 264* 95 170* 164*               Lines/Drains:  Invasive Devices     Central Venous Catheter Line            Port A Cath 08/28/17 Right Chest 1411 days          Peripheral Intravenous Line            Long-Dwell Peripheral IV (Midline) 44/23/85 Left Cephalic 17 days                Central Line:  Goal for removal: N/A - Chronic PICC             Imaging: No pertinent imaging reviewed      Recent Cultures (last 7 days):         Last 24 Hours Medication List:   Current Facility-Administered Medications Medication Dose Route Frequency Provider Last Rate    acetaminophen  650 mg Oral Q6H PRN Allie Paul MD      aspirin  81 mg Oral Daily Allie Paul MD      barium sulfate  1 tablet Oral Once in imaging Francisco J Chet, DO      benzonatate  100 mg Oral TID PRN DESIRE Casillas      docusate sodium  100 mg Oral BID Tonia Hess,       furosemide  20 mg Oral Daily Sandra Martinez MD      guaiFENesin  1,200 mg Oral Q12H [facility] Blaze Velázquez MD      insulin glargine  12 Units Subcutaneous QAM Leora Alonzo MD      insulin lispro  1-6 Units Subcutaneous TID AC Sandra Martinez MD      ketorolac  15 mg Intravenous Once Isaiah Graham, DO      levalbuterol  1 25 mg Nebulization TID Kenneth Ahuja MD      lidocaine   Topical Daily PRN Claus Sosa MD      LORazepam  0 5 mg Oral BID PRN Leora Alonzo MD      melatonin  9 mg Oral HS Leora Alonzo MD      metoprolol  5 mg Intravenous Q6H PRN Leora Alonzo MD      metoprolol tartrate  12 5 mg Oral Q12H [facility] Isaiah Graham, DO      nystatin   Topical TID Kenneth Ahuja MD      ondansetron  8 mg Intravenous Q6H PRN Allie Paul MD      pantoprazole  40 mg Oral Early Morning Allie Paul MD      polyethylene glycol  34 g Oral TID DESTIN Suoza      predniSONE  30 mg Oral Daily Isaiah Dies, DO      Followed by   Curtis Johnson ON 7/15/2021] predniSONE  20 mg Oral Daily Isaiah Graham, DO      Followed by   Curtis Johnson ON 7/22/2021] predniSONE  10 mg Oral Daily Isaiah Graham, DO      senna  2 tablet Oral BID Chantal Swift MD      sodium chloride  1 spray Each Nare Q1H PRN Tammi Jiang PA-C      sodium chloride  3 mL Nebulization TID Kenneth Ahuja MD      tiotropium  18 mcg Inhalation Daily Guerita Hart DO      warfarin  3 mg Oral Daily (warfarin) Sandra Martinez MD          Today, Patient Was Seen By: Isaiah Graham DO    **Please Note: This note may have been constructed using a voice recognition system  **

## 2021-07-09 NOTE — CASE MANAGEMENT
CM informed by [facility] that Patient had a BM and is medically stable for dc  CM informed by Yess[facility] Rocha that facility is able to accept the Patient  Facility will start auth  Patient will need a Covid test prior to dc  CM informed [facility] of same; will order Covid  CM informed by [facility] that Patient's Covid resulted positive  CM requested that [facility] order a repeat Covid to determine if Patient had a false positive test, or if Patient remains positive from earlier infection  If repeat test returns negative, Patient will need a second negative in 24 hours  CM met with the Patient and his Estela Flores, to review that [facility] has started [facility] and to review the Patient's positive Covid result and planned repeat test tomorrow if negative  CM reviewed that this result determines where he will admit in the facility and visiting restrictions  Patient and his Estela Flores, verbalized their understanding  CM reviewed that the Patient will need BLS transport due to his O2 requirements  Patient verbalized his understanding, but is concerned about the cost  CM reviewed that transport will be billed to his insurance by the ambulance company providing transport  Patient verbalized his understanding and expressed that he is ready to get to rehab and be on the road to recovery  IMM reviewed with patient and caregiver  patient and caregiver agree with discharge determination; copy provided  CM dept will continue to follow for repeat Covid results and authorization

## 2021-07-09 NOTE — ASSESSMENT & PLAN NOTE
Healthy heart, low concentrated sweets   POA   Patient is not on any home oxygen  Patient on nasal cannula at 4L stating at 88%  Differential post COVID syndrome, post radiation/chemotherapy pulmonary fibrosis/pneumonitis  Plan:  · Continue nebulizations as needed  · continue daily dose of Lasix, decreased parameters to   · Continue Spiriva  · Incentive spirometry  · Titrate oxygen to maintain SpO2 above 88%  · Patient on prednisone taper, decrease by 10 mg every 7 days  Currently on prednisone 30 mg for day 2/7

## 2021-07-09 NOTE — PROGRESS NOTES
Progress Note - Angela Garcia 65 y o  male MRN: [id number]    Unit/Bed#: S -01 Encounter: [id number]        Subjective:    patient reports he is feeling much better since he had a bowel movements after aggressive regimen last night, he had 2 more bowel movements this morning  He says his abdomen is no longer distended or painful  Denies any rectal bleeding  Objective:     Vitals: Blood pressure 105/69, pulse 78, temperature 98 °F (36 7 °C), temperature source Oral, resp  rate 16, height 5' 7" (1 702 m), weight 76 2 kg (167 lb 15 9 oz), SpO2 91 %  ,Body mass index is 26 31 kg/m²  Intake/Output Summary (Last 24 hours) at 7/9/2021 1620  Last data filed at 7/9/2021 0201  Gross per 24 hour   Intake 300 ml   Output 1175 ml   Net -875 ml       Physical Exam:   General appearance: alert, appears stated age and cooperative  Lungs: clear to auscultation bilaterally, no labored breathing/accessory muscle use  Heart: regular rate and rhythm, S1, S2 normal, no murmur, click, rub or gallop  Abdomen: soft, non-tender; bowel sounds normal; no masses,  no organomegaly  Extremities: no edema    Invasive Devices     Central Venous Catheter Line            Port A Cath 08/28/17 Right Chest 1411 days          Peripheral Intravenous Line            Long-Dwell Peripheral IV (Midline) 08/28/77 Left Cephalic 17 days                Lab, Imaging and other studies: I have personally reviewed pertinent reports  No results displayed because visit has over 200 results  Results for Oswald Pain (MRN [id number]) as of 7/9/2021 16:17   Ref   Range 7/6/2021 15:48 7/6/2021 21:06 7/7/2021 06:33 7/7/2021 07:26 7/7/2021 11:21 7/7/2021 16:03 7/7/2021 19:57 7/8/2021 07:09 7/8/2021 07:35 7/8/2021 10:53 7/8/2021 16:33 7/8/2021 20:02 7/9/2021 06:19 7/9/2021 07:27 7/9/2021 10:53 7/9/2021 12:50 7/9/2021 15:49   POC Glucose Latest Ref Range: 65 - 140 mg/dl 164 (H) 170 (H)  95 264 (H) 200 (H) 217 (H)  110 209 (H) 153 (H) 158 (H)  83 196 (H)  174 (H)   Sodium Latest Ref Range: 136 - 145 mmol/L   136          137       Potassium Latest Ref Range: 3 5 - 5 3 mmol/L   3 7          3 9       Chloride Latest Ref Range: 100 - 108 mmol/L   102          103       CO2 Latest Ref Range: 21 - 32 mmol/L   27          28       Anion Gap Latest Ref Range: 4 - 13 mmol/L   7          6       BUN Latest Ref Range: 5 - 25 mg/dL   19          15       Creatinine Latest Ref Range: 0 60 - 1 30 mg/dL   0 90          0 90       Glucose, Random Latest Ref Range: 65 - 140 mg/dL   101          84       Calcium Latest Ref Range: 8 3 - 10 1 mg/dL   8 2 (L)          8 1 (L)       eGFR Latest Units: ml/min/1 73sq m   85          85       Protime Latest Ref Range: 11 6 - 14 5 seconds   26 8 (H)     25 7 (H)            INR Latest Ref Range: 0 84 - 1 19    2 47 (H)     2 34 (H)            SARS-COV-2 Latest Ref Range: Negative                 Positive (A)        Assessment/Plan:    1  Severe constipation, appears improved after administration of enemas yesterday, reports to be having bowel movements today as well, no evidence of bowel obstruction at this time  He had previously been noted with positive Cologuard testing    -recommend non urgent colonoscopy after patient can be adequately prepped and after patient has improvement from a cardiopulmonary standpoint, he is currently requiring supplemental oxygen    Would also need to hold Coumadin for at least a few days  - recommend MiraLax at least once daily going forward  -diet as tolerated

## 2021-07-09 NOTE — DISCHARGE SUMMARY
[facility]  Discharge- Gerson Goel 1950, 65 y o  male MRN: [id number]  Unit/Bed#: S -01 Encounter: [id number]  Primary Care Provider: Leeanne Iyer DO   Date and time admitted to hospital: 6/15/2021  7:06 AM    * Acute respiratory failure with hypoxia (HCC)-resolved as of 7/11/2021  Assessment & Plan  Patient is not on any home oxygen  During his admission, patient is on nasal cannula at 5L stating at 95%  Differential includes post COVID syndrome, post radiation/chemotherapy pulmonary fibrosis/pneumonitis  Patient will be discharged to rehab with oxygen  Patient will also have a prednisone taper decreased by 10 mg every 7 days continued after discharge  He is currently on prednisone 30 mg on day 4/7  Paroxysmal [facility])  Assessment & Plan  Transmission, patient's EKG shows new onset paroxysmal atrial fibrillation  Patient's AFib likely secondary to acute respiratory failure with hypoxia  Patient will be discharged with metoprolol 25 mg and follow-up with PCP and Cardiology outpatient  Constipation-resolved as of 7/11/2021  Assessment & Plan  Pt was on Clear liquid diet, and tolerated advancing to Regular diet  Patient passed stool the past 3 nights and  did not complain of abdominal pain overnight  KUB abd x-ray: Nonobstructive bowel gas pattern    Patient to follow-up with PCP and GI for colonoscopy outpatient    Type 2 diabetes mellitus, without long-term current use of insulin (HCC)  Assessment & Plan  Continue home medication    Moderate protein-calorie malnutrition (HCC)  Assessment & Plan  Malnutrition Findings:   Adult Malnutrition type: Acute illness (in the setting of chronic illness)  Adult Degree of Malnutrition: Malnutrition of moderate degree (related to catabolic illness, respiratory distress)    BMI Findings: Body mass index is 26 31 kg/m²     Encourage oral intake, nutrition supp    PAD (peripheral artery disease)  Assessment & Plan  · Continue aspirin and statin    Esophageal cancer   Assessment & Plan  · Outpatient follow-up with Oncology  · Patient was due for Herceptin infusion on 06/29/2021  · Patient's oncologist is aware, recommends holding treatment until patient has improved       Carotid stenosis  Assessment & Plan  · Continue aspirin and statin    Essential hypertension  Assessment & Plan  · Patient had episodes of hypotension when sleeping, likely secondary hypoxia  Continue metoprolol at a lower dose of 12 5 mg  · Patient to follow-up with cardiology and PCP  GERD (gastroesophageal reflux disease)  Assessment & Plan  · Continue Protonix and follow up with GI and PCP    Acute renal failure superimposed on stage 3 chronic kidney disease (HCC)-resolved as of 7/11/2021  Assessment & Plan  Lab Results   Component Value Date    EGFR 85 07/10/2021    EGFR 85 07/09/2021    EGFR 85 07/07/2021    CREATININE 0 89 07/10/2021    CREATININE 0 90 07/09/2021    CREATININE 0 90 07/07/2021     · Patient still at baseline creatinine of 1 14-1 2  · Lasix resumed  · ARISTIDES resolved    Dysphagia-resolved as of 7/2/2021  Assessment & Plan  Continue regular diet   no risk of aspiration seen on VBS      Medical Problems     Resolved Problems  Date Reviewed: 7/11/2021        Resolved    Dysphagia 7/2/2021     Resolved by  Jennifer Escobedo MD    Acute renal failure superimposed on stage 3 chronic kidney disease ([facility] [address] ) 7/11/2021     Resolved by  Cecy Worley DO    Pneumonia 7/11/2021     Resolved by  Cecy Worley,     Transaminitis 6/18/2021     Resolved by  Mariel Taylor MD    * (Principal) Acute respiratory failure with hypoxia ([facility] [address] ) 7/11/2021     Resolved by  Cecy Worley,     Constipation 7/11/2021     Resolved by  Cecy Worley DO    Hyponatremia 7/10/2021     Resolved by  Jannette Fuentes MD              Discharging Resident: Cecy Worley DO  Discharging Attending: Sandra Fierro MD  PCP: Gia Braxton DO  Admission Date:   Admission Orders (From admission, onward)     Ordered        06/15/21 1153  Inpatient Admission  Once                   Discharge Date: 07/11/21     Disposition:    Acute Rehab at [facility]    Reason for Admission:  Shortness of breath    Discharge Diagnoses:   Please see assessment and plan section above for further details regarding discharge diagnoses  Consultations During Hospital Stay:  · Pulmonology  · Infectious disease  · Gastroenterology    Procedures Performed:   · None     Significant Findings / Test Results:   XR chest portable    Result Date: 7/1/2021  · Impression: Bilateral peripheral and groundglass opacities, no change  Workstation performed: [id number]   · Elevated inflammatory markers, trended down  · Elevated D-dimer, trended down  · COVID-19 positive    Incidental Findings:   · None    Test Results Pending at Discharge (will require follow up): · None     Outpatient Tests Requested:  · None    Complications:  None    Hospital Course:      Aurora Puga is a 65 y o  male patient who originally presented to the hospital on 6/15/2021 due to shortness of breath  Patient met sepsis criteria with possible source of pneumonia and completed 7 days total of appropriate antibiotics  He was also in acute hypoxic respiratory failure thought to be likely to pneumonia versus pneumonitis versus post COVID-19 fibrosis therefore pulmonology was on board and patient currently on prednisone taper  He was found to have new onset AFib with elevated chads Vasc score therefore he was started on Coumadin and currently INR at goal   Patient respiratory status improved significantly however he still requiring oxygen at rest and with ambulation    Patient continued to complain of constipation and received aggressive bowel regimen including multiple enemas and bowel prep and was evaluated by Gastroenterology who did not recommend colonoscopy while inpatient due to oxygen requirement and recommended outpatient colonoscopy once respiratory status is more stable  Patient constipation resolved with bowel regimen recommended by Gastroenterology team   Physical therapy evaluated the patient and recommended rehab  As patient continued to require up to 15 later with ambulation, the plan was to discharge patient to [facility] facility however the case was rejected  Patient oxygen status started to improve and we have facility accepted him at his current respiratory status and oxygen requirement therefore he was discharged once authorization was completed  Authorization was completed  Patient was medically stable for discharge to rehab  Condition at Discharge: stable    Discharge Day Visit / Exam:   Subjective:  Patient is doing well and medically stable  Patient does not complain of any constipation or abdominal pain  Vitals: Blood Pressure: 96/60 (07/11/21 0918)  Pulse: 100 (07/11/21 0918)  Temperature: 98 4 °F (36 9 °C) (07/10/21 2345)  Temp Source: Oral (07/09/21 1503)  Respirations: 18 (07/10/21 1615)  Height: 5' 7" (170 2 cm) (06/15/21 0715)  Weight - Scale: 76 2 kg (167 lb 15 9 oz) (06/15/21 0715)  SpO2: 91 % (07/11/21 3504)  Exam:   Physical Exam  Vitals and nursing note reviewed  Constitutional:       General: He is not in acute distress  Appearance: Normal appearance  He is well-developed  He is not ill-appearing, toxic-appearing or diaphoretic  HENT:      Head: Normocephalic and atraumatic  Eyes:      Conjunctiva/sclera: Conjunctivae normal    Cardiovascular:      Rate and Rhythm: Normal rate and regular rhythm  Pulses: Normal pulses  Heart sounds: Normal heart sounds  No murmur heard  No friction rub  Pulmonary:      Effort: Pulmonary effort is normal  No respiratory distress  Breath sounds: Normal breath sounds  No wheezing  Abdominal:      General: There is no distension  Palpations: Abdomen is soft  Tenderness: There is no abdominal tenderness  There is no guarding     Musculoskeletal: General: No swelling or deformity  Cervical back: Neck supple  Skin:     General: Skin is warm and dry  Coloration: Skin is not jaundiced or pale  Findings: No bruising  Neurological:      Mental Status: He is alert  Mental status is at baseline  Motor: No weakness  Psychiatric:         Mood and Affect: Mood normal          Behavior: Behavior normal           Discussion with Family:  Patient's friend has been updated on his medical care  Medication Adjustments and Discharge Medications:  · Discharge Medication List: See after visit summary for reconciled discharge medications  · Medication Dosing Tapers - Please refer to Discharge Medication List for details on any medication dosing tapers (if applicable to patient)  · Summary of Medication Adjustments made as a result of this hospitalization:  Started on Coumadin, started on Lasix 20 mg  · Medications being temporarily held (include recommended restart time):  None    Wound Care Recommendations:  When applicable, please see wound care section of After Visit Summary  Instructions for any Catheters / Lines Present at Discharge (including removal date, if applicable):  None    Diet Recommendations at Discharge:  Diet -        Diet Orders   (From admission, onward)             Start     Ordered    07/10/21 1058  Diet Jcarlos/CHO Controlled; Consistent Carbohydrate Diet Level 2 (5 carb servings/75 grams CHO/meal); Sodium 4 GM (JOSE LUIS)  Diet effective now     Question Answer Comment   Diet Type Jcarlos/CHO Controlled    Jcarlos/CHO Controlled Consistent Carbohydrate Diet Level 2 (5 carb servings/75 grams CHO/meal)    Other Restriction(s): Sodium 4 GM (JOSE LUIS)    RD to adjust diet per protocol?  Yes        07/10/21 1101    06/22/21 1136  Dietary nutrition supplements  Once     Question Answer Comment   Select Supplement: Magic Cup Blaine    Frequency Dinner        06/22/21 1136    06/15/21 222  Room Service  Once     Question:  Type of Service Answer:  Room Service - Appropriate with Assistance    06/15/21 6652                Goals of Care Discussions:  · Code Status at Discharge: Level 1 - Full Code  · Goals of care were not discussed during this admission  Discharge instructions/Information to patient and family:   See after visit summary section titled Discharge Instructions for information provided to patient and family  Planned Readmission:  No    **Please Note: This note may have been constructed using a voice recognition system  **

## 2021-07-10 PROBLEM — E87.1 HYPONATREMIA: Status: RESOLVED | Noted: 2021-01-01 | Resolved: 2021-01-01

## 2021-07-10 NOTE — CASE MANAGEMENT
CM checked the communication between [facility] and previous CM in [address]  Per response from [facility] in admissions at [facility] yesterday, since patient tested Covid+ yesterday, "We will need then 2 consecutive negatives within 24 hours apart "  Updated PT notes were also requested by [facility] in order to submit for insurance authorization, updated notes are in   CM reached out to admission of [facility] this afternoon requesting status of insurance auth and admission status  Awaiting response

## 2021-07-10 NOTE — ASSESSMENT & PLAN NOTE
· Episodes of hypotension when pt is sleeping, likely secondary to hypoxia  · Continue metoprolol at a lower dose 12 5 mg  · Blood pressures remained soft   · continue current regimen

## 2021-07-10 NOTE — ASSESSMENT & PLAN NOTE
Resolved     · Patient had COVID 19 about a month ago and he is also immunocompromised from his esophageal cancer  · Treatment with antibiotics completed as of 06/21/2021

## 2021-07-10 NOTE — PROGRESS NOTES
[facility]  Progress Note Jose Manuel Childress 1950, 65 y o  male MRN: [id number]  Unit/Bed#: S -01 Encounter: [id number]  Primary Care Provider: Terry Welch DO   Date and time admitted to hospital: 6/15/2021  7:06 AM    * Acute respiratory failure with hypoxia ([address] )  Assessment & Plan  POA  Patient is not on any home oxygen  Patient on nasal cannula at 4L stating at 88%  Differential post COVID syndrome, post radiation/chemotherapy pulmonary fibrosis/pneumonitis  · Continue nebulizations as needed  · continue daily dose of Lasix, decreased parameters to   · Continue Spiriva  · Patient on prednisone taper, decrease by 10 mg every 7 days  Currently on prednisone 30 mg for day 2/7  Constipation  Assessment & Plan  Pt was on Clear liquid diet, and was advanced to Regular diet  Patient passed stool last night  Did not complain of abdominal pain overnight  KUB abd x-ray: Nonobstructive bowel gas pattern    · GI recommendation appreciated  · Advance to regular diet  · Continue oral bowel regimen colace BID, senokot BID, Miralax TID  · Not appropriate for colonoscopy at this time due to respiratory status, but recommend colonoscopy as an outpatient when respiratory status is improved and patient able to hold coumadin prior to procedure for evaluation of positive cologuard  Paroxysmal A-fib [facility])  Assessment & Plan  Patient's EKG shows new onset paroxysmal atrial fibrillation  Patient's AFib likely secondary to acute respiratory failure with hypoxia  · Continue to rate controlled with metoprolol  · Chads Vasc score: 3    · Recheck INR in am    Type 2 diabetes mellitus, without long-term current use of insulin (HCC)  Assessment & Plan  2/2 to steroids  · Carb controlled diet   · Hypoglycemia protocol  · Patient is currently on 12 units Lantus  · Monitor glucose levels as they will start decreasing as steroid doses are titrated down    · Sliding scale with accu checks 4 times a day  Moderate protein-calorie malnutrition ([address] )  Assessment & Plan  Malnutrition Findings:   Adult Malnutrition type: Acute illness (in the setting of chronic illness)  Adult Degree of Malnutrition: Malnutrition of moderate degree (related to catabolic illness, respiratory distress)    BMI Findings: Body mass index is 26 31 kg/m²  Encourage oral intake, nutrition supp    Pneumonia  Assessment & Plan  Resolved  · Patient had COVID 19 about a month ago and he is also immunocompromised from his esophageal cancer  · Treatment with antibiotics completed as of 06/21/2021    Acute renal failure superimposed on stage 3 chronic kidney disease [facility])  Assessment & Plan  Lab Results   Component Value Date    EGFR 85 07/10/2021    EGFR 85 07/09/2021    EGFR 85 07/07/2021    CREATININE 0 89 07/10/2021    CREATININE 0 90 07/09/2021    CREATININE 0 90 07/07/2021     · Patient still at baseline creatinine of 1 14-1 2  · Lasix resumed  · ARISTIDES resolved    PAD (peripheral artery disease)  Assessment & Plan  · Continue aspirin and statin    Esophageal cancer   Assessment & Plan  · Outpatient follow-up with Oncology  · Patient was due for Herceptin infusion on 06/29/2021  · Patient's oncologist is aware, recommends holding treatment until patient has improved       Carotid stenosis  Assessment & Plan  · Continue aspirin and statin    Essential hypertension  Assessment & Plan  · Episodes of hypotension when pt is sleeping, likely secondary to hypoxia  · Continue metoprolol at a lower dose 12 5 mg  · Blood pressures remained soft   · continue current regimen  GERD (gastroesophageal reflux disease)  Assessment & Plan  · Continue Protonix    Hyponatremia-resolved as of 7/10/2021  Assessment & Plan  Resolved    Transaminitis-resolved as of 6/18/2021  Assessment & Plan  · Patient's transaminitis likely secondary to shock level in the setting of sepsis    · Patient's transaminitis improved with resolution of ARISTIDES and sepsis      Dysphagia-resolved as of 2021  Assessment & Plan  Continue regular diet  no risk of aspiration seen on VBS          VTE Pharmacologic Prophylaxis:   VTE Score: 3 Moderate Risk (Score 3-4) - Pharmacological DVT Prophylaxis Ordered: Warfarin (Coumadin)  Mechanical VTE Prophylaxis in Place: Yes    Patient Centered Rounds: I have performed bedside rounds with nursing staff today  Discussions with Specialists or Other Care Team Provider: Nursing     Education and Discussions with Family / Patient: Will update family through phone  Current Length of Stay: 25 day(s)    Current Patient Status: Inpatient     Discharge Plan / Estimated Discharge Date: Anticipate discharge tomorrow to rehab facility  Code Status: Level 1 - Full Code      Subjective:   Patient patient had no overnight events  Patient and addressed tender abdominal skin due to injections and having his last bowel movement yesterday night, and denied headache, fever, chills, abdominal pain, shortness of breath and weakness  Objective:     Vitals:   Temp (24hrs), Av 5 °F (36 9 °C), Min:98 °F (36 7 °C), Max:98 9 °F (37 2 °C)    Temp:  [98 °F (36 7 °C)-98 9 °F (37 2 °C)] 98 9 °F (37 2 °C)  HR:  [74-78] 75  Resp:  [16-18] 18  BP: (105-112)/(61-69) 105/62  SpO2:  [89 %-98 %] 89 %  Body mass index is 26 31 kg/m²  Input and Output Summary (last 24 hours): Intake/Output Summary (Last 24 hours) at 7/10/2021 1430  Last data filed at 7/10/2021 0501  Gross per 24 hour   Intake 120 ml   Output 1000 ml   Net -880 ml       Physical Exam:     Physical Exam  Vitals and nursing note reviewed  Constitutional:       General: He is not in acute distress  Appearance: Normal appearance  He is well-developed  He is not ill-appearing, toxic-appearing or diaphoretic  HENT:      Head: Normocephalic and atraumatic  Mouth/Throat:      Mouth: Mucous membranes are moist       Pharynx: Oropharynx is clear     Eyes: General: No scleral icterus  Conjunctiva/sclera: Conjunctivae normal    Cardiovascular:      Rate and Rhythm: Normal rate and regular rhythm  Pulses: Normal pulses  Heart sounds: Normal heart sounds  No murmur heard  No friction rub  Pulmonary:      Effort: Pulmonary effort is normal  No respiratory distress  Breath sounds: Normal breath sounds  No wheezing  Abdominal:      General: There is no distension  Palpations: Abdomen is soft  Tenderness: There is no abdominal tenderness  There is no guarding  Musculoskeletal:         General: No swelling or deformity  Cervical back: Normal range of motion and neck supple  Right lower leg: No edema  Left lower leg: No edema  Skin:     General: Skin is warm and dry  Coloration: Skin is not jaundiced or pale  Findings: No bruising  Neurological:      Mental Status: He is alert and oriented to person, place, and time  Mental status is at baseline  Motor: No weakness  Psychiatric:         Mood and Affect: Mood normal          Behavior: Behavior normal           Additional Data:     Labs:      Results from last 7 days   Lab Units 07/10/21  0524   SODIUM mmol/L 139   POTASSIUM mmol/L 5 1   CHLORIDE mmol/L 105   CO2 mmol/L 30   BUN mg/dL 11   CREATININE mg/dL 0 89   ANION GAP mmol/L 4   CALCIUM mg/dL 8 4   GLUCOSE RANDOM mg/dL 84     Results from last 7 days   Lab Units 07/10/21  0524   INR  2 45*     Results from last 7 days   Lab Units 07/10/21  1113 07/10/21  0810 07/09/21  2104 07/09/21  1549 07/09/21  1053 07/09/21  0727 07/08/21  2002 07/08/21  1633 07/08/21  1053 07/08/21  0735 07/07/21  1957 07/07/21  1603   POC GLUCOSE mg/dl 179* 80 208* 174* 196* 83 158* 153* 209* 110 217* 200*               Imaging: No pertinent imaging reviewed      Recent Cultures (last 7 days):           Lines/Drains:  Invasive Devices     Central Venous Catheter Line            Port A Cath 08/28/17 Right Chest 1412 days Peripheral Intravenous Line            Long-Dwell Peripheral IV (Midline) 21/20/49 Left Cephalic 18 days                Telemetry:        Last 24 Hours Medication List:   Current Facility-Administered Medications   Medication Dose Route Frequency Provider Last Rate    acetaminophen  650 mg Oral Q6H PRN Salo Talavera MD      aspirin  81 mg Oral Daily Salo Talavera MD      barium sulfate  1 tablet Oral Once in imaging Francisco J Rosenberg, DO      benzonatate  100 mg Oral TID PRN Ilia Pradhan PA-C      docusate sodium  100 mg Oral BID Jl Nicole, DO      furosemide  20 mg Oral Daily Amira Genao MD      guaiFENesin  1,200 mg Oral Q12H [address] Blaze Velázquez MD      insulin glargine  12 Units Subcutaneous QAMIRIAM Grajeda MD      insulin lispro  1-6 Units Subcutaneous TID AC Amira Genao MD      ketorolac  15 mg Intravenous Once [employer], DO      levalbuterol  1 25 mg Nebulization TID Viet Bledsoe MD      lidocaine   Topical Daily PRN Los Toure MD      LORazepam  0 5 mg Oral BID PRN Jasmine Grajeda MD      melatonin  9 mg Oral HS Jasmine Grajeda MD      metoprolol  5 mg Intravenous Q6H PRN Jasmine Grajeda MD      metoprolol tartrate  12 5 mg Oral Q12H [address] [employer], DO      nystatin   Topical TID Viet Bledsoe MD      ondansetron  8 mg Intravenous Q6H PRN Salo Talavera MD      pantoprazole  40 mg Oral Early Morning Salo Talavera MD      polyethylene glycol  34 g Oral TID DESTIN Cox      predniSONE  30 mg Oral Daily [employer], DO      Followed by   [place] 7/15/2021] predniSONE  20 mg Oral Daily [employer], DO      Followed by   [place] 7/22/2021] predniSONE  10 mg Oral Daily [employer], DO      senna  2 tablet Oral BID Corky Beebe MD      sodium chloride  1 spray Each Nare Q1H PRN Tammi Jiang PA-C      sodium chloride  3 mL Nebulization TID Viet Bledsoe MD      tiotropium  18 mcg Inhalation Daily Jesica Bynum, DO      warfarin  3 mg Oral Daily (warfarin) Shu Ruiz MD          Today, Patient Was Seen By: Karen Hernandes MD    ** Please Note: This note has been constructed using a voice recognition system   **

## 2021-07-10 NOTE — ASSESSMENT & PLAN NOTE
Patient's EKG shows new onset paroxysmal atrial fibrillation  Patient's AFib likely secondary to acute respiratory failure with hypoxia      · Continue to rate controlled with metoprolol  · Chads Vasc score: 3    · Recheck INR in am

## 2021-07-10 NOTE — ASSESSMENT & PLAN NOTE
Pt was on Clear liquid diet, and was advanced to Regular diet  Patient passed stool last night  Did not complain of abdominal pain overnight  KUB abd x-ray: Nonobstructive bowel gas pattern    · GI recommendation appreciated  · Advance to regular diet  · Continue oral bowel regimen colace BID, senokot BID, Miralax TID  · Not appropriate for colonoscopy at this time due to respiratory status, but recommend colonoscopy as an outpatient when respiratory status is improved and patient able to hold coumadin prior to procedure for evaluation of positive cologuard

## 2021-07-10 NOTE — ASSESSMENT & PLAN NOTE
Lab Results   Component Value Date    EGFR 85 07/10/2021    EGFR 85 07/09/2021    EGFR 85 07/07/2021    CREATININE 0 89 07/10/2021    CREATININE 0 90 07/09/2021    CREATININE 0 90 07/07/2021     · Patient still at baseline creatinine of 1 14-1 2  · Lasix resumed  · ARISTIDES resolved

## 2021-07-10 NOTE — ASSESSMENT & PLAN NOTE
POA   Patient is not on any home oxygen  Patient on nasal cannula at 4L stating at 88%  Differential post COVID syndrome, post radiation/chemotherapy pulmonary fibrosis/pneumonitis  · Continue nebulizations as needed  · continue daily dose of Lasix, decreased parameters to   · Continue Spiriva  · Patient on prednisone taper, decrease by 10 mg every 7 days  Currently on prednisone 30 mg for day 2/7

## 2021-07-11 PROBLEM — N18.30 ACUTE RENAL FAILURE SUPERIMPOSED ON STAGE 3 CHRONIC KIDNEY DISEASE (HCC): Status: RESOLVED | Noted: 2021-01-01 | Resolved: 2021-01-01

## 2021-07-11 PROBLEM — K59.00 CONSTIPATION: Status: RESOLVED | Noted: 2021-01-01 | Resolved: 2021-01-01

## 2021-07-11 PROBLEM — J18.9 PNEUMONIA: Status: RESOLVED | Noted: 2021-01-01 | Resolved: 2021-01-01

## 2021-07-11 PROBLEM — J96.01 ACUTE RESPIRATORY FAILURE WITH HYPOXIA (HCC): Status: RESOLVED | Noted: 2021-01-01 | Resolved: 2021-01-01

## 2021-07-11 PROBLEM — N17.9 ACUTE RENAL FAILURE SUPERIMPOSED ON STAGE 3 CHRONIC KIDNEY DISEASE (HCC): Status: RESOLVED | Noted: 2021-01-01 | Resolved: 2021-01-01

## 2021-07-11 NOTE — ASSESSMENT & PLAN NOTE
· Patient had episodes of hypotension when sleeping, likely secondary hypoxia  Continue metoprolol at a lower dose of 12 5 mg  · Patient to follow-up with cardiology and PCP

## 2021-07-11 NOTE — CASE MANAGEMENT
CM was notified by [facility] that patient is medically cleared for discharge  CM received notification in [address] from [address] [facility] that authorization has been received and patient is good to admit today  CM sent transportation request to [facility]  Med necessity completed  Awaiting response from [facility] with transport time

## 2021-07-11 NOTE — ASSESSMENT & PLAN NOTE
Patient is not on any home oxygen  During his admission, patient is on nasal cannula at 5L stating at 95%  Differential includes post COVID syndrome, post radiation/chemotherapy pulmonary fibrosis/pneumonitis  Patient will be discharged to rehab with oxygen  Patient will also have a prednisone taper decreased by 10 mg every 7 days continued after discharge  He is currently on prednisone 30 mg on day 4/7

## 2021-07-11 NOTE — CASE MANAGEMENT
CM received notification from Bryant at [place] that patient has a  transport with [employer]  CM updated nurse, [facility] via [address], and significant other Surendra Duncan who is at bedside  Med necessity completed and placed in label book    Facility contacts updated in [facility]

## 2021-07-11 NOTE — TRANSPORTATION MEDICAL NECESSITY
Section I - General Information    Name of Patient: Kortney Swift                 : 1950    Medicare #: [id number]  Transport Date: 21 (PCS is valid for round trips on this date and for all repetitive trips in the 60-day range as noted below )  Origin: [address]: Chidi Eis  Is the pt's stay covered under Medicare Part A (PPS/DRG)   [x]     Closest appropriate facility? If no, why is transport to more distant facility required? Yes  If hospice pt, is this transport related to pt's terminal illness? NA       Section II - Medical Necessity Questionnaire  Ambulance transportation is medically necessary only if other means of transport are contraindicated or would be potentially harmful to the patient  To meet this requirement, the patient must either be "bed confined" or suffer from a condition such that transport by means other than ambulance is contraindicated by the patient's condition  The following questions must be answered by the medical professional signing below for this form to be valid:    1)  Describe the MEDICAL CONDITION (physical and/or mental) of this patient AT [address] [place] [address] that requires the patient to be transported in an ambulance and why transport by other means is contraindicated by the patient's condition: Latest PT notes states that patient has "inability to transfer or mobilize out of bed  pt remains at risk for falling " thus medical attendant required  Weakness related to esophageal cancer on chemo  Patient is on 5LO2 and cannot self-administer  2) Is the patient "bed confined" as defined below? No  To be "be confined" the patient must satisfy all three of the following conditions: (1) unable to get up from bed without Assistance; AND (2) unable to ambulate; AND (3) unable to sit in a chair or wheelchair      3) Can this patient safely be transported by car or wheelchair van (i e , seated during transport without a medical attendant or monitoring)? No    4) In addition to completing questions 1-3 above, please check any of the following conditions that apply*:   *Note: supporting documentation for any boxes checked must be maintained in the patient's medical records  If hosp-hosp transfer, describe services needed at 2nd facility not available at 1st facility? Medical attendant required   Requires oxygen-unable to self administer  Other(specify) Latest PT notes states that patient has "inability to transfer or mobilize out of bed  pt remains at risk for falling " thus medical attendant required  Weakness related to esophageal cancer on chemo  Patient is on 5LO2 and cannot self-administer  Section III - Signature of Physician or Healthcare Professional  I certify that the above information is true and correct based on my evaluation of this patient, and represent that the patient requires transport by ambulance and that other forms of transport are contraindicated  I understand that this information will be used by the Centers for Medicare and Medicaid Services (CMS) to support the determination of medical necessity for ambulance services, and I represent that I have personal knowledge of the patient's condition at time of transport  []  If this box is checked, I also certify that the patient is physically or mentally incapable of signing the ambulance service's claim and that the institution with which I am affiliated has furnished care, services, or assistance to the patient  My signature below is made on behalf of the patient pursuant to 42 CFR §424 36(b)(4)   In accordance with 42 CFR §424 37, the specific reason(s) that the patient is physically or mentally incapable of signing the claim form is as follows: NA       Signature of Physician* or Healthcare Professional______________________________________________________________  Signature Date 07/11/21 (For scheduled repetitive transports, this form is not valid for transports performed more than 60 days after this date)    Printed Name & Credentials of Physician or Healthcare Professional (MD, DO, RN, etc )  Reynaldo Samples  *Form must be signed by patient's attending physician for scheduled, repetitive transports   For non-repetitive, unscheduled ambulance transports, if unable to obtain the signature of the attending physician, any of the following may sign (choose appropriate option below)  [] Physician Assistant []  Clinical Nurse Specialist []  Registered Nurse  []  Nurse Practitioner  [x] Discharge Planner

## 2021-07-11 NOTE — ASSESSMENT & PLAN NOTE
Pt was on Clear liquid diet, and tolerated advancing to Regular diet  Patient passed stool the past 3 nights and  did not complain of abdominal pain overnight       KUB abd x-ray: Nonobstructive bowel gas pattern    Patient to follow-up with PCP and GI for colonoscopy outpatient

## 2021-07-11 NOTE — ASSESSMENT & PLAN NOTE
Transmission, patient's EKG shows new onset paroxysmal atrial fibrillation  Patient's AFib likely secondary to acute respiratory failure with hypoxia  Patient will be discharged with metoprolol 25 mg and follow-up with PCP and Cardiology outpatient

## 2021-07-11 NOTE — PLAN OF CARE
Problem: MOBILITY - ADULT  Goal: Maintain or return to baseline ADL function  Description: INTERVENTIONS:  -  Assess patient's ability to carry out ADLs; assess patient's baseline for ADL function and identify physical deficits which impact ability to perform ADLs (bathing, care of mouth/teeth, toileting, grooming, dressing, etc )  - Assess/evaluate cause of self-care deficits   - Assess range of motion  - Assess patient's mobility; develop plan if impaired  - Assess patient's need for assistive devices and provide as appropriate  - Encourage maximum independence but intervene and supervise when necessary  - Involve family in performance of ADLs  - Assess for home care needs following discharge   - Consider OT consult to assist with ADL evaluation and planning for discharge  - Provide patient education as appropriate  7/11/2021 1333 by Daryle Ball, RN  Outcome: Adequate for Discharge  7/11/2021 1329 by Daryle Ball, RN  Outcome: Progressing  7/11/2021 1126 by Daryle Ball, RN  Outcome: Progressing  Goal: Maintains/Returns to pre admission functional level  Description: INTERVENTIONS:  - Perform BMAT or MOVE assessment daily    - Set and communicate daily mobility goal to care team and patient/family/caregiver     - Collaborate with rehabilitation services on mobility goals if consulted    - Out of bed for toileting  - Record patient progress and toleration of activity level   7/11/2021 1333 by Daryle Ball, RN  Outcome: Adequate for Discharge  7/11/2021 1329 by Daryle Ball, RN  Outcome: Progressing  7/11/2021 1126 by Daryle Ball, RN  Outcome: Progressing     Problem: Prexisting or High Potential for Compromised Skin Integrity  Goal: Skin integrity is maintained or improved  Description: INTERVENTIONS:  - Identify patients at risk for skin breakdown  - Assess and monitor skin integrity  - Assess and monitor nutrition and hydration status  - Monitor labs   - Assess for incontinence   - Turn and reposition patient  - Assist with mobility/ambulation  - Relieve pressure over bony prominences  - Avoid friction and shearing  - Provide appropriate hygiene as needed including keeping skin clean and dry  - Evaluate need for skin moisturizer/barrier cream  - Collaborate with interdisciplinary team   - Patient/family teaching  - Consider wound care consult   7/11/2021 1333 by De Bhandari RN  Outcome: Adequate for Discharge  7/11/2021 1329 by De Bhandari RN  Outcome: Progressing  7/11/2021 1126 by De Bhandari RN  Outcome: Progressing     Problem: Potential for Falls  Goal: Patient will remain free of falls  Description: INTERVENTIONS:  - Educate patient/family on patient safety including physical limitations  - Instruct patient to call for assistance with activity   - Consult OT/PT to assist with strengthening/mobility   - Keep Call bell within reach  - Keep bed low and locked with side rails adjusted as appropriate  - Keep care items and personal belongings within reach  - Initiate and maintain comfort rounds  - Make Fall Risk Sign visible to   - Apply yellow socks and bracelet for high fall risk patients  - Consider moving patient to room near nurses station  7/11/2021 1333 by De Bhandari RN  Outcome: Adequate for Discharge  7/11/2021 1329 by De Bhandari RN  Outcome: Progressing  7/11/2021 1126 by De Bhandari RN  Outcome: Progressing     Problem: RESPIRATORY - ADULT  Goal: Achieves optimal ventilation and oxygenation  Description: INTERVENTIONS:  - Assess for changes in respiratory status  - Assess for changes in mentation and behavior  - Position to facilitate oxygenation and minimize respiratory effort  - Oxygen administered by appropriate delivery if ordered  - Initiate smoking cessation education as indicated  - Encourage broncho-pulmonary hygiene including cough, deep breathe, Incentive Spirometry  - Assess the need for suctioning and aspirate as needed  - Assess and instruct to report SOB or any respiratory difficulty  - Respiratory Therapy support as indicated  7/11/2021 1333 by Emily Dumont RN  Outcome: Adequate for Discharge  7/11/2021 1329 by Emily Dumont RN  Outcome: Progressing  7/11/2021 1126 by Emily Dumont RN  Outcome: Progressing     Problem: INFECTION - ADULT  Goal: Absence or prevention of progression during hospitalization  Description: INTERVENTIONS:  - Assess and monitor for signs and symptoms of infection  - Monitor lab/diagnostic results  - Monitor all insertion sites, i e  indwelling lines, tubes, and drains  - Monitor endotracheal if appropriate and nasal secretions for changes in amount and color  - [place] appropriate cooling/warming therapies per order  - Administer medications as ordered  - Instruct and encourage patient and family to use good hand hygiene technique  - Identify and instruct in appropriate isolation precautions for identified infection/condition  7/11/2021 1333 by Emily Dumont RN  Outcome: Adequate for Discharge  7/11/2021 1329 by Emily Dumont RN  Outcome: Progressing  7/11/2021 1126 by Emily Dumont RN  Outcome: Progressing  Goal: Absence of fever/infection during neutropenic period  Description: INTERVENTIONS:  - Monitor WBC    7/11/2021 1333 by Emily Dumont RN  Outcome: Adequate for Discharge  7/11/2021 1329 by Emily Dumont RN  Outcome: Progressing  7/11/2021 1126 by Emily Dumont RN  Outcome: Progressing     Problem: Nutrition/Hydration-ADULT  Goal: Nutrient/Hydration intake appropriate for improving, restoring or maintaining nutritional needs  Description: Monitor and assess patient's nutrition/hydration status for malnutrition  Collaborate with interdisciplinary team and initiate plan and interventions as ordered  Monitor patient's weight and dietary intake as ordered or per policy  Utilize nutrition screening tool and intervene as necessary  Determine patient's food preferences and provide high-protein, high-caloric foods as appropriate  INTERVENTIONS:  - Monitor oral intake, urinary output, labs, and treatment plans  - Assess nutrition and hydration status and recommend course of action  - Evaluate amount of meals eaten  - Assist patient with eating if necessary   - Allow adequate time for meals  - Recommend/ encourage appropriate diets, oral nutritional supplements, and vitamin/mineral supplements  - Order, calculate, and assess calorie counts as needed  - Recommend, monitor, and adjust tube feedings and TPN/PPN based on assessed needs  - Assess need for intravenous fluids  - Provide specific nutrition/hydration education as appropriate  - Include patient/family/caregiver in decisions related to nutrition  7/11/2021 1333 by De Bhandari RN  Outcome: Adequate for Discharge  7/11/2021 1329 by De Bhandari RN  Outcome: Progressing  7/11/2021 1126 by De Bhandari RN  Outcome: Progressing

## 2021-07-11 NOTE — PLAN OF CARE
Problem: MOBILITY - ADULT  Goal: Maintain or return to baseline ADL function  Description: INTERVENTIONS:  -  Assess patient's ability to carry out ADLs; assess patient's baseline for ADL function and identify physical deficits which impact ability to perform ADLs (bathing, care of mouth/teeth, toileting, grooming, dressing, etc )  - Assess/evaluate cause of self-care deficits   - Assess range of motion  - Assess patient's mobility; develop plan if impaired  - Assess patient's need for assistive devices and provide as appropriate  - Encourage maximum independence but intervene and supervise when necessary  - Involve family in performance of ADLs  - Assess for home care needs following discharge   - Consider OT consult to assist with ADL evaluation and planning for discharge  - Provide patient education as appropriate  Outcome: Progressing  Goal: Maintains/Returns to pre admission functional level  Description: INTERVENTIONS:  - Perform BMAT or MOVE assessment daily    - Set and communicate daily mobility goal to care team and patient/family/caregiver     - Collaborate with rehabilitation services on mobility goals if   - Record patient progress and toleration of activity level   Outcome: Progressing     Problem: Prexisting or High Potential for Compromised Skin Integrity  Goal: Skin integrity is maintained or improved  Description: INTERVENTIONS:  - Identify patients at risk for skin breakdown  - Assess and monitor skin integrity  - Assess and monitor nutrition and hydration status  - Monitor labs   - Assess for incontinence   - Turn and reposition patient  - Assist with mobility/ambulation  - Relieve pressure over bony prominences  - Avoid friction and shearing  - Provide appropriate hygiene as needed including keeping skin clean and dry  - Evaluate need for skin moisturizer/barrier cream  - Collaborate with interdisciplinary team   - Patient/family teaching  - Consider wound care consult   Outcome: Progressing Problem: Potential for Falls  Goal: Patient will remain free of falls  Description: INTERVENTIONS:  - Educate patient/family on patient safety including physical limitations  - Instruct patient to call for assistance with activity   - Consult OT/PT to assist with strengthening/mobility   - Keep Call bell within reach  - Keep bed low and locked with side rails adjusted as appropriate  - Keep care items and personal belongings within reach  - Initiate and maintain comfort rounds  - Make Fall Risk Sign visible to staff  - Apply yellow socks and bracelet for high fall risk patients  - Consider moving patient to room near nurses station  Outcome: Progressing     Problem: RESPIRATORY - ADULT  Goal: Achieves optimal ventilation and oxygenation  Description: INTERVENTIONS:  - Assess for changes in respiratory status  - Assess for changes in mentation and behavior  - Position to facilitate oxygenation and minimize respiratory effort  - Oxygen administered by appropriate delivery if ordered  - Initiate smoking cessation education as indicated  - Encourage broncho-pulmonary hygiene including cough, deep breathe, Incentive Spirometry  - Assess the need for suctioning and aspirate as needed  - Assess and instruct to report SOB or any respiratory difficulty  - Respiratory Therapy support as indicated  Outcome: Progressing     Problem: INFECTION - ADULT  Goal: Absence or prevention of progression during hospitalization  Description: INTERVENTIONS:  - Assess and monitor for signs and symptoms of infection  - Monitor lab/diagnostic results  - Monitor all insertion sites, i e  indwelling lines, tubes, and drains  - Monitor endotracheal if appropriate and nasal secretions for changes in amount and color  - [place] appropriate cooling/warming therapies per order  - Administer medications as ordered  - Instruct and encourage patient and family to use good hand hygiene technique  - Identify and instruct in appropriate isolation precautions for identified infection/condition  Outcome: Progressing  Goal: Absence of fever/infection during neutropenic period  Description: INTERVENTIONS:  - Monitor WBC    Outcome: Progressing     Problem: Nutrition/Hydration-ADULT  Goal: Nutrient/Hydration intake appropriate for improving, restoring or maintaining nutritional needs  Description: Monitor and assess patient's nutrition/hydration status for malnutrition  Collaborate with interdisciplinary team and initiate plan and interventions as ordered  Monitor patient's weight and dietary intake as ordered or per policy  Utilize nutrition screening tool and intervene as necessary  Determine patient's food preferences and provide high-protein, high-caloric foods as appropriate       INTERVENTIONS:  - Monitor oral intake, urinary output, labs, and treatment plans  - Assess nutrition and hydration status and recommend course of action  - Evaluate amount of meals eaten  - Assist patient with eating if necessary   - Allow adequate time for meals  - Recommend/ encourage appropriate diets, oral nutritional supplements, and vitamin/mineral supplements  - Order, calculate, and assess calorie counts as needed  - Recommend, monitor, and adjust tube feedings and TPN/PPN based on assessed needs  - Assess need for intravenous fluids  - Provide specific nutrition/hydration education as appropriate  - Include patient/family/caregiver in decisions related to nutrition  Outcome: Progressing

## 2021-07-12 NOTE — ASSESSMENT & PLAN NOTE
Patient was recently hospitalized with acute respiratory failure attributed to pneumonia versus post COVID pulmonary fibrosis  He was diagnosed with COVID in May/21, managed at home  Patient completed antibiotic course at the hospital   Oxygen requirements are down to 5 L at rest and up to 15 L with ambulation at times  Patient remains on Incruse Ellipta and albuterol inhaler  Continue to monitor respiratory status closely    Follow-up with Pulmonary Service

## 2021-07-12 NOTE — UTILIZATION REVIEW
Notification of Discharge   This is a Notification of Discharge from our facility [address]  Please be advised that this patient has been discharge from our facility  Below you will find the admission and discharge date and time including the patients disposition  UTILIZATION REVIEW CONTACT:  Zelia Cogan  Utilization   Network Utilization Review Department  Phone: [phone] x carefully listen to the prompts  All voicemails are confidential   Email: [email or web address]     PHYSICIAN ADVISORY SERVICES:  FOR [id number] REVIEW - MEDICAL NECESSITY DENIAL  Phone: [phone]  Fax: [phone]  Email: [email or web address]     PRESENTATION DATE: 6/15/2021  7:06 AM  OBERVATION ADMISSION DATE:   INPATIENT ADMISSION DATE: 6/15/21 11:53 AM   DISCHARGE DATE: 7/11/2021  4:03 PM  DISPOSITION: Non [facility] SN[facility]/TCU/SNU Non [facility] SNF/TCU/SNU      IMPORTANT INFORMATION:  Send all requests for admission clinical reviews, approved or denied determinations and any other requests to dedicated fax number below belonging to the campus where the patient is receiving treatment   List of dedicated fax numbers:  [address] [place] [address] DENIALS (Administrative/Medical Necessity) [phone]   [address]  (Maternity/NICU/Pediatrics) [phone]   Rinda Purple [phone]   Diego Gouge [phone]   Steven Brine [phone]   [address] [phone]   [facility]  [phone]   [address] [facility]  [address] [facility] [address] [facility] [phone]

## 2021-07-12 NOTE — PROGRESS NOTES
[facility] [facility]  [address], [address] [place] [address]    Nursing Home Admission    NAME: Pedro Javier  AGE: 65 y o  SEX: male [id number]      Patient Location     [facility]    Patients care was coordinated with nursing facility staff  Recent vitals, labs and updated medications were reviewed on [employer] system of facility  Past Medical, surgical, social, medication and allergy history and patients previous records reviewed  Assessment/Plan:    Acute respiratory failure with hypoxia (HCC)  Patient was recently hospitalized with acute respiratory failure attributed to pneumonia versus post COVID pulmonary fibrosis  He was diagnosed with COVID in May/21, managed at home  Patient completed antibiotic course at the hospital   Oxygen requirements are down to 5 L at rest and up to 15 L with ambulation at times  Patient remains on Incruse Ellipta and albuterol inhaler  Continue to monitor respiratory status closely  Follow-up with Pulmonary Service    Paroxysmal atrial fibrillation:  Patient was recently diagnosed with PAF  at the hospital   Heart rate stable on metoprolol  Patient was started on warfarin  Follow-up repeat INR  Lab Results   Component Value Date    INR 2 45 (H) 07/10/2021    INR 2 34 (H) 07/08/2021    INR 2 47 (H) 07/07/2021    PROTIME 26 6 (H) 07/10/2021    PROTIME 25 7 (H) 07/08/2021    PROTIME 26 8 (H) 07/07/2021     Diabetes mellitus type 2:  Lab Results   Component Value Date    HGBA1C 6 5 (H) 06/23/2021     Recent hemoglobin A1c was well controlled  Continue Lantus 12 units daily along with sliding scale coverage  Blood sugars likely to increase with prednisone on board  Fasting blood sugar was high 116  Postprandial reading was 267    Esophageal cancer:  Per records patient has history of metastatic esophageal cancer  Last chemo and radiation therapy was over a month ago  Patient had been on Herceptin infusion    Follow-up with heme Onc    Essential hypertension:  Blood pressure was noted to be low over the weekend  Currently stable  Continue metoprolol with holding parameters    Carotid stenosis:  Continue aspirin and statin    Peripheral arterial disease:  Continue aspirin and statin    GERD:  Continue Protonix  Follow-up with GI    Moderate protein calorie malnutrition:  Patient is noted to be weak, reports losing more than 44 lb in a month  Malnutrition noted in the setting of ongoing illness, recent prolonged hospitalization  Continue to encourage p o  intake    Constipation:  Patient reports having severe constipation recently at the hospital needing multiple enemas  KUB revealed nonobstructive bowel gas pattern  Continue MiraLax  Start Colace 100 mg b i d  Acute on chronic renal failure:  Noted recently at the hospital   Creatinine returned to baseline upon discharge  Lasix was resumed upon discharge  The patient appears to be on dry side with low BP at times  Will discontinue Lasix and follow    Hyperlipidemia:  Continue simvastatin    Chief Complaint      Recent hospitalization for acute respiratory failure with hypoxia, paroxysmal atrial fibrillation  History of esophageal cancer, peripheral arterial disease, malnutrition, carotid stenosis, deconditioned    HPI       Patient is a 65 y o  male with past medical history significant for esophageal cancer status post chemo and radiation therapy, carotid stenosis, hypertension,, GERD, CKD 3 and diabetes mellitus type 2  Pt was diagnosed with Covid 19 in 5/21 managed at home  Patient was hospitalized on 06/15/2021 with acute respiratory failure related to pneumonia  There was also question of post COVID-19 fibrosis contributing to dyspnea  Patient was started on prednisone taper  Respiratory status in general has improved    Patient continues to required up to 15 L with ambulation    During the hospital stay patient was found to have new onset atrial fibrillation  He was started on Coumadin  Hospital stay was complicated by severe constipation needing aggressive bowel regimen including multiple enemas  Patient was evaluated by GI service  Outpatient colonoscopy was recommended  Pt was subsequently discharged to [facility] rehab where he is being seen for post hospital admission  At the time of my evaluation patient is doing okay  Patient was noted to have low blood pressure over the weekend  Holding parameters were provider  Past Medical History:   Diagnosis Date    Anxiety     Cancer [facility])     Esophageal    Dysphagia     Esophageal abnormality     Esophageal cancer (HCC)     GERD (gastroesophageal reflux disease)     Hyperlipidemia     Hypertension     Occasional tremors     Transaminitis 6/16/2021       Past Surgical History:   Procedure Laterality Date    ESOPHAGOGASTRODUODENOSCOPY N/A 8/9/2017    Procedure: ESOPHAGOGASTRODUODENOSCOPY (EGD); Surgeon: Fantasma Garza MD;  [facility]: [facility]; Service: Gastroenterology    ESOPHAGOGASTRODUODENOSCOPY N/A 8/11/2017    Procedure: ESOPHAGOGASTRODUODENOSCOPY (EGD) w/ stent;  Surgeon: Fantasma Garza MD;  [facility];   Service: Gastroenterology    ESOPHAGOGASTRODUODENOSCOPY N/A 1/26/2021    Procedure: ESOPHAGOGASTRODUODENOSCOPY (EGD), BIOPSY, ESOPHAGEAL DILATION,  STENT PLACEMENT;  Surgeon: Ivana Chino MD;  [facility];  Service: Thoracic    LAPAROTOMY N/A 2/11/2020    Procedure: EGD; REMOVAL OF ESOPHAGEAL STENTS X 3;  Surgeon: Ivana Chino MD;  [facility];  Service: Thoracic    PORTACATH PLACEMENT      PORTACATH PLACEMENT      SD ESOPHAGOGASTRODUODENOSCOPY TRANSORAL DIAGNOSTIC N/A 4/26/2021    Procedure: ESOPHAGOGASTRODUODENOSCOPY (EGD); stent removal;  Surgeon: Ivana Chino MD;  [facility];  Service: Thoracic    UPPER GASTROINTESTINAL ENDOSCOPY      VASCULAR SURGERY  2008    blockage in neck        Social History     Tobacco Use Smoking Status Former Smoker    Packs/day: 1 00    Years: 39 00    Pack years: 39 00    Types: Cigarettes    Start date:     Quit date: 2008    Years since quittin 8   Smokeless Tobacco Never Used   Tobacco Comment    started around age 22           Family History   Problem Relation Age of Onset    Liver cancer Paternal Uncle         Allergies   Allergen Reactions    Other      Cat Dander          Current Outpatient Medications:     aspirin 81 mg chewable tablet, Chew 81 mg daily, Disp: , Rfl:     furosemide (LASIX) 20 mg tablet, Take 1 tablet (20 mg total) by mouth daily, Disp: 30 tablet, Rfl: 0    insulin glargine (LANTUS) 100 units/mL subcutaneous injection, Inject 12 Units under the skin every morning, Disp: 10 mL, Rfl: 0    lidocaine-prilocaine (EMLA) cream, Apply topically as needed for mild pain, Disp: 30 g, Rfl: 1    LORazepam (ATIVAN) 0 5 mg tablet, Take 1 tablet (0 5 mg total) by mouth 2 (two) times a day as needed for anxiety, Disp: 60 tablet, Rfl: 1    melatonin 3 mg, Take 1 tablet (3 mg total) by mouth daily at bedtime, Disp: 30 each, Rfl: 0    metoprolol tartrate (LOPRESSOR) 25 mg tablet, Take 0 5 tablets (12 5 mg total) by mouth every 12 (twelve) hours, Disp: 30 tablet, Rfl: 0    Multiple Vitamin (MULTIVITAMIN) tablet, Take 1 tablet by mouth daily, Disp: , Rfl:     ondansetron (ZOFRAN-ODT) 4 mg disintegrating tablet, Take 1 tablet (4 mg total) by mouth every 6 (six) hours as needed for nausea or vomiting, Disp: 20 tablet, Rfl: 0    pantoprazole (PROTONIX) 40 mg tablet, Take 1 tablet by mouth daily in the early morning, Disp: 30 tablet, Rfl: 0    polyethylene glycol (MIRALAX) 17 g packet, Take 17 g by mouth 3 (three) times a day, Disp: 1530 g, Rfl: 0    predniSONE 10 mg tablet, Take 3 tablets (30 mg total) by mouth daily for 3 days, THEN 2 tablets (20 mg total) daily for 7 days, THEN 1 tablet (10 mg total) daily for 7 days  , Disp: 30 tablet, Rfl: 0    simvastatin (ZOCOR) 40 mg tablet, Take 40 mg by mouth daily at bedtime, Disp: , Rfl:     tiotropium (SPIRIVA) 18 mcg inhalation capsule, Place 1 capsule (18 mcg total) into inhaler and inhale daily, Disp: 30 capsule, Rfl: 0    warfarin (COUMADIN) 3 mg tablet, Take 1 tablet (3 mg total) by mouth daily, Disp: 30 tablet, Rfl: 0  No current facility-administered medications for this visit  Updated list was reviewed in [address] [place] [address] system of facility  Vitals:    07/12/21 1347   BP: 114/64   Pulse: 77   Resp: 16   Temp: 97 8 °F (36 6 °C)   SpO2: 97%       Vital signs were reviewed in point click care    Review of Systems   Constitutional: Positive for appetite change (Decreased earlier improved present), fatigue and unexpected weight change (Patient reports losing over 44 lb in 1 month)  Negative for chills and fever  HENT: Negative for nosebleeds and rhinorrhea  Eyes: Negative for discharge and redness  Respiratory: Negative for cough, chest tightness, shortness of breath, wheezing and stridor  Cardiovascular: Negative for chest pain and leg swelling  Gastrointestinal: Negative for abdominal distention, abdominal pain, diarrhea and vomiting  Genitourinary: Negative for dysuria, flank pain and hematuria  Musculoskeletal: Positive for gait problem  Negative for arthralgias and back pain  Skin: Negative for pallor  Neurological: Positive for weakness (Generalized)  Negative for tremors, seizures, syncope and headaches  Psychiatric/Behavioral: Negative for agitation, behavioral problems and confusion  Physical Exam  Constitutional:       General: He is not in acute distress  Appearance: He is well-developed  He is not diaphoretic  HENT:      Nose: No rhinorrhea  Eyes:      General: No scleral icterus  Right eye: No discharge  Left eye: No discharge  Cardiovascular:      Rate and Rhythm: Normal rate and regular rhythm  Pulmonary:      Breath sounds: No stridor   Rales (Few at bases) present  No wheezing  Abdominal:      General: There is no distension  Palpations: Abdomen is soft  Tenderness: There is no abdominal tenderness  There is no guarding  Musculoskeletal:      Cervical back: Neck supple  Right lower leg: No edema  Left lower leg: No edema  Skin:     Coloration: Skin is not jaundiced or pale  Neurological:      General: No focal deficit present  Mental Status: He is alert  Cranial Nerves: No cranial nerve deficit  Motor: Weakness (Generalized) present  Comments: Muscle wasting of all extremities noted   Psychiatric:         Mood and Affect: Mood normal          Behavior: Behavior normal            Diagnostic Data       Recent labs, EKG and imaging studies were reviewed  Lab Results   Component Value Date    SODIUM 139 07/10/2021    K 5 1 07/10/2021     07/10/2021    CO2 30 07/10/2021    BUN 11 07/10/2021    CREATININE 0 89 07/10/2021    GLUC 84 07/10/2021    CALCIUM 8 4 07/10/2021     Lab Results   Component Value Date    WBC 8 98 07/01/2021    HGB 13 7 07/01/2021    HCT 41 1 07/01/2021    MCV 90 07/01/2021     07/01/2021        Code Status:      Full code    CTA chest on 06/15/2021 was negative for PE revealed diffuse ground-glass opacities in the lung    Additional notes:      Discontinue Lasix   Start Colace 100 mg b i d    Continue tapering schedule for prednisone  Follow-up with Pulmonary Service   Monitor respiratory status closely   Follow-up repeat labs  Monitor PT INR and adjust Coumadin dose accordingly   Continue PT      This note was electronically signed by Dr Surendra Wright

## 2021-07-14 PROBLEM — F41.9 ANXIETY: Status: ACTIVE | Noted: 2021-01-01

## 2021-07-14 PROBLEM — R53.81 DEBILITY: Status: ACTIVE | Noted: 2021-01-01

## 2021-07-14 NOTE — ASSESSMENT & PLAN NOTE
Lab Results   Component Value Date    HGBA1C 6 5 (H) 06/23/2021   -blood glucose within acceptable range for patient's age  -continue Lantus insulin 12 units daily  -continue Accu-Cheks with SSI   -continue to monitor

## 2021-07-14 NOTE — ASSESSMENT & PLAN NOTE
Malnutrition Findings:  44 lb weight loss in one month per record review in setting of acute illness on chronic illness  -change diet to regular  -consult dietitian  -monitor weight

## 2021-07-14 NOTE — PROGRESS NOTES
[place]  POS: 31 (STR)  Progress Note    Chief Complaint/Reason for visit: STR follow up  History of Present Illness:  75-year-old male seen and examined for STR follow up  Received patient resting in bed with oxygen on  Patient appears ill  No clubbing or cyanosis noted  Patient reports that he has intermittent productive cough  No episodes of respiratory distress reported  His appetite is fair to poor  Denies having trouble swallowing  Past Medical History: unchanged from history and physical  Past Medical History:   Diagnosis Date    Anxiety     Cancer ([address] )     Esophageal    Dysphagia     Esophageal abnormality     Esophageal cancer (HCC)     GERD (gastroesophageal reflux disease)     Hyperlipidemia     Hypertension     Occasional tremors     Transaminitis 6/16/2021     Family History: unchanged from history and physical  Social History: unchanged from history and physical  Resident Since: 7/11/2021  Review of systems: Review of Systems   Constitutional: Positive for activity change, appetite change and fatigue  Negative for chills, diaphoresis and fever  HENT: Negative  Eyes: Negative  Respiratory: Positive for cough and shortness of breath  Cardiovascular: Negative  Gastrointestinal: Negative  Endocrine: Negative  Genitourinary: Negative  Musculoskeletal: Positive for gait problem  Neurological: Negative for dizziness, syncope, speech difficulty, light-headedness, numbness and headaches  Psychiatric/Behavioral: Negative for agitation and behavioral problems  The patient is not nervous/anxious  All other systems reviewed and are negative  Medications: All medication orders were reviewed  Allergies: NKDA  Consults reviewed: Other  Labs/Diagnostics (reviewed by this provider): Copy in Chart    Imaging Reviewed: None today     Physical Exam    Weight:  168 lb Temp: 97 4 BP: 84/48; will recheck BP   Pulse: 80 Resp: 16 O2 Sat:  98% on oxygen  Constitutional: Normocephalic  [facility]     Physical Exam  Vitals and nursing note reviewed  Constitutional:       General: He is not in acute distress  Appearance: He is not toxic-appearing or diaphoretic  Comments: Elderly male who appears chronically ill   HENT:      Head: Normocephalic  Nose: No congestion or rhinorrhea  Mouth/Throat:      Mouth: Mucous membranes are moist       Pharynx: No oropharyngeal exudate  Eyes:      General: No scleral icterus  Right eye: No discharge  Left eye: No discharge  Extraocular Movements: Extraocular movements intact  Conjunctiva/sclera: Conjunctivae normal       Pupils: Pupils are equal, round, and reactive to light  Cardiovascular:      Rate and Rhythm: Normal rate and regular rhythm  Pulses: Normal pulses  Pulmonary:      Comments: Easily dyspneic on slight exertion  Course rales noted 1/2 up bilateral lung fields  Abdominal:      General: Bowel sounds are normal  There is no distension  Palpations: Abdomen is soft  Tenderness: There is no abdominal tenderness  There is no guarding  Musculoskeletal:      Cervical back: Neck supple  No rigidity  Right lower leg: No edema  Left lower leg: No edema  Comments: Moves all 4 extremities with generalized weakness   Lymphadenopathy:      Cervical: No cervical adenopathy  Skin:     General: Skin is warm and dry  Capillary Refill: Capillary refill takes less than 2 seconds  Neurological:      Mental Status: He is alert  Mental status is at baseline  Cranial Nerves: No cranial nerve deficit  Motor: Weakness present  Gait: Gait abnormal    Psychiatric:         Mood and Affect: Mood normal          Behavior: Behavior normal          Thought Content:  Thought content normal        Assessment/Plan:  77-year-old male with:    Acute respiratory failure with hypoxia ([facility] [address] )  -diagnosed with COVID-19   -recently hospitalized with acute respiratory failure secondary to pneumonia versus post COVID-19 pulmonary fibrosis  -requiring continuous oxygen via nasal cannula  -continue albuterol nebulizer, Incruse Ellipta  -continue prednisone taper  -continue to monitor closely as patient is at risk for deterioration    Paroxysmal A-fib (HCC)  -HR trending 70s to 80s  -continue metoprolol with hold parameter    Type 2 diabetes mellitus, without long-term current use of insulin (HCC)    Lab Results   Component Value Date    HGBA1C 6 5 (H) 06/23/2021   -blood glucose within acceptable range for patient's age  -continue Lantus insulin 12 units daily  -continue Accu-Cheks with SSI   -continue to monitor    Carotid stenosis  -stable on aspirin and statin  -follow-up with vascular outpatient    Moderate protein-calorie malnutrition (HCC)  Malnutrition Findings:  44 lb weight loss in one month per record review in setting of acute illness on chronic illness  -change diet to regular  -consult dietitian  -monitor weight    GERD (gastroesophageal reflux disease)  -continue Protonix    Esophageal cancer   -with history of esophageal stricture, history of EGD with dilatation and stent placement 01/26/2021  -patient was due for Herceptin infusion on 06/29/2021; oncologist recommends holding treatment until patient's condition is stable  -outpatient follow-up with oncology    Debility  -multifactorial  -continue care and support at SNF for ADLs  -continue PT/OT  -continue fall precautions  -ensure adequate hydration and nutrition  -management of acute and chronic medical conditions outlined    Essential hypertension  -with recent episodes of hypotension inpatient when sleeping likely secondary to hypoxia and metoprolol dose decreased to 12 5 mg q 12 hours  -SBPs has been trending 100s to 110s at [facility] with isolated BP 84/48 today; will recheck BP  -continue metoprolol with hold parameter    Anxiety  -continue lorazepam p r n     **Please note:  This follow-up note was constructed using a voice recognition system  [address] [id number] [facility]  5/[id number]:90 AM

## 2021-07-14 NOTE — ASSESSMENT & PLAN NOTE
-diagnosed with COVID-19   -recently hospitalized with acute respiratory failure secondary to pneumonia versus post COVID-19 pulmonary fibrosis  -requiring continuous oxygen via nasal cannula  -continue albuterol nebulizer, Incruse Ellipta  -continue prednisone taper  -continue to monitor closely as patient is at risk for deterioration

## 2021-07-14 NOTE — ASSESSMENT & PLAN NOTE
-with history of esophageal stricture, history of EGD with dilatation and stent placement 01/26/2021  -patient was due for Herceptin infusion on 06/29/2021; oncologist recommends holding treatment until patient's condition is stable  -outpatient follow-up with oncology

## 2021-07-14 NOTE — ASSESSMENT & PLAN NOTE
-with recent episodes of hypotension inpatient when sleeping likely secondary to hypoxia and metoprolol dose decreased to 12 5 mg q 12 hours  -SBPs has been trending 100s to 110s at [facility] with isolated BP 84/48 today; will recheck BP  -continue metoprolol with hold parameter

## 2021-07-14 NOTE — ASSESSMENT & PLAN NOTE
-multifactorial  -continue care and support at SNF for ADLs  -continue PT/OT  -continue fall precautions  -ensure adequate hydration and nutrition  -management of acute and chronic medical conditions outlined

## 2021-07-20 PROBLEM — Z86.16 HISTORY OF COVID-19: Status: ACTIVE | Noted: 2021-01-01

## 2021-07-20 PROBLEM — A41.9 SEPSIS (HCC): Status: ACTIVE | Noted: 2021-01-01

## 2021-07-20 PROBLEM — S31.000A SACRAL WOUND: Status: ACTIVE | Noted: 2021-01-01

## 2021-07-20 PROBLEM — J96.21 ACUTE ON CHRONIC RESPIRATORY FAILURE WITH HYPOXIA (HCC): Status: ACTIVE | Noted: 2021-01-01

## 2021-07-20 NOTE — ED NOTES
Transport and Transfer info:  [facility]NI[facility] @ [place] [address] ICU [address] [place] [address]   Number for report: [phone]  Accepting:  Ricco Hobbs RN  07/20/21 5956

## 2021-07-20 NOTE — ED NOTES
Updated patient's wife, Renetta Heads, on patient status and pickup time  She states she will bring patient's dentures to ED prior to patient transfer       Tricia Goldman RN  07/20/21 4414

## 2021-07-20 NOTE — ASSESSMENT & PLAN NOTE
· Sacral wound present on admission  · Wound care consultation  · Frequent offloading repositioning to avoid skin breakdown

## 2021-07-20 NOTE — RESPIRATORY THERAPY NOTE
07/20/21 1046   Respiratory Assessment   Resp Comments Initiated hfnc on md request  pt tolerating well  wob appears the same to bipap      Non-Invasive Information   O2 Interface Device HFNC prongs   Non-Invasive Ventilation Mode HFNC (High flow)   $ Pulse Oximetry Spot Check Charge Completed   Non-Invasive Settings   FiO2 (%) 70   Flow (lpm) 60   Maintenance   Water bag changed No

## 2021-07-20 NOTE — ED NOTES
[facility] arrived to ED, setting up patient for transport at this time       Jossie Saeed RN  07/20/21 8984

## 2021-07-20 NOTE — ED PROVIDER NOTES
History  Chief Complaint   Patient presents with    Chest Pain     arrived via [facility] with c/o chest pain, dx pneumonia yesterday  also c/o sob ? COVID + 7/9/21     Patient is a 58-year-old male seen in the emergency department with concern for chest pain which began this evening just prior to evaluation  Patient notes substernal chest pain without radiation, which the patient states just hurts  Patient notes associated shortness of breath  Patient notes no cough or fever  Patient notes history of pulmonary fibrosis, possibly due to radiation treatment for esophageal cancer  Review of records shows the patient tested positive for COVID 19 infection on 07/09/2021  Patient was admitted to [address] [place] [address] last month for respiratory failure/hypoxia  Patient with a long smoking history, but states that he is not currently smoke cigarettes  Patient does wear oxygen via nasal cannula at home, and states that his oxygen saturation is typically about 93%  Patient was found to be hypoxic to 84% in the emergency department on 4 L via nasal cannula  Patient was treated with a DuoNeb by EMS prior to arrival in the emergency department  Prior to Admission Medications   Prescriptions Last Dose Informant Patient Reported? Taking?    LORazepam (ATIVAN) 0 5 mg tablet 7/19/2021 at Unknown time Self No Yes   Sig: Take 1 tablet (0 5 mg total) by mouth 2 (two) times a day as needed for anxiety   Multiple Vitamin (MULTIVITAMIN) tablet 7/19/2021 at Unknown time Self Yes Yes   Sig: Take 1 tablet by mouth daily   albuterol (2 5 mg/3 mL) 0 083 % nebulizer solution 7/20/2021 at Unknown time  Yes Yes   Sig: Take 2 5 mg by nebulization every 4 (four) hours as needed for wheezing or shortness of breath   amoxicillin-clavulanate (AUGMENTIN) 875-125 mg per tablet 7/19/2021 at Unknown time  Yes Yes   Sig: Take 1 tablet by mouth every 12 (twelve) hours Start 7/19 until 7/26   aspirin 81 mg chewable tablet 7/20/2021 at Unknown time Self Yes Yes   Sig: Chew 81 mg daily   azithromycin (ZITHROMAX) 250 mg tablet 7/19/2021 at Unknown time  Yes Yes   Sig: Take 500 mg by mouth every 24 hours Start 7/19 x 4 doses   docusate sodium (COLACE) 100 mg capsule 7/19/2021 at Unknown time  Yes Yes   Sig: Take 100 mg by mouth 2 (two) times a day   furosemide (LASIX) 20 mg tablet Not Taking at Unknown time  No No   Sig: Take 1 tablet (20 mg total) by mouth daily   Patient not taking: Reported on 7/20/2021   insulin glargine (LANTUS) 100 units/mL subcutaneous injection 7/19/2021 at Unknown time  No Yes   Sig: Inject 12 Units under the skin every morning   insulin lispro (HumaLOG) 100 units/mL injection 7/19/2021 at Unknown time  Yes Yes   Sig: Inject under the skin 3 (three) times a day with meals   lidocaine-prilocaine (EMLA) cream Unknown at Unknown time  No No   Sig: Apply topically as needed for mild pain   melatonin 3 mg 7/19/2021 at Unknown time Self No Yes   Sig: Take 1 tablet (3 mg total) by mouth daily at bedtime   metoprolol tartrate (LOPRESSOR) 25 mg tablet 7/19/2021 at Unknown time  No Yes   Sig: Take 0 5 tablets (12 5 mg total) by mouth every 12 (twelve) hours   nystatin (MYCOSTATIN) powder 7/19/2021 at Unknown time  Yes Yes   Sig: Apply topically 4 (four) times a day To groin fungus   ondansetron (ZOFRAN-ODT) 4 mg disintegrating tablet Unknown at Unknown time  No No   Sig: Take 1 tablet (4 mg total) by mouth every 6 (six) hours as needed for nausea or vomiting   pantoprazole (PROTONIX) 40 mg tablet 7/19/2021 at Unknown time Self No Yes   Sig: Take 1 tablet by mouth daily in the early morning   polyethylene glycol (MIRALAX) 17 g packet 7/19/2021 at Unknown time  No Yes   Sig: Take 17 g by mouth 3 (three) times a day   predniSONE 10 mg tablet 7/19/2021 at Unknown time  No Yes   Sig: Take 3 tablets (30 mg total) by mouth daily for 3 days, THEN 2 tablets (20 mg total) daily for 7 days, THEN 1 tablet (10 mg total) daily for 7 days     simvastatin (ZOCOR) 40 mg tablet 7/19/2021 at Unknown time Self Yes Yes   Sig: Take 40 mg by mouth daily at bedtime   tiotropium (SPIRIVA) 18 mcg inhalation capsule 7/19/2021 at Unknown time  No Yes   Sig: Place 1 capsule (18 mcg total) into inhaler and inhale daily   warfarin (COUMADIN) 3 mg tablet 7/19/2021 at Unknown time  No Yes   Sig: Take 1 tablet (3 mg total) by mouth daily   Patient taking differently: Take 4 mg by mouth daily       Facility-Administered Medications: None       Past Medical History:   Diagnosis Date    Anxiety     Cancer ([place] [address] )     Esophageal    Dysphagia     Esophageal abnormality     Esophageal cancer (HCC)     GERD (gastroesophageal reflux disease)     Hyperlipidemia     Hypertension     Occasional tremors     Transaminitis 6/16/2021    Type 2 diabetes mellitus, without long-term current use of insulin ([place] [address] )        Past Surgical History:   Procedure Laterality Date    ESOPHAGOGASTRODUODENOSCOPY N/A 8/9/2017    Procedure: ESOPHAGOGASTRODUODENOSCOPY (EGD); Surgeon: Mariel Barboza MD;  Location: [facility]; Service: Gastroenterology    ESOPHAGOGASTRODUODENOSCOPY N/A 8/11/2017    Procedure: ESOPHAGOGASTRODUODENOSCOPY (EGD) w/ stent;  Surgeon: Mariel Barboza MD;  [facility];   Service: Gastroenterology    ESOPHAGOGASTRODUODENOSCOPY N/A 1/26/2021    Procedure: ESOPHAGOGASTRODUODENOSCOPY (EGD), BIOPSY, ESOPHAGEAL DILATION,  STENT PLACEMENT;  Surgeon: Gena Kwong MD;  [facility];  Service: Thoracic    LAPAROTOMY N/A 2/11/2020    Procedure: EGD; REMOVAL OF ESOPHAGEAL STENTS X 3;  Surgeon: Gena Kwong MD;  [facility];  Service: Thoracic    PORTACATH PLACEMENT      PORTACATH PLACEMENT      DC ESOPHAGOGASTRODUODENOSCOPY TRANSORAL DIAGNOSTIC N/A 4/26/2021    Procedure: ESOPHAGOGASTRODUODENOSCOPY (EGD); stent removal;  Surgeon: Gena Kwong MD;  [facility];  Service: Thoracic    UPPER GASTROINTESTINAL ENDOSCOPY     [facility] VASCULAR SURG[facility]  2008 blockage in neck        Family History   Problem Relation Age of Onset    Liver cancer Paternal Uncle      I have reviewed and agree with the history as documented  E-Cigarette/Vaping    E-Cigarette Use Never User      E-Cigarette/Vaping Substances     Social History     Tobacco Use    Smoking status: Former Smoker     Packs/day: 1 00     Years: 39 00     Pack years: 39 00     Types: Cigarettes     Start date:      Quit date: 2008     Years since quittin 8    Smokeless tobacco: Never Used    Tobacco comment: started around age 22    Vaping Use    Vaping Use: Never used   Substance Use Topics    Alcohol use: Not Currently    Drug use: Yes     Types: Marijuana     Comment: has medical card for this       Review of Systems   Constitutional: Negative for chills and fever  HENT: Negative for ear pain and sore throat  Eyes: Negative for pain and visual disturbance  Respiratory: Positive for shortness of breath  Negative for cough  Cardiovascular: Positive for chest pain  Negative for palpitations  Gastrointestinal: Negative for abdominal pain and vomiting  Genitourinary: Negative for dysuria and hematuria  Musculoskeletal: Negative for arthralgias and back pain  Skin: Negative for color change and rash  Neurological: Negative for seizures and syncope  All other systems reviewed and are negative  Physical Exam  Physical Exam  Vitals and nursing note reviewed  Constitutional:       Appearance: He is well-developed  He is ill-appearing  HENT:      Head: Normocephalic and atraumatic  Right Ear: External ear normal       Left Ear: External ear normal       Nose: Nose normal       Mouth/Throat:      Pharynx: Oropharynx is clear  Eyes:      General: No scleral icterus  Conjunctiva/sclera: Conjunctivae normal    Cardiovascular:      Rate and Rhythm: Regular rhythm  Tachycardia present  Heart sounds: No murmur heard       Pulmonary:      Effort: Tachypnea present  Comments: Decreased breath sounds bilaterally  Abdominal:      Palpations: Abdomen is soft  Tenderness: There is no abdominal tenderness  Musculoskeletal:         General: No deformity or signs of injury  Cervical back: Normal range of motion and neck supple  Skin:     General: Skin is warm and dry  Neurological:      General: No focal deficit present  Mental Status: He is alert  Cranial Nerves: No cranial nerve deficit  Sensory: No sensory deficit     Psychiatric:         Mood and Affect: Mood normal          Behavior: Behavior normal          Vital Signs  ED Triage Vitals   Temperature Pulse Respirations Blood Pressure SpO2   07/20/21 0535 07/20/21 0535 07/20/21 0535 07/20/21 0535 07/20/21 0535   98 2 °F (36 8 °C) (!) 112 (!) 32 (!) 179/86 (!) 84 %      Temp src Heart Rate Source Patient Position - Orthostatic VS BP Location FiO2 (%)   -- 07/20/21 0535 07/20/21 0607 07/20/21 0607 --    Monitor Lying Right arm       Pain Score       07/20/21 0535       9           Vitals:    07/20/21 0535 07/20/21 0607 07/20/21 0619   BP: (!) 179/86 117/89 140/91   Pulse: (!) 112 (!) 120 (!) 125   Patient Position - Orthostatic VS:  Lying Lying         Visual Acuity      ED Medications  Medications   sodium chloride 0 9 % bolus 500 mL (500 mL Intravenous New Bag 7/20/21 0600)   cefTRIAXone (ROCEPHIN) IVPB (premix in dextrose) 1,000 mg 50 mL (1,000 mg Intravenous New Bag 7/20/21 0643)   azithromycin (ZITHROMAX) 500 mg in sodium chloride 0 9 % 250 mL IVPB (has no administration in time range)   dexamethasone (DECADRON) injection 6 mg (has no administration in time range)   sodium chloride 0 9 % bolus 500 mL (has no administration in time range)   ipratropium-albuterol (FOR EMS ONLY) (DUO-NEB) 0 5-2 5 mg/3 mL inhalation solution 3 mL (0 mL Does not apply Given to EMS 7/20/21 0550)   aspirin chewable tablet 324 mg (324 mg Oral Given 7/20/21 0602)       Diagnostic Studies  Results Reviewed Procedure Component Value Units Date/Time    Manual Differential(PHLEBS Do Not Order) [[id number]]  (Abnormal) Collected: 07/20/21 0546    Lab Status: Final result Specimen: Blood from Arm, Left Updated: 07/20/21 0650     Segmented % 80 %      Bands % 3 %      Lymphocytes % 5 %      Monocytes % 7 %      Eosinophils, % 1 %      Basophils % 1 %      Metamyelocytes% 3 %      Absolute Neutrophils 11 19 Thousand/uL      Lymphocytes Absolute 0 67 Thousand/uL      Monocytes Absolute 0 94 Thousand/uL      Eosinophils Absolute 0 13 Thousand/uL      Basophils Absolute 0 13 Thousand/uL      Total Counted 100     RBC Morphology Present     Polychromasia Present     Platelet Estimate Adequate    CBC and differential [[id number]]  (Abnormal) Collected: 07/20/21 0546    Lab Status: Final result Specimen: Blood from Arm, Left Updated: 07/20/21 0650     WBC 13 48 Thousand/uL      RBC 5 05 Million/uL      Hemoglobin 14 8 g/dL      Hematocrit 45 1 %      MCV 89 fL      MCH 29 3 pg      MCHC 32 8 g/dL      RDW 14 7 %      MPV 8 5 fL      Platelets 080 Thousands/uL     Narrative: This is an appended report  These results have been appended to a previously verified report      Blood gas, arterial [[id number]]  (Abnormal) Collected: 07/20/21 0633    Lab Status: Final result Specimen: Blood, Arterial from Radial, Right Updated: 07/20/21 0646     pH, Arterial 7 454     pCO2, Arterial 33 9 mm Hg      pO2, Arterial 54 9 mm Hg      HCO3, Arterial 23 2 mmol/L      Base Excess, Arterial 0 0 mmol/L      O2 Content, Arterial 19 4 mL/dL      O2 HGB,Arterial  89 9 %      SOURCE Radial, Right     DARVIN TEST Yes     Non Vent type BIPAP BIPAP     IPAP 14     EPAP 8     BIPAP fio2 100 %     Comprehensive metabolic panel [[id number]]  (Abnormal) Collected: 07/20/21 0546    Lab Status: Final result Specimen: Blood from Arm, Left Updated: 07/20/21 4240     Sodium 136 mmol/L      Potassium 4 1 mmol/L      Chloride 102 mmol/L      CO2 25 mmol/L      ANION GAP 9 mmol/L      BUN 14 mg/dL      Creatinine 0 72 mg/dL      Glucose 155 mg/dL      Calcium 8 9 mg/dL      Corrected Calcium 9 5 mg/dL      AST 31 U/L      ALT 79 U/L      Alkaline Phosphatase 165 5 U/L      Total Protein 6 4 g/dL      Albumin 3 2 g/dL      Total Bilirubin 0 58 mg/dL      eGFR 93 ml/min/1 73sq m     Narrative:      [place] guidelines for Chronic Kidney Disease (CKD):     Stage 1 with normal or high GFR (GFR > 90 mL/min/1 73 square meters)    Stage 2 Mild CKD (GFR = 60-89 mL/min/1 73 square meters)    Stage 3A Moderate CKD (GFR = 45-59 mL/min/1 73 square meters)    Stage 3B Moderate CKD (GFR = 30-44 mL/min/1 73 square meters)    Stage 4 Severe CKD (GFR = 15-29 mL/min/1 73 square meters)    Stage 5 End Stage CKD (GFR <15 mL/min/1 73 square meters)  Note: GFR calculation is accurate only with a steady state creatinine    Lactic acid, plasma [[id number]]  (Normal) Collected: 07/20/21 0546    Lab Status: Final result Specimen: Blood from Arm, Left Updated: 07/20/21 6159     LACTIC ACID 1 8 mmol/L     Narrative:      Result may be elevated if tourniquet was used during collection  Magnesium [[id number]]  (Normal) Collected: 07/20/21 0546    Lab Status: Final result Specimen: Blood from Arm, Left Updated: 07/20/21 0637     Magnesium 1 9 mg/dL     APTT [[id number]]  (Abnormal) Collected: 07/20/21 0546    Lab Status: Final result Specimen: Blood from Arm, Left Updated: 07/20/21 0625     PTT 33 seconds     Protime-INR [[id number]]  (Abnormal) Collected: 07/20/21 0546    Lab Status: Final result Specimen: Blood from Arm, Left Updated: 07/20/21 0625     Protime 14 5 seconds      INR 1 30    Narrative:      INR Reference Ranges:  No Anticoagulant, Normal:           0 9-1 1  Standard Dose, Oral Anticoagulant:  2 0-3 0  High Dose, Oral Anticoagulant:      2 5-3 5    Blood culture #2 [[id number]] Collected: 07/20/21 0545    Lab Status:  In process Specimen: Blood from Line, Venous Updated: 07/20/21 0625    Blood culture #1 [[id number]] Collected: 07/20/21 0553    Lab Status: In process Specimen: Blood from Arm, Right Updated: 07/20/21 0624    Troponin I [[id number]]  (Normal) Collected: 07/20/21 0546    Lab Status: Final result Specimen: Blood from Arm, Left Updated: 07/20/21 0623     Troponin I <0 03 ng/mL     B-Type Natriuretic Peptide ([address]) [[id number]] Collected: 07/20/21 0546    Lab Status:  In process Specimen: Blood from Arm, Left Updated: 07/20/21 0600                 XR chest 1 view portable   ED Interpretation by Mary Alberts MD (07/20 6368)   Bilateral lower lobe infiltrates                 Procedures  ECG 12 Lead Documentation Only    Date/Time: 7/20/2021 5:53 AM  Performed by: Mary Alberts MD  Authorized by: Mary Alberts MD     Indications / Diagnosis:  Chest pain  ECG reviewed by me, the ED Provider: yes    Patient location:  ED  Rate:     ECG rate:  116    ECG rate assessment: tachycardic    Rhythm:     Rhythm: sinus tachycardia    QRS:     QRS axis:  Left  ST segments:     ST segments:  Non-specific  T waves:     T waves: non-specific    Comments:      Sinus tachycardia at 116, left axis deviation, , QRS 84, QTc 395, nonspecific ST-T wave abnormality, no definite evidence of acute ischemia  CriticalCare Time  Performed by: Mary Alberts MD  Authorized by: Mary Alberts MD     Critical care provider statement:     Critical care time (minutes):  60    Critical care start time:  7/20/2021 6:00 AM    Critical care end time:  7/20/2021 7:00 AM    Critical care time was exclusive of:  Separately billable procedures and treating other patients and teaching time    Critical care was necessary to treat or prevent imminent or life-threatening deterioration of the following conditions:  Respiratory failure    Critical care was time spent personally by me on the following activities:  Obtaining history from patient or surrogate, ordering and performing treatments and interventions, ordering and review of radiographic studies, re-evaluation of patient's condition, review of old charts, ordering and review of laboratory studies, evaluation of patient's response to treatment and examination of patient             ED Course                             SBIRT 20yo+      Most Recent Value   SBIRT (22 yo +)   In order to provide better care to our patients, we are screening all of our patients for alcohol and drug use  Would it be okay to ask you these screening questions? No Filed at: 07/20/2021 2101                    [facility]  Number of Diagnoses or Management Options  Bilateral pneumonia  History of COVID-19  Hypoxia  Diagnosis management comments: Patient is a 68-year-old male seen in the emergency department with concern for chest pain and shortness of breath  EKG was obtained and noted  Patient was placed on medium flow nasal cannula in the emergency department, but then BiPAP for respiratory distress, with good effect  Chest x-ray shows bilateral lower extremity infiltrates  Patient was treated with IV antibiotics for suspected pneumonia  Laboratory evaluation remarkable for elevated glucose of 155, elevated ALT of 79, elevated alk phos of 165 5, elevated PTT of 33, elevated PT of 14 5, elevated INR of 1 30, elevated white blood cell count of 13 48, 80% segs, 3% bands  Plan to transfer patient for further evaluation and treatment  Case was discussed with transfer center  Patient signed out to my colleague at change of shift, pending PAC/bed availability         Amount and/or Complexity of Data Reviewed  Clinical lab tests: ordered and reviewed  Tests in the radiology section of CPT®: ordered and reviewed  Tests in the medicine section of CPT®: ordered and reviewed        Disposition  Final diagnoses:   Bilateral pneumonia   History of COVID-19   Hypoxia     Time reflects when diagnosis was documented in both MDM as applicable and the Disposition within this note     Time User Action Codes Description Comment    7/20/2021  6:42 AM [place] [facility] Add [J18 9] Bilateral pneumonia     7/20/2021  6:56 AM [place] [facility] Add [Z86 16] History of COVID-19     7/20/2021  6:56 AM [place] [facility] Add [R09 02] Hypoxia       ED Disposition     None      Follow-up Information    None         Patient's Medications   Discharge Prescriptions    No medications on file     No discharge procedures on file      PDMP Review     None          ED Provider  Electronically Signed by           Franc Lynch MD  07/20/21 020

## 2021-07-20 NOTE — SPEECH THERAPY NOTE
Speech-Language Pathology Bedside Swallow Evaluation      Patient Name: Eagle HOLLAND Date: 7/20/2021     Problem List  Principal Problem:    Acute on chronic respiratory failure with hypoxia ([place] [address] )  Active Problems:    GERD (gastroesophageal reflux disease)    Essential hypertension    Esophageal stricture    Esophageal cancer     Multifocal pneumonia    Moderate protein-calorie malnutrition (HCC)    Type 2 diabetes mellitus, without long-term current use of insulin ([facility])    Paroxysmal A-fib ([place] [address] )    History of COVID-19    Sepsis ([place] [address] )    Sacral wound      Past Medical History  Past Medical History:   Diagnosis Date    Anxiety     Cancer ([place] [address] )     Esophageal    Dysphagia     Esophageal abnormality     Esophageal cancer (HCC)     GERD (gastroesophageal reflux disease)     Hyperlipidemia     Hypertension     Occasional tremors     Transaminitis 6/16/2021    Type 2 diabetes mellitus, without long-term current use of insulin ([place] [address] )        Past Surgical History  Past Surgical History:   Procedure Laterality Date    ESOPHAGOGASTRODUODENOSCOPY N/A 8/9/2017    Procedure: ESOPHAGOGASTRODUODENOSCOPY (EGD); Surgeon: Georgiana Suh MD;  Location: [facility]; Service: Gastroenterology    ESOPHAGOGASTRODUODENOSCOPY N/A 8/11/2017    Procedure: ESOPHAGOGASTRODUODENOSCOPY (EGD) w/ stent;  Surgeon: Georgiana Suh MD;  [facility]: [facility];   Service: Gastroenterology    ESOPHAGOGASTRODUODENOSCOPY N/A 1/26/2021    Procedure: ESOPHAGOGASTRODUODENOSCOPY (EGD), BIOPSY, ESOPHAGEAL DILATION,  STENT PLACEMENT;  Surgeon: Marley Guadarrama MD;  [facility];  Service: Thoracic    LAPAROTOMY N/A 2/11/2020    Procedure: EGD; REMOVAL OF ESOPHAGEAL STENTS X 3;  Surgeon: Marley Guadarrama MD;  [facility];  Service: Thoracic    PORTACATH PLACEMENT      PORTACATH PLACEMENT      [place] ESOPHAGOGASTRODUODENOSCOPY TRANSORAL DIAGNOSTIC N/A 4/26/2021    Procedure: ESOPHAGOGASTRODUODENOSCOPY (EGD); stent removal;  Surgeon: Ankit Singh MD;  [facility];  Service: Thoracic    UPPER GASTROINTESTINAL ENDOSCOPY      VASCULAR SURGERY  2008    blockage in neck        Summary   Pt presented with functional appearing oral and pharyngeal stage swallowing skills for mechanical soft foods (soft cooked, moistened and minced/well cut food) and thin liquid  Intake of large pills would likely be eased by crushing same  Recommended Diet: mechanically altered/level 2 diet and thin liquids   Recommended Form of Meds: OK for small pills whole with water but please cut/crush large pills  Aspiration precautions and swallowing strategies: upright posture, slow rate of feeding and small bites/sips        Current Medical Status  Efren Lomas is a 64 yo M c PMH of metastatic esophageal cancer, on palliative chemotherapy,  pulmonary fibrosis, possibly due to radiation treatment for esophageal cancer and prolonged hospital stay secondary to [facility]  He was transferred from [facility] today with substernal chest pain and on BIPAP  DX:  Acute chronic respiratory failure with hypoxia, multifocal pneumonia, sepsis, h/o Covid-19 (diagnosed 5/11/21), paroxysmal afib, h/o 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 s/p [id number], was on trastuzumab monotherapy & completed palliative XRT to distal esophagus 4/23/21, h/o esophageal stricture with stent placement 1/26 and removal 4/23  Pt reprotedly on a soft diet and SLP Swallowing Re-evaluation requested at this time  Current Precautions:  Fall,  Contact and  AIrborne    Allergies:  No known food allergies    Past medical history:  Please see H&P for details    Special Studies:  7/20/21 CXR: Slight increase in moderate bibasilar groundglass opacity since 7/1/2021 due to Covid-19     6/22/21 VBS: Mild Oral dysphagia due to decreased oral processing of soft and hard solids; pharyngeal stage  appeared within normal limits  No pharyngeal retention, laryngeal penetration, or aspiration observed     Social/Education/Vocational Hx:  Pt lived with family in [place]  Has been at [facility] for nursign and rehab services since previous hospital stay  Swallow Information   Current Risks for Dysphagia & Aspiration: respiratory challenge with need for HFNC at this time  Current Diet: NPO   Baseline Diet: regular diet, thin liquids and encouraged to eat slowly to minimize further demands on respiratory status      Baseline Assessment   Behavior/Cognition: alert  Speech/Language Status: able to participate in conversation  Patient Positioning: partially upright in bed   Pain Status/Interventions/Response to Interventions:No report of or nonverbal indications of pain  Swallow Mechanism Exam  Facial: symmetrical  Labial: WFL  Lingual: WFL  Velum: symmetrical  Mandible: adequate ROM  Dentition: edentulous and full dentures  Vocal quality:clear/adequate   Respiratory Status: on HFNC  60L with an FiO2 of  70 %    Consistencies Assessed and Performance   Consistencies Administered: thin liquids, nectar thick, puree and mechanical soft solids  Materials administered included nectar thick and thin apple juice, applesauce, banana (he declined mj crackers and cookies)     Oral Stage: WFL  Mastication was adequate with the materials administered today  Bolus formation and transfer were functional with no significant oral residue noted  No overt s/s reduced oral control  Pharyngeal Stage: WFL suspected  Swallow Mechanics:  Swallowing initiation appeared prompt  Laryngeal rise was palpated and judged to be within functional limits  No coughing, throat clearing, change in vocal quality or respiratory status noted today  Esophageal Concerns: h/o esophageal cancer, XRT, stent and removal of same    Pt did take small bites/sips, chew well and feed self v slowly    Summary and Recommendations (see above)    Results Reviewed with: patient, RN and CRNP     Treatment Recommended: f/u x1 to ensure pt on desired and best tolerated yet least restrictive diet  Dysphagia LTG  -Patient will demonstrate safe and effective oral intake (without overt s/s significant oral/pharyngeal dysphagia including s/s penetration or aspiration) for the highest appropriate diet level

## 2021-07-20 NOTE — CASE MANAGEMENT
PT IS A READMISSION  CM SPOKE TO DARINEL FROM [facility] WHO EXPLAINS THAT PT DEVELOPED INCREASING SOB, CHEST PAIN, AND HYPOXIA  HE WAS TRANSPORTED TO [facility] FOR EVALUATION  ALL MEDICATIONS WERE GIVEN AS PRESCRIBED  Pt currently is receiving rehab at [facility] following a hospitalization at [facility]  He normally lives with his POA/Caregiver [phone] in a ranch style home with 2 BRET  Prior to this admission to [facility], pt was independent with ADL's and used no DME's  Any exertion did cause increasing SOB  Bridgett Roque from [facility] informs CM that pt has not been walking at rehab and has required an assist of 2 to get OOB to chair  He is receiving PT, but CM was unable to talk to them to find out more of his functional status  No VNA hx   Denies substance abuse or mental health issues  He was a heavy smoker x 39 years, but quit in 2008  His PCP is Jennifer Valerio  He uses [facility] [address] [place] [address] [facility] in [place] and [address] at [facility]  There are no issues with co-pays  His POA is Shobha Deal  CM does not have a copy of paperwork  Pt currently on HFNC and it is difficult for him to answer questions  Trupti Lamar from [facility] informs CM that they will accept pt back when he is medically cleared  CM will get pt's preferences  Will need transportation on discharge  CM reviewed discharge planning process including the following: identifying help at home, patient preference for discharge planning needs, pharmacy preference, and availability of treatment team to discuss questions or concerns patient and/or family may have regarding understanding medications and recognizing signs and symptoms once discharged  CM also encouraged patient to follow up with all recommended appointments after discharge  Patient advised of importance for patient and family to participate in managing patients medical well being

## 2021-07-20 NOTE — TELEPHONE ENCOUNTER
[phone]/[id number] from [facility]/Pt is Jacqueline   [id number]  Pt has a pulse of 117, /91  He is complaining of chest pain  Dr Bettie Schulz was paged at [phone]    Please allow 20-30 mins for the provider to call you back  If you do not here from them within that timeframe, please call us back

## 2021-07-20 NOTE — ASSESSMENT & PLAN NOTE
· CXR revealing interval progression of bilateral opacifications  · Continue broad-spectrum antibiotics cefepime/azithromycin antibiotic day 1  · Check procalcitonin  Deescalate antibiotics as able  · Sputum/MRSA culture  · Courage good incentive spirometry/pulmonary toileting

## 2021-07-20 NOTE — ASSESSMENT & PLAN NOTE
· Evident tachycardia, tachypnea, and leukocytosis  · Concern for infectious pulmonary etiology  · Continue broad-spectrum antibiotics

## 2021-07-20 NOTE — RESPIRATORY THERAPY NOTE
07/20/21 0940   Respiratory Assessment   Resp Comments Pt arrived on bipap via ems  Fio2 weaned to 80%  Kept previous bipap settings      Non-Invasive Information   O2 Interface Device Face mask   Non-Invasive Ventilation Mode BiPAP   SpO2 92 %   $ Pulse Oximetry Spot Check Charge Completed   Non-Invasive Settings   IPAP (cm) 14 cm   EPAP (cm) 8 cm   Rate (Set) 8   FiO2 (%) 80   Rise Time 3   Inspiratory Time (Set) 0 85   Non-Invasive Readings   Leak (lpm) 45   Skin Intervention Skin intact   Total Rate 25   MV (Mech) 21   Peak Pressure (Obs) 19   Spontaneous Vt (mL) 755

## 2021-07-20 NOTE — ASSESSMENT & PLAN NOTE
· Continue HFNC to support respiratory status  · Baseline 5L O2 NC  · Maintain O2 saturations >88%  · Continue broad spectrum antibiotics cefepime/azithromycin antibiotic # 1  · Increased baseline steroids Solu-Medrol 60 mg b i d  · Will trial diuresis Lasix 40 mg once  Goal fluid balance net -1 L  · Question of aspiration  Formal speech swallow evaluation  · Encourage good incentive spirometry/pulmonary toileting

## 2021-07-20 NOTE — ED NOTES
Wife's phone number is [phone] can be updated on patients status     Jose Quintanilla RN  07/20/21 3137

## 2021-07-20 NOTE — H&P
[address]  H&P - Dexter Denney 1950, 65 y o  male MRN: [id number]  Unit/Bed#:  Encounter: [id number]  Primary Care Provider: Fredy Gonsalves DO   Date and time admitted to hospital: 7/20/2021  9:52 AM    * Acute on chronic respiratory failure with hypoxia (HCC)  Assessment & Plan  · Continue HFNC to support respiratory status  · Baseline 5L O2 NC  · Maintain O2 saturations >88%  · Continue broad spectrum antibiotics cefepime/azithromycin antibiotic # 1  · Increased baseline steroids Solu-Medrol 60 mg b i d  · Will trial diuresis Lasix 40 mg once  Goal fluid balance net -1 L  · Question of aspiration  Formal speech swallow evaluation  · Encourage good incentive spirometry/pulmonary toileting  Multifocal pneumonia  Assessment & Plan  · CXR revealing interval progression of bilateral opacifications  · Continue broad-spectrum antibiotics cefepime/azithromycin antibiotic day 1  · Check procalcitonin  Deescalate antibiotics as able  · Sputum/MRSA culture  · Courage good incentive spirometry/pulmonary toileting  History of COVID-19  Assessment & Plan  · Diagnosed May 11 2021  Did not require inpatient hospitalization    Sepsis ([facility] [address] )  Assessment & Plan  · Evident tachycardia, tachypnea, and leukocytosis  · Concern for infectious pulmonary etiology  · Continue broad-spectrum antibiotics  Paroxysmal A-fib (HCC)  Assessment & Plan  · Recent diagnosis paroxysmal AFib during loss inpatient hospitalization  · Continue metoprolol 12 5 mg b i d   · Coumadin 3 mg q h s  INR subtherapeutic    Goal INR 2-3  · Heparin gtt    Esophageal cancer   Assessment & Plan  · 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 s/p FOLFOX-6, was on trastuzumab monotherapy   · Completed palliative radiation to distal esophagus 4/23/21  · Follows with  Andmika      Esophageal stricture  Assessment & Plan  · EGD with dilation and stent placement 1/26/21  · EGD with stent removal 4/26/21   · Formal speech/swallow evaluation  Previously on soft diet    Moderate protein-calorie malnutrition ([address] )  Assessment & Plan  Malnutrition Findings:           BMI Findings: There is no height or weight on file to calculate BMI  · Dietary consultation  Essential hypertension  Assessment & Plan  · Metoprolol 12 5 mg b i d  [place] Type 2 diabetes mellitus, without long-term current use of insulin (HCC)  Assessment & Plan  Lab Results   Component Value Date    HGBA1C 6 5 (H) 06/23/2021       No results for input(s): POCGLU in the last 72 hours  Blood Sugar Average: Last 72 hrs:  · Hold oral glycemic agents  · Sliding scale insulin coverage as needed  GERD (gastroesophageal reflux disease)  Assessment & Plan  · Protonix b i d  Sacral wound  Assessment & Plan  · Sacral wound present on admission  · Wound care consultation  · Frequent offloading repositioning to avoid skin breakdown       -------------------------------------------------------------------------------------------------------------  Chief Complaint: SOB/Chest pain     History of Present Illness   HX and PE limited by: Brandy Bahena is a 65 y o  male who presented to [facility] for evaluation of progressive shortness of breath/chest pain  Patient has a significant past medical history of 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  He required distal esophageal stenting secondary to dysphagia January 2021  He underwent palliative radiation completed April 2021  Esophageal stent was furthermore removed by thoracic surgery April 6/20/21 secondary to irrigation  He has since been maintained on Herceptin chemotherapy and is followed by Dr Piper  He was confirmed positive for COVID-19 pneumonia May of 2021 and did not require inpatient hospitalization    He was admitted to [facility] 6/15/21 to 7/11/21 for treatment of multifocal pneumonia versus pneumonitis  He was treated with course of broad-spectrum antibiotics and high-dose steroids  He was discharged to acute rehab on 5 L nasal cannula and prednisone taper  He presented to emergency department today for evaluation of nonradiating substernal chest pain and shortness of breath  Upon arrival to emergency department patient was noted to be tachypneic, tachycardic, and hypoxemic  He was placed on BiPAP to support respiratory status  Checks x-ray revealing interval increase in bilateral opacifications  Patient was started on broad-spectrum antibiotics, also receiving Decadron  He was subsequently [facility] for higher level of care given need for persistent BiPAP therapy  History obtained from chart review  -------------------------------------------------------------------------------------------------------------  Dispo: Admit to Stepdown Level 1    Code Status: Level 1 - Full Code  --------------------------------------------------------------------------------------------------------------  Review of Systems   Constitutional: Positive for fatigue  HENT: Negative  Respiratory: Positive for chest tightness and shortness of breath  Cardiovascular: Positive for chest pain  Negative for palpitations and leg swelling  Gastrointestinal: Negative  Genitourinary: Negative  Musculoskeletal: Negative  Skin: Negative  Neurological: Positive for weakness  Physical Exam  Vitals and nursing note reviewed  Constitutional:       Comments: Chronically ill-appearing   HENT:      Head: Normocephalic  Mouth/Throat:      Mouth: Mucous membranes are moist    Eyes:      Extraocular Movements: Extraocular movements intact  Pupils: Pupils are equal, round, and reactive to light  Cardiovascular:      Rate and Rhythm: Tachycardia present  Rhythm irregular     Pulmonary: Effort: No respiratory distress  Comments: Mild tachypnea, diminished breath sounds at bilateral bases  Abdominal:      General: Abdomen is flat  There is no distension  Palpations: Abdomen is soft  Tenderness: There is no abdominal tenderness  Musculoskeletal:      Cervical back: Normal range of motion  Right lower leg: No edema  Left lower leg: No edema  Skin:     General: Skin is warm  Capillary Refill: Capillary refill takes less than 2 seconds  Neurological:      General: No focal deficit present  Mental Status: He is alert and oriented to person, place, and time  --------------------------------------------------------------------------------------------------------------  Vitals:   Vitals:    07/20/21 0940 07/20/21 1000 07/20/21 1046 07/20/21 1056   BP:  105/69  101/68   BP Location:    Right arm   Pulse:  (!) 118  105   Resp:  (!) 28  (!) 27   Temp:    97 8 °F (36 6 °C)   TempSrc:    Oral   SpO2: 92% 91% 93% 96%     Temp  Min: 97 8 °F (36 6 °C)  Max: 98 2 °F (36 8 °C)        There is no height or weight on file to calculate BMI      Laboratory and Diagnostics:  Results from last 7 days   Lab Units 07/20/21  0546   WBC Thousand/uL 13 48*   HEMOGLOBIN g/dL 14 8   HEMATOCRIT % 45 1   PLATELETS Thousands/uL 278   BANDS PCT % 3   MONO PCT % 7     Results from last 7 days   Lab Units 07/20/21  0546   SODIUM mmol/L 136   POTASSIUM mmol/L 4 1   CHLORIDE mmol/L 102   CO2 mmol/L 25   ANION GAP mmol/L 9   BUN mg/dL 14   CREATININE mg/dL 0 72   CALCIUM mg/dL 8 9   GLUCOSE RANDOM mg/dL 155*   ALT U/L 79*   AST U/L 31   ALK PHOS U/L 165 5*   ALBUMIN g/dL 3 2*   TOTAL BILIRUBIN mg/dL 0 58     Results from last 7 days   Lab Units 07/20/21  0546   MAGNESIUM mg/dL 1 9      Results from last 7 days   Lab Units 07/20/21  0546   INR  1 30*   PTT seconds 33*      Results from last 7 days   Lab Units 07/20/21  0546   TROPONIN I ng/mL <0 03     Results from last 7 days   Lab Units 07/20/21  0546   LACTIC ACID mmol/L 1 8     ABG:  Results from last 7 days   Lab Units 07/20/21  0633   PH ART  7 454*   PCO2 ART mm Hg 33 9*   PO2 ART mm Hg 54 9*   HCO3 ART mmol/L 23 2   BASE EXC ART mmol/L 0 0   ABG SOURCE  Radial, Right     VBG:  Results from last 7 days   Lab Units 07/20/21  0633   ABG SOURCE  Radial, Right           Micro:        EKG:  Paroxysmal atrial fibrillation  Imaging: I have personally reviewed pertinent reports  and I have personally reviewed pertinent films in PACS      Historical Information   Past Medical History:   Diagnosis Date    Anxiety     Cancer [facility])     Esophageal    Dysphagia     Esophageal abnormality     Esophageal cancer (HCC)     GERD (gastroesophageal reflux disease)     Hyperlipidemia     Hypertension     Occasional tremors     Transaminitis 6/16/2021    Type 2 diabetes mellitus, without long-term current use of insulin [facility]      Past Surgical History:   Procedure Laterality Date    ESOPHAGOGASTRODUODENOSCOPY N/A 8/9/2017    Procedure: ESOPHAGOGASTRODUODENOSCOPY (EGD); Surgeon: Barry Brand MD;  [facility]: [facility]; Service: Gastroenterology    ESOPHAGOGASTRODUODENOSCOPY N/A 8/11/2017    Procedure: ESOPHAGOGASTRODUODENOSCOPY (EGD) w/ stent;  Surgeon: Barry Brand MD;  [facility];   Service: Gastroenterology    ESOPHAGOGASTRODUODENOSCOPY N/A 1/26/2021    Procedure: ESOPHAGOGASTRODUODENOSCOPY (EGD), BIOPSY, ESOPHAGEAL DILATION,  STENT PLACEMENT;  Surgeon: Casi Hussein MD;  [facility];  Service: Thoracic    LAPAROTOMY N/A 2/11/2020    Procedure: EGD; REMOVAL OF ESOPHAGEAL STENTS X 3;  Surgeon: Casi Hussein MD;  [facility];  Service: Thoracic    PORTACATH PLACEMENT      PORTACATH PLACEMENT      SC ESOPHAGOGASTRODUODENOSCOPY TRANSORAL DIAGNOSTIC N/A 4/26/2021    Procedure: ESOPHAGOGASTRODUODENOSCOPY (EGD); stent removal;  Surgeon: Casi Hussein MD;  [facility];  Service: Thoracic    UPPER GASTROINTESTINAL ENDOSCOPY      VASCULAR SURGERY  2008    blockage in neck      Social History   Social History     Substance and Sexual Activity   Alcohol Use Not Currently     Social History     Substance and Sexual Activity   Drug Use Yes    Types: Marijuana    Comment: has medical card for this     Social History     Tobacco Use   Smoking Status Former Smoker    Packs/day: 1 00    Years: 39 00    Pack years: 39 00    Types: Cigarettes    Start date: 54    Quit date: 2008    Years since quittin 8   Smokeless Tobacco Never Used   Tobacco Comment    started around age 22      Exercise History: requires assistance for daily ADLs  Family History:   Family History   Problem Relation Age of Onset    Liver cancer Paternal Uncle          Medications:  Current Facility-Administered Medications   Medication Dose Route Frequency    [START ON 2021] azithromycin (ZITHROMAX) 500 mg in sodium chloride 0 9 % 250 mL IVPB  500 mg Intravenous Q24H    cefepime (MAXIPIME) 2,000 mg in dextrose 5 % 50 mL IVPB  2,000 mg Intravenous Q12H    docusate sodium (COLACE) capsule 100 mg  100 mg Oral BID    furosemide (LASIX) injection 40 mg  40 mg Intravenous Once    heparin (porcine) 25,000 units in 0 45% NaCl 250 mL infusion (premix)  3-20 Units/kg/hr (Order-Specific) Intravenous Titrated    heparin (porcine) injection 2,000 Units  2,000 Units Intravenous Q1H PRN    heparin (porcine) injection 4,000 Units  4,000 Units Intravenous Q1H PRN    insulin lispro (HumaLOG) 100 units/mL subcutaneous injection 4-20 Units  4-20 Units Subcutaneous Q6H [address]    melatonin tablet 3 mg  3 mg Oral HS    methylPREDNISolone sodium succinate (Solu-MEDROL) injection 60 mg  60 mg Intravenous Q12H [address]    metoprolol tartrate (LOPRESSOR) partial tablet 12 5 mg  12 5 mg Oral Q12H SENG    nystatin (MYCOSTATIN) cream   Topical BID    polyethylene glycol (MIRALAX) packet 17 g  17 g Oral TID    pravastatin (PRAVACHOL) tablet 80 mg  80 mg Oral Daily With Dinner    tiotropium [facility]) capsule for inhaler 18 mcg  18 mcg Inhalation Daily    warfarin (COUMADIN) tablet 3 mg  3 mg Oral Daily (warfarin)     Home medications:  Prior to Admission Medications   Prescriptions Last Dose Informant Patient Reported? Taking?    LORazepam (ATIVAN) 0 5 mg tablet  Self No No   Sig: Take 1 tablet (0 5 mg total) by mouth 2 (two) times a day as needed for anxiety   Multiple Vitamin (MULTIVITAMIN) tablet  Self Yes No   Sig: Take 1 tablet by mouth daily   albuterol (2 5 mg/3 mL) 0 083 % nebulizer solution   Yes No   Sig: Take 2 5 mg by nebulization every 4 (four) hours as needed for wheezing or shortness of breath   amoxicillin-clavulanate (AUGMENTIN) 875-125 mg per tablet   Yes No   Sig: Take 1 tablet by mouth every 12 (twelve) hours Start 7/19 until 7/26   aspirin 81 mg chewable tablet  Self Yes No   Sig: Chew 81 mg daily   azithromycin (ZITHROMAX) 250 mg tablet   Yes No   Sig: Take 500 mg by mouth every 24 hours Start 7/19 x 4 doses   docusate sodium (COLACE) 100 mg capsule   Yes No   Sig: Take 100 mg by mouth 2 (two) times a day   furosemide (LASIX) 20 mg tablet   No No   Sig: Take 1 tablet (20 mg total) by mouth daily   Patient not taking: Reported on 7/20/2021   insulin glargine (LANTUS) 100 units/mL subcutaneous injection   No No   Sig: Inject 12 Units under the skin every morning   insulin lispro (HumaLOG) 100 units/mL injection   Yes No   Sig: Inject under the skin 3 (three) times a day with meals   lidocaine-prilocaine (EMLA) cream   No No   Sig: Apply topically as needed for mild pain   melatonin 3 mg  Self No No   Sig: Take 1 tablet (3 mg total) by mouth daily at bedtime   metoprolol tartrate (LOPRESSOR) 25 mg tablet   No No   Sig: Take 0 5 tablets (12 5 mg total) by mouth every 12 (twelve) hours   nystatin (MYCOSTATIN) powder   Yes No   Sig: Apply topically 4 (four) times a day To groin fungus   ondansetron (ZOFRAN-ODT) 4 mg disintegrating tablet   No No Sig: Take 1 tablet (4 mg total) by mouth every 6 (six) hours as needed for nausea or vomiting   pantoprazole (PROTONIX) 40 mg tablet  Self No No   Sig: Take 1 tablet by mouth daily in the early morning   polyethylene glycol (MIRALAX) 17 g packet   No No   Sig: Take 17 g by mouth 3 (three) times a day   predniSONE 10 mg tablet   No No   Sig: Take 3 tablets (30 mg total) by mouth daily for 3 days, THEN 2 tablets (20 mg total) daily for 7 days, THEN 1 tablet (10 mg total) daily for 7 days  simvastatin (ZOCOR) 40 mg tablet  Self Yes No   Sig: Take 40 mg by mouth daily at bedtime   tiotropium (SPIRIVA) 18 mcg inhalation capsule   No No   Sig: Place 1 capsule (18 mcg total) into inhaler and inhale daily   warfarin (COUMADIN) 3 mg tablet   No No   Sig: Take 1 tablet (3 mg total) by mouth daily   Patient taking differently: Take 4 mg by mouth daily       Facility-Administered Medications: None     Allergies: Allergies   Allergen Reactions    Other      Cat Dander       ------------------------------------------------------------------------------------------------------------  Advance Directive and Living Will:      Power of :    POLST:    ------------------------------------------------------------------------------------------------------------  Anticipated Length of Stay is > 2 midnights    Care Time Delivered:   Upon my evaluation, this patient had a high probability of imminent or life-threatening deterioration due to Acute on chronic hypoxemic respiratory failure, sepsis, which required my direct attention, intervention, and personal management  I have personally provided 20 minutes (0930 to 11:20) of critical care time, exclusive of procedures, teaching, family meetings, and any prior time recorded by providers other than myself  Ray Banks PA-C        Portions of the record may have been created with voice recognition software    Occasional wrong word or "sound a like" substitutions may have occurred due to the inherent limitations of voice recognition software    Read the chart carefully and recognize, using context, where substitutions have occurred

## 2021-07-20 NOTE — ASSESSMENT & PLAN NOTE
Lab Results   Component Value Date    HGBA1C 6 5 (H) 06/23/2021       No results for input(s): POCGLU in the last 72 hours  Blood Sugar Average: Last 72 hrs:  · Hold oral glycemic agents  · Sliding scale insulin coverage as needed

## 2021-07-20 NOTE — EMTALA/ACUTE CARE TRANSFER
[facility] EMERGENCY DEPARTMENT  [address]   Dept: [phone]      EMTALA TRANSFER CONSENT    NAME Eudora Epley                                         1950                              MRN [id number]    I have been informed of my rights regarding examination, treatment, and transfer   by Dr Juliane Pacheco MD    Benefits:      Risks:        Consent for Transfer:  I acknowledge that my medical condition has been evaluated and explained to me by the emergency department physician or other qualified medical person and/or my attending physician, who has recommended that I be transferred to the service of  Accepting Physician: Dr Angelito Barbour at [address] Name, [address] : Cheryl Redding  The above potential benefits of such transfer, the potential risks associated with such transfer, and the probable risks of not being transferred have been explained to me, and I fully understand them  The doctor has explained that, in my case, the benefits of transfer outweigh the risks  I agree to be transferred  I authorize the performance of emergency medical procedures and treatments upon me in both transit and upon arrival at the receiving facility  Additionally, I authorize the release of any and all medical records to the receiving facility and request they be transported with me, if possible  I understand that the safest mode of transportation during a medical emergency is an ambulance and that the Hospital advocates the use of this mode of transport  Risks of traveling to the receiving facility by car, including absence of medical control, life sustaining equipment, such as oxygen, and medical personnel has been explained to me and I fully understand them  (ANGELO CORRECT BOX BELOW)  [  ]  I consent to the stated transfer and to be transported by ambulance/helicopter    [  ]  I consent to the stated transfer, but refuse transportation by ambulance and accept full responsibility for my transportation by car  I understand the risks of non-ambulance transfers and I exonerate the Hospital and its staff from any deterioration in my condition that results from this refusal     X___________________________________________    DATE  21  TIME________  Signature of patient or legally responsible individual signing on patient behalf           RELATIONSHIP TO PATIENT_________________________          Provider Certification    NAME Nadine Winter                                         1950                              MRN [id number]    A medical screening exam was performed on the above named patient  Based on the examination:    Condition Necessitating Transfer The primary encounter diagnosis was Bilateral pneumonia  Diagnoses of History of COVID-19 and Hypoxia were also pertinent to this visit  Patient Condition:      Reason for Transfer:      Transfer Requirements: [address] [facility]   · Space available and qualified personnel available for treatment as acknowledged by    · Agreed to accept transfer and to provide appropriate medical treatment as acknowledged by       Dr Ilana Andino  · Appropriate medical records of the examination and treatment of the patient are provided at the time of transfer   [address], [address] _______  · Transfer will be performed by qualified personnel from    and appropriate transfer equipment as required, including the use of necessary and appropriate life support measures      Provider Certification: I have examined the patient and explained the following risks and benefits of being transferred/refusing transfer to the patient/family:         Based on these reasonable risks and benefits to the patient and/or the unborn child(gina), and based upon the information available at the time of the patients examination, I certify that the medical benefits reasonably to be expected from the provision of appropriate medical treatments at another medical facility outweigh the increasing risks, if any, to the individuals medical condition, and in the case of labor to the unborn child, from effecting the transfer      X____________________________________________ DATE 07/20/21        TIME_______      ORIGINAL - SEND TO MEDICAL RECORDS   COPY - SEND WITH PATIENT DURING TRANSFER

## 2021-07-20 NOTE — ED NOTES
Pt wife dropped off dentures to ER  Received by ED RN in waiting room and placed in patient bag with patient label  Will be transported with patient at time of EMS transport        Gareth Ritter RN  07/20/21 8828

## 2021-07-20 NOTE — ASSESSMENT & PLAN NOTE
Malnutrition Findings:           BMI Findings: There is no height or weight on file to calculate BMI  · Dietary consultation

## 2021-07-20 NOTE — ASSESSMENT & PLAN NOTE
· CXR revealing worsening bilateral opacifications  · Continue broad spectrum antibiotics cefepime/vancomycin antibiotic # 1  · Obtain procal continue to trend deescalate antibotics as able    · Check MRSA/Sputum culture  · Encourage good incentive spirometry and pulmonary toliet
PACU

## 2021-07-20 NOTE — ASSESSMENT & PLAN NOTE
· EGD with dilation and stent placement 1/26/21  · EGD with stent removal 4/26/21   · Formal speech/swallow evaluation    Previously on soft diet

## 2021-07-20 NOTE — ED NOTES
Attempted to call report to [place] [facility], no answer x 2  Tiger texted Charge nurse at [place] [facility] for alternate # for report        Cris Lee RN  07/20/21 7763

## 2021-07-20 NOTE — ASSESSMENT & PLAN NOTE
· Recent diagnosis paroxysmal AFib during loss inpatient hospitalization  · Continue metoprolol 12 5 mg b i d   · Coumadin 3 mg q h s  INR subtherapeutic    Goal INR 2-3  · Heparin gtt

## 2021-07-20 NOTE — ASSESSMENT & PLAN NOTE
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

## 2021-07-20 NOTE — PLAN OF CARE
Problem: Nutrition/Hydration-ADULT  Goal: Nutrient/Hydration intake appropriate for improving, restoring or maintaining nutritional needs  Description: Monitor and assess patient's nutrition/hydration status for malnutrition  Collaborate with interdisciplinary team and initiate plan and interventions as ordered  Monitor patient's weight and dietary intake as ordered or per policy  Utilize nutrition screening tool and intervene as necessary  Determine patient's food preferences and provide high-protein, high-caloric foods as appropriate       INTERVENTIONS:  - Monitor oral intake, urinary output, labs, and treatment plans  - Assess nutrition and hydration status and recommend course of action  - Evaluate amount of meals eaten  - Assist patient with eating if necessary   - Allow adequate time for meals  - Recommend/ encourage appropriate diets, oral nutritional supplements, and vitamin/mineral supplements  - Order, calculate, and assess calorie counts as needed  - Recommend, monitor, and adjust tube feedings and TPN/PPN based on assessed needs  - Assess need for intravenous fluids  - Provide specific nutrition/hydration education as appropriate  - Include patient/family/caregiver in decisions related to nutrition  7/20/2021 1641 by Paresh Mendoza RN  Outcome: Progressing  7/20/2021 1638 by Paresh Mendoza RN  Outcome: Progressing  7/20/2021 1637 by Paresh Mendoza RN  Outcome: Progressing     Problem: Prexisting or High Potential for Compromised Skin Integrity  Goal: Skin integrity is maintained or improved  Description: INTERVENTIONS:  - Identify patients at risk for skin breakdown  - Assess and monitor skin integrity  - Assess and monitor nutrition and hydration status  - Monitor labs   - Assess for incontinence   - Turn and reposition patient  - Assist with mobility/ambulation  - Relieve pressure over bony prominences  - Avoid friction and shearing  - Provide appropriate hygiene as needed including keeping skin clean and dry  - Evaluate need for skin moisturizer/barrier cream  - Collaborate with interdisciplinary team   - Patient/family teaching  - Consider wound care consult   7/20/2021 1641 by Nathan Hankins RN  Outcome: Progressing  7/20/2021 1638 by Nathan Hankins RN  Outcome: Progressing  7/20/2021 1637 by Nathan Hankins RN  Outcome: Progressing     Problem: Potential for Falls  Goal: Patient will remain free of falls  Description: INTERVENTIONS:  - Educate patient/family on patient safety including physical limitations  - Instruct patient to call for assistance with activity   - Consult OT/PT to assist with strengthening/mobility   - Keep Call bell within reach  - Keep bed low and locked with side rails adjusted as appropriate  - Keep care items and personal belongings within reach  - Initiate and maintain comfort rounds  - Make Fall Risk Sign visible to staff  - Offer Toileting every 2 Hours, in advance of need  - Initiate/Maintain bed alarm  - Apply yellow socks and bracelet for high fall risk patients  - Consider moving patient to room near nurses station  7/20/2021 1641 by Nathan Hankins RN  Outcome: Progressing  7/20/2021 1638 by Nathan Hankins RN  Outcome: Progressing  7/20/2021 1637 by Nathan Hankins RN  Outcome: Progressing     Problem: MOBILITY - ADULT  Goal: Maintain or return to baseline ADL function  Description: INTERVENTIONS:  -  Assess patient's ability to carry out ADLs; assess patient's baseline for ADL function and identify physical deficits which impact ability to perform ADLs (bathing, care of mouth/teeth, toileting, grooming, dressing, etc )  - Assess/evaluate cause of self-care deficits   - Assess range of motion  - Assess patient's mobility; develop plan if impaired  - Assess patient's need for assistive devices and provide as appropriate  - Encourage maximum independence but intervene and supervise when necessary  - Involve family in performance of ADLs  - Assess for home care needs following discharge   - Consider OT consult to assist with ADL evaluation and planning for discharge  - Provide patient education as appropriate  7/20/2021 1641 by Roger Fry RN  Outcome: Progressing  7/20/2021 1638 by Roger Fry RN  Outcome: Progressing  7/20/2021 1637 by Roger Fry RN  Outcome: Progressing  Goal: Maintains/Returns to pre admission functional level  Description: INTERVENTIONS:  - Perform BMAT or MOVE assessment daily    - Set and communicate daily mobility goal to care team and patient/family/caregiver  - Collaborate with rehabilitation services on mobility goals if consulted  - Perform Range of Motion 1 times a day  - Reposition patient every 2 hours    - Ambulate patient 2 times a day  - Out of bed to chair 1 time a day   - Out of bed for meals 2 times a day  - Out of bed for toileting  - Record patient progress and toleration of activity level   7/20/2021 1641 by Roger Fry RN  Outcome: Progressing  7/20/2021 1638 by Roger Fry RN  Outcome: Progressing  7/20/2021 1637 by Roger Fry RN  Outcome: Progressing

## 2021-07-21 NOTE — MALNUTRITION/BMI
This medical record reflects one or more clinical indicators suggestive of malnutrition and/or morbid obesity  Malnutrition Findings:   Adult Malnutrition type: Chronic illness  Adult Degree of Malnutrition: Malnutrition of moderate degree  Malnutrition Characteristics: Inadequate energy, Weight loss (inadequate energy intakes of less than 75% compared to estimated needs for at least past month; significant weight loss of 19% x 5 months; Intervention-Dysphagia 2-Mechanical Soft diet with strawberry Ensure Enlive BID)    BMI Findings: Body mass index is 24 86 kg/m²  See Nutrition note dated 7/21/21 for additional details  Completed nutrition assessment is viewable in the nutrition documentation

## 2021-07-21 NOTE — RESPIRATORY THERAPY NOTE
07/21/21 6075   Non-Invasive Information   O2 Interface Device HFNC prongs   Non-Invasive Ventilation Mode HFNC (High flow)   SpO2 92 %   $ Pulse Oximetry Spot Check Charge Completed   Non-Invasive Settings   FiO2 (%) 80   Flow (lpm) 50   Temperature (Set) 31   Humidification   (heater)   Non-Invasive Readings   Skin Intervention Skin intact   Heater Temperature (Obs) 31   Maintenance   Water bag changed No

## 2021-07-21 NOTE — RESPIRATORY THERAPY NOTE
07/21/21 0855   Non-Invasive Information   O2 Interface Device HFNC prongs   Non-Invasive Ventilation Mode HFNC (High flow)   SpO2 91 %   $ Pulse Oximetry Spot Check Charge Completed   Non-Invasive Settings   FiO2 (%) 90   Flow (lpm) 55   Maintenance   Water bag changed Yes

## 2021-07-21 NOTE — RESPIRATORY THERAPY NOTE
07/21/21 1940   Respiratory Assessment   Assessment Type Pre-treatment   General Appearance Alert; Awake   Respiratory Pattern Tachypneic   Chest Assessment Chest expansion symmetrical   Bilateral Breath Sounds Diminished;Clear   R Breath Sounds Diminished;Coarse   Resp Comments no distress tx given via aerogen optiflo   O2 Device HFNC    Subjective Data pt states he feels better did have episode today of suuden onset SOb , had ct scan supine and prone will cont to titrate HFNC as able    Non-Invasive Information   O2 Interface Device HFNC prongs   Non-Invasive Ventilation Mode HFNC (High flow)  (opti )   SpO2 93 %   $ Pulse Oximetry Spot Check Charge Completed   Non-Invasive Settings   FiO2 (%) 80   Flow (lpm) 50   Temperature (Set) 31   Humidification   (heater )   Non-Invasive Readings   Skin Intervention Skin intact   Total Rate 26   Heater Temperature (Obs) 31   Maintenance   Water bag changed Yes

## 2021-07-21 NOTE — WOUND OSTOMY CARE
Progress Note - Wound   Senia Mcclellan 65 y o  male MRN: [id number]  Unit/Bed#:  Encounter: [id number]      Assessment:   Patient admitted due to acute on chronic respiratory failure with hypoxia  History of cancer, GERD, HTN, HLD, diabetes, COVID-19  Wound care consulted for sacral wound  Patient agreeable to assessment, alert and oriented x4, assist of 1 to turn for assessment, continent of bowel and bladder, heels elevated off of mattress, wedges at bedside for turning and repositioning, in ICU on a critical care low air loss mattress, on High Flow nasal cannula, dependent for care at this time  Attending physician made aware of assessment findings and plan of care  1  Pressure injury bilateral buttock, stage 2- POA- Wounds pictured and measured together  Picture from admission use due to [place] system down  Wounds are scattered, irregular in shape- suspect etiology to be mixture of pressure and moisture  Wounds are moist, partial thickness, 100% non-blanchable beefy red tissue, scant amount of serosanguineous drainage  Suzy-wounds are dry, intact, blanchable redness, noted to have fungal rash appearance with satellite lesions, and brown scaly skin  2  Scrotum and bilateral groin- Noted to have fungal rash  Patient stated he tends to have significant moisture build up to the groin and buttock and has frequently formed rashes over the years  Wound is irregular, moist, partial thickness superficial wounds noted to bilateral groin due to MASD, 100% blanchable pink tissue, small amount of serous drainage on assessment  Suzy-wound is dry, intact, blanchable pink skin, satellite lesions and brown scaly skin  - patient states nystatin powder and nystatin cream have not helped improve the rash/wounds  3  Bilateral hips, elbows, and heels are dry, intact, blanchable pink skin  Educated patient on importance of frequent offloading of pressure via turning, repositioning, and weight-shifting   Verbalized understanding of plan of care  No induration, fluctuance, odor, warmth, or purulence noted to the above noted wounds  Patient tolerated fairly well, reports pain to the scrotal wound  Primary nurse aware of plan of care  See flow sheets for more detailed assessment findings  Will follow along  Skin care plans:  1-Cleanse sacro-buttock and groin with soap and water, pat dry  Apply JENNIFER to sacrum, buttocks, groin, and scrotum BID and PRN  2-Hydraguard to bilateral heel BID and PRN  3-Elevate heels to offload pressure  4-Ehob cushion when out of bed  5-Turn/repoisiton q2h or when medically stable for pressure re-distribution on skin  6-Moisturize skin daily with skin nourishing cream   7-Wound care will follow weekly, tiger text with questions or concerns  Wound 07/07/21 Buttocks (Active)   Wound Image    07/20/21 1004   Wound Description Beefy red;Pink 07/21/21 1106   Pressure Injury Stage 2 07/21/21 1106   Suzy-wound Assessment Dry; Intact;Pink;Rash;Scaly 07/21/21 1106   Wound Length (cm) 2 cm 07/21/21 1106   Wound Width (cm) 8 cm 07/21/21 1106   Wound Depth (cm) 0 1 cm 07/21/21 1106   Wound Surface Area (cm^2) 16 cm^2 07/21/21 1106   Wound Volume (cm^3) 1 6 cm^3 07/21/21 1106   Calculated Wound Volume (cm^3) 1 6 cm^3 07/21/21 1106   Drainage Amount Scant 07/21/21 1106   Drainage Description Serosanguineous 07/21/21 1106   Non-staged Wound Description Partial thickness 07/21/21 1106   Treatments Cleansed;Site care 07/21/21 1106   Dressing Other (Comment) 07/21/21 1106   Dressing Changed     Patient Tolerance Tolerated well 07/21/21 1106   Dressing Status         Wound 07/20/21 Other (comment) Dermatologic/Rash Perineum (Active)   Wound Image   07/21/21 1106   Wound Description [place] 07/21/21 1106   Suzy-wound Assessment Dry; Intact; Brown;Rash;Scaly 07/21/21 1106   Wound Length (cm) 8 cm 07/21/21 1106   Wound Width (cm) 14 cm 07/21/21 1106   Wound Depth (cm) 0 1 cm 07/21/21 1106   Wound Surface Area (cm^2) 112 cm^2 07/21/21 1106   Wound Volume (cm^3) 11 2 cm^3 07/21/21 1106   Calculated Wound Volume (cm^3) 11 2 cm^3 07/21/21 1106   Drainage Amount Small 07/21/21 1106   Drainage Description Serous 07/21/21 1106   Non-staged Wound Description Partial thickness 07/21/21 1106   Treatments Cleansed;Site care 07/21/21 1106   Dressing Other (Comment) 07/21/21 1106   Patient Tolerance Tolerated poorly 07/21/21 1106     Call or tigertext with any questions  Wound Care will continue to follow while inpatient    Carlton Soto RN BSN  Wound care

## 2021-07-21 NOTE — ASSESSMENT & PLAN NOTE
· Sacral wound present on admission  · Wound care consultation    · Frequent offloading repositioning to avoid further skin breakdown

## 2021-07-21 NOTE — ASSESSMENT & PLAN NOTE
Malnutrition Findings:           BMI Findings: Body mass index is 22 69 kg/m²       · Dietary consultation

## 2021-07-21 NOTE — ASSESSMENT & PLAN NOTE
· Evident tachycardia, tachypnea, and leukocytosis  · Concern for infectious pulmonary etiology    · Blood cultures pending  · Continue broad-spectrum antibiotics, cefepime/azithro D2

## 2021-07-21 NOTE — ASSESSMENT & PLAN NOTE
· Continue HFNC to support respiratory status  · Baseline 5L O2 NC  · Maintain O2 saturations >88%  · Continue broad spectrum antibiotics cefepime/azithromycin antibiotic # 1  · Increased baseline steroids Solu-Medrol 60 mg b i d   · Received one time dose of Lasix, appears dry, hold on further diuresis  · Question of aspiration, speech following, appreciate recommendations  · Encourage good incentive spirometry/pulmonary toileting

## 2021-07-21 NOTE — ASSESSMENT & PLAN NOTE
· Metoprolol 12 5 mg b i d   · Now with borderline hypotension, systolic in the 89Z, continue to monitor  · May benefit from albumin

## 2021-07-21 NOTE — DISCHARGE INSTR - OTHER ORDERS
Skin care plans:  1-Cleanse sacro-buttock and groin with soap and water, pat dry  Apply JENNIFER to sacrum, buttocks BID and PRN  2-Hydraguard to bilateral heel and scrotum BID and PRN  3-Elevate heels to offload pressure  4-Ehob cushion when out of bed  5-Turn/repoisiton every 2 hours for pressure re-distribution on skin    6-Moisturize skin daily with skin nourishing cream

## 2021-07-21 NOTE — ASSESSMENT & PLAN NOTE
Lab Results   Component Value Date    HGBA1C 6 5 (H) 06/23/2021       Recent Labs     07/20/21  1058 07/20/21  1536 07/20/21  1810 07/20/21  2115   POCGLU 239* 136 278* 154*       Blood Sugar Average: Last 72 hrs:  · (P) 201 75   · Hold oral glycemic agents  · Sliding scale insulin coverage as needed  · Consider resuming home dose Lantus, 12 units QAM  · Monitor blood glucose

## 2021-07-21 NOTE — ASSESSMENT & PLAN NOTE
· CXR revealing interval progression of bilateral opacifications  · Continue broad-spectrum antibiotics cefepime/azithromycin antibiotic day 2  · Procal 0 31, continue to trend  · Sputum/MRSA culture  · Courage good incentive spirometry/pulmonary toileting

## 2021-07-21 NOTE — CASE MANAGEMENT
Cm had a long discussion with pt and his SO-Keren  They were very upset with the care pt received at [facility] and that it was recommended as a [facility]'s preferred facility  Shobha Romero has already called the facility regarding their concerns--  -disrespect  -left on bedpan for hrs or told to go in his bed  Left lying in soiled bed which attributed to worsening bedsore  -Told nurse that he was very SOB and was told there was nothing she could do  -xray done on wrong pt    CM will report these concerns to CC    Pt does not want to return to [facility] on discharge  Referrals will be placed to [place], [place]  They do not want referrals to [facility] or [facility] either

## 2021-07-21 NOTE — PROGRESS NOTES
[address] [facility]  Progress Note Arvind Neville 1950, 65 y o  male MRN: [id number]  Unit/Bed#:  Encounter: [id number]  Primary Care Provider: Yadira Dunn DO   Date and time admitted to hospital: 7/20/2021  9:52 AM    * Acute on chronic respiratory failure with hypoxia (HCC)  Assessment & Plan  · Continue HFNC to support respiratory status  · Baseline 5L O2 NC  · Maintain O2 saturations >88%  · Continue broad spectrum antibiotics cefepime/azithromycin antibiotic # 1  · Increased baseline steroids Solu-Medrol 60 mg b i d   · Received one time dose of Lasix, appears dry, hold on further diuresis  · Question of aspiration, speech following, appreciate recommendations  · Encourage good incentive spirometry/pulmonary toileting    Multifocal pneumonia  Assessment & Plan  · CXR revealing interval progression of bilateral opacifications  · Continue broad-spectrum antibiotics cefepime/azithromycin antibiotic day 2  · Procal 0 31, continue to trend  · Sputum/MRSA culture  · Courage good incentive spirometry/pulmonary toileting  History of COVID-19  Assessment & Plan  · Diagnosed May 11 2021  Did not require inpatient hospitalization    Sepsis ([facility] [address] )  Assessment & Plan  · Evident tachycardia, tachypnea, and leukocytosis  · Concern for infectious pulmonary etiology  · Blood cultures pending  · Continue broad-spectrum antibiotics, cefepime/azithro D2      Paroxysmal A-fib (HCC)  Assessment & Plan  · Recent diagnosis paroxysmal AFib during last inpatient hospitalization  · Continue metoprolol 12 5 mg b i d   · Coumadin 3 mg q h s  INR subtherapeutic    Goal INR 2-3  · Continue Heparin gtt while bridging to Coumadin    Esophageal cancer   Assessment & Plan  · 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 s/p FOLFOX-6, was on trastuzumab monotherapy   · Completed palliative radiation to distal esophagus 4/23/21  · Follows with Brody Dang as outpatient      Esophageal stricture  Assessment & Plan  · EGD with dilation and stent placement 1/26/21  · EGD with stent removal 4/26/21 secondary to stent migration  · Formal speech/swallow evaluation completed, appreciate recommendations    Moderate protein-calorie malnutrition ([address] )  Assessment & Plan  Malnutrition Findings:           BMI Findings: Body mass index is 22 69 kg/m²  · Dietary consultation    Essential hypertension  Assessment & Plan  · Metoprolol 12 5 mg b i d   · Now with borderline hypotension, systolic in the 71O, continue to monitor  · May benefit from albumin    Type 2 diabetes mellitus, without long-term current use of insulin [facility])  Assessment & Plan  Lab Results   Component Value Date    HGBA1C 6 5 (H) 06/23/2021       Recent Labs     07/20/21  1058 07/20/21  1536 07/20/21  1810 07/20/21  2115   POCGLU 239* 136 278* 154*       Blood Sugar Average: Last 72 hrs:  · (P) 201 75   · Hold oral glycemic agents  · Sliding scale insulin coverage as needed  · Consider resuming home dose Lantus, 12 units QAM  · Monitor blood glucose    GERD (gastroesophageal reflux disease)  Assessment & Plan  · Protonix b i d  Sacral wound  Assessment & Plan  · Sacral wound present on admission  · Wound care consultation  · Frequent offloading repositioning to avoid further skin breakdown      ----------------------------------------------------------------------------------------  HPI/24hr events: No acute events overnight    Disposition: Continue Critical Care   Code Status: Level 1 - Full Code  ---------------------------------------------------------------------------------------  SUBJECTIVE  Offers no complaints    Review of Systems   Respiratory: Negative for shortness of breath  Cardiovascular: Negative for chest pain  Neurological: Negative for dizziness  All other systems reviewed and are negative      Review of systems was reviewed and negative unless stated above in HPI/24-hour events   ---------------------------------------------------------------------------------------  OBJECTIVE    Vitals   Vitals:    21 2300 21 2324 21 0000 21 0335   BP: (!) 88/64 95/65 115/69    BP Location:  Right arm     Pulse: 93 92 95    Resp: (!) 35 (!) 30 (!) 32    Temp:  97 8 °F (36 6 °C)     TempSrc:  Oral     SpO2: 97% 96% 93% 95%   Weight:       Height:         Temp (24hrs), Av 8 °F (36 6 °C), Min:97 °F (36 1 °C), Max:98 2 °F (36 8 °C)  Current: Temperature: 97 8 °F (36 6 °C)          Respiratory:  SpO2: SpO2: 95 %, SpO2 Activity: SpO2 Activity: At Rest, SpO2 Device: O2 Device: High flow nasal cannula       Invasive/non-invasive ventilation settings   Respiratory    Lab Data (Last 4 hours)    None         O2/Vent Data (Last 4 hours)       033          Non-Invasive Ventilation Mode HFNC (High flow)                   Physical Exam  Vitals and nursing note reviewed  HENT:      Head: Normocephalic  Mouth/Throat:      Mouth: Mucous membranes are dry  Pharynx: Oropharynx is clear  Eyes:      Pupils: Pupils are equal, round, and reactive to light  Cardiovascular:      Rate and Rhythm: Normal rate and regular rhythm  Pulses: Normal pulses  Heart sounds: Normal heart sounds  Pulmonary:      Effort: Pulmonary effort is normal       Breath sounds: Decreased breath sounds present  Abdominal:      Palpations: Abdomen is soft  Musculoskeletal:         General: Normal range of motion  Cervical back: Normal range of motion  Skin:     General: Skin is warm and dry  Neurological:      General: No focal deficit present  Mental Status: He is alert and oriented to person, place, and time           Laboratory and Diagnostics:  Results from last 7 days   Lab Units 21  0546   WBC Thousand/uL 13 48*   HEMOGLOBIN g/dL 14 8   HEMATOCRIT % 45 1   PLATELETS Thousands/uL 278   BANDS PCT % 3   MONO PCT % 7     Results from last 7 days   Lab Units 07/20/21  0546   SODIUM mmol/L 136   POTASSIUM mmol/L 4 1   CHLORIDE mmol/L 102   CO2 mmol/L 25   ANION GAP mmol/L 9   BUN mg/dL 14   CREATININE mg/dL 0 72   CALCIUM mg/dL 8 9   GLUCOSE RANDOM mg/dL 155*   ALT U/L 79*   AST U/L 31   ALK PHOS U/L 165 5*   ALBUMIN g/dL 3 2*   TOTAL BILIRUBIN mg/dL 0 58     Results from last 7 days   Lab Units 07/20/21  0546   MAGNESIUM mg/dL 1 9      Results from last 7 days   Lab Units 07/21/21  0020 07/20/21  1748 07/20/21  1246 07/20/21  0546   INR   --   --  1 52* 1 30*   PTT seconds 120* 87* 38* 33*      Results from last 7 days   Lab Units 07/20/21  0546   TROPONIN I ng/mL <0 03     Results from last 7 days   Lab Units 07/20/21  0546   LACTIC ACID mmol/L 1 8     ABG:  Results from last 7 days   Lab Units 07/20/21  0633   PH ART  7 454*   PCO2 ART mm Hg 33 9*   PO2 ART mm Hg 54 9*   HCO3 ART mmol/L 23 2   BASE EXC ART mmol/L 0 0   ABG SOURCE  Radial, Right     VBG:  Results from last 7 days   Lab Units 07/20/21  0633   ABG SOURCE  Radial, Right     Results from last 7 days   Lab Units 07/20/21  1748   PROCALCITONIN ng/ml 0 31*       Micro  Results from last 7 days   Lab Units 07/20/21  0553 07/20/21  0545   BLOOD CULTURE  Received in Microbiology Lab  Culture in Progress  Received in Microbiology Lab  Culture in Progress  EKG: normal sinus rhythm  Imaging: I have personally reviewed pertinent reports  and I have personally reviewed pertinent films in PACS    Intake and Output  I/O       07/19 0701 - 07/20 0700 07/20 0701 - 07/21 0700    P  O   480    I V  (mL/kg)  33 3 (0 5)    Total Intake(mL/kg)  513 3 (7 8)    Urine (mL/kg/hr)  1050    Stool  0    Total Output  1050    Net  -536 7          Unmeasured Stool Occurrence  1 x          Height and Weights   Height: 5' 7" (170 2 cm)  IBW (Ideal Body Weight): 66 1 kg  Body mass index is 22 69 kg/m²    Weight (last 2 days)     Date/Time   Weight    07/20/21 1600   65 7 (144 84)    07/20/21 1029   65 7 (144 84) Nutrition       Diet Orders   (From admission, onward)             Start     Ordered    07/20/21 1240  Diet Dysphagia/Modified Consistency; Dysphagia 2-Mechanical Soft; Thin Liquid  Diet effective now     Question Answer Comment   Diet Type Dysphagia/Modified Consistency    Dysphagia/Modified Consistency Dysphagia 2-Mechanical Soft    Liquid Modifier Thin Liquid    RD to adjust diet per protocol?  Yes        07/20/21 1239                  Active Medications  Scheduled Meds:  Current Facility-Administered Medications   Medication Dose Route Frequency Provider Last Rate    azithromycin  500 mg Intravenous Q24H [place] [facility], PA-C      cefepime  2,000 mg Intravenous Q12H Ray [facility], PA-C 2,000 mg (07/20/21 1800)    docusate sodium  100 mg Oral BID Ray Darren, PA-GABY      heparin (porcine)  3-20 Units/kg/hr (Order-Specific) Intravenous Titrated [place] 9 Units/kg/hr (07/21/21 0206)    heparin (porcine)  2,000 Units Intravenous Q1H PRN [facility], PA-C      heparin (porcine)  4,000 Units Intravenous Q1H PRN [facility], PA-GABY      insulin lispro  4-20 Units Subcutaneous TID With Meals [place] CATRACHITA Banks-GABY      LORazepam  0 5 mg Oral BID PRN [place]olia Dowse, CRNP      melatonin  3 mg Oral HS [facility], PA-GABY      methylPREDNISolone sodium succinate  60 mg Intravenous Q12H [address] [facility], PA-GABY      metoprolol tartrate  12 5 mg Oral Q12H [address] [place], [place]      nystatin   Topical BID [facility], PA-GABY      pantoprazole  40 mg Intravenous Q12H [address] [facility], PA-GABY      polyethylene glycol  17 g Oral TID [facility], PA-GABY      pravastatin  80 mg Oral Daily With [employer], PA-GABY      tiotropium  18 mcg Inhalation Daily [facility], PA-GABY      warfarin  3 mg Oral Daily (warfarin) Ray Banks PA-C       Continuous Infusions:  heparin (porcine), 3-20 Units/kg/hr (Order-Specific), Last Rate: 9 Units/kg/hr (07/21/21 0206)      PRN Meds:   heparin (porcine), 2,000 Units, Q1H PRN  heparin (porcine), 4,000 Units, Q1H PRN  LORazepam, 0 5 mg, BID PRN        Invasive Devices Review  Invasive Devices     Central Venous Catheter Line            Port A Cath 08/28/17 Right Chest 1422 days          Peripheral Intravenous Line            Peripheral IV 07/20/21 Left Arm <1 day    Peripheral IV 07/20/21 Left Wrist <1 day    Peripheral IV 07/21/21 Right Forearm <1 day                Rationale for remaining devices: n/a  ---------------------------------------------------------------------------------------  Advance Directive and Living Will:      Power of :    POLST:    ---------------------------------------------------------------------------------------  Care Time Delivered:   Upon my evaluation, this patient had a high probability of imminent or life-threatening deterioration due to acute hypoxic respiratory failure, which required my direct attention, intervention, and personal management  I have personally provided 25 minutes (0500 to [phone]) of critical care time, exclusive of procedures, teaching, family meetings, and any prior time recorded by providers other than myself  DESTIN Dodge      Portions of the record may have been created with voice recognition software  Occasional wrong word or "sound a like" substitutions may have occurred due to the inherent limitations of voice recognition software    Read the chart carefully and recognize, using context, where substitutions have occurred

## 2021-07-21 NOTE — ASSESSMENT & PLAN NOTE
· Recent diagnosis paroxysmal AFib during last inpatient hospitalization  · Continue metoprolol 12 5 mg b i d   · Coumadin 3 mg q h s  INR subtherapeutic    Goal INR 2-3  · Continue Heparin gtt while bridging to Coumadin

## 2021-07-21 NOTE — RESPIRATORY THERAPY NOTE
Weaned FiO2 and flow rate     07/21/21 3658   Non-Invasive Information   O2 Interface Device HFNC prongs   Non-Invasive Ventilation Mode HFNC (High flow)   SpO2 95 %   $ Pulse Oximetry Spot Check Charge Completed   Non-Invasive Settings   FiO2 (%) 80   Flow (lpm) 50   Temperature (Set) 31   Humidification   (heater)   Non-Invasive Readings   Skin Intervention Skin intact   Heater Temperature (Obs) 31   Maintenance   Water bag changed No

## 2021-07-21 NOTE — ASSESSMENT & PLAN NOTE
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

## 2021-07-21 NOTE — ASSESSMENT & PLAN NOTE
· EGD with dilation and stent placement 1/26/21  · EGD with stent removal 4/26/21 secondary to stent migration  · Formal speech/swallow evaluation completed, appreciate recommendations

## 2021-07-22 NOTE — ASSESSMENT & PLAN NOTE
· Evident by tachycardia, tachypnea, and leukocytosis  · Concern for infectious pulmonary etiology    · Blood cultures negative x24 hours  · Continue broad-spectrum antibiotics, cefepime/azithro D3

## 2021-07-22 NOTE — CONSULTS
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  Appears to have had some chronic fibrosis on prior imaging  Can consider aspiration component as well given prior esophageal cancer though low aspiration risk on speech eval   He is currently on HFNC, titrate to maintain O2 sats > 88%  Does not appear to be bacterial infection -No leukocytosis, he is afebrile, procalcitonin is normal      He is currently on Solu-Medrol 40 Q 12 along with Spiriva and Xopenex  BNP very slightly elevated, does not appear overtly fluid overloaded  May benefit from bronchoscopy at some point if O2 requirement comes down or if he required intubation  Will continue to follow  History of Present Illness   Physician Requesting Consult: Cullen Condon MD  Reason for Consult / Principal Problem: Respiratory failure  Hx and PE limited by: None  HPI: Roz Perez is a 65y o  year old male  Former smoker with past medical history of stage IV esophageal cancer status post chemo / RT in April 2021, COVID-19 infection in May 2021, chronic hypoxemic respiratory failure, Afib, DM who presents with complaint of worsening shortness of breath and increasing O2 requirement while in rehab  He had been hospitalized in June for COVID-19 infection, still required 6 L O2 upon discharge to rehab facility  Prior to his COVID-19 diagnosis, had not been diagnosed with any significant lung disease  He was treated for metastatic esophageal cancer in April with chemo/RT      Inpatient consult to Pulmonology  Consult performed by: Dariela Sue PA-C  Consult ordered by: Ray Banks PA-C          Review of Systems   Constitutional: Negative  HENT: Negative  Respiratory: Positive for shortness of breath  Cardiovascular: Negative  Gastrointestinal: Negative  Genitourinary: Negative  Musculoskeletal: Negative  Skin: Negative  Allergic/Immunologic: Negative  Neurological: Negative  Psychiatric/Behavioral: Negative  Historical Information   Past Medical History:   Diagnosis Date    Anxiety     Cancer [facility])     Esophageal    Dysphagia     Esophageal abnormality     Esophageal cancer (HCC)     GERD (gastroesophageal reflux disease)     Hyperlipidemia     Hypertension     Occasional tremors     Transaminitis 6/16/2021    Type 2 diabetes mellitus, without long-term current use of insulin ([facility] [address] )      Past Surgical History:   Procedure Laterality Date    ESOPHAGOGASTRODUODENOSCOPY N/A 8/9/2017    Procedure: ESOPHAGOGASTRODUODENOSCOPY (EGD); Surgeon: Columba Mckeon MD;  [facility]: [facility]; Service: Gastroenterology    ESOPHAGOGASTRODUODENOSCOPY N/A 8/11/2017    Procedure: ESOPHAGOGASTRODUODENOSCOPY (EGD) w/ stent;  Surgeon: Columba Mckeon MD;  [facility];   Service: Gastroenterology    ESOPHAGOGASTRODUODENOSCOPY N/A 1/26/2021    Procedure: ESOPHAGOGASTRODUODENOSCOPY (EGD), BIOPSY, ESOPHAGEAL DILATION,  STENT PLACEMENT;  Surgeon: Evita Nur MD;  [facility];  Service: Thoracic    LAPAROTOMY N/A 2/11/2020    Procedure: EGD; REMOVAL OF ESOPHAGEAL STENTS X 3;  Surgeon: Evita Nur MD;  [facility];  Service: Thoracic    PORTACATH PLACEMENT      PORTACATH PLACEMENT      [place] ESOPHAGOGASTRODUODENOSCOPY TRANSORAL DIAGNOSTIC N/A 4/26/2021    Procedure: ESOPHAGOGASTRODUODENOSCOPY (EGD); stent removal;  Surgeon: Evita Nur MD;  [facility];  Service: Thoracic    UPPER GASTROINTESTINAL ENDOSCOPY      VASCULAR SURGERY  2008    blockage in neck Social History   Social History     Substance and Sexual Activity   Alcohol Use Not Currently     Social History     Substance and Sexual Activity   Drug Use Yes    Types: Marijuana    Comment: has medical card for this     E-Cigarette/Vaping    E-Cigarette Use Never User      E-Cigarette/Vaping Substances     Social History     Tobacco Use   Smoking Status Former Smoker    Packs/day: 1 00    Years: 39 00    Pack years: 39 00    Types: Cigarettes    Start date:     Quit date: 2008    Years since quittin 8   Smokeless Tobacco Never Used   Tobacco Comment    started around age 22      Occupational History:     Family History:   Family History   Problem Relation Age of Onset    Liver cancer Paternal Uncle        Meds/Allergies   all current active meds have been reviewed, pertinent pulmonary meds have been reviewed and current meds:   Current Facility-Administered Medications   Medication Dose Route Frequency    [START ON 2021] cefTRIAXone (ROCEPHIN) 1,000 mg in dextrose 5 % 50 mL IVPB  1,000 mg Intravenous Q24H    docusate sodium (COLACE) capsule 100 mg  100 mg Oral BID    heparin (porcine) 25,000 units in 0 45% NaCl 250 mL infusion (premix)  3-20 Units/kg/hr (Order-Specific) Intravenous Titrated    heparin (porcine) injection 2,000 Units  2,000 Units Intravenous Q1H PRN    heparin (porcine) injection 4,000 Units  4,000 Units Intravenous Q1H PRN    insulin lispro (HumaLOG) 100 units/mL subcutaneous injection 1-6 Units  1-6 Units Subcutaneous HS    insulin lispro (HumaLOG) 100 units/mL subcutaneous injection 4-20 Units  4-20 Units Subcutaneous TID With Meals    levalbuterol (XOPENEX) inhalation solution 1 25 mg  1 25 mg Nebulization TID    LORazepam (ATIVAN) tablet 0 5 mg  0 5 mg Oral BID PRN    melatonin tablet 6 mg  6 mg Oral HS    methylPREDNISolone sodium succinate (Solu-MEDROL) injection 40 mg  40 mg Intravenous Q12H [address]    metoprolol tartrate (LOPRESSOR) partial tablet 12 5 mg  12 5 mg Oral Q12H [facility]    metroNIDAZOLE (FLAGYL) IVPB (premix) 500 mg 100 mL  500 mg Intravenous Q8H    moisture barrier miconazole 2% cream (aka JENNIFER MOISTURE BARRIER ANTIFUNGAL CREAM)   Topical BID    pantoprazole (PROTONIX) injection 40 mg  40 mg Intravenous Q12H [facility]    polyethylene glycol (MIRALAX) packet 17 g  17 g Oral TID    pravastatin (PRAVACHOL) tablet 80 mg  80 mg Oral Daily With Dinner    senna (SENOKOT) tablet 8 6 mg  1 tablet Oral HS    sodium chloride 0 9 % inhalation solution 3 mL  3 mL Nebulization TID    tiotropium (SPIRIVA) capsule for inhaler 18 mcg  18 mcg Inhalation Daily    warfarin (COUMADIN) tablet 3 mg  3 mg Oral Daily (warfarin)       Allergies   Allergen Reactions    Other      Cat Dander       Objective   Vitals: Blood pressure 100/64, pulse 79, temperature 97 6 °F (36 4 °C), resp  rate (!) 48, height 5' 7" (1 702 m), weight 68 kg (149 lb 14 6 oz), SpO2 92 %  ,Body mass index is 23 48 kg/m²  Intake/Output Summary (Last 24 hours) at 7/22/2021 1146  Last data filed at 7/22/2021 0915  Gross per 24 hour   Intake 1148 58 ml   Output 1150 ml   Net -1 42 ml     Invasive Devices     Central Venous Catheter Line            Port A Cath 08/28/17 Right Chest 1424 days          Peripheral Intravenous Line            Peripheral IV 07/20/21 Left Arm 2 days    Peripheral IV 07/21/21 Right Forearm 1 day                Physical Exam  Vitals reviewed  Constitutional:       General: He is not in acute distress  Appearance: Normal appearance  He is not ill-appearing  HENT:      Head: Normocephalic and atraumatic  Eyes:      Conjunctiva/sclera: Conjunctivae normal    Cardiovascular:      Rate and Rhythm: Normal rate and regular rhythm  Pulmonary:      Effort: Pulmonary effort is normal  No respiratory distress  Breath sounds: Examination of the right-lower field reveals rales  Examination of the left-lower field reveals rales  Rales present   No decreased breath sounds, wheezing or rhonchi  Abdominal:      General: Abdomen is flat  There is no distension  Musculoskeletal:         General: Normal range of motion  Cervical back: Normal range of motion  Right lower leg: No edema  Left lower leg: No edema  Skin:     General: Skin is warm and dry  Neurological:      Mental Status: He is alert and oriented to person, place, and time  Psychiatric:         Mood and Affect: Mood normal          Behavior: Behavior normal          Lab Results:   I have personally reviewed pertinent lab results  , CBC:   Lab Results   Component Value Date    WBC 9 36 07/22/2021    HGB 11 3 (L) 07/22/2021    HCT 33 9 (L) 07/22/2021    MCV 90 07/22/2021     07/22/2021    MCH 29 9 07/22/2021    MCHC 33 3 07/22/2021    RDW 14 0 07/22/2021    MPV 8 7 (L) 07/22/2021    NRBC 0 07/22/2021   , CMP:   Lab Results   Component Value Date    SODIUM 135 (L) 07/22/2021    K 4 2 07/22/2021     07/22/2021    CO2 26 07/22/2021    BUN 27 (H) 07/22/2021    CREATININE 0 84 07/22/2021    CALCIUM 8 3 07/22/2021    EGFR 87 07/22/2021     Imaging Studies: I have personally reviewed pertinent reports  and I have personally reviewed pertinent films in PACS  EKG, Pathology, and Other Studies: I have personally reviewed pertinent reports      VTE Prophylaxis: Sequential compression device (Venodyne)  and Heparin    Code Status: Level 1 - Full Code  Advance Directive and Living Will:      Power of :    POLST:

## 2021-07-22 NOTE — ASSESSMENT & PLAN NOTE
Lab Results   Component Value Date    HGBA1C 6 5 (H) 06/23/2021       Recent Labs     07/21/21  0902 07/21/21  1117 07/21/21  1732 07/21/21  2108   POCGLU 189* 125 299* 246*       Blood Sugar Average: Last 72 hrs:  · (P) 208 25   · Hold oral glycemic agents  · Sliding scale insulin coverage as needed  · Consider resuming home dose Lantus, 12 units QAM

## 2021-07-22 NOTE — ASSESSMENT & PLAN NOTE
· Sacral wound present on admission    · Wound care consultation  · Frequent offloading/repositioning to avoid further skin breakdown

## 2021-07-22 NOTE — OCCUPATIONAL THERAPY NOTE
Occupational Therapy Evaluation        Patient Name: Tierney Cruz  [id number] Date: 7/22/2021 07/22/21 0916   OT Last Visit   OT Visit Date 07/22/21   Note Type   Note type Evaluation   Restrictions/Precautions   Weight Bearing Precautions Per Order No   Braces or Orthoses Other (Comment)  (none reported)   Other Precautions Multiple lines;Telemetry;O2;Fall Risk;Pain; Chair Alarm   Pain Assessment   Pain Assessment Tool 0-10   Pain Score Worst Possible Pain  ("its a 12")   Pain Location/Orientation Other (Comment)  (is my esophagus because Im trying to eat")   Home Living   Type of [address] [place] [address] One level;Performs ADLs on one level  (1+1 BRET)   Bathroom Shower/Tub Walk-in shower   Bathroom Toilet Raised   Bathroom Equipment Grab bars in shower; Shower chair   [address] Other (Comment)  (none per pt)   Prior Function   Level of [place] Independent with ADLs and functional mobility   Lives With Significant other   Receives Help From Family  (Significant other)   ADL Assistance Independent   IADLs Independent   Falls in the last 6 months 0   Vocational Retired   Comments pt drives   Lifestyle   Autonomy Patient reported independent with ADLs/ IADLs  Patient lives with his SO in a one story house, 1 + 1 BRET  Patient was ambulatory with no AD, was driving and reported to be physically active before the onset of esophageal CA in April 2021     Reciprocal Relationships Supportive family   Service to Others Retired   Intrinsic Gratification physically active, outdoor housework   Psychosocial   Psychosocial (WDL) X   Patient Behaviors/Mood Anxious   ADL   Eating Assistance 5  Supervision/Setup   Grooming Assistance 4  Minimal Assistance   UB Bathing Assistance 3  Moderate Assistance   LB Bathing Assistance 2  Maximal [address] 2  Maximal [address] [id number] Moderate Assistance   Functional Assistance 3  Moderate Assistance   Bed Mobility   Rolling L 4  Minimal assistance   Additional items Assist x 2;HOB elevated; Bedrails; Increased time required;Verbal cues;LE management   Supine to Sit 4  Minimal assistance   Additional items Assist x 2;HOB elevated; Increased time required;Verbal cues;LE management   Transfers   Sit to Stand 3  Moderate assistance   Additional items Assist x 2; Increased time required;Verbal cues   Stand to Sit 3  Moderate assistance   Additional items Assist x 2; Increased time required;Verbal cues   Functional Mobility   Functional Mobility 3  Moderate assistance   Additional Comments poor standing tolerance time/ poor endurance   Additional items Rolling walker   Balance   Static Sitting Fair   Dynamic Sitting Fair -   Static Standing Poor +   Dynamic Standing Poor   Activity Tolerance   Activity Tolerance Patient limited by fatigue;Patient limited by pain   Nurse Made Aware RN made aware of therapy outcome   RUE Assessment   RUE Assessment X  (limited overhead movements)   RUE Strength   RUE Overall Strength Deficits  (3/5)   LUE Assessment   LUE Assessment X  (limited overhead movements)   LUE Strength   LUE Overall Strength Deficits  (3/5)   Hand Function   Gross Motor Coordination Impaired   Fine Motor Coordination Impaired   Sensation   Light Touch No apparent deficits  (BUEs)   Cognition   Overall Cognitive Status WFL   Arousal/Participation Alert; Responsive; Cooperative   Attention Within functional limits   Orientation Level Oriented X4   Memory Within functional limits   Following Commands Follows all commands and directions without difficulty   Assessment   Limitation Decreased ADL status; Decreased UE ROM; Decreased UE strength;Decreased endurance;Decreased fine motor control;Decreased self-care trans;Decreased high-level ADLs   Prognosis Good   Assessment Patient is a 65 y o  male seen for OT evaluation s/p admit to [id number] on 7/20/2021 w/Acute on chronic respiratory failure with hypoxia ([facility] [address] )  Commorbidities affecting patient's functional performance at time of assessment include:Multifocal PNA, h/o of Covid 19, Sepsis, A fib, esophageal CA, GERD, Sacral wound, presented to ED with progressive shortness of breath/chest pain  Orders placed for OT evaluation and treatment  Performed at least two patient identifiers during session including name and wristband  Prior to admission, Patient reported independent with ADLs/ IADLs  Patient lives with his SO in a one story house, 1 + 1 BRET  Patient was ambulatory with no AD, was driving and reported to be physically active before the onset of esophageal CA in April 2021  Personal factors affecting patient at time of initial evaluation include: steps to enter, decreased initiation and engagement, difficulty performing ADLs and difficulty performing IADLs  Upon evaluation, patient requires moderate assist for UB ADLs, maximal assist for LB ADLs,  Patient is alert and oriented x 4  Occupational performance is affected by the following deficits: decreased functional use of BUEs, decreased muscle strength, impaired gross motor coordination, impaired fine motor coordination, dynamic sit/ stand balance deficit with poor standing tolerance time for self care and functional mobility, decreased activity tolerance, increased pain and postural control and postural alignment deficit, requiring external assistance to complete transitional movements  Patient to benefit from continued Occupational Therapy treatment while in the hospital to address deficits as defined above and maximize level of functional independence with ADLs and functional mobility   Occupational Performance areas to address include: bathing/ shower, dressing, toilet hygiene, transfer to all surfaces, functional ambulation, functional mobility, community mobility, emergency response, health maintenance, IADLs: safety procedures, Leisure Participation and Social participation  From OT standpoint, recommendation at time of d/c would be Short Term Rehab  Plan   Treatment Interventions ADL retraining;Functional transfer training;UE strengthening/ROM; Endurance training;Patient/family training;Equipment evaluation/education; Compensatory technique education;Continued evaluation; Energy conservation; Activityengagement   Goal Expiration Date 08/05/21   OT Frequency 3-5x/wk   Recommendation   OT Discharge Recommendation Post acute rehabilitation services   AM-PAC Daily Activity Inpatient   Lower Body Dressing 2   Bathing 2   Toileting 2   Upper Body Dressing 2   Grooming 3   Eating 3   Daily Activity Raw Score 14   Daily Activity Standardized Score (Calc for Raw Score >=11) 33 39   AM-PAC Applied Cognition Inpatient   Following a Speech/Presentation 4   Understanding Ordinary Conversation 4   Taking Medications 4   Remembering Where Things Are Placed or Put Away 4   Remembering List of 4-5 Errands 3   Taking Care of Complicated Tasks 3   Applied Cognition Raw Score 22   Applied Cognition Standardized Score 47 83   Barthel Index   Feeding 5   Bathing 0   Grooming Score 0   Dressing Score 5   Bladder Score 10   Bowels Score 10   Toilet Use Score 5   Transfers (Bed/Chair) Score 5   Mobility (Level Surface) Score 0   Stairs Score 0   Barthel Index Score 40       Occupational Therapy goals: In 7-14 days:     1- Patient will verbalize and demonstrate use of energy conservation/ deep breathing technique and work simplification skills during functional activity with no verbal cues  2-Patient will verbalize and demonstrate good body mechanics and joint protection techniques during  ADLs/ IADLs with no verbal cues  3- Patient will increase OOB/ sitting tolerance to 2-4 hours per day for increased participation in self care and leisure tasks with no s/s of exertion    4-Patient will increase standing tolerance time to 5  minutes with unilateral UE support to complete sink level ADLs@ mod I level   5- Patient will increase sitting tolerance at edge of bed to 20 minutes to complete UB ADLs @ set up assist level  6- Patient will transfer bed to Chair / toilet at Set up assist level with AD as indicated  7- Patient will complete UB ADLs with set up assist  8- Patient will complete LB ADLs with min assist with the use of adaptive equipment  9- Patient will complete toileting hygiene with set up assist/ supervision for thoroughness    10-Patient/ Family  will demonstrate competency with UE Home Exercise Program

## 2021-07-22 NOTE — ASSESSMENT & PLAN NOTE
· Recent diagnosis paroxysmal AFib during last inpatient hospitalization  · Continue metoprolol 12 5 mg b i d   · Coumadin 3 mg q h s  for goal INR 2-3  · Continue Heparin gtt while bridging to Coumadin

## 2021-07-22 NOTE — ASSESSMENT & PLAN NOTE
· CXR revealing interval progression of bilateral opacifications  · Continue broad-spectrum antibiotics cefepime/azithromycin antibiotic day 3  · Procal 0 31, continue to trend  · Sputum/MRSA culture  · Courage good incentive spirometry/pulmonary toileting

## 2021-07-22 NOTE — PHYSICAL THERAPY NOTE
Physical Therapy Evaluation     Patient's Name: Jose Garcia    Admitting Diagnosis  Pneumonia [J18 9]    Problem List  Patient Active Problem List   Diagnosis    Odynophagia    GERD (gastroesophageal reflux disease)    Essential hypertension    Carotid stenosis    Esophageal stricture    Esophageal cancer     Migrated esophageal stent    Migration of esophageal stent    PAD (peripheral artery disease)    Bilateral carotid artery stenosis    Positive colorectal cancer screening using Cologuard test    SOB (shortness of breath)    Multifocal pneumonia    Acute on chronic respiratory failure with hypoxia (HCC)    Moderate protein-calorie malnutrition (HCC)    Type 2 diabetes mellitus, without long-term current use of insulin (HCC)    Paroxysmal [facility]-fib ([facility] [address] )    Debility    Anxiety    History of COVID-19    Sepsis ([facility] [address] )    Sacral wound       Past Medical History  Past Medical History:   Diagnosis Date    Anxiety     Cancer ([facility] )     Esophageal    Dysphagia     Esophageal abnormality     Esophageal cancer (HCC)     GERD (gastroesophageal reflux disease)     Hyperlipidemia     Hypertension     Occasional tremors     Transaminitis 6/16/2021    Type 2 diabetes mellitus, without long-term current use of insulin ([facility] )        Past Surgical History  Past Surgical History:   Procedure Laterality Date    ESOPHAGOGASTRODUODENOSCOPY N/A 8/9/2017    Procedure: ESOPHAGOGASTRODUODENOSCOPY (EGD); Surgeon: Merlin Lulas, MD;  [facility]: [facility]; Service: Gastroenterology    ESOPHAGOGASTRODUODENOSCOPY N/A 8/11/2017    Procedure: ESOPHAGOGASTRODUODENOSCOPY (EGD) w/ stent;  Surgeon: Merlin Lulas, MD;  [facility];   Service: Gastroenterology    ESOPHAGOGASTRODUODENOSCOPY N/A 1/26/2021    Procedure: ESOPHAGOGASTRODUODENOSCOPY (EGD), BIOPSY, ESOPHAGEAL DILATION,  STENT PLACEMENT;  Surgeon: Anai Knight MD;  [facility];  Service: Thoracic    LAPAROTOMY N/A 2/11/2020 Procedure: EGD; REMOVAL OF ESOPHAGEAL STENTS X 3;  Surgeon: Marley Guadarrama MD;  [facility];  Service: Thoracic    PORTACATH PLACEMENT      PORTACATH PLACEMENT      [place] ESOPHAGOGASTRODUODENOSCOPY TRANSORAL DIAGNOSTIC N/A 4/26/2021    Procedure: ESOPHAGOGASTRODUODENOSCOPY (EGD); stent removal;  Surgeon: Marley Guadarrama MD;  [facility];  Service: Thoracic    UPPER GASTROINTESTINAL ENDOSCOPY      VASCULAR SURGERY  2008    blockage in neck             07/22/21 0935   PT Last Visit   PT Visit Date 07/22/21   Note Type   Note type Evaluation   Pain Assessment   Pain Assessment Tool 0-10   Pain Score Worst Possible Pain  ("it's a 12 because I'm eating")   Pain Location/Orientation Other (Comment)  (Location: Esophagus)   Hospital Pain Intervention(s) Ambulation/increased activity   Multiple Pain Sites No   Home Living   Type of [address] One level;Performs ADLs on one level; Able to live on main level with bedroom/bathroom  (1+1 BRET)   Bathroom Shower/Tub Walk-in shower   Bathroom Toilet Raised   Bathroom Equipment Grab bars in shower; Shower chair   [address] Other (Comment)  (none per pt)   Prior Function   Level of [place] Independent with ADLs and functional mobility   Lives With Significant other   Receives Help From Family  (Significant other)   ADL Assistance Independent   IADLs Independent   Falls in the last 6 months 0   Vocational Retired   Comments pt drives   Restrictions/Precautions   [place] Bearing Precautions Per Order No   Braces or Orthoses Other (Comment)  (none reported)   Other Precautions Bed Alarm; Chair Alarm;Telemetry;Multiple lines;O2;Fall Risk;Pain  (High flow nasal cannula)   General   Family/Caregiver Present No   Cognition   Overall Cognitive Status WFL   Arousal/Participation Cooperative   Attention Within functional limits   Orientation Level Oriented X4   Memory Within functional limits   Following Commands Follows all commands and directions without difficulty   Comments patient agreeable to PT eval   RUE Assessment   RUE Assessment X  (limited overhead movements)   RUE Strength   RUE Overall Strength Deficits  (3/5)   LUE Assessment   LUE Assessment X  (limited overhead movements)   LUE Strength   LUE Overall Strength Deficits  (3/5)   RLE Assessment   RLE Assessment WFL  (grossly assessed with functional mobility: at least 3+/5)   LLE Assessment   LLE Assessment WFL  (grossly assessed with functional mobility: at least 3+/5)   Coordination   Movements are Fluid and Coordinated 0   Coordination and Movement Description tremulous movement noted   Sensation WFL   Bed Mobility   Rolling L 4  Minimal assistance   Additional items Assist x 2;HOB elevated; Bedrails; Increased time required;Verbal cues;LE management   Supine to Sit 4  Minimal assistance   Additional items Assist x 2;HOB elevated; Increased time required;Verbal cues;LE management   Transfers   Sit to Stand 3  Moderate assistance   Additional items Assist x 2; Increased time required;Verbal cues   Stand to Sit 4  Minimal assistance   Additional items Assist x 2; Increased time required;Verbal cues   Ambulation/Elevation   Gait pattern Short stride; Step to;Excessively slow;Decreased foot clearance   Gait Assistance 3  Moderate assist   Additional items Assist x 1;Verbal cues   Assistive Device Rolling walker   Distance 3' bed to chair   Stair Management Assistance Not tested   Balance   Static Sitting Fair   Dynamic Sitting Fair -   Static Standing Poor +   Dynamic Standing Poor   Ambulatory Poor   Endurance Deficit   Endurance Deficit Yes   Activity Tolerance   Activity Tolerance Patient limited by fatigue;Patient limited by pain   Medical Staff Made Aware MELISA Hernandez   Nurse Made Aware HEDY Penny confirmed patient appropriate for therapy; HEDY Mcknight made aware of session outcomes   Assessment   Prognosis Good   Problem List Decreased strength;Decreased endurance; Impaired balance;Decreased mobility; Decreased coordination;Pain;Decreased skin integrity   Assessment Pt is 65 y o  male seen for PT evaluation [facility] w/ Acute on chronic respiratory failure with hypoxia ([facility] )  PT consulted to assess pt's functional mobility and d/c needs  Order placed for PT eval and tx, w/ up w/ A order  Performed at least 2 patient identifiers during session: Name and wristband  Comorbidities affecting pt's physical performance at time of assessment include: a past medical history of Anxiety, Cancer ([facility] ), Dysphagia, Esophageal abnormality, Esophageal cancer ([facility] ), GERD (gastroesophageal reflux disease), Hyperlipidemia, Hypertension, Occasional tremors, Transaminitis (6/16/2021), and Type 2 diabetes mellitus, without long-term current use of insulin ([facility] )  PTA, pt was independent w/ all functional mobility w/ no AD, ambulates community distances and elevations, has 1+1 BRET, lives w/ significant other in one level house and retired  Personal factors affecting pt at time of IE include: inaccessible home environment, ambulating w/ assistive device, inability to ambulate household distances, inability to navigate community distances, inability to navigate level surfaces w/o external assistance, unable to perform dynamic tasks in community, inability to perform IADLs and inability to perform ADLs  Please find objective findings from PT assessment regarding body systems outlined above with impairments and limitations including weakness, impaired balance, decreased endurance, impaired coordination, gait deviations, pain, decreased activity tolerance, decreased functional mobility tolerance, fall risk and decreased skin integrity  The following objective measures performed on IE also reveal limitations: Barthel Index: 40/100 and Modified [place]: 4 (moderate/severe disability)   Pt's clinical presentation is currently unstable/unpredictable seen in pt's presentation of ongoing medical management/monitoring, evident in need for assist w/ all phases of mobility when usually mobilizing independently and fatigue and pain impacting overall mobility status  Pt to benefit from continued PT tx to address deficits as defined above and maximize level of functional independent mobility and consistency  From PT/mobility standpoint, recommendation at time of d/c would be post acute rehabilitation services pending progress in order to facilitate return to PLOF  Barriers to Discharge Inaccessible home environment   Goals   Patient Goals to return home   STG Expiration Date 08/01/21   Short Term Goal #1 In 7-10 days: Increase bilateral LE strength 1/2 grade to facilitate independent mobility, Perform all bed mobility tasks with SBA to decrease caregiver burden, Perform all transfers with SBA to improve independence, Ambulate > 50 ft  X 3 trials with least restrictive assistive device with SBA w/o LOB and w/ normalized gait pattern 100% of the time, Navigate 2 stairs with SBA with unilateral handrail to facilitate return to previous living environment and Increase all balance 1 grade to decrease risk for falls   PT Treatment Day 0   Plan   Treatment/Interventions Functional transfer training;LE strengthening/ROM; Elevations; Therapeutic exercise; Endurance training;Patient/family training;Bed mobility;Gait training;Equipment eval/education;Spoke to nursing;OT   PT Frequency Other (Comment)  (3-5x/wk)   Recommendation   PT Discharge Recommendation Post acute rehabilitation services   PT - OK to Discharge Yes  (when medically cleared; if to STR)   [address] [place] [address] Mobility Inpatient   Turning in Bed Without Bedrails 2   Lying on Back to Sitting on Edge of Flat Bed 2   Moving Bed to Chair 2   Standing Up From Chair 2   Walk in Room 2   Climb 3-5 Stairs 2   Basic Mobility Inpatient Raw Score 12   Basic Mobility Standardized Score 32 23   Modified [place] Scale   Modified [place] Scale 4 Barthel Index   Feeding 5   Bathing 0   Grooming Score 0   Dressing Score 5   Bladder Score 10   Bowels Score 10   Toilet Use Score 5   Transfers (Bed/Chair) Score 5   Mobility (Level Surface) Score 0   Stairs Score 0   Barthel Index Score 40       Zulema Fox, PT, DPT

## 2021-07-22 NOTE — ASSESSMENT & PLAN NOTE
· Continue HFNC to support respiratory status  · Baseline 5L O2 NC  · Maintain O2 saturations >88%  · Continue broad spectrum antibiotics cefepime/azithromycin antibiotic day 3  · Increased baseline steroids Solu-Medrol 60 mg b i d   · Question of aspiration, speech following, appreciate recommendations  · Encourage good incentive spirometry/pulmonary toileting

## 2021-07-22 NOTE — RESPIRATORY THERAPY NOTE
07/22/21 0722   Respiratory Assessment   Assessment Type Pre-treatment   General Appearance Sleeping   Respiratory Pattern Dyspnea with exertion   Chest Assessment Chest expansion symmetrical   Bilateral Breath Sounds Diminished;Clear   Resp Comments titrated fio2 tp 60%   Non-Invasive Information   O2 Interface Device HFNC prongs   Non-Invasive Ventilation Mode HFNC (High flow)   SpO2 98 %   $ Pulse Oximetry Spot Check Charge Completed   Non-Invasive Settings   FiO2 (%) 60   Flow (lpm) 45   Temperature (Set) 31   Non-Invasive Readings   Skin Intervention Skin intact   Total Rate 27   Heater Temperature (Obs) 31

## 2021-07-22 NOTE — RESPIRATORY THERAPY NOTE
07/22/21 0230   Respiratory Assessment   Assessment Type Assess only   General Appearance Sleeping   Resp Comments titrated HFNC pt thomas well asleep no resp distress   O2 Device  HFNC    Non-Invasive Information   O2 Interface Device HFNC prongs   Non-Invasive Ventilation Mode HFNC (High flow)  (optiflo )   $ Pulse Oximetry Spot Check Charge Completed   Non-Invasive Settings   FiO2 (%) 65   Flow (lpm) 45   Temperature (Set) 31   Humidification   (heater )   Non-Invasive Readings   Skin Intervention Skin intact   Total Rate 18   Heater Temperature (Obs) 31   Maintenance   Water bag changed No

## 2021-07-22 NOTE — PLAN OF CARE
Problem: PHYSICAL THERAPY ADULT  Goal: Performs mobility at highest level of function for planned discharge setting  See evaluation for individualized goals  Description: Treatment/Interventions: Functional transfer training, LE strengthening/ROM, Elevations, Therapeutic exercise, Endurance training, Patient/family training, Bed mobility, Gait training, Equipment eval/education, Spoke to nursing, OT          See flowsheet documentation for full assessment, interventions and recommendations  Note: Prognosis: Good  Problem List: Decreased strength, Decreased endurance, Impaired balance, Decreased mobility, Decreased coordination, Pain, Decreased skin integrity  Assessment: Pt is 65 y o  male seen for PT evaluation s/p admit to [facility] on 7/20/2021 w/ Acute on chronic respiratory failure with hypoxia ([facility] )  PT consulted to assess pt's functional mobility and d/c needs  Order placed for PT eval and tx, w/ up w/ A order  Performed at least 2 patient identifiers during session: Name and wristband  Comorbidities affecting pt's physical performance at time of assessment include: a past medical history of Anxiety, Cancer ([facility] ), Dysphagia, Esophageal abnormality, Esophageal cancer ([facility] ), GERD (gastroesophageal reflux disease), Hyperlipidemia, Hypertension, Occasional tremors, Transaminitis (6/16/2021), and Type 2 diabetes mellitus, without long-term current use of insulin ([facility] )  PTA, pt was independent w/ all functional mobility w/ no AD, ambulates community distances and elevations, has 1+1 BRET, lives w/ significant other in one level house and retired   Personal factors affecting pt at time of IE include: inaccessible home environment, ambulating w/ assistive device, inability to ambulate household distances, inability to navigate community distances, inability to navigate level surfaces w/o external assistance, unable to perform dynamic tasks in community, inability to perform IADLs and inability to perform ADLs  Please find objective findings from PT assessment regarding body systems outlined above with impairments and limitations including weakness, impaired balance, decreased endurance, impaired coordination, gait deviations, pain, decreased activity tolerance, decreased functional mobility tolerance, fall risk and decreased skin integrity  The following objective measures performed on IE also reveal limitations: Barthel Index: 40/100 and Modified [place]: 4 (moderate/severe disability)  Pt's clinical presentation is currently unstable/unpredictable seen in pt's presentation of ongoing medical management/monitoring, evident in need for assist w/ all phases of mobility when usually mobilizing independently and fatigue and pain impacting overall mobility status  Pt to benefit from continued PT tx to address deficits as defined above and maximize level of functional independent mobility and consistency  From PT/mobility standpoint, recommendation at time of d/c would be post acute rehabilitation services pending progress in order to facilitate return to PLOF  Barriers to Discharge: Inaccessible home environment        PT Discharge Recommendation: Post acute rehabilitation services     PT - OK to Discharge: Yes (when medically cleared; if to STR)    See flowsheet documentation for full assessment

## 2021-07-22 NOTE — RESPIRATORY THERAPY NOTE
07/22/21 1604   Respiratory Assessment   Assessment Type Assess only   General Appearance Alert; Awake   Respiratory Pattern Dyspnea with exertion;Dyspnea at rest   Chest Assessment Chest expansion symmetrical   Bilateral Breath Sounds Diminished;Coarse   Resp Comments titrated to 70%   Non-Invasive Information   O2 Interface Device HFNC prongs   Non-Invasive Ventilation Mode HFNC (High flow)   SpO2 93 %  (Simultaneous filing   User may not have seen previous data )   Non-Invasive Settings   FiO2 (%) 70   Flow (lpm) 45   Maintenance   Water bag changed No

## 2021-07-22 NOTE — ASSESSMENT & PLAN NOTE
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

## 2021-07-22 NOTE — PROGRESS NOTES
[address]  Progress Note Kirk Land 1950, 65 y o  male MRN: [id number]  Unit/Bed#:  Encounter: [id number]  Primary Care Provider: Gia Braxton DO   Date and time admitted to hospital: 7/20/2021  9:52 AM    * Acute on chronic respiratory failure with hypoxia (HCC)  Assessment & Plan  · Continue HFNC to support respiratory status  · Baseline 5L O2 NC  · Maintain O2 saturations >88%  · Continue broad spectrum antibiotics cefepime/azithromycin antibiotic day 3  · Increased baseline steroids Solu-Medrol 60 mg b i d   · Question of aspiration, speech following, appreciate recommendations  · Encourage good incentive spirometry/pulmonary toileting    Multifocal pneumonia  Assessment & Plan  · CXR revealing interval progression of bilateral opacifications  · Continue broad-spectrum antibiotics cefepime/azithromycin antibiotic day 3  · Procal 0 31, continue to trend  · Sputum/MRSA culture  · Courage good incentive spirometry/pulmonary toileting  History of COVID-19  Assessment & Plan  · Diagnosed May 11 2021  · Did not require inpatient hospitalization    Sepsis ([facility] )  Assessment & Plan  · Evident by tachycardia, tachypnea, and leukocytosis  · Concern for infectious pulmonary etiology  · Blood cultures negative x24 hours  · Continue broad-spectrum antibiotics, cefepime/azithro D3      Paroxysmal A-fib (HCC)  Assessment & Plan  · Recent diagnosis paroxysmal AFib during last inpatient hospitalization  · Continue metoprolol 12 5 mg b i d   · Coumadin 3 mg q h s  for goal INR 2-3  · Continue Heparin gtt while bridging to Coumadin    Esophageal cancer   Assessment & Plan  · 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 s/p FOLFOX-6, was on trastuzumab monotherapy    · Completed palliative radiation to distal esophagus 4/23/21  · Follows with Dr Angie Salcedo as outpatient      Esophageal stricture  Assessment & Plan  · EGD with dilation and stent placement 1/26/21  · EGD with stent removal 4/26/21 secondary to stent migration  · Formal speech/swallow evaluation completed, appreciate recommendations    Moderate protein-calorie malnutrition ([address] )  Assessment & Plan  Malnutrition Findings:   Adult Malnutrition type: Chronic illness  Adult Degree of Malnutrition: Malnutrition of moderate degree    BMI Findings: Body mass index is 24 86 kg/m²  · Dietary consultation    Essential hypertension  Assessment & Plan  · Metoprolol 12 5 mg b i d   · BP remains stable, continue to monitor    Type 2 diabetes mellitus, without long-term current use of insulin [facility])  Assessment & Plan  Lab Results   Component Value Date    HGBA1C 6 5 (H) 06/23/2021       Recent Labs     07/21/21  0902 07/21/21  1117 07/21/21  1732 07/21/21  2108   POCGLU 189* 125 299* 246*       Blood Sugar Average: Last 72 hrs:  · (P) 208 25   · Hold oral glycemic agents  · Sliding scale insulin coverage as needed  · Consider resuming home dose Lantus, 12 units QAM    GERD (gastroesophageal reflux disease)  Assessment & Plan  · Continue protonix b i d  Sacral wound  Assessment & Plan  · Sacral wound present on admission  · Wound care consultation  · Frequent offloading/repositioning to avoid further skin breakdown      ----------------------------------------------------------------------------------------  HPI/24hr events: No acute events overnight, remains on HFNC    Disposition: Continue Stepdown Level 1 level of care   Code Status: Level 1 - Full Code  ---------------------------------------------------------------------------------------  SUBJECTIVE  Offers no complaints    Review of Systems   All other systems reviewed and are negative      Review of systems was reviewed and negative unless stated above in HPI/24-hour events   ---------------------------------------------------------------------------------------  OBJECTIVE    Vitals   Vitals: 21 0100 21 0200 21 0230 21 0300   BP: 112/65 122/70  122/60   BP Location:    Right arm   Pulse: 75 71  63   Resp: (!) 32 (!) 28  (!) 33   Temp:    97 5 °F (36 4 °C)   TempSrc:    Oral   SpO2: 96% 98% 99% 97%   Weight:       Height:         Temp (24hrs), Av 7 °F (36 5 °C), Min:97 5 °F (36 4 °C), Max:97 9 °F (36 6 °C)  Current: Temperature: 97 5 °F (36 4 °C)          Respiratory:  SpO2: SpO2: 97 %, SpO2 Activity: SpO2 Activity: At Rest, SpO2 Device: O2 Device: High flow nasal cannula       Invasive/non-invasive ventilation settings   Respiratory    Lab Data (Last 4 hours)    None         O2/Vent Data (Last 4 hours)       0230          Non-Invasive Ventilation Mode HFNC (High flow)  Comment: optiflo                    Physical Exam  Vitals and nursing note reviewed  Constitutional:       Appearance: He is ill-appearing  HENT:      Head: Normocephalic  Mouth/Throat:      Mouth: Mucous membranes are dry  Pharynx: Oropharynx is clear  Eyes:      Pupils: Pupils are equal, round, and reactive to light  Cardiovascular:      Rate and Rhythm: Normal rate and regular rhythm  Pulses: Normal pulses  Heart sounds: Normal heart sounds  Pulmonary:      Breath sounds: Decreased breath sounds present  Abdominal:      General: Bowel sounds are normal       Palpations: Abdomen is soft  Musculoskeletal:         General: Normal range of motion  Cervical back: Normal range of motion  Skin:     General: Skin is warm and dry  Neurological:      General: No focal deficit present  Mental Status: He is oriented to person, place, and time           Laboratory and Diagnostics:  Results from last 7 days   Lab Units 21  0532 21  0546   WBC Thousand/uL 9 85 13 48*   HEMOGLOBIN g/dL 11 9* 14 8   HEMATOCRIT % 35 8* 45 1   PLATELETS Thousands/uL 247 278   NEUTROS PCT % 89*  --    BANDS PCT %  --  3   MONOS PCT % 4  --    MONO PCT %  --  7     Results from last 7 days   Lab Units 07/21/21  0532 07/20/21  0546   SODIUM mmol/L 137 136   POTASSIUM mmol/L 4 1 4 1   CHLORIDE mmol/L 101 102   CO2 mmol/L 26 25   ANION GAP mmol/L 10 9   BUN mg/dL 25 14   CREATININE mg/dL 0 93 0 72   CALCIUM mg/dL 8 3 8 9   GLUCOSE RANDOM mg/dL 262* 155*   ALT U/L 63 79*   AST U/L 21 31   ALK PHOS U/L 135* 165 5*   ALBUMIN g/dL 1 9* 3 2*   TOTAL BILIRUBIN mg/dL 0 33 0 58     Results from last 7 days   Lab Units 07/20/21  0546   MAGNESIUM mg/dL 1 9      Results from last 7 days   Lab Units 07/22/21  0108 07/21/21  1847 07/21/21  1129 07/21/21  0532 07/21/21  0020 07/20/21  1748 07/20/21  1246 07/20/21  0546   INR   --   --   --  1 90*  --   --  1 52* 1 30*   PTT seconds 75* 110* 53*  --  120* 87* 38* 33*      Results from last 7 days   Lab Units 07/20/21  0546   TROPONIN I ng/mL <0 03     Results from last 7 days   Lab Units 07/20/21  0546   LACTIC ACID mmol/L 1 8     ABG:  Results from last 7 days   Lab Units 07/20/21  0633   PH ART  7 454*   PCO2 ART mm Hg 33 9*   PO2 ART mm Hg 54 9*   HCO3 ART mmol/L 23 2   BASE EXC ART mmol/L 0 0   ABG SOURCE  Radial, Right     VBG:  Results from last 7 days   Lab Units 07/20/21  0633   ABG SOURCE  Radial, Right     Results from last 7 days   Lab Units 07/21/21  0532 07/20/21  1748   PROCALCITONIN ng/ml 0 15 0 31*       Micro  Results from last 7 days   Lab Units 07/20/21  0553 07/20/21  0545   BLOOD CULTURE  No Growth at 24 hrs  No Growth at 24 hrs  EKG: normal sinus rhythm  Imaging: I have personally reviewed pertinent reports  and I have personally reviewed pertinent films in PACS    Intake and Output  I/O       07/20 0701 - 07/21 0700 07/21 0701 - 07/22 0700    P  O  480 960    I V  (mL/kg) 141 7 (2) 132 5 (1 8)    IV Piggyback 50 141 7    Total Intake(mL/kg) 671 7 (9 3) 1234 2 (17 1)    Urine (mL/kg/hr) 1250 600 (0 3)    Stool 0     Total Output 1250 600    Net -578 3 +634 2          Unmeasured Stool Occurrence 1 x           Height and Weights Height: 5' 7" (170 2 cm)  IBW (Ideal Body Weight): 66 1 kg  Body mass index is 24 86 kg/m²  Weight (last 2 days)     Date/Time   Weight    07/21/21 1612   72 (158 73)    07/21/21 0600   72 (158 73)    07/20/21 1600   65 7 (144 84)    07/20/21 1029   65 7 (144 84)                Nutrition       Diet Orders   (From admission, onward)             Start     Ordered    07/21/21 1637  Dietary nutrition supplements  Once     Question Answer Comment   Select Supplement: Ensure Enlive-Strawberry    Frequency Breakfast, Dinner        07/21/21 1636    07/20/21 1240  Diet Dysphagia/Modified Consistency; Dysphagia 2-Mechanical Soft; Thin Liquid  Diet effective now     Question Answer Comment   Diet Type Dysphagia/Modified Consistency    Dysphagia/Modified Consistency Dysphagia 2-Mechanical Soft    Liquid Modifier Thin Liquid    RD to adjust diet per protocol?  Yes        07/20/21 1239                  Active Medications  Scheduled Meds:  Current Facility-Administered Medications   Medication Dose Route Frequency Provider Last Rate    azithromycin  500 mg Intravenous Q24H Jerrlyn Eye, PA-C 500 mg (07/21/21 0612)    cefepime  2,000 mg Intravenous Q12H Jerrlyn Eye, PA-C 2,000 mg (07/21/21 1835)    docusate sodium  100 mg Oral BID Jerrlyn Eye, PA-C      heparin (porcine)  3-20 Units/kg/hr (Order-Specific) Intravenous Titrated Jerrlyn Eye, PA-C 9 Units/kg/hr (07/22/21 0149)    heparin (porcine)  2,000 Units Intravenous Q1H PRN Jerrlyn Eye, PA-C      heparin (porcine)  4,000 Units Intravenous Q1H PRN Jerrlyn Eye, PA-C      insulin lispro  1-6 Units Subcutaneous HS Angel Null, CRNP      insulin lispro  4-20 Units Subcutaneous TID With Meals Jerrlyn Eye, PA-C      levalbuterol  1 25 mg Nebulization TID Jerrlyn Eye, PA-C      LORazepam  0 5 mg Oral BID PRN Angel Null, CRNP      melatonin  6 mg Oral HS Angel Null, CRNP      methylPREDNISolone sodium succinate  60 mg Intravenous Q12H [address] Jerrlyn Eye, PA-C      metoprolol tartrate  12 5 mg Oral Q12H [place], [place]      metroNIDAZOLE  500 mg Intravenous Q8H Camb[place]ian DESIRE Shaver Stopped (07/22/21 0045)   Carol Cassidy ANTIFUNGAL   Topical BID Abimael Mancia MD      pantoprazole  40 mg Intravenous Q12H [place], [place]      polyethylene glycol  17 g Oral TID Cambodiantha Shaver PA-C      pravastatin  80 mg Oral Daily With TransMont[employer]DESIRE diaz      senna  1 tablet Oral HS [place] DESIRE Shaver      sodium chloride  3 mL Nebulization TID Abimael Mancia MD      tiotropium  18 mcg Inhalation Daily Cambodiantha Shaver PA-C      warfarin  3 mg Oral Daily (warfarin) Gail Shaver PA-C       Continuous Infusions:  heparin (porcine), 3-20 Units/kg/hr (Order-Specific), Last Rate: 9 Units/kg/hr (07/22/21 0149)      PRN Meds:   heparin (porcine), 2,000 Units, Q1H PRN  heparin (porcine), 4,000 Units, Q1H PRN  LORazepam, 0 5 mg, BID PRN        Invasive Devices Review  Invasive Devices     Central Venous Catheter Line            Port A Cath 08/28/17 Right Chest 1423 days          Peripheral Intravenous Line            Peripheral IV 07/20/21 Left Arm 1 day    Peripheral IV 07/21/21 Right Forearm 1 day                Rationale for remaining devices: n/a  ---------------------------------------------------------------------------------------  Advance Directive and Living Will:      Power of :    POLST:    ---------------------------------------------------------------------------------------  Care Time Delivered:   Upon my evaluation, this patient had a high probability of imminent or life-threatening deterioration due to acute hypoxic respiratory failure, which required my direct attention, intervention, and personal management  I have personally provided 15 minutes (0345 to 0400) of critical care time, exclusive of procedures, teaching, family meetings, and any prior time recorded by providers other than myself         DESTIN Davalos      Portions of the record may have been created with voice recognition software  Occasional wrong word or "sound a like" substitutions may have occurred due to the inherent limitations of voice recognition software    Read the chart carefully and recognize, using context, where substitutions have occurred

## 2021-07-22 NOTE — PROCEDURES
Nerve block    Date/Time: 7/22/2021 2:00 PM  Performed by: Shane Peralta MD  Authorized by: Shane Peralta MD     Patient location:  Bedside  [place] Protocol:  Consent: Verbal consent obtained  Written consent obtained  Consent given by: guardian  Time out: Immediately prior to procedure a "time out" was called to verify the correct patient, procedure, equipment, support staff and site/side marked as required  Timeout called at: 7/22/2021 1:55 PM   Patient understanding: patient states understanding of the procedure being performed  Patient consent: the patient's understanding of the procedure matches consent given  Procedure consent: procedure consent matches procedure scheduled  Relevant documents: relevant documents present and verified  Test results: test results available and properly labeled  Site marked: the operative site was marked  Radiology Images displayed and confirmed  If images not available, report reviewed: imaging studies available  Patient identity confirmed: arm band      Indications:     Indications:  Procedural anesthesia  Location:     Body area:  Lower extremity    Lower extremity nerve:  Femoral    Laterality:  Right  Pre-procedure details:     Skin preparation:  2% chlorhexidine    Preparation: Patient was prepped and draped in usual sterile fashion    Skin anesthesia (see MAR for exact dosages):     Skin anesthesia method:  Local infiltration    Local anesthetic:  Lidocaine 1% w/o epi  Procedure details (see MAR for exact dosages):     Needle gauge: 20 ga stimuplex needle, 4 inch  Guidance: ultrasound      Block anesthetic: ropivacaine 0 5 %    Steroid injected:  Dexamethasone (5 mg)    Additive injected:  None    Injection procedure:  Incremental injection and negative aspiration for blood  Post-procedure details:     Dressing:  None    Outcome:  Anesthesia achieved    Patient tolerance of procedure:   Tolerated well, no immediate complications  Comments:      Patient placed in supine position, femoral artery, nerve and vein identified, artery severely calcified and nerve appears poorly defined with neovascularization, 25cc of 0 5% ropivicaine and 5 mg dexamethasone injected in incremental doses, no aspiration of heme prior to dosing, no paresthesias, no change in HR or BP noted, vitals documented in nursing notes    Ultrasound images saved and sent to PACs

## 2021-07-22 NOTE — PLAN OF CARE
Problem: OCCUPATIONAL THERAPY ADULT  Goal: Performs self-care activities at highest level of function for planned discharge setting  See evaluation for individualized goals  Description: Treatment Interventions: ADL retraining, Functional transfer training, UE strengthening/ROM, Endurance training, Patient/family training, Equipment evaluation/education, Compensatory technique education, Continued evaluation, Energy conservation, Activityengagement          See flowsheet documentation for full assessment, interventions and recommendations  Note: Limitation: Decreased ADL status, Decreased UE ROM, Decreased UE strength, Decreased endurance, Decreased fine motor control, Decreased self-care trans, Decreased high-level ADLs  Prognosis: Good  Assessment: Patient is a 65 y o  male seen for OT evaluation s/p admit to [id number] [facility] on 7/20/2021 w/Acute on chronic respiratory failure with hypoxia ([facility] )  Commorbidities affecting patient's functional performance at time of assessment include:Multifocal PNA, h/o of Covid 19, Sepsis, A fib, esophageal CA, GERD, Sacral wound, presented to ED with progressive shortness of breath/chest pain  Orders placed for OT evaluation and treatment  Performed at least two patient identifiers during session including name and wristband  Prior to admission, Patient reported independent with ADLs/ IADLs  Patient lives with his SO in a one story house, 1 + 1 BRET  Patient was ambulatory with no AD, was driving and reported to be physically active before the onset of esophageal CA in April 2021  Personal factors affecting patient at time of initial evaluation include: steps to enter, decreased initiation and engagement, difficulty performing ADLs and difficulty performing IADLs  Upon evaluation, patient requires moderate assist for UB ADLs, maximal assist for LB ADLs,  Patient is alert and oriented x 4   Occupational performance is affected by the following deficits: decreased functional use of BUEs, decreased muscle strength, impaired gross motor coordination, impaired fine motor coordination, dynamic sit/ stand balance deficit with poor standing tolerance time for self care and functional mobility, decreased activity tolerance, increased pain and postural control and postural alignment deficit, requiring external assistance to complete transitional movements  Patient to benefit from continued Occupational Therapy treatment while in the hospital to address deficits as defined above and maximize level of functional independence with ADLs and functional mobility  Occupational Performance areas to address include: bathing/ shower, dressing, toilet hygiene, transfer to all surfaces, functional ambulation, functional mobility, community mobility, emergency response, health maintenance, IADLs: safety procedures, Leisure Participation and Social participation  From OT standpoint, recommendation at time of d/c would be Short Term Rehab        OT Discharge Recommendation: Post acute rehabilitation services

## 2021-07-22 NOTE — PLAN OF CARE
Problem: Nutrition/Hydration-ADULT  Goal: Nutrient/Hydration intake appropriate for improving, restoring or maintaining nutritional needs  Description: Monitor and assess patient's nutrition/hydration status for malnutrition  Collaborate with interdisciplinary team and initiate plan and interventions as ordered  Monitor patient's weight and dietary intake as ordered or per policy  Utilize nutrition screening tool and intervene as necessary  Determine patient's food preferences and provide high-protein, high-caloric foods as appropriate       INTERVENTIONS:  - Monitor oral intake, urinary output, labs, and treatment plans  - Assess nutrition and hydration status and recommend course of action  - Evaluate amount of meals eaten  - Assist patient with eating if necessary   - Allow adequate time for meals  - Recommend/ encourage appropriate diets, oral nutritional supplements, and vitamin/mineral supplements  - Order, calculate, and assess calorie counts as needed  - Recommend, monitor, and adjust tube feedings and TPN/PPN based on assessed needs  - Assess need for intravenous fluids  - Provide specific nutrition/hydration education as appropriate  - Include patient/family/caregiver in decisions related to nutrition  Outcome: Progressing     Problem: Prexisting or High Potential for Compromised Skin Integrity  Goal: Skin integrity is maintained or improved  Description: INTERVENTIONS:  - Identify patients at risk for skin breakdown  - Assess and monitor skin integrity  - Assess and monitor nutrition and hydration status  - Monitor labs   - Assess for incontinence   - Turn and reposition patient  - Assist with mobility/ambulation  - Relieve pressure over bony prominences  - Avoid friction and shearing  - Provide appropriate hygiene as needed including keeping skin clean and dry  - Evaluate need for skin moisturizer/barrier cream  - Collaborate with interdisciplinary team   - Patient/family teaching  - Consider wound care consult   Outcome: Progressing     Problem: Potential for Falls  Goal: Patient will remain free of falls  Description: INTERVENTIONS:  - Educate patient/family on patient safety including physical limitations  - Instruct patient to call for assistance with activity   - Consult OT/PT to assist with strengthening/mobility   - Keep Call bell within reach  - Keep bed low and locked with side rails adjusted as appropriate  - Keep care items and personal belongings within reach  - Initiate and maintain comfort rounds  - Make Fall Risk Sign visible to staff  - Offer Toileting every 3 Hours, in advance of need  - Initiate/Maintain bed/chair alarm  - Obtain necessary fall risk management equipment:   - Apply yellow socks and bracelet for high fall risk patients  - Consider moving patient to room near nurses station  Outcome: Progressing     Problem: MOBILITY - ADULT  Goal: Maintain or return to baseline ADL function  Description: INTERVENTIONS:  -  Assess patient's ability to carry out ADLs; assess patient's baseline for ADL function and identify physical deficits which impact ability to perform ADLs (bathing, care of mouth/teeth, toileting, grooming, dressing, etc )  - Assess/evaluate cause of self-care deficits   - Assess range of motion  - Assess patient's mobility; develop plan if impaired  - Assess patient's need for assistive devices and provide as appropriate  - Encourage maximum independence but intervene and supervise when necessary  - Involve family in performance of ADLs  - Assess for home care needs following discharge   - Consider OT consult to assist with ADL evaluation and planning for discharge  - Provide patient education as appropriate  Outcome: Progressing  Goal: Maintains/Returns to pre admission functional level  Description: INTERVENTIONS:  - Perform BMAT or MOVE assessment daily    - Set and communicate daily mobility goal to care team and patient/family/caregiver     - Collaborate with rehabilitation services on mobility goals if consulted  - Perform Range of Motion 2 times a day  - Reposition patient every 2 hours    - Dangle patient 3 times a day  - Stand patient 3 times a day  - Ambulate patient 3 times a day  - Out of bed to chair 3 times a day   - Out of bed for meals 3 times a day  - Out of bed for toileting  - Record patient progress and toleration of activity level   Outcome: Progressing     Problem: PAIN - ADULT  Goal: Verbalizes/displays adequate comfort level or baseline comfort level  Description: Interventions:  - Encourage patient to monitor pain and request assistance  - Assess pain using appropriate pain scale  - Administer analgesics based on type and severity of pain and evaluate response  - Implement non-pharmacological measures as appropriate and evaluate response  - Consider cultural and social influences on pain and pain management  - Notify physician/advanced practitioner if interventions unsuccessful or patient reports new pain  Outcome: Progressing     Problem: INFECTION - ADULT  Goal: Absence or prevention of progression during hospitalization  Description: INTERVENTIONS:  - Assess and monitor for signs and symptoms of infection  - Monitor lab/diagnostic results  - Monitor all insertion sites, i e  indwelling lines, tubes, and drains  - Monitor endotracheal if appropriate and nasal secretions for changes in amount and color  - [place] appropriate cooling/warming therapies per order  - Administer medications as ordered  - Instruct and encourage patient and family to use good hand hygiene technique  - Identify and instruct in appropriate isolation precautions for identified infection/condition  Outcome: Progressing     Problem: SAFETY ADULT  Goal: Patient will remain free of falls  Description: INTERVENTIONS:  - Educate patient/family on patient safety including physical limitations  - Instruct patient to call for assistance with activity   - Consult OT/PT to assist with strengthening/mobility   - Keep Call bell within reach  - Keep bed low and locked with side rails adjusted as appropriate  - Keep care items and personal belongings within reach  - Initiate and maintain comfort rounds  - Make Fall Risk Sign visible to staff  - Offer Toileting every 3 Hours, in advance of need  - Initiate/Maintain bed/chair alarm  - Obtain necessary fall risk management equipment:   - Apply yellow socks and bracelet for high fall risk patients  - Consider moving patient to room near nurses station  Outcome: Progressing  Goal: Maintain or return to baseline ADL function  Description: INTERVENTIONS:  -  Assess patient's ability to carry out ADLs; assess patient's baseline for ADL function and identify physical deficits which impact ability to perform ADLs (bathing, care of mouth/teeth, toileting, grooming, dressing, etc )  - Assess/evaluate cause of self-care deficits   - Assess range of motion  - Assess patient's mobility; develop plan if impaired  - Assess patient's need for assistive devices and provide as appropriate  - Encourage maximum independence but intervene and supervise when necessary  - Involve family in performance of ADLs  - Assess for home care needs following discharge   - Consider OT consult to assist with ADL evaluation and planning for discharge  - Provide patient education as appropriate  Outcome: Progressing  Goal: Maintains/Returns to pre admission functional level  Description: INTERVENTIONS:  - Perform BMAT or MOVE assessment daily    - Set and communicate daily mobility goal to care team and patient/family/caregiver  - Collaborate with rehabilitation services on mobility goals if consulted  - Perform Range of Motion 3 times a day  - Reposition patient every 2 hours    - Dangle patient 3 times a day  - Stand patient 3 times a day  - Ambulate patient 3 times a day  - Out of bed to chair 3 times a day   - Out of bed for meals 3 times a day  - Out of bed for toileting  - Record patient progress and toleration of activity level   Outcome: Progressing     Problem: DISCHARGE PLANNING  Goal: Discharge to home or other facility with appropriate resources  Description: INTERVENTIONS:  - Identify barriers to discharge w/patient and caregiver  - Arrange for needed discharge resources and transportation as appropriate  - Identify discharge learning needs (meds, wound care, etc )  - Arrange for interpretive services to assist at discharge as needed  - Refer to Case Management Department for coordinating discharge planning if the patient needs post-hospital services based on physician/advanced practitioner order or complex needs related to functional status, cognitive ability, or social support system  Outcome: Progressing     Problem: Knowledge Deficit  Goal: Patient/family/caregiver demonstrates understanding of disease process, treatment plan, medications, and discharge instructions  Description: Complete learning assessment and assess knowledge base    Interventions:  - Provide teaching at level of understanding  - Provide teaching via preferred learning methods  Outcome: Progressing

## 2021-07-22 NOTE — RESPIRATORY THERAPY NOTE
07/21/21 3873   Respiratory Assessment   Assessment Type Assess only   General Appearance Awake; Alert   Respiratory Pattern Normal;Dyspnea with exertion   Chest Assessment Chest expansion symmetrical   Bilateral Breath Sounds Diminished;Clear   Resp Comments titrated HFNC , pt awake alert thomas well no didtress    O2 Device HFNC    Non-Invasive Information   O2 Interface Device HFNC prongs   Non-Invasive Ventilation Mode HFNC (High flow)  (opti)   $ Pulse Oximetry Spot Check Charge Completed   Non-Invasive Settings   FiO2 (%) 70   Flow (lpm) 45   Temperature (Set) 31   Humidification   (heater )   Non-Invasive Readings   Skin Intervention Skin intact   Total Rate 24   Heater Temperature (Obs) 31   Maintenance   Water bag changed No

## 2021-07-22 NOTE — RESPIRATORY THERAPY NOTE
07/22/21 1259   Non-Invasive Information   O2 Interface Device HFNC prongs   Non-Invasive Ventilation Mode HFNC (High flow)   SpO2 90 %   Non-Invasive Settings   FiO2 (%) 60   Flow (lpm) 45   Temperature (Set) 31   Non-Invasive Readings   Skin Intervention Skin intact   Heater Temperature (Obs) 31   Maintenance   Water bag changed Yes

## 2021-07-22 NOTE — SOCIAL WORK
Palliative Inpatient Assessment Note    LSW appreciates the opportunity to provide patient/family with inpatient emotional support and guidance while they continue to receive medical attention from a Palliative provider  LSW completed an assessment of need which was completed with patient and significant other, Paul Deal    Family dynamics:   Relationship status: Significant other "my other half"  Duration of relationship: 17 years  Name of significant other: Shelly Flower and Ages: Son and Daughter (39yrs old)  Pets:  Other important family information:   Living situation: Resides with caregiver     Patient's primary caregiver:  Maria Esther Torres  Any limitations: none reported  Environmental concerns or barriers: none reported   history: Patient reports having been in [facility]   Employment history/ Source of income:   Disability:  Spirituality/ Congregation:  Patient did discuss [facility]  Reported that his father  after praying in [place] saying "Amen" and then not picking up his head  Patient reports comfort in this  Cultural information:   Mental Health and/or Drug and Alcohol history:  Advanced Directives:  Patient does have a Living Will and Identifies, Significant other, Keren Deal as decision maker  Patient's strengths, social supports, and resources: Patient's significant other is a support for him  Patient/significant other have had conversations regarding death and dying, patient's wishes related to same  Patient/family current level of coping:  Level of understanding: Patient does wish to be at home and die at home  Patient and significant other do have experience with hospice within the family  Patient/significant other would be open to hospice in the home   Patient/family concerns and areas of need: Ongoing support, symptom management  *All questions may not be answered due to constraints    Follow-up discussions may need to occur

## 2021-07-22 NOTE — CONSULTS
Consultation - Palliative and Supportive Care   Dexter Denney 65 y o  male [id number]    Patient Active Problem List   Diagnosis    Odynophagia    GERD (gastroesophageal reflux disease)    Essential hypertension    Carotid stenosis    Esophageal stricture    Esophageal cancer     Migrated esophageal stent    Migration of esophageal stent    PAD (peripheral artery disease)    Bilateral carotid artery stenosis    Positive colorectal cancer screening using Cologuard test    SOB (shortness of breath)    Multifocal pneumonia    Acute on chronic respiratory failure with hypoxia (HCC)    Moderate protein-calorie malnutrition (HCC)    Type 2 diabetes mellitus, without long-term current use of insulin (HCC)    Paroxysmal A-fib (HCC)    Debility    Anxiety    History of COVID-19    Sepsis ([address] )    Sacral wound     Active issues specifically addressed today include:   Stage IV esophageal cancer  Acute on chronic hypoxic respiratory failuere  History of COVID 19      Plan:  1  Symptom management -    - Respiratory support per ICU   - follow for ongoing symptoms, constipation etc       2  Goals - Extensive goals of care conversation where we reviewed his advanced directive, discussed goals of care, discussed hospice, discussed quality of life indicators, discussed code status  No changes to code status today but patient clearly names his signficant other Jaron Hager as his medical decision maker over his two biologic children  This is also documented in his advanced medical directive  Code Status: level 1   Decisional apparatus:  Patient is competent on my exam today  If competence is lost, patient's substitute decision maker would default to spouse by [place]     Advance Directive / Living Will / POLST:  None on file     I have reviewed the patient's controlled substance dispensing history in the Prescription Drug Monitoring Program in compliance with the [facility] regulations before prescribing any controlled substances  We appreciate the invitation to be involved in this patient's care  We will follow  Please do not hesitate to reach our on call provider through our clinic answering service at  should you have acute symptom control concerns  Jigar Delarosa MD  Palliative and Supportive Care  Clinic/Answering Service: [phone]  You can find me on [employer]! IDENTIFICATION:  Inpatient consult to Palliative Care  Consult performed by: Jigar Delarosa MD  Consult ordered by: Ashley River PA-C        Physician Requesting Consult: Adriana Sheffield MD  Reason for Consult / Principal Problem: goals of care  Hx and PE limited by: respiratory distress    HISTORY OF PRESENT ILLNESS:       Kortney Swift is a 65 y o  male with extensive PMHx including stage IV esophageal CA s/p extended stay at [facility] for COVID/pulmonary disease who presents with progressive decline at SNF with respiratory distress and hypoxia  Unfortunately he remains quite ill in the ICU with unclear etiology of pulmonary disease  From a symptom standpoint, he endorses exertional dyspnea  He denies any pain  He reports previous issues with constipation which have now resolved  Denies current constipation but  Denies urinary retention  Denies nausea  Denies vomiting  States he has a reasonable appetite but is hard for him to eat given his dyspnea  Denies significant anxiety  Sleeping okay  No current chest pain the had chest pain on admission  From goals of care standpoint, patient endorses his desire to continue current treatment  He reports having right advanced directive which we later reviewed in his chart  He is accompanied by his significant other roof  He has two children  He relates stories about the death and dying of his parents    He relates stories about his illness journey including is history of getting treatment for his esophageal cancer and then later his extended hospital stay at [facility]  He discusses some of the challenges he face while at a skilled nursing facility  He states he does have bed sores on his gluteal region  Philosophically he endorses that being home would be optimal and we discussed hospice at some length  We discussed intubation some eye  We discussed various scenarios that his request regarding life support  Patient's significant other Jose Carter was also very engaged in the conversation  ROS    Past Medical History:   Diagnosis Date    Anxiety     Cancer [facility])     Esophageal    Dysphagia     Esophageal abnormality     Esophageal cancer (HCC)     GERD (gastroesophageal reflux disease)     Hyperlipidemia     Hypertension     Occasional tremors     Transaminitis 6/16/2021    Type 2 diabetes mellitus, without long-term current use of insulin ([facility] [address] )      Past Surgical History:   Procedure Laterality Date    ESOPHAGOGASTRODUODENOSCOPY N/A 8/9/2017    Procedure: ESOPHAGOGASTRODUODENOSCOPY (EGD); Surgeon: Fe Vargas MD;  [facility]: [facility]; Service: Gastroenterology    ESOPHAGOGASTRODUODENOSCOPY N/A 8/11/2017    Procedure: ESOPHAGOGASTRODUODENOSCOPY (EGD) w/ stent;  Surgeon: Fe Vargas MD;  [facility];   Service: Gastroenterology    ESOPHAGOGASTRODUODENOSCOPY N/A 1/26/2021    Procedure: ESOPHAGOGASTRODUODENOSCOPY (EGD), BIOPSY, ESOPHAGEAL DILATION,  STENT PLACEMENT;  Surgeon: Heena Soriano MD;  [facility];  Service: Thoracic    LAPAROTOMY N/A 2/11/2020    Procedure: EGD; REMOVAL OF ESOPHAGEAL STENTS X 3;  Surgeon: Heena Soriano MD;  [facility];  Service: Thoracic    PORTACATH PLACEMENT      PORTACATH PLACEMENT      SC ESOPHAGOGASTRODUODENOSCOPY TRANSORAL DIAGNOSTIC N/A 4/26/2021    Procedure: ESOPHAGOGASTRODUODENOSCOPY (EGD); stent removal;  Surgeon: Heena Soriano MD;  [facility];  Service: Thoracic    UPPER GASTROINTESTINAL ENDOSCOPY      VASCULAR SURGERY  2008    blockage in neck      Social History     Socioeconomic History    Marital status: Single     Spouse name: Not on file    Number of children: Not on file    Years of education: Not on file    Highest education level: Not on file   Occupational History    Not on file   Tobacco Use    Smoking status: Former Smoker     Packs/day: 1 00     Years: 39 00     Pack years: 39 00     Types: Cigarettes     Start date: 54     Quit date: 2008     Years since quittin 8    Smokeless tobacco: Never Used    Tobacco comment: started around age 22    Vaping Use    Vaping Use: Never used   Substance and Sexual Activity    Alcohol use: Not Currently    Drug use: Yes     Types: Marijuana     Comment: has medical card for this    Sexual activity: Not Currently   Other Topics Concern    Not on file   Social History Narrative    Not on file     Social Determinants of Health     Financial Resource Strain:     Difficulty of Paying Living Expenses:    Food Insecurity:     Worried About Running Out of Food in the Last Year:     [address] in the Last Year:    Transportation Needs:     Lack of Transportation (Medical):      Lack of Transportation (Non-Medical):    Physical Activity:     Days of Exercise per Week:     Minutes of Exercise per Session:    Stress:     Feeling of Stress :    Social Connections:     Frequency of Communication with Friends and Family:     Frequency of Social Gatherings with Friends and Family:     Attends Mu-ism Services:     Active Member of Clubs or Organizations:     Attends Club or Organization Meetings:     Marital Status:    Intimate Partner Violence:     Fear of Current or Ex-Partner:     Emotionally Abused:     Physically Abused:     Sexually Abused:      Family History   Problem Relation Age of Onset    Liver cancer Paternal Uncle        MEDICATIONS / ALLERGIES:    all current active meds have been reviewed and current meds:   Current Facility-Administered Medications Medication Dose Route Frequency    [START ON 7/23/2021] cefTRIAXone (ROCEPHIN) 1,000 mg in dextrose 5 % 50 mL IVPB  1,000 mg Intravenous Q24H    docusate sodium (COLACE) capsule 100 mg  100 mg Oral BID    heparin (porcine) 25,000 units in 0 45% NaCl 250 mL infusion (premix)  3-20 Units/kg/hr (Order-Specific) Intravenous Titrated    heparin (porcine) injection 2,000 Units  2,000 Units Intravenous Q1H PRN    heparin (porcine) injection 4,000 Units  4,000 Units Intravenous Q1H PRN    insulin lispro (HumaLOG) 100 units/mL subcutaneous injection 1-6 Units  1-6 Units Subcutaneous HS    insulin lispro (HumaLOG) 100 units/mL subcutaneous injection 4-20 Units  4-20 Units Subcutaneous TID With Meals    levalbuterol (XOPENEX) inhalation solution 1 25 mg  1 25 mg Nebulization TID    LORazepam (ATIVAN) tablet 0 5 mg  0 5 mg Oral BID PRN    melatonin tablet 6 mg  6 mg Oral HS    methylPREDNISolone sodium succinate (Solu-MEDROL) injection 40 mg  40 mg Intravenous Q12H SENG    metoprolol tartrate (LOPRESSOR) partial tablet 12 5 mg  12 5 mg Oral Q12H SENG    metroNIDAZOLE (FLAGYL) IVPB (premix) 500 mg 100 mL  500 mg Intravenous Q8H    moisture barrier miconazole 2% cream (aka JENNIFER MOISTURE BARRIER ANTIFUNGAL CREAM)   Topical BID    pantoprazole (PROTONIX) injection 40 mg  40 mg Intravenous Q12H SENG    polyethylene glycol (MIRALAX) packet 17 g  17 g Oral TID    pravastatin (PRAVACHOL) tablet 80 mg  80 mg Oral Daily With Dinner    senna (SENOKOT) tablet 8 6 mg  1 tablet Oral HS    sodium chloride 0 9 % inhalation solution 3 mL  3 mL Nebulization TID    tiotropium (SPIRIVA) capsule for inhaler 18 mcg  18 mcg Inhalation Daily    warfarin (COUMADIN) tablet 3 mg  3 mg Oral Daily (warfarin)       Allergies   Allergen Reactions    Other      Cat Dander       OBJECTIVE:    Physical Exam  Physical Exam  Vitals and nursing note reviewed  Constitutional:       General: He is not in acute distress       Appearance: He is ill-appearing  He is not toxic-appearing or diaphoretic  HENT:      Head: Atraumatic  Right Ear: External ear normal       Left Ear: External ear normal       Nose:      Comments: HFNC in place     Mouth/Throat:      Mouth: Mucous membranes are dry  Eyes:      General:         Right eye: No discharge  Left eye: No discharge  Cardiovascular:      Rate and Rhythm: Normal rate  Pulmonary:      Effort: No respiratory distress  Abdominal:      General: There is no distension  Musculoskeletal:         General: No swelling  Skin:     General: Skin is warm and dry  Neurological:      General: No focal deficit present  Mental Status: He is alert and oriented to person, place, and time  Motor: Weakness present  Psychiatric:         Mood and Affect: Mood normal          Behavior: Behavior normal          Thought Content: Thought content normal          Judgment: Judgment normal          Lab Results:   I have personally reviewed pertinent labs  , CBC:   Lab Results   Component Value Date    WBC 9 36 07/22/2021    HGB 11 3 (L) 07/22/2021    HCT 33 9 (L) 07/22/2021    MCV 90 07/22/2021     07/22/2021    MCH 29 9 07/22/2021    MCHC 33 3 07/22/2021    RDW 14 0 07/22/2021    MPV 8 7 (L) 07/22/2021    NRBC 0 07/22/2021   , CMP:   Lab Results   Component Value Date    SODIUM 135 (L) 07/22/2021    K 4 2 07/22/2021     07/22/2021    CO2 26 07/22/2021    BUN 27 (H) 07/22/2021    CREATININE 0 84 07/22/2021    CALCIUM 8 3 07/22/2021    EGFR 87 07/22/2021   , BMP:  Lab Results   Component Value Date    SODIUM 135 (L) 07/22/2021    K 4 2 07/22/2021     07/22/2021    CO2 26 07/22/2021    BUN 27 (H) 07/22/2021    CREATININE 0 84 07/22/2021    GLUC 186 (H) 07/22/2021    CALCIUM 8 3 07/22/2021    AGAP 8 07/22/2021    EGFR 87 07/22/2021     Imaging Studies: I personally reviewed relevant reports  EKG, Pathology, and Other Studies: I personally reviewed relevant reports    Counseling / Coordination of Care    Total floor / unit time spent today 100+ minutes (14:40-16:00)  Greater than 50% of total time was spent with the patient and / or family counseling and / or coordination of care   A description of the counseling / coordination of care:  Extensive discussion regarding goals of care including code status determination, designation of medical decision maker, review of advance directive, review of patient's family and social situation, review of patient's quality of life indicators, discussion regarding patient's previous care needs, psychosocial support, symptom assessment, chart review, medication review, discussion with ICU team

## 2021-07-22 NOTE — PROCEDURES
Nerve block    Date/Time: 7/22/2021 1:45 PM  Performed by: Luis Dior MD  Authorized by: Luis Dior MD     Patient location:  Bedside  [place] Protocol:  Procedure performed by: Derrick Wilder)  Consent: Written consent obtained  Risks and benefits: risks, benefits and alternatives were discussed  Consent given by: guardian  Time out: Immediately prior to procedure a "time out" was called to verify the correct patient, procedure, equipment, support staff and site/side marked as required  Timeout called at: 7/22/2021 1:40 PM   Patient understanding: patient states understanding of the procedure being performed  Patient consent: the patient's understanding of the procedure matches consent given  Procedure consent: procedure consent matches procedure scheduled  Relevant documents: relevant documents present and verified  Test results: test results available and properly labeled  Site marked: the operative site was marked  Radiology Images displayed and confirmed  If images not available, report reviewed: imaging studies available  Required items: required blood products, implants, devices, and special equipment available  Patient identity confirmed: verbally with patient      Indications:     Indications:  Procedural anesthesia  Location:     Body area:  Lower extremity    Lower extremity nerve blocked: popliteal     Nerve type:  Peripheral    Laterality:  Right  Pre-procedure details:     Skin preparation:  2% chlorhexidine    Preparation: Patient was prepped and draped in usual sterile fashion    Skin anesthesia (see MAR for exact dosages):     Skin anesthesia method:  Local infiltration    Local anesthetic:  Lidocaine 1% w/o epi  Procedure details (see MAR for exact dosages):     Needle gauge: 20 Ga Stimuplex 6 inch needle      Guidance: ultrasound      Block anesthetic: ropivicaine 0 5%    Steroid injected:  Dexamethasone (5 mg)    Additive injected:  None    Injection procedure:  Incremental injection and negative aspiration for blood  Post-procedure details:     Dressing:  None    Outcome:  Anesthesia achieved    Patient tolerance of procedure:   Tolerated well, no immediate complications  Comments:      Patient placed in left lateral position, popliteal nerve, artery and vein identified, 30 cc 0 5% ropivicaine and 5 mg dexamethasone injected in incremental injections, good spread of anesthetic visualized surrounding nerve, no change in HR or BP during and after injection, vitals documented in nursing notes    Ultrasound images saved and sent to PACs

## 2021-07-22 NOTE — ASSESSMENT & PLAN NOTE
Malnutrition Findings:   Adult Malnutrition type: Chronic illness  Adult Degree of Malnutrition: Malnutrition of moderate degree    BMI Findings: Body mass index is 24 86 kg/m²       · Dietary consultation

## 2021-07-23 NOTE — ASSESSMENT & PLAN NOTE
· Evident by tachycardia, tachypnea, and leukocytosis  · Concern for infectious pulmonary etiology  · Blood cultures negative x24 hours  · Continue antibiotics, ceftriaxone/flagyl D4  · Continue IV solu medrol 40mg BID

## 2021-07-23 NOTE — ASSESSMENT & PLAN NOTE
· CXR revealing interval progression of bilateral opacifications  · High resolution CT demonstrated new area of groundglass density in the posterior aspect right upper lobe along with worsening consolidation at both lung bases, increasing reticulation with traction bronchiectatic changes also noted at the lung bases  · Continue broad-spectrum antibiotics ceftriaxone/flagyl, antibiotic day 4  · Procal 0 31, continue to trend  · Sputum/MRSA culture  · Courage good incentive spirometry/pulmonary toileting

## 2021-07-23 NOTE — PROGRESS NOTES
[facility]  Progress Note - Zohra Expose 1950, 65 y o  male MRN: [id number]  Unit/Bed#:  Encounter: [id number]  Primary Care Provider: Adrian Hodges DO   Date and time admitted to hospital: 7/20/2021  9:52 AM    * Acute on chronic respiratory failure with hypoxia (HCC)  Assessment & Plan  · Continue HFNC to support respiratory status  · Baseline 5L O2 NC  · Maintain O2 saturations >88%  · Continue broad spectrum antibiotics ceftriaxone/flagyl antibiotic day 4  · Steroids weaned to Solu-Medrol 40 mg b i d   · Question of aspiration, speech following, appreciate recommendations  · Pulm following, CT changes thought to be associated with persistent aspiration  · Bronch at this time would not be appropriate or beneficial to the patient  · Encourage good incentive spirometry/pulmonary toileting    Multifocal pneumonia  Assessment & Plan  · CXR revealing interval progression of bilateral opacifications  · High resolution CT demonstrated new area of groundglass density in the posterior aspect right upper lobe along with worsening consolidation at both lung bases, increasing reticulation with traction bronchiectatic changes also noted at the lung bases  · Continue broad-spectrum antibiotics ceftriaxone/flagyl, antibiotic day 4  · Procal 0 31, continue to trend  · Sputum/MRSA culture  · Courage good incentive spirometry/pulmonary toileting        History of COVID-19  Assessment & Plan  · Diagnosed May 11 2021  · Did not require inpatient hospitalization at that time    Sepsis [facility])  Assessment & Plan  · Evident by tachycardia, tachypnea, and leukocytosis  · Concern for infectious pulmonary etiology  · Blood cultures negative x24 hours  · Continue antibiotics, ceftriaxone/flagyl D4  · Continue IV solu medrol 40mg BID      Paroxysmal A-fib (HCC)  Assessment & Plan  · Recent diagnosis paroxysmal AFib during last inpatient hospitalization  · Continue metoprolol 12 5 mg b i d  · Coumadin 3 mg q h s  for goal INR 2-3  · Monitor INR daily  · Continue Heparin gtt while bridging to Coumadin    Esophageal cancer   Assessment & Plan  · 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 s/p [id number], was on trastuzumab monotherapy  · Completed palliative radiation to distal esophagus 4/23/21  · Follows with Dr Dipak Pierce as outpatient  · Palliative care consult, appreciate input  · Patient wishes to recover and return home at this time, remains full code   · Ongoing goals of care discussions      Esophageal stricture  Assessment & Plan  · EGD with dilation and stent placement 1/26/21  · EGD with stent removal 4/26/21 secondary to stent migration  · Formal speech/swallow evaluation completed, appreciate recommendations    Moderate protein-calorie malnutrition ([address] )  Assessment & Plan  Malnutrition Findings:   Adult Malnutrition type: Chronic illness  Adult Degree of Malnutrition: Malnutrition of moderate degree    BMI Findings: Body mass index is 23 48 kg/m²  · Dietary consultation  Essential hypertension  Assessment & Plan  · Metoprolol 12 5 mg b i d   · BP remains stable, continue to monitor      Type 2 diabetes mellitus, without long-term current use of insulin [facility])  Assessment & Plan  Lab Results   Component Value Date    HGBA1C 6 5 (H) 06/23/2021       Recent Labs     07/22/21  0713 07/22/21  1101 07/22/21  1618 07/22/21 2050   POCGLU 190* 190* 171* 129       Blood Sugar Average: Last 72 hrs:  · (P) 195 5   · Hold oral glycemic agents  · Sliding scale insulin coverage as needed  · Resume home dose Lantus, 12 units QAM    GERD (gastroesophageal reflux disease)  Assessment & Plan  · Continue protonix BID    Sacral wound  Assessment & Plan  · Sacral wound present on admission  · Wound care consultation  · Frequent offloading/repositioning to avoid further skin sounds: Normal heart sounds  Pulmonary:      Effort: Pulmonary effort is normal       Breath sounds: Decreased breath sounds present  Abdominal:      General: Bowel sounds are normal       Palpations: Abdomen is soft  Musculoskeletal:         General: Normal range of motion  Cervical back: Normal range of motion  Skin:     General: Skin is warm and dry  Neurological:      General: No focal deficit present  Mental Status: He is oriented to person, place, and time           Laboratory and Diagnostics:  Results from last 7 days   Lab Units 07/22/21  0557 07/21/21  0532 07/20/21  0546   WBC Thousand/uL 9 36 9 85 13 48*   HEMOGLOBIN g/dL 11 3* 11 9* 14 8   HEMATOCRIT % 33 9* 35 8* 45 1   PLATELETS Thousands/uL 237 247 278   NEUTROS PCT %  --  89*  --    BANDS PCT %  --   --  3   MONOS PCT %  --  4  --    MONO PCT %  --   --  7     Results from last 7 days   Lab Units 07/22/21  0557 07/21/21  0532 07/20/21  0546   SODIUM mmol/L 135* 137 136   POTASSIUM mmol/L 4 2 4 1 4 1   CHLORIDE mmol/L 101 101 102   CO2 mmol/L 26 26 25   ANION GAP mmol/L 8 10 9   BUN mg/dL 27* 25 14   CREATININE mg/dL 0 84 0 93 0 72   CALCIUM mg/dL 8 3 8 3 8 9   GLUCOSE RANDOM mg/dL 186* 262* 155*   ALT U/L  --  63 79*   AST U/L  --  21 31   ALK PHOS U/L  --  135* 165 5*   ALBUMIN g/dL  --  1 9* 3 2*   TOTAL BILIRUBIN mg/dL  --  0 33 0 58     Results from last 7 days   Lab Units 07/22/21  0557 07/20/21  0546   MAGNESIUM mg/dL 2 0 1 9   PHOSPHORUS mg/dL 3 8  --       Results from last 7 days   Lab Units 07/22/21  0827 07/22/21  0557 07/22/21  0108 07/21/21  1847 07/21/21  1129 07/21/21  0532 07/21/21  0020 07/20/21  1748 07/20/21  1246 07/20/21  0546   INR   --  1 95*  --   --   --  1 90*  --   --  1 52* 1 30*   PTT seconds 68*  --  75* 110* 53*  --  120* 87* 38* 33*      Results from last 7 days   Lab Units 07/20/21  0546   TROPONIN I ng/mL <0 03     Results from last 7 days   Lab Units 07/20/21  0546   LACTIC ACID mmol/L 1 8 ABG:  Results from last 7 days   Lab Units 07/20/21  0633   PH ART  7 454*   PCO2 ART mm Hg 33 9*   PO2 ART mm Hg 54 9*   HCO3 ART mmol/L 23 2   BASE EXC ART mmol/L 0 0   ABG SOURCE  Radial, Right     VBG:  Results from last 7 days   Lab Units 07/20/21  0633   ABG SOURCE  Radial, Right     Results from last 7 days   Lab Units 07/22/21  0557 07/21/21  0532 07/20/21  1748   PROCALCITONIN ng/ml 0 08 0 15 0 31*       Micro  Results from last 7 days   Lab Units 07/20/21  0553 07/20/21  0545   BLOOD CULTURE  No Growth at 48 hrs  No Growth at 48 hrs  EKG: normal sinus rhythm  Imaging: I have personally reviewed pertinent reports  and I have personally reviewed pertinent films in PACS    Intake and Output  I/O       07/21 0701 - 07/22 0700 07/22 0701 - 07/23 0700    P  O  960 100    I V  (mL/kg) 173 3 (2 5) 122 4 (1 8)    IV Piggyback 141 7 200    Total Intake(mL/kg) 1275 (18 7) 422 4 (6 2)    Urine (mL/kg/hr) 900 (0 6) 1475 (0 9)    Stool  0    Total Output 900 1475    Net +375 -1052 6          Unmeasured Stool Occurrence  3 x          Height and Weights   Height: 5' 7" (170 2 cm)  IBW (Ideal Body Weight): 66 1 kg  Body mass index is 23 48 kg/m²  Weight (last 2 days)     Date/Time   Weight    07/22/21 0600   68 (149 91)    07/21/21 1612   72 (158 73)    07/21/21 0600   72 (158 73)                Nutrition       Diet Orders   (From admission, onward)             Start     Ordered    07/21/21 1637  Dietary nutrition supplements  Once     Question Answer Comment   Select Supplement: Ensure Enlive-Strawberry    Frequency Breakfast, Dinner        07/21/21 1636    07/20/21 1240  Diet Dysphagia/Modified Consistency; Dysphagia 2-Mechanical Soft; Thin Liquid  Diet effective now     Question Answer Comment   Diet Type Dysphagia/Modified Consistency    Dysphagia/Modified Consistency Dysphagia 2-Mechanical Soft    Liquid Modifier Thin Liquid    RD to adjust diet per protocol?  Yes        07/20/21 8860 Active Medications  Scheduled Meds:  Current Facility-Administered Medications   Medication Dose Route Frequency Provider Last Rate    cefTRIAXone  1,000 mg Intravenous Q24H Reid James PA-C      docusate sodium  100 mg Oral BID Reid James PA-C      heparin (porcine)  3-20 Units/kg/hr (Order-Specific) Intravenous Titrated CATRACHITA Romano-C 9 Units/kg/hr (07/22/21 0149)    heparin (porcine)  2,000 Units Intravenous Q1H PRN CATRACHITA Romano-GABY      heparin (porcine)  4,000 Units Intravenous Q1H PRN Reid James PA-C      insulin lispro  1-6 Units Subcutaneous HS DESTIN Epstein      insulin lispro  4-20 Units Subcutaneous TID With Meals Reid James PA-C      levalbuterol  1 25 mg Nebulization TID Reid James PA-C      Lidocaine Viscous HCl  15 mL Swish & Spit 4x Daily PRN Reid James PA-C      LORazepam  0 5 mg Oral BID PRN DESTIN Epstein      melatonin  6 mg Oral HS DESTIN Epstein      methylPREDNISolone sodium succinate  40 mg Intravenous Q12H [address] Reid James PA-C      metoprolol tartrate  12 5 mg Oral Q12H [address] Sandra Romano Ang      metroNIDAZOLE  500 mg Intravenous Q8H Reid James PA-C 500 mg (07/23/21 0002)    JENNIFER ANTIFUNGAL   Topical BID Manuel Baker MD      pantoprazole  40 mg Intravenous Q12H [address] Reid James PA-C      polyethylene glycol  17 g Oral TID Reid James PA-C      pravastatin  80 mg Oral Daily With [employer] almeida      senna  1 tablet Oral HS Reid James PA-C      sodium chloride  3 mL Nebulization TID Manuel Baker MD      tiotropium  18 mcg Inhalation Daily Reid James PA-C      warfarin  3 mg Oral Daily (warfarin) Reid James PA-C       Continuous Infusions:  heparin (porcine), 3-20 Units/kg/hr (Order-Specific), Last Rate: 9 Units/kg/hr (07/22/21 0149)      PRN Meds:   heparin (porcine), 2,000 Units, Q1H PRN  heparin (porcine), 4,000 Units, Q1H PRN  Lidocaine Viscous HCl, 15 mL, 4x Daily PRN  LORazepam, 0 5 mg, BID PRN        Invasive Devices Review  Invasive Devices     Central Venous Catheter Line            Port A Cath 08/28/17 Right Chest 1424 days          Peripheral Intravenous Line            Peripheral IV 07/20/21 Left Arm 2 days    Peripheral IV 07/21/21 Right Forearm 2 days                Rationale for remaining devices: n/a  ---------------------------------------------------------------------------------------  Advance Directive and Living Will:      Power of :    POLST:    ---------------------------------------------------------------------------------------  Care Time Delivered:   Upon my evaluation, this patient had a high probability of imminent or life-threatening deterioration due to acute hypoxic respiratory failure, which required my direct attention, intervention, and personal management  I have personally provided 20 minutes (0540 to 0600) of critical care time, exclusive of procedures, teaching, family meetings, and any prior time recorded by providers other than myself  DESTIN Hall      Portions of the record may have been created with voice recognition software  Occasional wrong word or "sound a like" substitutions may have occurred due to the inherent limitations of voice recognition software    Read the chart carefully and recognize, using context, where substitutions have occurred

## 2021-07-23 NOTE — PLAN OF CARE
Problem: Nutrition/Hydration-ADULT  Goal: Nutrient/Hydration intake appropriate for improving, restoring or maintaining nutritional needs  Description: Monitor and assess patient's nutrition/hydration status for malnutrition  Collaborate with interdisciplinary team and initiate plan and interventions as ordered  Monitor patient's weight and dietary intake as ordered or per policy  Utilize nutrition screening tool and intervene as necessary  Determine patient's food preferences and provide high-protein, high-caloric foods as appropriate       INTERVENTIONS:  - Monitor oral intake, urinary output, labs, and treatment plans  - Assess nutrition and hydration status and recommend course of action  - Evaluate amount of meals eaten  - Assist patient with eating if necessary   - Allow adequate time for meals  - Recommend/ encourage appropriate diets, oral nutritional supplements, and vitamin/mineral supplements  - Order, calculate, and assess calorie counts as needed  - Recommend, monitor, and adjust tube feedings and TPN/PPN based on assessed needs  - Assess need for intravenous fluids  - Provide specific nutrition/hydration education as appropriate  - Include patient/family/caregiver in decisions related to nutrition  Outcome: Progressing     Problem: Prexisting or High Potential for Compromised Skin Integrity  Goal: Skin integrity is maintained or improved  Description: INTERVENTIONS:  - Identify patients at risk for skin breakdown  - Assess and monitor skin integrity  - Assess and monitor nutrition and hydration status  - Monitor labs   - Assess for incontinence   - Turn and reposition patient  - Assist with mobility/ambulation  - Relieve pressure over bony prominences  - Avoid friction and shearing  - Provide appropriate hygiene as needed including keeping skin clean and dry  - Evaluate need for skin moisturizer/barrier cream  - Collaborate with interdisciplinary team   - Patient/family teaching  - Consider wound care consult   Outcome: Progressing     Problem: Potential for Falls  Goal: Patient will remain free of falls  Description: INTERVENTIONS:  - Educate patient/family on patient safety including physical limitations  - Instruct patient to call for assistance with activity   - Consult OT/PT to assist with strengthening/mobility   - Keep Call bell within reach  - Keep bed low and locked with side rails adjusted as appropriate  - Keep care items and personal belongings within reach  - Initiate and maintain comfort rounds  - Make Fall Risk Sign visible to staff  - Offer Toileting every 2 Hours, in advance of need  - Initiate/Maintain bed alarm  - Apply yellow socks and bracelet for high fall risk patients  - Consider moving patient to room near nurses station  Outcome: Progressing     Problem: MOBILITY - ADULT  Goal: Maintain or return to baseline ADL function  Description: INTERVENTIONS:  -  Assess patient's ability to carry out ADLs; assess patient's baseline for ADL function and identify physical deficits which impact ability to perform ADLs (bathing, care of mouth/teeth, toileting, grooming, dressing, etc )  - Assess/evaluate cause of self-care deficits   - Assess range of motion  - Assess patient's mobility; develop plan if impaired  - Assess patient's need for assistive devices and provide as appropriate  - Encourage maximum independence but intervene and supervise when necessary  - Involve family in performance of ADLs  - Assess for home care needs following discharge   - Consider OT consult to assist with ADL evaluation and planning for discharge  - Provide patient education as appropriate  Outcome: Progressing  Goal: Maintains/Returns to pre admission functional level  Description: INTERVENTIONS:  - Perform BMAT or MOVE assessment daily    - Set and communicate daily mobility goal to care team and patient/family/caregiver     - Collaborate with rehabilitation services on mobility goals if consulted  - Perform Range of Motion 2 times a day  - Reposition patient every 2 hours    - Dangle patient 2 times a day  - Stand patient 1 times a day  - Ambulate patient 1 times a day  - Out of bed to chair 1 times a day   - Out of bed for meals 1 times a day  - Out of bed for toileting  - Record patient progress and toleration of activity level   Outcome: Progressing     Problem: PAIN - ADULT  Goal: Verbalizes/displays adequate comfort level or baseline comfort level  Description: Interventions:  - Encourage patient to monitor pain and request assistance  - Assess pain using appropriate pain scale  - Administer analgesics based on type and severity of pain and evaluate response  - Implement non-pharmacological measures as appropriate and evaluate response  - Consider cultural and social influences on pain and pain management  - Notify physician/advanced practitioner if interventions unsuccessful or patient reports new pain  Outcome: Progressing     Problem: INFECTION - ADULT  Goal: Absence or prevention of progression during hospitalization  Description: INTERVENTIONS:  - Assess and monitor for signs and symptoms of infection  - Monitor lab/diagnostic results  - Monitor all insertion sites, i e  indwelling lines, tubes, and drains  - Monitor endotracheal if appropriate and nasal secretions for changes in amount and color  - [place] appropriate cooling/warming therapies per order  - Administer medications as ordered  - Instruct and encourage patient and family to use good hand hygiene technique  - Identify and instruct in appropriate isolation precautions for identified infection/condition  Outcome: Progressing     Problem: SAFETY ADULT  Goal: Patient will remain free of falls  Description: INTERVENTIONS:  - Educate patient/family on patient safety including physical limitations  - Instruct patient to call for assistance with activity   - Consult OT/PT to assist with strengthening/mobility   - Keep Call bell within reach  - Keep bed low and locked with side rails adjusted as appropriate  - Keep care items and personal belongings within reach  - Initiate and maintain comfort rounds  - Make Fall Risk Sign visible to staff  - Offer Toileting every 2 Hours, in advance of need  - Initiate/Maintain bed alarm  - Apply yellow socks and bracelet for high fall risk patients  - Consider moving patient to room near nurses station  Outcome: Progressing  Goal: Maintain or return to baseline ADL function  Description: INTERVENTIONS:  -  Assess patient's ability to carry out ADLs; assess patient's baseline for ADL function and identify physical deficits which impact ability to perform ADLs (bathing, care of mouth/teeth, toileting, grooming, dressing, etc )  - Assess/evaluate cause of self-care deficits   - Assess range of motion  - Assess patient's mobility; develop plan if impaired  - Assess patient's need for assistive devices and provide as appropriate  - Encourage maximum independence but intervene and supervise when necessary  - Involve family in performance of ADLs  - Assess for home care needs following discharge   - Consider OT consult to assist with ADL evaluation and planning for discharge  - Provide patient education as appropriate  Outcome: Progressing  Goal: Maintains/Returns to pre admission functional level  Description: INTERVENTIONS:  - Perform BMAT or MOVE assessment daily    - Set and communicate daily mobility goal to care team and patient/family/caregiver     - Collaborate with rehabilitation services on mobility goals if consulted  - Record patient progress and toleration of activity level   Outcome: Progressing     Problem: DISCHARGE PLANNING  Goal: Discharge to home or other facility with appropriate resources  Description: INTERVENTIONS:  - Identify barriers to discharge w/patient and caregiver  - Arrange for needed discharge resources and transportation as appropriate  - Identify discharge learning needs (meds, wound care, etc )  - Arrange for interpretive services to assist at discharge as needed  - Refer to Case Management Department for coordinating discharge planning if the patient needs post-hospital services based on physician/advanced practitioner order or complex needs related to functional status, cognitive ability, or social support system  Outcome: Progressing     Problem: Knowledge Deficit  Goal: Patient/family/caregiver demonstrates understanding of disease process, treatment plan, medications, and discharge instructions  Description: Complete learning assessment and assess knowledge base    Interventions:  - Provide teaching at level of understanding  - Provide teaching via preferred learning methods  Outcome: Progressing

## 2021-07-23 NOTE — RESPIRATORY THERAPY NOTE
07/23/21 5087   Non-Invasive Information   O2 Interface Device HFNC prongs   Non-Invasive Ventilation Mode HFNC (High flow)   SpO2 92 %   Non-Invasive Settings   FiO2 (%) 65   Flow (lpm) 45   Temperature (Set) 31   Non-Invasive Readings   Skin Intervention Skin intact   Total Rate 32   Heater Temperature (Obs) 30 8   Maintenance   Water bag changed No

## 2021-07-23 NOTE — ASSESSMENT & PLAN NOTE
· Recent diagnosis paroxysmal AFib during last inpatient hospitalization  · Continue metoprolol 12 5 mg b i d   · Coumadin 3 mg q h s  for goal INR 2-3  · Monitor INR daily  · Continue Heparin gtt while bridging to Coumadin

## 2021-07-23 NOTE — RESPIRATORY THERAPY NOTE
07/23/21 0331   Respiratory Assessment   Resp Comments pt currently sleeping, no changes made to patient's settings   Non-Invasive Information   O2 Interface Device HFNC prongs   Non-Invasive Ventilation Mode HFNC (High flow)   $ Pulse Oximetry Spot Check Charge Completed   Non-Invasive Settings   FiO2 (%) 60   Flow (lpm) 45

## 2021-07-23 NOTE — ASSESSMENT & PLAN NOTE
Malnutrition Findings:   Adult Malnutrition type: Chronic illness  Adult Degree of Malnutrition: Malnutrition of moderate degree    BMI Findings: Body mass index is 23 48 kg/m²  · Dietary consultation

## 2021-07-23 NOTE — RESPIRATORY THERAPY NOTE
07/22/21 0991   Respiratory Assessment   Resp Comments pt sleeping comfortably on HFNC, decreased FIO2 to 60 at this time due to SPO2 of 100   Non-Invasive Information   O2 Interface Device HFNC prongs   Non-Invasive Ventilation Mode HFNC (High flow)   $ Pulse Oximetry Spot Check Charge Completed   Non-Invasive Settings   FiO2 (%) 60   Flow (lpm) 45

## 2021-07-23 NOTE — RESPIRATORY THERAPY NOTE
07/22/21 2013   Respiratory Assessment   Assessment Type Pre-treatment   General Appearance Alert; Awake   Respiratory Pattern Dyspnea with exertion;Dyspnea at rest   Chest Assessment Chest expansion symmetrical   Bilateral Breath Sounds Diminished;Clear   Resp Comments patient awake and alert in no apparent resp distress at this time, tx given   No changes were made to the patient's HFNC settings   O2 Device HFNC   Non-Invasive Information   O2 Interface Device HFNC prongs   Non-Invasive Ventilation Mode HFNC (High flow)   SpO2 (!) 88 %   $ Pulse Oximetry Spot Check Charge Completed   Non-Invasive Settings   FiO2 (%) 40   Flow (lpm) 45

## 2021-07-23 NOTE — PROGRESS NOTES
Progress Note - Pulmonary   Dewey Barone 65 y o  male MRN: [id number]  Unit/Bed#:  Encounter: [id number]      Interval History:   Patient states that shortness of breath is similar to yesterday  No fevers or chills  No significant cough other than occasional when eating too fast  Continues to require high-flow nasal cannula with an FiO2 of 60-70% to achieve an oxygen saturation of greater than 88%  Review of Systems:  Full 12 point review of systems negative other than previously mentioned    Objective:   Vitals: Blood pressure 122/54, pulse 76, temperature 98 4 °F (36 9 °C), temperature source Oral, resp  rate (!) 23, height 5' 7" (1 702 m), weight 67 6 kg (149 lb 0 5 oz), SpO2 93 %  , Body mass index is 23 34 kg/m²  Physical Exam  Gen: Awake, alert, oriented x 3, no acute distress  HEENT: Mucous membranes moist, no oral lesions, no thrush  NECK: No accessory muscle use, JVP not elevated  Cardiac:  +S1-S2  Lungs:  Bronchial breath sounds diffusely, mild bibasilar rales  Abdomen: normoactive bowel sounds, soft nontender, nondistended, no rebound or rigidity, no guarding  Extremities: no cyanosis, no clubbing, no edema    Labs: I have personally reviewed pertinent lab results    Results Reviewed     None           Current Medications:    Current Facility-Administered Medications:     docusate sodium (COLACE) capsule 100 mg, 100 mg, Oral, BID, Latasha George PA-C, 100 mg at 07/22/21 0837    insulin lispro (HumaLOG) 100 units/mL subcutaneous injection 1-6 Units, 1-6 Units, Subcutaneous, HS, talib Fregoso, CRNP, 3 Units at 07/21/21 2158    insulin lispro (HumaLOG) 100 units/mL subcutaneous injection 4-20 Units, 4-20 Units, Subcutaneous, TID With Meals, 4 Units at 07/22/21 1729 **AND** [CANCELED] Fingerstick Glucose (POCT), , , TID AC, Latasha George PA-C    [facility]) inhalation solution 1 25 mg, 1 25 mg, Nebulization, TID, Latasha George PA-C, 1 25 mg at 07/23/21 0301    Lidocaine Viscous HCl (XYLOCAINE) 2 % mucosal solution 15 mL, 15 mL, Swish & Spit, 4x Daily PRN, Maria De Jesus Campbell PA-C, 15 mL at 07/23/21 0753    LORazepam (ATIVAN) tablet 0 5 mg, 0 5 mg, Oral, BID PRN, Rosales Ovens, CRNP, 0 5 mg at 07/20/21 2143    melatonin tablet 6 mg, 6 mg, Oral, HS, Rosales Ovens, CRNP, 6 mg at 07/22/21 2145    methylPREDNISolone sodium succinate (Solu-MEDROL) injection 40 mg, 40 mg, Intravenous, Q12H [address], Maria De Jesus Campbell PA-C, 40 mg at 07/23/21 0818    metoprolol tartrate (LOPRESSOR) partial tablet 12 5 mg, 12 5 mg, Oral, Q12H [address], Maria De Jesus Campbell PA-C, 12 5 mg at 07/23/21 0062    metroNIDAZOLE (FLAGYL) IVPB (premix) 500 mg 100 mL, 500 mg, Intravenous, Q8H, Maria De Jesus Campbell PA-C, Stopped at 07/23/21 0901    moisture barrier miconazole 2% cream (aka JENNIFER MOISTURE BARRIER ANTIFUNGAL CREAM), , Topical, BID, Vinita Wade MD, Given at 07/23/21 1044    oxyCODONE (ROXICODONE) IR tablet 2 5 mg, 2 5 mg, Oral, Q4H PRN, Vinita Wade MD, 2 5 mg at 07/23/21 1057    pantoprazole (PROTONIX) injection 40 mg, 40 mg, Intravenous, Q12H [address], Maria De Jesus Campbell PA-C, 40 mg at 07/23/21 0817    polyethylene glycol (MIRALAX) packet 17 g, 17 g, Oral, TID, Maria De Jesus Campbell PA-C, 17 g at 07/22/21 5977    pravastatin (PRAVACHOL) tablet 80 mg, 80 mg, Oral, Daily With Kaushik PopDESIRE arce, 80 mg at 07/22/21 1729    senna (SENOKOT) tablet 8 6 mg, 1 tablet, Oral, HS, Maria De Jesus Campbell PA-C, 8 6 mg at 07/21/21 2118    sodium chloride 0 9 % inhalation solution 3 mL, 3 mL, Nebulization, TID, iVnita Wade MD, 3 mL at 07/23/21 0722    tiotropium (SPIRIVA) capsule for inhaler 18 mcg, 18 mcg, Inhalation, Daily, Maria De Jesus Campbell PA-C, 18 mcg at 07/23/21 0790    warfarin (COUMADIN) tablet 3 mg, 3 mg, Oral, Daily (warfarin), Maria De Jesus Campbell PA-C, 3 mg at 07/22/21 1728     Microbiology:  None    Imaging and other studies:   I personally viewed and interpreted the following imaging studies:  CT chest 07/21/2021 shows basilar predominant cystic bronchiectasis in addition to mosaic pattern attenuation predominantly in both apices  Scattered ground-glass throughout both lungs    Assessment:  1  Acute hypoxic respiratory failure  2  Chronic hypoxic respiratory failure  3  Metastatic esophageal carcinoma  4  Recent COVID-19 infection    Plan:   Continue titrate supplemental oxygen for goal SpO2 of greater than 88%   Severe aspiration appears to be a cause of this patient's cystic bronchiectasis and severe hypoxic respiratory failure   Continue Solu-Medrol 40 mg IV q 12   Diuresis as tolerated per primary service   Antibiotics per primary service   I continue to recommend goals of care discussion given this patient's overall poor prognosis  Palliative Care is following   Pulmonary Medicine will continue to follow   I personally discussed this patient in depth with the primary service            [facility]

## 2021-07-23 NOTE — PLAN OF CARE
Problem: Nutrition/Hydration-ADULT  Goal: Nutrient/Hydration intake appropriate for improving, restoring or maintaining nutritional needs  Description: Monitor and assess patient's nutrition/hydration status for malnutrition  Collaborate with interdisciplinary team and initiate plan and interventions as ordered  Monitor patient's weight and dietary intake as ordered or per policy  Utilize nutrition screening tool and intervene as necessary  Determine patient's food preferences and provide high-protein, high-caloric foods as appropriate  INTERVENTIONS:  - Monitor oral intake, urinary output, labs, and treatment plans  - Assess nutrition and hydration status and recommend course of action  - Evaluate amount of meals eaten  - Assist patient with eating if necessary   - Allow adequate time for meals  - Recommend/ encourage appropriate diets, oral nutritional supplements, and vitamin/mineral supplements  - Order, calculate, and assess calorie counts as needed  - Recommend, monitor, and adjust tube feedings and TPN/PPN based on assessed needs  - Assess need for intravenous fluids  - Provide specific nutrition/hydration education as appropriate  - Include patient/family/caregiver in decisions related to nutrition  Outcome: Not Progressing  Poor nutritional status, interventions adjusted, goals of care discussion ongoing

## 2021-07-23 NOTE — ASSESSMENT & PLAN NOTE
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

## 2021-07-23 NOTE — SPEECH THERAPY NOTE
F/U to Clinical Swallowing Evaluation completed  Pt tolerated thin liquid c no s/s aspiration  VBS completed 6/22 revealed no s/s pharyngeal dysphagia but pt c h/o GERD and esophageal cancer with distal stricture (is s/p stent and remova of same 2* migration) with presumed risk for esophageal statsis and risk for retrograde-related aspiration  I spoke c Edie Glaser PA-C and discussed possibility of completing esophagram to further evaluate risk at this time  No additional SLP recommendations at this time

## 2021-07-23 NOTE — ASSESSMENT & PLAN NOTE
· Continue HFNC to support respiratory status  · Baseline 5L O2 NC  · Maintain O2 saturations >88%  · Continue broad spectrum antibiotics ceftriaxone/flagyl antibiotic day 4  · Steroids weaned to Solu-Medrol 40 mg b i d   · Question of aspiration, speech following, appreciate recommendations  · Pulm following, CT changes thought to be associated with persistent aspiration  · Bronch at this time would not be appropriate or beneficial to the patient  · Encourage good incentive spirometry/pulmonary toileting

## 2021-07-23 NOTE — ASSESSMENT & PLAN NOTE
Lab Results   Component Value Date    HGBA1C 6 5 (H) 06/23/2021       Recent Labs     07/22/21  0713 07/22/21  1101 07/22/21  1618 07/22/21 2050   POCGLU 190* 190* 171* 129       Blood Sugar Average: Last 72 hrs:  · (P) 195 5   · Hold oral glycemic agents  · Sliding scale insulin coverage as needed  · Resume home dose Lantus, 12 units QAM

## 2021-07-23 NOTE — UTILIZATION REVIEW
Initial Clinical Review    Admission: Date/Time/Statement:   Admission Orders (From admission, onward)     Ordered        07/20/21 1013  Inpatient Admission  Once                   Orders Placed This Encounter   Procedures    Inpatient Admission     Standing Status:   Standing     Number of Occurrences:   1     Order Specific Question:   Level of Care     Answer:   Level 1 Stepdown [13]     Order Specific Question:   Estimated length of stay     Answer:   More than 2 Midnights     Order Specific Question:   Certification     Answer:   I certify that inpatient services are medically necessary for this patient for a duration of greater than two midnights  See H&P and MD Progress Notes for additional information about the patient's course of treatment  Initial Presentation: 64 yo male as a transfer from [facility] for progressive SOB /CP   PMHX of stage IV addendum carcinoma distal esophagus w/ pulmonary and retroperitoneal lymph node involvement  Required distal esophageal stenting sec to dysphagia 1/2021  Completed palliative radiation 4/ 2021  Esophageal stent was removed by thoracic surgery 4/2021 secondary to irrigation  Currently on Herceptin chemotherapy  Tested +  COVID-19 pneumonia 5/2021  did not require IP stay    Admitted to [facility]  6/15/21 to 7/11/21 for treatment of multifocal pneumonia versus pneumonitis  Treated w/  of broad-spectrum antibiotics and high-dose steroids  DC'd to acute rehab on 5L NC and prednisone taper  On arrival pt tachypneic, tachycardic, and hypoxemic, placed on bipap   CXR revealing inc conrado opacifications  Started on abx and decadron   Admitted IP status to ICU , cont HFNC keep sat > 88 % , cont IV abx , IV solumedrol , trial IV lasix   F/u BC, pct , sputum /MRSA cx       PE : diminished BS at conrado bases    Date: 7/21  Day 2: no acute events overnight   Remains on high flow O2 w/ dec BS   Remains on heparin gtt for afib , bridge to coumadin    Cont IV abx   hodl further iv lasix , appears dry        ED Triage Vitals   Temperature Pulse Respirations Blood Pressure SpO2   07/20/21 1056 07/20/21 1000 07/20/21 1000 07/20/21 1000 07/20/21 0940   97 8 °F (36 6 °C) (!) 118 (!) 28 105/69 92 %      Temp Source Heart Rate Source Patient Position - Orthostatic VS BP Location FiO2 (%)   07/20/21 1056 07/20/21 1300 07/20/21 1056 07/20/21 1056 07/20/21 1947   Oral Monitor Lying Right arm 70      Pain Score       07/20/21 1000       2          Wt Readings from Last 1 Encounters:   07/23/21 67 6 kg (149 lb 0 5 oz)     Additional Vital Signs:   07/21/21 0855  --  --  --  --  --  91 %  --  --  --  HFNC prongs  --   07/21/21 0748  --  --  --  --  --  92 %  --  --  --  Face mask  --   07/21/21 0700  97 9 °F (36 6 °C)  100  26Abnormal   106/72  85  90 %  --  --  --  --  --   07/21/21 0600  --  84  21  96/62  75  95 %  --  --  --  --  --   07/21/21 0547  --  --  --  --  --  97 %  60  50 L/min  High flow nasal cannula  HFNC prongs  --   07/21/21 0500  --  85  22  115/55  76  96 %  --  --  --  --  --   07/21/21 0400  --  93  38Abnormal   109/60  79  95 %  --  --  --  --  --   07/21/21 0335  --  --  --  --  --  95 %  70  60 L/min  High flow nasal cannula  HFNC prongs  --   07/21/21 0000  --  95  32Abnormal   115/69  87  93 %  --  --  --  --  --   07/20/21 2324  97 8 °F (36 6 °C)  92  30Abnormal   95/65  75  96 %  70  60 L/min  High flow nasal cannula  HFNC prongs  Lying   07/20/21 2300  --  93  35Abnormal   88/64Abnormal   72  97 %  --  --  --  --  --   07/20/21 2106  --  100  --  93/62  --  --  --  --  --  --  --   07/20/21 2000  --  103  34Abnormal   96/58  72  98 %  --  --  --  --  --   07/20/21 1947  --  --  --  --  --  96 %  70  60 L/min  High flow nasal cannula  HFNC prongs  --   07/20/21 1930  98 2 °F (36 8 °C)  102  26Abnormal   125/96  108  93 %  --  --  High flow nasal cannula  --  Lying   07/20/21 1800  --  99  30Abnormal   85/63Abnormal   70  94 %  --  --  --  --  --   07/20/21 1700  --  99 32Abnormal   124/65  88  96 %  --  --  --  --  --   07/20/21 1619  --  --  --  --  --  91 %  --  --  --  HFNC prongs  --   07/20/21 1600  --  97  27Abnormal   134/80  103  91 %  --  --  --  --  --   07/20/21 1500  97 °F (36 1 °C)Abnormal   97  31Abnormal   99/59  74  97 %  --  --  High flow nasal cannula  --  Lying   07/20/21 1400  --  96  31Abnormal   85/55Abnormal    65  97 %  --  --  --  --  --   BP: Aminata aware   at 07/20/21 1400   07/20/21 1300  --  114Abnormal   38Abnormal   110/81  92  90 %  --  --  --  --  --   07/20/21 1100  --  111Abnormal   29Abnormal   92/67  76  93 %  --  --  --  --  --   07/20/21 1056  97 8 °F (36 6 °C)  105  27Abnormal   101/68  80  96 %  --  --  High flow nasal cannula  --  Lying   07/20/21 1046  --  --  --  --  --  93 %  --  --  --  HFNC prongs  --   07/20/21 1000  --  118Abnormal   28Abnormal   105/69  82  91 %                 Pertinent Labs/Diagnostic Test Results:   7/20 PCXR    Slight increase in moderate bibasilar groundglass opacity since 7/1/2021 due to Covid-19    7/20 EKG  ST   Results from last 7 days   Lab Units 07/20/21  1555   SARS-COV-2  Negative     Results from last 7 days   Lab Units 07/23/21  0530 07/22/21  0557 07/21/21  0532 07/20/21  0546   WBC Thousand/uL 8 17 9 36 9 85 13 48*   HEMOGLOBIN g/dL 12 3 11 3* 11 9* 14 8   HEMATOCRIT % 37 3 33 9* 35 8* 45 1   PLATELETS Thousands/uL 265 237 247 278   NEUTROS ABS Thousands/µL 7 10  --  8 77*  --    BANDS PCT %  --   --   --  3     Results from last 7 days   Lab Units 07/23/21  0530 07/22/21  0557 07/21/21  0532 07/20/21  0546   SODIUM mmol/L 135* 135* 137 136   POTASSIUM mmol/L 4 5 4 2 4 1 4 1   CHLORIDE mmol/L 100 101 101 102   CO2 mmol/L 26 26 26 25   ANION GAP mmol/L 9 8 10 9   BUN mg/dL 20 27* 25 14   CREATININE mg/dL 0 97 0 84 0 93 0 72   EGFR ml/min/1 73sq m 78 87 82 93   CALCIUM mg/dL 8 4 8 3 8 3 8 9   MAGNESIUM mg/dL  --  2 0  --  1 9   PHOSPHORUS mg/dL  --  3 8  --   --      Results from last 7 days   Lab Units 07/21/21  0532 07/20/21  0546   AST U/L 21 31   ALT U/L 63 79*   ALK PHOS U/L 135* 165 5*   TOTAL PROTEIN g/dL 5 7* 6 4   ALBUMIN g/dL 1 9* 3 2*   TOTAL BILIRUBIN mg/dL 0 33 0 58     Results from last 7 days   Lab Units 07/23/21  1118 07/23/21  0812 07/22/21  2050 07/22/21  1618 07/22/21  1101 07/22/21  0713 07/21/21  2108 07/21/21  1732 07/21/21  1117 07/21/21  0902 07/20/21  2115 07/20/21  1810   POC GLUCOSE mg/dl 195* 126 129 171* 190* 190* 246* 299* 125 189* 154* 278*     Results from last 7 days   Lab Units 07/23/21  0530 07/22/21  0557 07/21/21  0532 07/20/21  0546   GLUCOSE RANDOM mg/dL 195* 186* 262* 155*   No results found for: BETA-HYDROXYBUTYRATE   Results from last 7 days   Lab Units 07/20/21  0633   PH ART  7 454*   PCO2 ART mm Hg 33 9*   PO2 ART mm Hg 54 9*   HCO3 ART mmol/L 23 2   BASE EXC ART mmol/L 0 0   O2 CONTENT ART mL/dL 19 4   O2 HGB, ARTERIAL % 89 9*   ABG SOURCE  Radial, Right     Results from last 7 days   Lab Units 07/20/21  0546   TROPONIN I ng/mL <0 03     Results from last 7 days   Lab Units 07/23/21  0530 07/22/21  0827 07/22/21  0557 07/22/21  0108 07/21/21  0532   PROTIME seconds 27 5*  --  22 4*  --  22 1*   INR  2 53*  --  1 95*  --  1 90*   PTT seconds 55* 68*  --  75*  --      Results from last 7 days   Lab Units 07/23/21  0530 07/22/21  0557 07/21/21  0532 07/20/21  1748   PROCALCITONIN ng/ml 0 08 0 08 0 15 0 31*     Results from last 7 days   Lab Units 07/20/21  0546   LACTIC ACID mmol/L 1 8     Results from last 7 days   Lab Units 07/20/21  0546   BNP pg/mL 240 0*     Results from last 7 days   Lab Units 07/20/21  0553 07/20/21  0545   BLOOD CULTURE  No Growth at 48 hrs  No Growth at 48 hrs       Results from last 7 days   Lab Units 07/20/21  0546   TOTAL COUNTED  100     ED Treatment:   Medication Administration - No Administrations Displayed (No Start Event Found)     None        Past Medical History:   Diagnosis Date    Anxiety     Cancer ([facility] [address] )     Esophageal    Dysphagia     Esophageal abnormality     Esophageal cancer (HCC)     GERD (gastroesophageal reflux disease)     Hyperlipidemia     Hypertension     Occasional tremors     Transaminitis 6/16/2021    Type 2 diabetes mellitus, without long-term current use of insulin (HCC)      Present on Admission:   Paroxysmal A-fib (HCC)   Essential hypertension   GERD (gastroesophageal reflux disease)   Esophageal cancer    Esophageal stricture   Type 2 diabetes mellitus, without long-term current use of insulin (HCC)   Moderate protein-calorie malnutrition (HCC)      Admitting Diagnosis: Pneumonia [J18 9]  Age/Sex: 65 y o  male  Admission Orders:  Scheduled Medications:  azithromycin, 500 mg, Intravenous, Q24H  cefepime, 2,000 mg, Intravenous, Q12H  docusate sodium, 100 mg, Oral, BID  insulin lispro, 4-20 Units, Subcutaneous, TID With Meals  levalbuterol, 1 25 mg, Nebulization, TID  melatonin, 3 mg, Oral, HS  methylPREDNISolone sodium succinate, 60 mg, Intravenous, Q12H SENG  metoprolol tartrate, 12 5 mg, Oral, Q12H SENG  nystatin, , Topical, BID  pantoprazole, 40 mg, Intravenous, Q12H SENG  polyethylene glycol, 17 g, Oral, TID  pravastatin, 80 mg, Oral, Daily With Dinner  senna, 1 tablet, Oral, HS  tiotropium, 18 mcg, Inhalation, Daily  warfarin, 3 mg, Oral, Daily (warfarin)      Continuous IV Infusions:  heparin (porcine), 3-20 Units/kg/hr (Order-Specific), Intravenous, Titrated      PRN Meds:  heparin (porcine), 2,000 Units, Intravenous, Q1H PRN  heparin (porcine), 4,000 Units, Intravenous, Q1H PRN  LORazepam, 0 5 mg, Oral, BID PRN    Fingerstick ac and hs  NPO   Neuro checks   Tele     IP CONSULT TO CASE MANAGEMENT  IP CONSULT TO PULMONOLOGY  IP CONSULT TO PALLIATIVE CARE    Network Utilization Review Department  ATTENTION: Please call with any questions or concerns to [phone] and carefully listen to the prompts so that you are directed to the right person   All voicemails are confidential   Cloyde Havers all requests for admission clinical reviews, approved or denied determinations and any other requests to dedicated fax number below belonging to the campus where the patient is receiving treatment   List of dedicated fax numbers for the Facilities:  [address] [place] [address] DENIALS (Administrative/Medical Necessity) [phone]   [address] (Maternity/NICU/Pediatrics) [phone]   [address] [place] [address] [id number] [place] [address] [phone] [facility] [place] [facility] [phone]

## 2021-07-23 NOTE — SOCIAL WORK
Palliative LSW saw patient at the bedside today  LSW appreciates the opportunity to provide patient/family with inpatient emotional support and guidance while patient continues to receive medical attention from the medical team     Topics discussed: Discussion had regarding possibility of further work up for aspiration  Patient was also informed that if the testing does show aspiration, possible j/g for feedings and NPO  Patient reported that he is not against j/g but would like to think about it and discuss with his SO  Areas that need follow-up: ongoing support to patient/family  LSW will return to room once patient's SO visits today to answer questions/discuss possibility of testing and possibility of j/g placement  Resources given: none  Others present:  Dr Deann Ellis will continue to follow as requested by the medical team, patient, or family    LSW returned back to patient's room after receiving notification that patient's significant other was back in room  Discussed patient's desire to have testing done to see if patient is aspirating  Updated significant other on  tube feed conversation had earlier  Patient asking if he would be able to get tube feed and return home with hospice services  Discussed pros and cons of hospice, also discussed possibility of placement should he go tube feed route  Dr Marlyn Guillaume, ICU Physician came into room to assist with medical discussion and to ensure that both patient/significant other understand medical prognosis and options  Currently at this time, patient reported that he is opting for more time  Agreeable to being placed in a facility long term if it gives him "longevity"  Although patient has said a number of times that he would like to return home, he was made aware that his time would be more limited due to his breathing  Patient reported that he has loose ends that he needs to tie up   Patient and significant other both reported that they have already started the processes of trying to tie up these things  Family members from [place] are trying to come up to visit with patient  Spouse has also been in contact with Senait Colon  to help with placement options

## 2021-07-23 NOTE — CASE MANAGEMENT
Pt to have a video barium swallow to see if he is aspirating  If he is having difficulties with aspiration, a feeding tube may be necessary  Palliative following

## 2021-07-23 NOTE — PROGRESS NOTES
Progress note - Palliative and Supportive Care   Marybeth Sexton 65 y o  male [id number]    Patient Active Problem List   Diagnosis    Odynophagia    GERD (gastroesophageal reflux disease)    Essential hypertension    Carotid stenosis    Esophageal stricture    Esophageal cancer     Migrated esophageal stent    Migration of esophageal stent    PAD (peripheral artery disease)    Bilateral carotid artery stenosis    Positive colorectal cancer screening using Cologuard test    SOB (shortness of breath)    Multifocal pneumonia    Acute on chronic respiratory failure with hypoxia (HCC)    Moderate protein-calorie malnutrition (HCC)    Type 2 diabetes mellitus, without long-term current use of insulin (HCC)    Paroxysmal A-fib (HCC)    Debility    Anxiety    History of COVID-19    Sepsis ([facility] [address] )    Sacral wound     Active issues specifically addressed today include:   Esophageal cancer  Acute on chronic respiratory failure  Questionable aspiration pneumonitis  Goals of care counseling        Plan:  1  Symptom management -   - respiratory support per ICU team     2  Goals - discussed with patient again today  Reviewed at length the possibility of a feeding tube discussion  Ary Monique states that he has talked about this at length with his significant other and they have been speaking with him about a feeding tube since time of diagnosis  Asked several questions about what this would entail and is agreeable to ongoing goals of care discussions  Interval history:       Patient's respiratory status has improved slightly since yesterday  No new complaints  Ongoing stable shortness of breath  Denies pain  Denies nausea  Good appetite  Discussed goals of care as above      MEDICATIONS / ALLERGIES:     all current active meds have been reviewed and current meds:   Current Facility-Administered Medications   Medication Dose Route Frequency    docusate sodium (COLACE) capsule 100 mg  100 mg Oral BID  insulin lispro (HumaLOG) 100 units/mL subcutaneous injection 1-6 Units  1-6 Units Subcutaneous HS    insulin lispro (HumaLOG) 100 units/mL subcutaneous injection 4-20 Units  4-20 Units Subcutaneous TID With Meals    levalbuterol (XOPENEX) inhalation solution 1 25 mg  1 25 mg Nebulization TID    Lidocaine Viscous HCl (XYLOCAINE) 2 % mucosal solution 15 mL  15 mL Swish & Spit 4x Daily PRN    LORazepam (ATIVAN) tablet 0 5 mg  0 5 mg Oral BID PRN    melatonin tablet 6 mg  6 mg Oral HS    methylPREDNISolone sodium succinate (Solu-MEDROL) injection 40 mg  40 mg Intravenous Q12H [address]    metoprolol tartrate (LOPRESSOR) partial tablet 12 5 mg  12 5 mg Oral Q12H SENG    metroNIDAZOLE (FLAGYL) IVPB (premix) 500 mg 100 mL  500 mg Intravenous Q8H    moisture barrier miconazole 2% cream (aka JENNIFER MOISTURE BARRIER ANTIFUNGAL CREAM)   Topical BID    oxyCODONE (ROXICODONE) IR tablet 2 5 mg  2 5 mg Oral Q4H PRN    pantoprazole (PROTONIX) injection 40 mg  40 mg Intravenous Q12H [facility]    polyethylene glycol (MIRALAX) packet 17 g  17 g Oral TID    pravastatin (PRAVACHOL) tablet 80 mg  80 mg Oral Daily With Dinner    senna (SENOKOT) tablet 8 6 mg  1 tablet Oral HS    sodium chloride 0 9 % inhalation solution 3 mL  3 mL Nebulization TID    tiotropium (SPIRIVA) capsule for inhaler 18 mcg  18 mcg Inhalation Daily    warfarin (COUMADIN) tablet 3 mg  3 mg Oral Daily (warfarin)       Allergies   Allergen Reactions    Other      Cat Dander       OBJECTIVE:    Physical Exam  Physical Exam  Vitals and nursing note reviewed  Constitutional:       General: He is not in acute distress  Appearance: He is ill-appearing  He is not toxic-appearing  HENT:      Head: Atraumatic  Right Ear: External ear normal       Left Ear: External ear normal       Nose:      Comments: High-flow nasal cannula in place  Pulmonary:      Effort: No respiratory distress  Abdominal:      General: There is no distension     Skin:     General: Skin is warm and dry  Neurological:      General: No focal deficit present  Mental Status: He is alert  Psychiatric:         Mood and Affect: Mood normal          Behavior: Behavior normal          Lab Results:   I have personally reviewed pertinent labs  , CBC:   Lab Results   Component Value Date    WBC 8 17 07/23/2021    HGB 12 3 07/23/2021    HCT 37 3 07/23/2021    MCV 91 07/23/2021     07/23/2021    MCH 29 9 07/23/2021    MCHC 33 0 07/23/2021    RDW 14 0 07/23/2021    MPV 8 8 (L) 07/23/2021    NRBC 0 07/23/2021   , CMP:   Lab Results   Component Value Date    SODIUM 135 (L) 07/23/2021    K 4 5 07/23/2021     07/23/2021    CO2 26 07/23/2021    BUN 20 07/23/2021    CREATININE 0 97 07/23/2021    CALCIUM 8 4 07/23/2021    EGFR 78 07/23/2021       Counseling / Coordination of Care    Total floor / unit time spent today 30+ minutes  Greater than 50% of total time was spent with the patient and / or family counseling and / or coordination of care   A description of the counseling / coordination of care:  Patient assessment, symptom assessment, med review, goals of care, advanced care planning, psychosocial support

## 2021-07-24 NOTE — ASSESSMENT & PLAN NOTE
· Recent diagnosis paroxysmal AFib during last inpatient hospitalization  · Continue metoprolol 12 5 mg b i d   · Coumadin 3 mg q h s  for goal INR 2-3  · Monitor INR daily

## 2021-07-24 NOTE — ASSESSMENT & PLAN NOTE
· Continue HFNC to support respiratory status  · Baseline 5L O2 NC  · Maintain O2 saturations >88%  · Completed 5 day antibiotic course  · Steroids weaned to Solu-Medrol 40 mg b i d, will start prednisone taper today  · Question of aspiration, speech following, appreciate recommendations-recommending esophogram when resp status has stabilized  · Pulm following, CT changes thought to be associated with persistent aspiration  · Bronch at this time would not be appropriate or beneficial to the patient  · Encourage good incentive spirometry/pulmonary toileting

## 2021-07-24 NOTE — RESPIRATORY THERAPY NOTE
07/24/21 0436   Non-Invasive Information   O2 Interface Device HFNC prongs   Non-Invasive Ventilation Mode HFNC (High flow)   $ Pulse Oximetry Spot Check Charge Completed   Non-Invasive Settings   FiO2 (%) 75   Flow (lpm) 45

## 2021-07-24 NOTE — RESPIRATORY THERAPY NOTE
07/24/21 0506   Respiratory Assessment   Resp Comments pt currently on 70% and 45L   Non-Invasive Information   O2 Interface Device HFNC prongs   Non-Invasive Ventilation Mode HFNC (High flow)   $ Pulse Oximetry Spot Check Charge Completed   Non-Invasive Settings   FiO2 (%) 70   Flow (lpm) 45

## 2021-07-24 NOTE — ASSESSMENT & PLAN NOTE
· Evident by tachycardia, tachypnea, and leukocytosis  · Concern for infectious pulmonary etiology  · Blood cultures negative   · Completed 5 day abx no

## 2021-07-24 NOTE — ASSESSMENT & PLAN NOTE
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

## 2021-07-24 NOTE — RESPIRATORY THERAPY NOTE
07/24/21 0728   Respiratory Assessment   Assessment Type During-treatment   General Appearance Awake; Alert   Respiratory Pattern Dyspnea with exertion   Chest Assessment Chest expansion symmetrical   Bilateral Breath Sounds Diminished;Coarse   R Breath Sounds Diminished;Coarse   Resp Comments pt  currently on high flow   O2 Device hfnc   Non-Invasive Information   O2 Interface Device HFNC prongs   Non-Invasive Ventilation Mode HFNC (High flow)   SpO2 97 %   $ Pulse Oximetry Spot Check Charge Completed   Non-Invasive Settings   FiO2 (%) 70   Flow (lpm) 45   Maintenance   Water bag changed No        07/24/21 0728   Respiratory Assessment   Assessment Type During-treatment   General Appearance Awake; Alert   Respiratory Pattern Dyspnea with exertion   Chest Assessment Chest expansion symmetrical   Bilateral Breath Sounds Diminished;Coarse   R Breath Sounds Diminished;Coarse   Resp Comments pt  currently on high flow   O2 Device hfnc   Non-Invasive Information   O2 Interface Device HFNC prongs   Non-Invasive Ventilation Mode HFNC (High flow)   SpO2 97 %   $ Pulse Oximetry Spot Check Charge Completed   Non-Invasive Settings   FiO2 (%) 70   Flow (lpm) 45   Maintenance   Water bag changed No

## 2021-07-24 NOTE — PLAN OF CARE
Problem: Nutrition/Hydration-ADULT  Goal: Nutrient/Hydration intake appropriate for improving, restoring or maintaining nutritional needs  Description: Monitor and assess patient's nutrition/hydration status for malnutrition  Collaborate with interdisciplinary team and initiate plan and interventions as ordered  Monitor patient's weight and dietary intake as ordered or per policy  Utilize nutrition screening tool and intervene as necessary  Determine patient's food preferences and provide high-protein, high-caloric foods as appropriate       INTERVENTIONS:  - Monitor oral intake, urinary output, labs, and treatment plans  - Assess nutrition and hydration status and recommend course of action  - Evaluate amount of meals eaten  - Assist patient with eating if necessary   - Allow adequate time for meals  - Recommend/ encourage appropriate diets, oral nutritional supplements, and vitamin/mineral supplements  - Order, calculate, and assess calorie counts as needed  - Recommend, monitor, and adjust tube feedings and TPN/PPN based on assessed needs  - Assess need for intravenous fluids  - Provide specific nutrition/hydration education as appropriate  - Include patient/family/caregiver in decisions related to nutrition  Outcome: Progressing     Problem: Prexisting or High Potential for Compromised Skin Integrity  Goal: Skin integrity is maintained or improved  Description: INTERVENTIONS:  - Identify patients at risk for skin breakdown  - Assess and monitor skin integrity  - Assess and monitor nutrition and hydration status  - Monitor labs   - Assess for incontinence   - Turn and reposition patient  - Assist with mobility/ambulation  - Relieve pressure over bony prominences  - Avoid friction and shearing  - Provide appropriate hygiene as needed including keeping skin clean and dry  - Evaluate need for skin moisturizer/barrier cream  - Collaborate with interdisciplinary team   - Patient/family teaching  - Consider wound care consult   Outcome: Progressing     Problem: Potential for Falls  Goal: Patient will remain free of falls  Description: INTERVENTIONS:  - Educate patient/family on patient safety including physical limitations  - Instruct patient to call for assistance with activity   - Consult OT/PT to assist with strengthening/mobility   - Keep Call bell within reach  - Keep bed low and locked with side rails adjusted as appropriate  - Keep care items and personal belongings within reach  - Initiate and maintain comfort rounds  - Make Fall Risk Sign visible to staff  - Offer Toileting every 2 Hours, in advance of need  - Initiate/Maintain bed alarm  - Apply yellow socks and bracelet for high fall risk patients  - Consider moving patient to room near nurses station  Outcome: Progressing     Problem: MOBILITY - ADULT  Goal: Maintain or return to baseline ADL function  Description: INTERVENTIONS:  -  Assess patient's ability to carry out ADLs; assess patient's baseline for ADL function and identify physical deficits which impact ability to perform ADLs (bathing, care of mouth/teeth, toileting, grooming, dressing, etc )  - Assess/evaluate cause of self-care deficits   - Assess range of motion  - Assess patient's mobility; develop plan if impaired  - Assess patient's need for assistive devices and provide as appropriate  - Encourage maximum independence but intervene and supervise when necessary  - Involve family in performance of ADLs  - Assess for home care needs following discharge   - Consider OT consult to assist with ADL evaluation and planning for discharge  - Provide patient education as appropriate  Outcome: Progressing  Goal: Maintains/Returns to pre admission functional level  Description: INTERVENTIONS:  - Perform BMAT or MOVE assessment daily    - Set and communicate daily mobility goal to care team and patient/family/caregiver     - Collaborate with rehabilitation services on mobility goals if consulted  - Perform Range of Motion 3 times a day  - Reposition patient every 2 hours    - Dangle patient 2 times a day  - Stand patient 2 times a day  - Ambulate patient 1 times a day  - Out of bed to chair 2 times a day   - Out of bed for toileting  - Record patient progress and toleration of activity level   Outcome: Progressing     Problem: PAIN - ADULT  Goal: Verbalizes/displays adequate comfort level or baseline comfort level  Description: Interventions:  - Encourage patient to monitor pain and request assistance  - Assess pain using appropriate pain scale  - Administer analgesics based on type and severity of pain and evaluate response  - Implement non-pharmacological measures as appropriate and evaluate response  - Consider cultural and social influences on pain and pain management  - Notify physician/advanced practitioner if interventions unsuccessful or patient reports new pain  Outcome: Progressing     Problem: INFECTION - ADULT  Goal: Absence or prevention of progression during hospitalization  Description: INTERVENTIONS:  - Assess and monitor for signs and symptoms of infection  - Monitor lab/diagnostic results  - Monitor all insertion sites, i e  indwelling lines, tubes, and drains  - Monitor endotracheal if appropriate and nasal secretions for changes in amount and color  - [place] appropriate cooling/warming therapies per order  - Administer medications as ordered  - Instruct and encourage patient and family to use good hand hygiene technique  - Identify and instruct in appropriate isolation precautions for identified infection/condition  Outcome: Progressing     Problem: SAFETY ADULT  Goal: Patient will remain free of falls  Description: INTERVENTIONS:  - Educate patient/family on patient safety including physical limitations  - Instruct patient to call for assistance with activity   - Consult OT/PT to assist with strengthening/mobility   - Keep Call bell within reach  - Keep bed low and locked with side rails adjusted as appropriate  - Keep care items and personal belongings within reach  - Initiate and maintain comfort rounds  - Make Fall Risk Sign visible to staff  - Offer Toileting every 2 Hours, in advance of need  - Initiate/Maintain bed alarm  - Apply yellow socks and bracelet for high fall risk patients  - Consider moving patient to room near nurses station  Outcome: Progressing  Goal: Maintain or return to baseline ADL function  Description: INTERVENTIONS:  -  Assess patient's ability to carry out ADLs; assess patient's baseline for ADL function and identify physical deficits which impact ability to perform ADLs (bathing, care of mouth/teeth, toileting, grooming, dressing, etc )  - Assess/evaluate cause of self-care deficits   - Assess range of motion  - Assess patient's mobility; develop plan if impaired  - Assess patient's need for assistive devices and provide as appropriate  - Encourage maximum independence but intervene and supervise when necessary  - Involve family in performance of ADLs  - Assess for home care needs following discharge   - Consider OT consult to assist with ADL evaluation and planning for discharge  - Provide patient education as appropriate  Outcome: Progressing  Goal: Maintains/Returns to pre admission functional level  Description: INTERVENTIONS:  - Perform BMAT or MOVE assessment daily    - Set and communicate daily mobility goal to care team and patient/family/caregiver     - Collaborate with rehabilitation services on mobility goals if consulted  - Record patient progress and toleration of activity level   Outcome: Progressing     Problem: DISCHARGE PLANNING  Goal: Discharge to home or other facility with appropriate resources  Description: INTERVENTIONS:  - Identify barriers to discharge w/patient and caregiver  - Arrange for needed discharge resources and transportation as appropriate  - Identify discharge learning needs (meds, wound care, etc )  - Arrange for interpretive services to assist at discharge as needed  - Refer to Case Management Department for coordinating discharge planning if the patient needs post-hospital services based on physician/advanced practitioner order or complex needs related to functional status, cognitive ability, or social support system  Outcome: Progressing     Problem: Knowledge Deficit  Goal: Patient/family/caregiver demonstrates understanding of disease process, treatment plan, medications, and discharge instructions  Description: Complete learning assessment and assess knowledge base    Interventions:  - Provide teaching at level of understanding  - Provide teaching via preferred learning methods  Outcome: Progressing

## 2021-07-24 NOTE — ASSESSMENT & PLAN NOTE
Malnutrition Findings:   Adult Malnutrition type: Chronic illness  Adult Degree of Malnutrition: Malnutrition of moderate degree    BMI Findings: Body mass index is 23 34 kg/m²  · Dietary consultation

## 2021-07-24 NOTE — PROGRESS NOTES
Progress Note - Pulmonary   Coye Links 65 y o  male MRN: [id number]  Unit/Bed#:  Encounter: [id number]    Assessment:  1  Acute on chronic hypoxemic respiratory failure  2  Metastatic esophageal carcinoma  3  Recent COVID-19 infection    Plan:  Titrate to maintain O2 sats greater than 88%  He remains on HFNC but beginning to titrate the FiO2  Suspect severe aspiration as a cause cystic bronchiectasis and severe hypoxemic respiratory failure given his underlying esophageal cancer and past stent  Speech therapy has evaluated patient, VBS from 6/22 showed no aspiration  Esophagram has been ordered for further evaluation  Antibiotics per Critical Care  Will continue to follow  Subjective:   Patient resting in bed  Feels same as yesterday, was able to rest better last night  Objective:     Vitals: Blood pressure 106/55, pulse 63, temperature (!) 97 4 °F (36 3 °C), temperature source Oral, resp  rate 17, height 5' 7" (1 702 m), weight 67 6 kg (149 lb 0 5 oz), SpO2 97 %  ,Body mass index is 23 34 kg/m²        Intake/Output Summary (Last 24 hours) at 7/24/2021 0844  Last data filed at 7/24/2021 0428  Gross per 24 hour   Intake 1391 23 ml   Output 1600 ml   Net -208 77 ml       Invasive Devices     Central Venous Catheter Line            Port A Cath 08/28/17 Right Chest 1425 days          Peripheral Intravenous Line            Peripheral IV 07/20/21 Left Arm 4 days    Peripheral IV 07/21/21 Right Forearm 3 days                Physical Exam: /55 (BP Location: Right arm)   Pulse 63   Temp (!) 97 4 °F (36 3 °C) (Oral)   Resp 17   Ht 5' 7" (1 702 m)   Wt 67 6 kg (149 lb 0 5 oz)   SpO2 97%   BMI 23 34 kg/m²   General appearance: alert and oriented, in no acute distress  Head: Normocephalic, without obvious abnormality, atraumatic  Eyes: negative findings: conjunctivae and sclerae normal  Lungs: Bibasilar rales  Heart: regular rate and rhythm and S1, S2 normal  Abdomen: normal findings: soft, non-tender  Extremities: No edema  Skin: Warm and dry  Neurologic: Mental status: Alert, oriented, thought content appropriate     Labs: I have personally reviewed pertinent lab results  , CBC:   Lab Results   Component Value Date    WBC 8 32 07/24/2021    HGB 11 5 (L) 07/24/2021    HCT 34 7 (L) 07/24/2021    MCV 90 07/24/2021     07/24/2021    MCH 29 9 07/24/2021    MCHC 33 1 07/24/2021    RDW 14 1 07/24/2021    MPV 8 8 (L) 07/24/2021    NRBC 0 07/24/2021   , CMP:   Lab Results   Component Value Date    SODIUM 136 07/24/2021    K 4 4 07/24/2021     07/24/2021    CO2 29 07/24/2021    BUN 15 07/24/2021    CREATININE 0 84 07/24/2021    CALCIUM 8 0 (L) 07/24/2021    EGFR 87 07/24/2021     Imaging and other studies: I have personally reviewed pertinent reports     and I have personally reviewed pertinent films in PACS

## 2021-07-24 NOTE — ASSESSMENT & PLAN NOTE
· EGD with dilation and stent placement 1/26/21  · EGD with stent removal 4/26/21 secondary to stent migration  · Formal speech/swallow evaluation completed, recommending esophogram when stable

## 2021-07-24 NOTE — RESPIRATORY THERAPY NOTE
07/24/21 1700   Respiratory Assessment   Assessment Type During-treatment   General Appearance Awake; Alert   Respiratory Pattern Dyspnea with exertion   Chest Assessment Chest expansion symmetrical   Bilateral Breath Sounds Diminished   Resp Comments patient desaturates moving from chair to bed ,so fio2 was increased to 70%   O2 Device hfnc   Non-Invasive Information   O2 Interface Device HFNC prongs   Non-Invasive Ventilation Mode HFNC (High flow)   SpO2 99 %   $ Pulse Oximetry Spot Check Charge Completed   Non-Invasive Settings   FiO2 (%) 70   Flow (lpm) 50   Maintenance   Water bag changed No        07/24/21 1700   Respiratory Assessment   Assessment Type During-treatment   General Appearance Awake; Alert   Respiratory Pattern Dyspnea with exertion   Chest Assessment Chest expansion symmetrical   Bilateral Breath Sounds Diminished   Resp Comments patient desaturates moving from chair to bed ,so fio2 was increased to 70%   O2 Device hfnc   Non-Invasive Information   O2 Interface Device HFNC prongs   Non-Invasive Ventilation Mode HFNC (High flow)   SpO2 99 %   $ Pulse Oximetry Spot Check Charge Completed   Non-Invasive Settings   FiO2 (%) 70   Flow (lpm) 50   Maintenance   Water bag changed No

## 2021-07-24 NOTE — RESPIRATORY THERAPY NOTE
07/23/21 1958   Respiratory Assessment   Assessment Type Pre-treatment   General Appearance Alert; Awake   Respiratory Pattern Dyspnea with exertion   Chest Assessment Chest expansion symmetrical   Resp Comments patient awake and alert in no apparent resp distress at this time, patient remains on HFNC found on 75%     O2 Device HFNC   Non-Invasive Information   O2 Interface Device HFNC prongs   Non-Invasive Ventilation Mode HFNC (High flow)   SpO2 98 %   $ Pulse Oximetry Spot Check Charge Completed   Non-Invasive Settings   FiO2 (%) 75   Flow (lpm) 45

## 2021-07-24 NOTE — SPEECH THERAPY NOTE
Patient currently on HFNC with strong suspicion for (retrograde) aspiration  He is NPO currently and pending an esophagram 7/26  Discussed case with RN- VBS showed minimal oropharyngeal dysphagia with no aspiration likely no further benefit from ST at this time- may need PEG vs new stent/dilation  Will f u x1 after esophagram as indicated       MIRIAM Wong S , [id number] [facility]  Speech Language Pathologist   Available via [address] [place] [address] #[id number]  [place] #[id number]

## 2021-07-24 NOTE — PROGRESS NOTES
[address] [facility]  Progress Note - Roz Perez 1950, 65 y o  male MRN: [id number]  Unit/Bed#:  Encounter: [id number]  Primary Care Provider: DO Mere   Date and time admitted to hospital: 7/20/2021  9:52 AM    * Acute on chronic respiratory failure with hypoxia (HCC)  Assessment & Plan  · Continue HFNC to support respiratory status  · Baseline 5L O2 NC  · Maintain O2 saturations >88%  · Completed 5 day antibiotic course  · Steroids weaned to Solu-Medrol 40 mg b i d, will start prednisone taper today  · Question of aspiration, speech following, appreciate recommendations-recommending esophogram when resp status has stabilized  · Pulm following, CT changes thought to be associated with persistent aspiration  · Bronch at this time would not be appropriate or beneficial to the patient  · Encourage good incentive spirometry/pulmonary toileting    Multifocal pneumonia  Assessment & Plan  · CXR revealing interval progression of bilateral opacifications  · High resolution CT demonstrated new area of groundglass density in the posterior aspect right upper lobe along with worsening consolidation at both lung bases, increasing reticulation with traction bronchiectatic changes also noted at the lung bases  · Completed anitbiotics  · Procal 0 31, continue to trend  · Sputum/MRSA culture  · Courage good incentive spirometry/pulmonary toileting        History of COVID-19  Assessment & Plan  · Diagnosed May 11 2021  · Did not require inpatient hospitalization at that time    Sepsis [facility])  Assessment & Plan  · Evident by tachycardia, tachypnea, and leukocytosis  · Concern for infectious pulmonary etiology  · Blood cultures negative   · Completed 5 day abx      Paroxysmal A-fib (HCC)  Assessment & Plan  · Recent diagnosis paroxysmal AFib during last inpatient hospitalization  · Continue metoprolol 12 5 mg b i d   · Coumadin 3 mg q h s  for goal INR 2-3  · Monitor INR daily      Esophageal cancer   Assessment & Plan  · 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 s/p FOLFOX-6, was on trastuzumab monotherapy  · Completed palliative radiation to distal esophagus 4/23/21  · Follows with Dr John Kwong as outpatient  · Palliative care consult, appreciate input  · Patient wishes to recover and return home at this time, remains full code   · Ongoing goals of care discussions      Esophageal stricture  Assessment & Plan  · EGD with dilation and stent placement 1/26/21  · EGD with stent removal 4/26/21 secondary to stent migration  · Formal speech/swallow evaluation completed, recommending esophogram when stable    Moderate protein-calorie malnutrition ([facility] [address] )  Assessment & Plan  Malnutrition Findings:   Adult Malnutrition type: Chronic illness  Adult Degree of Malnutrition: Malnutrition of moderate degree    BMI Findings: Body mass index is 23 34 kg/m²  · Dietary consultation  Essential hypertension  Assessment & Plan  · Metoprolol 12 5 mg b i d   · BP remains stable, continue to monitor  Type 2 diabetes mellitus, without long-term current use of insulin [facility])  Assessment & Plan  Lab Results   Component Value Date    HGBA1C 6 5 (H) 06/23/2021       Recent Labs     07/23/21  1118 07/23/21  1536 07/23/21  2056 07/24/21  0702   POCGLU 195* 123 152* 168*       Blood Sugar Average: Last 72 hrs:  · (P) 404 [id number]   · Hold oral glycemic agents  · Sliding scale insulin coverage as needed      GERD (gastroesophageal reflux disease)  Assessment & Plan  · Continue protonix BID    Sacral wound  Assessment & Plan  · Sacral wound present on admission  · Wound care consultation  · Frequent offloading/repositioning to avoid further skin breakdown      ----------------------------------------------------------------------------------------  HPI/24hr events: Remains on HFNC 70% 45L        Disposition: Continue Stepdown Level 1 level of care Code Status: Level 1 - Full Code  ---------------------------------------------------------------------------------------  SUBJECTIVE  "I'd like to get out of bed today again "    Review of Systems   Constitutional: Negative for chills and fever  HENT: Negative  Eyes: Negative  Respiratory: Positive for shortness of breath  Negative for cough, wheezing and stridor  Cardiovascular: Negative for chest pain, palpitations and leg swelling  Gastrointestinal: Negative for abdominal distention, abdominal pain, blood in stool, diarrhea, nausea and vomiting  Endocrine: Negative  Genitourinary: Negative for dysuria, flank pain, frequency and urgency  Musculoskeletal: Negative  Skin: Negative for rash and wound  Allergic/Immunologic: Negative  Neurological: Negative for dizziness, syncope, facial asymmetry, weakness, light-headedness, numbness and headaches  Hematological: Negative for adenopathy  Does not bruise/bleed easily  Psychiatric/Behavioral: Negative        Review of systems was reviewed and negative unless stated above in HPI/24-hour events   ---------------------------------------------------------------------------------------  OBJECTIVE    Vitals   Vitals:    21 2140 21 2300 21 0300 21 0728   BP: 109/55 119/58 106/55    BP Location:  Right arm Right arm    Pulse: 81 74 63    Resp: (!) 26 (!) 27 17    Temp:  (!) 96 9 °F (36 1 °C) (!) 97 4 °F (36 3 °C)    TempSrc:  Oral Oral    SpO2: 93% 93% 99% 97%   Weight:       Height:         Temp (24hrs), Av °F (36 7 °C), Min:96 9 °F (36 1 °C), Max:98 5 °F (36 9 °C)  Current: Temperature: (!) 97 4 °F (36 3 °C)          Respiratory:  SpO2: SpO2: 97 %       Invasive/non-invasive ventilation settings   Respiratory    Lab Data (Last 4 hours)    None         O2/Vent Data (Last 4 hours)       0436  0506  0728      Non-Invasive Ventilation Mode HFNC (High flow) HFNC (High flow) HFNC (High flow) Physical Exam  Constitutional:       General: He is awake  Appearance: He is ill-appearing  HENT:      Head: Normocephalic and atraumatic  Eyes:      Extraocular Movements: Extraocular movements intact  Pupils: Pupils are equal, round, and reactive to light  Neck:      Vascular: No JVD  Cardiovascular:      Rate and Rhythm: Normal rate  Heart sounds: Normal heart sounds  Pulmonary:      Breath sounds: Decreased air movement present  Abdominal:      General: Bowel sounds are normal       Palpations: Abdomen is soft  Skin:     General: Skin is warm and dry  Neurological:      GCS: GCS eye subscore is 4  GCS verbal subscore is 5  GCS motor subscore is 6  Psychiatric:         Behavior: Behavior is cooperative           Laboratory and Diagnostics:  Results from last 7 days   Lab Units 07/24/21  0434 07/23/21  0530 07/22/21  0557 07/21/21  0532 07/20/21  0546   WBC Thousand/uL 8 32 8 17 9 36 9 85 13 48*   HEMOGLOBIN g/dL 11 5* 12 3 11 3* 11 9* 14 8   HEMATOCRIT % 34 7* 37 3 33 9* 35 8* 45 1   PLATELETS Thousands/uL 227 265 237 247 278   NEUTROS PCT %  --  87*  --  89*  --    BANDS PCT % 2  --   --   --  3   MONOS PCT %  --  6  --  4  --    MONO PCT % 4  --   --   --  7     Results from last 7 days   Lab Units 07/24/21 0434 07/23/21  0530 07/22/21  0557 07/21/21  0532 07/20/21  0546   SODIUM mmol/L 136 135* 135* 137 136   POTASSIUM mmol/L 4 4 4 5 4 2 4 1 4 1   CHLORIDE mmol/L 102 100 101 101 102   CO2 mmol/L 29 26 26 26 25   ANION GAP mmol/L 5 9 8 10 9   BUN mg/dL 15 20 27* 25 14   CREATININE mg/dL 0 84 0 97 0 84 0 93 0 72   CALCIUM mg/dL 8 0* 8 4 8 3 8 3 8 9   GLUCOSE RANDOM mg/dL 195* 195* 186* 262* 155*   ALT U/L  --   --   --  63 79*   AST U/L  --   --   --  21 31   ALK PHOS U/L  --   --   --  135* 165 5*   ALBUMIN g/dL  --   --   --  1 9* 3 2*   TOTAL BILIRUBIN mg/dL  --   --   --  0 33 0 58     Results from last 7 days   Lab Units 07/24/21  0434 07/22/21  0557 07/20/21  0546 MAGNESIUM mg/dL 1 9 2 0 1 9   PHOSPHORUS mg/dL 3 5 3 8  --       Results from last 7 days   Lab Units 07/24/21  0434 07/23/21  0530 07/22/21  0827 07/22/21  0557 07/22/21  0108 07/21/21  1847 07/21/21  1129 07/21/21  0532 07/21/21  0020 07/20/21  1748 07/20/21  1246 07/20/21  0546   INR  2 46* 2 53*  --  1 95*  --   --   --  1 90*  --   --  1 52* 1 30*   PTT seconds  --  55* 68*  --  75* 110* 53*  --  120* 87* 38* 33*      Results from last 7 days   Lab Units 07/20/21  0546   TROPONIN I ng/mL <0 03     Results from last 7 days   Lab Units 07/20/21  0546   LACTIC ACID mmol/L 1 8     ABG:  Results from last 7 days   Lab Units 07/20/21  0633   PH ART  7 454*   PCO2 ART mm Hg 33 9*   PO2 ART mm Hg 54 9*   HCO3 ART mmol/L 23 2   BASE EXC ART mmol/L 0 0   ABG SOURCE  Radial, Right     VBG:  Results from last 7 days   Lab Units 07/20/21  0633   ABG SOURCE  Radial, Right     Results from last 7 days   Lab Units 07/23/21  0530 07/22/21  0557 07/21/21  0532 07/20/21  1748   PROCALCITONIN ng/ml 0 08 0 08 0 15 0 31*       Micro  Results from last 7 days   Lab Units 07/20/21  0553 07/20/21  0545   BLOOD CULTURE  No Growth at 72 hrs  No Growth at 72 hrs  EKG: NSSR  Imaging: I have personally reviewed pertinent reports  Intake and Output  I/O       07/22 0701 - 07/23 0700 07/23 0701 - 07/24 0700 07/24 0701 - 07/25 0700    P  O  100      I V  (mL/kg) 149 6 (2 2) 1191 2 (17 6)     IV Piggyback 200 200     Total Intake(mL/kg) 449 6 (6 7) 1391 2 (20 6)     Urine (mL/kg/hr) 2075 (1 3) 1600 (1)     Stool 0      Total Output 2075 1600     Net -1625 4 -208 8            Unmeasured Stool Occurrence 4 x            Height and Weights   Height: 5' 7" (170 2 cm)  IBW (Ideal Body Weight): 66 1 kg  Body mass index is 23 34 kg/m²    Weight (last 2 days)     Date/Time   Weight    07/23/21 0534   67 6 (149 03)    07/22/21 0600   68 (149 91)                Nutrition       Diet Orders   (From admission, onward)             Start     Ordered 07/23/21 1422  Diet NPO  Diet effective now     Question Answer Comment   Diet Type NPO    RD to adjust diet per protocol?  Yes        07/23/21 1422    07/23/21 1108  Dietary nutrition supplements  Once     Question Answer Comment   Select Supplement: Ensure Pudding-Vanila    Frequency Breakfast, Lunch, Dinner        07/23/21 1107    07/23/21 1107  Dietary nutrition supplements  Once     Question Answer Comment   Select Supplement: Magic Cup Chocolate    Frequency Lunch, Dinner        07/23/21 1107                  Active Medications  Scheduled Meds:  Current Facility-Administered Medications   Medication Dose Route Frequency Provider Last Rate    dextrose 5% lactated ringer's  75 mL/hr Intravenous Continuous Celine Olivas PA-C 75 mL/hr (07/23/21 1456)    docusate sodium  100 mg Oral BID Celine Olivas PA-C      HYDROmorphone  0 5 mg Intravenous Q6H PRN Alice German MD      insulin lispro  1-6 Units Subcutaneous HS Abdiel Bolder, CRNP      insulin lispro  4-20 Units Subcutaneous TID With Meals Celine Olivas PA-C      levalbuterol  1 25 mg Nebulization TID Celine Olivas PA-C      Lidocaine Viscous HCl  15 mL Swish & Spit 4x Daily PRN Celine Olivas PA-C      LORazepam  0 5 mg Oral BID PRN Abdiel BoldDESTIN howard      melatonin  6 mg Oral HS Abdiel Bolder, CRNP      methylPREDNISolone sodium succinate  40 mg Intravenous Q12H [address] [facility]      metoprolol tartrate  12 5 mg Oral Q12H [address] [facility] ANTIFUNGAL   Topical BID Alice German MD      oxyCODONE  10 mg Oral Q4H PRN Alice German MD      oxyCODONE  5 mg Oral Q4H PRN Alice German MD      pantoprazole  40 mg Intravenous Q12H [address] Celine Olivas PA-C      polyethylene glycol  17 g Oral TID Celine Olivas PA-C      pravastatin  80 mg Oral Daily With [employer]      senna  1 tablet Oral HS Celine Olivas PA-C      sodium chloride  3 mL Nebulization TID Alice German MD      tiotropium  18 mcg Inhalation Daily Aleke Rickey Lashell Duran PA-C      warfarin  3 mg Oral Daily (warfarin) Latasha George PA-C       Continuous Infusions:  dextrose 5% lactated ringer's, 75 mL/hr, Last Rate: 75 mL/hr (07/23/21 1456)      PRN Meds:   HYDROmorphone, 0 5 mg, Q6H PRN  Lidocaine Viscous HCl, 15 mL, 4x Daily PRN  LORazepam, 0 5 mg, BID PRN  oxyCODONE, 10 mg, Q4H PRN  oxyCODONE, 5 mg, Q4H PRN        Invasive Devices Review  Invasive Devices     Central Venous Catheter Line            Port A Cath 08/28/17 Right Chest 1425 days          Peripheral Intravenous Line            Peripheral IV 07/20/21 Left Arm 4 days    Peripheral IV 07/21/21 Right Forearm 3 days                Rationale for remaining devices: HFNC for resp failure  ---------------------------------------------------------------------------------------  Advance Directive and Living Will: Yes    Power of : Yes  POLST:    ---------------------------------------------------------------------------------------  Care Time Delivered:   No Critical Care time spent       [facility]      Portions of the record may have been created with voice recognition software  Occasional wrong word or "sound a like" substitutions may have occurred due to the inherent limitations of voice recognition software    Read the chart carefully and recognize, using context, where substitutions have occurred

## 2021-07-24 NOTE — ASSESSMENT & PLAN NOTE
· CXR revealing interval progression of bilateral opacifications  · High resolution CT demonstrated new area of groundglass density in the posterior aspect right upper lobe along with worsening consolidation at both lung bases, increasing reticulation with traction bronchiectatic changes also noted at the lung bases  · Completed anitbiotics  · Procal 0 31, continue to trend  · Sputum/MRSA culture  · Courage good incentive spirometry/pulmonary toileting

## 2021-07-24 NOTE — ASSESSMENT & PLAN NOTE
Lab Results   Component Value Date    HGBA1C 6 5 (H) 06/23/2021       Recent Labs     07/23/21  1118 07/23/21  1536 07/23/21 2056 07/24/21  0702   POCGLU 195* 123 152* 168*       Blood Sugar Average: Last 72 hrs:  · (P) 882 [id number]   · Hold oral glycemic agents  · Sliding scale insulin coverage as needed

## 2021-07-25 NOTE — RESPIRATORY THERAPY NOTE
07/25/21 0306   Respiratory Assessment   Resp Comments no changes made to the patient's HFNC settings at this time, pt remains comfortable on current settings   O2 Device HFNC   Non-Invasive Information   O2 Interface Device HFNC prongs   Non-Invasive Ventilation Mode HFNC (High flow)   $ Pulse Oximetry Spot Check Charge Completed   Non-Invasive Settings   FiO2 (%) 70   Flow (lpm) 50   Temperature (Set) 31   Non-Invasive Readings   Heater Temperature (Obs) 30 8

## 2021-07-25 NOTE — ASSESSMENT & PLAN NOTE
Malnutrition Findings:   Adult Malnutrition type: Chronic illness  Adult Degree of Malnutrition: Malnutrition of moderate degree    BMI Findings: Body mass index is 23 34 kg/m²  · Dietary consultation    · May require post pyloric feeding conduit

## 2021-07-25 NOTE — PLAN OF CARE
Problem: Nutrition/Hydration-ADULT  Goal: Nutrient/Hydration intake appropriate for improving, restoring or maintaining nutritional needs  Description: Monitor and assess patient's nutrition/hydration status for malnutrition  Collaborate with interdisciplinary team and initiate plan and interventions as ordered  Monitor patient's weight and dietary intake as ordered or per policy  Utilize nutrition screening tool and intervene as necessary  Determine patient's food preferences and provide high-protein, high-caloric foods as appropriate       INTERVENTIONS:  - Monitor oral intake, urinary output, labs, and treatment plans  - Assess nutrition and hydration status and recommend course of action  - Evaluate amount of meals eaten  - Assist patient with eating if necessary   - Allow adequate time for meals  - Recommend/ encourage appropriate diets, oral nutritional supplements, and vitamin/mineral supplements  - Order, calculate, and assess calorie counts as needed  - Recommend, monitor, and adjust tube feedings and TPN/PPN based on assessed needs  - Assess need for intravenous fluids  - Provide specific nutrition/hydration education as appropriate  - Include patient/family/caregiver in decisions related to nutrition  Outcome: Progressing     Problem: Prexisting or High Potential for Compromised Skin Integrity  Goal: Skin integrity is maintained or improved  Description: INTERVENTIONS:  - Identify patients at risk for skin breakdown  - Assess and monitor skin integrity  - Assess and monitor nutrition and hydration status  - Monitor labs   - Assess for incontinence   - Turn and reposition patient  - Assist with mobility/ambulation  - Relieve pressure over bony prominences  - Avoid friction and shearing  - Provide appropriate hygiene as needed including keeping skin clean and dry  - Evaluate need for skin moisturizer/barrier cream  - Collaborate with interdisciplinary team   - Patient/family teaching  - Consider wound care consult   Outcome: Progressing     Problem: Potential for Falls  Goal: Patient will remain free of falls  Description: INTERVENTIONS:  - Educate patient/family on patient safety including physical limitations  - Instruct patient to call for assistance with activity   - Consult OT/PT to assist with strengthening/mobility   - Keep Call bell within reach  - Keep bed low and locked with side rails adjusted as appropriate  - Keep care items and personal belongings within reach  - Initiate and maintain comfort rounds  - Make Fall Risk Sign visible to staff  - Offer Toileting every 2 Hours, in advance of need  - Initiate/Maintain bed alarm  - Apply yellow socks and bracelet for high fall risk patients  - Consider moving patient to room near nurses station  Outcome: Progressing     Problem: MOBILITY - ADULT  Goal: Maintain or return to baseline ADL function  Description: INTERVENTIONS:  -  Assess patient's ability to carry out ADLs; assess patient's baseline for ADL function and identify physical deficits which impact ability to perform ADLs (bathing, care of mouth/teeth, toileting, grooming, dressing, etc )  - Assess/evaluate cause of self-care deficits   - Assess range of motion  - Assess patient's mobility; develop plan if impaired  - Assess patient's need for assistive devices and provide as appropriate  - Encourage maximum independence but intervene and supervise when necessary  - Involve family in performance of ADLs  - Assess for home care needs following discharge   - Consider OT consult to assist with ADL evaluation and planning for discharge  - Provide patient education as appropriate  Outcome: Progressing  Goal: Maintains/Returns to pre admission functional level  Description: INTERVENTIONS:  - Perform BMAT or MOVE assessment daily    - Set and communicate daily mobility goal to care team and patient/family/caregiver     - Collaborate with rehabilitation services on mobility goals if consulted  - Perform Range of Motion 3 times a day  - Reposition patient every 2 hours    - Dangle patient 1 times a day  - Stand patient 1 times a day  - Ambulate patient 1 times a day  - Out of bed to chair 1 times a day   - Out of bed for meals 1 times a day  - Out of bed for toileting  - Record patient progress and toleration of activity level   Outcome: Progressing     Problem: PAIN - ADULT  Goal: Verbalizes/displays adequate comfort level or baseline comfort level  Description: Interventions:  - Encourage patient to monitor pain and request assistance  - Assess pain using appropriate pain scale  - Administer analgesics based on type and severity of pain and evaluate response  - Implement non-pharmacological measures as appropriate and evaluate response  - Consider cultural and social influences on pain and pain management  - Notify physician/advanced practitioner if interventions unsuccessful or patient reports new pain  Outcome: Progressing     Problem: INFECTION - ADULT  Goal: Absence or prevention of progression during hospitalization  Description: INTERVENTIONS:  - Assess and monitor for signs and symptoms of infection  - Monitor lab/diagnostic results  - Monitor all insertion sites, i e  indwelling lines, tubes, and drains  - Monitor endotracheal if appropriate and nasal secretions for changes in amount and color  - Wallback appropriate cooling/warming therapies per order  - Administer medications as ordered  - Instruct and encourage patient and family to use good hand hygiene technique  - Identify and instruct in appropriate isolation precautions for identified infection/condition  Outcome: Progressing     Problem: SAFETY ADULT  Goal: Patient will remain free of falls  Description: INTERVENTIONS:  - Educate patient/family on patient safety including physical limitations  - Instruct patient to call for assistance with activity   - Consult OT/PT to assist with strengthening/mobility   - Keep Call bell within reach  - Keep bed low and locked with side rails adjusted as appropriate  - Keep care items and personal belongings within reach  - Initiate and maintain comfort rounds  - Make Fall Risk Sign visible to staff  - Offer Toileting every 2 Hours, in advance of need  - Initiate/Maintain bed alarm  - Apply yellow socks and bracelet for high fall risk patients  - Consider moving patient to room near nurses station  Outcome: Progressing  Goal: Maintain or return to baseline ADL function  Description: INTERVENTIONS:  -  Assess patient's ability to carry out ADLs; assess patient's baseline for ADL function and identify physical deficits which impact ability to perform ADLs (bathing, care of mouth/teeth, toileting, grooming, dressing, etc )  - Assess/evaluate cause of self-care deficits   - Assess range of motion  - Assess patient's mobility; develop plan if impaired  - Assess patient's need for assistive devices and provide as appropriate  - Encourage maximum independence but intervene and supervise when necessary  - Involve family in performance of ADLs  - Assess for home care needs following discharge   - Consider OT consult to assist with ADL evaluation and planning for discharge  - Provide patient education as appropriate  Outcome: Progressing  Goal: Maintains/Returns to pre admission functional level  Description: INTERVENTIONS:  - Perform BMAT or MOVE assessment daily    - Set and communicate daily mobility goal to care team and patient/family/caregiver     - Collaborate with rehabilitation services on mobility goals if consulted  Outcome: Progressing     Problem: DISCHARGE PLANNING  Goal: Discharge to home or other facility with appropriate resources  Description: INTERVENTIONS:  - Identify barriers to discharge w/patient and caregiver  - Arrange for needed discharge resources and transportation as appropriate  - Identify discharge learning needs (meds, wound care, etc )  - Arrange for interpretive services to assist at discharge as needed  - Refer to Case Management Department for coordinating discharge planning if the patient needs post-hospital services based on physician/advanced practitioner order or complex needs related to functional status, cognitive ability, or social support system  Outcome: Progressing     Problem: Knowledge Deficit  Goal: Patient/family/caregiver demonstrates understanding of disease process, treatment plan, medications, and discharge instructions  Description: Complete learning assessment and assess knowledge base    Interventions:  - Provide teaching at level of understanding  - Provide teaching via preferred learning methods  Outcome: Progressing

## 2021-07-25 NOTE — ASSESSMENT & PLAN NOTE
· EGD with dilation and stent placement 1/26/21  · EGD with stent removal 4/26/21 secondary to stent migration  · NPO present

## 2021-07-25 NOTE — ASSESSMENT & PLAN NOTE
Lab Results   Component Value Date    HGBA1C 6 5 (H) 06/23/2021       Recent Labs     07/24/21  0702 07/24/21  1122 07/24/21  1617 07/25/21  0003   POCGLU 168* 124 190* 107       Blood Sugar Average: Last 72 hrs:  · (P) 143 [id number]   · Hold oral glycemic agents  · Insulin per protocol as needed

## 2021-07-25 NOTE — PROGRESS NOTES
[facility]  Progress Note - [place] E[place] 1950, 65 y o  male MRN: [id number]  Unit/Bed#:  Encounter: [id number]  Primary Care Provider: Amy Lr DO   Date and time admitted to hospital: 7/20/2021  9:52 AM    * Acute on chronic respiratory failure with hypoxia (HCC)  Assessment & Plan  · Continue HFNC to support respiratory status  · Baseline 5L O2 NC  · Maintain O2 saturations >88%  · Completed 5 day antibiotic course  · Steroids weaned to Solu-Medrol 40 mg b i d, will start prednisone taper today  · Question of aspiration, speech following, appreciate recommendations-recommending esophogram when resp status has stabilized  · Pulm following, CT changes thought to be associated with persistent aspiration  · Bronch at this time would not be appropriate or beneficial to the patient  · Encourage good incentive spirometry/pulmonary toileting    Multifocal pneumonia  Assessment & Plan  · CXR revealing interval progression of bilateral opacifications  · High resolution CT demonstrated new area of groundglass density in the posterior aspect right upper lobe along with worsening consolidation at both lung bases, increasing reticulation with traction bronchiectatic changes also noted at the lung bases  · Completed anitbiotics  · Trend procalcitonin is  · Sputum/MRSA culture  · Courage good incentive spirometry/pulmonary toileting        History of COVID-19  Assessment & Plan  · Diagnosed May 11 2021  · Did not require inpatient hospitalization at that time    Sepsis [facility])  Assessment & Plan  · Evident by tachycardia, tachypnea, and leukocytosis  · Concern for infectious pulmonary etiology  · Blood cultures negative   · Completed 5 day abx  · Continue to follow fever and leukocyte curves      Paroxysmal A-fib (HCC)  Assessment & Plan  · Recent diagnosis paroxysmal AFib during last inpatient hospitalization  · Continue metoprolol 12 5 mg b i d   · Coumadin 3 mg q h s  for goal INR 2-3  · Monitor INR daily      Esophageal cancer   Assessment & Plan  · 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 s/p FOLFOX-6, was on trastuzumab monotherapy  · Completed palliative radiation to distal esophagus 4/23/21  · Follows with Dr Jessica Booth as outpatient  · Palliative care consult, appreciate input  · Patient wishes to recover and return home at this time, remains full code   · Ongoing goals of care discussions      Esophageal stricture  Assessment & Plan  · EGD with dilation and stent placement 1/26/21  · EGD with stent removal 4/26/21 secondary to stent migration  · NPO present    Moderate protein-calorie malnutrition ([address] )  Assessment & Plan  Malnutrition Findings:   Adult Malnutrition type: Chronic illness  Adult Degree of Malnutrition: Malnutrition of moderate degree    BMI Findings: Body mass index is 23 34 kg/m²  · Dietary consultation  · May require post pyloric feeding conduit    Essential hypertension  Assessment & Plan  · Metoprolol 12 5 mg b i d   · BP remains stable, continue to monitor  Type 2 diabetes mellitus, without long-term current use of insulin [facility])  Assessment & Plan  Lab Results   Component Value Date    HGBA1C 6 5 (H) 06/23/2021       Recent Labs     07/24/21  0702 07/24/21  1122 07/24/21  1617 07/25/21  0003   POCGLU 168* 124 190* 107       Blood Sugar Average: Last 72 hrs:  · (P) 340 [id number]   · Hold oral glycemic agents  · Insulin per protocol as needed      GERD (gastroesophageal reflux disease)  Assessment & Plan  · Continue protonix BID    Sacral wound  Assessment & Plan  · Sacral wound present on admission  · Wound care consultation  · Frequent offloading/repositioning to avoid further skin breakdown        ----------------------------------------------------------------------------------------  HPI/24hr events:  Remains dependent on high-flow nasal cannula with very little improvement  Pain is better controlled  Ongoing goals of care discussions  Lab holiday today  Disposition: Continue Stepdown Level 1 level of care   Code Status: Level 1 - Full Code  ---------------------------------------------------------------------------------------  SUBJECTIVE  No complaints offered overnight    Review of Systems  Review of systems was reviewed and negative unless stated above in HPI/24-hour events   ---------------------------------------------------------------------------------------  OBJECTIVE    Vitals   Vitals:    21 1914 21 2347 21 0300   BP: 109/56  (!) 88/51 137/63   BP Location: Right arm  Right arm Right arm   Pulse: (!) 109  64 97   Resp: 22  20 22   Temp: 97 7 °F (36 5 °C)  97 5 °F (36 4 °C) 97 8 °F (36 6 °C)   TempSrc: Oral  Oral Oral   SpO2: 98% 97% 96% 98%   Weight:       Height:         Temp (24hrs), Av 7 °F (36 5 °C), Min:97 5 °F (36 4 °C), Max:97 8 °F (36 6 °C)  Current: Temperature: 97 8 °F (36 6 °C)          Respiratory:  SpO2: SpO2: 98 %       Invasive/non-invasive ventilation settings   Respiratory    Lab Data (Last 4 hours)    None         O2/Vent Data (Last 4 hours)       030          Non-Invasive Ventilation Mode HFNC (High flow)                   Physical Exam  GEN:  Ill appearing, appears stated age, no acute distress  HEENT:  Sclera anicteric, mucous membranes pink and moist, conjunctiva pink, no billy/rhinorrhea  Neck:  No lymphadenopathy, normal ROM, supple, no bruits  CV :  S1S2, regular, no murmurs, rubs or gallops  Intact distal pulses  No JVD  Resp:  Lungs diminished throughout  No subq air or crepitus  Symmetrical expansion  No cough noted    GI :  Abd soft, nontender, no guarding/rebound, nondistended, hypoactive bowel sounds X4 quads, no organomegaly appreciated  Neuro:  CN II-XII grossly intact, nonfocal exam, speech clear, GCS 15  Psych:  Appropriate affect    Laboratory and Diagnostics:  Results from last 7 days   Lab Units 07/24/21  0434 07/23/21  0530 07/22/21  0557 07/21/21  0532 07/20/21  0546   WBC Thousand/uL 8 32 8 17 9 36 9 85 13 48*   HEMOGLOBIN g/dL 11 5* 12 3 11 3* 11 9* 14 8   HEMATOCRIT % 34 7* 37 3 33 9* 35 8* 45 1   PLATELETS Thousands/uL 227 265 237 247 278   NEUTROS PCT %  --  87*  --  89*  --    BANDS PCT % 2  --   --   --  3   MONOS PCT %  --  6  --  4  --    MONO PCT % 4  --   --   --  7     Results from last 7 days   Lab Units 07/24/21  0434 07/23/21  0530 07/22/21  0557 07/21/21  0532 07/20/21  0546   SODIUM mmol/L 136 135* 135* 137 136   POTASSIUM mmol/L 4 4 4 5 4 2 4 1 4 1   CHLORIDE mmol/L 102 100 101 101 102   CO2 mmol/L 29 26 26 26 25   ANION GAP mmol/L 5 9 8 10 9   BUN mg/dL 15 20 27* 25 14   CREATININE mg/dL 0 84 0 97 0 84 0 93 0 72   CALCIUM mg/dL 8 0* 8 4 8 3 8 3 8 9   GLUCOSE RANDOM mg/dL 195* 195* 186* 262* 155*   ALT U/L  --   --   --  63 79*   AST U/L  --   --   --  21 31   ALK PHOS U/L  --   --   --  135* 165 5*   ALBUMIN g/dL  --   --   --  1 9* 3 2*   TOTAL BILIRUBIN mg/dL  --   --   --  0 33 0 58     Results from last 7 days   Lab Units 07/24/21  0434 07/22/21  0557 07/20/21  0546   MAGNESIUM mg/dL 1 9 2 0 1 9   PHOSPHORUS mg/dL 3 5 3 8  --       Results from last 7 days   Lab Units 07/24/21  0434 07/23/21  0530 07/22/21  0827 07/22/21  0557 07/22/21  0108 07/21/21  1847 07/21/21  1129 07/21/21  0532 07/21/21  0020 07/20/21  1748 07/20/21  1246 07/20/21  0546   INR  2 46* 2 53*  --  1 95*  --   --   --  1 90*  --   --  1 52* 1 30*   PTT seconds  --  55* 68*  --  75* 110* 53*  --  120* 87* 38* 33*      Results from last 7 days   Lab Units 07/20/21  0546   TROPONIN I ng/mL <0 03     Results from last 7 days   Lab Units 07/20/21  0546   LACTIC ACID mmol/L 1 8     ABG:  Results from last 7 days   Lab Units 07/20/21  0633   PH ART  7 454*   PCO2 ART mm Hg 33 9*   PO2 ART mm Hg 54 9*   HCO3 ART mmol/L 23 2   BASE EXC ART mmol/L 0 0   ABG SOURCE  Radial, Right     VBG:  Results from last 7 days Lab Units 07/20/21  0633   ABG SOURCE  Radial, Right     Results from last 7 days   Lab Units 07/23/21  0530 07/22/21  0557 07/21/21  0532 07/20/21  1748   PROCALCITONIN ng/ml 0 08 0 08 0 15 0 31*       Micro  Results from last 7 days   Lab Units 07/20/21  0553 07/20/21  0545   BLOOD CULTURE  No Growth After 4 Days  No Growth After 4 Days  EKG:  Sinus rhythm  Imaging: I have personally reviewed pertinent reports  and I have personally reviewed pertinent films in PACS    Intake and Output  I/O       07/23 0701 - 07/24 0700 07/24 0701 - 07/25 0700    I V  (mL/kg) 1191 2 (17 6) 835 (12 4)    IV Piggyback 200     Total Intake(mL/kg) 1391 2 (20 6) 835 (12 4)    Urine (mL/kg/hr) 1600 (1) 1700 (1)    Total Output 1600 1700    Net -208 8 -865                Height and Weights   Height: 5' 7" (170 2 cm)  IBW (Ideal Body Weight): 66 1 kg  Body mass index is 23 34 kg/m²  Weight (last 2 days)     Date/Time   Weight    07/23/21 0534   67 6 (149 03)                Nutrition       Diet Orders   (From admission, onward)             Start     Ordered    07/23/21 1422  Diet NPO  Diet effective now     Question Answer Comment   Diet Type NPO    RD to adjust diet per protocol?  Yes        07/23/21 1422    07/23/21 1108  Dietary nutrition supplements  Once     Question Answer Comment   Select Supplement: Ensure Pudding-Vanila    Frequency Breakfast, Lunch, Dinner        07/23/21 1107    07/23/21 1107  Dietary nutrition supplements  Once     Question Answer Comment   Select Supplement: Magic Cup Chocolate    Frequency Lunch, Dinner        07/23/21 1107                  Active Medications  Scheduled Meds:  Current Facility-Administered Medications   Medication Dose Route Frequency Provider Last Rate    dextrose 5% lactated ringer's  75 mL/hr Intravenous Continuous Chang Brand PA-C 75 mL/hr (07/24/21 6519)    docusate sodium  100 mg Oral BID Chang Brand PA-C      HYDROmorphone  0 5 mg Intravenous Q6H PRN Elliot Bone MD  levalbuterol  1 25 mg Nebulization TID Andres Avila PA-C      Lidocaine Viscous HCl  15 mL Swish & Spit 4x Daily PRN Andres Avila PA-C      LORazepam  0 5 mg Oral BID PRN DESTIN Alejandre      melatonin  6 mg Oral HS DESTIN Alejandre      methylPREDNISolone sodium succinate  20 mg Intravenous Q12H [facility] Doreen CejaDESTIN      metoprolol tartrate  12 5 mg Oral Q12H [facility] Epi Trevino      JENNIFER ANTIFUNGAL   Topical BID Devaughn Alicia MD      oxyCODONE  10 mg Oral Q4H PRN Devaughn Ailcia MD      oxyCODONE  5 mg Oral Q4H PRN Devaughn Alicia MD      pantoprazole  40 mg Intravenous Q12H [facility] Andres Avila PA-C      polyethylene glycol  17 g Oral TID Andres Avila PA-C      pravastatin  80 mg Oral Daily With [employer]      senna  1 tablet Oral HS Andres Avila PA-C      sodium chloride  3 mL Nebulization TID Devaughn Alicia MD      tiotropium  18 mcg Inhalation Daily Andres Avila PA-C      traZODone  50 mg Oral HS Devaughn Alicia MD      warfarin  3 mg Oral Daily (warfarin) Andres Avila PA-C       Continuous Infusions:  dextrose 5% lactated ringer's, 75 mL/hr, Last Rate: 75 mL/hr (07/24/21 9679)      PRN Meds:   HYDROmorphone, 0 5 mg, Q6H PRN  Lidocaine Viscous HCl, 15 mL, 4x Daily PRN  LORazepam, 0 5 mg, BID PRN  oxyCODONE, 10 mg, Q4H PRN  oxyCODONE, 5 mg, Q4H PRN        Invasive Devices Review  Invasive Devices     Central Venous Catheter Line            Port A Cath 08/28/17 Right Chest 1426 days          Peripheral Intravenous Line            Peripheral IV 07/20/21 Left Arm 4 days    Peripheral IV 07/21/21 Right Forearm 4 days                Rationale for remaining devices:  Nonapplicable  ---------------------------------------------------------------------------------------  Advance Directive and Living Will: Yes    Power of : Yes  POLST:    ---------------------------------------------------------------------------------------  Care Time Delivered:   No Critical Care time spent       [facility]      Portions of the record may have been created with voice recognition software  Occasional wrong word or "sound a like" substitutions may have occurred due to the inherent limitations of voice recognition software    Read the chart carefully and recognize, using context, where substitutions have occurred

## 2021-07-25 NOTE — ASSESSMENT & PLAN NOTE
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

## 2021-07-25 NOTE — ASSESSMENT & PLAN NOTE
· Evident by tachycardia, tachypnea, and leukocytosis  · Concern for infectious pulmonary etiology  · Blood cultures negative   · Completed 5 day abx  · Continue to follow fever and leukocyte curves

## 2021-07-25 NOTE — ASSESSMENT & PLAN NOTE
· CXR revealing interval progression of bilateral opacifications  · High resolution CT demonstrated new area of groundglass density in the posterior aspect right upper lobe along with worsening consolidation at both lung bases, increasing reticulation with traction bronchiectatic changes also noted at the lung bases  · Completed anitbiotics  · Trend procalcitonin is  · Sputum/MRSA culture  · Courage good incentive spirometry/pulmonary toileting

## 2021-07-26 NOTE — RESPIRATORY THERAPY NOTE
07/26/21 0013   Respiratory Assessment   Resp Comments patient resting comfortably   Non-Invasive Information   O2 Interface Device HFNC prongs   Non-Invasive Ventilation Mode HFNC (High flow)   $ Pulse Oximetry Spot Check Charge Completed   Non-Invasive Settings   FiO2 (%) 60   Flow (lpm) 50   Temperature (Set) 31

## 2021-07-26 NOTE — PROGRESS NOTES
[address] [facility]  Progress Note - Naren Hunt 1950, 65 y o  male MRN: [id number]  Unit/Bed#:  Encounter: [id number]  Primary Care Provider: Yao Vicente DO   Date and time admitted to hospital: 7/20/2021  9:52 AM    * Acute on chronic respiratory failure with hypoxia (HCC)  Assessment & Plan  · Continue HFNC to support respiratory status  · Baseline 5L O2 NC  · Maintain O2 saturations >88%  · Completed 5 day antibiotic course  · Steroids weaned to Solu-Medrol 40 mg b i d, will start prednisone taper today  · Question of aspiration, speech following, appreciate recommendations-recommending esophogram when resp status has stabilized  · Pulm following, CT changes thought to be associated with persistent aspiration  · Bronch at this time would not be appropriate or beneficial to the patient  · Encourage good incentive spirometry/pulmonary toileting    Multifocal pneumonia  Assessment & Plan  · CXR revealing interval progression of bilateral opacifications  · High resolution CT demonstrated new area of groundglass density in the posterior aspect right upper lobe along with worsening consolidation at both lung bases, increasing reticulation with traction bronchiectatic changes also noted at the lung bases  · Completed anitbiotics  · Trend procalcitonins  · Sputum/MRSA culture  · Courage good incentive spirometry/pulmonary toileting        History of COVID-19  Assessment & Plan  · Diagnosed May 11 2021  · Did not require inpatient hospitalization at that time    Sepsis [facility])  Assessment & Plan  · Evident by tachycardia, tachypnea, and leukocytosis  · Concern for infectious pulmonary etiology  · Blood cultures negative   · Completed 5 day abx  · Continue to follow fever and leukocyte curves      Paroxysmal A-fib (HCC)  Assessment & Plan  · Recent diagnosis paroxysmal AFib during last inpatient hospitalization  · Continue metoprolol 12 5 mg b i d   · Coumadin 3 mg q h s  for goal INR 2-3  · Monitor INR daily      Esophageal cancer   Assessment & Plan  · 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 s/p FOLFOX-6, was on trastuzumab monotherapy  · Completed palliative radiation to distal esophagus 4/23/21  · Follows with Dr Augie Hood as outpatient  · Palliative care consult, appreciate input  · Patient wishes to recover and return home at this time, remains full code   · Ongoing goals of care discussions      Esophageal stricture  Assessment & Plan  · EGD with dilation and stent placement 1/26/21  · EGD with stent removal 4/26/21 secondary to stent migration  · NPO present    Moderate protein-calorie malnutrition ([address] )  Assessment & Plan  Malnutrition Findings:   Adult Malnutrition type: Chronic illness  Adult Degree of Malnutrition: Malnutrition of moderate degree    BMI Findings: Body mass index is 23 34 kg/m²  · Dietary consultation  · May require post pyloric feeding conduit    Essential hypertension  Assessment & Plan  · Metoprolol 12 5 mg b i d   · BP remains stable, continue to monitor  Type 2 diabetes mellitus, without long-term current use of insulin [facility])  Assessment & Plan  Lab Results   Component Value Date    HGBA1C 6 5 (H) 06/23/2021       Recent Labs     07/25/21  0635 07/25/21  1147 07/25/21  1839 07/26/21  0015   POCGLU 143* 107 141* 122       Blood Sugar Average: Last 72 hrs:  · (P) 141 5   · Hold oral glycemic agents  · Insulin per protocol as needed      GERD (gastroesophageal reflux disease)  Assessment & Plan  · Continue protonix BID    Sacral wound  Assessment & Plan  · Sacral wound present on admission  · Wound care consultation  · Frequent offloading/repositioning to avoid further skin breakdown    ----------------------------------------------------------------------------------------  HPI/24hr events:  Remains dependent on high-flow nasal cannula 60% 50 L  Very poor reserve    Palliative care discussions for goals of care ongoing  Disposition: Continue Stepdown Level 1 level of care   Code Status: Level 1 - Full Code  ---------------------------------------------------------------------------------------  SUBJECTIVE  No complaints offered    Review of Systems  Review of systems was reviewed and negative unless stated above in HPI/24-hour events   ---------------------------------------------------------------------------------------  OBJECTIVE    Vitals   Vitals:    21 1903 21 2030 21 2352 21 0322   BP: 98/60  101/55 100/61   BP Location: Left arm  Right arm Right arm   Pulse: 78  80 81   Resp: (!)   (!) 26 19   Temp: 97 9 °F (36 6 °C)  97 6 °F (36 4 °C) 97 7 °F (36 5 °C)   TempSrc: Oral  Oral Oral   SpO2: 95% 95% 98% 95%   Weight:       Height:         Temp (24hrs), Av °F (36 7 °C), Min:97 6 °F (36 4 °C), Max:98 9 °F (37 2 °C)  Current: Temperature: 97 7 °F (36 5 °C)          Respiratory:  SpO2: SpO2: 95 %       Invasive/non-invasive ventilation settings   Respiratory    Lab Data (Last 4 hours)    None         O2/Vent Data (Last 4 hours)       031          Non-Invasive Ventilation Mode HFNC (High flow)                   Physical Exam  GEN:  Chronically ill-appearing, appears stated age, no acute distress  HEENT:  Sclera anicteric, mucous membranes pink and moist, conjunctiva pink, no billy/rhinorrhea  Neck:  No lymphadenopathy, normal ROM, supple, no bruits  CV :  S1S2, regular, no murmurs, rubs or gallops  Intact distal pulses  No JVD  Resp:  Lungs diminished throughout  No subq air or crepitus  Symmetrical expansion  No cough noted    GI :  Abd soft, nontender, no guarding/rebound, nondistended, normoactive bowel sounds X4 quads, no organomegaly appreciated  Neuro:  CN II-XII grossly intact, nonfocal exam, speech clear, GCS 15  Psych:  Appropriate affect      Laboratory and Diagnostics:  Results from last 7 days   Lab Units 21  0434 21  0434 07/23/21  0530 07/22/21  0557 07/21/21  0532 07/20/21  0546   WBC Thousand/uL 8 02 8 32 8 17 9 36 9 85 13 48*   HEMOGLOBIN g/dL 12 2 11 5* 12 3 11 3* 11 9* 14 8   HEMATOCRIT % 38 4 34 7* 37 3 33 9* 35 8* 45 1   PLATELETS Thousands/uL 181 227 265 237 247 278   NEUTROS PCT %  --   --  87*  --  89*  --    BANDS PCT %  --  2  --   --   --  3   MONOS PCT %  --   --  6  --  4  --    MONO PCT %  --  4  --   --   --  7     Results from last 7 days   Lab Units 07/26/21 0434 07/24/21 0434 07/23/21  0530 07/22/21  0557 07/21/21  0532 07/20/21  0546   SODIUM mmol/L 137 136 135* 135* 137 136   POTASSIUM mmol/L 4 4 4 4 4 5 4 2 4 1 4 1   CHLORIDE mmol/L 103 102 100 101 101 102   CO2 mmol/L 28 29 26 26 26 25   ANION GAP mmol/L 6 5 9 8 10 9   BUN mg/dL 11 15 20 27* 25 14   CREATININE mg/dL 0 69 0 84 0 97 0 84 0 93 0 72   CALCIUM mg/dL 8 2* 8 0* 8 4 8 3 8 3 8 9   GLUCOSE RANDOM mg/dL 135 195* 195* 186* 262* 155*   ALT U/L  --   --   --   --  63 79*   AST U/L  --   --   --   --  21 31   ALK PHOS U/L  --   --   --   --  135* 165 5*   ALBUMIN g/dL  --   --   --   --  1 9* 3 2*   TOTAL BILIRUBIN mg/dL  --   --   --   --  0 33 0 58     Results from last 7 days   Lab Units 07/24/21  0434 07/22/21  0557 07/20/21  0546   MAGNESIUM mg/dL 1 9 2 0 1 9   PHOSPHORUS mg/dL 3 5 3 8  --       Results from last 7 days   Lab Units 07/24/21  0434 07/23/21  0530 07/22/21  0827 07/22/21  0557 07/22/21  0108 07/21/21  1847 07/21/21  1129 07/21/21  0532 07/21/21  0020 07/20/21  1748 07/20/21  1246 07/20/21  0546   INR  2 46* 2 53*  --  1 95*  --   --   --  1 90*  --   --  1 52* 1 30*   PTT seconds  --  55* 68*  --  75* 110* 53*  --  120* 87* 38* 33*      Results from last 7 days   Lab Units 07/20/21  0546   TROPONIN I ng/mL <0 03     Results from last 7 days   Lab Units 07/20/21  0546   LACTIC ACID mmol/L 1 8     ABG:  Results from last 7 days   Lab Units 07/20/21  0633   PH ART  7 454*   PCO2 ART mm Hg 33 9*   PO2 ART mm Hg 54 9*   HCO3 ART mmol/L 23 2 BASE EXC ART mmol/L 0 0   ABG SOURCE  Radial, Right     VBG:  Results from last 7 days   Lab Units 07/20/21  0633   ABG SOURCE  Radial, Right     Results from last 7 days   Lab Units 07/23/21  0530 07/22/21  0557 07/21/21  0532 07/20/21  1748   PROCALCITONIN ng/ml 0 08 0 08 0 15 0 31*       Micro  Results from last 7 days   Lab Units 07/20/21  0553 07/20/21  0545   BLOOD CULTURE  No Growth After 5 Days  No Growth After 5 Days  EKG:  Sinus rhythm  Imaging: I have personally reviewed pertinent reports  and I have personally reviewed pertinent films in PACS    Intake and Output  I/O       07/24 0701 - 07/25 0700 07/25 0701 - 07/26 0700    I V  (mL/kg) 1360 (20 1) 707 5 (10 5)    Total Intake(mL/kg) 1360 (20 1) 707 5 (10 5)    Urine (mL/kg/hr) 2350 (1 4) 975 (0 6)    Total Output 2350 975    Net -990 -267 5                Height and Weights   Height: 5' 7" (170 2 cm)  IBW (Ideal Body Weight): 66 1 kg  Body mass index is 23 34 kg/m²  Weight (last 2 days)     None            Nutrition       Diet Orders   (From admission, onward)             Start     Ordered    07/23/21 1422  Diet NPO  Diet effective now     Question Answer Comment   Diet Type NPO    RD to adjust diet per protocol?  Yes        07/23/21 1422    07/23/21 1108  Dietary nutrition supplements  Once     Question Answer Comment   Select Supplement: Ensure Pudding-Vanila    Frequency Breakfast, Lunch, Dinner        07/23/21 1107    07/23/21 1107  Dietary nutrition supplements  Once     Question Answer Comment   Select Supplement: Magic Cup Chocolate    Frequency Lunch, Dinner        07/23/21 1107                  Active Medications  Scheduled Meds:  Current Facility-Administered Medications   Medication Dose Route Frequency Provider Last Rate    dextrose 5% lactated ringer's  75 mL/hr Intravenous Continuous Javi Diehl MD 75 mL/hr (07/25/21 1954)    docusate sodium  100 mg Oral BID America Madrigal PA-C      HYDROmorphone  0 5 mg Intravenous Q6H PRN Abimael Mancia MD      levalbuterol  1 25 mg Nebulization TID Gail Shaver PA-C      Lidocaine Viscous HCl  15 mL Swish & Spit 4x Daily PRN Gail Shaver PA-C      LORazepam  0 5 mg Oral BID PRN Yanaluis Cedeno, CRRENÉ      melatonin  6 mg Oral HS Yana Hikes, CRNP      methylPREDNISolone sodium succinate  20 mg Intravenous Daily Abimael Mancia MD      metoprolol tartrate  12 5 mg Oral Q12H [facility] Russian [place]aya [place]      JENNIFER ANTIFUNGAL   Topical BID Abimael Mancia MD      oxyCODONE  10 mg Oral Q4H PRN Abimael Mancia MD      oxyCODONE  5 mg Oral Q4H PRN Abimael Mancia MD      pantoprazole  40 mg Intravenous Q12H [facility] Russian DESIRE Shaver      polyethylene glycol  17 g Oral TID Gail Shaver PA-C      pravastatin  80 mg Oral Daily With [employer]      senna  1 tablet Oral HS Gail Shaver PA-C      sodium chloride  3 mL Nebulization TID Abimael Mancia MD      tiotropium  18 mcg Inhalation Daily Gail Shaver PA-C      traZODone  100 mg Oral HS Abimael Mancia MD      warfarin  3 mg Oral Daily (warfarin) Gail Shaver PA-C       Continuous Infusions:  dextrose 5% lactated ringer's, 75 mL/hr, Last Rate: 75 mL/hr (07/25/21 1954)      PRN Meds:   HYDROmorphone, 0 5 mg, Q6H PRN  Lidocaine Viscous HCl, 15 mL, 4x Daily PRN  LORazepam, 0 5 mg, BID PRN  oxyCODONE, 10 mg, Q4H PRN  oxyCODONE, 5 mg, Q4H PRN        Invasive Devices Review  Invasive Devices     Central Venous Catheter Line            Port A Cath 08/28/17 Right Chest 1427 days          Peripheral Intravenous Line            Peripheral IV 07/25/21 Left Hand <1 day                Rationale for remaining devices:  Not applicable  ---------------------------------------------------------------------------------------  Advance Directive and Living Will: Yes    Power of : Yes  POLST:    ---------------------------------------------------------------------------------------  Care Time Delivered:   No Critical Care time spent       Maryruth Pallas DESTIN Villanueva      Portions of the record may have been created with voice recognition software  Occasional wrong word or "sound a like" substitutions may have occurred due to the inherent limitations of voice recognition software    Read the chart carefully and recognize, using context, where substitutions have occurred

## 2021-07-26 NOTE — SOCIAL WORK
Palliative LSW saw patient at the bedside today  LSW appreciates the opportunity to provide patient/family with inpatient emotional support and guidance while patient continues to receive medical attention from the medical team     Topics discussed: Discussed recent conversation patient/family had with Dr Jose M Lynch and now plan is to home with hospice  Discussed unfinished business that both patient/family have previously mentioned  According to wife, everything should be completed by Thursday  Spouse reported that her daughter and family have started driving from [place] today       Areas that need follow-up: ongoing support  Resources given: none  Others present:  Dr Frandy Joaquin Junior and Dr Enrico Briseno will continue to follow as requested by the medical team, patient, or family

## 2021-07-26 NOTE — ASSESSMENT & PLAN NOTE
Lab Results   Component Value Date    HGBA1C 6 5 (H) 06/23/2021       Recent Labs     07/25/21  0635 07/25/21  1147 07/25/21  1839 07/26/21  0015   POCGLU 143* 107 141* 122       Blood Sugar Average: Last 72 hrs:  · (P) 141 5   · Hold oral glycemic agents  · Insulin per protocol as needed

## 2021-07-26 NOTE — PROGRESS NOTES
Progress note - Palliative and Supportive Care   Tor Hu 65 y o  male [id number]    Patient Active Problem List   Diagnosis    Odynophagia    GERD (gastroesophageal reflux disease)    Essential hypertension    Carotid stenosis    Esophageal stricture    Esophageal cancer     Migrated esophageal stent    Migration of esophageal stent    PAD (peripheral artery disease)    Bilateral carotid artery stenosis    Positive colorectal cancer screening using Cologuard test    SOB (shortness of breath)    Multifocal pneumonia    Acute on chronic respiratory failure with hypoxia (HCC)    Moderate protein-calorie malnutrition (HCC)    Type 2 diabetes mellitus, without long-term current use of insulin (HCC)    Paroxysmal A-fib (HCC)    Debility    Anxiety    History of COVID-19    S[address] [facility] [address] )    Sacral wound     Active issues specifically addressed today include:   History of COVID  Esophageal cancer  Acute on chronic respiratory failure    Plan:  1  Symptom management -    - No changes today  Will plan for hospice dispo pending outcome of swallow eval     2  Goals - Extensive goals discussion ricci smith's sig other and Diamante Hooks  Reviewed his desire to be home  Discussed hospice cares at length  Discussed code status  Given his desire to die at home and enroll in hospice a code status of level 3 is certainly indicated and Diamante Hooks adamantly agrees  Will change to level 3  Will follow up with barium swallow eval       Code Status: level 3       Interval history:       Patient continues to require HFNC for O2 support  NPO over the weekend  Despite all this does not feel too bad  Extensive goals discussion with patient and sig anisa Pierre as above         MEDICATIONS / ALLERGIES:     all current active meds have been reviewed and current meds:   Current Facility-Administered Medications   Medication Dose Route Frequency    dextrose 5 % in lactated Ringer's infusion  75 mL/hr Intravenous Continuous    docusate sodium (COLACE) capsule 100 mg  100 mg Oral BID    HYDROmorphone (DILAUDID) injection 0 5 mg  0 5 mg Intravenous Q6H PRN    levalbuterol (XOPENEX) inhalation solution 1 25 mg  1 25 mg Nebulization TID    Lidocaine Viscous HCl (XYLOCAINE) 2 % mucosal solution 15 mL  15 mL Swish & Spit 4x Daily PRN    LORazepam (ATIVAN) tablet 0 5 mg  0 5 mg Oral BID PRN    melatonin tablet 6 mg  6 mg Oral HS    methylPREDNISolone sodium succinate (Solu-MEDROL) injection 20 mg  20 mg Intravenous Daily    metoprolol tartrate (LOPRESSOR) partial tablet 12 5 mg  12 5 mg Oral Q12H [facility]    moisture barrier miconazole 2% cream (aka JENNIFER MOISTURE BARRIER ANTIFUNGAL CREAM)   Topical BID    oxyCODONE (ROXICODONE) immediate release tablet 10 mg  10 mg Oral Q4H PRN    oxyCODONE (ROXICODONE) IR tablet 5 mg  5 mg Oral Q4H PRN    pantoprazole (PROTONIX) injection 40 mg  40 mg Intravenous Q12H SENG    polyethylene glycol (MIRALAX) packet 17 g  17 g Oral TID    pravastatin (PRAVACHOL) tablet 80 mg  80 mg Oral Daily With Dinner    senna (SENOKOT) tablet 8 6 mg  1 tablet Oral HS    sodium chloride 0 9 % inhalation solution 3 mL  3 mL Nebulization TID    tiotropium (SPIRIVA) capsule for inhaler 18 mcg  18 mcg Inhalation Daily    traZODone (DESYREL) tablet 100 mg  100 mg Oral HS    warfarin (COUMADIN) tablet 3 mg  3 mg Oral Daily (warfarin)       Allergies   Allergen Reactions    Other      Cat Dander       OBJECTIVE:    Physical Exam  Physical Exam  Vitals and nursing note reviewed  Constitutional:       General: He is not in acute distress  HENT:      Right Ear: External ear normal       Left Ear: External ear normal       Nose: Nose normal    Eyes:      General:         Right eye: No discharge  Left eye: No discharge  Cardiovascular:      Rate and Rhythm: Normal rate  Pulmonary:      Effort: No respiratory distress  Musculoskeletal:         General: No swelling     Skin:     General: Skin is dry    Neurological:      General: No focal deficit present  Mental Status: He is alert and oriented to person, place, and time  Psychiatric:         Mood and Affect: Mood normal          Behavior: Behavior normal          Lab Results:   I have personally reviewed pertinent labs  , CBC:   Lab Results   Component Value Date    WBC 8 02 07/26/2021    HGB 12 2 07/26/2021    HCT 38 4 07/26/2021    MCV 93 07/26/2021     07/26/2021    MCH 29 6 07/26/2021    MCHC 31 8 07/26/2021    RDW 14 4 07/26/2021    MPV 9 4 07/26/2021    NRBC 0 07/26/2021   , CMP:   Lab Results   Component Value Date    SODIUM 137 07/26/2021    K 4 4 07/26/2021     07/26/2021    CO2 28 07/26/2021    BUN 11 07/26/2021    CREATININE 0 69 07/26/2021    CALCIUM 8 2 (L) 07/26/2021    EGFR 95 07/26/2021   , BMP:  Lab Results   Component Value Date    SODIUM 137 07/26/2021    K 4 4 07/26/2021     07/26/2021    CO2 28 07/26/2021    BUN 11 07/26/2021    CREATININE 0 69 07/26/2021    GLUC 135 07/26/2021    CALCIUM 8 2 (L) 07/26/2021    AGAP 6 07/26/2021    EGFR 95 07/26/2021         Counseling / Coordination of Care    Total floor / unit time spent today 30+ minutes  Greater than 50% of total time was spent with the patient and / or family counseling and / or coordination of care  A description of the counseling / coordination of care: chart review, goals of care, change in code status, psychosocial support, discussion with ICU sonya

## 2021-07-26 NOTE — ASSESSMENT & PLAN NOTE
· CXR revealing interval progression of bilateral opacifications  · High resolution CT demonstrated new area of groundglass density in the posterior aspect right upper lobe along with worsening consolidation at both lung bases, increasing reticulation with traction bronchiectatic changes also noted at the lung bases  · Completed anitbiotics  · Trend procalcitonins  · Sputum/MRSA culture  · Courage good incentive spirometry/pulmonary toileting

## 2021-07-26 NOTE — SPEECH THERAPY NOTE
Patient is NPO pending esophagram  Will f/u when able      MIRIAM Wong S , [address] [facility] [place]  Speech Language Pathologist   Available via [address] [place] [address] #[id number]  [place] #[id number] Petr Harry Pa-c  [address]  [address]  [facility] [place]       07/03/18        Patient: Delilah Moreland   YOB: 1969   Date of Visit: 7/3/2018       Dear  Dr. Pérez Box MD,      Thank you for referring Delilah Moreland to my practice.

## 2021-07-26 NOTE — CASE MANAGEMENT
This CM received hospice consult and met with patient and SO,  Trudy Deal at bedside to discuss same  List of hospices provided and questions answered  Trudy Moreno would like to review choices and will discuss same with CM tomorrow  Both are in agreement for same and plan for d/c home Wednesday 7/28/21 w/ hospice to start same day    Will follow up in am

## 2021-07-26 NOTE — RESPIRATORY THERAPY NOTE
07/26/21 0312   Respiratory Assessment   Resp Comments decreased FIO2   Non-Invasive Information   O2 Interface Device HFNC prongs   Non-Invasive Ventilation Mode HFNC (High flow)   $ Pulse Oximetry Spot Check Charge Completed   Non-Invasive Settings   FiO2 (%) 50   Flow (lpm) 50

## 2021-07-26 NOTE — PLAN OF CARE
Problem: Nutrition/Hydration-ADULT  Goal: Nutrient/Hydration intake appropriate for improving, restoring or maintaining nutritional needs  Description: Monitor and assess patient's nutrition/hydration status for malnutrition  Collaborate with interdisciplinary team and initiate plan and interventions as ordered  Monitor patient's weight and dietary intake as ordered or per policy  Utilize nutrition screening tool and intervene as necessary  Determine patient's food preferences and provide high-protein, high-caloric foods as appropriate       INTERVENTIONS:  - Monitor oral intake, urinary output, labs, and treatment plans  - Assess nutrition and hydration status and recommend course of action  - Evaluate amount of meals eaten  - Assist patient with eating if necessary   - Allow adequate time for meals  - Recommend/ encourage appropriate diets, oral nutritional supplements, and vitamin/mineral supplements  - Order, calculate, and assess calorie counts as needed  - Recommend, monitor, and adjust tube feedings and TPN/PPN based on assessed needs  - Assess need for intravenous fluids  - Provide specific nutrition/hydration education as appropriate  - Include patient/family/caregiver in decisions related to nutrition  Outcome: Progressing     Problem: Prexisting or High Potential for Compromised Skin Integrity  Goal: Skin integrity is maintained or improved  Description: INTERVENTIONS:  - Identify patients at risk for skin breakdown  - Assess and monitor skin integrity  - Assess and monitor nutrition and hydration status  - Monitor labs   - Assess for incontinence   - Turn and reposition patient  - Assist with mobility/ambulation  - Relieve pressure over bony prominences  - Avoid friction and shearing  - Provide appropriate hygiene as needed including keeping skin clean and dry  - Evaluate need for skin moisturizer/barrier cream  - Collaborate with interdisciplinary team   - Patient/family teaching  - Consider wound care consult   Outcome: Progressing     Problem: Potential for Falls  Goal: Patient will remain free of falls  Description: INTERVENTIONS:  - Educate patient/family on patient safety including physical limitations  - Instruct patient to call for assistance with activity   - Consult OT/PT to assist with strengthening/mobility   - Keep Call bell within reach  - Keep bed low and locked with side rails adjusted as appropriate  - Keep care items and personal belongings within reach  - Initiate and maintain comfort rounds  - Make Fall Risk Sign visible to staff  - Offer Toileting every 2 Hours, in advance of need  - Initiate/Maintain bed alarm  - Apply yellow socks and bracelet for high fall risk patients  - Consider moving patient to room near nurses station  Outcome: Progressing     Problem: MOBILITY - ADULT  Goal: Maintain or return to baseline ADL function  Description: INTERVENTIONS:  -  Assess patient's ability to carry out ADLs; assess patient's baseline for ADL function and identify physical deficits which impact ability to perform ADLs (bathing, care of mouth/teeth, toileting, grooming, dressing, etc )  - Assess/evaluate cause of self-care deficits   - Assess range of motion  - Assess patient's mobility; develop plan if impaired  - Assess patient's need for assistive devices and provide as appropriate  - Encourage maximum independence but intervene and supervise when necessary  - Involve family in performance of ADLs  - Assess for home care needs following discharge   - Consider OT consult to assist with ADL evaluation and planning for discharge  - Provide patient education as appropriate  Outcome: Progressing  Goal: Maintains/Returns to pre admission functional level  Description: INTERVENTIONS:  - Perform BMAT or MOVE assessment daily    - Set and communicate daily mobility goal to care team and patient/family/caregiver     - Collaborate with rehabilitation services on mobility goals if consulted  - Perform Range of Motion 2 times a day  - Reposition patient every 2 hours    - Dangle patient 1 times a day  - Stand patient 1 times a day  - Ambulate patient 1 times a day  - Out of bed to chair 1 times a day   - Out of bed for meals 1 times a day  - Out of bed for toileting  - Record patient progress and toleration of activity level   Outcome: Progressing     Problem: PAIN - ADULT  Goal: Verbalizes/displays adequate comfort level or baseline comfort level  Description: Interventions:  - Encourage patient to monitor pain and request assistance  - Assess pain using appropriate pain scale  - Administer analgesics based on type and severity of pain and evaluate response  - Implement non-pharmacological measures as appropriate and evaluate response  - Consider cultural and social influences on pain and pain management  - Notify physician/advanced practitioner if interventions unsuccessful or patient reports new pain  Outcome: Progressing     Problem: INFECTION - ADULT  Goal: Absence or prevention of progression during hospitalization  Description: INTERVENTIONS:  - Assess and monitor for signs and symptoms of infection  - Monitor lab/diagnostic results  - Monitor all insertion sites, i e  indwelling lines, tubes, and drains  - Monitor endotracheal if appropriate and nasal secretions for changes in amount and color  - Leslie appropriate cooling/warming therapies per order  - Administer medications as ordered  - Instruct and encourage patient and family to use good hand hygiene technique  - Identify and instruct in appropriate isolation precautions for identified infection/condition  Outcome: Progressing     Problem: SAFETY ADULT  Goal: Patient will remain free of falls  Description: INTERVENTIONS:  - Educate patient/family on patient safety including physical limitations  - Instruct patient to call for assistance with activity   - Consult OT/PT to assist with strengthening/mobility   - Keep Call bell within reach  - Keep bed low and locked with side rails adjusted as appropriate  - Keep care items and personal belongings within reach  - Initiate and maintain comfort rounds  - Make Fall Risk Sign visible to staff  - Offer Toileting every 2 Hours, in advance of need  - Initiate/Maintain bed alarm  - Apply yellow socks and bracelet for high fall risk patients  - Consider moving patient to room near nurses station  Outcome: Progressing  Goal: Maintain or return to baseline ADL function  Description: INTERVENTIONS:  -  Assess patient's ability to carry out ADLs; assess patient's baseline for ADL function and identify physical deficits which impact ability to perform ADLs (bathing, care of mouth/teeth, toileting, grooming, dressing, etc )  - Assess/evaluate cause of self-care deficits   - Assess range of motion  - Assess patient's mobility; develop plan if impaired  - Assess patient's need for assistive devices and provide as appropriate  - Encourage maximum independence but intervene and supervise when necessary  - Involve family in performance of ADLs  - Assess for home care needs following discharge   - Consider OT consult to assist with ADL evaluation and planning for discharge  - Provide patient education as appropriate  Outcome: Progressing  Goal: Maintains/Returns to pre admission functional level  Description: INTERVENTIONS:  - Perform BMAT or MOVE assessment daily    - Set and communicate daily mobility goal to care team and patient/family/caregiver     - Collaborate with rehabilitation services on mobility goals if consulted  - Record patient progress and toleration of activity level   Outcome: Progressing     Problem: DISCHARGE PLANNING  Goal: Discharge to home or other facility with appropriate resources  Description: INTERVENTIONS:  - Identify barriers to discharge w/patient and caregiver  - Arrange for needed discharge resources and transportation as appropriate  - Identify discharge learning needs (meds, wound care, etc )  - Arrange for interpretive services to assist at discharge as needed  - Refer to Case Management Department for coordinating discharge planning if the patient needs post-hospital services based on physician/advanced practitioner order or complex needs related to functional status, cognitive ability, or social support system  Outcome: Progressing     Problem: Knowledge Deficit  Goal: Patient/family/caregiver demonstrates understanding of disease process, treatment plan, medications, and discharge instructions  Description: Complete learning assessment and assess knowledge base    Interventions:  - Provide teaching at level of understanding  - Provide teaching via preferred learning methods  Outcome: Progressing

## 2021-07-26 NOTE — ASSESSMENT & PLAN NOTE
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

## 2021-07-27 PROBLEM — E43 SEVERE PROTEIN-CALORIE MALNUTRITION (HCC): Status: ACTIVE | Noted: 2021-01-01

## 2021-07-27 NOTE — PROGRESS NOTES
[facility]  Progress Note - Dewey Lizabeth 1950, 65 y o  male MRN: [id number]  Unit/Bed#:  Encounter: [id number]  Primary Care Provider: Calvin Hussein DO   Date and time admitted to hospital: 7/20/2021  9:52 AM    * Acute on chronic respiratory failure with hypoxia (HCC)  Assessment & Plan  · Continue HFNC to support respiratory status  · Baseline 5L O2 NC  · Maintain O2 saturations >88%  · Completed 5 day antibiotic course  · Steroids Solu-Medrol 20mg daily   · Question of aspiration, speech following  · Barium swallow demonstrated delayed emptying of the esophagus, incomplete opening of the lower esophageal sphincter and lobulated filling defects in the distal esophagus likely related to known neoplasia  · Pulm following, CT changes thought to be associated with persistent aspiration  · Bronch at this time would not be appropriate or beneficial to the patient  · Encourage good incentive spirometry/pulmonary toileting  · Ongoing goals of care discussions with plans to transition to home hospice    Multifocal pneumonia  Assessment & Plan  · CXR revealing interval progression of bilateral opacifications  · High resolution CT demonstrated new area of groundglass density in the posterior aspect right upper lobe along with worsening consolidation at both lung bases, increasing reticulation with traction bronchiectatic changes also noted at the lung bases  · Completed anitbiotics  · Courage good incentive spirometry/pulmonary toileting    History of COVID-19  Assessment & Plan  · Diagnosed May 11 2021  · Did not require inpatient hospitalization at that time  Sepsis ([facility] [address] )  Assessment & Plan  · Evident by tachycardia, tachypnea, and leukocytosis  · Concern for infectious pulmonary etiology  · Blood cultures negative   · Completed 5 day abx  · Continue to follow fever and leukocyte curves        Paroxysmal A-fib [facility])  Assessment & Plan  · Recent diagnosis paroxysmal AFib during last inpatient hospitalization  · Continue metoprolol 12 5 mg b i d   · Coumadin 3 mg q h s  for goal INR 2-3  · Monitor INR daily      Esophageal cancer   Assessment & Plan  · 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 s/p FOLFOX-6, was on trastuzumab monotherapy  · Completed palliative radiation to distal esophagus 4/23/21  · Follows with Dr Rowan Wade as outpatient  · Palliative care consult, appreciate input  · Patient wishes to return home on hospice, now level 3 status, awaiting further planning       Esophageal stricture  Assessment & Plan  · EGD with dilation and stent placement 1/26/21  · EGD with stent removal 4/26/21 secondary to stent migration  · Continue NPO     Moderate protein-calorie malnutrition ([address] )  Assessment & Plan  Malnutrition Findings:   Adult Malnutrition type: Chronic illness  Adult Degree of Malnutrition: Malnutrition of moderate degree    BMI Findings: Body mass index is 23 34 kg/m²  · Dietary consultation    Essential hypertension  Assessment & Plan  · Metoprolol 12 5 mg b i d   · BP remains stable, continue to monitor    Type 2 diabetes mellitus, without long-term current use of insulin [facility])  Assessment & Plan  Lab Results   Component Value Date    HGBA1C 6 5 (H) 06/23/2021       Recent Labs     07/26/21  0015 07/26/21  1141 07/26/21  1755 07/26/21  2356   POCGLU 122 159* 158* 136       Blood Sugar Average: Last 72 hrs:  · (P) 233 [id number]     · Hold oral glycemic agents  · Insulin per protocol as needed      GERD (gastroesophageal reflux disease)  Assessment & Plan  · Continue protonix BID    Sacral wound  Assessment & Plan  · Sacral wound present on admission  · Wound care consultation  · Frequent offloading/repositioning to avoid further skin breakdown         ----------------------------------------------------------------------------------------  HPI/24hr events: No acute events overnight   Remains on 9L midflow  Disposition: Awaiting discharge to home hospice  Code Status: Level 3 - DNAR and DNI  ---------------------------------------------------------------------------------------  SUBJECTIVE  Would like a diet    Review of Systems   Constitutional: Negative for chills  Respiratory: Negative for shortness of breath  Cardiovascular: Negative for chest pain  Gastrointestinal: Negative for abdominal pain, nausea and vomiting  Neurological: Negative for dizziness and light-headedness  All other systems reviewed and are negative  Review of systems was reviewed and negative unless stated above in HPI/24-hour events   ---------------------------------------------------------------------------------------  OBJECTIVE    Vitals   Vitals:    21 0200 21 0300 21 0400 21 0500   BP:   132/67    BP Location:   Left arm    Pulse: 76 70 67 68   Resp: 16 18 16 16   Temp:   97 7 °F (36 5 °C)    TempSrc:   Oral    SpO2: 97% 94% 96% 98%   Weight:       Height:         Temp (24hrs), Av 8 °F (36 6 °C), Min:97 4 °F (36 3 °C), Max:98 3 °F (36 8 °C)  Current: Temperature: 97 7 °F (36 5 °C)          Respiratory:  SpO2: SpO2: 98 %, SpO2 Activity: SpO2 Activity: At Rest, SpO2 Device: O2 Device: Mid flow nasal cannula  Nasal Cannula O2 Flow Rate (L/min): 9 L/min    Invasive/non-invasive ventilation settings   Respiratory    Lab Data (Last 4 hours)    None         O2/Vent Data (Last 4 hours)    None                Physical Exam  Vitals and nursing note reviewed  Constitutional:       Appearance: He is ill-appearing  HENT:      Head: Normocephalic  Mouth/Throat:      Mouth: Mucous membranes are dry  Pharynx: Oropharynx is clear  Eyes:      Pupils: Pupils are equal, round, and reactive to light  Cardiovascular:      Rate and Rhythm: Normal rate and regular rhythm  Pulses: Normal pulses  Heart sounds: Normal heart sounds     Pulmonary:      Effort: Pulmonary effort is normal       Breath sounds: Decreased breath sounds present  Abdominal:      Palpations: Abdomen is soft  Musculoskeletal:         General: Normal range of motion  Cervical back: Normal range of motion  Skin:     General: Skin is warm and dry  Neurological:      General: No focal deficit present  Mental Status: He is alert and oriented to person, place, and time           Laboratory and Diagnostics:  Results from last 7 days   Lab Units 07/27/21  0423 07/26/21  0434 07/24/21  0434 07/23/21  0530 07/22/21  0557 07/21/21  0532 07/20/21  0546   WBC Thousand/uL 10 44* 8 02 8 32 8 17 9 36 9 85 13 48*   HEMOGLOBIN g/dL 14 3 12 2 11 5* 12 3 11 3* 11 9* 14 8   HEMATOCRIT % 43 3 38 4 34 7* 37 3 33 9* 35 8* 45 1   PLATELETS Thousands/uL 279 181 227 265 237 247 278   NEUTROS PCT %  --   --   --  87*  --  89*  --    BANDS PCT %  --   --  2  --   --   --  3   MONOS PCT %  --   --   --  6  --  4  --    MONO PCT %  --   --  4  --   --   --  7     Results from last 7 days   Lab Units 07/26/21  0434 07/24/21  0434 07/23/21  0530 07/22/21  0557 07/21/21  0532 07/20/21  0546   SODIUM mmol/L 137 136 135* 135* 137 136   POTASSIUM mmol/L 4 4 4 4 4 5 4 2 4 1 4 1   CHLORIDE mmol/L 103 102 100 101 101 102   CO2 mmol/L 28 29 26 26 26 25   ANION GAP mmol/L 6 5 9 8 10 9   BUN mg/dL 11 15 20 27* 25 14   CREATININE mg/dL 0 69 0 84 0 97 0 84 0 93 0 72   CALCIUM mg/dL 8 2* 8 0* 8 4 8 3 8 3 8 9   GLUCOSE RANDOM mg/dL 135 195* 195* 186* 262* 155*   ALT U/L  --   --   --   --  63 79*   AST U/L  --   --   --   --  21 31   ALK PHOS U/L  --   --   --   --  135* 165 5*   ALBUMIN g/dL  --   --   --   --  1 9* 3 2*   TOTAL BILIRUBIN mg/dL  --   --   --   --  0 33 0 58     Results from last 7 days   Lab Units 07/24/21  0434 07/22/21  0557 07/20/21  0546   MAGNESIUM mg/dL 1 9 2 0 1 9   PHOSPHORUS mg/dL 3 5 3 8  --       Results from last 7 days   Lab Units 07/24/21  0434 07/23/21  0530 07/22/21  0827 07/22/21  0557 07/22/21  0108 07/21/21  1847 07/21/21  1129 07/21/21  0532 07/21/21  0020 07/20/21  1748 07/20/21  1246 07/20/21  0546   INR  2 46* 2 53*  --  1 95*  --   --   --  1 90*  --   --  1 52* 1 30*   PTT seconds  --  55* 68*  --  75* 110* 53*  --  120* 87* 38* 33*      Results from last 7 days   Lab Units 07/20/21  0546   TROPONIN I ng/mL <0 03     Results from last 7 days   Lab Units 07/20/21  0546   LACTIC ACID mmol/L 1 8     ABG:  Results from last 7 days   Lab Units 07/20/21  0633   PH ART  7 454*   PCO2 ART mm Hg 33 9*   PO2 ART mm Hg 54 9*   HCO3 ART mmol/L 23 2   BASE EXC ART mmol/L 0 0   ABG SOURCE  Radial, Right     VBG:  Results from last 7 days   Lab Units 07/20/21  0633   ABG SOURCE  Radial, Right     Results from last 7 days   Lab Units 07/23/21  0530 07/22/21  0557 07/21/21  0532 07/20/21  1748   PROCALCITONIN ng/ml 0 08 0 08 0 15 0 31*       Micro  Results from last 7 days   Lab Units 07/20/21  0553 07/20/21  0545   BLOOD CULTURE  No Growth After 5 Days  No Growth After 5 Days  EKG: normal sinus rhythm  Imaging: I have personally reviewed pertinent reports  and I have personally reviewed pertinent films in PACS    Intake and Output  I/O       07/25 0701 - 07/26 0700 07/26 0701 - 07/27 0700    P  O   120    I V  (mL/kg) 1232 5 (18 2) 1642 5 (24 3)    Total Intake(mL/kg) 1232 5 (18 2) 1762 5 (26 1)    Urine (mL/kg/hr) 1375 (0 8) 1075 (0 7)    Total Output 1375 1075    Net -142 5 +687 5                Height and Weights   Height: 5' 7" (170 2 cm)  IBW (Ideal Body Weight): 66 1 kg  Body mass index is 23 34 kg/m²  Weight (last 2 days)     None            Nutrition       Diet Orders   (From admission, onward)             Start     Ordered    07/23/21 1422  Diet NPO  Diet effective now     Question Answer Comment   Diet Type NPO    RD to adjust diet per protocol?  Yes        07/23/21 1422 07/23/21 1108  Dietary nutrition supplements  Once     Question Answer Comment   Select Supplement: Ensure Pudding-Vanila Frequency Breakfast, Lunch, Dinner        07/23/21 1107    07/23/21 1107  Dietary nutrition supplements  Once     Question Answer Comment   Select Supplement: Magic Cup Chocolate    Frequency Lunch, Dinner        07/23/21 1107                  Active Medications  Scheduled Meds:  Current Facility-Administered Medications   Medication Dose Route Frequency Provider Last Rate    dextrose 5% lactated ringer's  75 mL/hr Intravenous Continuous Librado Self MD 75 mL/hr (07/26/21 1922)    docusate sodium  100 mg Oral BID Daniel Mendoza PA-C      HYDROmorphone  0 5 mg Intravenous Q6H PRN Librado Self MD      levalbuterol  1 25 mg Nebulization TID Daniel Mendoza PA-C      Lidocaine Viscous HCl  15 mL Swish & Spit 4x Daily PRN Daniel Mendoza PA-C      LORazepam  0 5 mg Oral BID PRN Serjio Larissa, CRNP      melatonin  6 mg Oral HS Serjio Larissa, CRNP      methylPREDNISolone sodium succinate  20 mg Intravenous Daily Librado Self MD      metoprolol tartrate  12 5 mg Oral Q12H [address] Daniel Mendoza [place]      JENNIFER ANTIFUNGAL   Topical BID Librado Self MD      oxyCODONE  10 mg Oral Q4H PRN Librado Self MD      oxyCODONE  5 mg Oral Q4H PRN Librado Self MD      pantoprazole  40 mg Intravenous Q12H [address] Daniel Mendoza PA-C      polyethylene glycol  17 g Oral TID Daniel Mendoza PA-C      pravastatin  80 mg Oral Daily With [employer] almeida      senna  1 tablet Oral HS Daniel Mendoza PA-C      sodium chloride  3 mL Nebulization TID Librado Self MD      tiotropium  18 mcg Inhalation Daily Daniel Mendoza PA-C      traZODone  100 mg Oral HS Librado Self MD      warfarin  3 mg Oral Daily (warfarin) Daniel Mendoza PA-C       Continuous Infusions:  dextrose 5% lactated ringer's, 75 mL/hr, Last Rate: 75 mL/hr (07/26/21 1922)      PRN Meds:   HYDROmorphone, 0 5 mg, Q6H PRN  Lidocaine Viscous HCl, 15 mL, 4x Daily PRN  LORazepam, 0 5 mg, BID PRN  oxyCODONE, 10 mg, Q4H PRN  oxyCODONE, 5 mg, Q4H PRN        Invasive Devices Review  Invasive Devices     Central Venous Catheter Line            Port A Cath 08/28/17 Right Chest 1428 days          Peripheral Intravenous Line            Peripheral IV 07/25/21 Left Hand 1 day                Rationale for remaining devices: n/a  ---------------------------------------------------------------------------------------  Advance Directive and Living Will: Yes    Power of : Yes  POLST:    ---------------------------------------------------------------------------------------  Care Time Delivered:   No Critical Care time spent       DESTIN Velasquez      Portions of the record may have been created with voice recognition software  Occasional wrong word or "sound a like" substitutions may have occurred due to the inherent limitations of voice recognition software    Read the chart carefully and recognize, using context, where substitutions have occurred

## 2021-07-27 NOTE — MALNUTRITION/BMI
This medical record reflects one or more clinical indicators suggestive of malnutrition  Malnutrition Findings:   Adult Malnutrition type: Chronic illness  Adult Degree of Malnutrition: Other severe protein calorie malnutrition  Malnutrition Characteristics: Inadequate energy, Weight loss (related to inadequate energy intake as evidenced by intake <50% estimated energy needs for > 5 days, 29% weight loss 2/9/21-7/27/21  Int: Dental soft diet and medical food supplements )    BMI Findings: Body mass index is 21 89 kg/m²  See Nutrition note dated 7/27/21 for additional details  Completed nutrition assessment is viewable in the nutrition documentation

## 2021-07-27 NOTE — PLAN OF CARE
Problem: Nutrition/Hydration-ADULT  Goal: Nutrient/Hydration intake appropriate for improving, restoring or maintaining nutritional needs  Description: Monitor and assess patient's nutrition/hydration status for malnutrition  Collaborate with interdisciplinary team and initiate plan and interventions as ordered  Monitor patient's weight and dietary intake as ordered or per policy  Utilize nutrition screening tool and intervene as necessary  Determine patient's food preferences and provide high-protein, high-caloric foods as appropriate       INTERVENTIONS:  - Monitor oral intake, urinary output, labs, and treatment plans  - Assess nutrition and hydration status and recommend course of action  - Evaluate amount of meals eaten  - Assist patient with eating if necessary   - Allow adequate time for meals  - Recommend/ encourage appropriate diets, oral nutritional supplements, and vitamin/mineral supplements  - Order, calculate, and assess calorie counts as needed  - Recommend, monitor, and adjust tube feedings and TPN/PPN based on assessed needs  - Assess need for intravenous fluids  - Provide specific nutrition/hydration education as appropriate  - Include patient/family/caregiver in decisions related to nutrition  Outcome: Not Progressing   Nutrition interventions to honor goals of care

## 2021-07-27 NOTE — ASSESSMENT & PLAN NOTE
Lab Results   Component Value Date    HGBA1C 6 5 (H) 06/23/2021       Recent Labs     07/26/21  0015 07/26/21  1141 07/26/21  1755 07/26/21  2356   POCGLU 122 159* 158* 136       Blood Sugar Average: Last 72 hrs:  · (P) 139 [id number]     · Hold oral glycemic agents  · Insulin per protocol as needed

## 2021-07-27 NOTE — ASSESSMENT & PLAN NOTE
· Continue HFNC to support respiratory status  · Baseline 5L O2 NC  · Maintain O2 saturations >88%  · Completed 5 day antibiotic course  · Steroids Solu-Medrol 20mg daily   · Question of aspiration, speech following  · Barium swallow demonstrated delayed emptying of the esophagus, incomplete opening of the lower esophageal sphincter and lobulated filling defects in the distal esophagus likely related to known neoplasia  · Pulm following, CT changes thought to be associated with persistent aspiration  · Bronch at this time would not be appropriate or beneficial to the patient  · Encourage good incentive spirometry/pulmonary toileting  · Ongoing goals of care discussions with plans to transition to home hospice

## 2021-07-27 NOTE — PROGRESS NOTES
Progress Note - Pulmonary   A[place]ora Puga 65 y o  male MRN: [id number]  Unit/Bed#:  Encounter: [id number]    Assessment:  Acute on chronic hypoxemic respiratory failure  Metastatic esophageal carcinoma  Recent COVID-19 infection    Plan:  Patient has been transition from high-flow down to mid flow  Continue titrate as able maintain SpO2 greater than 88%  Suspect severe aspiration given underlying esophageal cancer and past sent  Continue antibiotics per Critical Care  BID nebs  Taper steroids    Discussed with critical care AP and nursing at bedside      Subjective:   Patient is also his breathing is about the same as yesterday but overall improved compared to prior  He is not coughing up any sputum  Only has chest pain when eating  Objective:     Vitals: Blood pressure 161/73, pulse 88, temperature 97 6 °F (36 4 °C), temperature source Oral, resp  rate 22, height 5' 7" (1 702 m), weight 63 4 kg (139 lb 12 4 oz), SpO2 98 %  ,Body mass index is 21 89 kg/m²  Intake/Output Summary (Last 24 hours) at 7/27/2021 0954  Last data filed at 7/27/2021 0919  Gross per 24 hour   Intake 1642 5 ml   Output 1050 ml   Net 592 5 ml       Invasive Devices     Central Venous Catheter Line            Port A Cath 08/28/17 Right Chest 1429 days          Peripheral Intravenous Line            Peripheral IV 07/25/21 Left Hand 1 day                Physical Exam:  General appearance: Alert and oriented, in no acute distress  Chronically ill-appearing  Head: Normocephalic, without obvious abnormality, atraumatic  Eyes: EOMI  No discharge bilaterally  No scleral icterus  Neck: Supple, symmetrical, trachea midline  Lungs:  Decreased breath sounds  Heart: Regular rate and rhythm, S1, S2 normal, no murmur  Abdomen:  No appreciable distension or tenderness  Extremities:  No edema or tenderness  Skin: Warm and dry  Neurologic: No acute focal deficits are noted    Labs: I have personally reviewed pertinent lab results    Imaging and other studies: I have personally reviewed pertinent reports

## 2021-07-27 NOTE — PLAN OF CARE
Problem: Nutrition/Hydration-ADULT  Goal: Nutrient/Hydration intake appropriate for improving, restoring or maintaining nutritional needs  Description: Monitor and assess patient's nutrition/hydration status for malnutrition  Collaborate with interdisciplinary team and initiate plan and interventions as ordered  Monitor patient's weight and dietary intake as ordered or per policy  Utilize nutrition screening tool and intervene as necessary  Determine patient's food preferences and provide high-protein, high-caloric foods as appropriate       INTERVENTIONS:  - Monitor oral intake, urinary output, labs, and treatment plans  - Assess nutrition and hydration status and recommend course of action  - Evaluate amount of meals eaten  - Assist patient with eating if necessary   - Allow adequate time for meals  - Recommend/ encourage appropriate diets, oral nutritional supplements, and vitamin/mineral supplements  - Order, calculate, and assess calorie counts as needed  - Recommend, monitor, and adjust tube feedings and TPN/PPN based on assessed needs  - Assess need for intravenous fluids  - Provide specific nutrition/hydration education as appropriate  - Include patient/family/caregiver in decisions related to nutrition  Outcome: Progressing     Problem: Prexisting or High Potential for Compromised Skin Integrity  Goal: Skin integrity is maintained or improved  Description: INTERVENTIONS:  - Identify patients at risk for skin breakdown  - Assess and monitor skin integrity  - Assess and monitor nutrition and hydration status  - Monitor labs   - Assess for incontinence   - Turn and reposition patient  - Assist with mobility/ambulation  - Relieve pressure over bony prominences  - Avoid friction and shearing  - Provide appropriate hygiene as needed including keeping skin clean and dry  - Evaluate need for skin moisturizer/barrier cream  - Collaborate with interdisciplinary team   - Patient/family teaching  - Consider wound care consult   Outcome: Progressing     Problem: Potential for Falls  Goal: Patient will remain free of falls  Description: INTERVENTIONS:  - Educate patient/family on patient safety including physical limitations  - Instruct patient to call for assistance with activity   - Consult OT/PT to assist with strengthening/mobility   - Keep Call bell within reach  - Keep bed low and locked with side rails adjusted as appropriate  - Keep care items and personal belongings within reach  - Initiate and maintain comfort rounds  - Make Fall Risk Sign visible to staff  -- Apply yellow socks and bracelet for high fall risk patients  - Consider moving patient to room near nurses station  Outcome: Progressing     Problem: MOBILITY - ADULT  Goal: Maintain or return to baseline ADL function  Description: INTERVENTIONS:  -  Assess patient's ability to carry out ADLs; assess patient's baseline for ADL function and identify physical deficits which impact ability to perform ADLs (bathing, care of mouth/teeth, toileting, grooming, dressing, etc )  - Assess/evaluate cause of self-care deficits   - Assess range of motion  - Assess patient's mobility; develop plan if impaired  - Assess patient's need for assistive devices and provide as appropriate  - Encourage maximum independence but intervene and supervise when necessary  - Involve family in performance of ADLs  - Assess for home care needs following discharge   - Consider OT consult to assist with ADL evaluation and planning for discharge  - Provide patient education as appropriate  Outcome: Progressing  Goal: Maintains/Returns to pre admission functional level  Description: INTERVENTIONS:  - Perform BMAT or MOVE assessment daily    - Set and communicate daily mobility goal to care team and patient/family/caregiver     - Collaborate with rehabilitation services on mobility goals if consulted  -- Out of bed for toileting  - Record patient progress and toleration of activity level Outcome: Progressing     Problem: PAIN - ADULT  Goal: Verbalizes/displays adequate comfort level or baseline comfort level  Description: Interventions:  - Encourage patient to monitor pain and request assistance  - Assess pain using appropriate pain scale  - Administer analgesics based on type and severity of pain and evaluate response  - Implement non-pharmacological measures as appropriate and evaluate response  - Consider cultural and social influences on pain and pain management  - Notify physician/advanced practitioner if interventions unsuccessful or patient reports new pain  Outcome: Progressing     Problem: INFECTION - ADULT  Goal: Absence or prevention of progression during hospitalization  Description: INTERVENTIONS:  - Assess and monitor for signs and symptoms of infection  - Monitor lab/diagnostic results  - Monitor all insertion sites, i e  indwelling lines, tubes, and drains  - Monitor endotracheal if appropriate and nasal secretions for changes in amount and color  - [place] appropriate cooling/warming therapies per order  - Administer medications as ordered  - Instruct and encourage patient and family to use good hand hygiene technique  - Identify and instruct in appropriate isolation precautions for identified infection/condition  Outcome: Progressing     Problem: SAFETY ADULT  Goal: Patient will remain free of falls  Description: INTERVENTIONS:  - Educate patient/family on patient safety including physical limitations  - Instruct patient to call for assistance with activity   - Consult OT/PT to assist with strengthening/mobility   - Keep Call bell within reach  - Keep bed low and locked with side rails adjusted as appropriate  - Keep care items and personal belongings within reach  - Initiate and maintain comfort rounds  - Make Fall Risk Sign visible to staff    - Apply yellow socks and bracelet for high fall risk patients  - Consider moving patient to room near nurses station  Outcome: Progressing  Goal: Maintain or return to baseline ADL function  Description: INTERVENTIONS:  -  Assess patient's ability to carry out ADLs; assess patient's baseline for ADL function and identify physical deficits which impact ability to perform ADLs (bathing, care of mouth/teeth, toileting, grooming, dressing, etc )  - Assess/evaluate cause of self-care deficits   - Assess range of motion  - Assess patient's mobility; develop plan if impaired  - Assess patient's need for assistive devices and provide as appropriate  - Encourage maximum independence but intervene and supervise when necessary  - Involve family in performance of ADLs  - Assess for home care needs following discharge   - Consider OT consult to assist with ADL evaluation and planning for discharge  - Provide patient education as appropriate  Outcome: Progressing  Goal: Maintains/Returns to pre admission functional level  Description: INTERVENTIONS:  - Perform BMAT or MOVE assessment daily    - Set and communicate daily mobility goal to care team and patient/family/caregiver     - Collaborate with rehabilitation services on mobility goals if consulted  - Out of bed for toileting  - Record patient progress and toleration of activity level   Outcome: Progressing     Problem: DISCHARGE PLANNING  Goal: Discharge to home or other facility with appropriate resources  Description: INTERVENTIONS:  - Identify barriers to discharge w/patient and caregiver  - Arrange for needed discharge resources and transportation as appropriate  - Identify discharge learning needs (meds, wound care, etc )  - Arrange for interpretive services to assist at discharge as needed  - Refer to Case Management Department for coordinating discharge planning if the patient needs post-hospital services based on physician/advanced practitioner order or complex needs related to functional status, cognitive ability, or social support system  Outcome: Progressing     Problem: Knowledge Deficit  Goal: Patient/family/caregiver demonstrates understanding of disease process, treatment plan, medications, and discharge instructions  Description: Complete learning assessment and assess knowledge base    Interventions:  - Provide teaching at level of understanding  - Provide teaching via preferred learning methods  Outcome: Progressing

## 2021-07-27 NOTE — ASSESSMENT & PLAN NOTE
· CXR revealing interval progression of bilateral opacifications  · High resolution CT demonstrated new area of groundglass density in the posterior aspect right upper lobe along with worsening consolidation at both lung bases, increasing reticulation with traction bronchiectatic changes also noted at the lung bases  · Completed anitbiotics  · Courage good incentive spirometry/pulmonary toileting

## 2021-07-27 NOTE — HOSPICE NOTE
Liaison spoke with samuel Rodriguez to explain hospice services and is in agreement with hospice care    DME to be delivered Wednesday am with hospice soc Wednesday afternoon   Per SAMUEL Rodriguez pt to be dc Wednesday am   Liaison will continue to follow until discharge

## 2021-07-27 NOTE — ASSESSMENT & PLAN NOTE
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

## 2021-07-27 NOTE — PROGRESS NOTES
Progress note - Palliative and Supportive Care   Dewey Barone 65 y o  male [id number]    Patient Active Problem List   Diagnosis    Odynophagia    GERD (gastroesophageal reflux disease)    Essential hypertension    Carotid stenosis    Esophageal stricture    Esophageal cancer     Migrated esophageal stent    Migration of esophageal stent    PAD (peripheral artery disease)    Bilateral carotid artery stenosis    Positive colorectal cancer screening using Cologuard test    SOB (shortness of breath)    Multifocal pneumonia    Acute on chronic respiratory failure with hypoxia (HCC)    Moderate protein-calorie malnutrition (HCC)    Type 2 diabetes mellitus, without long-term current use of insulin (HCC)    Paroxysmal A-fib (HCC)    Debility    Anxiety    History of COVID-19    Sepsis ([facility] [address] )    Sacral wound     Active issues specifically addressed today include:   Acute on chronic respiratory failure  Esophageal stricture  Esophageal cancer  Goals of care    Plan:  1  Symptom management -    - Respiratory support per ICU team    2  Goals -    - Patient continues to improve acutely though still suffering from underlying esophageal dysmotility and esophageal cancer  I support home hospice per his wishes given the above  He is aware that he can sign off at any time should he clinically continue to improved  Discussed with Case mgmt today  Interval history:       Patient continues to feel better in his breathing  Now on midflow  Unfortunately when he moves at all he desats and becomes significantly dyspneic  Asked good questions about hospice today       MEDICATIONS / ALLERGIES:     all current active meds have been reviewed and current meds:   Current Facility-Administered Medications   Medication Dose Route Frequency    docusate sodium (COLACE) capsule 100 mg  100 mg Oral BID    HYDROmorphone (DILAUDID) injection 0 5 mg  0 5 mg Intravenous Q6H PRN    levalbuterol (XOPENEX) inhalation solution 1 25 mg  1 25 mg Nebulization TID    Lidocaine Viscous HCl (XYLOCAINE) 2 % mucosal solution 15 mL  15 mL Swish & Spit 4x Daily PRN    LORazepam (ATIVAN) tablet 0 5 mg  0 5 mg Oral BID PRN    melatonin tablet 6 mg  6 mg Oral HS    methylPREDNISolone sodium succinate (Solu-MEDROL) injection 20 mg  20 mg Intravenous Daily    metoprolol tartrate (LOPRESSOR) partial tablet 12 5 mg  12 5 mg Oral Q12H [address]    moisture barrier miconazole 2% cream (aka JENNIFER MOISTURE BARRIER ANTIFUNGAL CREAM)   Topical BID    oxyCODONE (ROXICODONE) immediate release tablet 10 mg  10 mg Oral Q4H PRN    oxyCODONE (ROXICODONE) IR tablet 5 mg  5 mg Oral Q4H PRN    pantoprazole (PROTONIX) injection 40 mg  40 mg Intravenous Q12H [address]    polyethylene glycol (MIRALAX) packet 17 g  17 g Oral TID    pravastatin (PRAVACHOL) tablet 80 mg  80 mg Oral Daily With Dinner    senna (SENOKOT) tablet 8 6 mg  1 tablet Oral HS    sodium chloride 0 9 % inhalation solution 3 mL  3 mL Nebulization TID    tiotropium (SPIRIVA) capsule for inhaler 18 mcg  18 mcg Inhalation Daily    traZODone (DESYREL) tablet 100 mg  100 mg Oral HS    warfarin (COUMADIN) tablet 3 mg  3 mg Oral Daily (warfarin)       Allergies   Allergen Reactions    Other      Cat Dander       OBJECTIVE:    Physical Exam  Physical Exam  Vitals and nursing note reviewed  Constitutional:       General: He is not in acute distress  Appearance: He is ill-appearing  He is not toxic-appearing  HENT:      Head: Atraumatic  Right Ear: External ear normal       Left Ear: External ear normal       Nose: Nose normal    Eyes:      General:         Right eye: No discharge  Left eye: No discharge  Cardiovascular:      Rate and Rhythm: Normal rate  Pulmonary:      Effort: Respiratory distress (with exertion) present  Abdominal:      General: There is no distension  Skin:     General: Skin is warm and dry  Neurological:      General: No focal deficit present  Mental Status: He is alert  Mental status is at baseline  Psychiatric:         Mood and Affect: Mood normal          Behavior: Behavior normal          Lab Results:   I have personally reviewed pertinent labs  , CBC:   Lab Results   Component Value Date    WBC 10 44 (H) 07/27/2021    HGB 14 3 07/27/2021    HCT 43 3 07/27/2021    MCV 90 07/27/2021     07/27/2021    MCH 29 7 07/27/2021    MCHC 33 0 07/27/2021    RDW 15 0 07/27/2021    MPV 8 4 (L) 07/27/2021    NRBC 0 07/27/2021   , CMP: No results found for: SODIUM, K, CL, CO2, ANIONGAP, BUN, CREATININE, GLUCOSE, CALCIUM, AST, ALT, ALKPHOS, PROT, BILITOT, EGFR, BMP:No results found for: SODIUM, K, CL, CO2, BUN, CREATININE, GLUC, GLUF, CALCIUM, AGAP, EGFR    Counseling / Coordination of Care    Total floor / unit time spent today 30+ minutes  Greater than 50% of total time was spent with the patient and / or family counseling and / or coordination of care   A description of the counseling / coordination of care: patietn assessment, med review, goals of care and hospice d/c planning, support

## 2021-07-27 NOTE — TRANSPORTATION MEDICAL NECESSITY
Section I - General Information    Name of Patient: Angela Garcia                 : 1950    Medicare #: [id number]  Transport Date: 21 (PCS is valid for round trips on this date and for all repetitive trips in the 60-day range as noted below )  Origin: [place]: home address: [address] [place] [address], [address] [place] [address]  Is the pt's stay covered under Medicare Part A (PPS/DRG)   []     Closest appropriate facility? If no, why is transport to more distant facility required? No  If no, explain: patient going to home  If hospice pt, is this transport related to pt's terminal illness? No       Section II - Medical Necessity Questionnaire  Ambulance transportation is medically necessary only if other means of transport are contraindicated or would be potentially harmful to the patient  To meet this requirement, the patient must either be "bed confined" or suffer from a condition such that transport by means other than ambulance is contraindicated by the patient's condition  The following questions must be answered by the medical professional signing below for this form to be valid:    1)  Describe the MEDICAL CONDITION (physical and/or mental) of this patient AT [address] [place] [address] that requires the patient to be transported in an ambulance and why transport by other means is contraindicated by the patient's condition:  Acute on chronic respiratory failure  Esophageal stricture  Esophageal cancer  Oxygen 9L Mid Flow    2) Is the patient "bed confined" as defined below? No  To be "be confined" the patient must satisfy all three of the following conditions: (1) unable to get up from bed without Assistance; AND (2) unable to ambulate; AND (3) unable to sit in a chair or wheelchair      3) Can this patient safely be transported by car or wheelchair van (i e , seated during transport without a medical attendant or monitoring)? No    4) In addition to completing questions 1-3 above, please check any of the following conditions that apply  *Note: supporting documentation for any boxes checked must be maintained in the patient's medical records  If hosp-hosp transfer, describe services needed at 2nd facility not available at 1st facility? Medical attendant required   Requires oxygen-unable to self administer  Unable to tolerate seated position for time needed to transport   Unable to sit in a chair or wheelchair due to decubitus ulcers or other wounds   Other(specify) desaturates with movement      Section III - Signature of Physician or Healthcare Professional  I certify that the above information is true and correct based on my evaluation of this patient, and represent that the patient requires transport by ambulance and that other forms of transport are contraindicated  I understand that this information will be used by the Centers for Medicare and Medicaid Services (CMS) to support the determination of medical necessity for ambulance services, and I represent that I have personal knowledge of the patient's condition at time of transport  []  If this box is checked, I also certify that the patient is physically or mentally incapable of signing the ambulance service's claim and that the institution with which I am affiliated has furnished care, services, or assistance to the patient  My signature below is made on behalf of the patient pursuant to 42 CFR §424 36(b)(4)  In accordance with 42 CFR §424 37, the specific reason(s) that the patient is physically or mentally incapable of signing the claim form is as follows: Pt is capable of signing        Signature of Physician* or Healthcare Professional______________________________________________________________  Signature Date 07/27/21 (For scheduled repetitive transports, this form is not valid for transports performed more than 60 days after this date)    Printed Name & Credentials of Physician or Healthcare Professional (MD, DO, RN, etc )_Lynn Josephina Gitelman RN _______  *Form must be signed by patient's attending physician for scheduled, repetitive transports   For non-repetitive, unscheduled ambulance transports, if unable to obtain the signature of the attending physician, any of the following may sign (choose appropriate option below)  [] Physician Assistant []  Clinical Nurse Specialist []  Registered Nurse  []  Nurse Practitioner  [] Discharge Planner

## 2021-07-27 NOTE — ASSESSMENT & PLAN NOTE
· EGD with dilation and stent placement 1/26/21  · EGD with stent removal 4/26/21 secondary to stent migration  · Continue NPO

## 2021-07-27 NOTE — CASE MANAGEMENT
Met with patient and Bonnie Conte today to discuss choice for Home Hospice care  Sunita Rodriguez has selected [id number]  Referral sent via [address] and the hospice liaison will contact Sunita Rodriguez directly to arrange delivery of equipment and time of evaluation  Sunita Rodriguez is aware of same  Family is arriving later today  Pt will need BLS transport home-notified Surjit at [facility]-requested 11am-12 noon pickup time  Awaiting response with time  **addendum: 1534 out of hospital DNR placed on patient's chart  Needs to be completed  Dr Deja Chambers notified of same

## 2021-07-27 NOTE — SPEECH THERAPY NOTE
Speech Language/Pathology    Speech/Language Pathology Progress Note    Patient Name: Dexter Denney  [id number] Date: 7/27/2021    Subjective:  "I haven't eaten in 3 days"    Objective:  EGD completed: Delayed emptying of the esophagus noted along with the esophageal tertiary contractions and retrograde peristaltic waves  There is incomplete opening of the lower esophageal sphincter suggest mild achalasia achalasia  Lobulated filling defects seen in the distal esophagus likely related to patient's known neoplasia    Discussed dysphagia with patient who reports ongoing difficulty with respirations as well as dysmotility  He demonstrated good knowledge of strategies for improved safety and ease of intake  We discussed diet options and he requests to remain on dysphagia 2 diet however indicated a desire for some regular items  I offered upgrade as he is pending hospice however he reports he would like to remain on current dysphagia 2 diet with knowledge he can request diet upgrade at any time       Assessment:  Esophageal dysphagia      Plan/Recommendations:  Diet as per patient request    No further ST indicated at this time    MIRIAM Booth , [address] [facility] Language Pathologist   Available via [address] [place] [address] #[id number]  [place] #[id number]

## 2021-07-28 NOTE — WOUND OSTOMY CARE
Progress Note - Wound   Aurora Puga 65 y o  male MRN: [id number]  Unit/Bed#:  Encounter: [id number]      Assessment:   Patient admitted due to acute on chronic respiratory failure with hypoxia  History of cancer, GERD, HTN, HLD, diabetes, COVID-19  Wound care follow-up for sacral wound and rash  Patient agreeable to assessment, alert and oriented x4, assist of 1 to turn for assessment, continent of bowel and bladder, heels elevated off of mattress, wedges in use for turning and repositioning, in ICU on a critical care low air loss mattress, on Mid Flow nasal cannula, dependent for care at this time  Patient will be going home on hospice      1  Pressure injury bilateral buttock, stage 2- POA- Wounds pictured and measured together  Wounds are scattered, irregular in shape- suspect etiology to be mixture of pressure and moisture  Wounds are moist, partial thickness, 100% non-blanchable beefy red tissue, scant amount of serosanguineous drainage  Suzy-wounds are dry, intact, blanchable redness, blanchable hyperpigmented skin, noted to have fungal rash appearance with satellite lesions, and brown scaly skin      2  Scrotum and bilateral groin- Noted to have fungal rash with improvement from last assessment  Patient stated he tends to have significant moisture build up to the groin and buttock and has frequently formed rashes over the years  Skin is now dry, intact, brown scaly skin, and 100% blanchable red skin, no drainage on assessment  Suzy-wound is dry, intact, blanchable pink skin, satellite lesions and brown scaly skin      3  Bilateral hips, elbows, and heels are dry, intact, blanchable pink skin      Educated patient on importance of frequent offloading of pressure via turning, repositioning, and weight-shifting  Verbalized understanding of plan of care      No induration, fluctuance, odor, warmth, or purulence noted to the above noted wounds   Patient tolerated fairly well, reports pain to the scrotal wound  Primary nurse aware of plan of care  See flow sheets for more detailed assessment findings  Will follow along      Skin care plans:  1-Cleanse sacro-buttock and groin with soap and water, pat dry  Apply JENNIFER to sacrum, buttocks BID and PRN  2-Hydraguard to bilateral heel and scrotum BID and PRN  3-Elevate heels to offload pressure  4-Ehob cushion when out of bed  5-Turn/repoisiton q2h or when medically stable for pressure re-distribution on skin  6-Moisturize skin daily with skin nourishing cream   7-Wound care will follow weekly, tiger text with questions or concerns  Wound 07/07/21 Buttocks (Active)   Wound Image   07/28/21 0831   Wound Description Beefy red 07/28/21 0831   Pressure Injury Stage 2 07/28/21 0831   Suzy-wound Assessment Dry; Intact;Pink;Rash;Scaly 07/28/21 0831   Wound Length (cm) 3 cm 07/28/21 0831   Wound Width (cm) 0 5 cm 07/28/21 0831   Wound Depth (cm) 0 1 cm 07/28/21 0831   Wound Surface Area (cm^2) 1 5 cm^2 07/28/21 0831   Wound Volume (cm^3) 0 15 cm^3 07/28/21 0831   Calculated Wound Volume (cm^3) 0 15 cm^3 07/28/21 0831   Change in Wound Size % 90 62 07/28/21 0831   Drainage Amount Scant 07/28/21 0831   Drainage Description Serosanguineous 07/28/21 0831   Non-staged Wound Description Partial thickness 07/28/21 0831   Treatments Cleansed;Site care 07/28/21 0831   Dressing Other (Comment) 07/28/21 0831   Dressing Changed     Patient Tolerance Tolerated well 07/28/21 0831   Dressing Status         Wound 07/20/21 Other (comment) Dermatologic/Rash Perineum (Active)   Wound Image   07/28/21 0833   Wound Description Dry; Intact; Pink 07/28/21 0833   Suzy-wound Assessment Dry; Intact;Scaly 07/28/21 0833   Wound Length (cm)     Wound Width (cm)     Wound Depth (cm)     Wound Surface Area (cm^2)     Wound Volume (cm^3)     Calculated Wound Volume (cm^3)     Drainage Amount None 07/28/21 0833   Drainage Description     Non-staged Wound Description Not applicable 45/76/54 5972   Treatments Cleansed;Site care 07/28/21 0833   Dressing Moisture barrier 07/28/21 0833       Call or tigertext with any questions  Wound Care will continue to follow    Felipe Church RN BSN  Wound care

## 2021-07-28 NOTE — CASE MANAGEMENT
Call placed to Surendra Duncan this morning and discussed with her that the patient would be an 11 AM  today  by [facility]  She told me that the equipment will be delivered at 11AM  And felt that this was a good time for him to be transported home  [place], [facility] [place] [place] made aware of  time  IMM explauned and signed

## 2021-07-28 NOTE — NURSING NOTE
Pt remains awake alert oriented and denies pain, transportation set up by case mngt for home hospice

## 2021-07-28 NOTE — DISCHARGE SUMMARY
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  He required distal esophageal stenting secondary to dysphagia January 2021  He underwent palliative radiation completed April 2021  Esophageal stent was furthermore removed by thoracic surgery April 6/20/21 secondary to irrigation  He has since been maintained on Herceptin chemotherapy and is followed by Dr Piper  He was confirmed positive for COVID-19 pneumonia May of 2021 and did not require inpatient hospitalization  He was admitted to [facility] 6/15/21 to 7/11/21 for treatment of multifocal pneumonia versus pneumonitis  He was treated with course of broad-spectrum antibiotics and high-dose steroids  He was discharged to acute rehab on 5 L nasal cannula and prednisone taper  He presented to emergency department today for evaluation of nonradiating substernal chest pain and shortness of breath  Upon arrival to emergency department patient was noted to be tachypneic, tachycardic, and hypoxemic  He was placed on BiPAP to support respiratory status  Checks x-ray revealing interval increase in bilateral opacifications  Patient was started on broad-spectrum antibiotics, also receiving Decadron    He was subsequently transferred to [facility] for higher level of care given need for persistent BiPAP therapy "      Procedures Performed: No orders of the defined types were placed in this encounter  Summary of Hospital Course:  Upon arrival to THE [facility] ICU patient was weaned off of BIPAP therapy to high flow nasal canula  He was started prophylactically on broad-spectrum antibiotics, and high-dose systemic IV steroids  Etiology of acute hypoxemic respiratory failure unclear  Despite interventions he failed to respond to therapies  High-resolution chest CT was obtained revealing bronchiectatic changes to the bilateral lung bases  Pulmonary Medicine was consulted  Given advanced esophageal disease there was concern for chronic silent reflux  Palliative Care Medicine was consulted to provide emotional and social support to patient and family  An esophagram was obtained confirming suspicion of reflux  Therapy options were explored with family including placement of G-J tube, and tube feeding vs transition to hospice  Ultimately patient and family agreed upon transition to hospice care  Patient wishes were to enroll in home hospice to spend his last days surrounded by family  With the help of multidisciplinary care including palliative Care, Case Management patient was able to be safely discharged home to home hospice today      Significant Findings, Care, Treatment and Services Provided:    07/21: CT chest high resolution  07/26: FL barium swallow         Complications: N/A    Discharge Diagnosis:   Principal Problem:    Acute on chronic respiratory failure with hypoxia (HCC)  Active Problems:    Multifocal pneumonia    History of COVID-19    Sepsis (HCC)    Paroxysmal A-fib (HCC)    Esophageal cancer     Esophageal stricture    Moderate protein-calorie malnutrition (HCC)    Essential hypertension    Type 2 diabetes mellitus, without long-term current use of insulin (HCC)    GERD (gastroesophageal reflux disease)    Sacral wound    Severe protein-calorie malnutrition [facility])      Medical Problems     Resolved Problems  Date Reviewed: 7/27/2021    None Condition at Discharge: stable         Discharge instructions/Information to patient and family:   See after visit summary for information provided to patient and family  Provisions for Follow-Up Care:  See after visit summary for information related to follow-up care and any pertinent home health orders  PCP: Jennifer Valerio DO    Disposition: Home hospice    Planned Readmission: No      Discharge Statement   I spent 20 minutes discharging the patient  This time was spent on the day of discharge  I had direct contact with the patient on the day of discharge  Additional documentation is required if more than 30 minutes were spent on discharge  Discharge Medications:  See after visit summary for reconciled discharge medications provided to patient and family

## 2021-08-11 NOTE — PROGRESS NOTES
Patient presents today for treatment with Herceptin  Patient offers no complaints  EF 55% 8/30/2018  Port accessed without incident with excellent blood return noted  Detail Level: Zone Samples Given: Amlactin 06910 Detailed

## 2021-08-24 ENCOUNTER — HOSPITAL ENCOUNTER (OUTPATIENT)
Dept: INFUSION CENTER | Facility: CLINIC | Age: 71
Discharge: HOME | End: 2021-08-24

## 2023-04-10 NOTE — UTILIZATION REVIEW
17.8 Anabella Lozano PA-C   Physician Assistant   Critical Care/ICU   Discharge Summary       Attested   Date of Service:  7/28/2021 12:20 PM               Attested        Attestation signed by Amy Whitehead MD at 7/28/2021 5:31 PM   Agree with documentation            Show:Clear all  [x]Manual[x]Template[x]Copied    Added by:  Yvonne Brewer PA-C    []Urbano for details     Discharge Summary - Marybeth Sexton 65 y o  male MRN: [id number]     Unit/Bed#:  Encounter: [id number]     Admission Date:       Admission Orders (From admission, onward)              Ordered          07/20/21 1013   Inpatient Admission  Once                          Admitting Diagnosis: Pneumonia [J18 9]     HPI: Per personal documented history               " Reather Lesches a 65 y  o  male who presented to [facility] from [facility] for evaluation of progressive shortness of breath/chest pain   Patient has a significant past medical history of 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   He required distal esophageal stenting secondary to dysphagia January 2021   He underwent palliative radiation completed April 2021  Esophageal stent was furthermore removed by thoracic surgery April 6/20/21 secondary to irrigation  Rosalie Dias has since been maintained on Herceptin chemotherapy and is followed by Darshan Israel was confirmed positive for COVID-19 pneumonia May of 2021 and did not require inpatient hospitalization  Rosalie Dias was admitted to [facility] 6/15/21 to 7/11/21 for treatment of multifocal pneumonia versus pneumonitis  Rosalie Dias was treated with course of broad-spectrum antibiotics and high-dose steroids  Rosalie Dias was discharged to acute rehab on 5 L nasal cannula and prednisone taper   He presented to emergency department today for evaluation of nonradiating substernal chest pain and shortness of breath   Upon arrival to emergency department patient was noted to be tachypneic, tachycardic, and hypoxemic   He was placed on BiPAP to support respiratory status   Checks x-ray revealing interval increase in bilateral opacifications   Patient was started on broad-spectrum antibiotics, also receiving Decadron   He was subsequently transferred to [facility] for higher level of care given need for persistent BiPAP therapy  "        Procedures Performed: No orders of the defined types were placed in this encounter         Summary of Hospital Course:  Upon arrival to THE [facility] ICU patient was weaned off of BIPAP therapy to high flow nasal canula  He was started prophylactically on broad-spectrum antibiotics, and high-dose systemic IV steroids  Etiology of acute hypoxemic respiratory failure unclear  Despite interventions he failed to respond to therapies  High-resolution chest CT was obtained revealing bronchiectatic changes to the bilateral lung bases  Pulmonary Medicine was consulted  Given advanced esophageal disease there was concern for chronic silent reflux  Palliative Care Medicine was consulted to provide emotional and social support to patient and family  An esophagram was obtained confirming suspicion of reflux  Therapy options were explored with family including placement of G-J tube, and tube feeding vs transition to hospice  Ultimately patient and family agreed upon transition to hospice care  Patient wishes were to enroll in home hospice to spend his last days surrounded by family    With the help of multidisciplinary care including palliative Care, Case Management patient was able to be safely discharged home to home hospice today      Significant Findings, Care, Treatment and Services Provided:     07/21: CT chest high resolution  07/26: FL barium swallow            Complications: N/A     Discharge Diagnosis:   Principal Problem:    Acute on chronic respiratory failure with hypoxia ([facility] )  Active Problems:    Multifocal pneumonia    History of COVID-19    Sepsis ([facility] )    Paroxysmal A-fib ([facility] )    Esophageal cancer     Esophageal stricture    Moderate protein-calorie malnutrition ([facility] )    Essential hypertension    Type 2 diabetes mellitus, without long-term current use of insulin (HCC)    GERD (gastroesophageal reflux disease)    Sacral wound    Severe protein-calorie malnutrition ([facility] )            Medical Problems            Resolved Problems  Date Reviewed: 7/27/2021           None                     Condition at Discharge: stable         Discharge instructions/Information to patient and family:   See after visit summary for information provided to patient and family        Provisions for Follow-Up Care:  See after visit summary for information related to follow-up care and any pertinent home health orders        PCP: Daina Villarreal DO     Disposition: Home hospice     Planned Readmission: No        Discharge Statement   I spent 20 minutes discharging the patient  This time was spent on the day of discharge  I had direct contact with the patient on the day of discharge   Additional documentation is required if more than 30 minutes were spent on discharge       Discharge Medications:  See after visit summary for reconciled discharge medications provided to patient and family                      Cosigned by: Amy Whitehead MD at 7/28/2021  5:31 PM   Revision History

## (undated) DEVICE — SINGLE-USE BIOPSY FORCEPS: Brand: RADIAL JAW 4

## (undated) DEVICE — GLOVE SRG BIOGEL ECLIPSE 7

## (undated) DEVICE — GAUZE SPONGES,16 PLY: Brand: CURITY

## (undated) DEVICE — FORCEP ENDO RESCUE COMBO 230CM

## (undated) DEVICE — TUBING SUCTION 5MM X 12 FT

## (undated) DEVICE — PLUMEPEN PRO 10FT

## (undated) DEVICE — 2000CC GUARDIAN II: Brand: GUARDIAN

## (undated) DEVICE — HEAVY DUTY TABLE COVER: Brand: CONVERTORS

## (undated) DEVICE — SUT SILK 2-0 18 IN A185H

## (undated) DEVICE — MEDI-VAC YANK SUCT HNDL W/TPRD BULBOUS TIP: Brand: CARDINAL HEALTH

## (undated) DEVICE — 60 ML SYRINGE,REGULAR TIP: Brand: MONOJECT

## (undated) DEVICE — NEEDLE 18 G X 1 1/2

## (undated) DEVICE — UTILITY MARKER,BLACK WITH LABELS: Brand: DEVON

## (undated) DEVICE — AIR AND WATER TUBING/CAP SET FOR OLYMPUS® SCOPES: Brand: ERBE

## (undated) DEVICE — BETHLEHEM MAJOR GENERAL PACK: Brand: CARDINAL HEALTH

## (undated) DEVICE — LIGACLIP MCA MULTIPLE CLIP APPLIERS, 20 MEDIUM CLIPS: Brand: LIGACLIP

## (undated) DEVICE — INTENDED FOR TISSUE SEPARATION, AND OTHER PROCEDURES THAT REQUIRE A SHARP SURGICAL BLADE TO PUNCTURE OR CUT.: Brand: BARD-PARKER SAFETY BLADES SIZE 10, STERILE

## (undated) DEVICE — FIRST STEP BEDSIDE KIT - STAND-UP POUCH, ENDOSCOPIC CLEANING PAD - 1 POUCH: Brand: FIRST STEP BEDSIDE KIT - STAND-UP POUCH, ENDOSCOPIC CLEANING PAD

## (undated) DEVICE — CHLORAPREP HI-LITE 26ML ORANGE

## (undated) DEVICE — Device: Brand: DEFENDO AIR/WATER/SUCTION AND BIOPSY VALVE

## (undated) DEVICE — SUT VICRYL 2-0 D-SPECIAL 27 IN D8114

## (undated) DEVICE — GLOVE INDICATOR PI UNDERGLOVE SZ 7 BLUE

## (undated) DEVICE — POOLE SUCTION HANDLE: Brand: CARDINAL HEALTH

## (undated) DEVICE — SUT VICRYL 3-0 SH 27 IN J416H

## (undated) DEVICE — GUIDEWIRE ENDO DREAMWIRE 0.035 X 260CM STR SS

## (undated) DEVICE — INTENDED FOR TISSUE SEPARATION, AND OTHER PROCEDURES THAT REQUIRE A SHARP SURGICAL BLADE TO PUNCTURE OR CUT.: Brand: BARD-PARKER SAFETY BLADES SIZE 15, STERILE

## (undated) DEVICE — 3000CC GUARDIAN II: Brand: GUARDIAN

## (undated) DEVICE — HIGH PERFORMANCE GUIDEWIRE: Brand: JAGWIRE